# Patient Record
Sex: MALE | Race: WHITE | NOT HISPANIC OR LATINO
[De-identification: names, ages, dates, MRNs, and addresses within clinical notes are randomized per-mention and may not be internally consistent; named-entity substitution may affect disease eponyms.]

---

## 2017-02-09 ENCOUNTER — APPOINTMENT (OUTPATIENT)
Dept: HEART AND VASCULAR | Facility: CLINIC | Age: 82
End: 2017-02-09

## 2017-03-23 ENCOUNTER — APPOINTMENT (OUTPATIENT)
Dept: HEART AND VASCULAR | Facility: CLINIC | Age: 82
End: 2017-03-23

## 2017-03-23 VITALS — RESPIRATION RATE: 16 BRPM | DIASTOLIC BLOOD PRESSURE: 70 MMHG | SYSTOLIC BLOOD PRESSURE: 118 MMHG | HEART RATE: 64 BPM

## 2017-03-23 VITALS — BODY MASS INDEX: 19.61 KG/M2 | HEIGHT: 73 IN | WEIGHT: 148 LBS

## 2017-03-23 VITALS
SYSTOLIC BLOOD PRESSURE: 137 MMHG | WEIGHT: 148 LBS | DIASTOLIC BLOOD PRESSURE: 61 MMHG | HEIGHT: 73 IN | BODY MASS INDEX: 19.61 KG/M2 | HEART RATE: 71 BPM

## 2017-03-23 DIAGNOSIS — Z00.00 ENCOUNTER FOR GENERAL ADULT MEDICAL EXAMINATION W/OUT ABNORMAL FINDINGS: ICD-10-CM

## 2017-07-20 ENCOUNTER — APPOINTMENT (OUTPATIENT)
Dept: HEART AND VASCULAR | Facility: CLINIC | Age: 82
End: 2017-07-20

## 2017-07-20 VITALS — HEART RATE: 63 BPM | SYSTOLIC BLOOD PRESSURE: 136 MMHG | RESPIRATION RATE: 16 BRPM | DIASTOLIC BLOOD PRESSURE: 80 MMHG

## 2017-07-20 RX ORDER — TAMSULOSIN HYDROCHLORIDE 0.4 MG/1
0.4 CAPSULE ORAL
Qty: 30 | Refills: 0 | Status: ACTIVE | COMMUNITY
Start: 2017-04-03

## 2017-07-20 RX ORDER — DEXTRAN 70/HYPROMELLOSE 0.1%-0.3%
0.1-0.3 DROPS OPHTHALMIC (EYE)
Qty: 15 | Refills: 0 | Status: ACTIVE | COMMUNITY
Start: 2017-01-31

## 2017-08-16 ENCOUNTER — MEDICATION RENEWAL (OUTPATIENT)
Age: 82
End: 2017-08-16

## 2017-10-26 ENCOUNTER — APPOINTMENT (OUTPATIENT)
Dept: HEART AND VASCULAR | Facility: CLINIC | Age: 82
End: 2017-10-26
Payer: OTHER GOVERNMENT

## 2017-10-26 VITALS — WEIGHT: 159 LBS | BODY MASS INDEX: 21.07 KG/M2 | HEIGHT: 73 IN

## 2017-10-26 DIAGNOSIS — R07.9 CHEST PAIN, UNSPECIFIED: ICD-10-CM

## 2017-10-26 PROCEDURE — 36415 COLL VENOUS BLD VENIPUNCTURE: CPT

## 2017-10-26 PROCEDURE — 99214 OFFICE O/P EST MOD 30 MIN: CPT

## 2017-10-26 RX ORDER — LEVETIRACETAM 500 MG/1
500 TABLET, FILM COATED ORAL
Qty: 60 | Refills: 0 | Status: DISCONTINUED | COMMUNITY
Start: 2017-04-18 | End: 2017-10-26

## 2017-10-29 VITALS — RESPIRATION RATE: 16 BRPM | HEART RATE: 64 BPM | DIASTOLIC BLOOD PRESSURE: 80 MMHG | SYSTOLIC BLOOD PRESSURE: 124 MMHG

## 2017-10-29 LAB
ALBUMIN SERPL ELPH-MCNC: 3.5 G/DL
ALP BLD-CCNC: 73 U/L
ALT SERPL-CCNC: 13 U/L
ANION GAP SERPL CALC-SCNC: 13 MMOL/L
AST SERPL-CCNC: 10 U/L
BILIRUB SERPL-MCNC: 0.3 MG/DL
BUN SERPL-MCNC: 14 MG/DL
CALCIUM SERPL-MCNC: 8.2 MG/DL
CHLORIDE SERPL-SCNC: 107 MMOL/L
CHOLEST SERPL-MCNC: 98 MG/DL
CHOLEST/HDLC SERPL: 2.5 RATIO
CO2 SERPL-SCNC: 27 MMOL/L
CREAT SERPL-MCNC: 1.01 MG/DL
GLUCOSE SERPL-MCNC: 105 MG/DL
HDLC SERPL-MCNC: 40 MG/DL
LDLC SERPL CALC-MCNC: 42 MG/DL
POTASSIUM SERPL-SCNC: 4.3 MMOL/L
PROT SERPL-MCNC: 5.5 G/DL
SODIUM SERPL-SCNC: 147 MMOL/L
TRIGL SERPL-MCNC: 82 MG/DL

## 2018-02-22 ENCOUNTER — APPOINTMENT (OUTPATIENT)
Dept: HEART AND VASCULAR | Facility: CLINIC | Age: 83
End: 2018-02-22
Payer: COMMERCIAL

## 2018-02-22 VITALS — HEIGHT: 73 IN | WEIGHT: 160 LBS | BODY MASS INDEX: 21.2 KG/M2

## 2018-02-22 PROCEDURE — 99214 OFFICE O/P EST MOD 30 MIN: CPT

## 2018-02-26 VITALS — DIASTOLIC BLOOD PRESSURE: 70 MMHG | RESPIRATION RATE: 16 BRPM | HEART RATE: 64 BPM | SYSTOLIC BLOOD PRESSURE: 130 MMHG

## 2018-06-19 ENCOUNTER — MEDICATION RENEWAL (OUTPATIENT)
Age: 83
End: 2018-06-19

## 2018-06-19 RX ORDER — METOPROLOL TARTRATE 25 MG/1
25 TABLET, FILM COATED ORAL
Qty: 90 | Refills: 3 | Status: ACTIVE | COMMUNITY
Start: 2017-03-03 | End: 1900-01-01

## 2018-08-23 ENCOUNTER — APPOINTMENT (OUTPATIENT)
Dept: HEART AND VASCULAR | Facility: CLINIC | Age: 83
End: 2018-08-23
Payer: COMMERCIAL

## 2018-08-23 VITALS — WEIGHT: 149 LBS | BODY MASS INDEX: 19.75 KG/M2 | HEIGHT: 73 IN

## 2018-08-23 PROCEDURE — 99214 OFFICE O/P EST MOD 30 MIN: CPT | Mod: 25

## 2018-08-23 PROCEDURE — 36415 COLL VENOUS BLD VENIPUNCTURE: CPT

## 2018-08-23 RX ORDER — AMANTADINE HYDROCHLORIDE 100 MG/1
100 CAPSULE ORAL
Qty: 30 | Refills: 0 | Status: DISCONTINUED | COMMUNITY
Start: 2017-04-03 | End: 2018-08-23

## 2018-08-24 ENCOUNTER — EMERGENCY (EMERGENCY)
Facility: HOSPITAL | Age: 83
LOS: 1 days | Discharge: HOME | End: 2018-08-24
Attending: EMERGENCY MEDICINE | Admitting: EMERGENCY MEDICINE
Payer: COMMERCIAL

## 2018-08-24 VITALS — SYSTOLIC BLOOD PRESSURE: 132 MMHG | HEART RATE: 60 BPM | RESPIRATION RATE: 16 BRPM | DIASTOLIC BLOOD PRESSURE: 80 MMHG

## 2018-08-24 VITALS
DIASTOLIC BLOOD PRESSURE: 50 MMHG | TEMPERATURE: 98 F | OXYGEN SATURATION: 97 % | RESPIRATION RATE: 18 BRPM | WEIGHT: 149.69 LBS | HEART RATE: 81 BPM | SYSTOLIC BLOOD PRESSURE: 186 MMHG

## 2018-08-24 VITALS
DIASTOLIC BLOOD PRESSURE: 87 MMHG | RESPIRATION RATE: 18 BRPM | TEMPERATURE: 98 F | HEART RATE: 76 BPM | SYSTOLIC BLOOD PRESSURE: 176 MMHG | OXYGEN SATURATION: 97 %

## 2018-08-24 DIAGNOSIS — Z98.890 OTHER SPECIFIED POSTPROCEDURAL STATES: Chronic | ICD-10-CM

## 2018-08-24 LAB
ALBUMIN SERPL ELPH-MCNC: 3.5 G/DL
ALP BLD-CCNC: 57 U/L
ALT SERPL-CCNC: 9 U/L
ANION GAP SERPL CALC-SCNC: 16 MMOL/L
AST SERPL-CCNC: 14 U/L
BILIRUB SERPL-MCNC: 0.4 MG/DL
BUN SERPL-MCNC: 14 MG/DL
CALCIUM SERPL-MCNC: 6.2 MG/DL
CHLORIDE SERPL-SCNC: 104 MMOL/L
CHOLEST SERPL-MCNC: 94 MG/DL
CHOLEST/HDLC SERPL: 2.1 RATIO
CO2 SERPL-SCNC: 25 MMOL/L
CREAT SERPL-MCNC: 0.9 MG/DL
GLUCOSE SERPL-MCNC: 100 MG/DL
HDLC SERPL-MCNC: 45 MG/DL
LDLC SERPL CALC-MCNC: 39 MG/DL
POTASSIUM SERPL-SCNC: 4 MMOL/L
PROT SERPL-MCNC: 5.3 G/DL
SODIUM SERPL-SCNC: 145 MMOL/L
TRIGL SERPL-MCNC: 51 MG/DL

## 2018-08-24 PROCEDURE — 96375 TX/PRO/DX INJ NEW DRUG ADDON: CPT

## 2018-08-24 PROCEDURE — 99284 EMERGENCY DEPT VISIT MOD MDM: CPT | Mod: 25

## 2018-08-24 PROCEDURE — 83735 ASSAY OF MAGNESIUM: CPT

## 2018-08-24 PROCEDURE — 82330 ASSAY OF CALCIUM: CPT

## 2018-08-24 PROCEDURE — 82040 ASSAY OF SERUM ALBUMIN: CPT

## 2018-08-24 PROCEDURE — 85025 COMPLETE CBC W/AUTO DIFF WBC: CPT

## 2018-08-24 PROCEDURE — 85610 PROTHROMBIN TIME: CPT

## 2018-08-24 PROCEDURE — 80048 BASIC METABOLIC PNL TOTAL CA: CPT

## 2018-08-24 PROCEDURE — 36415 COLL VENOUS BLD VENIPUNCTURE: CPT

## 2018-08-24 PROCEDURE — 96374 THER/PROPH/DIAG INJ IV PUSH: CPT

## 2018-08-24 PROCEDURE — 99284 EMERGENCY DEPT VISIT MOD MDM: CPT

## 2018-08-24 PROCEDURE — 84100 ASSAY OF PHOSPHORUS: CPT

## 2018-08-24 PROCEDURE — 85730 THROMBOPLASTIN TIME PARTIAL: CPT

## 2018-08-24 PROCEDURE — 80053 COMPREHEN METABOLIC PANEL: CPT

## 2018-08-24 RX ORDER — MAGNESIUM SULFATE 500 MG/ML
2 VIAL (ML) INJECTION ONCE
Qty: 0 | Refills: 0 | Status: COMPLETED | OUTPATIENT
Start: 2018-08-24 | End: 2018-08-24

## 2018-08-24 RX ORDER — CALCIUM CARBONATE 500(1250)
2 TABLET ORAL ONCE
Qty: 0 | Refills: 0 | Status: COMPLETED | OUTPATIENT
Start: 2018-08-24 | End: 2018-08-24

## 2018-08-24 RX ORDER — CALCIUM GLUCONATE 100 MG/ML
2 VIAL (ML) INTRAVENOUS ONCE
Qty: 0 | Refills: 0 | Status: COMPLETED | OUTPATIENT
Start: 2018-08-24 | End: 2018-08-24

## 2018-08-24 RX ORDER — MAGNESIUM OXIDE 400 MG ORAL TABLET 241.3 MG
800 TABLET ORAL ONCE
Qty: 0 | Refills: 0 | Status: COMPLETED | OUTPATIENT
Start: 2018-08-24 | End: 2018-08-24

## 2018-08-24 RX ORDER — MAGNESIUM OXIDE 400 MG ORAL TABLET 241.3 MG
1 TABLET ORAL
Qty: 14 | Refills: 0 | OUTPATIENT
Start: 2018-08-24 | End: 2018-09-06

## 2018-08-24 RX ADMIN — Medication 2 TABLET(S): at 14:24

## 2018-08-24 RX ADMIN — Medication 200 GRAM(S): at 14:25

## 2018-08-24 RX ADMIN — Medication 50 GRAM(S): at 15:22

## 2018-08-24 RX ADMIN — MAGNESIUM OXIDE 400 MG ORAL TABLET 800 MILLIGRAM(S): 241.3 TABLET ORAL at 15:22

## 2018-08-24 NOTE — ED ADULT NURSE NOTE - PMH
Atherosclerosis of coronary artery  CAD (coronary artery disease)  Stented coronary artery  x4, per pt.

## 2018-08-24 NOTE — ED PROVIDER NOTE - OBJECTIVE STATEMENT
84 yo male h/o cad s/p stent, prostate ca, gerd c/o low calcium found on routine blood work yest.  Pt notes ~ 1 mo nonbloody diarrhea w 10 lb wt loss, denies any new meds or changes in meds.  No fever. No weakness, muscle cramps/pain, palpitations, sob, cp, abd pain, n/v, dysuria.

## 2018-08-24 NOTE — ED PROVIDER NOTE - MEDICAL DECISION MAKING DETAILS
Pt w incidental finding of low ca - asymptomatic.  EKG w/o sig hypocalcemia findings.  Will recheck labs; reassess.

## 2018-08-24 NOTE — ED ADULT NURSE NOTE - NSIMPLEMENTINTERV_GEN_ALL_ED
Implemented All Universal Safety Interventions:  [place] to call system. Call bell, personal items and telephone within reach. Instruct patient to call for assistance. Room bathroom lighting operational. Non-slip footwear when patient is off stretcher. Physically safe environment: no spills, clutter or unnecessary equipment. Stretcher in lowest position, wheels locked, appropriate side rails in place.

## 2018-08-24 NOTE — ED ADULT TRIAGE NOTE - CHIEF COMPLAINT QUOTE
pt states " I had annual blood work and they call me from my doctor's office to tell me that my calcium was low". ekg in progress.

## 2018-08-24 NOTE — ED PROVIDER NOTE - PROGRESS NOTE DETAILS
Pt w low ca here, albumin 3.3.  Mg, Phos, vit d and pth ordered.  Ca repletion ordered; will recheck. Mag low and repleted; repeat mg, ca improved.  Pt discussed w hospitalist, Dr Stuart - agrees w dc since pt asymptomatic.  Vit d and pth pending; pt set up for outpt appt bt referrals coordinator.  Rudolph groves.

## 2018-08-24 NOTE — ED PROVIDER NOTE - PMH
Atherosclerosis of coronary artery  CAD (coronary artery disease)  Stented coronary artery  x4, per pt. Atherosclerosis of coronary artery  CAD (coronary artery disease)  Prostate cancer    Stented coronary artery  x4, per pt.

## 2018-08-24 NOTE — ED ADULT NURSE NOTE - OBJECTIVE STATEMENT
Pt directed to ED from cardiologist [place] abnormal labs.  PT states "I had a heart check up yesterday and some blood was drawn and today my doctor said I should come here because my Calcium is abnormal."  EKG obtained.  PT denies palpitations, CP, SOB, Dizziness, N/V/D, Fevers at this time.

## 2018-08-28 DIAGNOSIS — Z79.82 LONG TERM (CURRENT) USE OF ASPIRIN: ICD-10-CM

## 2018-08-28 DIAGNOSIS — R19.7 DIARRHEA, UNSPECIFIED: ICD-10-CM

## 2018-08-28 DIAGNOSIS — I25.10 ATHEROSCLEROTIC HEART DISEASE OF NATIVE CORONARY ARTERY WITHOUT ANGINA PECTORIS: ICD-10-CM

## 2018-08-28 DIAGNOSIS — Z79.02 LONG TERM (CURRENT) USE OF ANTITHROMBOTICS/ANTIPLATELETS: ICD-10-CM

## 2018-08-28 DIAGNOSIS — R79.89 OTHER SPECIFIED ABNORMAL FINDINGS OF BLOOD CHEMISTRY: ICD-10-CM

## 2018-08-28 DIAGNOSIS — E83.42 HYPOMAGNESEMIA: ICD-10-CM

## 2018-08-28 DIAGNOSIS — Z79.899 OTHER LONG TERM (CURRENT) DRUG THERAPY: ICD-10-CM

## 2018-08-28 DIAGNOSIS — E83.51 HYPOCALCEMIA: ICD-10-CM

## 2018-08-29 ENCOUNTER — APPOINTMENT (OUTPATIENT)
Dept: INTERNAL MEDICINE | Facility: CLINIC | Age: 83
End: 2018-08-29

## 2018-08-29 PROBLEM — C61 MALIGNANT NEOPLASM OF PROSTATE: Chronic | Status: ACTIVE | Noted: 2018-08-24

## 2018-09-04 ENCOUNTER — APPOINTMENT (OUTPATIENT)
Dept: INTERNAL MEDICINE | Facility: CLINIC | Age: 83
End: 2018-09-04

## 2018-09-05 ENCOUNTER — LABORATORY RESULT (OUTPATIENT)
Age: 83
End: 2018-09-05

## 2018-09-05 ENCOUNTER — APPOINTMENT (OUTPATIENT)
Dept: INTERNAL MEDICINE | Facility: CLINIC | Age: 83
End: 2018-09-05
Payer: COMMERCIAL

## 2018-09-05 DIAGNOSIS — Z78.9 OTHER SPECIFIED HEALTH STATUS: ICD-10-CM

## 2018-09-05 DIAGNOSIS — Z90.49 ACQUIRED ABSENCE OF OTHER SPECIFIED PARTS OF DIGESTIVE TRACT: ICD-10-CM

## 2018-09-05 DIAGNOSIS — I25.10 ATHEROSCLEROTIC HEART DISEASE OF NATIVE CORONARY ARTERY W/OUT ANGINA PECTORIS: ICD-10-CM

## 2018-09-05 PROCEDURE — 36415 COLL VENOUS BLD VENIPUNCTURE: CPT

## 2018-09-05 PROCEDURE — 99204 OFFICE O/P NEW MOD 45 MIN: CPT | Mod: 25,GC

## 2018-09-05 RX ORDER — ATORVASTATIN CALCIUM 20 MG/1
20 TABLET, FILM COATED ORAL
Qty: 135 | Refills: 3 | Status: ACTIVE | COMMUNITY
Start: 2018-09-05

## 2018-09-05 RX ORDER — MENTHOL 5.8 MG/1
500 LOZENGE ORAL
Qty: 180 | Refills: 0 | Status: ACTIVE | COMMUNITY
Start: 2018-09-05

## 2018-09-05 RX ORDER — OMEPRAZOLE 20 MG/1
20 CAPSULE, DELAYED RELEASE ORAL DAILY
Qty: 30 | Refills: 0 | Status: ACTIVE | COMMUNITY
Start: 2018-09-05

## 2018-09-05 RX ORDER — ATORVASTATIN CALCIUM 10 MG/1
10 TABLET, FILM COATED ORAL
Qty: 30 | Refills: 0 | Status: DISCONTINUED | COMMUNITY
Start: 2017-03-03 | End: 2018-09-05

## 2018-09-05 RX ORDER — FINASTERIDE 5 MG/1
5 TABLET, FILM COATED ORAL
Qty: 30 | Refills: 0 | Status: DISCONTINUED | COMMUNITY
Start: 2017-03-31 | End: 2018-09-05

## 2018-09-05 RX ORDER — OMEGA-3/DHA/EPA/FISH OIL 300-1000MG
400 CAPSULE ORAL
Qty: 30 | Refills: 0 | Status: ACTIVE | COMMUNITY
Start: 2018-09-05

## 2018-09-05 NOTE — COUNSELING
[Healthy eating counseling provided] : healthy eating [Inadequate social support] : Inadequate social support [Financial issues] : Financial issues [de-identified] : wife currently hospitalized for broken hip

## 2018-09-05 NOTE — END OF VISIT
[] : Resident [FreeTextEntry3] : Transfer PCP to our practice. \par Sent to ED by cardiology with hypocalcemia\par ED w/u shows low ca, otw unremarkable, no further work up for etiology is done. \par Corrected Ca 6.9. \par Diarrhea. -now resolved. \par Exam: neuro-- negative paresthesia \par \par DDx: Hypocalcemia 2/2 PTH?  Electrolyte abnormality with recent diarreha?  \par Check BMP, PTH, urine lytes, Mg. \par \par f/u labs with Dr. August over phone this wk. \par RTO with [facility] in 2 months.   [>50% of Time Spent on Counseling for ____] : Greater than 50% of the encounter time was spent on counseling for [unfilled] [Time Spent: ___ minutes] : I have spent [unfilled] minutes of face to face time with the patient

## 2018-09-05 NOTE — HISTORY OF PRESENT ILLNESS
[Post-hospitalization from ___ Hospital] : Post-hospitalization from [unfilled] Hospital [FreeTextEntry2] : The patient had a 2-month history diarrhea for which he did not seek medical care. On 8/23 Mr. Cha was seen by cardiologist for routine f/u labs for his CAD s/p 3x stents and found to have hypocalcemia to 6.1 after which he was recommended to go to ED. At ED, calcium was 6.3 and magnesium was 0.7. Patient received electrolyte repletion and IV fluids and was discharged on PO calcium vitamin D tablet and PO magnesium. His diarrhea resolved after starting these supplements. He presents today for evaluation. Denies any other complaints.

## 2018-09-05 NOTE — ASSESSMENT
[FreeTextEntry1] : Mr. Cha is an 86yo M with PMH HLD, HTN, CAD s/p 3x stents, BPH, GERD who presents for f/u of acute diarrhea, hypocalcemia and hypomagnesemia detected upon routine labs at outpatient cards then seen by ED. Poor PO intake vs acute illness vs primary hypoparathyroidism vs increased urinary calcium losses. Diarrhea resolved after starting PO calcium, vitamin D and magnesium. Patient has been tolerating good PO and has good appetite.\par \par \par 1. Hypocalcemia, hypomagnesemia\par - labs today: BMP, magnesium, UA, urine protein, 24 hr urine calcium\par - continue magnesium 400 mg po daily\par - switch from calcium 600/ vitamin D 200 qd to Tums 3 tablets BID as per patient current tablets too big\par \par 2. Diarrhea\par - resolved, patient educated to call clinic if diarrhea recurs\par

## 2018-09-05 NOTE — PHYSICAL EXAM
[No Acute Distress] : no acute distress [Well Nourished] : well nourished [Well Developed] : well developed [Well-Appearing] : well-appearing [Normal Voice/Communication] : normal voice/communication [Normal Sclera/Conjunctiva] : normal sclera/conjunctiva [No JVD] : no jugular venous distention [No Respiratory Distress] : no respiratory distress  [Clear to Auscultation] : lungs were clear to auscultation bilaterally [No Accessory Muscle Use] : no accessory muscle use [Normal Rate] : normal rate  [Regular Rhythm] : with a regular rhythm [Normal S1, S2] : normal S1 and S2 [No Murmur] : no murmur heard [No Edema] : there was no peripheral edema [No Extremity Clubbing/Cyanosis] : no extremity clubbing/cyanosis [Soft] : abdomen soft [Non Tender] : non-tender [Non-distended] : non-distended [No Masses] : no abdominal mass palpated [Kyphosis] : kyphosis [Grossly Normal Strength/Tone] : grossly normal strength/tone [No Rash] : no rash [Coordination Grossly Intact] : coordination grossly intact [No Focal Deficits] : no focal deficits [Speech Grossly Normal] : speech grossly normal [Memory Grossly Normal] : memory grossly normal [Normal Affect] : the affect was normal [Alert and Oriented x3] : oriented to person, place, and time [Normal Mood] : the mood was normal [Normal Insight/Judgement] : insight and judgment were intact [de-identified] : Elderly adult white male [de-identified] : antalgic gait

## 2018-09-05 NOTE — REVIEW OF SYSTEMS
[Joint Pain] : joint pain [Fever] : no fever [Chills] : no chills [Recent Change In Weight] : ~T no recent weight change [Chest Pain] : no chest pain [Palpitations] : no palpitations [Lower Ext Edema] : no lower extremity edema [Orthopena] : no orthopnea [Shortness Of Breath] : no shortness of breath [Cough] : no cough [Dyspnea on Exertion] : not dyspnea on exertion [Abdominal Pain] : no abdominal pain [Nausea] : no nausea [Constipation] : no constipation [Diarrhea] : no diarrhea [Vomiting] : no vomiting [Dysuria] : no dysuria [Hesitancy] : no hesitancy

## 2018-09-10 LAB
ANION GAP SERPL CALC-SCNC: 13 MMOL/L
APPEARANCE: CLEAR
BILIRUBIN URINE: ABNORMAL
BLOOD URINE: NEGATIVE
BUN SERPL-MCNC: 14 MG/DL
CALCIUM SERPL-MCNC: 8.4 MG/DL
CALCIUM SERPL-MCNC: 8.4 MG/DL
CAU: 4 MG/DL
CHLORIDE SERPL-SCNC: 103 MMOL/L
CO2 SERPL-SCNC: 27 MMOL/L
COLOR: ABNORMAL
CREAT 24H UR-MCNC: NORMAL
CREAT ?TM UR-MCNC: 304 MG/DL
CREAT SERPL-MCNC: 0.82 MG/DL
GLUCOSE QUALITATIVE U: NEGATIVE MG/DL
GLUCOSE SERPL-MCNC: 105 MG/DL
KETONES URINE: ABNORMAL
LEUKOCYTE ESTERASE URINE: NEGATIVE
MAGNESIUM SERPL-MCNC: 1.5 MG/DL
NITRITE URINE: NEGATIVE
PARATHYROID HORMONE INTACT: 38 PG/ML
PH URINE: 5.5
POTASSIUM SERPL-SCNC: 4.4 MMOL/L
PROT ?TM UR-MCNC: NORMAL
PROTEIN URINE: ABNORMAL MG/DL
SODIUM SERPL-SCNC: 143 MMOL/L
SPECIFIC GRAVITY URINE: 1.03
SPECIMEN VOL 24H UR: NORMAL
SPECIMEN VOL 24H UR: NORMAL
UROBILINOGEN URINE: 1 MG/DL

## 2018-09-12 ENCOUNTER — INPATIENT (INPATIENT)
Facility: HOSPITAL | Age: 83
LOS: 0 days | Discharge: HOME | DRG: 392 | End: 2018-09-13
Attending: STUDENT IN AN ORGANIZED HEALTH CARE EDUCATION/TRAINING PROGRAM | Admitting: STUDENT IN AN ORGANIZED HEALTH CARE EDUCATION/TRAINING PROGRAM
Payer: COMMERCIAL

## 2018-09-12 VITALS
HEART RATE: 56 BPM | RESPIRATION RATE: 18 BRPM | TEMPERATURE: 98 F | DIASTOLIC BLOOD PRESSURE: 75 MMHG | WEIGHT: 149.91 LBS | SYSTOLIC BLOOD PRESSURE: 161 MMHG | OXYGEN SATURATION: 98 %

## 2018-09-12 DIAGNOSIS — Z98.890 OTHER SPECIFIED POSTPROCEDURAL STATES: Chronic | ICD-10-CM

## 2018-09-12 DIAGNOSIS — Z95.5 PRESENCE OF CORONARY ANGIOPLASTY IMPLANT AND GRAFT: ICD-10-CM

## 2018-09-12 DIAGNOSIS — Z91.89 OTHER SPECIFIED PERSONAL RISK FACTORS, NOT ELSEWHERE CLASSIFIED: ICD-10-CM

## 2018-09-12 DIAGNOSIS — K40.90 UNILATERAL INGUINAL HERNIA, WITHOUT OBSTRUCTION OR GANGRENE, NOT SPECIFIED AS RECURRENT: ICD-10-CM

## 2018-09-12 DIAGNOSIS — K52.9 NONINFECTIVE GASTROENTERITIS AND COLITIS, UNSPECIFIED: ICD-10-CM

## 2018-09-12 DIAGNOSIS — C61 MALIGNANT NEOPLASM OF PROSTATE: ICD-10-CM

## 2018-09-12 DIAGNOSIS — Z29.9 ENCOUNTER FOR PROPHYLACTIC MEASURES, UNSPECIFIED: ICD-10-CM

## 2018-09-12 DIAGNOSIS — I10 ESSENTIAL (PRIMARY) HYPERTENSION: ICD-10-CM

## 2018-09-12 LAB — LACTATE SERPL-SCNC: 1.1 MMOL/L — SIGNIFICANT CHANGE UP (ref 0.5–2)

## 2018-09-12 PROCEDURE — 74177 CT ABD & PELVIS W/CONTRAST: CPT | Mod: 26

## 2018-09-12 PROCEDURE — 99285 EMERGENCY DEPT VISIT HI MDM: CPT | Mod: 25

## 2018-09-12 PROCEDURE — 93010 ELECTROCARDIOGRAM REPORT: CPT

## 2018-09-12 PROCEDURE — 99222 1ST HOSP IP/OBS MODERATE 55: CPT | Mod: GC

## 2018-09-12 RX ORDER — MAGNESIUM SULFATE 500 MG/ML
2 VIAL (ML) INJECTION ONCE
Qty: 0 | Refills: 0 | Status: COMPLETED | OUTPATIENT
Start: 2018-09-12 | End: 2018-09-12

## 2018-09-12 RX ORDER — IOHEXOL 300 MG/ML
30 INJECTION, SOLUTION INTRAVENOUS ONCE
Qty: 0 | Refills: 0 | Status: COMPLETED | OUTPATIENT
Start: 2018-09-12 | End: 2018-09-12

## 2018-09-12 RX ORDER — SODIUM CHLORIDE 9 MG/ML
500 INJECTION INTRAMUSCULAR; INTRAVENOUS; SUBCUTANEOUS ONCE
Qty: 0 | Refills: 0 | Status: DISCONTINUED | OUTPATIENT
Start: 2018-09-12 | End: 2018-09-12

## 2018-09-12 RX ORDER — METRONIDAZOLE 500 MG
500 TABLET ORAL EVERY 8 HOURS
Qty: 0 | Refills: 0 | Status: DISCONTINUED | OUTPATIENT
Start: 2018-09-13 | End: 2018-09-13

## 2018-09-12 RX ORDER — ONDANSETRON 8 MG/1
4 TABLET, FILM COATED ORAL ONCE
Qty: 0 | Refills: 0 | Status: COMPLETED | OUTPATIENT
Start: 2018-09-12 | End: 2018-09-12

## 2018-09-12 RX ORDER — CEFTRIAXONE 500 MG/1
1 INJECTION, POWDER, FOR SOLUTION INTRAMUSCULAR; INTRAVENOUS EVERY 24 HOURS
Qty: 0 | Refills: 0 | Status: DISCONTINUED | OUTPATIENT
Start: 2018-09-13 | End: 2018-09-13

## 2018-09-12 RX ORDER — SODIUM CHLORIDE 9 MG/ML
1000 INJECTION INTRAMUSCULAR; INTRAVENOUS; SUBCUTANEOUS
Qty: 0 | Refills: 0 | Status: DISCONTINUED | OUTPATIENT
Start: 2018-09-12 | End: 2018-09-13

## 2018-09-12 RX ORDER — CEFTRIAXONE 500 MG/1
1 INJECTION, POWDER, FOR SOLUTION INTRAMUSCULAR; INTRAVENOUS ONCE
Qty: 0 | Refills: 0 | Status: COMPLETED | OUTPATIENT
Start: 2018-09-12 | End: 2018-09-12

## 2018-09-12 RX ORDER — METRONIDAZOLE 500 MG
500 TABLET ORAL ONCE
Qty: 0 | Refills: 0 | Status: COMPLETED | OUTPATIENT
Start: 2018-09-12 | End: 2018-09-12

## 2018-09-12 RX ORDER — SODIUM CHLORIDE 9 MG/ML
1000 INJECTION INTRAMUSCULAR; INTRAVENOUS; SUBCUTANEOUS ONCE
Qty: 0 | Refills: 0 | Status: COMPLETED | OUTPATIENT
Start: 2018-09-12 | End: 2018-09-12

## 2018-09-12 RX ADMIN — Medication 100 MILLIGRAM(S): at 21:35

## 2018-09-12 RX ADMIN — ONDANSETRON 4 MILLIGRAM(S): 8 TABLET, FILM COATED ORAL at 17:33

## 2018-09-12 RX ADMIN — CEFTRIAXONE 1 GRAM(S): 500 INJECTION, POWDER, FOR SOLUTION INTRAMUSCULAR; INTRAVENOUS at 21:49

## 2018-09-12 RX ADMIN — SODIUM CHLORIDE 100 MILLILITER(S): 9 INJECTION INTRAMUSCULAR; INTRAVENOUS; SUBCUTANEOUS at 17:33

## 2018-09-12 RX ADMIN — SODIUM CHLORIDE 1000 MILLILITER(S): 9 INJECTION INTRAMUSCULAR; INTRAVENOUS; SUBCUTANEOUS at 20:16

## 2018-09-12 RX ADMIN — CEFTRIAXONE 100 GRAM(S): 500 INJECTION, POWDER, FOR SOLUTION INTRAMUSCULAR; INTRAVENOUS at 21:16

## 2018-09-12 RX ADMIN — Medication 50 GRAM(S): at 23:42

## 2018-09-12 RX ADMIN — IOHEXOL 30 MILLILITER(S): 300 INJECTION, SOLUTION INTRAVENOUS at 17:33

## 2018-09-12 NOTE — ED PROVIDER NOTE - MEDICAL DECISION MAKING DETAILS
Pt p/w abd pain, n/v. Pt reports large painful hernia PTA, resolved. Possibly incarcerated hernia, now reduced. Will get labs, CT a/p, surgical consult. Dispo pending w/u and clinical status

## 2018-09-12 NOTE — H&P ADULT - HISTORY OF PRESENT ILLNESS
85 y/o M PMhx of HTN, CAD s/p PCI (last stent 2012), ICH 2/2 fall (2016), prostate ca s/p resection, gerd, b/l inguinal hernia with right incarcerated hernia s/p repair (years ago) here with abd pain. The patient reports lower abd pain that started this AM. The pain is crampy, nonradiating, intermittent and worse with palpation. The patient states that he has bowel movements daily but they have been "loose" for the past month. He has been taking imodium at home with reduction in the amount of loose stools. He also reports an episode of nausea and vomiting earlier today that was nonbloody. The patient denies chest pain, shortness of breath, fever, or chills. Denies recent travel, recent hospitalizations or sick contacts.     CT a/p with iv and po contrast had findings consistent with colitis and also showed some mesenteric edema.   Patient lactate was 2.3 but normalized after 1.5L of NS.   General surgery evaluated patient given hx of incarcerated hernias and mesenteric edema. No urgent surgical intervention recommended. 87 y/o M PMhx of HTN, CAD s/p PCI (last stent 2012), ICH 2/2 fall (2016), prostate ca s/p resection, gerd, b/l inguinal hernia with right incarcerated hernia s/p repair (years ago) here with abd pain. The patient reports lower abd pain that started this AM. The pain is crampy, nonradiating, intermittent and worse with palpation. The patient states that he has bowel movements daily but they have been "loose" for the past month. He has been taking imodium at home with reduction in the amount of loose stools. He also reports an episode of nausea and vomiting earlier today that was nonbloody. The patient denies chest pain, shortness of breath, fever, or chills. Denies recent travel, recent hospitalizations or sick contacts.     CT a/p with iv and po contrast had findings consistent with colitis and also showed some mesenteric edema.   Patient lactate was 2.3 but normalized after 1.5L of NS.   General surgery evaluated patient given hx of incarcerated hernias and mesenteric edema. No urgent surgical intervention recommended.     FH: Denies cardiac history in father

## 2018-09-12 NOTE — ED PROVIDER NOTE - PHYSICAL EXAMINATION
Constitutional: Elderly, frail, awake, alert, oriented to person, place, time/situation and in no apparent distress.  ENMT: Airway patent. Normal MM  Eyes: Clear bilaterally  Cardiac: Normal rate, regular rhythm.  Heart sounds S1, S2.  No murmurs, rubs or gallops.  Respiratory: Breaths sounds equal and clear b/l. No increased WOB, tachypnea, hypoxia, or accessory mm use. Pt speaks in full sentences.   Gastrointestinal: Abd soft, NT, ND, NABS. No guarding, rebound, or rigidity. No pulsatile abdominal masses. No organomegaly appreciated. No CVAT. + mild swelling in L inguinal region w/o clear hernia palpated.   Musculoskeletal: Range of motion is not limited  Neuro: Alert and oriented, grossly non focal  Skin: Skin normal color for race, warm, dry and intact. No evidence of rash.  Psych: Alert and oriented to person, place, time/situation. normal mood and affect. no apparent risk to self or others. Constitutional: Elderly, frail, awake, alert, oriented to person, place, time/situation and in no apparent distress.  ENMT: Airway patent. Normal MM  Eyes: Clear bilaterally  Cardiac: Normal rate, regular rhythm.  Heart sounds S1, S2.  No murmurs, rubs or gallops.  Respiratory: Breaths sounds equal and clear b/l. No increased WOB, tachypnea, hypoxia, or accessory mm use. Pt speaks in full sentences.   Gastrointestinal: Abd soft, ND, NABS. + mild ttp w/ deep palpation in the LLQ. No guarding, rebound, or rigidity. No pulsatile abdominal masses. No organomegaly appreciated. No CVAT. + mild swelling in L inguinal region w/o clear hernia palpated, ttp in this region.   Musculoskeletal: Range of motion is not limited  Neuro: Alert and oriented, grossly non focal  Skin: Skin normal color for race, warm, dry and intact. No evidence of rash.  Psych: Alert and oriented to person, place, time/situation. normal mood and affect. no apparent risk to self or others.

## 2018-09-12 NOTE — H&P ADULT - PROBLEM SELECTOR PLAN 2
CT showing mesenteric edema which is less prominent that prior scans. Inguinal hernias reducible b/l.   -General surgery on board -- no urgent surgical intervention required at this point in time. Recommendations appreciated.   -Serial abd exams.

## 2018-09-12 NOTE — CONSULT NOTE ADULT - SUBJECTIVE AND OBJECTIVE BOX
HPI:  86 year old male with history of HTN, CAD s/p stent x 3, GERD, Prostate cancer s/p resection, bilateral inguinal hernias s/p open R inguinal hernia repair. The patient presents with a one day history of sudden abdominal pain associated with some nausea / vomiting. The patient states that this pain was located in the epigastric region as well over his L groin. The patient's abdominal pain improved upon presentation to the ED. The patient continues to have normal bowel function.   The patient currently denies nausea / vomiting / fevers / chills / CP / SOB / dysuria.     PAST MEDICAL & SURGICAL HISTORY:  Prostate cancer  Stented coronary artery: x4, per pt.  Atherosclerosis of coronary artery: CAD (coronary artery disease)  History of prostate surgery    MEDICATIONS  (STANDING):  sodium chloride 0.9%. 1000 milliLiter(s) (100 mL/Hr) IV Continuous <Continuous>    Allergies  No Known Allergies    Vital Signs Last 24 Hrs  T(C): 36.5 (12 Sep 2018 20:14), Max: 36.6 (12 Sep 2018 16:37)  T(F): 97.7 (12 Sep 2018 20:14), Max: 97.9 (12 Sep 2018 16:37)  HR: 75 (12 Sep 2018 20:14) (56 - 75)  BP: 126/62 (12 Sep 2018 20:14) (126/62 - 161/75)  BP(mean): --  RR: 16 (12 Sep 2018 20:14) (16 - 18)  SpO2: 99% (12 Sep 2018 20:14) (98% - 99%)    PHYSICAL EXAM  Gen: NAD   HEENT: NCAT   Resp: CTA BL   CV: RRR  Abd: soft, nt, nd, b/l inguinal hernias reducible non incarcerated likely fat containing   Extremities: no swelling, cyanosis or edema   Neuro: grossly intact     LABS:                        12.8   5.5   )-----------( 205      ( 12 Sep 2018 16:58 )             38.4     09-12    143  |  100  |  15  ----------------------------<  164<H>  3.8   |  29  |  0.93    Ca    9.2      12 Sep 2018 16:58    TPro  6.0  /  Alb  3.8  /  TBili  0.8  /  DBili  x   /  AST  14  /  ALT  10  /  AlkPhos  59  09-12    PT/INR - ( 12 Sep 2018 16:58 )   PT: 14.9 sec;   INR: 1.33        PTT - ( 12 Sep 2018 16:58 )  PTT:30.7 sec    RADIOLOGY & ADDITIONAL STUDIES:  CT scan --> diffuse colitis, consistent with CT scan from 2015  bilateral inguinal hernia's not containing fat

## 2018-09-12 NOTE — ED ADULT NURSE REASSESSMENT NOTE - NS ED NURSE REASSESS COMMENT FT1
report was received from off-going RN, pt. in CT at present, will bolus NS upon return and repeat Lactate afterwards

## 2018-09-12 NOTE — ED ADULT TRIAGE NOTE - CHIEF COMPLAINT QUOTE
patient BIBA from home complains of lower abdominal pain with n/v x 3 days. states that he vomited 6 times today. denies chest pain, sob, fever, chills. reports having diarrhea as well but not today.

## 2018-09-12 NOTE — ED PROVIDER NOTE - PROGRESS NOTE DETAILS
Surgery consulted for possible incarcerated hernia. Pt in CT and surgery will see after CT CT results reviewed. A/p radiology documentation - surgery aware. D/w surgery who is reviewing w/ surgical attending at this time CT results reviewed. A/p radiology documentation - surgery aware. D/w surgery who is reviewing w/ surgical attending Dr Bhatt at this time PT seen by surgery - does not feel mesenteric ischemia. Feels colitis of infection / inflamm pathology. Recommends admission to medicine. PT seen by surgery - does not feel mesenteric ischemia. Feels colitis of infection / inflamm pathology. Recommends admission to medicine. Pt had watery BM in the ED, non bloody. Will give abx, admit to medicine. Rpt lactate nml

## 2018-09-12 NOTE — ED ADULT NURSE NOTE - OBJECTIVE STATEMENT
Patient presents to ED with c/o abdominal pain x3 days associated with nausea and vomiting. Reports vomiting 6 times today Patient also reports diarrhea.  Denies chest pain, sob, fever, chills.

## 2018-09-12 NOTE — CONSULT NOTE ADULT - ASSESSMENT
86 year old male with history of HTN, CAD s/p stent x 3, GERD, Prostate cancer s/p resection, bilateral inguinal hernias s/p open R inguinal hernia repair. The patient presents with colitis and bilateral reducible inguinal hernia   - no urgent surgical intervention at this time   - etiology likely inflammatory versus infectious, unlikely ischemic given distribution of colitis   - bilateral groin hernias are reducible, non incarcerated and fat containing  - patient should been hydrated given elevated lactate  - recommend medical admission and GI work up   - recommend stool studies   - discussed with Dr. Bhatt 86 year old male with history of HTN, CAD s/p stent x 3, GERD, Prostate cancer s/p resection, bilateral inguinal hernias s/p open R inguinal hernia repair. The patient presents with colitis and bilateral reducible inguinal hernia   - no urgent surgical intervention at this time   - etiology likely inflammatory versus infectious, unlikely ischemic given distribution of colitis   - bilateral groin hernias are reducible, non incarcerated and fat containing  - patient should been hydrated given elevated lactate and have lactate re checked   - recommend medical admission and GI work up   - recommend stool studies   - discussed with Dr. Bhatt

## 2018-09-12 NOTE — H&P ADULT - PROBLEM SELECTOR PLAN 1
As per CT scan. Patient reports subacute to chronic loose stool but has been taking imodium. Does not meet SIRS criteria.  -c/w CTX and Flagyl.   -Stool PCR and stool culture.  -Consider O/P x3.   -Serial abd exams.

## 2018-09-12 NOTE — H&P ADULT - PROBLEM SELECTOR PLAN 7
1) PCP Contacted on Admission: (Y/N) --> Name & Phone #:  2) Date of Contact with PCP:  3) PCP Contacted at Discharge: (Y/N, N/A): na   4) Summary of Handoff Given to PCP:   5) Post-Discharge Appointment Date and Location: 1) PCP Contacted on Admission: (Y/N) --> Name & Phone #:  [phone]  2) Date of Contact with PCP:  3) PCP Contacted at Discharge: (Y/N, N/A): na   4) Summary of Handoff Given to PCP:   5) Post-Discharge Appointment Date and Location:

## 2018-09-12 NOTE — H&P ADULT - NSHPPHYSICALEXAM_GEN_ALL_CORE
Appearance: NAD. Speaking in full sentences.   HEENT:   Conjunctiva clear b/l. Moist oral mucosa.  Cardiovascular: RRR with no murmurs.  Respiratory: Lungs CTAB.   Gastrointestinal:  Soft, nontender. Non-distended. Non-rigid.	  Extremities: No edema b/l. No erythema b/l. LE WWP b/l.  Vascular: DP 2+ b/l.  Neurologic:  Alert and awake. Moving all extremities. Following commands. Making eye contact. Appearance: NAD. Speaking in full sentences.   HEENT:   Conjunctiva clear b/l.  Oral mucosa moist.   Cardiovascular: RRR with no murmurs.  Respiratory: Lungs CTAB.   Gastrointestinal:  Soft, Non-distended. Non-rigid. Mild tenderness to deep palpation of the lower abd quadrants. Reducible inguinal hernias noted b/l. No tenderness with inguinal hernia exam b/l. 	  Extremities: No edema b/l. No erythema b/l. LE WWP b/l.  Vascular: DP 2+ b/l.  Neurologic:  Alert and awake. Moving all extremities. Following commands. Making eye contact.  Skin: no rash noted. Appearance: NAD. Speaking in full sentences.   HEENT:   Conjunctiva clear b/l.  Oral mucosa moist.   Cardiovascular: RRR with no murmurs.  Respiratory: Lungs CTAB.   Gastrointestinal:  Soft, Non-distended. Non-rigid. Mild tenderness to deep palpation of the lower abd quadrants. Reducible inguinal hernias noted b/l. No tenderness with inguinal hernia exam b/l. 	  Extremities: No edema b/l. No erythema b/l. LE WWP b/l.  Vascular: DP 2+ b/l.  Neurologic:  Alert and awake. Moving all extremities. Following commands. Making eye contact.  Skin: no rash noted.  Psych: normal affect

## 2018-09-12 NOTE — H&P ADULT - NSHPLABSRESULTS_GEN_ALL_CORE
LABS:                        12.8   5.5   )-----------( 205      ( 12 Sep 2018 16:58 )             38.4     09-12    143  |  100  |  15  ----------------------------<  164<H>  3.8   |  29  |  0.93    Ca    9.2      12 Sep 2018 16:58    TPro  6.0  /  Alb  3.8  /  TBili  0.8  /  DBili  x   /  AST  14  /  ALT  10  /  AlkPhos  59  09-12    PT/INR - ( 12 Sep 2018 16:58 )   PT: 14.9 sec;   INR: 1.33          PTT - ( 12 Sep 2018 16:58 )  PTT:30.7 sec LABS:                        12.8   5.5   )-----------( 205      ( 12 Sep 2018 16:58 )             38.4     09-12    143  |  100  |  15  ----------------------------<  164<H>  3.8   |  29  |  0.93    Ca    9.2      12 Sep 2018 16:58    TPro  6.0  /  Alb  3.8  /  TBili  0.8  /  DBili  x   /  AST  14  /  ALT  10  /  AlkPhos  59  09-12    PT/INR - ( 12 Sep 2018 16:58 )   PT: 14.9 sec;   INR: 1.33       PTT - ( 12 Sep 2018 16:58 )  PTT:30.7 sec    Additional studies reviewed

## 2018-09-12 NOTE — ED ADULT NURSE NOTE - NSIMPLEMENTINTERV_GEN_ALL_ED
Implemented All Universal Safety Interventions:  [place] to call system. Call bell, personal items and telephone within reach. Instruct patient to call for assistance. Room bathroom lighting operational. Non-slip footwear when patient is off stretcher. Physically safe environment: no spills, clutter or unnecessary equipment. Stretcher in lowest position, wheels locked, appropriate side rails in place.

## 2018-09-12 NOTE — H&P ADULT - PMH
Atherosclerosis of coronary artery  CAD (coronary artery disease)  Prostate cancer    Stented coronary artery  x4, per pt.

## 2018-09-12 NOTE — H&P ADULT - ASSESSMENT
85 y/o M PMhx of HTN, CAD s/p PCI (last stent 2012), ICH 2/2 fall (2016), prostate ca s/p resection, gerd, b/l inguinal hernia with right incarcerated hernia s/p repair (years ago) here with colitis.

## 2018-09-12 NOTE — ED PROVIDER NOTE - OBJECTIVE STATEMENT
Pt w/ PMHx HTN, HLD, CAD s/p PCI, ICH, prostate CA in remission, GERD, PSHx R inguinal hernia repair s/p incarceration at [facility] in [place], p/w abd pain, n/v. Pt reports LLQ pain, onset earlier todayu, and bulging known L inguinal hernia w/ pain. Pt reports multiple episodes of nonbilious, nonbloody emesis. Pt reports 1 hard BM earlier today. Pt is not passing flatus. No F/U/D or hematuria. No flank pain. No f/c. At the time of exam, pt reports his hernia reduced, and is pain-free for the past 15 min. Pt w/ PMHx HTN, HLD, CAD s/p PCI, ICH, prostate CA in remission, GERD, PSHx R inguinal hernia repair s/p incarceration at [facility] in [place], p/w abd pain, n/v. Pt reports LLQ pain, onset earlier today, and bulging known L inguinal hernia w/ pain. Pt reports multiple episodes of nonbilious, nonbloody emesis. Pt reports 1 hard BM earlier today. Pt is not passing flatus. No F/U/D or hematuria. No flank pain. No f/c. At the time of exam, pt reports his hernia reduced, and is pain-free for the past 15 min.

## 2018-09-13 ENCOUNTER — TRANSCRIPTION ENCOUNTER (OUTPATIENT)
Age: 83
End: 2018-09-13

## 2018-09-13 VITALS
HEART RATE: 67 BPM | OXYGEN SATURATION: 99 % | RESPIRATION RATE: 16 BRPM | TEMPERATURE: 98 F | SYSTOLIC BLOOD PRESSURE: 129 MMHG | DIASTOLIC BLOOD PRESSURE: 63 MMHG

## 2018-09-13 DIAGNOSIS — I25.10 ATHEROSCLEROTIC HEART DISEASE OF NATIVE CORONARY ARTERY WITHOUT ANGINA PECTORIS: ICD-10-CM

## 2018-09-13 DIAGNOSIS — E83.42 HYPOMAGNESEMIA: ICD-10-CM

## 2018-09-13 DIAGNOSIS — K40.20 BILATERAL INGUINAL HERNIA, WITHOUT OBSTRUCTION OR GANGRENE, NOT SPECIFIED AS RECURRENT: ICD-10-CM

## 2018-09-13 LAB — HBA1C BLD-MCNC: 5.2 % — SIGNIFICANT CHANGE UP (ref 4–5.6)

## 2018-09-13 PROCEDURE — 83605 ASSAY OF LACTIC ACID: CPT

## 2018-09-13 PROCEDURE — 87040 BLOOD CULTURE FOR BACTERIA: CPT

## 2018-09-13 PROCEDURE — 99238 HOSP IP/OBS DSCHRG MGMT 30/<: CPT

## 2018-09-13 PROCEDURE — 85730 THROMBOPLASTIN TIME PARTIAL: CPT

## 2018-09-13 PROCEDURE — 96365 THER/PROPH/DIAG IV INF INIT: CPT | Mod: XU

## 2018-09-13 PROCEDURE — 83036 HEMOGLOBIN GLYCOSYLATED A1C: CPT

## 2018-09-13 PROCEDURE — 85610 PROTHROMBIN TIME: CPT

## 2018-09-13 PROCEDURE — 97162 PT EVAL MOD COMPLEX 30 MIN: CPT

## 2018-09-13 PROCEDURE — 74177 CT ABD & PELVIS W/CONTRAST: CPT

## 2018-09-13 PROCEDURE — 85025 COMPLETE CBC W/AUTO DIFF WBC: CPT

## 2018-09-13 PROCEDURE — 83735 ASSAY OF MAGNESIUM: CPT

## 2018-09-13 PROCEDURE — 93005 ELECTROCARDIOGRAM TRACING: CPT

## 2018-09-13 PROCEDURE — 99285 EMERGENCY DEPT VISIT HI MDM: CPT | Mod: 25

## 2018-09-13 PROCEDURE — 36415 COLL VENOUS BLD VENIPUNCTURE: CPT

## 2018-09-13 PROCEDURE — 96375 TX/PRO/DX INJ NEW DRUG ADDON: CPT | Mod: XU

## 2018-09-13 PROCEDURE — 80053 COMPREHEN METABOLIC PANEL: CPT

## 2018-09-13 PROCEDURE — 83690 ASSAY OF LIPASE: CPT

## 2018-09-13 PROCEDURE — 81003 URINALYSIS AUTO W/O SCOPE: CPT

## 2018-09-13 RX ORDER — ATORVASTATIN CALCIUM 80 MG/1
0 TABLET, FILM COATED ORAL
Qty: 0 | Refills: 0 | COMMUNITY

## 2018-09-13 RX ORDER — CLOPIDOGREL BISULFATE 75 MG/1
75 TABLET, FILM COATED ORAL DAILY
Qty: 0 | Refills: 0 | Status: DISCONTINUED | OUTPATIENT
Start: 2018-09-13 | End: 2018-09-13

## 2018-09-13 RX ORDER — CALCIUM CARBONATE 500(1250)
1 TABLET ORAL DAILY
Qty: 0 | Refills: 0 | Status: DISCONTINUED | OUTPATIENT
Start: 2018-09-13 | End: 2018-09-13

## 2018-09-13 RX ORDER — ATORVASTATIN CALCIUM 80 MG/1
40 TABLET, FILM COATED ORAL AT BEDTIME
Qty: 0 | Refills: 0 | Status: DISCONTINUED | OUTPATIENT
Start: 2018-09-13 | End: 2018-09-13

## 2018-09-13 RX ORDER — ASPIRIN/CALCIUM CARB/MAGNESIUM 324 MG
0 TABLET ORAL
Qty: 0 | Refills: 0 | COMMUNITY

## 2018-09-13 RX ORDER — ENOXAPARIN SODIUM 100 MG/ML
40 INJECTION SUBCUTANEOUS DAILY
Qty: 0 | Refills: 0 | Status: DISCONTINUED | OUTPATIENT
Start: 2018-09-13 | End: 2018-09-13

## 2018-09-13 RX ORDER — CLOPIDOGREL BISULFATE 75 MG/1
0 TABLET, FILM COATED ORAL
Qty: 0 | Refills: 0 | COMMUNITY

## 2018-09-13 RX ORDER — PANTOPRAZOLE SODIUM 20 MG/1
40 TABLET, DELAYED RELEASE ORAL
Qty: 0 | Refills: 0 | Status: DISCONTINUED | OUTPATIENT
Start: 2018-09-13 | End: 2018-09-13

## 2018-09-13 RX ORDER — ASPIRIN/CALCIUM CARB/MAGNESIUM 324 MG
81 TABLET ORAL DAILY
Qty: 0 | Refills: 0 | Status: DISCONTINUED | OUTPATIENT
Start: 2018-09-13 | End: 2018-09-13

## 2018-09-13 RX ORDER — MAGNESIUM OXIDE 400 MG ORAL TABLET 241.3 MG
400 TABLET ORAL DAILY
Qty: 0 | Refills: 0 | Status: DISCONTINUED | OUTPATIENT
Start: 2018-09-13 | End: 2018-09-13

## 2018-09-13 RX ORDER — OMEPRAZOLE 10 MG/1
0 CAPSULE, DELAYED RELEASE ORAL
Qty: 0 | Refills: 0 | COMMUNITY

## 2018-09-13 RX ORDER — ATORVASTATIN CALCIUM 80 MG/1
1 TABLET, FILM COATED ORAL
Qty: 0 | Refills: 0 | COMMUNITY

## 2018-09-13 RX ORDER — INFLUENZA VIRUS VACCINE 15; 15; 15; 15 UG/.5ML; UG/.5ML; UG/.5ML; UG/.5ML
0.5 SUSPENSION INTRAMUSCULAR ONCE
Qty: 0 | Refills: 0 | Status: COMPLETED | OUTPATIENT
Start: 2018-09-13 | End: 2018-09-13

## 2018-09-13 RX ADMIN — CLOPIDOGREL BISULFATE 75 MILLIGRAM(S): 75 TABLET, FILM COATED ORAL at 11:08

## 2018-09-13 RX ADMIN — Medication 100 MILLIGRAM(S): at 06:38

## 2018-09-13 RX ADMIN — Medication 81 MILLIGRAM(S): at 11:08

## 2018-09-13 RX ADMIN — MAGNESIUM OXIDE 400 MG ORAL TABLET 400 MILLIGRAM(S): 241.3 TABLET ORAL at 11:08

## 2018-09-13 RX ADMIN — PANTOPRAZOLE SODIUM 40 MILLIGRAM(S): 20 TABLET, DELAYED RELEASE ORAL at 06:38

## 2018-09-13 RX ADMIN — Medication 1 TABLET(S): at 12:30

## 2018-09-13 NOTE — PHYSICAL THERAPY INITIAL EVALUATION ADULT - ADDITIONAL COMMENTS
Patient lives with his wife in a walk up apartment building with 50 steps with handrails to enter. 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

## 2018-09-13 NOTE — PROGRESS NOTE ADULT - PROBLEM SELECTOR PLAN 7
1) PCP Contacted on Admission: (Y/N) --> Name & Phone #:  [phone]  2) Date of Contact with PCP:  3) PCP Contacted at Discharge: (Y/N, N/A): na   4) Summary of Handoff Given to PCP:   5) Post-Discharge Appointment Date and Location:

## 2018-09-13 NOTE — DISCHARGE NOTE ADULT - PLAN OF CARE
Supportive Treatment You were admitted to the hospital because of symptoms that you had that were consistent with an infection of your digestive tract which can be caused by a number of things such as bacterial toxins or viruses. We treated you with fluids, and empiric antibiotics.

## 2018-09-13 NOTE — CONSULT NOTE ADULT - ASSESSMENT
85 y/o M PMhx of HTN, CAD s/p PCI (last stent 2012), ICH 2/2 fall (2016), prostate ca s/p resection, gerd, b/l inguinal hernia with right incarcerated hernia s/p repair (years ago) here with colitis.     Problem/Plan - 1:  ·  Problem: Colitis.  Plan: As per CT scan. Patient reports subacute to chronic loose stool but has been taking imodium. Does not meet SIRS criteria.  -c/w CTX and Flagyl.   -Stool PCR and stool culture.  -Consider O/P x3.   -Serial abd exams.     Problem/Plan - 2:  ·  Problem: Inguinal hernia.  Plan: CT showing mesenteric edema which is less prominent that prior scans. Inguinal hernias reducible b/l.   -General surgery on board -- no urgent surgical intervention required at this point in time. Recommendations appreciated.   -Serial abd exams.

## 2018-09-13 NOTE — PROGRESS NOTE ADULT - PROBLEM SELECTOR PLAN 1
As per CT scan. Patient reports subacute to chronic loose stool but has been taking imodium. Does not meet SIRS criteria.  -DC CTX and Flagyl.   -Stool PCR and stool culture.  -Consider O/P .   -Serial abd exams.

## 2018-09-13 NOTE — DISCHARGE NOTE ADULT - CARE PLAN
Principal Discharge DX:	Gastroenteritis  Goal:	Supportive Treatment  Assessment and plan of treatment:	You were admitted to the hospital because of symptoms that you had that were consistent with an infection of your digestive tract which can be caused by a number of things such as bacterial toxins or viruses. We treated you with fluids, and empiric antibiotics.

## 2018-09-13 NOTE — PROGRESS NOTE ADULT - PROBLEM SELECTOR PLAN 1
clinically-improved; can d/c abx (afebrile, WBC WNL, sx mild/resolved); cont. bland diet as tolerated; unable to send stool studies as BM now formed

## 2018-09-13 NOTE — PHYSICAL THERAPY INITIAL EVALUATION ADULT - PERTINENT HX OF CURRENT PROBLEM, REHAB EVAL
85 y/o M PMhx of HTN, CAD s/p PCI (last stent 2012), ICH 2/2 fall (2016), prostate ca s/p resection, GERD, b/l inguinal hernia with right incarcerated hernia s/p repair (years ago) presents with abdominal pain.

## 2018-09-13 NOTE — DISCHARGE NOTE ADULT - PROVIDER TOKENS
FREE:[LAST:[[facility] Medicine at [address]],PHONE:[phone]],FAX:[phone]],ADDRESS:[[address] [place] [address], [address] [place] [address]]]

## 2018-09-13 NOTE — PROGRESS NOTE ADULT - SUBJECTIVE AND OBJECTIVE BOX
Patient is a 86y old  Male who presents with a chief complaint of Colitis (13 Sep 2018 11:27)      INTERVAL HPI/OVERNIGHT EVENTS:    Pt. seen and examined at 10:30AM  Pt. feels well; diarrhea resolved -- last BM formed  No F/C, N/V, CP, SOB, abdominal pain    Review of Systems: 12 point review of systems otherwise negative    MEDICATIONS  (STANDING):  aspirin enteric coated 81 milliGRAM(s) Oral daily  atorvastatin 40 milliGRAM(s) Oral at bedtime  calcium carbonate    500 mG (Tums) Chewable 1 Tablet(s) Chew daily  cefTRIAXone   IVPB 1 Gram(s) IV Intermittent every 24 hours  clopidogrel Tablet 75 milliGRAM(s) Oral daily  enoxaparin Injectable 40 milliGRAM(s) SubCutaneous daily  magnesium oxide 400 milliGRAM(s) Oral daily  metroNIDAZOLE  IVPB 500 milliGRAM(s) IV Intermittent every 8 hours  pantoprazole    Tablet 40 milliGRAM(s) Oral before breakfast    MEDICATIONS  (PRN):      Allergies    No Known Allergies    Intolerances          Vital Signs Last 24 Hrs  T(C): 36.8 (13 Sep 2018 11:17), Max: 37.1 (13 Sep 2018 05:55)  T(F): 98.3 (13 Sep 2018 11:17), Max: 98.8 (13 Sep 2018 05:55)  HR: 67 (13 Sep 2018 11:17) (56 - 75)  BP: 129/63 (13 Sep 2018 11:17) (117/64 - 161/75)  BP(mean): --  RR: 16 (13 Sep 2018 11:17) (16 - 18)  SpO2: 99% (13 Sep 2018 11:17) (95% - 99%)  CAPILLARY BLOOD GLUCOSE            Physical Exam:  (at 10:30AM)  Daily Height in cm: 180.34 (13 Sep 2018 05:55)    Daily   General:  well-appearing  HEENT: MMM  Abdomen:  soft NT ND  Skin:  WWP  Neuro:  AAOx3    LABS:                        12.8   5.5   )-----------( 205      ( 12 Sep 2018 16:58 )             38.4     09-12    143  |  100  |  15  ----------------------------<  164<H>  3.8   |  29  |  0.93    Ca    9.2      12 Sep 2018 16:58  Mg     1.5     09-12    TPro  6.0  /  Alb  3.8  /  TBili  0.8  /  DBili  x   /  AST  14  /  ALT  10  /  AlkPhos  59  09-12    PT/INR - ( 12 Sep 2018 16:58 )   PT: 14.9 sec;   INR: 1.33          PTT - ( 12 Sep 2018 16:58 )  PTT:30.7 sec  Urinalysis Basic - ( 12 Sep 2018 22:43 )    Color: Straw / Appearance: Clear / SG: <=1.005 / pH: x  Gluc: x / Ketone: NEGATIVE  / Bili: Negative / Urobili: 0.2 E.U./dL   Blood: x / Protein: NEGATIVE mg/dL / Nitrite: NEGATIVE   Leuk Esterase: NEGATIVE / RBC: x / WBC x   Sq Epi: x / Non Sq Epi: x / Bacteria: x          RADIOLOGY & ADDITIONAL TESTS:    ---------------------------------------------------------------------------  I personally reviewed: [  ]EKG   [  ]CXR    [  ] CT    [  ]Other  ---------------------------------------------------------------------------  PLEASE CHECK WHEN PRESENT:     [  ]Heart Failure     [  ] Acute     [  ] Acute on Chronic     [  ] Chronic  -------------------------------------------------------------------     [  ]Diastolic [HFpEF]     [  ]Systolic [HFrEF]     [  ]Combined [HFpEF & HFrEF]     [  ]Other:  -------------------------------------------------------------------  [  ]JESUS     [  ]ATN     [  ]Reneal Medullary Necrosis     [  ]Renal Cortical Necrosis     [  ]Other Pathological Lesions:    [  ]CKD 1  [  ]CKD 2  [  ]CKD 3  [  ]CKD 4  [  ]CKD 5  [  ]Other  -------------------------------------------------------------------  [  ]Other/Unspecified:    --------------------------------------------------------------------    Abdominal Nutritional Status  [  ]Malnutrition: See Nutrition Note  [  ]Cachexia  [  ]Other:   [  ]Supplement Ordered:  [  ]Morbid Obesity (BMI >=40]

## 2018-09-13 NOTE — PROGRESS NOTE ADULT - SUBJECTIVE AND OBJECTIVE BOX
CAN FELIX 86y Male    Interval HPI:  Patient was admitted to floors overnight 2/2 to  of loose stools and NBNB vomiting x 6 since yesterday. Patient denied any episode of vomiting while in hospital and one loose stool yesterday. Patient has no acute complaints. Denies fever, cough, new N/V/D or abdominal pain.      Patient seen and examined at bedside:    T(C): 37.1 (09-13-18 @ 05:55), Max: 37.1 (09-13-18 @ 05:55)  HR: 66 (09-13-18 @ 05:55) (56 - 75)  BP: 129/57 (09-13-18 @ 05:55) (117/64 - 161/75)  RR: 18 (09-13-18 @ 05:55) (16 - 18)  SpO2: 98% (09-13-18 @ 05:55) (95% - 99%)    Review of systems negative except as mentioned above    aspirin enteric coated 81 milliGRAM(s) Oral daily  atorvastatin 40 milliGRAM(s) Oral at bedtime  calcium carbonate    500 mG (Tums) Chewable 1 Tablet(s) Chew daily  cefTRIAXone   IVPB 1 Gram(s) IV Intermittent every 24 hours  clopidogrel Tablet 75 milliGRAM(s) Oral daily  enoxaparin Injectable 40 milliGRAM(s) SubCutaneous daily  influenza   Vaccine 0.5 milliLiter(s) IntraMuscular once  magnesium oxide 400 milliGRAM(s) Oral daily  metroNIDAZOLE  IVPB 500 milliGRAM(s) IV Intermittent every 8 hours  pantoprazole    Tablet 40 milliGRAM(s) Oral before breakfast      Physical Exam:    GENERAL: Lying in bed, NAD  HEENT: NC/AT, MMM, PERRL, no LAD,   NECK: Supple  RESPIRATORY: CTAB, with good effort and depth  CV: S1S2 appreciated, RRR, no m/r/g  GI: Soft, NT/ND, normal active bowel sounds  EXT: 2+ pulses in upper and lower ext B/L, capillary refill <2secs, no cyanosis, no edema  NEURO: AOx3  MSK: Normal bulk and tone for age, NROM      Labs:                        12.8   5.5   )-----------( 205      ( 12 Sep 2018 16:58 )             38.4     09-12    143  |  100  |  15  ----------------------------<  164<H>  3.8   |  29  |  0.93    Ca    9.2      12 Sep 2018 16:58  Mg     1.5     09-12    TPro  6.0  /  Alb  3.8  /  TBili  0.8  /  DBili  x   /  AST  14  /  ALT  10  /  AlkPhos  59  09-12    PT/INR - ( 12 Sep 2018 16:58 )   PT: 14.9 sec;   INR: 1.33          PTT - ( 12 Sep 2018 16:58 )  PTT:30.7 sec  Urinalysis Basic - ( 12 Sep 2018 22:43 )    Color: Straw / Appearance: Clear / SG: <=1.005 / pH: x  Gluc: x / Ketone: NEGATIVE  / Bili: Negative / Urobili: 0.2 E.U./dL   Blood: x / Protein: NEGATIVE mg/dL / Nitrite: NEGATIVE   Leuk Esterase: NEGATIVE / RBC: x / WBC x   Sq Epi: x / Non Sq Epi: x / Bacteria: x        CAPILLARY BLOOD GLUCOSE                Imaging:

## 2018-09-13 NOTE — DISCHARGE NOTE ADULT - MEDICATION SUMMARY - MEDICATIONS TO TAKE
I will START or STAY ON the medications listed below when I get home from the hospital:    aspirin  -- 81 milligram(s) by mouth once a day  -- Indication: For Pain    Tums 500 mg oral tablet, chewable  -- 1 tab(s) by mouth once a day  -- Indication: For Reflux    atorvastatin 40 mg oral tablet  -- 1 tab(s) by mouth once a day  -- Indication: For High Cholesterol    Plavix 75 mg oral tablet  -- 1 tab(s) by mouth once a day  -- Indication: For Thrombosis    magnesium oxide 400 mg (240 mg elemental magnesium) oral tablet  -- 1 tab(s) by mouth once a day   -- Indication: For Nutritional Supplementation    omeprazole 20 mg oral delayed release capsule  -- 1 cap(s) by mouth once a day  -- Indication: For GERD

## 2018-09-13 NOTE — DISCHARGE NOTE ADULT - CARE PROVIDER_API CALL
[facility] Physician [facility] Medicine at [address] [place],   [address]   [place], [place] [address]  Phone: [phone]  Fax: [phone]

## 2018-09-13 NOTE — PROGRESS NOTE ADULT - ASSESSMENT
87 y/o M PMhx of HTN, CAD s/p PCI (last stent 2012), ICH 2/2 fall (2016), prostate ca s/p resection, gerd, b/l inguinal hernia with right incarcerated hernia s/p repair (years ago) presents with gastroenteritis.

## 2018-09-13 NOTE — DISCHARGE NOTE ADULT - HOSPITAL COURSE
Mr. Cha was admitted to the hospital because of intractable nausea, vomiting and loose stools. CT abdomen and pelvis was done which showed findings consistent with colitis. Surgery was consulted and deemed no intervention necessary. Patient received 1.5L in ED and responded to fluid resuscitation. Please follow up with [facility] Medicine clinic at [address] [place] [address], [address] [place] [address].

## 2018-09-13 NOTE — PHYSICAL THERAPY INITIAL EVALUATION ADULT - THERAPY FREQUENCY, PT EVAL
DC PT - patient is independent for all functional mobility and ADLs including bed mobility, transfers and ambulation without assistive device or external assistance provided. Patient with good dynamic stability for all mobility without loss of balance witnessed. Displays good endurance with stair negotiation, despite increased time on task for descending stairs. Patient has no further skilled inpatient PT needs but would benefit from use of a straight cane for balance. Patient attests to having used one and knowing how to use one. Patient is safe to return home. DC PT

## 2018-09-13 NOTE — PHYSICAL THERAPY INITIAL EVALUATION ADULT - PATIENT/FAMILY/SIGNIFICANT OTHER GOALS STATEMENT, PT EVAL
Patient is willing and motivated to participate in physical therapy and would like to go home so he can visit his wife in the hospital

## 2018-09-13 NOTE — CONSULT NOTE ADULT - SUBJECTIVE AND OBJECTIVE BOX
Patient is a 86y old  Male who presents with a chief complaint of colitis (12 Sep 2018 22:43)       HPI:  87 y/o M PMhx of HTN, CAD s/p PCI (last stent 2012), ICH 2/2 fall (2016), prostate ca s/p resection, gerd, b/l inguinal hernia with right incarcerated hernia s/p repair (years ago) here with abd pain. The patient reports lower abd pain that started this AM. The pain is crampy, nonradiating, intermittent and worse with palpation. The patient states that he has bowel movements daily but they have been "loose" for the past month. He has been taking imodium at home with reduction in the amount of loose stools. He also reports an episode of nausea and vomiting earlier today that was nonbloody. The patient denies chest pain, shortness of breath, fever, or chills. Denies recent travel, recent hospitalizations or sick contacts.     CT a/p with iv and po contrast had findings consistent with colitis and also showed some mesenteric edema.   Patient lactate was 2.3 but normalized after 1.5L of NS.   General surgery evaluated patient given hx of incarcerated hernias and mesenteric edema. No urgent surgical intervention recommended.     FH: Denies cardiac history in father (12 Sep 2018 22:43)      PAST MEDICAL & SURGICAL HISTORY:  Prostate cancer  Stented coronary artery: x4, per pt.  Atherosclerosis of coronary artery: CAD (coronary artery disease)  History of prostate surgery      MEDICATIONS  (STANDING):  aspirin enteric coated 81 milliGRAM(s) Oral daily  atorvastatin 40 milliGRAM(s) Oral at bedtime  calcium carbonate    500 mG (Tums) Chewable 1 Tablet(s) Chew daily  cefTRIAXone   IVPB 1 Gram(s) IV Intermittent every 24 hours  clopidogrel Tablet 75 milliGRAM(s) Oral daily  enoxaparin Injectable 40 milliGRAM(s) SubCutaneous daily  magnesium oxide 400 milliGRAM(s) Oral daily  metroNIDAZOLE  IVPB 500 milliGRAM(s) IV Intermittent every 8 hours  pantoprazole    Tablet 40 milliGRAM(s) Oral before breakfast    MEDICATIONS  (PRN):      Social History per patient: lives with his wife in an elevator accessible apartment building    Functional Level Prior to Admission per patient: ADL independent, walks with a cane/walker prn    FAMILY HISTORY:  No pertinent family history in first degree relatives      CBC Full  -  ( 12 Sep 2018 16:58 )  WBC Count : 5.5 K/uL  Hemoglobin : 12.8 g/dL  Hematocrit : 38.4 %  Platelet Count - Automated : 205 K/uL  Mean Cell Volume : 97.7 fL  Mean Cell Hemoglobin : 32.6 pg  Mean Cell Hemoglobin Concentration : 33.3 g/dL  Auto Neutrophil # : x  Auto Lymphocyte # : x  Auto Monocyte # : x  Auto Eosinophil # : x  Auto Basophil # : x  Auto Neutrophil % : 85.0 %  Auto Lymphocyte % : 8.0 %  Auto Monocyte % : 6.4 %  Auto Eosinophil % : 0.4 %  Auto Basophil % : 0.2 %      09-12    143  |  100  |  15  ----------------------------<  164<H>  3.8   |  29  |  0.93    Ca    9.2      12 Sep 2018 16:58  Mg     1.5     09-12    TPro  6.0  /  Alb  3.8  /  TBili  0.8  /  DBili  x   /  AST  14  /  ALT  10  /  AlkPhos  59  09-12        Urinalysis Basic - ( 12 Sep 2018 22:43 )    Color: Straw / Appearance: Clear / SG: <=1.005 / pH: x  Gluc: x / Ketone: NEGATIVE  / Bili: Negative / Urobili: 0.2 E.U./dL   Blood: x / Protein: NEGATIVE mg/dL / Nitrite: NEGATIVE   Leuk Esterase: NEGATIVE / RBC: x / WBC x   Sq Epi: x / Non Sq Epi: x / Bacteria: x          Radiology:    < from: CT Abdomen and Pelvis w/ Oral Cont and w/ IV Cont (09.12.18 @ 20:29) >  EXAM:  CT ABDOMEN AND PELVIS OC IC                          PROCEDURE DATE:  09/12/2018          INTERPRETATION:  CT of the ABDOMEN and PELVIS with intravenous contrast   dated 9/12/2018 8:29 PM    INDICATION: Abdominal pain. Vomiting. Known left inguinal hernia.    TECHNIQUE: CT of the abdomen and pelvis. Axial, sagittal, and coronal   images were obtained and reviewed. IV contrast (Optiray 350): used 125   cc, discarded 4 cc. Oral contrast was administered.    COMPARISON: CT abdomen pelvis 6/15/2015    FINDINGS:    Lower chest: Grossly clear lung bases.     Liver: Smooth in contour. No focal mass. Portal and hepatic veins are   patent.    Biliary system: Cholelithiasis. No biliary ductal dilatation.    Pancreas: Unremarkable.    Spleen: Unremarkable.    Adrenal glands: Unremarkable.    Kidneys: Symmetric parenchymal enhancement. No renal mass. No   hydronephrosis. No renal or ureteral stone.   Urinary Bladder: Unremarkable.    Reproductive organs: Unremarkable.     Bowel/Peritoneum: There is wall thickening involving the distal ileum,   cecum, ascending and proximal transverse colon. There also appears to be   more mild wall thickening extending from the distal descending through   the sigmoid colon. Redemonstrated are irregular inguinal mesenteric   vessels within the right lower quadrant along with stranding of the   surrounding mesenteric fat, consistent with mesenteric edema. These   findings are quite similar in appearance to prior study of 6/15/2015   however appears more prominent on the present study. There is no evidence   of pneumatosis intestinalis at this time or intraperitoneal free air.   Findings are suggestive for an enterocolitis either ischemic or   inflammatory/infectious in etiology. There are large bilateral inguinal   hernias containing fluid. No bowel loops are noted within the hernia   sacs. There is a small focus of the right anterior margin of the urinary   bladder which enters into the proximal right inguinal hernia sac.    Lymph nodes: No lymphadenopathy.    Aorta/IVC: The aorta and aortic branches are well-opacified with   contrast. The splenic, superior mesenteric and portal veins are   well-opacified with contrast.    Abdominal wall: Bilateral fluid containing inguinal hernias, no bowel   content seen within the herniated sac. Small portion of the right   inferior aspect of the bladder is seen going into the hernia. Small fluid   containing umbilical hernia.    Bones/Soft tissues: Mild degenerative changes spine.      IMPRESSION:   Cristopher thickening involving the distal ileum, cecum, ascending and proximal   transverse colon. There also appears to be more mild wall thickening   extending from the distal descending through the sigmoid colon.   Redemonstrated are irregular inguinal mesenteric vessels within the right   lower quadrant along with stranding of the surrounding mesenteric fat,   consistent with mesenteric edema. These findings are quite similar in   appearance to prior study of 6/15/2015 however appears more prominent on   the present study. There is no evidence of pneumatosis intestinalis at   this time or intraperitoneal free air. Findings are suggestive for an   enterocolitis either ischemic or inflammatory/infectious in etiology.   Correlation with the patient's lactate levels may be of help.    Cholelithiasis              Vital Signs Last 24 Hrs  T(C): 37.1 (13 Sep 2018 05:55), Max: 37.1 (13 Sep 2018 05:55)  T(F): 98.8 (13 Sep 2018 05:55), Max: 98.8 (13 Sep 2018 05:55)  HR: 66 (13 Sep 2018 05:55) (56 - 75)  BP: 129/57 (13 Sep 2018 05:55) (117/64 - 161/75)  BP(mean): --  RR: 18 (13 Sep 2018 05:55) (16 - 18)  SpO2: 98% (13 Sep 2018 05:55) (95% - 99%)    REVIEW OF SYSTEMS:    CONSTITUTIONAL: fatigue  EYES: No eye pain, visual disturbances, or discharge  ENMT:  No difficulty hearing, tinnitus, vertigo; No sinus or throat pain  NECK: No pain or stiffness  BREASTS: No pain, masses, or nipple discharge  RESPIRATORY: No cough, wheezing, chills or hemoptysis; No shortness of breath  CARDIOVASCULAR: No chest pain, palpitations, dizziness, or leg swelling  GASTROINTESTINAL: No abdominal or epigastric pain. No nausea, vomiting, or hematemesis; No diarrhea or constipation. No melena or hematochezia.  GENITOURINARY: No dysuria, frequency, hematuria, or incontinence  NEUROLOGICAL: No headaches, memory loss, loss of strength, numbness, or tremors  SKIN: No itching, burning, rashes, or lesions   LYMPH NODES: No enlarged glands  ENDOCRINE: No heat or cold intolerance; No hair loss  MUSCULOSKELETAL: No joint pain or swelling; No muscle, back, or extremity pain  PSYCHIATRIC: No depression, anxiety, mood swings, or difficulty sleeping  HEME/LYMPH: No easy bruising, or bleeding gums  ALLERGY AND IMMUNOLOGIC: No hives or eczema  VASCULAR: no swelling, erythema      Physical Exam: 87 yo  gentleman lying in semi Moulton's position, fee's tired, no c/o abdominal pain    Head: normocephalic, atraumatic    Eyes: PERRLA, EOMI, no nystagmus, sclera anicteric    ENT: nasal discharge, uvula midline, no oropharyngeal erythema/exudate    Neck: supple, negative JVD, negative carotid bruits, no thyromegaly    Chest: CTA bilaterally, neg wheeze, rhonchi, rales, crackles, egophany    Cardiovascular: regular rate and rhythm, neg murmurs/rubs/gallops    Abdomen: soft, non distended, non tender, negative rebound/guarding, normal bowel sounds, neg hepatosplenomegaly, bilateral reducible inguinal hernias, non tender    Extremities: WWP, neg cyanosis/clubbing/edema, negative calf tenderness to palpation, negative Kayla's sign    :     Neurologic Exam:    Alert and oriented to person, place, date/year, speech fluent w/o dysarthria, repetition intact, comprehension intact,     Cranial Nerves:     II:                       pupils equal, round and reactive to light, visual fields intact   III/ IV/VI:            extraocular movements intact, neg nystagmus, ptosis  V:                       facial sensation intact, V1-3 normal  VII:                     face symmetric, no droop, normal eye closure and smile  VIII:                    hearing intact to finger rub bilaterally  IX/ X:                 soft palate rise symmetrical  XI:                      head turning, shoulder shrug normal  XII:                     tongue midline    Motor Exam:    Upper Extremities:              Right:    5/5 /intrinsics                                                          5/5 deltoid                                                          5/5 biceps                                                          5/5 triceps                                                          5/5 wrist extensors-flexors                                                          negative pronator drift                                                Left:      5/5 /intrinsics                                                          5/5 deltoid                                                          5/5 biceps                                                          5/5 triceps                                                          5/5 wrist extensors/flexors                                                          negative pronator drift      Lower Extremities:             Right:      4/5 hip flexors/adductors/abductors                                                           5/5 quadriceps/hamstrings                                                           5/5 dorsiflexors/plantar flexors/invertors-evertors                                               Left:       4/5 hip flexors/adductors/abductors                                                            5/5 quadriceps/hamstrings                                                            5/5 dorsiflexors/plantar flexors/invertors-evertors    Sensory:              intact to LT/PP in all UE/LE dermatomes    DTR:                   = biceps/     triceps/     brachioradialis                            = patella/   medial hamstring/    ankle                            neg clonus                            neg Babinski                            neg Hoffmans    Finger to Nose:  wnl    Heel to Shin:       wnl    Rapid Alternating movements:   wnl    Joint Position Sense:  intact    Romberg:  not tested    Tandem Walking:  not tested    Gait:  not tested        PM&R Impression:    1) deconditioned  2) no focal weakness  3) colitis      Recommendations:    1) Physical therapy focusing on therapeutic exercises, bed mobility/transfer out of bed evaluation, progressive ambulation with assistive devices.    2) Anticipated Disposition Plan/Recommendations: d/c home, +/- home physical therapy

## 2018-09-13 NOTE — DISCHARGE NOTE ADULT - PATIENT PORTAL LINK FT
You can access the [facility] Patient Portal, offered by [facility], by registering with the following website: [email or web address]

## 2018-09-13 NOTE — DISCHARGE NOTE ADULT - ADDITIONAL INSTRUCTIONS
Please follow up with your PCP Please follow up with at [facility] Medicine at [address]

## 2018-09-17 DIAGNOSIS — Z95.5 PRESENCE OF CORONARY ANGIOPLASTY IMPLANT AND GRAFT: ICD-10-CM

## 2018-09-17 DIAGNOSIS — K52.9 NONINFECTIVE GASTROENTERITIS AND COLITIS, UNSPECIFIED: ICD-10-CM

## 2018-09-17 DIAGNOSIS — I10 ESSENTIAL (PRIMARY) HYPERTENSION: ICD-10-CM

## 2018-09-17 DIAGNOSIS — K21.9 GASTRO-ESOPHAGEAL REFLUX DISEASE WITHOUT ESOPHAGITIS: ICD-10-CM

## 2018-09-17 DIAGNOSIS — I25.10 ATHEROSCLEROTIC HEART DISEASE OF NATIVE CORONARY ARTERY WITHOUT ANGINA PECTORIS: ICD-10-CM

## 2018-09-17 DIAGNOSIS — K40.20 BILATERAL INGUINAL HERNIA, WITHOUT OBSTRUCTION OR GANGRENE, NOT SPECIFIED AS RECURRENT: ICD-10-CM

## 2018-09-17 DIAGNOSIS — Z79.82 LONG TERM (CURRENT) USE OF ASPIRIN: ICD-10-CM

## 2018-09-17 DIAGNOSIS — E83.42 HYPOMAGNESEMIA: ICD-10-CM

## 2018-09-17 DIAGNOSIS — R10.9 UNSPECIFIED ABDOMINAL PAIN: ICD-10-CM

## 2018-09-17 DIAGNOSIS — Z85.46 PERSONAL HISTORY OF MALIGNANT NEOPLASM OF PROSTATE: ICD-10-CM

## 2018-09-17 DIAGNOSIS — E78.5 HYPERLIPIDEMIA, UNSPECIFIED: ICD-10-CM

## 2018-09-17 DIAGNOSIS — E78.00 PURE HYPERCHOLESTEROLEMIA, UNSPECIFIED: ICD-10-CM

## 2018-09-17 DIAGNOSIS — Z79.01 LONG TERM (CURRENT) USE OF ANTICOAGULANTS: ICD-10-CM

## 2018-09-18 ENCOUNTER — APPOINTMENT (OUTPATIENT)
Dept: INTERNAL MEDICINE | Facility: CLINIC | Age: 83
End: 2018-09-18
Payer: COMMERCIAL

## 2018-09-18 VITALS
DIASTOLIC BLOOD PRESSURE: 74 MMHG | BODY MASS INDEX: 19.88 KG/M2 | TEMPERATURE: 98.2 F | OXYGEN SATURATION: 99 % | WEIGHT: 150 LBS | HEIGHT: 73 IN | HEART RATE: 48 BPM | SYSTOLIC BLOOD PRESSURE: 127 MMHG

## 2018-09-18 DIAGNOSIS — E83.51 HYPOCALCEMIA: ICD-10-CM

## 2018-09-18 DIAGNOSIS — E83.42 HYPOMAGNESEMIA: ICD-10-CM

## 2018-09-18 DIAGNOSIS — R19.7 DIARRHEA, UNSPECIFIED: ICD-10-CM

## 2018-09-18 DIAGNOSIS — K40.20 BILATERAL INGUINAL HERNIA, W/OUT OBSTRUCTION OR GANGRENE, NOT SPECIFIED AS RECURRENT: ICD-10-CM

## 2018-09-18 LAB
CULTURE RESULTS: SIGNIFICANT CHANGE UP
CULTURE RESULTS: SIGNIFICANT CHANGE UP
PROT UR-MCNC: 39 MG/DL
SPECIMEN SOURCE: SIGNIFICANT CHANGE UP
SPECIMEN SOURCE: SIGNIFICANT CHANGE UP

## 2018-09-18 PROCEDURE — 99496 TRANSJ CARE MGMT HIGH F2F 7D: CPT | Mod: 25,GC

## 2018-09-18 PROCEDURE — 36415 COLL VENOUS BLD VENIPUNCTURE: CPT

## 2018-09-19 LAB
ANION GAP SERPL CALC-SCNC: 10 MMOL/L
BUN SERPL-MCNC: 12 MG/DL
CALCIUM SERPL-MCNC: 8.5 MG/DL
CHLORIDE SERPL-SCNC: 106 MMOL/L
CO2 SERPL-SCNC: 28 MMOL/L
CREAT SERPL-MCNC: 0.87 MG/DL
GLUCOSE SERPL-MCNC: 115 MG/DL
MAGNESIUM SERPL-MCNC: 2 MG/DL
PHOSPHATE SERPL-MCNC: 2.9 MG/DL
POTASSIUM SERPL-SCNC: 4.5 MMOL/L
SODIUM SERPL-SCNC: 144 MMOL/L

## 2018-11-05 ENCOUNTER — APPOINTMENT (OUTPATIENT)
Dept: INTERNAL MEDICINE | Facility: CLINIC | Age: 83
End: 2018-11-05

## 2019-01-24 ENCOUNTER — APPOINTMENT (OUTPATIENT)
Dept: HEART AND VASCULAR | Facility: CLINIC | Age: 84
End: 2019-01-24
Payer: COMMERCIAL

## 2019-01-24 VITALS — WEIGHT: 135 LBS | HEIGHT: 73 IN | BODY MASS INDEX: 17.89 KG/M2

## 2019-01-24 PROCEDURE — 99214 OFFICE O/P EST MOD 30 MIN: CPT

## 2019-01-28 VITALS — RESPIRATION RATE: 16 BRPM | HEART RATE: 64 BPM | DIASTOLIC BLOOD PRESSURE: 70 MMHG | SYSTOLIC BLOOD PRESSURE: 120 MMHG

## 2019-01-28 NOTE — DISCUSSION/SUMMARY
[FreeTextEntry1] : Impression: Stable CAD. No recent episodes of angina. He is to continue taking her medication for hypertension and hypercholesterolemia in followup in 6 months. I will refer him to Dr. Chris Valencia of surgery for evaluation of his inguinal hernias.

## 2019-01-28 NOTE — PHYSICAL EXAM
[FreeTextEntry1] : Neck exam reveals no JVD or bruits.  Carotid pulses are present and symetric.  Heart exam: S1, S2 regular without murmur, gallop, or rub.  The lungs are clear without evidence of wheezes, rhonchi or rales.  Extremities reveal no clubbing, cyanosis or edema.

## 2019-01-28 NOTE — REASON FOR VISIT
[Follow-Up - Clinic] : a clinic follow-up of [Coronary Artery Disease] : coronary artery disease [FreeTextEntry1] : This 86-year-old man with a history of coronary artery disease and prior stent procedures dating back to 2012 comes in for routine followup. He denies chest pain, shortness of breath, palpitations or syncope. He does complain of a bilateral inguinal hernia which he thinks needs to be evaluated.

## 2019-02-19 ENCOUNTER — APPOINTMENT (OUTPATIENT)
Dept: HEART AND VASCULAR | Facility: CLINIC | Age: 84
End: 2019-02-19

## 2019-02-21 ENCOUNTER — APPOINTMENT (OUTPATIENT)
Dept: HEART AND VASCULAR | Facility: CLINIC | Age: 84
End: 2019-02-21

## 2019-02-27 ENCOUNTER — FORM ENCOUNTER (OUTPATIENT)
Age: 84
End: 2019-02-27

## 2019-02-28 ENCOUNTER — OUTPATIENT (OUTPATIENT)
Dept: OUTPATIENT SERVICES | Facility: HOSPITAL | Age: 84
LOS: 1 days | End: 2019-02-28
Payer: COMMERCIAL

## 2019-02-28 ENCOUNTER — APPOINTMENT (OUTPATIENT)
Dept: HEART AND VASCULAR | Facility: CLINIC | Age: 84
End: 2019-02-28
Payer: COMMERCIAL

## 2019-02-28 ENCOUNTER — LABORATORY RESULT (OUTPATIENT)
Age: 84
End: 2019-02-28

## 2019-02-28 ENCOUNTER — NON-APPOINTMENT (OUTPATIENT)
Age: 84
End: 2019-02-28

## 2019-02-28 DIAGNOSIS — Z01.818 ENCOUNTER FOR OTHER PREPROCEDURAL EXAMINATION: ICD-10-CM

## 2019-02-28 DIAGNOSIS — I25.9 CHRONIC ISCHEMIC HEART DISEASE, UNSPECIFIED: ICD-10-CM

## 2019-02-28 DIAGNOSIS — Z98.890 OTHER SPECIFIED POSTPROCEDURAL STATES: Chronic | ICD-10-CM

## 2019-02-28 PROCEDURE — 71046 X-RAY EXAM CHEST 2 VIEWS: CPT

## 2019-02-28 PROCEDURE — 71046 X-RAY EXAM CHEST 2 VIEWS: CPT | Mod: 26

## 2019-02-28 PROCEDURE — 93000 ELECTROCARDIOGRAM COMPLETE: CPT

## 2019-02-28 PROCEDURE — 99214 OFFICE O/P EST MOD 30 MIN: CPT

## 2019-03-01 LAB
ALBUMIN SERPL ELPH-MCNC: 2.3 G/DL
ALP BLD-CCNC: 94 U/L
ALT SERPL-CCNC: 10 U/L
ANION GAP SERPL CALC-SCNC: 11 MMOL/L
AST SERPL-CCNC: 13 U/L
BILIRUB SERPL-MCNC: 0.4 MG/DL
BUN SERPL-MCNC: 21 MG/DL
CALCIUM SERPL-MCNC: 7.8 MG/DL
CHLORIDE SERPL-SCNC: 103 MMOL/L
CHOLEST SERPL-MCNC: 94 MG/DL
CHOLEST/HDLC SERPL: 2.1 RATIO
CO2 SERPL-SCNC: 27 MMOL/L
CREAT SERPL-MCNC: 0.97 MG/DL
GLUCOSE SERPL-MCNC: 139 MG/DL
HDLC SERPL-MCNC: 45 MG/DL
INR PPP: 1.14 RATIO
LDLC SERPL CALC-MCNC: 36 MG/DL
POTASSIUM SERPL-SCNC: 3.7 MMOL/L
PROT SERPL-MCNC: 4.3 G/DL
PT BLD: 13.2 SEC
SODIUM SERPL-SCNC: 141 MMOL/L
TRIGL SERPL-MCNC: 64 MG/DL

## 2019-03-03 VITALS — DIASTOLIC BLOOD PRESSURE: 70 MMHG | SYSTOLIC BLOOD PRESSURE: 100 MMHG | HEART RATE: 64 BPM | RESPIRATION RATE: 16 BRPM

## 2019-03-03 LAB
BASOPHILS # BLD AUTO: 0.03 K/UL
BASOPHILS NFR BLD AUTO: 0.6 %
EOSINOPHIL # BLD AUTO: 0.07 K/UL
EOSINOPHIL NFR BLD AUTO: 1.4 %
HCT VFR BLD CALC: 39.7 %
HGB BLD-MCNC: 12.3 G/DL
IMM GRANULOCYTES NFR BLD AUTO: 0.2 %
LYMPHOCYTES # BLD AUTO: 0.45 K/UL
LYMPHOCYTES NFR BLD AUTO: 9.1 %
MAN DIFF?: NORMAL
MCHC RBC-ENTMCNC: 31 GM/DL
MCHC RBC-ENTMCNC: 33.3 PG
MCV RBC AUTO: 107.6 FL
MONOCYTES # BLD AUTO: 0.44 K/UL
MONOCYTES NFR BLD AUTO: 8.9 %
NEUTROPHILS # BLD AUTO: 3.94 K/UL
NEUTROPHILS NFR BLD AUTO: 79.8 %
PLATELET # BLD AUTO: 273 K/UL
RBC # BLD: 3.69 M/UL
RBC # FLD: 14.1 %
WBC # FLD AUTO: 4.94 K/UL

## 2019-03-03 NOTE — DISCUSSION/SUMMARY
[FreeTextEntry1] : EKG: Normal sinus rhythm, occasional single PVC.\par \par  Impression: Low risk for general anesthesia from cardiovascular perspective. He is to continue taking her medication for hypertension hypercholesterolemia and followup in 6 months.

## 2019-03-03 NOTE — REASON FOR VISIT
[Follow-Up - Clinic] : a clinic follow-up of [Coronary Artery Disease] : coronary artery disease [FreeTextEntry1] : This 86-year-old man with history of coronary artery disease and prior stent procedure comes in for preoperative clearance prior to needing hernia repair with Dr. Valencia. Surgery is scheduled for March 11. Today he denies chest pain, shortness of breath, palpitations or syncope. There is no history of adverse anesthesia reaction.

## 2019-03-07 ENCOUNTER — APPOINTMENT (OUTPATIENT)
Dept: HEART AND VASCULAR | Facility: CLINIC | Age: 84
End: 2019-03-07

## 2019-03-08 RX ORDER — OMEPRAZOLE 10 MG/1
1 CAPSULE, DELAYED RELEASE ORAL
Qty: 0 | Refills: 0 | COMMUNITY

## 2019-03-08 RX ORDER — ASPIRIN/CALCIUM CARB/MAGNESIUM 324 MG
81 TABLET ORAL
Qty: 0 | Refills: 0 | COMMUNITY

## 2019-03-08 RX ORDER — CLOPIDOGREL BISULFATE 75 MG/1
1 TABLET, FILM COATED ORAL
Qty: 0 | Refills: 0 | COMMUNITY

## 2019-03-08 RX ORDER — CALCIUM CARBONATE 500(1250)
1 TABLET ORAL
Qty: 0 | Refills: 0 | COMMUNITY

## 2019-03-08 RX ORDER — ATORVASTATIN CALCIUM 80 MG/1
1 TABLET, FILM COATED ORAL
Qty: 0 | Refills: 0 | COMMUNITY

## 2019-03-11 ENCOUNTER — RESULT REVIEW (OUTPATIENT)
Age: 84
End: 2019-03-11

## 2019-03-11 ENCOUNTER — INPATIENT (INPATIENT)
Facility: HOSPITAL | Age: 84
LOS: 7 days | Discharge: NURSING, REHAB OR LONG-TERM CARE | DRG: 350 | End: 2019-03-19
Attending: SURGERY | Admitting: SURGERY
Payer: COMMERCIAL

## 2019-03-11 VITALS
TEMPERATURE: 97 F | HEART RATE: 102 BPM | DIASTOLIC BLOOD PRESSURE: 73 MMHG | RESPIRATION RATE: 20 BRPM | SYSTOLIC BLOOD PRESSURE: 116 MMHG | OXYGEN SATURATION: 100 %

## 2019-03-11 DIAGNOSIS — Z41.9 ENCOUNTER FOR PROCEDURE FOR PURPOSES OTHER THAN REMEDYING HEALTH STATE, UNSPECIFIED: Chronic | ICD-10-CM

## 2019-03-11 DIAGNOSIS — Z98.890 OTHER SPECIFIED POSTPROCEDURAL STATES: Chronic | ICD-10-CM

## 2019-03-11 LAB
ANION GAP SERPL CALC-SCNC: 8 MMOL/L — SIGNIFICANT CHANGE UP (ref 5–17)
BASE EXCESS BLDA CALC-SCNC: 1.1 MMOL/L — SIGNIFICANT CHANGE UP (ref -2–3)
BUN SERPL-MCNC: 22 MG/DL — SIGNIFICANT CHANGE UP (ref 7–23)
CALCIUM SERPL-MCNC: 7.6 MG/DL — LOW (ref 8.4–10.5)
CHLORIDE SERPL-SCNC: 101 MMOL/L — SIGNIFICANT CHANGE UP (ref 96–108)
CO2 SERPL-SCNC: 26 MMOL/L — SIGNIFICANT CHANGE UP (ref 22–31)
CREAT SERPL-MCNC: 0.9 MG/DL — SIGNIFICANT CHANGE UP (ref 0.5–1.3)
GAS PNL BLDA: SIGNIFICANT CHANGE UP
GLUCOSE SERPL-MCNC: 187 MG/DL — HIGH (ref 70–99)
HCO3 BLDA-SCNC: 30 MMOL/L — HIGH (ref 21–28)
HCT VFR BLD CALC: 32.5 % — LOW (ref 39–50)
HGB BLD-MCNC: 10.9 G/DL — LOW (ref 13–17)
MAGNESIUM SERPL-MCNC: 1.8 MG/DL — SIGNIFICANT CHANGE UP (ref 1.6–2.6)
MCHC RBC-ENTMCNC: 33.5 GM/DL — SIGNIFICANT CHANGE UP (ref 32–36)
MCHC RBC-ENTMCNC: 34.2 PG — HIGH (ref 27–34)
MCV RBC AUTO: 101.9 FL — HIGH (ref 80–100)
NRBC # BLD: 0 /100 WBCS — SIGNIFICANT CHANGE UP (ref 0–0)
PCO2 BLDA: 71 MMHG — CRITICAL HIGH (ref 35–48)
PH BLDA: 7.24 — LOW (ref 7.35–7.45)
PHOSPHATE SERPL-MCNC: 6 MG/DL — HIGH (ref 2.5–4.5)
PLATELET # BLD AUTO: 180 K/UL — SIGNIFICANT CHANGE UP (ref 150–400)
PO2 BLDA: 294 MMHG — HIGH (ref 83–108)
POTASSIUM SERPL-MCNC: 4.2 MMOL/L — SIGNIFICANT CHANGE UP (ref 3.5–5.3)
POTASSIUM SERPL-SCNC: 4.2 MMOL/L — SIGNIFICANT CHANGE UP (ref 3.5–5.3)
RBC # BLD: 3.19 M/UL — LOW (ref 4.2–5.8)
RBC # FLD: 12.9 % — SIGNIFICANT CHANGE UP (ref 10.3–14.5)
SAO2 % BLDA: 99 % — SIGNIFICANT CHANGE UP (ref 95–100)
SODIUM SERPL-SCNC: 135 MMOL/L — SIGNIFICANT CHANGE UP (ref 135–145)
WBC # BLD: 7.25 K/UL — SIGNIFICANT CHANGE UP (ref 3.8–10.5)
WBC # FLD AUTO: 7.25 K/UL — SIGNIFICANT CHANGE UP (ref 3.8–10.5)

## 2019-03-11 RX ORDER — METOPROLOL TARTRATE 50 MG
25 TABLET ORAL DAILY
Qty: 0 | Refills: 0 | Status: DISCONTINUED | OUTPATIENT
Start: 2019-03-12 | End: 2019-03-19

## 2019-03-11 RX ORDER — TAMSULOSIN HYDROCHLORIDE 0.4 MG/1
0.4 CAPSULE ORAL AT BEDTIME
Qty: 0 | Refills: 0 | Status: DISCONTINUED | OUTPATIENT
Start: 2019-03-11 | End: 2019-03-19

## 2019-03-11 RX ORDER — ASPIRIN/CALCIUM CARB/MAGNESIUM 324 MG
81 TABLET ORAL DAILY
Qty: 0 | Refills: 0 | Status: DISCONTINUED | OUTPATIENT
Start: 2019-03-12 | End: 2019-03-19

## 2019-03-11 RX ORDER — ATORVASTATIN CALCIUM 80 MG/1
80 TABLET, FILM COATED ORAL AT BEDTIME
Qty: 0 | Refills: 0 | Status: DISCONTINUED | OUTPATIENT
Start: 2019-03-11 | End: 2019-03-19

## 2019-03-11 RX ORDER — PANTOPRAZOLE SODIUM 20 MG/1
40 TABLET, DELAYED RELEASE ORAL
Qty: 0 | Refills: 0 | Status: DISCONTINUED | OUTPATIENT
Start: 2019-03-11 | End: 2019-03-19

## 2019-03-11 RX ORDER — INSULIN LISPRO 100/ML
VIAL (ML) SUBCUTANEOUS EVERY 6 HOURS
Qty: 0 | Refills: 0 | Status: DISCONTINUED | OUTPATIENT
Start: 2019-03-11 | End: 2019-03-12

## 2019-03-11 RX ORDER — ASPIRIN/CALCIUM CARB/MAGNESIUM 324 MG
81 TABLET ORAL ONCE
Qty: 0 | Refills: 0 | Status: COMPLETED | OUTPATIENT
Start: 2019-03-11 | End: 2019-03-11

## 2019-03-11 RX ORDER — ALBUMIN HUMAN 25 %
250 VIAL (ML) INTRAVENOUS EVERY 8 HOURS
Qty: 0 | Refills: 0 | Status: DISCONTINUED | OUTPATIENT
Start: 2019-03-11 | End: 2019-03-11

## 2019-03-11 RX ORDER — ONDANSETRON 8 MG/1
4 TABLET, FILM COATED ORAL EVERY 6 HOURS
Qty: 0 | Refills: 0 | Status: DISCONTINUED | OUTPATIENT
Start: 2019-03-11 | End: 2019-03-19

## 2019-03-11 RX ORDER — HEPARIN SODIUM 5000 [USP'U]/ML
5000 INJECTION INTRAVENOUS; SUBCUTANEOUS EVERY 8 HOURS
Qty: 0 | Refills: 0 | Status: DISCONTINUED | OUTPATIENT
Start: 2019-03-11 | End: 2019-03-15

## 2019-03-11 RX ORDER — DEXTROSE 50 % IN WATER 50 %
12.5 SYRINGE (ML) INTRAVENOUS ONCE
Qty: 0 | Refills: 0 | Status: DISCONTINUED | OUTPATIENT
Start: 2019-03-11 | End: 2019-03-12

## 2019-03-11 RX ORDER — HYDROMORPHONE HYDROCHLORIDE 2 MG/ML
0.25 INJECTION INTRAMUSCULAR; INTRAVENOUS; SUBCUTANEOUS
Qty: 0 | Refills: 0 | Status: DISCONTINUED | OUTPATIENT
Start: 2019-03-11 | End: 2019-03-11

## 2019-03-11 RX ORDER — DOCUSATE SODIUM 100 MG
100 CAPSULE ORAL THREE TIMES A DAY
Qty: 0 | Refills: 0 | Status: DISCONTINUED | OUTPATIENT
Start: 2019-03-11 | End: 2019-03-13

## 2019-03-11 RX ORDER — ALBUMIN HUMAN 25 %
250 VIAL (ML) INTRAVENOUS EVERY 8 HOURS
Qty: 0 | Refills: 0 | Status: DISCONTINUED | OUTPATIENT
Start: 2019-03-11 | End: 2019-03-13

## 2019-03-11 RX ORDER — HYDROMORPHONE HYDROCHLORIDE 2 MG/ML
0.5 INJECTION INTRAMUSCULAR; INTRAVENOUS; SUBCUTANEOUS
Qty: 0 | Refills: 0 | Status: DISCONTINUED | OUTPATIENT
Start: 2019-03-11 | End: 2019-03-11

## 2019-03-11 RX ORDER — ACETAMINOPHEN 500 MG
650 TABLET ORAL EVERY 6 HOURS
Qty: 0 | Refills: 0 | Status: DISCONTINUED | OUTPATIENT
Start: 2019-03-11 | End: 2019-03-19

## 2019-03-11 RX ORDER — DEXTROSE 50 % IN WATER 50 %
25 SYRINGE (ML) INTRAVENOUS ONCE
Qty: 0 | Refills: 0 | Status: DISCONTINUED | OUTPATIENT
Start: 2019-03-11 | End: 2019-03-12

## 2019-03-11 RX ORDER — SODIUM CHLORIDE 9 MG/ML
1000 INJECTION, SOLUTION INTRAVENOUS
Qty: 0 | Refills: 0 | Status: DISCONTINUED | OUTPATIENT
Start: 2019-03-11 | End: 2019-03-11

## 2019-03-11 RX ADMIN — Medication 31.25 MILLILITER(S): at 17:53

## 2019-03-11 RX ADMIN — Medication 81 MILLIGRAM(S): at 13:26

## 2019-03-11 RX ADMIN — Medication 100 MILLIGRAM(S): at 22:44

## 2019-03-11 RX ADMIN — HEPARIN SODIUM 5000 UNIT(S): 5000 INJECTION INTRAVENOUS; SUBCUTANEOUS at 22:44

## 2019-03-11 RX ADMIN — TAMSULOSIN HYDROCHLORIDE 0.4 MILLIGRAM(S): 0.4 CAPSULE ORAL at 22:43

## 2019-03-11 NOTE — H&P PST ADULT - ASSESSMENT
86M with a hx of CAD s/p stenting, right inguinal hernia repair now with recurrence and new diagnosis of left inguinal hernia and umbilical hernia here for an elective robotic assisted laparoscopic inguinal hernia repair b/l with mesh.    - Will admit to surgical service post op, team 1, telemetry bed  - 4.5 liters of ascites removed and sent for cytopathology during the procedure, as such will give slow colloid gtt for IVF  - Will restart home metoprolol tm, unless sustained tachycardia on POD0  - Preop ASA 81 given, will continue tm, and discuss restarting home plavix with stable cbc  - PACU labs, AM labs, PACU EKG  - TOV @ 10pm  - Pain/nausea control, but will limit narcotics  - OOBA with assistance  - SQH/SCDs/thigh high stockings/IS

## 2019-03-11 NOTE — H&P PST ADULT - HISTORY OF PRESENT ILLNESS
86M with a hx of CAD s/p coronary stents 2012 still on ASA/plavix, a hx of a right inguinal hernia repair with mesh now presenting with a longstanding hx of b/l groin bulging with no c/o pain or obstructive symptomatology. He was diagnosed with recurrent right inguinal hernia, new onset left inguinal hernia, and umbilical hernia.  He was evaluated by his cardiologist Dr. Luiz Garcia and optimized for his elective robotic assisted transabdominal preperitoneal approach to b/l inguinal hernia repair with mesh and primary umbilical hernia repair. No recent record of ECHO, however lives at home independently and able to walk up 3 flights of stairs to his apartment. Denies any recent fevers, chills, cp, sob, nausea, vomiting, change of bowel function.  He does note a recent hx of b/l lower extremity edema. Never smoker, rare ETOH use. Preop Hgh 12.3, Cr 0.97. Preop EKG NSR with occasional PVC.

## 2019-03-11 NOTE — H&P PST ADULT - PMH
Acute diarrhea    Atherosclerosis of coronary artery  CAD (coronary artery disease)  Esophageal reflux    HLD (hyperlipidemia)    HTN (hypertension)    Prostate cancer    Stented coronary artery  x3 , per pt.

## 2019-03-11 NOTE — BRIEF OPERATIVE NOTE - OPERATION/FINDINGS
Diagnostic laparoscopy revealing 4.5 liters of clear serous ascites, suctioned out and sent for cytopathology. No hemodynamic instability observed throughout the procedure. B/l inguinal hernia appreciated, and via a robotic assisted laparoscopic preperitoneal approach repaired b/l hernia with progrip mesh. Slight thickening of peritoneum appreciated medially. Previous mesh seen folded into the right inguinal canal. Pt did not report pain at the right groin preoperatively as such left mesh in place acting as a plug in the inguinal canal. Reduced a large direct inguinal hernia on the right and large indirect/direct inguinal hernia on the left. Peritoneum closed primarily with v-lock suture.  Umbilical hernia repaired primarily with maxon. Hemostasis achieved at the end of the case. Diagnostic laparoscopy revealing 4.5 liters of clear serous ascites, suctioned out and sent for cytopathology. Liver appeared grossly normal. No obvious mass appreciated in the bowel or omentum. No hemodynamic instability observed throughout the procedure. B/l inguinal hernia appreciated, and via a robotic assisted laparoscopic preperitoneal approach repaired b/l hernia with progrip mesh. Slight thickening of peritoneum appreciated medially. Previous mesh seen folded into the right inguinal canal. Pt did not report pain at the right groin preoperatively as such left mesh in place acting as a plug in the inguinal canal. Reduced a large direct inguinal hernia on the right and large indirect/direct inguinal hernia on the left. Peritoneum closed primarily with v-lock suture.  Umbilical hernia repaired primarily with maxon. Hemostasis achieved at the end of the case.

## 2019-03-11 NOTE — BRIEF OPERATIVE NOTE - PRE-OP DX
Bilateral inguinal hernia  03/11/2019    Active  Luis Aguiar  Umbilical hernia  03/11/2019    Active  Luis Aguiar

## 2019-03-11 NOTE — PACU DISCHARGE NOTE - COMMENTS
pt aao x3.  VSS.  lap sites to abd x3 with 2x2 and tegaderm intact.  denies c/o pain.  report given to RN on 8 lachman.  pt to go to 3-2 via bed with transport and RN on monitor

## 2019-03-11 NOTE — BRIEF OPERATIVE NOTE - PROCEDURE
<<-----Click on this checkbox to enter Procedure Umbilical hernia repair  03/11/2019    Active  MERA  Laparoscopic bilateral repair of inguinal hernia  03/11/2019  robotic assisted  [facility]

## 2019-03-12 LAB
ANION GAP SERPL CALC-SCNC: 8 MMOL/L — SIGNIFICANT CHANGE UP (ref 5–17)
APPEARANCE UR: CLEAR — SIGNIFICANT CHANGE UP
BILIRUB UR-MCNC: NEGATIVE — SIGNIFICANT CHANGE UP
BLD GP AB SCN SERPL QL: NEGATIVE — SIGNIFICANT CHANGE UP
BUN SERPL-MCNC: 23 MG/DL — SIGNIFICANT CHANGE UP (ref 7–23)
CALCIUM SERPL-MCNC: 7.8 MG/DL — LOW (ref 8.4–10.5)
CHLORIDE SERPL-SCNC: 100 MMOL/L — SIGNIFICANT CHANGE UP (ref 96–108)
CO2 SERPL-SCNC: 28 MMOL/L — SIGNIFICANT CHANGE UP (ref 22–31)
COLOR SPEC: YELLOW — SIGNIFICANT CHANGE UP
CREAT SERPL-MCNC: 0.87 MG/DL — SIGNIFICANT CHANGE UP (ref 0.5–1.3)
DIFF PNL FLD: NEGATIVE — SIGNIFICANT CHANGE UP
GLUCOSE BLDC GLUCOMTR-MCNC: 123 MG/DL — HIGH (ref 70–99)
GLUCOSE BLDC GLUCOMTR-MCNC: 129 MG/DL — HIGH (ref 70–99)
GLUCOSE BLDC GLUCOMTR-MCNC: 137 MG/DL — HIGH (ref 70–99)
GLUCOSE BLDC GLUCOMTR-MCNC: 91 MG/DL — SIGNIFICANT CHANGE UP (ref 70–99)
GLUCOSE BLDC GLUCOMTR-MCNC: 97 MG/DL — SIGNIFICANT CHANGE UP (ref 70–99)
GLUCOSE SERPL-MCNC: 103 MG/DL — HIGH (ref 70–99)
GLUCOSE UR QL: NEGATIVE — SIGNIFICANT CHANGE UP
HBA1C BLD-MCNC: 4.9 % — SIGNIFICANT CHANGE UP (ref 4–5.6)
HCT VFR BLD CALC: 29.5 % — LOW (ref 39–50)
HGB BLD-MCNC: 9.8 G/DL — LOW (ref 13–17)
KETONES UR-MCNC: 15 MG/DL
LEUKOCYTE ESTERASE UR-ACNC: NEGATIVE — SIGNIFICANT CHANGE UP
MAGNESIUM SERPL-MCNC: 1.7 MG/DL — SIGNIFICANT CHANGE UP (ref 1.6–2.6)
MCHC RBC-ENTMCNC: 33.2 GM/DL — SIGNIFICANT CHANGE UP (ref 32–36)
MCHC RBC-ENTMCNC: 33.9 PG — SIGNIFICANT CHANGE UP (ref 27–34)
MCV RBC AUTO: 102.1 FL — HIGH (ref 80–100)
NITRITE UR-MCNC: POSITIVE
NRBC # BLD: 0 /100 WBCS — SIGNIFICANT CHANGE UP (ref 0–0)
PH UR: 5.5 — SIGNIFICANT CHANGE UP (ref 5–8)
PHOSPHATE SERPL-MCNC: 4.1 MG/DL — SIGNIFICANT CHANGE UP (ref 2.5–4.5)
PLATELET # BLD AUTO: 168 K/UL — SIGNIFICANT CHANGE UP (ref 150–400)
POTASSIUM SERPL-MCNC: 4.2 MMOL/L — SIGNIFICANT CHANGE UP (ref 3.5–5.3)
POTASSIUM SERPL-SCNC: 4.2 MMOL/L — SIGNIFICANT CHANGE UP (ref 3.5–5.3)
PROT UR-MCNC: NEGATIVE MG/DL — SIGNIFICANT CHANGE UP
RBC # BLD: 2.89 M/UL — LOW (ref 4.2–5.8)
RBC # FLD: 13.2 % — SIGNIFICANT CHANGE UP (ref 10.3–14.5)
RH IG SCN BLD-IMP: POSITIVE — SIGNIFICANT CHANGE UP
SODIUM SERPL-SCNC: 136 MMOL/L — SIGNIFICANT CHANGE UP (ref 135–145)
SP GR SPEC: >=1.03 — SIGNIFICANT CHANGE UP (ref 1–1.03)
UROBILINOGEN FLD QL: 0.2 E.U./DL — SIGNIFICANT CHANGE UP
WBC # BLD: 5.54 K/UL — SIGNIFICANT CHANGE UP (ref 3.8–10.5)
WBC # FLD AUTO: 5.54 K/UL — SIGNIFICANT CHANGE UP (ref 3.8–10.5)

## 2019-03-12 RX ORDER — MAGNESIUM SULFATE 500 MG/ML
1 VIAL (ML) INJECTION ONCE
Qty: 0 | Refills: 0 | Status: COMPLETED | OUTPATIENT
Start: 2019-03-12 | End: 2019-03-12

## 2019-03-12 RX ORDER — MULTIVIT-MIN/FERROUS GLUCONATE 9 MG/15 ML
1 LIQUID (ML) ORAL DAILY
Qty: 0 | Refills: 0 | Status: DISCONTINUED | OUTPATIENT
Start: 2019-03-12 | End: 2019-03-19

## 2019-03-12 RX ORDER — TAMSULOSIN HYDROCHLORIDE 0.4 MG/1
0.4 CAPSULE ORAL AT BEDTIME
Qty: 0 | Refills: 0 | Status: DISCONTINUED | OUTPATIENT
Start: 2019-03-12 | End: 2019-03-12

## 2019-03-12 RX ORDER — TAMSULOSIN HYDROCHLORIDE 0.4 MG/1
0.4 CAPSULE ORAL ONCE
Qty: 0 | Refills: 0 | Status: COMPLETED | OUTPATIENT
Start: 2019-03-12 | End: 2019-03-12

## 2019-03-12 RX ADMIN — ATORVASTATIN CALCIUM 80 MILLIGRAM(S): 80 TABLET, FILM COATED ORAL at 22:23

## 2019-03-12 RX ADMIN — HEPARIN SODIUM 5000 UNIT(S): 5000 INJECTION INTRAVENOUS; SUBCUTANEOUS at 05:37

## 2019-03-12 RX ADMIN — Medication 1 TABLET(S): at 12:07

## 2019-03-12 RX ADMIN — HEPARIN SODIUM 5000 UNIT(S): 5000 INJECTION INTRAVENOUS; SUBCUTANEOUS at 22:23

## 2019-03-12 RX ADMIN — PANTOPRAZOLE SODIUM 40 MILLIGRAM(S): 20 TABLET, DELAYED RELEASE ORAL at 07:22

## 2019-03-12 RX ADMIN — TAMSULOSIN HYDROCHLORIDE 0.4 MILLIGRAM(S): 0.4 CAPSULE ORAL at 22:23

## 2019-03-12 RX ADMIN — Medication 650 MILLIGRAM(S): at 06:35

## 2019-03-12 RX ADMIN — Medication 650 MILLIGRAM(S): at 18:21

## 2019-03-12 RX ADMIN — HEPARIN SODIUM 5000 UNIT(S): 5000 INJECTION INTRAVENOUS; SUBCUTANEOUS at 14:35

## 2019-03-12 RX ADMIN — Medication 650 MILLIGRAM(S): at 13:00

## 2019-03-12 RX ADMIN — TAMSULOSIN HYDROCHLORIDE 0.4 MILLIGRAM(S): 0.4 CAPSULE ORAL at 05:37

## 2019-03-12 RX ADMIN — Medication 100 MILLIGRAM(S): at 14:35

## 2019-03-12 RX ADMIN — Medication 650 MILLIGRAM(S): at 17:37

## 2019-03-12 RX ADMIN — Medication 81 MILLIGRAM(S): at 12:07

## 2019-03-12 RX ADMIN — Medication 100 MILLIGRAM(S): at 05:37

## 2019-03-12 RX ADMIN — Medication 100 GRAM(S): at 12:07

## 2019-03-12 RX ADMIN — Medication 31.25 MILLILITER(S): at 17:36

## 2019-03-12 RX ADMIN — Medication 650 MILLIGRAM(S): at 12:07

## 2019-03-12 RX ADMIN — Medication 100 MILLIGRAM(S): at 22:23

## 2019-03-12 RX ADMIN — Medication 31.25 MILLILITER(S): at 09:36

## 2019-03-12 RX ADMIN — Medication 31.25 MILLILITER(S): at 01:58

## 2019-03-12 RX ADMIN — Medication 650 MILLIGRAM(S): at 05:37

## 2019-03-12 NOTE — CONSULT NOTE ADULT - SUBJECTIVE AND OBJECTIVE BOX
CONSULT NOTE:    HPI:  86M with a hx of CAD s/p coronary stents 2012 still on ASA/plavix, a hx of a right inguinal hernia repair with mesh, diagnosed with recurrent right inguinal hernia, new onset left inguinal hernia, and umbilical hernia.  admitted for elective robotic assisted transabdominal preperitoneal approach to b/l inguinal hernia repair with mesh and primary umbilical hernia repair. Called to see patient for urinary retention post op. Patient states he has a history underwent turp many many years ago. He is unsure of the surgeons name who did the procedure and he is unsure whether he ever took any medications for his prostate. Patients states he believes he empties his bladder at home and states he getsup to urinate once or twice each night. Denies any urgency, frequency, hematuria or dysuria. Patient states his last BM was yesterday. He states he has not ambulated yet and is having a lot of abd pain.     Vital Signs Last 24 Hrs  T(C): 35.6 (12 Mar 2019 09:04), Max: 36.6 (12 Mar 2019 00:10)  T(F): 96.1 (12 Mar 2019 09:04), Max: 97.8 (12 Mar 2019 00:10)  HR: 106 (12 Mar 2019 12:10) (72 - 108)  BP: 98/60 (12 Mar 2019 12:10) (90/64 - 120/75)  BP(mean): 74 (12 Mar 2019 12:10) (72 - 95)  RR: 16 (12 Mar 2019 12:10) (12 - 24)  SpO2: 98% (12 Mar 2019 12:10) (98% - 100%)  I&O's Summary    11 Mar 2019 07:01  -  12 Mar 2019 07:00  --------------------------------------------------------  IN: 604.8 mL / OUT: 0 mL / NET: 604.8 mL    12 Mar 2019 07:01  -  12 Mar 2019 12:23  --------------------------------------------------------  IN: 360 mL / OUT: 180 mL / NET: 180 mL        PE:  Gen: NAD  Abd: soft, ND, appropriate TTP incisions c/d/i  : rao draining clear urine      LABS:                        9.8    5.54  )-----------( 168      ( 12 Mar 2019 07:09 )             29.5     03-12    136  |  100  |  23  ----------------------------<  103<H>  4.2   |  28  |  0.87    Ca    7.8<L>      12 Mar 2019 07:09  Phos  4.1     03-12  Mg     1.7     03-12        Cultures      A/P 85 yo m with post op urinary retention  1)Send UA and UCx  2)Keep rao monitor output  3)continue flomax  4) OOB  5) bowel regimen  6) can do TOV before DC

## 2019-03-13 LAB
ANION GAP SERPL CALC-SCNC: 5 MMOL/L — SIGNIFICANT CHANGE UP (ref 5–17)
BUN SERPL-MCNC: 19 MG/DL — SIGNIFICANT CHANGE UP (ref 7–23)
C DIFF GDH STL QL: NEGATIVE — SIGNIFICANT CHANGE UP
C DIFF GDH STL QL: SIGNIFICANT CHANGE UP
CALCIUM SERPL-MCNC: 7.6 MG/DL — LOW (ref 8.4–10.5)
CHLORIDE SERPL-SCNC: 101 MMOL/L — SIGNIFICANT CHANGE UP (ref 96–108)
CO2 SERPL-SCNC: 29 MMOL/L — SIGNIFICANT CHANGE UP (ref 22–31)
CREAT SERPL-MCNC: 0.92 MG/DL — SIGNIFICANT CHANGE UP (ref 0.5–1.3)
CULTURE RESULTS: NO GROWTH — SIGNIFICANT CHANGE UP
GLUCOSE SERPL-MCNC: 103 MG/DL — HIGH (ref 70–99)
HCT VFR BLD CALC: 24.2 % — LOW (ref 39–50)
HCT VFR BLD CALC: 29 % — LOW (ref 39–50)
HGB BLD-MCNC: 7.9 G/DL — LOW (ref 13–17)
HGB BLD-MCNC: 9.5 G/DL — LOW (ref 13–17)
MAGNESIUM SERPL-MCNC: 1.9 MG/DL — SIGNIFICANT CHANGE UP (ref 1.6–2.6)
MCHC RBC-ENTMCNC: 32.6 GM/DL — SIGNIFICANT CHANGE UP (ref 32–36)
MCHC RBC-ENTMCNC: 32.8 GM/DL — SIGNIFICANT CHANGE UP (ref 32–36)
MCHC RBC-ENTMCNC: 33.3 PG — SIGNIFICANT CHANGE UP (ref 27–34)
MCHC RBC-ENTMCNC: 33.6 PG — SIGNIFICANT CHANGE UP (ref 27–34)
MCV RBC AUTO: 102.1 FL — HIGH (ref 80–100)
MCV RBC AUTO: 102.5 FL — HIGH (ref 80–100)
NON-GYNECOLOGICAL CYTOLOGY STUDY: SIGNIFICANT CHANGE UP
NON-GYNECOLOGICAL CYTOLOGY STUDY: SIGNIFICANT CHANGE UP
NRBC # BLD: 0 /100 WBCS — SIGNIFICANT CHANGE UP (ref 0–0)
NRBC # BLD: 0 /100 WBCS — SIGNIFICANT CHANGE UP (ref 0–0)
PHOSPHATE SERPL-MCNC: 2.7 MG/DL — SIGNIFICANT CHANGE UP (ref 2.5–4.5)
PLATELET # BLD AUTO: 143 K/UL — LOW (ref 150–400)
PLATELET # BLD AUTO: 206 K/UL — SIGNIFICANT CHANGE UP (ref 150–400)
POTASSIUM SERPL-MCNC: 4.5 MMOL/L — SIGNIFICANT CHANGE UP (ref 3.5–5.3)
POTASSIUM SERPL-SCNC: 4.5 MMOL/L — SIGNIFICANT CHANGE UP (ref 3.5–5.3)
RBC # BLD: 2.37 M/UL — LOW (ref 4.2–5.8)
RBC # BLD: 2.83 M/UL — LOW (ref 4.2–5.8)
RBC # FLD: 13.2 % — SIGNIFICANT CHANGE UP (ref 10.3–14.5)
RBC # FLD: 13.4 % — SIGNIFICANT CHANGE UP (ref 10.3–14.5)
SODIUM SERPL-SCNC: 135 MMOL/L — SIGNIFICANT CHANGE UP (ref 135–145)
SPECIMEN SOURCE: SIGNIFICANT CHANGE UP
WBC # BLD: 3.52 K/UL — LOW (ref 3.8–10.5)
WBC # BLD: 6.64 K/UL — SIGNIFICANT CHANGE UP (ref 3.8–10.5)
WBC # FLD AUTO: 3.52 K/UL — LOW (ref 3.8–10.5)
WBC # FLD AUTO: 6.64 K/UL — SIGNIFICANT CHANGE UP (ref 3.8–10.5)

## 2019-03-13 RX ORDER — MAGNESIUM SULFATE 500 MG/ML
1 VIAL (ML) INJECTION ONCE
Qty: 0 | Refills: 0 | Status: COMPLETED | OUTPATIENT
Start: 2019-03-13 | End: 2019-03-13

## 2019-03-13 RX ADMIN — Medication 650 MILLIGRAM(S): at 16:40

## 2019-03-13 RX ADMIN — Medication 1 TABLET(S): at 09:43

## 2019-03-13 RX ADMIN — Medication 650 MILLIGRAM(S): at 00:36

## 2019-03-13 RX ADMIN — HEPARIN SODIUM 5000 UNIT(S): 5000 INJECTION INTRAVENOUS; SUBCUTANEOUS at 06:08

## 2019-03-13 RX ADMIN — Medication 650 MILLIGRAM(S): at 07:04

## 2019-03-13 RX ADMIN — Medication 100 MILLIGRAM(S): at 06:07

## 2019-03-13 RX ADMIN — Medication 650 MILLIGRAM(S): at 10:43

## 2019-03-13 RX ADMIN — Medication 100 GRAM(S): at 12:40

## 2019-03-13 RX ADMIN — TAMSULOSIN HYDROCHLORIDE 0.4 MILLIGRAM(S): 0.4 CAPSULE ORAL at 22:03

## 2019-03-13 RX ADMIN — ATORVASTATIN CALCIUM 80 MILLIGRAM(S): 80 TABLET, FILM COATED ORAL at 22:03

## 2019-03-13 RX ADMIN — Medication 31.25 MILLILITER(S): at 01:26

## 2019-03-13 RX ADMIN — Medication 650 MILLIGRAM(S): at 10:44

## 2019-03-13 RX ADMIN — HEPARIN SODIUM 5000 UNIT(S): 5000 INJECTION INTRAVENOUS; SUBCUTANEOUS at 22:03

## 2019-03-13 RX ADMIN — Medication 650 MILLIGRAM(S): at 06:08

## 2019-03-13 RX ADMIN — HEPARIN SODIUM 5000 UNIT(S): 5000 INJECTION INTRAVENOUS; SUBCUTANEOUS at 14:47

## 2019-03-13 RX ADMIN — Medication 650 MILLIGRAM(S): at 01:40

## 2019-03-13 RX ADMIN — Medication 25 MILLIGRAM(S): at 06:07

## 2019-03-13 RX ADMIN — PANTOPRAZOLE SODIUM 40 MILLIGRAM(S): 20 TABLET, DELAYED RELEASE ORAL at 06:09

## 2019-03-13 RX ADMIN — Medication 650 MILLIGRAM(S): at 16:43

## 2019-03-13 RX ADMIN — Medication 81 MILLIGRAM(S): at 09:42

## 2019-03-13 NOTE — PHYSICAL THERAPY INITIAL EVALUATION ADULT - ADDITIONAL COMMENTS
Pt lives in 4th floor walk up with "50 steps up plus 10 steps to the porch", lives with wife. Pt states prior to sx indpt with amb without device, indpt with showering, sometimes needs assist for dressing, assist from wife's HHA for groceries. Pt states wife has HHA, needs assist, and is currently hospitalized as well. Pt owns SC and RW however has not used.

## 2019-03-13 NOTE — PHYSICAL THERAPY INITIAL EVALUATION ADULT - CRITERIA FOR SKILLED THERAPEUTIC INTERVENTIONS
predicted duration of therapy intervention/therapy frequency/anticipated discharge recommendation/impairments found/functional limitations in following categories/risk reduction/prevention/rehab potential

## 2019-03-13 NOTE — PHYSICAL THERAPY INITIAL EVALUATION ADULT - PERTINENT HX OF CURRENT PROBLEM, REHAB EVAL
86M with a hx of CAD s/p stenting, right inguinal hernia repair now with recurrence and new diagnosis of left inguinal hernia and umbilical hernia here for an elective robotic assisted laparoscopic inguinal hernia repair b/l with mesh.

## 2019-03-13 NOTE — PHYSICAL THERAPY INITIAL EVALUATION ADULT - DISCHARGE DISPOSITION, PT EVAL
TBD pending further assessment as limited by diarrhea this session. ***Of note, pt with wife in hospital, lives ni 4th floor walk-up)

## 2019-03-13 NOTE — PHYSICAL THERAPY INITIAL EVALUATION ADULT - GENERAL OBSERVATIONS, REHAB EVAL
Spoke to EVGENY Luo, pt cleared for PT. Chart reivewed, IE completed however limited by multiple bouts of diarrhea. Pt POD #2 lap inguinal hernia repair. Pt rcvd semi supine, +tele, +rao, +bedalarm, +SCDs. Pt denies pain, endorses many bouts of diarrhea this date (RN aware). Pt tolerated session fairly, limited by BMs, strength, balance and impaired posture. Demo Shell transfers to RW, static stance at RW >1 min with CG.

## 2019-03-14 ENCOUNTER — TRANSCRIPTION ENCOUNTER (OUTPATIENT)
Age: 84
End: 2019-03-14

## 2019-03-14 LAB
ANION GAP SERPL CALC-SCNC: 6 MMOL/L — SIGNIFICANT CHANGE UP (ref 5–17)
BUN SERPL-MCNC: 16 MG/DL — SIGNIFICANT CHANGE UP (ref 7–23)
CALCIUM SERPL-MCNC: 7.5 MG/DL — LOW (ref 8.4–10.5)
CHLORIDE SERPL-SCNC: 100 MMOL/L — SIGNIFICANT CHANGE UP (ref 96–108)
CO2 SERPL-SCNC: 29 MMOL/L — SIGNIFICANT CHANGE UP (ref 22–31)
CREAT SERPL-MCNC: 0.88 MG/DL — SIGNIFICANT CHANGE UP (ref 0.5–1.3)
GLUCOSE SERPL-MCNC: 91 MG/DL — SIGNIFICANT CHANGE UP (ref 70–99)
HCT VFR BLD CALC: 27.4 % — LOW (ref 39–50)
HGB BLD-MCNC: 9 G/DL — LOW (ref 13–17)
MAGNESIUM SERPL-MCNC: 2 MG/DL — SIGNIFICANT CHANGE UP (ref 1.6–2.6)
MCHC RBC-ENTMCNC: 32.8 GM/DL — SIGNIFICANT CHANGE UP (ref 32–36)
MCHC RBC-ENTMCNC: 33.5 PG — SIGNIFICANT CHANGE UP (ref 27–34)
MCV RBC AUTO: 101.9 FL — HIGH (ref 80–100)
NRBC # BLD: 0 /100 WBCS — SIGNIFICANT CHANGE UP (ref 0–0)
PHOSPHATE SERPL-MCNC: 2.3 MG/DL — LOW (ref 2.5–4.5)
PLATELET # BLD AUTO: 199 K/UL — SIGNIFICANT CHANGE UP (ref 150–400)
POTASSIUM SERPL-MCNC: 4.2 MMOL/L — SIGNIFICANT CHANGE UP (ref 3.5–5.3)
POTASSIUM SERPL-SCNC: 4.2 MMOL/L — SIGNIFICANT CHANGE UP (ref 3.5–5.3)
RBC # BLD: 2.69 M/UL — LOW (ref 4.2–5.8)
RBC # FLD: 13.3 % — SIGNIFICANT CHANGE UP (ref 10.3–14.5)
SODIUM SERPL-SCNC: 135 MMOL/L — SIGNIFICANT CHANGE UP (ref 135–145)
WBC # BLD: 4.71 K/UL — SIGNIFICANT CHANGE UP (ref 3.8–10.5)
WBC # FLD AUTO: 4.71 K/UL — SIGNIFICANT CHANGE UP (ref 3.8–10.5)

## 2019-03-14 PROCEDURE — 71045 X-RAY EXAM CHEST 1 VIEW: CPT | Mod: 26

## 2019-03-14 RX ORDER — FUROSEMIDE 40 MG
20 TABLET ORAL ONCE
Qty: 0 | Refills: 0 | Status: COMPLETED | OUTPATIENT
Start: 2019-03-14 | End: 2019-03-14

## 2019-03-14 RX ORDER — POTASSIUM PHOSPHATE, MONOBASIC POTASSIUM PHOSPHATE, DIBASIC 236; 224 MG/ML; MG/ML
15 INJECTION, SOLUTION INTRAVENOUS ONCE
Qty: 0 | Refills: 0 | Status: COMPLETED | OUTPATIENT
Start: 2019-03-14 | End: 2019-03-14

## 2019-03-14 RX ORDER — LOPERAMIDE HCL 2 MG
2 TABLET ORAL THREE TIMES A DAY
Qty: 0 | Refills: 0 | Status: DISCONTINUED | OUTPATIENT
Start: 2019-03-14 | End: 2019-03-18

## 2019-03-14 RX ORDER — LOPERAMIDE HCL 2 MG
2 TABLET ORAL ONCE
Qty: 0 | Refills: 0 | Status: COMPLETED | OUTPATIENT
Start: 2019-03-14 | End: 2019-03-14

## 2019-03-14 RX ORDER — LANOLIN ALCOHOL/MO/W.PET/CERES
3 CREAM (GRAM) TOPICAL ONCE
Qty: 0 | Refills: 0 | Status: COMPLETED | OUTPATIENT
Start: 2019-03-14 | End: 2019-03-14

## 2019-03-14 RX ORDER — HETASTARCH 6 G/100ML
500 INJECTION, SOLUTION INTRAVENOUS ONCE
Qty: 0 | Refills: 0 | Status: COMPLETED | OUTPATIENT
Start: 2019-03-14 | End: 2019-03-14

## 2019-03-14 RX ORDER — CLOPIDOGREL BISULFATE 75 MG/1
75 TABLET, FILM COATED ORAL DAILY
Qty: 0 | Refills: 0 | Status: DISCONTINUED | OUTPATIENT
Start: 2019-03-14 | End: 2019-03-19

## 2019-03-14 RX ADMIN — HEPARIN SODIUM 5000 UNIT(S): 5000 INJECTION INTRAVENOUS; SUBCUTANEOUS at 22:18

## 2019-03-14 RX ADMIN — HEPARIN SODIUM 5000 UNIT(S): 5000 INJECTION INTRAVENOUS; SUBCUTANEOUS at 06:01

## 2019-03-14 RX ADMIN — PANTOPRAZOLE SODIUM 40 MILLIGRAM(S): 20 TABLET, DELAYED RELEASE ORAL at 06:01

## 2019-03-14 RX ADMIN — Medication 3 MILLIGRAM(S): at 22:58

## 2019-03-14 RX ADMIN — Medication 25 MILLIGRAM(S): at 06:01

## 2019-03-14 RX ADMIN — TAMSULOSIN HYDROCHLORIDE 0.4 MILLIGRAM(S): 0.4 CAPSULE ORAL at 22:18

## 2019-03-14 RX ADMIN — HEPARIN SODIUM 5000 UNIT(S): 5000 INJECTION INTRAVENOUS; SUBCUTANEOUS at 16:06

## 2019-03-14 RX ADMIN — POTASSIUM PHOSPHATE, MONOBASIC POTASSIUM PHOSPHATE, DIBASIC 62.5 MILLIMOLE(S): 236; 224 INJECTION, SOLUTION INTRAVENOUS at 12:10

## 2019-03-14 RX ADMIN — ATORVASTATIN CALCIUM 80 MILLIGRAM(S): 80 TABLET, FILM COATED ORAL at 22:18

## 2019-03-14 RX ADMIN — Medication 650 MILLIGRAM(S): at 06:01

## 2019-03-14 RX ADMIN — HETASTARCH 1000 MILLILITER(S): 6 INJECTION, SOLUTION INTRAVENOUS at 17:46

## 2019-03-14 RX ADMIN — Medication 650 MILLIGRAM(S): at 17:46

## 2019-03-14 RX ADMIN — Medication 1 TABLET(S): at 12:12

## 2019-03-14 RX ADMIN — Medication 2 MILLIGRAM(S): at 22:18

## 2019-03-14 RX ADMIN — Medication 20 MILLIGRAM(S): at 19:15

## 2019-03-14 RX ADMIN — CLOPIDOGREL BISULFATE 75 MILLIGRAM(S): 75 TABLET, FILM COATED ORAL at 17:46

## 2019-03-14 RX ADMIN — Medication 650 MILLIGRAM(S): at 12:11

## 2019-03-14 RX ADMIN — Medication 81 MILLIGRAM(S): at 12:12

## 2019-03-14 RX ADMIN — Medication 650 MILLIGRAM(S): at 18:42

## 2019-03-14 NOTE — DISCHARGE NOTE PROVIDER - NSDCFUADDINST_GEN_ALL_CORE_FT
General Discharge Instructions:  Please resume all regular home medications unless specifically advised not to take a particular medication. Also, please take any new medications as prescribed. Take 2 tabs tylenol every 6 hours as needed for pain management. You have also been prescribed percocet for pain not controlled by tylenol. Take 2 tabs as prescribed instead of tylenol for severe pain. Take prescribed stool softener (colace) to prevent constipation caused by percocet.  Please get plenty of rest, continue to ambulate several times per day, and drink adequate amounts of fluids. Avoid lifting weights greater than 5-10 lbs until you follow-up with your surgeon, who will instruct you further regarding activity restrictions.  Avoid driving or operating heavy machinery while taking pain medications.  Please follow-up with your surgeon and Primary Care Provider (PCP) as advised.  Incision Care:  *Please call your doctor or nurse practitioner if you have increased pain, swelling, redness, or drainage from the incision site.  *Avoid swimming and baths until your follow-up appointment.  *You may shower, and wash surgical incisions with a mild soap and warm water. Gently pat the area dry.  *If you have steri-strips, they will fall off on their own. Please remove any remaining strips 7-10 days after surgery.    Warning Signs:  Please call your doctor or nurse practitioner if you experience the following:  *You experience new chest pain, pressure, squeezing or tightness.  *New or worsening cough, shortness of breath, or wheeze.  *If you are vomiting and cannot keep down fluids or your medications.  *You are getting dehydrated due to continued vomiting, diarrhea, or other reasons. Signs of dehydration include dry mouth, rapid heartbeat, or feeling dizzy or faint when standing.  *You see blood or dark/black material when you vomit or have a bowel movement.  *You experience burning when you urinate, have blood in your urine, or experience a discharge.  *Your pain is not improving within 8-12 hours or is not gone within 24 hours. Call or return immediately if your pain is getting worse, changes location, or moves to your chest or back.  *You have shaking chills, or fever greater than 101.5 degrees Fahrenheit or 38 degrees Celsius.  *Any change in your symptoms, or any new symptoms that concern you.    Follow Up:  Please follow up with Dr. Valencia in 1-2 weeks; you may call the office to make an appointment at your earliest convenience [phone]. General Discharge Instructions:  Please resume all regular home medications unless specifically advised not to take a particular medication. Also, please take any new medications as prescribed. Take 2 tabs tylenol every 6 hours as needed for pain management.  Please get plenty of rest, continue to ambulate several times per day, and drink adequate amounts of fluids. Avoid lifting weights greater than 5-10 lbs until you follow-up with your surgeon, who will instruct you further regarding activity restrictions.  Avoid driving or operating heavy machinery while taking pain medications.  Please follow-up with your surgeon and Primary Care Provider (PCP) as advised.  Incision Care:  *Please call your doctor or nurse practitioner if you have increased pain, swelling, redness, or drainage from the incision site.  *Avoid swimming and baths until your follow-up appointment.  *You may shower, and wash surgical incisions with a mild soap and warm water. Gently pat the area dry.  *If you have steri-strips, they will fall off on their own. Please remove any remaining strips 7-10 days after surgery.    Warning Signs:  Please call your doctor or nurse practitioner if you experience the following:  *You experience new chest pain, pressure, squeezing or tightness.  *New or worsening cough, shortness of breath, or wheeze.  *If you are vomiting and cannot keep down fluids or your medications.  *You are getting dehydrated due to continued vomiting, diarrhea, or other reasons. Signs of dehydration include dry mouth, rapid heartbeat, or feeling dizzy or faint when standing.  *You see blood or dark/black material when you vomit or have a bowel movement.  *You experience burning when you urinate, have blood in your urine, or experience a discharge.  *Your pain is not improving within 8-12 hours or is not gone within 24 hours. Call or return immediately if your pain is getting worse, changes location, or moves to your chest or back.  *You have shaking chills, or fever greater than 101.5 degrees Fahrenheit or 38 degrees Celsius.  *Any change in your symptoms, or any new symptoms that concern you.    Follow Up:  Please follow up with Dr. Valencia in 1-2 weeks; you may call the office to make an appointment at your earliest convenience [phone]. General Discharge Instructions:  Please resume all regular home medications unless specifically advised not to take a particular medication. Also, please take any new medications as prescribed. Take 2 tabs tylenol every 6 hours as needed for pain management.  Please get plenty of rest, continue to ambulate several times per day, and drink adequate amounts of fluids. Avoid lifting weights greater than 5-10 lbs until you follow-up with your surgeon, who will instruct you further regarding activity restrictions.  Avoid driving or operating heavy machinery while taking pain medications.  Please follow-up with your surgeon Dr. Valencia in 1-2 weeks. Please call his office at [phone]  Incision Care:  *Please call your doctor or nurse practitioner if you have increased pain, swelling, redness, or drainage from the incision site.  *Avoid swimming and baths until your follow-up appointment.  *You may shower, and wash surgical incisions with a mild soap and warm water. Gently pat the area dry.      Warning Signs:  Please call your doctor or nurse practitioner if you experience the following:  *You experience new chest pain, pressure, squeezing or tightness.  *New or worsening cough, shortness of breath, or wheeze.  *If you are vomiting and cannot keep down fluids or your medications.  *You are getting dehydrated due to continued vomiting, diarrhea, or other reasons. Signs of dehydration include dry mouth, rapid heartbeat, or feeling dizzy or faint when standing.  *You see blood or dark/black material when you vomit or have a bowel movement.  *You experience burning when you urinate, have blood in your urine, or experience a discharge.  *Your pain is not improving within 8-12 hours or is not gone within 24 hours. Call or return immediately if your pain is getting worse, changes location, or moves to your chest or back.  *You have shaking chills, or fever greater than 101.5 degrees Fahrenheit or 38 degrees Celsius.  *Any change in your symptoms, or any new symptoms that concern you.    Follow Up:  Please follow up with Dr. Valencia in 1-2 weeks; you may call the office to make an appointment at your earliest convenience [phone].

## 2019-03-14 NOTE — DIETITIAN INITIAL EVALUATION ADULT. - OTHER INFO
85yo M w/ a hx of CAD s/p stenting, right inguinal hernia repair now w/ recurrence and new diagnosis of left inguinal hernia and umbilical hernia here for an elective robotic assisted laparoscopic inguinal hernia repair b/l with mesh, now POD4. Pt seen in room resting in bed. Had 1 epsiode of diarrhea prior to assessment, has been having multiple bouts of diarrhea for the last 2 days. Currently on a regular diet and tolerating PO. Had 2 slices of turkey vengeas today, did not have cream of wheat on breakfast tray. Denies N/V. Pt reports at home, preparing his meals on his own and having 3 meals/day. Reports UBW was 169lbs 2-3 years ago and has been gradually losing weight since. Current BW is 137lbs, wt stated UBW 145lbs. This indicates a 32lb wt loss (18.9% wt change) x 2-3 years. Pt reports wt loss was gradual and not rapid.  Pt w/ notable severe muscle wasting in upper extremeties. Discussed importance of protein for maintaining LBM and hydration w/ perisistant diarrhea. Pt receptive and expressed understanding. Agreeable to trying EnsureEnlives (prefers strawberry)- communicated to team. NKFA or dietary restrictions. Skin: surgical incision; GI WDL per flowsheet. RD to follow.

## 2019-03-14 NOTE — DISCHARGE NOTE PROVIDER - HOSPITAL COURSE
Patient is a 86M with a hx of CAD s/p coronary stents 2012 still on ASA/plavix, a hx of a right inguinal hernia repair with mesh who presented to [facility] with a longstanding hx of b/l groin bulging with no c/o pain or obstructive symptomatology. He was diagnosed with recurrent right inguinal hernia, new onset left inguinal hernia, and umbilical hernia.  He was evaluated by his cardiologist Dr. Luiz Garcia and optimized for his elective robotic assisted transabdominal preperitoneal approach to b/l inguinal hernia repair with mesh and primary umbilical hernia repair. No recent record of ECHO, however pt reported that he lives at home independently and able to walk up 3 flights of stairs to his apartment. On presentation, pt denies any recent fevers, chills, cp, sob, nausea, vomiting, change of bowel function.  He does note a recent hx of b/l lower extremity edema. Never smoker, rare ETOH use. Preop Hgb 12.3, Cr 0.97. Preop EKG NSR with occasional PVC. On 3/11 pt underwent laparoscopic bilateral repair of inguinal hernia and umbilical hernia repair. Pt's postoperative check was within normal limits and rao was removed after the case. On 3/12 pt failed his trial of void, rao was replaced and pt was started on flomax. On 3/13 pt had multiple bouts of watery diarrhea w/ resolution in the evening, c.diff neg, and pts rao was removed again and _____. On 3/14 pt was evaluated by PT and recommended _______. Throughout hospital stay pts diet was advanced to a regular diet and tolerated well. On day of discharge patient was stable to be d/c'd _____. Patient is a 86M with a hx of CAD s/p coronary stents 2012 still on ASA/plavix, a hx of a right inguinal hernia repair with mesh who presented to [facility] with a longstanding hx of b/l groin bulging with no c/o pain or obstructive symptomatology. He was diagnosed with recurrent right inguinal hernia, new onset left inguinal hernia, and umbilical hernia.  He was evaluated by his cardiologist Dr. Luiz Garcia and optimized for his elective robotic assisted transabdominal preperitoneal approach to b/l inguinal hernia repair with mesh and primary umbilical hernia repair. No recent record of ECHO, however pt reported that he lives at home independently and able to walk up 3 flights of stairs to his apartment. On presentation, pt denies any recent fevers, chills, cp, sob, nausea, vomiting, change of bowel function.  He does note a recent hx of b/l lower extremity edema. Never smoker, rare ETOH use. Preop Hgb 12.3, Cr 0.97. Preop EKG NSR with occasional PVC. On 3/11 pt underwent laparoscopic bilateral repair of inguinal hernia and umbilical hernia repair. Pt's postoperative check was within normal limits and rao was removed after the case. On 3/12 pt failed his trial of void, rao was replaced and pt was started on flomax. On 3/13 pt had multiple bouts of watery diarrhea w/ resolution in the evening, c.diff neg, and pts rao was removed again and patient passed his trial of void. On 3/14 pt was evaluated by PT and recommended discharge to subacute rehab. Throughout hospital stay pts diet was advanced to a regular diet and tolerated well.  On 3/17 the patient had episode of sinus tachycardia. the patient was evaluated by cardiology and the EKG was within normal limits and showed no evidence of ischemia findings, troponins were trended and within normal limits on day of discharge. On day of discharge patient was stable to be d/c'd to subacute rehab.

## 2019-03-14 NOTE — DISCHARGE NOTE PROVIDER - CARE PROVIDER_API CALL
Jayden Valencia)  Surgery  [address]  [place], [place] [address]  Phone: [phone]  Fax: [phone]  Follow Up Time:

## 2019-03-14 NOTE — DISCHARGE NOTE PROVIDER - NSDCCPCAREPLAN_GEN_ALL_CORE_FT
PRINCIPAL DISCHARGE DIAGNOSIS  Diagnosis: Bilateral inguinal hernia  Assessment and Plan of Treatment: B/L inguinal hernia repair      SECONDARY DISCHARGE DIAGNOSES  Diagnosis: Umbilical hernia  Assessment and Plan of Treatment: Unbilical hernia repair

## 2019-03-14 NOTE — DIETITIAN INITIAL EVALUATION ADULT. - ENERGY NEEDS
Height: 5'11" Weight: 137lbs, IBW 172lbs+/-10%, %IBW 79%, BMI 19.1  IBW used for calculations as pt <80% of IBW    Nutrient needs based on [facility] standards of care for maintenance in older adults.   Needs adjusted for age, post-op and suspected severe protein calorie malnutrition. Recommend utilizing upper range of estimated needs.

## 2019-03-14 NOTE — CHART NOTE - NSCHARTNOTEFT_GEN_A_CORE
Upon Nutritional Assessment by the Registered Dietitian your patient was determined to meet criteria / has evidence of the following diagnosis/diagnoses:          [ ]  Mild Protein Calorie Malnutrition        [ ]  Moderate Protein Calorie Malnutrition        [ x] Severe Protein Calorie Malnutrition        [ ] Unspecified Protein Calorie Malnutrition        [ ] Underweight / BMI <19        [ ] Morbid Obesity / BMI > 40  BMI 19.1  79% of ideal body weight    Findings as based on:  •  Comprehensive nutrition assessment and consultation  •  Calorie counts (nutrient intake analysis)  •  Food acceptance and intake status from observations by staff  •  Follow up  •  Patient education  •  Intervention secondary to interdisciplinary rounds  •   concerns    Severe muscle wasting in temple and clavicle & acromion bone region  18.1% unintentional wt loss over last 2 years    Treatment:    The following diet has been recommended:  1) Continue on regular diet as tolerated  2) EnsureEnlive TID (1050kcal, 60g pro)  3) MVI daily  4) consider metamucil if diarrhea persists  *recs d/w team. Order placed for MD verification.    PROVIDER Section:     By signing this assessment you are acknowledging and agree with the diagnosis/diagnoses assigned by the Registered Dietitian    Comments:

## 2019-03-15 ENCOUNTER — TRANSCRIPTION ENCOUNTER (OUTPATIENT)
Age: 84
End: 2019-03-15

## 2019-03-15 LAB
ANION GAP SERPL CALC-SCNC: 4 MMOL/L — LOW (ref 5–17)
BUN SERPL-MCNC: 17 MG/DL — SIGNIFICANT CHANGE UP (ref 7–23)
CALCIUM SERPL-MCNC: 7 MG/DL — LOW (ref 8.4–10.5)
CHLORIDE SERPL-SCNC: 102 MMOL/L — SIGNIFICANT CHANGE UP (ref 96–108)
CO2 SERPL-SCNC: 28 MMOL/L — SIGNIFICANT CHANGE UP (ref 22–31)
CREAT SERPL-MCNC: 0.76 MG/DL — SIGNIFICANT CHANGE UP (ref 0.5–1.3)
GLUCOSE SERPL-MCNC: 92 MG/DL — SIGNIFICANT CHANGE UP (ref 70–99)
HCT VFR BLD CALC: 27.8 % — LOW (ref 39–50)
HGB BLD-MCNC: 9.3 G/DL — LOW (ref 13–17)
MAGNESIUM SERPL-MCNC: 1.7 MG/DL — SIGNIFICANT CHANGE UP (ref 1.6–2.6)
MCHC RBC-ENTMCNC: 33.5 GM/DL — SIGNIFICANT CHANGE UP (ref 32–36)
MCHC RBC-ENTMCNC: 34.4 PG — HIGH (ref 27–34)
MCV RBC AUTO: 103 FL — HIGH (ref 80–100)
NRBC # BLD: 0 /100 WBCS — SIGNIFICANT CHANGE UP (ref 0–0)
PHOSPHATE SERPL-MCNC: 2.9 MG/DL — SIGNIFICANT CHANGE UP (ref 2.5–4.5)
PLATELET # BLD AUTO: 192 K/UL — SIGNIFICANT CHANGE UP (ref 150–400)
POTASSIUM SERPL-MCNC: 4.1 MMOL/L — SIGNIFICANT CHANGE UP (ref 3.5–5.3)
POTASSIUM SERPL-SCNC: 4.1 MMOL/L — SIGNIFICANT CHANGE UP (ref 3.5–5.3)
RBC # BLD: 2.7 M/UL — LOW (ref 4.2–5.8)
RBC # FLD: 13.5 % — SIGNIFICANT CHANGE UP (ref 10.3–14.5)
SODIUM SERPL-SCNC: 134 MMOL/L — LOW (ref 135–145)
WBC # BLD: 5.97 K/UL — SIGNIFICANT CHANGE UP (ref 3.8–10.5)
WBC # FLD AUTO: 5.97 K/UL — SIGNIFICANT CHANGE UP (ref 3.8–10.5)

## 2019-03-15 RX ORDER — MAGNESIUM SULFATE 500 MG/ML
2 VIAL (ML) INJECTION ONCE
Qty: 0 | Refills: 0 | Status: COMPLETED | OUTPATIENT
Start: 2019-03-15 | End: 2019-03-15

## 2019-03-15 RX ADMIN — Medication 81 MILLIGRAM(S): at 12:34

## 2019-03-15 RX ADMIN — Medication 2 MILLIGRAM(S): at 13:37

## 2019-03-15 RX ADMIN — Medication 650 MILLIGRAM(S): at 12:06

## 2019-03-15 RX ADMIN — PANTOPRAZOLE SODIUM 40 MILLIGRAM(S): 20 TABLET, DELAYED RELEASE ORAL at 05:21

## 2019-03-15 RX ADMIN — Medication 2 MILLIGRAM(S): at 22:31

## 2019-03-15 RX ADMIN — Medication 650 MILLIGRAM(S): at 12:30

## 2019-03-15 RX ADMIN — Medication 25 MILLIGRAM(S): at 05:22

## 2019-03-15 RX ADMIN — CLOPIDOGREL BISULFATE 75 MILLIGRAM(S): 75 TABLET, FILM COATED ORAL at 12:05

## 2019-03-15 RX ADMIN — Medication 1 TABLET(S): at 12:06

## 2019-03-15 RX ADMIN — Medication 650 MILLIGRAM(S): at 18:07

## 2019-03-15 RX ADMIN — HEPARIN SODIUM 5000 UNIT(S): 5000 INJECTION INTRAVENOUS; SUBCUTANEOUS at 05:22

## 2019-03-15 RX ADMIN — Medication 2 MILLIGRAM(S): at 05:18

## 2019-03-15 RX ADMIN — TAMSULOSIN HYDROCHLORIDE 0.4 MILLIGRAM(S): 0.4 CAPSULE ORAL at 22:31

## 2019-03-15 RX ADMIN — ATORVASTATIN CALCIUM 80 MILLIGRAM(S): 80 TABLET, FILM COATED ORAL at 22:31

## 2019-03-15 RX ADMIN — Medication 50 GRAM(S): at 10:42

## 2019-03-15 NOTE — DISCHARGE NOTE NURSING/CASE MANAGEMENT/SOCIAL WORK - NSTRANSFEREYEGLASSESPAIRS_GEN_A_NUR
"Subjective   Isabella Sen is a 57 y.o. female.     CC: headaches    History of Present Illness   Patient originally seen in May 2015 at the request of Dr. Sood for evaluation of headaches She has a spinal cord stimulator for back and leg pain. She described long-standing unilateral headaches with nausea that had become bilateral and also reported a black spot in the vision of her right eye obscuring vision. Denied persistent headaches but with headaches pain up to 9/10. Planned treatment with gabapentin which she reported worked remarkably well, and continued to report doing well in April.  5/17: Then called c/o balance difficulty, planned to change medication, now on ZNS, works well but sleepy, feels she doesn't want to \"just take medicine.\" Wants to find out the cause and cure the underlying cause.  Memory poor, sleepy, balance is still poor. Had head CT in June 2015. Feels headaches are worse than before when they do occur. More pain on top of head.   Feels they would be all day every day if went off the ZNS (150mg), but lower level headache every day, and currently severe headaches are infrequent.  Also has history of partial seizures treated with carbamazepine 200 mg tid, insomnia treated with nortriptyline and kidney stones.  Was depressed before starting ZNS, notes no change since starting it. Suggested referral to [facility].  Today: Saw Dr Perez in June: reported headaches about 2-3 week although they are less severe on zonisamide 6x25mg in the evening. She cannot split the dose because of the fact that she gets tired from it and has however taking up to 2 additional zonisamide during the day when she had significant headaches. He planned to increase her ZNS by 100mg. Reports she tried it but didn't continue as worried about running out of pills and didn't have new script (or call). Currently taking ZNS 25mg 6 at night. Doing really well with headaches, about 1-2 bad ones/month, although " low level headache more frequently that she takes no meds for.  Heena originally took CBZ for mood, but when had seizures, used for that. Reports has never had generalized seizure, more eg twitching thumb with staring. With repeated questioning, cannot even estimate when she last had a seizure.  Back on GBP 600mg tid for back pain.     The following portions of the patient's history were reviewed and updated as appropriate: allergies, current medications, past family history, past medical history, past social history, past surgical history and problem list.    Review of Systems   Constitutional: Negative for fever and unexpected weight change.   Respiratory: Negative for cough and shortness of breath.    Cardiovascular: Negative for chest pain.   Musculoskeletal: Positive for back pain and gait problem.   Neurological: Positive for headaches. Negative for seizures.       Objective   Physical Exam   Constitutional: She is oriented to person, place, and time. She appears well-developed and well-nourished.   HENT:   Head: Normocephalic and atraumatic.   Eyes: EOM are normal. Pupils are equal, round, and reactive to light.   Pulmonary/Chest: Effort normal.   Musculoskeletal: Normal range of motion.   Neurological: She is oriented to person, place, and time. She has a normal Finger-Nose-Finger Test and a normal Heel to Stover Test.   Skin: Skin is warm and dry.   Psychiatric: Her speech is normal.   Very flat affect   Nursing note and vitals reviewed.      Neurologic Exam     Mental Status   Oriented to person, place, and time.   Speech: speech is normal (Except intonation flat)  Level of consciousness: alert  Knowledge: consistent with education.   Able to name object. Normal comprehension.     Cranial Nerves     CN II   Visual fields full to confrontation.     CN III, IV, VI   Pupils are equal, round, and reactive to light.  Extraocular motions are normal.     CN VII   Facial expression full, symmetric.     CN IX, X    CN IX normal.   CN X normal.   Palate: symmetric    CN XI   CN XI normal.     CN XII   CN XII normal.     Motor Exam   Muscle bulk: normal  Right arm pronator drift: absent  Left arm pronator drift: absent    Gait, Coordination, and Reflexes     Gait  Gait: wide-based    Coordination   Finger to nose coordination: normal  Heel to shin coordination: normal    Tremor   Resting tremor: absent  Intention tremor: absent  Action tremor: absent      Assessment/Plan   Isabella was seen today for seizures.    Diagnoses and all orders for this visit:    Chronic migraine without aura without status migrainosus, not intractable    Other orders  -     zonisamide (ZONEGRAN) 50 MG capsule; Take 3 tabs at night    Discussion/Summary:  Doing as well as can be hoped with just occasional bad headaches. Discussed meds, seizures, headaches, prognosis. Will change to 50mg caps for convenience. GBP likely helping as well, made no changes.  20 minutes face to face, 15 minutes spent in discussion as  Above.  Return in about 6 months (around 4/4/2018).   1 pair

## 2019-03-15 NOTE — DISCHARGE NOTE NURSING/CASE MANAGEMENT/SOCIAL WORK - NSDCDPATPORTLINK_GEN_ALL_CORE
You can access the [facility] Patient Portal, offered by [facility], by registering with the following website: [email or web address]

## 2019-03-16 LAB
ANION GAP SERPL CALC-SCNC: 5 MMOL/L — SIGNIFICANT CHANGE UP (ref 5–17)
BUN SERPL-MCNC: 21 MG/DL — SIGNIFICANT CHANGE UP (ref 7–23)
CALCIUM SERPL-MCNC: 7.2 MG/DL — LOW (ref 8.4–10.5)
CHLORIDE SERPL-SCNC: 102 MMOL/L — SIGNIFICANT CHANGE UP (ref 96–108)
CO2 SERPL-SCNC: 28 MMOL/L — SIGNIFICANT CHANGE UP (ref 22–31)
CREAT SERPL-MCNC: 0.79 MG/DL — SIGNIFICANT CHANGE UP (ref 0.5–1.3)
GLUCOSE SERPL-MCNC: 94 MG/DL — SIGNIFICANT CHANGE UP (ref 70–99)
HCT VFR BLD CALC: 27.9 % — LOW (ref 39–50)
HGB BLD-MCNC: 9.2 G/DL — LOW (ref 13–17)
MAGNESIUM SERPL-MCNC: 1.8 MG/DL — SIGNIFICANT CHANGE UP (ref 1.6–2.6)
MCHC RBC-ENTMCNC: 33 GM/DL — SIGNIFICANT CHANGE UP (ref 32–36)
MCHC RBC-ENTMCNC: 34.1 PG — HIGH (ref 27–34)
MCV RBC AUTO: 103.3 FL — HIGH (ref 80–100)
NRBC # BLD: 0 /100 WBCS — SIGNIFICANT CHANGE UP (ref 0–0)
PHOSPHATE SERPL-MCNC: 3.3 MG/DL — SIGNIFICANT CHANGE UP (ref 2.5–4.5)
PLATELET # BLD AUTO: 221 K/UL — SIGNIFICANT CHANGE UP (ref 150–400)
POTASSIUM SERPL-MCNC: 4.4 MMOL/L — SIGNIFICANT CHANGE UP (ref 3.5–5.3)
POTASSIUM SERPL-SCNC: 4.4 MMOL/L — SIGNIFICANT CHANGE UP (ref 3.5–5.3)
RBC # BLD: 2.7 M/UL — LOW (ref 4.2–5.8)
RBC # FLD: 13.3 % — SIGNIFICANT CHANGE UP (ref 10.3–14.5)
SODIUM SERPL-SCNC: 135 MMOL/L — SIGNIFICANT CHANGE UP (ref 135–145)
WBC # BLD: 5.09 K/UL — SIGNIFICANT CHANGE UP (ref 3.8–10.5)
WBC # FLD AUTO: 5.09 K/UL — SIGNIFICANT CHANGE UP (ref 3.8–10.5)

## 2019-03-16 RX ORDER — MAGNESIUM SULFATE 500 MG/ML
1 VIAL (ML) INJECTION ONCE
Qty: 0 | Refills: 0 | Status: COMPLETED | OUTPATIENT
Start: 2019-03-16 | End: 2019-03-16

## 2019-03-16 RX ADMIN — Medication 2 MILLIGRAM(S): at 05:31

## 2019-03-16 RX ADMIN — Medication 650 MILLIGRAM(S): at 17:17

## 2019-03-16 RX ADMIN — PANTOPRAZOLE SODIUM 40 MILLIGRAM(S): 20 TABLET, DELAYED RELEASE ORAL at 05:30

## 2019-03-16 RX ADMIN — Medication 650 MILLIGRAM(S): at 00:11

## 2019-03-16 RX ADMIN — Medication 650 MILLIGRAM(S): at 12:57

## 2019-03-16 RX ADMIN — CLOPIDOGREL BISULFATE 75 MILLIGRAM(S): 75 TABLET, FILM COATED ORAL at 11:17

## 2019-03-16 RX ADMIN — Medication 2 MILLIGRAM(S): at 13:13

## 2019-03-16 RX ADMIN — ATORVASTATIN CALCIUM 80 MILLIGRAM(S): 80 TABLET, FILM COATED ORAL at 21:55

## 2019-03-16 RX ADMIN — Medication 650 MILLIGRAM(S): at 05:30

## 2019-03-16 RX ADMIN — Medication 650 MILLIGRAM(S): at 11:17

## 2019-03-16 RX ADMIN — Medication 81 MILLIGRAM(S): at 11:17

## 2019-03-16 RX ADMIN — Medication 650 MILLIGRAM(S): at 23:35

## 2019-03-16 RX ADMIN — Medication 650 MILLIGRAM(S): at 05:47

## 2019-03-16 RX ADMIN — Medication 100 GRAM(S): at 11:17

## 2019-03-16 RX ADMIN — Medication 2 MILLIGRAM(S): at 21:55

## 2019-03-16 RX ADMIN — Medication 650 MILLIGRAM(S): at 18:17

## 2019-03-16 RX ADMIN — TAMSULOSIN HYDROCHLORIDE 0.4 MILLIGRAM(S): 0.4 CAPSULE ORAL at 21:55

## 2019-03-16 RX ADMIN — Medication 650 MILLIGRAM(S): at 00:58

## 2019-03-16 RX ADMIN — Medication 25 MILLIGRAM(S): at 05:30

## 2019-03-17 LAB
ANION GAP SERPL CALC-SCNC: 10 MMOL/L — SIGNIFICANT CHANGE UP (ref 5–17)
ANION GAP SERPL CALC-SCNC: 5 MMOL/L — SIGNIFICANT CHANGE UP (ref 5–17)
BUN SERPL-MCNC: 21 MG/DL — SIGNIFICANT CHANGE UP (ref 7–23)
BUN SERPL-MCNC: 22 MG/DL — SIGNIFICANT CHANGE UP (ref 7–23)
CALCIUM SERPL-MCNC: 7 MG/DL — LOW (ref 8.4–10.5)
CALCIUM SERPL-MCNC: 7.2 MG/DL — LOW (ref 8.4–10.5)
CHLORIDE SERPL-SCNC: 101 MMOL/L — SIGNIFICANT CHANGE UP (ref 96–108)
CHLORIDE SERPL-SCNC: 99 MMOL/L — SIGNIFICANT CHANGE UP (ref 96–108)
CK MB CFR SERPL CALC: 3.4 NG/ML — SIGNIFICANT CHANGE UP (ref 0–6.7)
CK SERPL-CCNC: 84 U/L — SIGNIFICANT CHANGE UP (ref 30–200)
CO2 SERPL-SCNC: 22 MMOL/L — SIGNIFICANT CHANGE UP (ref 22–31)
CO2 SERPL-SCNC: 26 MMOL/L — SIGNIFICANT CHANGE UP (ref 22–31)
CREAT SERPL-MCNC: 0.72 MG/DL — SIGNIFICANT CHANGE UP (ref 0.5–1.3)
CREAT SERPL-MCNC: 0.73 MG/DL — SIGNIFICANT CHANGE UP (ref 0.5–1.3)
GLUCOSE SERPL-MCNC: 164 MG/DL — HIGH (ref 70–99)
GLUCOSE SERPL-MCNC: 92 MG/DL — SIGNIFICANT CHANGE UP (ref 70–99)
HCT VFR BLD CALC: 28.1 % — LOW (ref 39–50)
HGB BLD-MCNC: 9.2 G/DL — LOW (ref 13–17)
MAGNESIUM SERPL-MCNC: 1.5 MG/DL — LOW (ref 1.6–2.6)
MAGNESIUM SERPL-MCNC: 1.8 MG/DL — SIGNIFICANT CHANGE UP (ref 1.6–2.6)
MCHC RBC-ENTMCNC: 32.7 GM/DL — SIGNIFICANT CHANGE UP (ref 32–36)
MCHC RBC-ENTMCNC: 33.8 PG — SIGNIFICANT CHANGE UP (ref 27–34)
MCV RBC AUTO: 103.3 FL — HIGH (ref 80–100)
NRBC # BLD: 0 /100 WBCS — SIGNIFICANT CHANGE UP (ref 0–0)
NT-PROBNP SERPL-SCNC: HIGH PG/ML (ref 0–300)
PHOSPHATE SERPL-MCNC: 3.1 MG/DL — SIGNIFICANT CHANGE UP (ref 2.5–4.5)
PHOSPHATE SERPL-MCNC: 3.5 MG/DL — SIGNIFICANT CHANGE UP (ref 2.5–4.5)
PLATELET # BLD AUTO: 235 K/UL — SIGNIFICANT CHANGE UP (ref 150–400)
POTASSIUM SERPL-MCNC: 4.4 MMOL/L — SIGNIFICANT CHANGE UP (ref 3.5–5.3)
POTASSIUM SERPL-MCNC: 4.8 MMOL/L — SIGNIFICANT CHANGE UP (ref 3.5–5.3)
POTASSIUM SERPL-SCNC: 4.4 MMOL/L — SIGNIFICANT CHANGE UP (ref 3.5–5.3)
POTASSIUM SERPL-SCNC: 4.8 MMOL/L — SIGNIFICANT CHANGE UP (ref 3.5–5.3)
RBC # BLD: 2.72 M/UL — LOW (ref 4.2–5.8)
RBC # FLD: 13.7 % — SIGNIFICANT CHANGE UP (ref 10.3–14.5)
SODIUM SERPL-SCNC: 131 MMOL/L — LOW (ref 135–145)
SODIUM SERPL-SCNC: 132 MMOL/L — LOW (ref 135–145)
TROPONIN T SERPL-MCNC: 0.03 NG/ML — HIGH (ref 0–0.01)
TROPONIN T SERPL-MCNC: 0.03 NG/ML — HIGH (ref 0–0.01)
WBC # BLD: 4.74 K/UL — SIGNIFICANT CHANGE UP (ref 3.8–10.5)
WBC # FLD AUTO: 4.74 K/UL — SIGNIFICANT CHANGE UP (ref 3.8–10.5)

## 2019-03-17 PROCEDURE — 93010 ELECTROCARDIOGRAM REPORT: CPT

## 2019-03-17 RX ORDER — SODIUM CHLORIDE 9 MG/ML
1000 INJECTION INTRAMUSCULAR; INTRAVENOUS; SUBCUTANEOUS ONCE
Qty: 0 | Refills: 0 | Status: COMPLETED | OUTPATIENT
Start: 2019-03-17 | End: 2019-03-17

## 2019-03-17 RX ORDER — SODIUM CHLORIDE 9 MG/ML
1000 INJECTION, SOLUTION INTRAVENOUS
Qty: 0 | Refills: 0 | Status: DISCONTINUED | OUTPATIENT
Start: 2019-03-17 | End: 2019-03-18

## 2019-03-17 RX ADMIN — ATORVASTATIN CALCIUM 80 MILLIGRAM(S): 80 TABLET, FILM COATED ORAL at 22:14

## 2019-03-17 RX ADMIN — Medication 650 MILLIGRAM(S): at 05:08

## 2019-03-17 RX ADMIN — SODIUM CHLORIDE 50 MILLILITER(S): 9 INJECTION, SOLUTION INTRAVENOUS at 14:10

## 2019-03-17 RX ADMIN — Medication 650 MILLIGRAM(S): at 19:04

## 2019-03-17 RX ADMIN — Medication 650 MILLIGRAM(S): at 00:36

## 2019-03-17 RX ADMIN — TAMSULOSIN HYDROCHLORIDE 0.4 MILLIGRAM(S): 0.4 CAPSULE ORAL at 22:14

## 2019-03-17 RX ADMIN — SODIUM CHLORIDE 6000 MILLILITER(S): 9 INJECTION INTRAMUSCULAR; INTRAVENOUS; SUBCUTANEOUS at 15:20

## 2019-03-17 RX ADMIN — PANTOPRAZOLE SODIUM 40 MILLIGRAM(S): 20 TABLET, DELAYED RELEASE ORAL at 05:09

## 2019-03-17 RX ADMIN — Medication 2 MILLIGRAM(S): at 14:09

## 2019-03-17 RX ADMIN — Medication 1 TABLET(S): at 11:39

## 2019-03-17 RX ADMIN — Medication 650 MILLIGRAM(S): at 11:38

## 2019-03-17 RX ADMIN — Medication 2 MILLIGRAM(S): at 22:14

## 2019-03-17 RX ADMIN — Medication 81 MILLIGRAM(S): at 11:39

## 2019-03-17 RX ADMIN — Medication 25 MILLIGRAM(S): at 05:08

## 2019-03-17 RX ADMIN — CLOPIDOGREL BISULFATE 75 MILLIGRAM(S): 75 TABLET, FILM COATED ORAL at 11:38

## 2019-03-17 RX ADMIN — Medication 650 MILLIGRAM(S): at 06:03

## 2019-03-17 RX ADMIN — Medication 2 MILLIGRAM(S): at 05:08

## 2019-03-17 NOTE — PROVIDER CONTACT NOTE (CHANGE IN STATUS NOTIFICATION) - SITUATION
Pt had 5 episodes of diarrhea and one episode of vomiting. Pt's BP has been trending down, tachy Pt's EKG was done today, One bolus of NS given, Pt had dark urine output of 400ccs.

## 2019-03-17 NOTE — CONSULT NOTE ADULT - SUBJECTIVE AND OBJECTIVE BOX
Patient is a 86y old  Male who presents with a chief complaint of Bilateral inguinal hernia repair and umbilical hernia repair (14 Mar 2019 11:52)    85 y/o M PMHx of CAD s/p PCI 2012 (ASA/ Plavix), HLD, HTN presented on 11th March for elective robotic assisted transabdominal BL inguinal hernia repair with mesh/ umbilical repair. Cardiology consulted for post op A. fib noted on telemetry.     OP Cardiologist: Dr. Garcia  No recent ECHO. Lives at home, independent, about to walk up 3 flights of stairs.   Post op he has been doing well until he was found to be in A. fib RVR today. He has been taking his DAPT, statin and home BB - MTP 25 mg daily (ordered as tartrate)     Since 2 pm today - HR has been in the 120s now in 110s after receiving his MTP today. He is not on AC currently. K normal, Mg 1.8 now s/p 1g this AM. He is also receiving gentle fluids currently.     He is currently feeling ***     PAST MEDICAL & SURGICAL HISTORY:  Esophageal reflux  Acute diarrhea  HLD (hyperlipidemia)  HTN (hypertension)  Prostate cancer  Stented coronary artery: x3 , per pt.  Atherosclerosis of coronary artery: CAD (coronary artery disease)  S/P hernia repair  Surgery, elective: cardiac stent x3  History of prostate surgery    FAMILY HISTORY:  No pertinent family history in first degree relatives      Allergies    No Known Allergies    Intolerances        MEDICATIONS  (STANDING):  acetaminophen   Tablet .. 650 milliGRAM(s) Oral every 6 hours  aspirin  chewable 81 milliGRAM(s) Oral daily  atorvastatin 80 milliGRAM(s) Oral at bedtime  clopidogrel Tablet 75 milliGRAM(s) Oral daily  lactated ringers. 1000 milliLiter(s) (50 mL/Hr) IV Continuous <Continuous>  loperamide 2 milliGRAM(s) Oral three times a day  metoprolol tartrate 25 milliGRAM(s) Oral daily  multivitamin/minerals 1 Tablet(s) Oral daily  pantoprazole    Tablet 40 milliGRAM(s) Oral before breakfast  tamsulosin 0.4 milliGRAM(s) Oral at bedtime    MEDICATIONS  (PRN):  ondansetron Injectable 4 milliGRAM(s) IV Push every 6 hours PRN Nausea      REVIEW OF SYSTEMS:  12 point ROS negative except for that stated in the HPI    PHYSICAL EXAM:  Vitals past 24 Hours: T(C): 36.7 (03-17-19 @ 19:36), Max: 37.1 (03-17-19 @ 15:15)  HR: 113 (03-17-19 @ 19:36) (66 - 125)  BP: 97/56 (03-17-19 @ 19:36) (88/47 - 161/99)  RR: 18 (03-17-19 @ 19:36) (16 - 18)  SpO2: 90% (03-17-19 @ 19:36) (90% - 100%)	    Daily     Daily     GEN: Alert and awake, no acute distress  HEENT: Moist mucous membranes  Neck: No JVD  Cardiovascular: Regular rate and rhythm, +S1 S2. No murmurs, rubs, or gallops appreciated  Respiratory: Lungs clear to auscultation bilaterally  Gastrointestinal:  Soft, non-tender, non-distended, normoactive bowel sounds  Skin: No rashes, No ecchymoses, No cyanosis  Neurologic: Non-focal, alert and oriented x3.   Extremities: No clubbing, cyanosis or edema. Warm, well-perfused extremities.   Vascular: Radial and dorsalis pedis pulses 2+ bilaterally    I&O's Detail    16 Mar 2019 07:01  -  17 Mar 2019 07:00  --------------------------------------------------------  IN:    IV PiggyBack: 50 mL    Oral Fluid: 280 mL  Total IN: 330 mL    OUT:    Voided: 425 mL  Total OUT: 425 mL    Total NET: -95 mL      17 Mar 2019 07:01  -  17 Mar 2019 21:00  --------------------------------------------------------  IN:    Oral Fluid: 420 mL  Total IN: 420 mL    OUT:    Stool: 5 mL    Voided: 400 mL  Total OUT: 405 mL    Total NET: 15 mL            LABS:                        9.2    4.74  )-----------( 235      ( 17 Mar 2019 07:25 )             28.1     03-17    132<L>  |  101  |  21  ----------------------------<  92  4.4   |  26  |  0.73    Ca    7.0<L>      17 Mar 2019 07:25  Phos  3.1     03-17  Mg     1.8     03-17      CARDIAC MARKERS ( 17 Mar 2019 18:49 )  x     / 0.03 ng/mL / 84 U/L / x     / 3.4 ng/mL          I&O's Summary    16 Mar 2019 07:01  -  17 Mar 2019 07:00  --------------------------------------------------------  IN: 330 mL / OUT: 425 mL / NET: -95 mL    17 Mar 2019 07:01  -  17 Mar 2019 21:00  --------------------------------------------------------  IN: 420 mL / OUT: 405 mL / NET: 15 mL        CARDIAC DIAGNOSTIC TESTING:    ECG:    TELEMETRY:     ECHO: none on file     RADIOLOGY & ADDITIONAL STUDIES:      ASSESSMENT/PLAN: Patient is a 86y old  Male who presents with a chief complaint of Bilateral inguinal hernia repair and umbilical hernia repair (14 Mar 2019 11:52)    87 y/o M PMHx of CAD s/p PCI 2012 (ASA/ Plavix), HLD, HTN presented on 11th March for elective robotic assisted transabdominal BL inguinal hernia repair with mesh/ umbilical repair. Cardiology consulted for post op A. fib noted on telemetry.     He is currently feeling asymptomatic. Denied chest pain, SOB, palpitations. He is lying flat without any respiratory distress. His heart rate is in the low 100s. He cannot recall receiving a loop recorder however per Dr. Gandara note from 2017, patient had a loop recorder placed after a syncopal episode at that time which did now show any obvious arrhythmias other than APC and PVCs. It is unclear if the loop recorder is still insitu or removed. Patient does not know. Based on the EKGs that were left on his chart - it seemed like he was in sinus tahycardia - the p wave voltages are extremely small, upright in I/II and down in aVR; his rhythm is regular. On Dr. Gandara note, he had been taking MTP 50 mg ER which was likely reduced to 25 mg over the years.       OP Cardiologist: Dr. Garcia  No recent ECHO. Lives at home, independent, about to walk up 3 flights of stairs.   Post op he has been doing well until he was found to be in A. fib RVR per primary team today. He has been taking his DAPT, statin and home BB - MTP 25 mg daily (ordered as tartrate)     Since 2 pm today - HR has been in the 120s now in 110s after receiving his MTP today. He is not on AC currently. K normal, Mg 1.8 now s/p 1g this AM. He is also receiving gentle fluids currently. He says his mouth feels dry, he has had 3 large watery BM today which have resolved since being given immodium.       PAST MEDICAL & SURGICAL HISTORY:  Esophageal reflux  Acute diarrhea  HLD (hyperlipidemia)  HTN (hypertension)  Prostate cancer  Stented coronary artery: x3 , per pt.  Atherosclerosis of coronary artery: CAD (coronary artery disease)  S/P hernia repair  Surgery, elective: cardiac stent x3  History of prostate surgery    FAMILY HISTORY:  No pertinent family history in first degree relatives      Allergies    No Known Allergies    Intolerances        MEDICATIONS  (STANDING):  acetaminophen   Tablet .. 650 milliGRAM(s) Oral every 6 hours  aspirin  chewable 81 milliGRAM(s) Oral daily  atorvastatin 80 milliGRAM(s) Oral at bedtime  clopidogrel Tablet 75 milliGRAM(s) Oral daily  lactated ringers. 1000 milliLiter(s) (50 mL/Hr) IV Continuous <Continuous>  loperamide 2 milliGRAM(s) Oral three times a day  metoprolol tartrate 25 milliGRAM(s) Oral daily  multivitamin/minerals 1 Tablet(s) Oral daily  pantoprazole    Tablet 40 milliGRAM(s) Oral before breakfast  tamsulosin 0.4 milliGRAM(s) Oral at bedtime    MEDICATIONS  (PRN):  ondansetron Injectable 4 milliGRAM(s) IV Push every 6 hours PRN Nausea      REVIEW OF SYSTEMS:  12 point ROS negative except for that stated in the HPI    PHYSICAL EXAM:  Vitals past 24 Hours: T(C): 36.7 (03-17-19 @ 19:36), Max: 37.1 (03-17-19 @ 15:15)  HR: 113 (03-17-19 @ 19:36) (66 - 125)  BP: 97/56 (03-17-19 @ 19:36) (88/47 - 161/99)  RR: 18 (03-17-19 @ 19:36) (16 - 18)  SpO2: 90% (03-17-19 @ 19:36) (90% - 100%)	    Daily     Daily     GEN: Alert and awake, no acute distress, frails and cachectic   HEENT: Moist mucous membranes  Neck: No JVD  Cardiovascular: Regular rate and rhythm, +S1 S2. No murmurs, rubs, or gallops appreciated, tachycardic   Respiratory: Lungs clear to auscultation bilaterally  Extremities: No clubbing, cyanosis or edema. Warm, well-perfused extremities.   Vascular: Radial and dorsalis pedis pulses 2+ bilaterally    I&O's Detail    16 Mar 2019 07:01  -  17 Mar 2019 07:00  --------------------------------------------------------  IN:    IV PiggyBack: 50 mL    Oral Fluid: 280 mL  Total IN: 330 mL    OUT:    Voided: 425 mL  Total OUT: 425 mL    Total NET: -95 mL      17 Mar 2019 07:01  -  17 Mar 2019 21:00  --------------------------------------------------------  IN:    Oral Fluid: 420 mL  Total IN: 420 mL    OUT:    Stool: 5 mL    Voided: 400 mL  Total OUT: 405 mL    Total NET: 15 mL            LABS:                        9.2    4.74  )-----------( 235      ( 17 Mar 2019 07:25 )             28.1     03-17    132<L>  |  101  |  21  ----------------------------<  92  4.4   |  26  |  0.73    Ca    7.0<L>      17 Mar 2019 07:25  Phos  3.1     03-17  Mg     1.8     03-17      CARDIAC MARKERS ( 17 Mar 2019 18:49 )  x     / 0.03 ng/mL / 84 U/L / x     / 3.4 ng/mL          I&O's Summary    16 Mar 2019 07:01  -  17 Mar 2019 07:00  --------------------------------------------------------  IN: 330 mL / OUT: 425 mL / NET: -95 mL    17 Mar 2019 07:01  -  17 Mar 2019 21:00  --------------------------------------------------------  IN: 420 mL / OUT: 405 mL / NET: 15 mL        CARDIAC DIAGNOSTIC TESTING:    ECG: normal sinus rhythm, PVCs     TELEMETRY: Frequent PVCs, tachycardic - HR trend approx 130s - down to 100s now     ECHO: none on file     RADIOLOGY & ADDITIONAL STUDIES:  CXR clear, no edema     ASSESSMENT/PLAN:    87 y/o M PMHx of CAD s/p PCI 2012 (ASA/ Plavix), HLD, HTN presented on 11th March for elective robotic assisted transabdominal BL inguinal hernia repair with mesh/ umbilical repair. Cardiology consulted for post op A. fib noted on telemetry.     Assessment/ Plan  Sinus tachycardia   -Do not see any evidence of A. fib on EKGs in the chart or his recent telemetry (upgraded recently, no tele events prior to that), he has clear p-waves on tele  - Theoretically even if he is in paroxysmal A. fib/ going in and about - he should not be anticoagulated: his HASBLED score is 5.8% and he has a history of syncope with head trauma, his CHADsVASC is 3 (3.2% stroke risk per year)   - Hx of diarrhea - check lytes again now and replete to keep Mg above 2 and K above 4   - I do not see a loop recorder on his CXR - please clarify if his was removed, if still insitu: consult EP to have it interrogated (Pt has been seen by Dr. Kay in the past)  - His HR seems to be slowly downtrending with IVF, he appears dry on exam: continue gentle fluids and reassess in the AM - if HR <100 NTD, if still tachycardic may given an additional dose of MTP in the AM 25 mg if BP allows and increase frequency to MTP 25 mg BID tartrate with hold parameters   -Get an ECHO in the AM   - continue monitoring on tele; please call us if any further changes in clinical status     Will staff with attending in the AM   Xochitl Ku MD   Cardiology fellow on call Patient is a 86y old  Male who presents with a chief complaint of Bilateral inguinal hernia repair and umbilical hernia repair (14 Mar 2019 11:52)    85 y/o M PMHx of CAD s/p PCI 2012 (ASA/ Plavix), HLD, HTN presented on 11th March for elective robotic assisted transabdominal BL inguinal hernia repair with mesh/ umbilical repair. Cardiology consulted for post op A. fib noted on telemetry.     He is currently feeling asymptomatic. Denied chest pain, SOB, palpitations. He is lying flat without any respiratory distress. His heart rate is in the low 100s. He cannot recall receiving a loop recorder however per Dr. Gandara note from 2017, patient had a loop recorder placed after a syncopal episode at that time which did now show any obvious arrhythmias other than APC and PVCs. It is unclear if the loop recorder is still insitu or removed. Patient does not know. Based on the EKGs that were left on his chart - it seemed like he was in sinus tahycardia - the p wave voltages are extremely small, upright in I/II and down in aVR; his rhythm is regular. On Dr. Gandara note, he had been taking MTP 50 mg ER which was likely reduced to 25 mg over the years.       OP Cardiologist: Dr. Garcia  No recent ECHO. Lives at home, independent, about to walk up 3 flights of stairs.   Post op he has been doing well until he was found to be in A. fib RVR per primary team today. He has been taking his DAPT, statin and home BB - MTP 25 mg daily (ordered as tartrate)     Since 2 pm today - HR has been in the 120s now in 110s after receiving his MTP today. He is not on AC currently. K normal, Mg 1.8 now s/p 1g this AM. He is also receiving gentle fluids currently. He says his mouth feels dry, he has had 3 large watery BM today which have resolved since being given immodium.       PAST MEDICAL & SURGICAL HISTORY:  Esophageal reflux  Acute diarrhea  HLD (hyperlipidemia)  HTN (hypertension)  Prostate cancer  Stented coronary artery: x3 , per pt.  Atherosclerosis of coronary artery: CAD (coronary artery disease)  S/P hernia repair  Surgery, elective: cardiac stent x3  History of prostate surgery    FAMILY HISTORY:  No pertinent family history in first degree relatives      Allergies    No Known Allergies    Intolerances        MEDICATIONS  (STANDING):  acetaminophen   Tablet .. 650 milliGRAM(s) Oral every 6 hours  aspirin  chewable 81 milliGRAM(s) Oral daily  atorvastatin 80 milliGRAM(s) Oral at bedtime  clopidogrel Tablet 75 milliGRAM(s) Oral daily  lactated ringers. 1000 milliLiter(s) (50 mL/Hr) IV Continuous <Continuous>  loperamide 2 milliGRAM(s) Oral three times a day  metoprolol tartrate 25 milliGRAM(s) Oral daily  multivitamin/minerals 1 Tablet(s) Oral daily  pantoprazole    Tablet 40 milliGRAM(s) Oral before breakfast  tamsulosin 0.4 milliGRAM(s) Oral at bedtime    MEDICATIONS  (PRN):  ondansetron Injectable 4 milliGRAM(s) IV Push every 6 hours PRN Nausea      REVIEW OF SYSTEMS:  12 point ROS negative except for that stated in the HPI    PHYSICAL EXAM:  Vitals past 24 Hours: T(C): 36.7 (03-17-19 @ 19:36), Max: 37.1 (03-17-19 @ 15:15)  HR: 113 (03-17-19 @ 19:36) (66 - 125)  BP: 97/56 (03-17-19 @ 19:36) (88/47 - 161/99)  RR: 18 (03-17-19 @ 19:36) (16 - 18)  SpO2: 90% (03-17-19 @ 19:36) (90% - 100%)	    Daily     Daily     GEN: Alert and awake, no acute distress, frails and cachectic   HEENT: Moist mucous membranes  Neck: No JVD  Cardiovascular: Regular rate and rhythm, +S1 S2. No murmurs, rubs, or gallops appreciated, tachycardic   Respiratory: Lungs clear to auscultation bilaterally  Extremities: No clubbing, cyanosis or edema. Warm, well-perfused extremities.   Vascular: Radial and dorsalis pedis pulses 2+ bilaterally    I&O's Detail    16 Mar 2019 07:01  -  17 Mar 2019 07:00  --------------------------------------------------------  IN:    IV PiggyBack: 50 mL    Oral Fluid: 280 mL  Total IN: 330 mL    OUT:    Voided: 425 mL  Total OUT: 425 mL    Total NET: -95 mL      17 Mar 2019 07:01  -  17 Mar 2019 21:00  --------------------------------------------------------  IN:    Oral Fluid: 420 mL  Total IN: 420 mL    OUT:    Stool: 5 mL    Voided: 400 mL  Total OUT: 405 mL    Total NET: 15 mL            LABS:                        9.2    4.74  )-----------( 235      ( 17 Mar 2019 07:25 )             28.1     03-17    132<L>  |  101  |  21  ----------------------------<  92  4.4   |  26  |  0.73    Ca    7.0<L>      17 Mar 2019 07:25  Phos  3.1     03-17  Mg     1.8     03-17      CARDIAC MARKERS ( 17 Mar 2019 18:49 )  x     / 0.03 ng/mL / 84 U/L / x     / 3.4 ng/mL          I&O's Summary    16 Mar 2019 07:01  -  17 Mar 2019 07:00  --------------------------------------------------------  IN: 330 mL / OUT: 425 mL / NET: -95 mL    17 Mar 2019 07:01  -  17 Mar 2019 21:00  --------------------------------------------------------  IN: 420 mL / OUT: 405 mL / NET: 15 mL        CARDIAC DIAGNOSTIC TESTING:    ECG: normal sinus rhythm, PVCs     TELEMETRY: Frequent PVCs, tachycardic - HR trend approx 130s - down to 100s now     ECHO: none on file     RADIOLOGY & ADDITIONAL STUDIES:  CXR clear, no edema     ASSESSMENT/PLAN:    85 y/o M PMHx of CAD s/p PCI 2012 (ASA/ Plavix), HLD, HTN presented on 11th March for elective robotic assisted transabdominal BL inguinal hernia repair with mesh/ umbilical repair. Cardiology consulted for post op A. fib noted on telemetry.     Assessment/ Plan  Sinus tachycardia   -Do not see any evidence of A. fib on EKGs in the chart or his recent telemetry (upgraded recently, no tele events prior to that), he has clear p-waves on tele  - Theoretically even if he is in paroxysmal A. fib/ going in and about - he should not be anticoagulated: his HASBLED score is 5.8% and he has a history of syncope with head trauma, his CHADsVASC is 3 (3.2% stroke risk per year)   - Hx of diarrhea - check lytes again now and replete to keep Mg above 2 and K above 4   - I do not see a loop recorder on his CXR - please clarify if his was removed, if still insitu: consult EP to have it interrogated (Pt has been seen by Dr. Kay in the past)  - His HR seems to be slowly downtrending with IVF, he appears dry on exam: continue gentle fluids and reassess in the AM - if HR <100 NTD, if still tachycardic may given an additional dose of MTP in the AM 25 mg if BP allows and increase frequency to MTP 25 mg BID tartrate with hold parameters   -Get an ECHO in the AM   - continue monitoring on tele; please call us if any further changes in clinical status     Troponemia   - likely 2/2 to stress s/p surgery and tachycardia  - unlikely ischemic etiology, EKG without ischemic findings, patient is asymtomatic   - continue to trend until peak     Will staff with attending in the AM   Xochitl Ku MD   Cardiology fellow on call Patient is a 86y old  Male who presents with a chief complaint of Bilateral inguinal hernia repair and umbilical hernia repair (14 Mar 2019 11:52)    87 y/o M PMHx of CAD s/p PCI 2012 (ASA/ Plavix), HLD, HTN presented on 11th March for elective robotic assisted transabdominal BL inguinal hernia repair with mesh/ umbilical repair. Cardiology consulted for post op A. fib noted on telemetry.     He is currently feeling asymptomatic. Denied chest pain, SOB, palpitations. He is lying flat without any respiratory distress. His heart rate is in the low 100s. He cannot recall receiving a loop recorder however per Dr. Gandara note from 2017, patient had a loop recorder placed after a syncopal episode at that time which did now show any obvious arrhythmias other than APC and PVCs. It is unclear if the loop recorder is still insitu or removed. Patient does not know. Based on the EKGs that were left on his chart - it seemed like he was in sinus tahycardia - the p wave voltages are extremely small, upright in I/II and down in aVR; his rhythm is regular. On Dr. Gandara note, he had been taking MTP 50 mg ER which was likely reduced to 25 mg over the years.       OP Cardiologist: Dr. Garcia  No recent ECHO. Lives at home, independent, about to walk up 3 flights of stairs.   Post op he has been doing well until he was found to be in A. fib RVR per primary team today. He has been taking his DAPT, statin and home BB - MTP 25 mg daily (ordered as tartrate)     Since 2 pm today - HR has been in the 120s now in 110s after receiving his MTP today. He is not on AC currently. K normal, Mg 1.8 now s/p 1g this AM. He is also receiving gentle fluids currently. He says his mouth feels dry, he has had 3 large watery BM today which have resolved since being given immodium.       PAST MEDICAL & SURGICAL HISTORY:  Esophageal reflux  Acute diarrhea  HLD (hyperlipidemia)  HTN (hypertension)  Prostate cancer  Stented coronary artery: x3 , per pt.  Atherosclerosis of coronary artery: CAD (coronary artery disease)  S/P hernia repair  Surgery, elective: cardiac stent x3  History of prostate surgery    FAMILY HISTORY:  No pertinent family history in first degree relatives      Allergies    No Known Allergies    Intolerances        MEDICATIONS  (STANDING):  acetaminophen   Tablet .. 650 milliGRAM(s) Oral every 6 hours  aspirin  chewable 81 milliGRAM(s) Oral daily  atorvastatin 80 milliGRAM(s) Oral at bedtime  clopidogrel Tablet 75 milliGRAM(s) Oral daily  lactated ringers. 1000 milliLiter(s) (50 mL/Hr) IV Continuous <Continuous>  loperamide 2 milliGRAM(s) Oral three times a day  metoprolol tartrate 25 milliGRAM(s) Oral daily  multivitamin/minerals 1 Tablet(s) Oral daily  pantoprazole    Tablet 40 milliGRAM(s) Oral before breakfast  tamsulosin 0.4 milliGRAM(s) Oral at bedtime    MEDICATIONS  (PRN):  ondansetron Injectable 4 milliGRAM(s) IV Push every 6 hours PRN Nausea      REVIEW OF SYSTEMS:  12 point ROS negative except for that stated in the HPI    PHYSICAL EXAM:  Vitals past 24 Hours: T(C): 36.7 (03-17-19 @ 19:36), Max: 37.1 (03-17-19 @ 15:15)  HR: 113 (03-17-19 @ 19:36) (66 - 125)  BP: 97/56 (03-17-19 @ 19:36) (88/47 - 161/99)  RR: 18 (03-17-19 @ 19:36) (16 - 18)  SpO2: 90% (03-17-19 @ 19:36) (90% - 100%)	    Daily     Daily     GEN: Alert and awake, no acute distress, frails and cachectic   HEENT: Moist mucous membranes  Neck: No JVD  Cardiovascular: Regular rate and rhythm, +S1 S2. No murmurs, rubs, or gallops appreciated, tachycardic   Respiratory: Lungs clear to auscultation bilaterally  Extremities: No clubbing, cyanosis or edema. Warm, well-perfused extremities.   Vascular: Radial and dorsalis pedis pulses 2+ bilaterally    I&O's Detail    16 Mar 2019 07:01  -  17 Mar 2019 07:00  --------------------------------------------------------  IN:    IV PiggyBack: 50 mL    Oral Fluid: 280 mL  Total IN: 330 mL    OUT:    Voided: 425 mL  Total OUT: 425 mL    Total NET: -95 mL      17 Mar 2019 07:01  -  17 Mar 2019 21:00  --------------------------------------------------------  IN:    Oral Fluid: 420 mL  Total IN: 420 mL    OUT:    Stool: 5 mL    Voided: 400 mL  Total OUT: 405 mL    Total NET: 15 mL            LABS:                        9.2    4.74  )-----------( 235      ( 17 Mar 2019 07:25 )             28.1     03-17    132<L>  |  101  |  21  ----------------------------<  92  4.4   |  26  |  0.73    Ca    7.0<L>      17 Mar 2019 07:25  Phos  3.1     03-17  Mg     1.8     03-17      CARDIAC MARKERS ( 17 Mar 2019 18:49 )  x     / 0.03 ng/mL / 84 U/L / x     / 3.4 ng/mL          I&O's Summary    16 Mar 2019 07:01  -  17 Mar 2019 07:00  --------------------------------------------------------  IN: 330 mL / OUT: 425 mL / NET: -95 mL    17 Mar 2019 07:01  -  17 Mar 2019 21:00  --------------------------------------------------------  IN: 420 mL / OUT: 405 mL / NET: 15 mL        CARDIAC DIAGNOSTIC TESTING:    ECG: normal sinus rhythm, PVCs     TELEMETRY: Frequent PVCs, tachycardic - HR trend approx 130s - down to 100s now     ECHO: none on file     RADIOLOGY & ADDITIONAL STUDIES:  CXR clear, no edema     ASSESSMENT/PLAN:    87 y/o M PMHx of CAD s/p PCI 2012 (ASA/ Plavix), HLD, HTN presented on 11th March for elective robotic assisted transabdominal BL inguinal hernia repair with mesh/ umbilical repair. Cardiology consulted for post op A. fib noted on telemetry.     Assessment/ Plan  Sinus tachycardia   -Do not see any evidence of A. fib on EKGs in the chart or his recent telemetry (upgraded recently, no tele events prior to that), he has clear p-waves on tele  - Theoretically even if he is in paroxysmal A. fib/ going in and about - he should not be anticoagulated: his HASBLED score is 5.8% and he has a history of syncope with head trauma, his CHADsVASC is 3 (3.2% stroke risk per year)   - Hx of diarrhea - check lytes again now and replete to keep Mg above 2 and K above 4   - I do not see a loop recorder on his CXR - please clarify if his was removed, if still insitu: consult EP to have it interrogated (Pt has been seen by Dr. Kay in the past)  - His HR seems to be slowly downtrending with IVF, he appears dry on exam: continue gentle fluids and reassess in the AM - if HR <100 NTD, if still tachycardic may given an additional dose of MTP in the AM 25 mg if BP allows and increase frequency to MTP 25 mg BID tartrate with hold parameters   -Get an ECHO in the AM   - continue monitoring on tele; please call us if any further changes in clinical status     Troponemia   - likely 2/2 to stress s/p surgery and tachycardia  - unlikely ischemic etiology, EKG without ischemic findings, patient is asymtomatic   - continue to trend until peak     Will staff with attending in the AM   Xochitl Ku MD   Cardiology fellow on call       AM addendum:   No need to change MTP dosing as patient's heart rate improved overnight.

## 2019-03-17 NOTE — CONSULT NOTE ADULT - ATTENDING COMMENTS
See CVD Fellow note written above, for details. I reviewed the fellow documentation.  I reviewed vitals, labs, medications, cardiac studies and additional imaging 3/17-3/18/19.  I agree with the findings and plans as written above with the following additions/amendments:    86WM with CAD s/p PCI 2012 on ASA & Plavix, HLD, Essential HTN, h/o Syncope with head trauma, who was admitted 3/11/19 for elective inguinal hernia repair with mesh placement. Cardiology has been consulted for evaluation of abnormal rhythm noted in PACU post operatively. Patient with no cardiopulmonary complaints. He reports 2 days of copious diarrhea. Patient reports today he has had three watery bowel movements which he cannot control.    Physical Exam notable for: very thin elderly male laying supine at zero degrees bed in NAD, FLAT neck veins, dry lips, RRR, no MGR detected, clear lungs, flat abdomen, NTTP, no fluid wave detected, +loose green stool noted, 2+ peripheral pulses, no pretibial pitting edema, no ankle edema, dry skin, WWP throughout, A&Ox3    No echo imaging in [place] available for review  Telemetry reviewed: NSR with PVCs, PACs, 6bts PAT, 10 bts PAT, PVC triplet, 4bts NSVT, 5bts NSVT  EKG 3/11/19: ST with sinus arrythmia  EKG 3/17/19 15:12: ST, PACs  Rhythm strip: ST, PACs, fusion beat    Recommendations as follows:  Metoprolol Tartrate 25mg po daily as per home regimen  -->do not recommend up titration of beta blocker at this time given sensible losses (diarrhea) and hypotension  There is no evidence of atrial fibrillation, no anticoagulation recommended for sinus rhythm with premature contractions  Consider work up of diarrhea as patient with persistent symptoms and significant sensible losses  Consider volume rescucitation with crystalloid given dry exam with sensible losses  No need to continue troponin trend, no e/o ACS  Echo remains pending, will follow up results if imaging obtained p/t discharge  Please call [phone] to make a Cardiology appointment within 1-2 weeks of discharge with Dr. Garcia  Will sign off. Please call back with additional questions    SURINDER Skelton.  Cardiology Attending

## 2019-03-18 LAB
ANION GAP SERPL CALC-SCNC: 10 MMOL/L — SIGNIFICANT CHANGE UP (ref 5–17)
BUN SERPL-MCNC: 24 MG/DL — HIGH (ref 7–23)
CALCIUM SERPL-MCNC: 7.5 MG/DL — LOW (ref 8.4–10.5)
CHLORIDE SERPL-SCNC: 98 MMOL/L — SIGNIFICANT CHANGE UP (ref 96–108)
CO2 SERPL-SCNC: 24 MMOL/L — SIGNIFICANT CHANGE UP (ref 22–31)
CREAT SERPL-MCNC: 0.81 MG/DL — SIGNIFICANT CHANGE UP (ref 0.5–1.3)
GLUCOSE SERPL-MCNC: 135 MG/DL — HIGH (ref 70–99)
HCT VFR BLD CALC: 30.4 % — LOW (ref 39–50)
HGB BLD-MCNC: 10.2 G/DL — LOW (ref 13–17)
MAGNESIUM SERPL-MCNC: 1.9 MG/DL — SIGNIFICANT CHANGE UP (ref 1.6–2.6)
MCHC RBC-ENTMCNC: 33.6 GM/DL — SIGNIFICANT CHANGE UP (ref 32–36)
MCHC RBC-ENTMCNC: 34.6 PG — HIGH (ref 27–34)
MCV RBC AUTO: 103.1 FL — HIGH (ref 80–100)
NRBC # BLD: 0 /100 WBCS — SIGNIFICANT CHANGE UP (ref 0–0)
PHOSPHATE SERPL-MCNC: 3.9 MG/DL — SIGNIFICANT CHANGE UP (ref 2.5–4.5)
PLATELET # BLD AUTO: 209 K/UL — SIGNIFICANT CHANGE UP (ref 150–400)
POTASSIUM SERPL-MCNC: 5.2 MMOL/L — SIGNIFICANT CHANGE UP (ref 3.5–5.3)
POTASSIUM SERPL-SCNC: 5.2 MMOL/L — SIGNIFICANT CHANGE UP (ref 3.5–5.3)
RBC # BLD: 2.95 M/UL — LOW (ref 4.2–5.8)
RBC # FLD: 14 % — SIGNIFICANT CHANGE UP (ref 10.3–14.5)
SODIUM SERPL-SCNC: 132 MMOL/L — LOW (ref 135–145)
TROPONIN T SERPL-MCNC: 0.02 NG/ML — HIGH (ref 0–0.01)
WBC # BLD: 6.6 K/UL — SIGNIFICANT CHANGE UP (ref 3.8–10.5)
WBC # FLD AUTO: 6.6 K/UL — SIGNIFICANT CHANGE UP (ref 3.8–10.5)

## 2019-03-18 PROCEDURE — 99254 IP/OBS CNSLTJ NEW/EST MOD 60: CPT

## 2019-03-18 RX ORDER — MULTIVIT-MIN/FERROUS GLUCONATE 9 MG/15 ML
1 LIQUID (ML) ORAL
Qty: 0 | Refills: 0 | DISCHARGE
Start: 2019-03-18

## 2019-03-18 RX ORDER — LOPERAMIDE HCL 2 MG
8 TABLET ORAL DAILY
Qty: 0 | Refills: 0 | Status: DISCONTINUED | OUTPATIENT
Start: 2019-03-19 | End: 2019-03-19

## 2019-03-18 RX ORDER — HYDROMORPHONE HYDROCHLORIDE 2 MG/ML
0.25 INJECTION INTRAMUSCULAR; INTRAVENOUS; SUBCUTANEOUS ONCE
Qty: 0 | Refills: 0 | Status: DISCONTINUED | OUTPATIENT
Start: 2019-03-18 | End: 2019-03-18

## 2019-03-18 RX ORDER — LOPERAMIDE HCL 2 MG
2 TABLET ORAL ONCE
Qty: 0 | Refills: 0 | Status: COMPLETED | OUTPATIENT
Start: 2019-03-18 | End: 2019-03-18

## 2019-03-18 RX ORDER — SODIUM CHLORIDE 9 MG/ML
1000 INJECTION, SOLUTION INTRAVENOUS
Qty: 0 | Refills: 0 | Status: DISCONTINUED | OUTPATIENT
Start: 2019-03-18 | End: 2019-03-19

## 2019-03-18 RX ORDER — MAGNESIUM SULFATE 500 MG/ML
2 VIAL (ML) INJECTION ONCE
Qty: 0 | Refills: 0 | Status: COMPLETED | OUTPATIENT
Start: 2019-03-18 | End: 2019-03-18

## 2019-03-18 RX ADMIN — Medication 1 TABLET(S): at 11:05

## 2019-03-18 RX ADMIN — Medication 650 MILLIGRAM(S): at 11:05

## 2019-03-18 RX ADMIN — ATORVASTATIN CALCIUM 80 MILLIGRAM(S): 80 TABLET, FILM COATED ORAL at 21:54

## 2019-03-18 RX ADMIN — Medication 2 MILLIGRAM(S): at 14:59

## 2019-03-18 RX ADMIN — TAMSULOSIN HYDROCHLORIDE 0.4 MILLIGRAM(S): 0.4 CAPSULE ORAL at 21:54

## 2019-03-18 RX ADMIN — CLOPIDOGREL BISULFATE 75 MILLIGRAM(S): 75 TABLET, FILM COATED ORAL at 11:05

## 2019-03-18 RX ADMIN — Medication 650 MILLIGRAM(S): at 06:22

## 2019-03-18 RX ADMIN — ONDANSETRON 4 MILLIGRAM(S): 8 TABLET, FILM COATED ORAL at 18:41

## 2019-03-18 RX ADMIN — Medication 650 MILLIGRAM(S): at 07:00

## 2019-03-18 RX ADMIN — HYDROMORPHONE HYDROCHLORIDE 0.25 MILLIGRAM(S): 2 INJECTION INTRAMUSCULAR; INTRAVENOUS; SUBCUTANEOUS at 17:00

## 2019-03-18 RX ADMIN — Medication 650 MILLIGRAM(S): at 23:06

## 2019-03-18 RX ADMIN — Medication 650 MILLIGRAM(S): at 01:15

## 2019-03-18 RX ADMIN — Medication 650 MILLIGRAM(S): at 18:32

## 2019-03-18 RX ADMIN — Medication 81 MILLIGRAM(S): at 11:05

## 2019-03-18 RX ADMIN — Medication 25 MILLIGRAM(S): at 06:21

## 2019-03-18 RX ADMIN — Medication 50 GRAM(S): at 00:16

## 2019-03-18 RX ADMIN — Medication 650 MILLIGRAM(S): at 12:05

## 2019-03-18 RX ADMIN — Medication 650 MILLIGRAM(S): at 00:15

## 2019-03-18 RX ADMIN — HYDROMORPHONE HYDROCHLORIDE 0.25 MILLIGRAM(S): 2 INJECTION INTRAMUSCULAR; INTRAVENOUS; SUBCUTANEOUS at 16:46

## 2019-03-18 RX ADMIN — Medication 650 MILLIGRAM(S): at 18:31

## 2019-03-18 RX ADMIN — PANTOPRAZOLE SODIUM 40 MILLIGRAM(S): 20 TABLET, DELAYED RELEASE ORAL at 06:21

## 2019-03-18 RX ADMIN — Medication 2 MILLIGRAM(S): at 18:31

## 2019-03-18 RX ADMIN — Medication 2 MILLIGRAM(S): at 06:20

## 2019-03-18 NOTE — CHART NOTE - NSCHARTNOTEFT_GEN_A_CORE
Admitting Diagnosis:   Patient is a 86y old  Male who presents with a chief complaint of Elective inguinal hernia repair (17 Mar 2019 20:59)    PAST MEDICAL & SURGICAL HISTORY:  Esophageal reflux  Acute diarrhea  HLD (hyperlipidemia)  HTN (hypertension)  Prostate cancer  Stented coronary artery: x3 , per pt.  Atherosclerosis of coronary artery: CAD (coronary artery disease)  S/P hernia repair  Surgery, elective: cardiac stent x3  History of prostate surgery    Current Nutrition Order: Regular diet with Ensure Enlive TID     PO Intake: Good (%) [   ]  Fair (50-75%) [ X  ] Poor (<25%) [   ]    GI Issues: loose stool 3/17 per flow sheet, pt denies N/V    Pain: Pt denies pain at this time    Skin Integrity: surgical incision abdomen    Labs:   03-18    132<L>  |  98  |  24<H>  ----------------------------<  135<H>  5.2   |  24  |  0.81    Ca    7.5<L>      18 Mar 2019 06:36  Phos  3.9     03-18  Mg     1.9     03-18    CAPILLARY BLOOD GLUCOSE    Medications:  MEDICATIONS  (STANDING):  acetaminophen   Tablet .. 650 milliGRAM(s) Oral every 6 hours  aspirin  chewable 81 milliGRAM(s) Oral daily  atorvastatin 80 milliGRAM(s) Oral at bedtime  clopidogrel Tablet 75 milliGRAM(s) Oral daily  loperamide 2 milliGRAM(s) Oral three times a day  metoprolol tartrate 25 milliGRAM(s) Oral daily  multivitamin/minerals 1 Tablet(s) Oral daily  pantoprazole    Tablet 40 milliGRAM(s) Oral before breakfast  tamsulosin 0.4 milliGRAM(s) Oral at bedtime    MEDICATIONS  (PRN):  ondansetron Injectable 4 milliGRAM(s) IV Push every 6 hours PRN Nausea    Weight:  64.1kg= updated bedsale wt 3/18    Weight Change:   Pt weighing 62.3 kg on admission    Nutrition Focused Physical Exam: Completed [ X on 3/14  ]  Not Pertinent [   ]    Estimated energy needs:   Height: 5'11" Weight: 137lbs, IBW 172lbs+/-10%, %IBW 79%, BMI 19.1  IBW used for calculations as pt <80% of IBW    Nutrient needs based on [facility] standards of care for maintenance in older adults.   Needs adjusted for age, post-op and suspected severe protein calorie malnutrition. Recommend utilizing upper range of estimated needs.    8695-4015 kcal (20-25 kcal/kg),  gm (1.2-1.4 gm/kg), fluid needs per team 2/2 hyponatremia    Subjective: 85yo M w/ a hx of CAD s/p stenting, right inguinal hernia repair now w/ recurrence and new diagnosis of left inguinal hernia and umbilical hernia here for an elective robotic assisted laparoscopic inguinal hernia repair b/l with mesh. Pt resting in bed, pt consuming ~50% of meals and drinking some Ensure supplements. Encouraged pt to aim to consume >/=75% meals and drink Ensure TID- pt appears receptived. RD to monitor and follow per protocol.    Previous Nutrition Diagnosis:  Malnutrition RT suspected inadequate kcal/pro intake over last few years AEB severe muscle wasting in upper extremities and 18.9% wt change over last 2 years    Active [ X ]  Resolved [   ]    Goal: Pt to consistently meet % of estimated needs PO without further s/s malnutrition.    Recommendations:  1. Recommend continue current diet order (Regular diet with Ensure Enlive TID). Monitor and encourage PO intake.  2. Monitor labs and trend weekly weights.    Education: Encouraged pt to increase PO intake as able and drink Ensure supplements- pt receptive    Risk Level: High [   ] Moderate [ X ] Low [   ]

## 2019-03-18 NOTE — PROGRESS NOTE ADULT - ATTENDING COMMENTS
Please see full attending addendum to initial consult note.   In brief:  86WM with CAD s/p PCI 2012 on ASA & Plavix, HLD, Essential HTN, h/o Syncope with head trauma, who was admitted 3/11/19 for elective inguinal hernia repair with mesh placement. Cardiology has been consulted for evaluation of abnormal rhythm noted in PACU post operatively. Patient with no cardiopulmonary complaints  No echo imaging in [place] available for review  Telemetry reviewed: NSR with PVCs, PACs, 6bts PAT, 10 bts PAT, PVC triplet, 4bts NSVT, 5bts NSVT  EKG 3/11/19: ST with sinus arrythmia  EKG 3/17/19 15:12: ST, PACs  Rhythm strip: ST, PACs, fusion beat    Recommendations as follows:  Metoprolol Tartrate 25mg po daily as per home regimen  -->do not recommend up titration of beta blocker at this time given sensible losses (diarrhea) and hypotension  There is no evidence of atrial fibrillation, no anticoagulation recommended for sinus rhythm with premature contractions  No need to continue troponin trend, no e/o ACS  Echo remains pending, will follow up results if imaging obtained p/t discharge  Please call [phone] to make a Cardiology appointment within 1-2 weeks of discharge with Dr. Garica  Will sign off. Please call back with additional questions    SURINDER Skelton.  Cardiology Attending .

## 2019-03-18 NOTE — PROVIDER CONTACT NOTE (OTHER) - SITUATION
Latest BP readings within the past two hours were 88/54 mmhg and 86/53 mmhg respectively. Px without urine output since around 2230. Bladder scan revealed 266 ml.
Px with O2 sat as low as 90-91% on RA.

## 2019-03-19 VITALS
TEMPERATURE: 98 F | RESPIRATION RATE: 16 BRPM | HEART RATE: 75 BPM | SYSTOLIC BLOOD PRESSURE: 92 MMHG | DIASTOLIC BLOOD PRESSURE: 61 MMHG | OXYGEN SATURATION: 96 %

## 2019-03-19 LAB
ANION GAP SERPL CALC-SCNC: 5 MMOL/L — SIGNIFICANT CHANGE UP (ref 5–17)
BUN SERPL-MCNC: 28 MG/DL — HIGH (ref 7–23)
CALCIUM SERPL-MCNC: 7.2 MG/DL — LOW (ref 8.4–10.5)
CHLORIDE SERPL-SCNC: 98 MMOL/L — SIGNIFICANT CHANGE UP (ref 96–108)
CO2 SERPL-SCNC: 26 MMOL/L — SIGNIFICANT CHANGE UP (ref 22–31)
CREAT SERPL-MCNC: 0.77 MG/DL — SIGNIFICANT CHANGE UP (ref 0.5–1.3)
GLUCOSE SERPL-MCNC: 123 MG/DL — HIGH (ref 70–99)
HCT VFR BLD CALC: 27.1 % — LOW (ref 39–50)
HGB BLD-MCNC: 8.9 G/DL — LOW (ref 13–17)
MAGNESIUM SERPL-MCNC: 1.7 MG/DL — SIGNIFICANT CHANGE UP (ref 1.6–2.6)
MCHC RBC-ENTMCNC: 32.8 GM/DL — SIGNIFICANT CHANGE UP (ref 32–36)
MCHC RBC-ENTMCNC: 33.8 PG — SIGNIFICANT CHANGE UP (ref 27–34)
MCV RBC AUTO: 103 FL — HIGH (ref 80–100)
NRBC # BLD: 0 /100 WBCS — SIGNIFICANT CHANGE UP (ref 0–0)
PHOSPHATE SERPL-MCNC: 2.9 MG/DL — SIGNIFICANT CHANGE UP (ref 2.5–4.5)
PLATELET # BLD AUTO: 208 K/UL — SIGNIFICANT CHANGE UP (ref 150–400)
POTASSIUM SERPL-MCNC: 4.7 MMOL/L — SIGNIFICANT CHANGE UP (ref 3.5–5.3)
POTASSIUM SERPL-SCNC: 4.7 MMOL/L — SIGNIFICANT CHANGE UP (ref 3.5–5.3)
RBC # BLD: 2.63 M/UL — LOW (ref 4.2–5.8)
RBC # FLD: 14.3 % — SIGNIFICANT CHANGE UP (ref 10.3–14.5)
SODIUM SERPL-SCNC: 129 MMOL/L — LOW (ref 135–145)
SURGICAL PATHOLOGY STUDY: SIGNIFICANT CHANGE UP
WBC # BLD: 3.98 K/UL — SIGNIFICANT CHANGE UP (ref 3.8–10.5)
WBC # FLD AUTO: 3.98 K/UL — SIGNIFICANT CHANGE UP (ref 3.8–10.5)

## 2019-03-19 PROCEDURE — 93005 ELECTROCARDIOGRAM TRACING: CPT

## 2019-03-19 PROCEDURE — 93306 TTE W/DOPPLER COMPLETE: CPT | Mod: 26

## 2019-03-19 PROCEDURE — 86850 RBC ANTIBODY SCREEN: CPT

## 2019-03-19 PROCEDURE — 97530 THERAPEUTIC ACTIVITIES: CPT

## 2019-03-19 PROCEDURE — 97161 PT EVAL LOW COMPLEX 20 MIN: CPT

## 2019-03-19 PROCEDURE — 81001 URINALYSIS AUTO W/SCOPE: CPT

## 2019-03-19 PROCEDURE — 82962 GLUCOSE BLOOD TEST: CPT

## 2019-03-19 PROCEDURE — 84100 ASSAY OF PHOSPHORUS: CPT

## 2019-03-19 PROCEDURE — 84484 ASSAY OF TROPONIN QUANT: CPT

## 2019-03-19 PROCEDURE — 87086 URINE CULTURE/COLONY COUNT: CPT

## 2019-03-19 PROCEDURE — C1781: CPT

## 2019-03-19 PROCEDURE — 82330 ASSAY OF CALCIUM: CPT

## 2019-03-19 PROCEDURE — 80048 BASIC METABOLIC PNL TOTAL CA: CPT

## 2019-03-19 PROCEDURE — 83036 HEMOGLOBIN GLYCOSYLATED A1C: CPT

## 2019-03-19 PROCEDURE — 83880 ASSAY OF NATRIURETIC PEPTIDE: CPT

## 2019-03-19 PROCEDURE — 36415 COLL VENOUS BLD VENIPUNCTURE: CPT

## 2019-03-19 PROCEDURE — 84295 ASSAY OF SERUM SODIUM: CPT

## 2019-03-19 PROCEDURE — 88302 TISSUE EXAM BY PATHOLOGIST: CPT

## 2019-03-19 PROCEDURE — 87449 NOS EACH ORGANISM AG IA: CPT

## 2019-03-19 PROCEDURE — P9045: CPT

## 2019-03-19 PROCEDURE — 84132 ASSAY OF SERUM POTASSIUM: CPT

## 2019-03-19 PROCEDURE — 97116 GAIT TRAINING THERAPY: CPT

## 2019-03-19 PROCEDURE — 82553 CREATINE MB FRACTION: CPT

## 2019-03-19 PROCEDURE — 87324 CLOSTRIDIUM AG IA: CPT

## 2019-03-19 PROCEDURE — 82550 ASSAY OF CK (CPK): CPT

## 2019-03-19 PROCEDURE — 83735 ASSAY OF MAGNESIUM: CPT

## 2019-03-19 PROCEDURE — 85027 COMPLETE CBC AUTOMATED: CPT

## 2019-03-19 PROCEDURE — 86900 BLOOD TYPING SEROLOGIC ABO: CPT

## 2019-03-19 PROCEDURE — 88305 TISSUE EXAM BY PATHOLOGIST: CPT

## 2019-03-19 PROCEDURE — 71045 X-RAY EXAM CHEST 1 VIEW: CPT

## 2019-03-19 PROCEDURE — 88112 CYTOPATH CELL ENHANCE TECH: CPT

## 2019-03-19 PROCEDURE — C8929: CPT

## 2019-03-19 PROCEDURE — 82803 BLOOD GASES ANY COMBINATION: CPT

## 2019-03-19 PROCEDURE — S2900: CPT

## 2019-03-19 PROCEDURE — 86901 BLOOD TYPING SEROLOGIC RH(D): CPT

## 2019-03-19 RX ORDER — SODIUM CHLORIDE 9 MG/ML
1000 INJECTION INTRAMUSCULAR; INTRAVENOUS; SUBCUTANEOUS
Qty: 0 | Refills: 0 | Status: DISCONTINUED | OUTPATIENT
Start: 2019-03-19 | End: 2019-03-19

## 2019-03-19 RX ORDER — LOPERAMIDE HCL 2 MG
4 TABLET ORAL
Qty: 0 | Refills: 0 | DISCHARGE
Start: 2019-03-19

## 2019-03-19 RX ADMIN — SODIUM CHLORIDE 50 MILLILITER(S): 9 INJECTION INTRAMUSCULAR; INTRAVENOUS; SUBCUTANEOUS at 07:51

## 2019-03-19 RX ADMIN — Medication 1 TABLET(S): at 13:43

## 2019-03-19 RX ADMIN — Medication 25 MILLIGRAM(S): at 06:28

## 2019-03-19 RX ADMIN — Medication 650 MILLIGRAM(S): at 13:43

## 2019-03-19 RX ADMIN — Medication 650 MILLIGRAM(S): at 06:28

## 2019-03-19 RX ADMIN — Medication 8 MILLIGRAM(S): at 07:51

## 2019-03-19 RX ADMIN — Medication 650 MILLIGRAM(S): at 07:25

## 2019-03-19 RX ADMIN — Medication 650 MILLIGRAM(S): at 00:00

## 2019-03-19 RX ADMIN — Medication 650 MILLIGRAM(S): at 18:21

## 2019-03-19 RX ADMIN — CLOPIDOGREL BISULFATE 75 MILLIGRAM(S): 75 TABLET, FILM COATED ORAL at 13:43

## 2019-03-19 RX ADMIN — PANTOPRAZOLE SODIUM 40 MILLIGRAM(S): 20 TABLET, DELAYED RELEASE ORAL at 06:28

## 2019-03-19 RX ADMIN — Medication 81 MILLIGRAM(S): at 13:42

## 2019-03-19 NOTE — PROGRESS NOTE ADULT - ASSESSMENT
Continue with current medication regimen, metoprolol tartrate 25 mg PO daily  Resume remainder of cardiac meds as prior to admission  EKG and tele review c/w sinus rhythm with PACs/PVCs, no signs of afib  Consider work up of diarrhea as he continues to have loose BMs  Consider volume rescucitation with crystalloid  Echo remains pending, will follow up results if imaging obtained p/t discharge    Please see attending attestation to note from 3/17 for further recommendations  Patient seen and discussed with Dr. Newberry
86M with a hx of CAD s/p stenting, right inguinal hernia repair now with recurrence and new diagnosis of left inguinal hernia and umbilical hernia here for an elective robotic assisted laparoscopic inguinal hernia repair b/l with mesh (3/11).    pain/nausea control  home metoprolol, aspirin  IS/OOB  regular   LR @ 50cc/hr  imodium  SCDs, no HSQ necessary  dispo: [facility]
86M with a hx of CAD s/p stenting, right inguinal hernia repair now with recurrence and new diagnosis of left inguinal hernia and umbilical hernia here for an elective robotic assisted laparoscopic inguinal hernia repair b/l with mesh (3/11).    pain/nausea control  home metoprolol, aspirin  IS/OOB  regular   SCDs, no HSQ necessary  ECHO pending  f/u cards consult  dispo: [facility]
86M with a hx of CAD s/p stenting, right inguinal hernia repair now with recurrence and new diagnosis of left inguinal hernia and umbilical hernia here for an elective robotic assisted laparoscopic inguinal hernia repair b/l with mesh (3/11).    pain/nausea control  home metoprolol, aspirin  IS/OOB  regular   SCDs, no HSQ necessary  dispo: [facility]
86M with a hx of CAD s/p stenting, right inguinal hernia repair now with recurrence and new diagnosis of left inguinal hernia and umbilical hernia here for an elective robotic assisted laparoscopic inguinal hernia repair b/l with mesh.    - 4.5 liters of ascites removed and sent for cytopathology during the procedure, as such will give slow colloid gtt for IVF  - restart home metoprolol  - Preop ASA 81 given and discuss restarting home plavix with stable cbc  - Pain/nausea control, but will limit narcotics  - OOBA with assistance  - SQH/SCDs/thigh high stockings/IS  - rao in place, will attempt TOV before DC
86M with a hx of CAD s/p stenting, right inguinal hernia repair now with recurrence and new diagnosis of left inguinal hernia and umbilical hernia here for an elective robotic assisted laparoscopic inguinal hernia repair b/l with mesh.    - 4.5 liters of ascites removed and sent for cytopathology during the procedure, as such will give slow colloid gtt for IVF  - restart home metoprolol  - Preop ASA 81 given, will continue tm, and discuss restarting home plavix with stable cbc  - Pain/nausea control, but will limit narcotics  - OOBA with assistance  - SQH/SCDs/thigh high stockings/IS  -failed TOV, nurses unable to straight cath, will place cudette catheter and consult urology
86M with a hx of CAD s/p stenting, right inguinal hernia repair now with recurrence and new diagnosis of left inguinal hernia and umbilical hernia here for an elective robotic assisted laparoscopic inguinal hernia repair b/l with mesh.    - Pain/nausea control, but will limit narcotics  - regular diet  - restarted home metoprolol  - ASA, holding plavix  - OOBA with assistance  - no SQH (on ASA/Plavix), SCDs/thigh high stockings/IS  - Dispo: [facility]
86M with a hx of CAD s/p stenting, right inguinal hernia repair now with recurrence and new diagnosis of left inguinal hernia and umbilical hernia here for an elective robotic assisted laparoscopic inguinal hernia repair b/l with mesh.    - restarted home metoprolol  - ASA, holding plavix  - Pain/nausea control, but will limit narcotics  - OOBA with assistance  - SQH/SCDs/thigh high stockings/IS  - regular diet  - d/c rao today  - re-eval with PT

## 2019-03-19 NOTE — PROGRESS NOTE ADULT - SUBJECTIVE AND OBJECTIVE BOX
HR in 80s-90s, tele reviewed, atrial tach, pacs, pvcs, ST  No evidence of atrial fibrillation  Given 1.5 L crystalloid, bp remains labile  No complaints today, continues to have loose/liquid BM    PAST MEDICAL & SURGICAL HISTORY:  Esophageal reflux  Acute diarrhea  HLD (hyperlipidemia)  HTN (hypertension)  Prostate cancer  Stented coronary artery: x3 , per pt.  Atherosclerosis of coronary artery: CAD (coronary artery disease)  S/P hernia repair  Surgery, elective: cardiac stent x3  History of prostate surgery      MEDICATIONS  (STANDING):  acetaminophen   Tablet .. 650 milliGRAM(s) Oral every 6 hours  aspirin  chewable 81 milliGRAM(s) Oral daily  atorvastatin 80 milliGRAM(s) Oral at bedtime  clopidogrel Tablet 75 milliGRAM(s) Oral daily  lactated ringers. 1000 milliLiter(s) (50 mL/Hr) IV Continuous <Continuous>  loperamide 2 milliGRAM(s) Oral once  metoprolol tartrate 25 milliGRAM(s) Oral daily  multivitamin/minerals 1 Tablet(s) Oral daily  pantoprazole    Tablet 40 milliGRAM(s) Oral before breakfast  tamsulosin 0.4 milliGRAM(s) Oral at bedtime      Vital Signs Last 24 Hrs  T(C): 36.3 (18 Mar 2019 16:48), Max: 37 (17 Mar 2019 19:25)  T(F): 97.4 (18 Mar 2019 16:48), Max: 98.6 (17 Mar 2019 19:25)  HR: 77 (18 Mar 2019 16:48) (71 - 115)  BP: 92/55 (18 Mar 2019 16:48) (86/53 - 110/65)  BP(mean): --  RR: 18 (18 Mar 2019 16:48) (16 - 20)  SpO2: 95% (18 Mar 2019 16:48) (90% - 96%)    Daily     Daily     Physical Exam:   GEN: NAD, AAOx3, frail, elderly  HEENT: dry MM, no JVD  CV: S1 S2 irregular, PACs  Lung: CTAB  Ext: no c/c/e  Neuro: no focal neuro deficit      LABS:                        10.2   6.60  )-----------( 209      ( 18 Mar 2019 06:37 )             30.4     03-18    132<L>  |  98  |  24<H>  ----------------------------<  135<H>  5.2   |  24  |  0.81    Ca    7.5<L>      18 Mar 2019 06:36  Phos  3.9     03-18  Mg     1.9     03-18      CARDIAC MARKERS ( 18 Mar 2019 06:36 )  x     / 0.02 ng/mL / x     / x     / x      CARDIAC MARKERS ( 17 Mar 2019 22:53 )  x     / 0.03 ng/mL / x     / x     / x      CARDIAC MARKERS ( 17 Mar 2019 18:49 )  x     / 0.03 ng/mL / 84 U/L / x     / 3.4 ng/mL          I&O's Detail    17 Mar 2019 07:01  -  18 Mar 2019 07:00  --------------------------------------------------------  IN:    IV PiggyBack: 50 mL    Oral Fluid: 480 mL  Total IN: 530 mL    OUT:    Stool: 5 mL    Voided: 400 mL  Total OUT: 405 mL    Total NET: 125 mL      18 Mar 2019 07:01  -  18 Mar 2019 18:06  --------------------------------------------------------  IN:    Oral Fluid: 380 mL  Total IN: 380 mL    OUT:    Stool: 2 mL    Voided: 350 mL  Total OUT: 352 mL    Total NET: 28 mL
24 hr events:  O/N: pt satting well on nc 2 L of oxygen, bipap discontinued, POC wnl, failed TOV @ 1am, bladder scaned 250cc, chief is aware, we will continue to monitor, VSS, @ 4:30 am pt still didn't void, bladder scan 297cc, straight cath ordered, nurse met resistants, flomax ordered STAT and then at bedtime    SUBJECTIVE:  Pt seen and examined by chief resident. Pt is doing well, resting comfortably on bed. Pain controlled. Diet tolerated.  No nausea or vomiting. No complaints at this time.      Vital Signs Last 24 Hrs  T(C): 35.6 (12 Mar 2019 09:04), Max: 36.6 (12 Mar 2019 00:10)  T(F): 96.1 (12 Mar 2019 09:04), Max: 97.8 (12 Mar 2019 00:10)  HR: 88 (12 Mar 2019 08:17) (72 - 108)  BP: 114/56 (12 Mar 2019 08:17) (90/64 - 120/75)  BP(mean): 81 (12 Mar 2019 08:17) (72 - 95)  RR: 18 (12 Mar 2019 08:17) (12 - 24)  SpO2: 100% (12 Mar 2019 08:17) (98% - 100%)    Physical Exam:  General: NAD  Pulmonary: Nonlabored breathing, no respiratory distress  Abdominal: soft, NT/ND, incisions clean dry and intact  Extremities: warm, well perfused. SCDs in place        I&O's Summary    11 Mar 2019 07:01  -  12 Mar 2019 07:00  --------------------------------------------------------  IN: 604.8 mL / OUT: 0 mL / NET: 604.8 mL    12 Mar 2019 07:01  -  12 Mar 2019 09:48  --------------------------------------------------------  IN: 360 mL / OUT: 180 mL / NET: 180 mL        LABS:                        9.8    5.54  )-----------( 168      ( 12 Mar 2019 07:09 )             29.5     03-12    136  |  100  |  23  ----------------------------<  103<H>  4.2   |  28  |  0.87    Ca    7.8<L>      12 Mar 2019 07:09  Phos  4.1     03-12  Mg     1.7     03-12        Urinalysis Basic - ( 12 Mar 2019 08:06 )    Color: Yellow / Appearance: Clear / SG: >=1.030 / pH: x  Gluc: x / Ketone: 15 mg/dL  / Bili: Negative / Urobili: 0.2 E.U./dL   Blood: x / Protein: NEGATIVE mg/dL / Nitrite: POSITIVE   Leuk Esterase: NEGATIVE / RBC: < 5 /HPF / WBC < 5 /HPF   Sq Epi: x / Non Sq Epi: 0-5 /HPF / Bacteria: Present /HPF      CAPILLARY BLOOD GLUCOSE      POCT Blood Glucose.: 91 mg/dL (12 Mar 2019 07:00)  POCT Blood Glucose.: 97 mg/dL (12 Mar 2019 00:10)        RADIOLOGY & ADDITIONAL STUDIES:
24 hr events:  O/N: tolerating regular diet, VSS. KELL  3/13: AM hb.6. PM hgb:9.5. multiple bouts of watery diarrhea, c.diff negative     SUBJECTIVE:  Pt seen and examined by chief resident. Pt is doing well, resting comfortably on bed. Pain controlled. Diet tolerated. No more episodes of diarrhea. No nausea or vomiting. No complaints at this time.    Vital Signs Last 24 Hrs  T(C): 36.7 (14 Mar 2019 05:33), Max: 37.1 (13 Mar 2019 14:23)  T(F): 98.1 (14 Mar 2019 05:33), Max: 98.7 (13 Mar 2019 14:23)  HR: 74 (14 Mar 2019 05:17) (68 - 86)  BP: 104/58 (14 Mar 2019 05:17) (91/50 - 114/66)  BP(mean): 75 (14 Mar 2019 05:17) (75 - 83)  RR: 17 (14 Mar 2019 05:17) (17 - 18)  SpO2: 98% (14 Mar 2019 05:17) (97% - 100%)    Physical Exam:  General: NAD  Pulmonary: Nonlabored breathing, no respiratory distress  Abdominal: soft, NT/ND, incisions clean dry and intact  Extremities: warm, well perfused  : jalen in place    Lines/drains/tubes:  Jalen    I&O's Summary    13 Mar 2019 07:01  -  14 Mar 2019 07:00  --------------------------------------------------------  IN: 0 mL / OUT: 1229 mL / NET: -1229 mL        LABS:                        9.0    4.71  )-----------( 199      ( 14 Mar 2019 06:34 )             27.4     03-14    135  |  100  |  16  ----------------------------<  91  4.2   |  29  |  0.88    Ca    7.5<L>      14 Mar 2019 06:34  Phos  2.3     03-14  Mg     2.0     03-14        Urinalysis Basic - ( 12 Mar 2019 08:06 )    Color: Yellow / Appearance: Clear / SG: >=1.030 / pH: x  Gluc: x / Ketone: 15 mg/dL  / Bili: Negative / Urobili: 0.2 E.U./dL   Blood: x / Protein: NEGATIVE mg/dL / Nitrite: POSITIVE   Leuk Esterase: NEGATIVE / RBC: < 5 /HPF / WBC < 5 /HPF   Sq Epi: x / Non Sq Epi: 0-5 /HPF / Bacteria: Present /HPF      CAPILLARY BLOOD GLUCOSE            RADIOLOGY & ADDITIONAL STUDIES:
24 hr events:  O/N: vss, urine output overnight 325/555cc  3/12: Cudette placed.  consulted, keep on flomax and ordered UA/UCx. PT ordered. restarted metoprolol    SUBJECTIVE:  Pt seen and examined by chief resident. Pt is doing well, resting comfortably on bed. Pain controlled. Diet tolerated.  No nausea or vomiting. No complaints at this time.      Vital Signs Last 24 Hrs  T(C): 35.8 (13 Mar 2019 04:55), Max: 36.4 (12 Mar 2019 14:34)  T(F): 96.4 (13 Mar 2019 04:55), Max: 97.5 (12 Mar 2019 14:34)  HR: 78 (13 Mar 2019 04:04) (78 - 106)  BP: 97/60 (13 Mar 2019 04:04) (97/60 - 114/56)  BP(mean): 74 (13 Mar 2019 04:04) (74 - 81)  RR: 16 (13 Mar 2019 04:04) (16 - 18)  SpO2: 98% (13 Mar 2019 04:04) (98% - 100%)    Physical Exam:  General: NAD  Pulmonary: Nonlabored breathing, no respiratory distress  Abdominal: soft, NT/ND, incisions clean dry and intact  Extremities: warm, well perfused. SCDs in place      Lines/drains/tubes:  Milena    I&O's Summary    12 Mar 2019 07:01  -  13 Mar 2019 07:00  --------------------------------------------------------  IN: 1564 mL / OUT: 555 mL / NET: 1009 mL        LABS:                        7.9    3.52  )-----------( 143      ( 13 Mar 2019 05:47 )             24.2     03-13    135  |  101  |  19  ----------------------------<  103<H>  4.5   |  29  |  0.92    Ca    7.6<L>      13 Mar 2019 05:44  Phos  2.7     03-13  Mg     1.9     03-13        Urinalysis Basic - ( 12 Mar 2019 08:06 )    Color: Yellow / Appearance: Clear / SG: >=1.030 / pH: x  Gluc: x / Ketone: 15 mg/dL  / Bili: Negative / Urobili: 0.2 E.U./dL   Blood: x / Protein: NEGATIVE mg/dL / Nitrite: POSITIVE   Leuk Esterase: NEGATIVE / RBC: < 5 /HPF / WBC < 5 /HPF   Sq Epi: x / Non Sq Epi: 0-5 /HPF / Bacteria: Present /HPF      CAPILLARY BLOOD GLUCOSE      POCT Blood Glucose.: 137 mg/dL (12 Mar 2019 21:58)  POCT Blood Glucose.: 129 mg/dL (12 Mar 2019 16:45)  POCT Blood Glucose.: 123 mg/dL (12 Mar 2019 11:50)        RADIOLOGY & ADDITIONAL STUDIES:
POST-OPERATIVE NOTE    Procedure: Laparoscopic bilateral repair of inguinal hernia, Umbilical hernia repair       Diagnosis/Indication: Bilateral inguinal hernia, Umbilical hernia     Surgeon: Dr. Valencia    S: Pt has no complaints. Denies CP, SOB, NGUYEN, calf tenderness. Pain controlled with medication. Denies nausea, vomiting.    O:  T(C): 36.2 (03-11-19 @ 16:35), Max: 36.2 (03-11-19 @ 16:35)  T(F): 97.2 (03-11-19 @ 16:35), Max: 97.2 (03-11-19 @ 16:35)  HR: 72 (03-11-19 @ 19:18) (72 - 108)  BP: 96/67 (03-11-19 @ 19:18) (90/64 - 120/75)  RR: 16 (03-11-19 @ 19:18) (12 - 24)  SpO2: 100% (03-11-19 @ 19:18) (99% - 100%)  Wt(kg): --                        10.9   7.25  )-----------( 180      ( 11 Mar 2019 17:00 )             32.5     03-11    135  |  101  |  22  ----------------------------<  187<H>  4.2   |  26  |  0.90    Ca    7.6<L>      11 Mar 2019 17:00  Phos  6.0     03-11  Mg     1.8     03-11    Gen: NAD, resting comfortably in bed  C/V: NSR  Pulm: Nonlabored breathing, no respiratory distress  Abd: soft, NT/ND, incision areas are clean, dry and intact (covered with gauze)  Extrem: WWP, no calf edema or tenderness, SCDs in place    A/P: 86y Male s/p above procedure  Diet: Reg  IVF: Albumin  Pain/nausea control  SQH/SCDs/OOBA/IS  Dispo pending pain control, PO tolerance, clinical improvement
STATUS POST:  3/11:  Laparoscopic bilateral repair of inguinal hernia, Umbilical hernia repair      SUBJECTIVE: This morning, he feels well; his pain is well-controlled. No nausea or vomiting. Passing flatus and having BMs. No acute complaints.    aspirin  chewable 81 milliGRAM(s) Oral daily  clopidogrel Tablet 75 milliGRAM(s) Oral daily  metoprolol tartrate 25 milliGRAM(s) Oral daily  tamsulosin 0.4 milliGRAM(s) Oral at bedtime      Vital Signs Last 24 Hrs  T(C): 37.2 (16 Mar 2019 08:50), Max: 37.2 (16 Mar 2019 08:50)  T(F): 98.9 (16 Mar 2019 08:50), Max: 98.9 (16 Mar 2019 08:50)  HR: 66 (16 Mar 2019 08:50) (61 - 70)  BP: 96/60 (16 Mar 2019 08:50) (96/60 - 112/60)  BP(mean): --  RR: 17 (16 Mar 2019 08:50) (16 - 17)  SpO2: 100% (16 Mar 2019 08:50) (97% - 100%)  I&O's Detail    15 Mar 2019 07:01  -  16 Mar 2019 07:00  --------------------------------------------------------  IN:    Oral Fluid: 480 mL  Total IN: 480 mL    OUT:    Voided: 350 mL  Total OUT: 350 mL    Total NET: 130 mL          General: NAD, resting comfortably in bed  Pulm: Nonlabored breathing, no respiratory distress  Abd: soft, NT/ND, incisions CDI with Steri-Strips in place, no rosa-incisional erythema or induration  Extrem: WWP, no edema, SCDs in place, no calf tenderness        LABS:                        9.2    5.09  )-----------( 221      ( 16 Mar 2019 07:34 )             27.9     03-16    135  |  102  |  21  ----------------------------<  94  4.4   |  28  |  0.79    Ca    7.2<L>      16 Mar 2019 07:34  Phos  3.3     03-16  Mg     1.8     03-16
STATUS POST:  Laparoscopic bilateral repair of inguinal hernia  Umbilical hernia repair      POST OPERATIVE DAY #: 4    SUBJECTIVE:   No acute events overnight. Voiding after bolus and lasix  Patient  pain controlled, out of bed with PT.  Tolerating regular diet. Denies nausea/vomiting  no loose BMs overnight, +Flatus    ---------------------------------------------------------------    Vital Signs Last 24 Hrs  T(C): 36.1 (15 Mar 2019 05:48), Max: 36.4 (14 Mar 2019 17:57)  T(F): 96.9 (15 Mar 2019 05:48), Max: 97.5 (14 Mar 2019 17:57)  HR: 68 (15 Mar 2019 05:48) (68 - 86)  BP: 125/60 (15 Mar 2019 05:48) (100/68 - 125/60)  BP(mean): 82 (14 Mar 2019 12:14) (76 - 82)  RR: 18 (15 Mar 2019 05:48) (17 - 18)  SpO2: 99% (15 Mar 2019 05:48) (97% - 99%)    I&O's Summary    14 Mar 2019 07:01  -  15 Mar 2019 07:00  --------------------------------------------------------  IN: 0 mL / OUT: 1025 mL / NET: -1025 mL        ----------------------------------------------------------------    Physical Exam:  General: NAD, Comfortable in bed     Neuro: A&Ox3  Respiratory: nonlabored breathing, no respiratory distress    Abd: soft, nontender, nondistended,  incisions clean dry intact, without erythema, drainage, or malodor  Extremities: WWP, pitting edema, compression stockings and SCDs in place        LABS:                        9.3    5.97  )-----------( 192      ( 15 Mar 2019 07:27 )             27.8     03-15    134<L>  |  102  |  17  ----------------------------<  92  4.1   |  28  |  0.76    Ca    7.0<L>      15 Mar 2019 07:27  Phos  2.9     03-15  Mg     1.7     03-15              RADIOLOGY & ADDITIONAL STUDIES:
SUBJECTIVE: Patient seen and examined bedside. Patient had 2 episodes of diarrhea overnight. Patient denies abdominal pain.    aspirin  chewable 81 milliGRAM(s) Oral daily  clopidogrel Tablet 75 milliGRAM(s) Oral daily  metoprolol tartrate 25 milliGRAM(s) Oral daily  tamsulosin 0.4 milliGRAM(s) Oral at bedtime      Vital Signs Last 24 Hrs  T(C): 36 (19 Mar 2019 04:14), Max: 36.7 (18 Mar 2019 21:45)  T(F): 96.8 (19 Mar 2019 04:14), Max: 98.1 (18 Mar 2019 21:45)  HR: 86 (19 Mar 2019 04:14) (71 - 90)  BP: 99/54 (19 Mar 2019 04:14) (92/55 - 99/57)  BP(mean): --  RR: 17 (19 Mar 2019 04:14) (16 - 20)  SpO2: 95% (19 Mar 2019 04:14) (93% - 95%)  I&O's Detail    18 Mar 2019 07:01  -  19 Mar 2019 07:00  --------------------------------------------------------  IN:    lactated ringers.: 650 mL    Oral Fluid: 440 mL  Total IN: 1090 mL    OUT:    Stool: 2 mL    Voided: 350 mL  Total OUT: 352 mL    Total NET: 738 mL          General: NAD, resting comfortably in bed  C/V: NSR  Pulm: Nonlabored breathing, no respiratory distress  Abd: soft, NT/ND. surgical incisions clean, dry, and intact.  Extrem: WWP, no edema, SCDs in place        LABS:                        8.9    3.98  )-----------( 208      ( 19 Mar 2019 06:50 )             27.1     03-18    132<L>  |  98  |  24<H>  ----------------------------<  135<H>  5.2   |  24  |  0.81    Ca    7.5<L>      18 Mar 2019 06:36  Phos  3.9     03-18  Mg     1.9     03-18            RADIOLOGY & ADDITIONAL STUDIES:
SUBJECTIVE: Patient seen and examined bedside. Patient has no complaints. Patient is tolerating his regular diet. Patient reports that he had multiple episodes of diarrhea yesterday.    aspirin  chewable 81 milliGRAM(s) Oral daily  clopidogrel Tablet 75 milliGRAM(s) Oral daily  metoprolol tartrate 25 milliGRAM(s) Oral daily  tamsulosin 0.4 milliGRAM(s) Oral at bedtime      Vital Signs Last 24 Hrs  T(C): 36.2 (18 Mar 2019 05:08), Max: 37.1 (17 Mar 2019 15:15)  T(F): 97.2 (18 Mar 2019 05:08), Max: 98.7 (17 Mar 2019 15:15)  HR: 77 (18 Mar 2019 07:16) (70 - 125)  BP: 86/53 (18 Mar 2019 07:16) (86/53 - 161/99)  BP(mean): --  RR: 16 (18 Mar 2019 07:16) (16 - 20)  SpO2: 93% (18 Mar 2019 07:16) (90% - 100%)  I&O's Detail    17 Mar 2019 07:01  -  18 Mar 2019 07:00  --------------------------------------------------------  IN:    IV PiggyBack: 50 mL    Oral Fluid: 480 mL  Total IN: 530 mL    OUT:    Stool: 5 mL    Voided: 400 mL  Total OUT: 405 mL    Total NET: 125 mL          General: NAD, resting comfortably in bed  C/V: NSR  Pulm: Nonlabored breathing, no respiratory distress  Abd: soft, NT/ND.  Extrem: WWP, no edema, SCDs in place        LABS:                        10.2   6.60  )-----------( 209      ( 18 Mar 2019 06:37 )             30.4     03-18    132<L>  |  98  |  24<H>  ----------------------------<  135<H>  5.2   |  24  |  0.81    Ca    7.5<L>      18 Mar 2019 06:36  Phos  3.9     03-18  Mg     1.9     03-18            RADIOLOGY & ADDITIONAL STUDIES:

## 2019-03-25 DIAGNOSIS — E43 UNSPECIFIED SEVERE PROTEIN-CALORIE MALNUTRITION: ICD-10-CM

## 2019-03-25 DIAGNOSIS — R18.8 OTHER ASCITES: ICD-10-CM

## 2019-03-25 DIAGNOSIS — I10 ESSENTIAL (PRIMARY) HYPERTENSION: ICD-10-CM

## 2019-03-25 DIAGNOSIS — K42.9 UMBILICAL HERNIA WITHOUT OBSTRUCTION OR GANGRENE: ICD-10-CM

## 2019-03-25 DIAGNOSIS — Z79.82 LONG TERM (CURRENT) USE OF ASPIRIN: ICD-10-CM

## 2019-03-25 DIAGNOSIS — I25.10 ATHEROSCLEROTIC HEART DISEASE OF NATIVE CORONARY ARTERY WITHOUT ANGINA PECTORIS: ICD-10-CM

## 2019-03-25 DIAGNOSIS — Z95.5 PRESENCE OF CORONARY ANGIOPLASTY IMPLANT AND GRAFT: ICD-10-CM

## 2019-03-25 DIAGNOSIS — Z85.46 PERSONAL HISTORY OF MALIGNANT NEOPLASM OF PROSTATE: ICD-10-CM

## 2019-03-25 DIAGNOSIS — K40.01 BILATERAL INGUINAL HERNIA, WITH OBSTRUCTION, WITHOUT GANGRENE, RECURRENT: ICD-10-CM

## 2019-03-25 DIAGNOSIS — R00.0 TACHYCARDIA, UNSPECIFIED: ICD-10-CM

## 2019-03-25 DIAGNOSIS — K21.9 GASTRO-ESOPHAGEAL REFLUX DISEASE WITHOUT ESOPHAGITIS: ICD-10-CM

## 2019-03-25 DIAGNOSIS — R33.9 RETENTION OF URINE, UNSPECIFIED: ICD-10-CM

## 2019-03-25 DIAGNOSIS — E78.5 HYPERLIPIDEMIA, UNSPECIFIED: ICD-10-CM

## 2019-04-05 ENCOUNTER — INPATIENT (INPATIENT)
Facility: HOSPITAL | Age: 84
LOS: 33 days | Discharge: ANOTHER HOSPITAL | DRG: 3 | End: 2019-05-09
Attending: SURGERY | Admitting: SURGERY
Payer: MEDICARE

## 2019-04-05 VITALS
HEART RATE: 90 BPM | TEMPERATURE: 98 F | SYSTOLIC BLOOD PRESSURE: 102 MMHG | RESPIRATION RATE: 20 BRPM | OXYGEN SATURATION: 95 % | DIASTOLIC BLOOD PRESSURE: 64 MMHG

## 2019-04-05 DIAGNOSIS — Z41.9 ENCOUNTER FOR PROCEDURE FOR PURPOSES OTHER THAN REMEDYING HEALTH STATE, UNSPECIFIED: Chronic | ICD-10-CM

## 2019-04-05 DIAGNOSIS — Z98.890 OTHER SPECIFIED POSTPROCEDURAL STATES: Chronic | ICD-10-CM

## 2019-04-05 PROBLEM — E78.5 HYPERLIPIDEMIA, UNSPECIFIED: Chronic | Status: ACTIVE | Noted: 2019-03-08

## 2019-04-05 PROBLEM — K21.9 GASTRO-ESOPHAGEAL REFLUX DISEASE WITHOUT ESOPHAGITIS: Chronic | Status: ACTIVE | Noted: 2019-03-08

## 2019-04-05 PROBLEM — R19.7 DIARRHEA, UNSPECIFIED: Chronic | Status: ACTIVE | Noted: 2019-03-08

## 2019-04-05 PROBLEM — Z95.5 PRESENCE OF CORONARY ANGIOPLASTY IMPLANT AND GRAFT: Chronic | Status: ACTIVE | Noted: 2018-08-24

## 2019-04-05 PROBLEM — I10 ESSENTIAL (PRIMARY) HYPERTENSION: Chronic | Status: ACTIVE | Noted: 2019-03-08

## 2019-04-05 LAB
ALBUMIN SERPL ELPH-MCNC: 1.5 G/DL — LOW (ref 3.3–5)
ALBUMIN SERPL ELPH-MCNC: 1.9 G/DL — LOW (ref 3.3–5)
ALP SERPL-CCNC: 111 U/L — SIGNIFICANT CHANGE UP (ref 40–120)
ALP SERPL-CCNC: 93 U/L — SIGNIFICANT CHANGE UP (ref 40–120)
ALT FLD-CCNC: 42 U/L — SIGNIFICANT CHANGE UP (ref 10–45)
ALT FLD-CCNC: 49 U/L — HIGH (ref 10–45)
ANION GAP SERPL CALC-SCNC: 10 MMOL/L — SIGNIFICANT CHANGE UP (ref 5–17)
ANION GAP SERPL CALC-SCNC: 9 MMOL/L — SIGNIFICANT CHANGE UP (ref 5–17)
ANISOCYTOSIS BLD QL: SLIGHT — SIGNIFICANT CHANGE UP
APPEARANCE UR: CLEAR — SIGNIFICANT CHANGE UP
APTT BLD: 24 SEC — LOW (ref 27.5–36.3)
AST SERPL-CCNC: 21 U/L — SIGNIFICANT CHANGE UP (ref 10–40)
AST SERPL-CCNC: 27 U/L — SIGNIFICANT CHANGE UP (ref 10–40)
BASOPHILS # BLD AUTO: 0 K/UL — SIGNIFICANT CHANGE UP (ref 0–0.2)
BASOPHILS NFR BLD AUTO: 0 % — SIGNIFICANT CHANGE UP (ref 0–2)
BILIRUB SERPL-MCNC: 0.5 MG/DL — SIGNIFICANT CHANGE UP (ref 0.2–1.2)
BILIRUB SERPL-MCNC: 0.5 MG/DL — SIGNIFICANT CHANGE UP (ref 0.2–1.2)
BILIRUB UR-MCNC: NEGATIVE — SIGNIFICANT CHANGE UP
BLD GP AB SCN SERPL QL: NEGATIVE — SIGNIFICANT CHANGE UP
BUN SERPL-MCNC: 18 MG/DL — SIGNIFICANT CHANGE UP (ref 7–23)
BUN SERPL-MCNC: 20 MG/DL — SIGNIFICANT CHANGE UP (ref 7–23)
BURR CELLS BLD QL SMEAR: SLIGHT — SIGNIFICANT CHANGE UP
CALCIUM SERPL-MCNC: 7.2 MG/DL — LOW (ref 8.4–10.5)
CALCIUM SERPL-MCNC: 7.5 MG/DL — LOW (ref 8.4–10.5)
CHLORIDE SERPL-SCNC: 96 MMOL/L — SIGNIFICANT CHANGE UP (ref 96–108)
CHLORIDE SERPL-SCNC: 97 MMOL/L — SIGNIFICANT CHANGE UP (ref 96–108)
CO2 SERPL-SCNC: 21 MMOL/L — LOW (ref 22–31)
CO2 SERPL-SCNC: 25 MMOL/L — SIGNIFICANT CHANGE UP (ref 22–31)
COLOR SPEC: YELLOW — SIGNIFICANT CHANGE UP
CREAT SERPL-MCNC: 0.62 MG/DL — SIGNIFICANT CHANGE UP (ref 0.5–1.3)
CREAT SERPL-MCNC: 0.75 MG/DL — SIGNIFICANT CHANGE UP (ref 0.5–1.3)
DIFF PNL FLD: NEGATIVE — SIGNIFICANT CHANGE UP
EOSINOPHIL # BLD AUTO: 0 K/UL — SIGNIFICANT CHANGE UP (ref 0–0.5)
EOSINOPHIL NFR BLD AUTO: 0 % — SIGNIFICANT CHANGE UP (ref 0–6)
EXTRA SST TUBE: SIGNIFICANT CHANGE UP
GIANT PLATELETS BLD QL SMEAR: PRESENT — SIGNIFICANT CHANGE UP
GLUCOSE BLDC GLUCOMTR-MCNC: 109 MG/DL — HIGH (ref 70–99)
GLUCOSE SERPL-MCNC: 110 MG/DL — HIGH (ref 70–99)
GLUCOSE SERPL-MCNC: 133 MG/DL — HIGH (ref 70–99)
GLUCOSE UR QL: NEGATIVE — SIGNIFICANT CHANGE UP
HCT VFR BLD CALC: 29.4 % — LOW (ref 39–50)
HCT VFR BLD CALC: 31.5 % — LOW (ref 39–50)
HGB BLD-MCNC: 10.3 G/DL — LOW (ref 13–17)
HGB BLD-MCNC: 9.6 G/DL — LOW (ref 13–17)
INR BLD: 1.8 — HIGH (ref 0.88–1.16)
INR BLD: 1.82 — HIGH (ref 0.88–1.16)
KETONES UR-MCNC: NEGATIVE — SIGNIFICANT CHANGE UP
LACTATE SERPL-SCNC: 1.1 MMOL/L — SIGNIFICANT CHANGE UP (ref 0.5–2)
LACTATE SERPL-SCNC: 1.9 MMOL/L — SIGNIFICANT CHANGE UP (ref 0.5–2)
LEUKOCYTE ESTERASE UR-ACNC: NEGATIVE — SIGNIFICANT CHANGE UP
LIDOCAIN IGE QN: 31 U/L — SIGNIFICANT CHANGE UP (ref 7–60)
LYMPHOCYTES # BLD AUTO: 0 % — LOW (ref 13–44)
LYMPHOCYTES # BLD AUTO: 0 K/UL — LOW (ref 1–3.3)
MACROCYTES BLD QL: SLIGHT — SIGNIFICANT CHANGE UP
MAGNESIUM SERPL-MCNC: 1.4 MG/DL — LOW (ref 1.6–2.6)
MANUAL SMEAR VERIFICATION: SIGNIFICANT CHANGE UP
MCHC RBC-ENTMCNC: 32.7 GM/DL — SIGNIFICANT CHANGE UP (ref 32–36)
MCHC RBC-ENTMCNC: 32.7 GM/DL — SIGNIFICANT CHANGE UP (ref 32–36)
MCHC RBC-ENTMCNC: 33 PG — SIGNIFICANT CHANGE UP (ref 27–34)
MCHC RBC-ENTMCNC: 33.3 PG — SIGNIFICANT CHANGE UP (ref 27–34)
MCV RBC AUTO: 101 FL — HIGH (ref 80–100)
MCV RBC AUTO: 102.1 FL — HIGH (ref 80–100)
MONOCYTES # BLD AUTO: 0 K/UL — SIGNIFICANT CHANGE UP (ref 0–0.9)
MONOCYTES NFR BLD AUTO: 0 % — LOW (ref 2–14)
NEUTROPHILS # BLD AUTO: 20.88 K/UL — HIGH (ref 1.8–7.4)
NEUTROPHILS NFR BLD AUTO: 89.6 % — HIGH (ref 43–77)
NEUTS BAND # BLD: 10.4 % — HIGH (ref 0–8)
NITRITE UR-MCNC: NEGATIVE — SIGNIFICANT CHANGE UP
NRBC # BLD: 0 /100 WBCS — SIGNIFICANT CHANGE UP (ref 0–0)
OVALOCYTES BLD QL SMEAR: SLIGHT — SIGNIFICANT CHANGE UP
PH UR: 5.5 — SIGNIFICANT CHANGE UP (ref 5–8)
PHOSPHATE SERPL-MCNC: 3.4 MG/DL — SIGNIFICANT CHANGE UP (ref 2.5–4.5)
PLAT MORPH BLD: NORMAL — SIGNIFICANT CHANGE UP
PLATELET # BLD AUTO: 327 K/UL — SIGNIFICANT CHANGE UP (ref 150–400)
PLATELET # BLD AUTO: 392 K/UL — SIGNIFICANT CHANGE UP (ref 150–400)
POTASSIUM SERPL-MCNC: 4.2 MMOL/L — SIGNIFICANT CHANGE UP (ref 3.5–5.3)
POTASSIUM SERPL-MCNC: 4.7 MMOL/L — SIGNIFICANT CHANGE UP (ref 3.5–5.3)
POTASSIUM SERPL-SCNC: 4.2 MMOL/L — SIGNIFICANT CHANGE UP (ref 3.5–5.3)
POTASSIUM SERPL-SCNC: 4.7 MMOL/L — SIGNIFICANT CHANGE UP (ref 3.5–5.3)
PROT SERPL-MCNC: 4.2 G/DL — LOW (ref 6–8.3)
PROT SERPL-MCNC: 4.6 G/DL — LOW (ref 6–8.3)
PROT UR-MCNC: NEGATIVE MG/DL — SIGNIFICANT CHANGE UP
PROTHROM AB SERPL-ACNC: 20.7 SEC — HIGH (ref 10–12.9)
PROTHROM AB SERPL-ACNC: 21 SEC — HIGH (ref 10–12.9)
RBC # BLD: 2.88 M/UL — LOW (ref 4.2–5.8)
RBC # BLD: 3.12 M/UL — LOW (ref 4.2–5.8)
RBC # FLD: 13.1 % — SIGNIFICANT CHANGE UP (ref 10.3–14.5)
RBC # FLD: 13.2 % — SIGNIFICANT CHANGE UP (ref 10.3–14.5)
RBC BLD AUTO: ABNORMAL
RH IG SCN BLD-IMP: POSITIVE — SIGNIFICANT CHANGE UP
SODIUM SERPL-SCNC: 128 MMOL/L — LOW (ref 135–145)
SODIUM SERPL-SCNC: 130 MMOL/L — LOW (ref 135–145)
SP GR SPEC: 1.01 — SIGNIFICANT CHANGE UP (ref 1–1.03)
UROBILINOGEN FLD QL: 0.2 E.U./DL — SIGNIFICANT CHANGE UP
WBC # BLD: 14.99 K/UL — HIGH (ref 3.8–10.5)
WBC # BLD: 20.88 K/UL — HIGH (ref 3.8–10.5)
WBC # FLD AUTO: 14.99 K/UL — HIGH (ref 3.8–10.5)
WBC # FLD AUTO: 20.88 K/UL — HIGH (ref 3.8–10.5)

## 2019-04-05 PROCEDURE — 74177 CT ABD & PELVIS W/CONTRAST: CPT | Mod: 26

## 2019-04-05 PROCEDURE — 99291 CRITICAL CARE FIRST HOUR: CPT

## 2019-04-05 PROCEDURE — 37191 INS ENDOVAS VENA CAVA FILTR: CPT

## 2019-04-05 PROCEDURE — 99152 MOD SED SAME PHYS/QHP 5/>YRS: CPT

## 2019-04-05 PROCEDURE — 49406 IMAGE CATH FLUID PERI/RETRO: CPT

## 2019-04-05 PROCEDURE — 99255 IP/OBS CONSLTJ NEW/EST HI 80: CPT

## 2019-04-05 PROCEDURE — 93010 ELECTROCARDIOGRAM REPORT: CPT

## 2019-04-05 PROCEDURE — 71045 X-RAY EXAM CHEST 1 VIEW: CPT | Mod: 26

## 2019-04-05 PROCEDURE — 99222 1ST HOSP IP/OBS MODERATE 55: CPT | Mod: GC

## 2019-04-05 PROCEDURE — 99292 CRITICAL CARE ADDL 30 MIN: CPT

## 2019-04-05 PROCEDURE — 99232 SBSQ HOSP IP/OBS MODERATE 35: CPT | Mod: GC

## 2019-04-05 PROCEDURE — 71260 CT THORAX DX C+: CPT | Mod: 26

## 2019-04-05 RX ORDER — SODIUM CHLORIDE 9 MG/ML
1000 INJECTION, SOLUTION INTRAVENOUS
Qty: 0 | Refills: 0 | Status: DISCONTINUED | OUTPATIENT
Start: 2019-04-05 | End: 2019-04-05

## 2019-04-05 RX ORDER — SODIUM CHLORIDE 9 MG/ML
1000 INJECTION INTRAMUSCULAR; INTRAVENOUS; SUBCUTANEOUS
Qty: 0 | Refills: 0 | Status: DISCONTINUED | OUTPATIENT
Start: 2019-04-05 | End: 2019-04-06

## 2019-04-05 RX ORDER — PIPERACILLIN AND TAZOBACTAM 4; .5 G/20ML; G/20ML
3.38 INJECTION, POWDER, LYOPHILIZED, FOR SOLUTION INTRAVENOUS EVERY 6 HOURS
Qty: 0 | Refills: 0 | Status: DISCONTINUED | OUTPATIENT
Start: 2019-04-05 | End: 2019-04-08

## 2019-04-05 RX ORDER — DEXTROSE 50 % IN WATER 50 %
25 SYRINGE (ML) INTRAVENOUS ONCE
Qty: 0 | Refills: 0 | Status: DISCONTINUED | OUTPATIENT
Start: 2019-04-05 | End: 2019-05-09

## 2019-04-05 RX ORDER — SODIUM CHLORIDE 9 MG/ML
1000 INJECTION, SOLUTION INTRAVENOUS
Qty: 0 | Refills: 0 | Status: DISCONTINUED | OUTPATIENT
Start: 2019-04-05 | End: 2019-05-09

## 2019-04-05 RX ORDER — GLUCAGON INJECTION, SOLUTION 0.5 MG/.1ML
1 INJECTION, SOLUTION SUBCUTANEOUS ONCE
Qty: 0 | Refills: 0 | Status: DISCONTINUED | OUTPATIENT
Start: 2019-04-05 | End: 2019-05-09

## 2019-04-05 RX ORDER — VANCOMYCIN HCL 1 G
1000 VIAL (EA) INTRAVENOUS EVERY 12 HOURS
Qty: 0 | Refills: 0 | Status: DISCONTINUED | OUTPATIENT
Start: 2019-04-05 | End: 2019-04-06

## 2019-04-05 RX ORDER — ASPIRIN/CALCIUM CARB/MAGNESIUM 324 MG
1 TABLET ORAL
Qty: 0 | Refills: 0 | COMMUNITY

## 2019-04-05 RX ORDER — HEPARIN SODIUM 5000 [USP'U]/ML
6000 INJECTION INTRAVENOUS; SUBCUTANEOUS ONCE
Qty: 0 | Refills: 0 | Status: COMPLETED | OUTPATIENT
Start: 2019-04-05 | End: 2019-04-05

## 2019-04-05 RX ORDER — SODIUM CHLORIDE 9 MG/ML
1000 INJECTION INTRAMUSCULAR; INTRAVENOUS; SUBCUTANEOUS ONCE
Qty: 0 | Refills: 0 | Status: COMPLETED | OUTPATIENT
Start: 2019-04-05 | End: 2019-04-05

## 2019-04-05 RX ORDER — ONDANSETRON 8 MG/1
4 TABLET, FILM COATED ORAL EVERY 6 HOURS
Qty: 0 | Refills: 0 | Status: DISCONTINUED | OUTPATIENT
Start: 2019-04-05 | End: 2019-05-09

## 2019-04-05 RX ORDER — PANTOPRAZOLE SODIUM 20 MG/1
40 TABLET, DELAYED RELEASE ORAL DAILY
Qty: 0 | Refills: 0 | Status: DISCONTINUED | OUTPATIENT
Start: 2019-04-05 | End: 2019-04-30

## 2019-04-05 RX ORDER — HEPARIN SODIUM 5000 [USP'U]/ML
INJECTION INTRAVENOUS; SUBCUTANEOUS
Qty: 25000 | Refills: 0 | Status: DISCONTINUED | OUTPATIENT
Start: 2019-04-05 | End: 2019-04-06

## 2019-04-05 RX ORDER — DEXTROSE 50 % IN WATER 50 %
15 SYRINGE (ML) INTRAVENOUS ONCE
Qty: 0 | Refills: 0 | Status: DISCONTINUED | OUTPATIENT
Start: 2019-04-05 | End: 2019-05-09

## 2019-04-05 RX ORDER — VANCOMYCIN HCL 1 G
1000 VIAL (EA) INTRAVENOUS ONCE
Qty: 0 | Refills: 0 | Status: COMPLETED | OUTPATIENT
Start: 2019-04-05 | End: 2019-04-05

## 2019-04-05 RX ORDER — PIPERACILLIN AND TAZOBACTAM 4; .5 G/20ML; G/20ML
3.38 INJECTION, POWDER, LYOPHILIZED, FOR SOLUTION INTRAVENOUS ONCE
Qty: 0 | Refills: 0 | Status: COMPLETED | OUTPATIENT
Start: 2019-04-05 | End: 2019-04-05

## 2019-04-05 RX ORDER — OMEPRAZOLE 10 MG/1
1 CAPSULE, DELAYED RELEASE ORAL
Qty: 0 | Refills: 0 | COMMUNITY

## 2019-04-05 RX ORDER — MAGNESIUM SULFATE 500 MG/ML
2 VIAL (ML) INJECTION
Qty: 0 | Refills: 0 | Status: COMPLETED | OUTPATIENT
Start: 2019-04-05 | End: 2019-04-06

## 2019-04-05 RX ORDER — INSULIN LISPRO 100/ML
VIAL (ML) SUBCUTANEOUS EVERY 6 HOURS
Qty: 0 | Refills: 0 | Status: DISCONTINUED | OUTPATIENT
Start: 2019-04-05 | End: 2019-04-08

## 2019-04-05 RX ORDER — DEXTROSE 50 % IN WATER 50 %
12.5 SYRINGE (ML) INTRAVENOUS ONCE
Qty: 0 | Refills: 0 | Status: DISCONTINUED | OUTPATIENT
Start: 2019-04-05 | End: 2019-05-09

## 2019-04-05 RX ADMIN — Medication 250 MILLIGRAM(S): at 18:20

## 2019-04-05 RX ADMIN — PIPERACILLIN AND TAZOBACTAM 200 GRAM(S): 4; .5 INJECTION, POWDER, LYOPHILIZED, FOR SOLUTION INTRAVENOUS at 16:33

## 2019-04-05 RX ADMIN — SODIUM CHLORIDE 1000 MILLILITER(S): 9 INJECTION INTRAMUSCULAR; INTRAVENOUS; SUBCUTANEOUS at 18:20

## 2019-04-05 RX ADMIN — HEPARIN SODIUM 6000 UNIT(S): 5000 INJECTION INTRAVENOUS; SUBCUTANEOUS at 18:10

## 2019-04-05 RX ADMIN — HEPARIN SODIUM UNIT(S)/HR: 5000 INJECTION INTRAVENOUS; SUBCUTANEOUS at 18:39

## 2019-04-05 RX ADMIN — HEPARIN SODIUM 1300 UNIT(S)/HR: 5000 INJECTION INTRAVENOUS; SUBCUTANEOUS at 18:10

## 2019-04-05 NOTE — CONSULT NOTE ADULT - SUBJECTIVE AND OBJECTIVE BOX
Surgeon: Dr. Reaves    Requesting Physician: Dr. Valencia    HISTORY OF PRESENT ILLNESS (Need 4): HPI obtained per H&P, as patient in procedure with IR, will return again in 1 hour and update accordingly.   85 y/o Male, poor historian, with history of CAD s/p 3 stents (, , ) still on ASA/Plavix, ETOH abuse, b/l inguinal hernias s/p laparoscopic robotic-assisted repair of bilateral inguinal hernias on 3/11/2019 (notably, 4.5L of ascites were also drained in the beginning of the procedure and sent for cytopathology) presenting with right inguinal hernia discomfort. Patient denies any associated fevers, chills, nausea, or emesis. Patient cannot remember last time he had bowel movement or passed flatus, but per nursing staff, patient a large bowel movement in his stretcher in the ED. Patient reports having a colonoscopy at age 70s which was reportedly normal. He denies ever having an endoscopy.     In the ED, patient is afebrile, HR 90s, , RR20, saturation 95%. WBC 20.8, hgb 10.3, INR 1.82, PT 21, PTT 24, Na 130. CT ab/pelvis with IV contrast shows 1) bilateral segmental pulmonary emboli, 2) left lung empyema, 3) large pneumoperitoneum, 4) 10 x 5 cm abscess in LLQ (possible source of pneumoperitoneum), 5) 6 x 4 cm fluid collection in right groin, and 6) DVT in bilateral common femoral vein, central venous pelvic thrombus in the left common iliac and internal iliac veins.    PAST MEDICAL & SURGICAL HISTORY:  Esophageal reflux  Acute diarrhea  HLD (hyperlipidemia)  HTN (hypertension)  Prostate cancer  Stented coronary artery: x3 , per pt.  Atherosclerosis of coronary artery: CAD (coronary artery disease)  S/P hernia repair  Surgery, elective: cardiac stent x3  History of prostate surgery      MEDICATIONS  (STANDING):  dextrose 5%. 1000 milliLiter(s) (50 mL/Hr) IV Continuous <Continuous>  dextrose 50% Injectable 12.5 Gram(s) IV Push once  dextrose 50% Injectable 25 Gram(s) IV Push once  dextrose 50% Injectable 25 Gram(s) IV Push once  heparin  Infusion.  Unit(s)/Hr (13 mL/Hr) IV Continuous <Continuous>  insulin lispro (HumaLOG) corrective regimen sliding scale   SubCutaneous every 6 hours  lactated ringers. 1000 milliLiter(s) (100 mL/Hr) IV Continuous <Continuous>  pantoprazole  Injectable 40 milliGRAM(s) IV Push daily  piperacillin/tazobactam IVPB. 3.375 Gram(s) IV Intermittent every 6 hours  vancomycin  IVPB 1000 milliGRAM(s) IV Intermittent every 12 hours    MEDICATIONS  (PRN):  dextrose 40% Gel 15 Gram(s) Oral once PRN Blood Glucose LESS THAN 70 milliGRAM(s)/deciliter  glucagon  Injectable 1 milliGRAM(s) IntraMuscular once PRN Glucose LESS THAN 70 milligrams/deciliter  ondansetron Injectable 4 milliGRAM(s) IV Push every 6 hours PRN Nausea      Allergies    No Known Allergies    Intolerances        SOCIAL HISTORY: Obtained per H&P, as patient in procedure with IR  Patient reports significant alcohol use in youth but was unable to quantify the number of daily drinks. Patient denies smoking history or drug use    FAMILY HISTORY:  No pertinent family history in first degree relatives      Review of Systems (Need 10): Unable to obtain, patient in IR procedure, will follow up.                                                                    Vital Signs Last 24 Hrs  T(C): 36.7 (2019 18:09), Max: 36.7 (2019 18:09)  T(F): 98 (2019 18:09), Max: 98 (2019 18:09)  HR: 93 (2019 18:09) (90 - 93)  BP: 109/62 (2019 18:09) (102/64 - 109/62)  BP(mean): --  RR: 18 (2019 18:09) (18 - 20)  SpO2: 100% (2019 18:09) (95% - 100%)    Physical Exam (Need 8) Unable to obtain, as patient in IR procedure, will reassess.   CONSTITUTIONAL:                                                                          WNL  NEURO:                                                                                             WNL                      EYES:                                                                                                  WNL  ENMT:                                                                                                WNL  CV:                                                                                                      WNL  RESPIRATORY:                                                                                  WNL  GI:                                                                                                       WNL  : MARRERO + / -                                                                                 WNL  MUSKULOSKELETAL:                                                                       WNL  SKIN / BREAST:                                                                                 WNL                                                          LABS:                        10.3   20.88 )-----------( 392      ( 2019 15:08 )             31.5     04-05    130<L>  |  96  |  20  ----------------------------<  133<H>  4.7   |  25  |  0.75    Ca    7.5<L>      2019 15:08    TPro  4.6<L>  /  Alb  1.9<L>  /  TBili  0.5  /  DBili  x   /  AST  27  /  ALT  49<H>  /  AlkPhos  111  04-05    PT/INR - ( 2019 16:25 )   PT: 21.0 sec;   INR: 1.82          PTT - ( 2019 16:25 )  PTT:24.0 sec  Urinalysis Basic - ( 2019 16:40 )    Color: Yellow / Appearance: Clear / S.015 / pH: x  Gluc: x / Ketone: NEGATIVE  / Bili: Negative / Urobili: 0.2 E.U./dL   Blood: x / Protein: NEGATIVE mg/dL / Nitrite: NEGATIVE   Leuk Esterase: NEGATIVE / RBC: x / WBC x   Sq Epi: x / Non Sq Epi: x / Bacteria: x              RADIOLOGY & ADDITIONAL STUDIES:  CAROTID U/S: none    CXR: None    CT Scan: < from: CT Chest w/ IV Cont (19 @ 17:00) >    EXAM:  CT CHEST IC                          EXAM:  CT ABDOMEN AND PELVIS IC                          PROCEDURE DATE:  2019          INTERPRETATION:  ATTENDING RADIOLOGIST ADDENDUM:     Large pneumoperitoneum and large ascites. 10 x 5 cm abscess in the left   lower quadrant adjacent to the thickened and inflamed descending colon.   Overall findings are consistent with bowel perforation, possibly from the   descending colon, however other sources of bowel perforation such as   stomach, duodenum and small bowel are in the differential.     DVT bilateral common femoral vein. Central venous pelvic thrombus in the   left common iliac and internal iliac veins.    6 x 4 cm fluid collection right groin.    < end of copied text >  < from: CT Chest w/ IV Cont (19 @ 17:00) >    IMPRESSION:  1.  Large amount of ascites and free intraperitoneal air.  2.  Bilateral pulmonary emboli.  3.  Left lower lobe hydropneumothorax with enhancing rim, suspicious for   empyema.  4.  Small right pleural effusion with passive atelectasis of lower lung   zone  5.  Mild thickening of small bowel loops, likely reactive.  6.  No change in fluid-filled right inguinal hernia.      < end of copied text >    TTE / GIANA: < from: TTE Echo w/Cont Complete (19 @ 12:58) >  EXAM:  ECHOCARDIOGRAM W CONTRAST                          PROCEDURE DATE:  2019          INTERPRETATION:  Patient Height: 180.0 cm  Patient Weight: 62.0 kg  Heart Rate: 81 bpm  Systolic Pressure: 94 mmHg  Diastolic Pressure: 55 mmHg  BSA: 1.8m^2  Interpretation Summary  There is mild concentric left ventricular hypertrophy.Abnormal   (paradoxical)   septal motion consistent with abnormal conduction. The other walls   contract   normally. The left ventricular ejection fraction is normal. The left   atrial   size is normal. There is moderate global hypokinesis of the left   ventricle.   The left ventricular ejection fraction is mild to moderately reduced.The   left   ventricular ejection fraction is 40%.The right ventricle is normal in  size and   function.The left atrial size is normal.The mitral inflow pattern is   consistent with impaired left ventricular relaxation with mildly   elevated left   atrial pressure (8-14mmHg).  Right atrial size is normal.There is mild   aortic   valve thickening.No aortic regurgitation noted.Milldy calcified mitral   valve   leaflets.There is trace mitral regurgitation.Structurally normal   tricuspid   valve.There is trace to mild tricuspid regurgitation.The pulmonary artery   systolic pressure is estimated to be 30 mmHg.The pulmonic valve is not   well   visualized.There is no pericardial effusion.    < end of copied text >      Cardiac Cath: none done on this admission Surgeon: Dr. Reaves    Requesting Physician: Dr. Valencia    HISTORY OF PRESENT ILLNESS (Need 4): HPI obtained per H&P, as patient seen post IR-procedure and not capable of answering detailed questions at this time due to lethargy, and generalized malaise.   87 y/o Male, poor historian, with history of CAD s/p 3 stents (, , ) still on ASA/Plavix, ETOH abuse, b/l inguinal hernias s/p laparoscopic robotic-assisted repair of bilateral inguinal hernias on 3/11/2019 (notably, 4.5L of ascites were also drained in the beginning of the procedure and sent for cytopathology) presenting with right inguinal hernia discomfort. Patient denies any associated fevers, chills, nausea, or emesis. Patient cannot remember last time he had bowel movement or passed flatus, but per nursing staff, patient a large bowel movement in his stretcher in the ED. Patient reports having a colonoscopy at age 70s which was reportedly normal. He denies ever having an endoscopy.     In the ED, patient is afebrile, HR 90s, , RR20, saturation 95%. WBC 20.8, hgb 10.3, INR 1.82, PT 21, PTT 24, Na 130. CT ab/pelvis with IV contrast shows 1) bilateral segmental pulmonary emboli, 2) left lung empyema, 3) large pneumoperitoneum, 4) 10 x 5 cm abscess in LLQ (possible source of pneumoperitoneum), 5) 6 x 4 cm fluid collection in right groin, and 6) DVT in bilateral common femoral vein, central venous pelvic thrombus in the left common iliac and internal iliac veins.    PAST MEDICAL & SURGICAL HISTORY:  Esophageal reflux  Acute diarrhea  HLD (hyperlipidemia)  HTN (hypertension)  Prostate cancer  Stented coronary artery: x3 , per pt.  Atherosclerosis of coronary artery: CAD (coronary artery disease)  S/P hernia repair  Surgery, elective: cardiac stent x3  History of prostate surgery      MEDICATIONS  (STANDING):  dextrose 5%. 1000 milliLiter(s) (50 mL/Hr) IV Continuous <Continuous>  dextrose 50% Injectable 12.5 Gram(s) IV Push once  dextrose 50% Injectable 25 Gram(s) IV Push once  dextrose 50% Injectable 25 Gram(s) IV Push once  heparin  Infusion.  Unit(s)/Hr (13 mL/Hr) IV Continuous <Continuous>  insulin lispro (HumaLOG) corrective regimen sliding scale   SubCutaneous every 6 hours  lactated ringers. 1000 milliLiter(s) (100 mL/Hr) IV Continuous <Continuous>  pantoprazole  Injectable 40 milliGRAM(s) IV Push daily  piperacillin/tazobactam IVPB. 3.375 Gram(s) IV Intermittent every 6 hours  vancomycin  IVPB 1000 milliGRAM(s) IV Intermittent every 12 hours    MEDICATIONS  (PRN):  dextrose 40% Gel 15 Gram(s) Oral once PRN Blood Glucose LESS THAN 70 milliGRAM(s)/deciliter  glucagon  Injectable 1 milliGRAM(s) IntraMuscular once PRN Glucose LESS THAN 70 milligrams/deciliter  ondansetron Injectable 4 milliGRAM(s) IV Push every 6 hours PRN Nausea      Allergies    No Known Allergies    Intolerances        SOCIAL HISTORY: Obtained per H&P, as patient post IR procedure, lethargic.  Patient reports significant alcohol use in youth but was unable to quantify the number of daily drinks. Patient denies smoking history or drug use    FAMILY HISTORY:  No pertinent family history in first degree relatives      Review of Systems (Need 10): Unable to obtain, as patient lethargic post IR procedure.                                                                     Vital Signs Last 24 Hrs  T(C): 36.7 (2019 18:09), Max: 36.7 (2019 18:09)  T(F): 98 (2019 18:09), Max: 98 (2019 18:09)  HR: 93 (2019 18:09) (90 - 93)  BP: 109/62 (2019 18:09) (102/64 - 109/62)  BP(mean): --  RR: 18 (2019 18:09) (18 - 20)  SpO2: 100% (2019 18:09) (95% - 100%)    Physical Exam (Need 8)   CONSTITUTIONAL: Frail, cachectic appearing male, normal affect, lying stiff in bed, in no acute distress.   NEURO: CN II-XII grossly intact, A&Ox3, no focal deficits.                 EYES: PERRLA, EOMI, no conjunctival injection  ENMT: Dry mucous membranes, no erythema, no lymphadenopathy, trachea midline.   CV: S1S2, RRR, no murmurs appreciated on exam.   RESPIRATORY: Auscultated anteriorly as patient too weak, clear to auscultation bilaterally, no wheezes rales or rhonchi.   GI: +Ascites, lateral pitting edema, soft, tender near LUQ PHYLLIS drain   : Deferred  MUSKULOSKELETAL: Diffuse 4+ b/l LE edema extending up to groin. Diffuse 2+ pitting edema b/l UE.   SKIN / BREAST: No lesions  VASCULAR: Radial pulse 2+ b/l. Unable to palpate LE pulses 2/2 edema.                                                             LABS:                        10.3   20.88 )-----------( 392      ( 2019 15:08 )             31.5     04-05    130<L>  |  96  |  20  ----------------------------<  133<H>  4.7   |  25  |  0.75    Ca    7.5<L>      2019 15:08    TPro  4.6<L>  /  Alb  1.9<L>  /  TBili  0.5  /  DBili  x   /  AST  27  /  ALT  49<H>  /  AlkPhos  111  04-05    PT/INR - ( 2019 16:25 )   PT: 21.0 sec;   INR: 1.82          PTT - ( 2019 16:25 )  PTT:24.0 sec  Urinalysis Basic - ( 2019 16:40 )    Color: Yellow / Appearance: Clear / S.015 / pH: x  Gluc: x / Ketone: NEGATIVE  / Bili: Negative / Urobili: 0.2 E.U./dL   Blood: x / Protein: NEGATIVE mg/dL / Nitrite: NEGATIVE   Leuk Esterase: NEGATIVE / RBC: x / WBC x   Sq Epi: x / Non Sq Epi: x / Bacteria: x              RADIOLOGY & ADDITIONAL STUDIES:  CAROTID U/S: none    CXR: None    CT Scan: < from: CT Chest w/ IV Cont (19 @ 17:00) >    EXAM:  CT CHEST IC                          EXAM:  CT ABDOMEN AND PELVIS IC                          PROCEDURE DATE:  2019          INTERPRETATION:  ATTENDING RADIOLOGIST ADDENDUM:     Large pneumoperitoneum and large ascites. 10 x 5 cm abscess in the left   lower quadrant adjacent to the thickened and inflamed descending colon.   Overall findings are consistent with bowel perforation, possibly from the   descending colon, however other sources of bowel perforation such as   stomach, duodenum and small bowel are in the differential.     DVT bilateral common femoral vein. Central venous pelvic thrombus in the   left common iliac and internal iliac veins.    6 x 4 cm fluid collection right groin.    < end of copied text >  < from: CT Chest w/ IV Cont (19 @ 17:00) >    IMPRESSION:  1.  Large amount of ascites and free intraperitoneal air.  2.  Bilateral pulmonary emboli.  3.  Left lower lobe hydropneumothorax with enhancing rim, suspicious for   empyema.  4.  Small right pleural effusion with passive atelectasis of lower lung   zone  5.  Mild thickening of small bowel loops, likely reactive.  6.  No change in fluid-filled right inguinal hernia.      < end of copied text >    TTE / GIANA: < from: TTE Echo w/Cont Complete (19 @ 12:58) >  EXAM:  ECHOCARDIOGRAM W CONTRAST                          PROCEDURE DATE:  2019          INTERPRETATION:  Patient Height: 180.0 cm  Patient Weight: 62.0 kg  Heart Rate: 81 bpm  Systolic Pressure: 94 mmHg  Diastolic Pressure: 55 mmHg  BSA: 1.8m^2  Interpretation Summary  There is mild concentric left ventricular hypertrophy.Abnormal   (paradoxical)   septal motion consistent with abnormal conduction. The other walls   contract   normally. The left ventricular ejection fraction is normal. The left   atrial   size is normal. There is moderate global hypokinesis of the left   ventricle.   The left ventricular ejection fraction is mild to moderately reduced.The   left   ventricular ejection fraction is 40%.The right ventricle is normal in  size and   function.The left atrial size is normal.The mitral inflow pattern is   consistent with impaired left ventricular relaxation with mildly   elevated left   atrial pressure (8-14mmHg).  Right atrial size is normal.There is mild   aortic   valve thickening.No aortic regurgitation noted.Milldy calcified mitral   valve   leaflets.There is trace mitral regurgitation.Structurally normal   tricuspid   valve.There is trace to mild tricuspid regurgitation.The pulmonary artery   systolic pressure is estimated to be 30 mmHg.The pulmonic valve is not   well   visualized.There is no pericardial effusion.    < end of copied text >      Cardiac Cath: none done on this admission

## 2019-04-05 NOTE — ED PROVIDER NOTE - OBJECTIVE STATEMENT
87 y/o m with PMH of HLD, CAD, BPH, HTN, GERD presents to ED from rehab center s/p b/l inguinal hernia and umbilica hernia repair by Dr. Valencia two weeks prior now with diffuse ascites and b/l lower ext edema with 16 lb weight gain.  Pt had US at NH confirming large abd ascites.  Pt complains of intermittent pain to right groin area over hernia repair and continues to feel intermittent bulge that he states at sometimes gets hard.  No nausea, vomiting, + BM.  Decreased po intake.  No fever or chills.  No chest pain or shortness of breath.  NH contacted Dr. Valencia who recommended pt transfer to hospital for evaluation and most likely admission.

## 2019-04-05 NOTE — ED ADULT NURSE NOTE - OBJECTIVE STATEMENT
Patient is an 85yo M, BIBA, AAOx3, in NAD, vitals stable, sent from NH for possible abdominal ascites.  Patient denies any CP, SOB, abdominal pain, N/V, fevers, chills or any other complaints at this time.  PIV placed, labs drawn and sent.

## 2019-04-05 NOTE — H&P ADULT - ASSESSMENT
Mr. Cha is an 85 yo M with history of b/l laparoscopic robotic-assisted inguinal hernia repair presenting with b/l segmental pulmonary embolisms, left lung empyema, pneumoperitoneum, and distal descending colon abscess (likely source of pneumoperitoneum), significant DVT burden.    - Admit patient to Team 1 Surgery - SICU level of care given comorbidities and radiologic findings.   - IR procedure for drainage of colonic abscess.   - Given extent of LE DVT burden (bilateral common femoral vein, central venous pelvic thrombus in the left common iliac and internal iliac veins.) and existing b/l PEs, patient will receive IVC filter via IR. Vascular consulted and in agreement  - Thoracic consult regarding management of empyema.  - Continue heparin drip following IR procedures  - Serial abdominal exams given significant pneumoperitoneum on imaging.  - Plan discussed with attending and chief resident. Mr. Cha is an 85 yo M with history of b/l laparoscopic robotic-assisted inguinal hernia repair presenting with b/l segmental pulmonary embolisms, left lung empyema, pneumoperitoneum, and distal descending colon abscess (likely source of pneumoperitoneum), significant DVT burden.    - Admit patient to Team 1 Surgery - SICU level of care given comorbidities and radiologic findings.   - IR procedure for drainage of colonic abscess.   - Given extent of LE DVT burden (bilateral common femoral vein, central venous pelvic thrombus in the left common iliac and internal iliac veins.) and existing b/l PEs, patient will receive IVC filter via IR. Vascular consulted and in agreement  - Thoracic consult regarding management of empyema.  - Continue heparin drip following IR procedures  - Serial abdominal exams given significant pneumoperitoneum on imaging.  - IV Vanc/Zosyn  - NPO, IVF  - IV Protonix  - Abbott catheter for careful management of I/Os.  - Plan discussed with attending and chief resident.

## 2019-04-05 NOTE — CONSULT NOTE ADULT - SUBJECTIVE AND OBJECTIVE BOX
VASCULAR SURGERY CONSULT NOTE    HPI: Mr. Cha is a 87 y/o m with PMH of HLD, CAD, BPH, HTN, GERD s/p b/l inguinal hernia and umbilica hernia repair by Dr. Valencia two weeks ago, now presenting with diffuse ascites, b/l lower ext edema, 16 lb weight gain. and evidence of bowel perforation and pelvic abscess.  Pt complains of intermittent pain to right groin area over hernia repair and continues to feel intermittent bulge that he states at sometimes gets hard.  No nausea, vomiting, + BM.  Decreased po intake.  No fever or chills.  No chest pain or shortness of breath.     Vascular surgery consulted for bilateral PE, DVT bilateral common femoral vein. Central venous pelvic thrombus in the left common iliac and internal iliac veins.    PMHx:   PAST MEDICAL & SURGICAL HISTORY:  Esophageal reflux  Acute diarrhea  HLD (hyperlipidemia)  HTN (hypertension)  Prostate cancer  Stented coronary artery: x3 , per pt.  Atherosclerosis of coronary artery: CAD (coronary artery disease)  S/P hernia repair  Surgery, elective: cardiac stent x3  History of prostate surgery    PSHx:    ROS: Pertinent positives/negatives noted in HPI.     HOME MEDS:   aspirin 81 mg oral tablet, chewable: 1 tab(s) orally once a day  atorvastatin 20 mg oral tablet: 1 tab(s) orally once a day  Flomax 0.4 mg oral capsule: 1 cap(s) orally once a day  loperamide 2 mg oral capsule: 4 cap(s) orally once a day  Metoprolol Tartrate 25 mg oral tablet: 1 tab(s) orally once a day  Multiple Vitamins with Minerals oral tablet: 1 tab(s) orally once a day  omeprazole 20 mg oral delayed release capsule: 1 cap(s) orally once a day  Plavix 75 mg oral tablet: 1 tab(s) orally once a day    ALLERGIES: NKDA  Allergies    No Known Allergies    Intolerances    SocHx:     FHx: No pertinent history in first degree relatives.   FAMILY HISTORY:  No pertinent family history in first degree relatives     T(C): 36.7 (19 @ 18:09), Max: 36.7 (19 @ 18:09)  HR: 93 (19 @ 18:09) (90 - 93)  BP: 109/62 (19 @ 18:09) (102/64 - 109/62)  RR: 18 (19 @ 18:09) (18 - 20)  SpO2: 100% (19 @ 18:09) (95% - 100%)    PHYSICAL EXAM:  GEN: NAD  CV: Tachy to 90's, regular rhythm   Abd: abd distended, mild diffuse TTP, R groin with small bulge  Extremities: b/l LE w/ 2+ pitting edema, B/L DP/PT palpable, b/l LE warm to palp    LABS:                        10.3   20.88 )-----------( 392      ( 2019 15:08 )             31.5         130<L>  |  96  |  20  ----------------------------<  133<H>  4.7   |  25  |  0.75    Ca    7.5<L>      2019 15:08    TPro  4.6<L>  /  Alb  1.9<L>  /  TBili  0.5  /  DBili  x   /  AST  27  /  ALT  49<H>  /  AlkPhos  111  04-05    PT/INR - ( 2019 16:25 )   PT: 21.0 sec;   INR: 1.82          PTT - ( 2019 16:25 )  PTT:24.0 sec    Urinalysis Basic - ( 2019 16:40 )    Color: Yellow / Appearance: Clear / S.015 / pH: x  Gluc: x / Ketone: NEGATIVE  / Bili: Negative / Urobili: 0.2 E.U./dL   Blood: x / Protein: NEGATIVE mg/dL / Nitrite: NEGATIVE   Leuk Esterase: NEGATIVE / RBC: x / WBC x   Sq Epi: x / Non Sq Epi: x / Bacteria: x

## 2019-04-05 NOTE — ED ADULT NURSE NOTE - CHIEF COMPLAINT QUOTE
Pt BIBA from [facility] on referral of attending physician w/ 6/10 abd pain r/t ascites confirmed on US after >16kg weight gain s/p inguinal hernia repair.  Pt denies N/V/D or CP/SOB.

## 2019-04-05 NOTE — CONSULT NOTE ADULT - ASSESSMENT
87 y/o Male, poor historian, with history of CAD s/p 3 stents (2012, 2009, 2000) still on ASA/Plavix, ETOH abuse, b/l inguinal hernias s/p laparoscopic robotic-assisted repair of bilateral inguinal hernias on 3/11/2019 (notably, 4.5L of ascites were also drained in the beginning of the procedure and sent for cytopathology) presented to [facility] ED with right inguinal hernia discomfort. In the ED, afebrile, HR 90s, , RR 20, saturation 95%. WBC 20.8. CT ab/pelvis with IV contrast revealed bilateral pulmonary emboli, left lung abscess, large pneumoperitoneum and large ascites consistent with bowel perforation, 10 x 5cm abscess LLQ, 6x4cm fluid collection right groin, DVT b/l common femoral vein, central venous pelvic thrombus in left common iliac and internal iliac veins. Vascular Surgery consulted for b/l PEs, DVTs. Thoracic Surgery, Dr. Reaves, consulted for left lung abscess and for possible surgical intervention.     Plan:  Problem 1: Lung abscess  - Case and images reviewed with Dr. Reaves.   - Dr. Reaves to see patient in AM.   - There is no urgent surgical intervention indicated for lung abscess at this time.   - GI issues need to be addressed - patient at IR for drainage of colonic abscess.  - Being admitted to SICU under Dr. Valencia  - Will continue to follow and provide further recs.    Problem 2: B/l pulmonary emboli, DVT bilateral common femoral vein. Central venous pelvic thrombus in the left common iliac and internal iliac veins  - Vascular surgery following, appreciate recs.   - IR to place IVC filter at this time.   - Continue heparin gtt, f/u coags per primary team.     Problem 3: Colon abscess, large pneumoperitoneum  - Admitted under Dr. Valencia, surgery, appreciate primary team recs.  - IR drainage today.   - WBC: 20.88, Continue Zosyn, Vancomycin  - NPO and IVF.     Problem 4: PPX  - Continue GI PPx with pantoprazole.     I have reviewed clinical labs tests and reports, radiology tests and reports, as well as old patient medical records, and discussed with the refering physician.

## 2019-04-05 NOTE — CONSULT NOTE ADULT - ASSESSMENT
Mr. Cha is an 87 yo M with history of b/l laparoscopic robotic-assisted inguinal hernia repair presenting with b/l segmental pulmonary embolisms, left lung empyema, pneumoperitoneum, and distal descending colon abscess (likely source of pneumoperitoneum), significant DVT burden, s/p IR drainage of LLQ abdominal collection (4/5) Mr. Cha is an 87 yo M with history of b/l laparoscopic robotic-assisted inguinal hernia repair presenting with b/l segmental pulmonary embolisms, left lung empyema, pneumoperitoneum, and distal descending colon abscess (likely source of pneumoperitoneum), significant DVT burden, s/p IR drainage of LLQ abdominal collection (4/5)    PLAN: 87 yo M with history of b/l laparoscopic robotic-assisted inguinal hernia repair presenting with b/l segmental pulmonary embolisms, left lung empyema, pneumoperitoneum, and distal descending colon abscess (likely source of pneumoperitoneum), significant DVT burden, s/p IR drainage of LLQ abdominal collection (4/5)    PLAN:   Neuro: pain well-controlled  CV: asymptomatic and HD stable in setting of bilateral PE   Pulm: bilateral PE's; currently HD stable and satting well; CT on board for Empyema  GI: NPO / IVF - s/p IR drainage LLQ collection; pneumoperitoneum of unknown etiology; serial abdominal exams  : rao; strict I'Os  ID: Cont. Zosyn for LLQ abscess, now s/p drainage  Endo: ISS  Heme: heparin gtt for iliofemoral DVT / bilateral PE; s/p IVC Filter   PPx: SCDs; (already on hep gtt)  Lines: PIVs  Wounds: abdominal PHYLLIS drain x1  PT/OT: Not ordered    - IV Vanc/Zosyn  - NPO, IVF  - IV Protonix  - Rao catheter for careful management of I/Os.  - Plan discussed with attending and chief resident.    Home Medications:  aspirin 81 mg oral tablet, chewable: 1 tab(s) orally once a day (05 Apr 2019 20:35)  atorvastatin 20 mg oral tablet: 1 tab(s) orally once a day (05 Apr 2019 20:35)  Flomax 0.4 mg oral capsule: 1 cap(s) orally once a day (05 Apr 2019 20:35)  loperamide 2 mg oral capsule: 4 cap(s) orally once a day (05 Apr 2019 20:35)  Metoprolol Tartrate 25 mg oral tablet: 1 tab(s) orally once a day (05 Apr 2019 20:35)  Multiple Vitamins with Minerals oral tablet: 1 tab(s) orally once a day (05 Apr 2019 20:35)  Plavix 75 mg oral tablet: 1 tab(s) orally once a day (05 Apr 2019 20:35) 85 yo M with history of b/l laparoscopic robotic-assisted inguinal hernia repair presenting with b/l segmental pulmonary embolisms, left lung empyema, pneumoperitoneum, and distal descending colon abscess (likely source of pneumoperitoneum), significant DVT burden, s/p IR drainage of LLQ abdominal collection (4/5)    PLAN:   Neuro: pain well-controlled  CV: asymptomatic and HD stable in setting of bilateral PE; statin; hold home asa/plavix for now  Pulm: bilateral PE's; satting well; CT consult for Empyema  GI: NPO / IVF - s/p IR drainage LLQ collection; pneumoperitoneum of unknown etiology; serial abdominal exams  : rao; strict I'Os  ID: Cont. Vanco/Zosyn for LLQ abscess, now s/p drainage; empyema   Endo: ISS  Heme: heparin gtt for iliofemoral DVT / bilateral PE; s/p IVC Filter   PPx: SCDs; (already on hep gtt)  Lines: PIVs  Wounds: abdominal PHYLLIS drain x1  PT/OT: Not ordered

## 2019-04-05 NOTE — ED PROVIDER NOTE - CRITICAL CARE PROVIDED
interpretation of diagnostic studies/consult w/ pt's family directly relating to pts condition/direct patient care (not related to procedure)/additional history taking/consultation with other physicians/conducted a detailed discussion of DNR status/telephone consultation with the patient's family/documentation

## 2019-04-05 NOTE — ED PROVIDER NOTE - CARE PLAN
Principal Discharge DX:	Perforated bowel  Secondary Diagnosis:	Abscess  Secondary Diagnosis:	Empyema  Secondary Diagnosis:	Pulmonary embolism

## 2019-04-05 NOTE — H&P ADULT - HISTORY OF PRESENT ILLNESS
Mr. Cha is an 85 yo M, poor historian, with history of CAD s/p 3 stents (2012, 2009, 2000) still on ASA/Plavix, ETOH abuse, b/l inguinal hernias s/p laparoscopic robotic-assisted repair of bilateral inguinal hernias on 3/11/2019 (notably, 4.5L of ascites were also drained in the beginning of the procedure and sent for cytopathology) presenting with right inguinal hernia discomfort. Patient is not ambulatory, but states that the discomfort occurs particularly upon moving is RLE. Patient denies any associated fevers, chills, nausea, or emesis. Patient cannot remember last time he had bowel movement or passed flatus, but per nursing staff, patient a large bowel movement in his stretcher in the ED. Patient reports having a colonoscopy at age 70s which was reportedly normal. He denies ever having an endoscopy.     In the ED, patient is afebrile, HR 90s, , RR20, saturation 95%. WBC 20.8, hgb 10.3, INR 1.82, PT 21, PTT 24, Na 130. CT ab/pelvis with IV contrast shows 1) bilateral segmental pulmonary emboli, 2) left lung empyema, 3) large pneumoperitoneum, 4) 10 x 5 cm abscess in LLQ (possible source of pneumoperitoneum), 5) 6 x 4 cm fluid collection in right groin, and 6) DVT in bilateral common femoral vein, central venous pelvic thrombus in the left common iliac and internal iliac veins.    PMH/PSx: CAD s/p 3 stents (2012, 2009, 2000) still on ASA/Plavix, ETOH abuse, b/l inguinal hernias s/p laparoscopic robotic-assisted repair of bilateral inguinal hernias on 3/11/2019 w/ drainage of 4.5 L of ascites,   Allergies: NKDA  Meds: see med rec  FH: patient denies FH of cancers or IBD  SH: patient reports significant alcohol use in youth but was unable to quantify the number of daily drinks. Patient denies smoking history or drug use Mr. Cha is an 87 yo M, poor historian, with history of CAD s/p 3 stents (2012, 2009, 2000) still on ASA/Plavix, ETOH abuse, b/l inguinal hernias s/p laparoscopic robotic-assisted repair of bilateral inguinal hernias on 3/11/2019 (notably, 4.5L of ascites were also drained in the beginning of the procedure and sent for cytopathology) presenting with right inguinal hernia discomfort. Patient denies any associated fevers, chills, nausea, or emesis. Patient cannot remember last time he had bowel movement or passed flatus, but per nursing staff, patient a large bowel movement in his stretcher in the ED. Patient reports having a colonoscopy at age 70s which was reportedly normal. He denies ever having an endoscopy.     In the ED, patient is afebrile, HR 90s, , RR20, saturation 95%. WBC 20.8, hgb 10.3, INR 1.82, PT 21, PTT 24, Na 130. CT ab/pelvis with IV contrast shows 1) bilateral segmental pulmonary emboli, 2) left lung empyema, 3) large pneumoperitoneum, 4) 10 x 5 cm abscess in LLQ (possible source of pneumoperitoneum), 5) 6 x 4 cm fluid collection in right groin, and 6) DVT in bilateral common femoral vein, central venous pelvic thrombus in the left common iliac and internal iliac veins.    PMH/PSx: CAD s/p 3 stents (2012, 2009, 2000) still on ASA/Plavix, ETOH abuse, b/l inguinal hernias s/p laparoscopic robotic-assisted repair of bilateral inguinal hernias on 3/11/2019 w/ drainage of 4.5 L of ascites,   Allergies: NKDA  Meds: see med rec  FH: patient denies FH of cancers or IBD  SH: patient reports significant alcohol use in youth but was unable to quantify the number of daily drinks. Patient denies smoking history or drug use

## 2019-04-05 NOTE — ED ADULT TRIAGE NOTE - CHIEF COMPLAINT QUOTE
Pt BIBA from [facility] on referral of attending physician w/ 6/10 abd pain r/t ascites confirmed on US after >16kg weight gain s/p inguinal hernia repair.  Pt denies N/V/D or CP/SOB.

## 2019-04-05 NOTE — H&P ADULT - NSHPPHYSICALEXAM_GEN_ALL_CORE
Gen: NAD, resting comfortably in bed  C/V: NSR  Pulm: Nonlabored breathing, no respiratory distress  Abd: distended with fluid wave, but soft and nontender  Groin: fullness of right groin, discomfort on deep palpation. No pain or discomfort over left groin   Extrem: Nonedematous

## 2019-04-05 NOTE — ED PROVIDER NOTE - CLINICAL SUMMARY MEDICAL DECISION MAKING FREE TEXT BOX
Pt with recent lap hernia repair two weeks prior sent from [facility] showing large amount of fluid with no known origin.  Pt denies physical complaints.  Pt weak appearing with distended abdomen and leg swelling.  Workup shows B/l PEs, perforated bowel, LLQ abscess, large peritoneal fluid.  Surgery immediately consulted after seeing patient.  As per surgery, IR to drain abscess.  IR emergently called in.  Borad spectrum antibiotics.  Heparin initially ordered and then help bc pt going to IR.  Pt with stable vitals in ED - not tachy or febrile BP 90s-100.  Pt admitted to SICU.

## 2019-04-05 NOTE — CONSULT NOTE ADULT - SUBJECTIVE AND OBJECTIVE BOX
Attending:  Devonte    HPI:  Mr. Cha is an 87 yo M, poor historian, with history of CAD s/p 3 stents (, , ) still on ASA/Plavix, ETOH abuse, b/l inguinal hernias s/p recent laparoscopic robotic-assisted repair of bilateral inguinal hernias on 3/11/2019 (notably, 4.5L of ascites were also drained in the beginning of the procedure and sent for cytopathology) presenting with right inguinal hernia discomfort. Patient denies any associated fevers, chills, nausea, or emesis. Patient cannot remember last time he had bowel movement or passed flatus, but per nursing staff, patient a large bowel movement in his stretcher in the ED. Patient reports having a colonoscopy at age 70s which was reportedly normal. He denies ever having an endoscopy.     In the ED, patient is afebrile, HR 90s, , RR20, saturation 95%. WBC 20.8, hgb 10.3, INR 1.82, PT 21, PTT 24, Na 130. CT ab/pelvis with IV contrast shows 1) bilateral segmental pulmonary emboli, 2) left lung empyema, 3) large pneumoperitoneum, 4) 10 x 5 cm abscess in LLQ (possible source of pneumoperitoneum), 5) 6 x 4 cm fluid collection in right groin, and 6) DVT in bilateral common femoral vein, central venous pelvic thrombus in the left common iliac and internal iliac veins.    SICU:  Patient was brought to SICU immediately following LLQ abscess drainage and IVC filter placement by IR. Patient was seen by bedside. He was awake, alert, and able to answer questions. He complained of "feeling cold" but endorsed that he had been cold before he came to the hospital. He denied subjective fevers, just that he felt cold most of the time. He denied abdominal pain, nausea, vomiting, chest pain, sob, dizziness. Also no neck pain at the site of endovenous access for IVC filter placement.         PMH/PSx: CAD s/p 3 stents (, , ) still on ASA/Plavix, ETOH abuse, b/l inguinal hernias s/p laparoscopic robotic-assisted repair of bilateral inguinal hernias on 3/11/2019 w/ drainage of 4.5 L of ascites,   Allergies: NKDA  Meds: see med rec  FH: patient denies FH of cancers or IBD  SH: patient reports significant alcohol use in youth but was unable to quantify the number of daily drinks. Patient denies smoking history or drug use (2019 19:49)        PAST MEDICAL & SURGICAL HISTORY:  Esophageal reflux  Acute diarrhea  HLD (hyperlipidemia)  HTN (hypertension)  Prostate cancer  Stented coronary artery: x3 , per pt.  Atherosclerosis of coronary artery: CAD (coronary artery disease)  S/P hernia repair  Surgery, elective: cardiac stent x3  History of prostate surgery      Home Medications:  aspirin 81 mg oral tablet, chewable: 1 tab(s) orally once a day (2019 20:35)  atorvastatin 20 mg oral tablet: 1 tab(s) orally once a day (2019 20:35)  Flomax 0.4 mg oral capsule: 1 cap(s) orally once a day (2019 20:35)  loperamide 2 mg oral capsule: 4 cap(s) orally once a day (2019 20:35)  Metoprolol Tartrate 25 mg oral tablet: 1 tab(s) orally once a day (2019 20:35)  Multiple Vitamins with Minerals oral tablet: 1 tab(s) orally once a day (2019 20:35)  Plavix 75 mg oral tablet: 1 tab(s) orally once a day (2019 20:35)      Allergies    No Known Allergies    Intolerances          SOCIAL HISTORY:      FAMILY HISTORY:  No pertinent family history in first degree relatives          Vital Signs Last 24 Hrs  T(C): 37.1 (2019 00:47), Max: 37.1 (2019 00:47)  T(F): 98.7 (2019 00:47), Max: 98.7 (2019 00:47)  HR: 90 (2019 01:00) (90 - 94)  BP: 101/53 (2019 01:00) (93/57 - 115/58)  BP(mean): 69 (2019 01:00) (68 - 77)  RR: 18 (2019 01:00) (16 - 20)  SpO2: 100% (2019 01:00) (95% - 100%)      PHYSICAL EXAM  Neuro: A&Ox3, CN grossly intact  General: no acute distress, resting comfortably, pleasant  HEENT: PERRL, EOMI, MMM  Pulm: no respiratory distress, satting well, clear to ausculatation bilaterally  C/V: nontachycardic, RRR, S1 S2, no murmurs  Abd: softly distended, but nttp, no guarding, full but not rigid; non-peritoneal; PHYLLIS drain w/ seropurulent fluid  Extremities: warm and well-perfused, 2+ pitting edema from feet to upper thighs  Vasc: 1+ palp signal through edema of feet; palp pop and fem bilaterally  : rao placed at bedside in SICU  Skin: no rashes          LABS:                        9.6    14.99 )-----------( 327      ( 2019 22:47 )             29.4     04-05    128<L>  |  97  |  18  ----------------------------<  110<H>  4.2   |  21<L>  |  0.62    Ca    7.2<L>      2019 22:47  Phos  3.4     04-05  Mg     1.4     04-05    TPro  4.2<L>  /  Alb  1.5<L>  /  TBili  0.5  /  DBili  x   /  AST  21  /  ALT  42  /  AlkPhos  93  04-05    LIVER FUNCTIONS - ( 2019 22:47 )  Alb: 1.5 g/dL / Pro: 4.2 g/dL / ALK PHOS: 93 U/L / ALT: 42 U/L / AST: 21 U/L / GGT: x           PT/INR - ( 2019 22:47 )   PT: 20.7 sec;   INR: 1.80          PTT - ( 2019 22:47 )  PTT:29.2 sec  CAPILLARY BLOOD GLUCOSE      POCT Blood Glucose.: 109 mg/dL (2019 22:18)    Urinalysis Basic - ( 2019 16:40 )    Color: Yellow / Appearance: Clear / S.015 / pH: x  Gluc: x / Ketone: NEGATIVE  / Bili: Negative / Urobili: 0.2 E.U./dL   Blood: x / Protein: NEGATIVE mg/dL / Nitrite: NEGATIVE   Leuk Esterase: NEGATIVE / RBC: x / WBC x   Sq Epi: x / Non Sq Epi: x / Bacteria: x            MEDICATIONS  (STANDING):  dextrose 5%. 1000 milliLiter(s) (50 mL/Hr) IV Continuous <Continuous>  dextrose 50% Injectable 12.5 Gram(s) IV Push once  dextrose 50% Injectable 25 Gram(s) IV Push once  dextrose 50% Injectable 25 Gram(s) IV Push once  heparin  Infusion 1500 Unit(s)/Hr (15 mL/Hr) IV Continuous <Continuous>  insulin lispro (HumaLOG) corrective regimen sliding scale   SubCutaneous every 6 hours  magnesium sulfate  IVPB 2 Gram(s) IV Intermittent every 1 hour  pantoprazole  Injectable 40 milliGRAM(s) IV Push daily  piperacillin/tazobactam IVPB. 3.375 Gram(s) IV Intermittent every 6 hours  sodium chloride 0.9%. 1000 milliLiter(s) (110 mL/Hr) IV Continuous <Continuous>  vancomycin  IVPB 1000 milliGRAM(s) IV Intermittent every 12 hours    MEDICATIONS  (PRN):  dextrose 40% Gel 15 Gram(s) Oral once PRN Blood Glucose LESS THAN 70 milliGRAM(s)/deciliter  glucagon  Injectable 1 milliGRAM(s) IntraMuscular once PRN Glucose LESS THAN 70 milligrams/deciliter  ondansetron Injectable 4 milliGRAM(s) IV Push every 6 hours PRN Nausea        RADIOLOGY & ADDITIONAL STUDIES    < from: CT Abdomen and Pelvis w/ IV Cont (19 @ 17:01) >  Large pneumoperitoneum and large ascites. 10 x 5 cm abscess in the left   lower quadrant adjacent to the thickened and inflamed descending colon.   Overall findings are consistent with bowel perforation, possibly from the   descending colon, however other sources of bowel perforation such as   stomach, duodenum and small bowel are in the differential.     DVT bilateral common femoral vein. Central venous pelvic thrombus in the   left common iliac and internal iliac veins.    6 x 4 cm fluid collection right groin.    < end of copied text >

## 2019-04-05 NOTE — ED PROVIDER NOTE - PROGRESS NOTE DETAILS
Pt with elevated wbc  with bandemia - fluids initially held secondary to ascites and leg swelling.  Pt septic thought and most likely dry so will give fluids but hold off on 30cc/kg bolus because unsure of origin or fluid.  Surgery aware of findings and following closely. Pt with elevated wbc  with bandemia - fluids initially held secondary to ascites and leg swelling.  Pt septic thought and most likely dry so will give fluids but hold off on 30cc/kg bolus because unsure of origin or fluid.  Surgery aware of findings and following closely.  Heparin started for PEs As per surgery, stop heparin for possible OR vs IR procedure in 1 hour

## 2019-04-05 NOTE — CONSULT NOTE ADULT - ASSESSMENT
85 y/o m with PMH of HLD, CAD, BPH, HTN, GERD s/p b/l inguinal hernia and umbilica hernia repair by Dr. Valencia two weeks ago, now presenting with diffuse ascites, b/l lower ext edema, 16 lb weight gain. and evidence of bowel perforation and pelvic abscess.    Vascular surgery consulted for bilateral PE, DVT bilateral common femoral vein. Central venous pelvic thrombus in the left common iliac and internal iliac veins.    - IR to place IVC filter now  - Resume heparin drip as soon as possible    - Vascular will follow  Call with any questions.

## 2019-04-06 LAB
ALBUMIN SERPL ELPH-MCNC: 1.3 G/DL — LOW (ref 3.3–5)
ALP SERPL-CCNC: 86 U/L — SIGNIFICANT CHANGE UP (ref 40–120)
ALT FLD-CCNC: 31 U/L — SIGNIFICANT CHANGE UP (ref 10–45)
ANION GAP SERPL CALC-SCNC: 7 MMOL/L — SIGNIFICANT CHANGE UP (ref 5–17)
ANION GAP SERPL CALC-SCNC: 8 MMOL/L — SIGNIFICANT CHANGE UP (ref 5–17)
APTT BLD: 100.5 SEC — HIGH (ref 27.5–36.3)
APTT BLD: 29.2 SEC — SIGNIFICANT CHANGE UP (ref 27.5–36.3)
APTT BLD: 55.5 SEC — HIGH (ref 27.5–36.3)
APTT BLD: 57.2 SEC — HIGH (ref 27.5–36.3)
APTT BLD: 93.4 SEC — HIGH (ref 27.5–36.3)
AST SERPL-CCNC: 15 U/L — SIGNIFICANT CHANGE UP (ref 10–40)
BASE EXCESS BLDA CALC-SCNC: -0.6 MMOL/L — SIGNIFICANT CHANGE UP (ref -2–3)
BILIRUB SERPL-MCNC: 0.4 MG/DL — SIGNIFICANT CHANGE UP (ref 0.2–1.2)
BUN SERPL-MCNC: 18 MG/DL — SIGNIFICANT CHANGE UP (ref 7–23)
BUN SERPL-MCNC: 18 MG/DL — SIGNIFICANT CHANGE UP (ref 7–23)
CALCIUM SERPL-MCNC: 6.9 MG/DL — LOW (ref 8.4–10.5)
CALCIUM SERPL-MCNC: 6.9 MG/DL — LOW (ref 8.4–10.5)
CHLORIDE SERPL-SCNC: 100 MMOL/L — SIGNIFICANT CHANGE UP (ref 96–108)
CHLORIDE SERPL-SCNC: 102 MMOL/L — SIGNIFICANT CHANGE UP (ref 96–108)
CO2 SERPL-SCNC: 22 MMOL/L — SIGNIFICANT CHANGE UP (ref 22–31)
CO2 SERPL-SCNC: 22 MMOL/L — SIGNIFICANT CHANGE UP (ref 22–31)
CREAT SERPL-MCNC: 0.62 MG/DL — SIGNIFICANT CHANGE UP (ref 0.5–1.3)
CREAT SERPL-MCNC: 0.63 MG/DL — SIGNIFICANT CHANGE UP (ref 0.5–1.3)
CULTURE RESULTS: NO GROWTH — SIGNIFICANT CHANGE UP
GLUCOSE BLDC GLUCOMTR-MCNC: 105 MG/DL — HIGH (ref 70–99)
GLUCOSE BLDC GLUCOMTR-MCNC: 113 MG/DL — HIGH (ref 70–99)
GLUCOSE BLDC GLUCOMTR-MCNC: 122 MG/DL — HIGH (ref 70–99)
GLUCOSE BLDC GLUCOMTR-MCNC: 134 MG/DL — HIGH (ref 70–99)
GLUCOSE SERPL-MCNC: 103 MG/DL — HIGH (ref 70–99)
GLUCOSE SERPL-MCNC: 112 MG/DL — HIGH (ref 70–99)
GRAM STN FLD: SIGNIFICANT CHANGE UP
HCO3 BLDA-SCNC: 22 MMOL/L — SIGNIFICANT CHANGE UP (ref 21–28)
HCT VFR BLD CALC: 22.8 % — LOW (ref 39–50)
HCT VFR BLD CALC: 23.8 % — LOW (ref 39–50)
HCT VFR BLD CALC: 24.6 % — LOW (ref 39–50)
HGB BLD-MCNC: 7.3 G/DL — LOW (ref 13–17)
HGB BLD-MCNC: 7.9 G/DL — LOW (ref 13–17)
HGB BLD-MCNC: 8.2 G/DL — LOW (ref 13–17)
INR BLD: 1.86 — HIGH (ref 0.88–1.16)
LACTATE SERPL-SCNC: 0.6 MMOL/L — SIGNIFICANT CHANGE UP (ref 0.5–2)
MAGNESIUM SERPL-MCNC: 1.9 MG/DL — SIGNIFICANT CHANGE UP (ref 1.6–2.6)
MCHC RBC-ENTMCNC: 32 GM/DL — SIGNIFICANT CHANGE UP (ref 32–36)
MCHC RBC-ENTMCNC: 32.4 PG — SIGNIFICANT CHANGE UP (ref 27–34)
MCHC RBC-ENTMCNC: 33.2 GM/DL — SIGNIFICANT CHANGE UP (ref 32–36)
MCHC RBC-ENTMCNC: 33.3 GM/DL — SIGNIFICANT CHANGE UP (ref 32–36)
MCHC RBC-ENTMCNC: 33.3 PG — SIGNIFICANT CHANGE UP (ref 27–34)
MCHC RBC-ENTMCNC: 33.5 PG — SIGNIFICANT CHANGE UP (ref 27–34)
MCV RBC AUTO: 100.4 FL — HIGH (ref 80–100)
MCV RBC AUTO: 100.4 FL — HIGH (ref 80–100)
MCV RBC AUTO: 101.3 FL — HIGH (ref 80–100)
NRBC # BLD: 0 /100 WBCS — SIGNIFICANT CHANGE UP (ref 0–0)
PCO2 BLDA: 30 MMHG — LOW (ref 35–48)
PH BLDA: 7.49 — HIGH (ref 7.35–7.45)
PHOSPHATE SERPL-MCNC: 3.3 MG/DL — SIGNIFICANT CHANGE UP (ref 2.5–4.5)
PLATELET # BLD AUTO: 247 K/UL — SIGNIFICANT CHANGE UP (ref 150–400)
PLATELET # BLD AUTO: 263 K/UL — SIGNIFICANT CHANGE UP (ref 150–400)
PLATELET # BLD AUTO: 328 K/UL — SIGNIFICANT CHANGE UP (ref 150–400)
PO2 BLDA: 117 MMHG — HIGH (ref 83–108)
POTASSIUM SERPL-MCNC: 3.7 MMOL/L — SIGNIFICANT CHANGE UP (ref 3.5–5.3)
POTASSIUM SERPL-MCNC: 3.9 MMOL/L — SIGNIFICANT CHANGE UP (ref 3.5–5.3)
POTASSIUM SERPL-SCNC: 3.7 MMOL/L — SIGNIFICANT CHANGE UP (ref 3.5–5.3)
POTASSIUM SERPL-SCNC: 3.9 MMOL/L — SIGNIFICANT CHANGE UP (ref 3.5–5.3)
PREALB SERPL-MCNC: 4 MG/DL — LOW (ref 20–40)
PROT SERPL-MCNC: 3.6 G/DL — LOW (ref 6–8.3)
PROTHROM AB SERPL-ACNC: 21.4 SEC — HIGH (ref 10–12.9)
RBC # BLD: 2.25 M/UL — LOW (ref 4.2–5.8)
RBC # BLD: 2.37 M/UL — LOW (ref 4.2–5.8)
RBC # BLD: 2.45 M/UL — LOW (ref 4.2–5.8)
RBC # FLD: 13.2 % — SIGNIFICANT CHANGE UP (ref 10.3–14.5)
RBC # FLD: 13.2 % — SIGNIFICANT CHANGE UP (ref 10.3–14.5)
RBC # FLD: 13.4 % — SIGNIFICANT CHANGE UP (ref 10.3–14.5)
SAO2 % BLDA: 98 % — SIGNIFICANT CHANGE UP (ref 95–100)
SODIUM SERPL-SCNC: 130 MMOL/L — LOW (ref 135–145)
SODIUM SERPL-SCNC: 131 MMOL/L — LOW (ref 135–145)
SPECIMEN SOURCE: SIGNIFICANT CHANGE UP
SPECIMEN SOURCE: SIGNIFICANT CHANGE UP
WBC # BLD: 10.92 K/UL — HIGH (ref 3.8–10.5)
WBC # BLD: 13.71 K/UL — HIGH (ref 3.8–10.5)
WBC # BLD: 13.85 K/UL — HIGH (ref 3.8–10.5)
WBC # FLD AUTO: 10.92 K/UL — HIGH (ref 3.8–10.5)
WBC # FLD AUTO: 13.71 K/UL — HIGH (ref 3.8–10.5)
WBC # FLD AUTO: 13.85 K/UL — HIGH (ref 3.8–10.5)

## 2019-04-06 PROCEDURE — 93306 TTE W/DOPPLER COMPLETE: CPT | Mod: 26

## 2019-04-06 PROCEDURE — 71045 X-RAY EXAM CHEST 1 VIEW: CPT | Mod: 26

## 2019-04-06 PROCEDURE — 99233 SBSQ HOSP IP/OBS HIGH 50: CPT | Mod: GC

## 2019-04-06 RX ORDER — SODIUM CHLORIDE 9 MG/ML
500 INJECTION INTRAMUSCULAR; INTRAVENOUS; SUBCUTANEOUS ONCE
Qty: 0 | Refills: 0 | Status: COMPLETED | OUTPATIENT
Start: 2019-04-06 | End: 2019-04-06

## 2019-04-06 RX ORDER — FUROSEMIDE 40 MG
20 TABLET ORAL ONCE
Qty: 0 | Refills: 0 | Status: COMPLETED | OUTPATIENT
Start: 2019-04-06 | End: 2019-04-06

## 2019-04-06 RX ORDER — HEPARIN SODIUM 5000 [USP'U]/ML
1500 INJECTION INTRAVENOUS; SUBCUTANEOUS
Qty: 25000 | Refills: 0 | Status: DISCONTINUED | OUTPATIENT
Start: 2019-04-06 | End: 2019-04-06

## 2019-04-06 RX ORDER — ALBUMIN HUMAN 25 %
50 VIAL (ML) INTRAVENOUS EVERY 8 HOURS
Qty: 0 | Refills: 0 | Status: DISCONTINUED | OUTPATIENT
Start: 2019-04-06 | End: 2019-04-10

## 2019-04-06 RX ORDER — HEPARIN SODIUM 5000 [USP'U]/ML
1400 INJECTION INTRAVENOUS; SUBCUTANEOUS
Qty: 25000 | Refills: 0 | Status: DISCONTINUED | OUTPATIENT
Start: 2019-04-06 | End: 2019-04-07

## 2019-04-06 RX ORDER — HYDROMORPHONE HYDROCHLORIDE 2 MG/ML
0.5 INJECTION INTRAMUSCULAR; INTRAVENOUS; SUBCUTANEOUS ONCE
Qty: 0 | Refills: 0 | Status: DISCONTINUED | OUTPATIENT
Start: 2019-04-06 | End: 2019-04-06

## 2019-04-06 RX ORDER — VANCOMYCIN HCL 1 G
1000 VIAL (EA) INTRAVENOUS EVERY 24 HOURS
Qty: 0 | Refills: 0 | Status: DISCONTINUED | OUTPATIENT
Start: 2019-04-07 | End: 2019-04-07

## 2019-04-06 RX ORDER — NOREPINEPHRINE BITARTRATE/D5W 8 MG/250ML
0.05 PLASTIC BAG, INJECTION (ML) INTRAVENOUS
Qty: 8 | Refills: 0 | Status: DISCONTINUED | OUTPATIENT
Start: 2019-04-06 | End: 2019-04-06

## 2019-04-06 RX ORDER — NOREPINEPHRINE BITARTRATE/D5W 8 MG/250ML
0.05 PLASTIC BAG, INJECTION (ML) INTRAVENOUS
Qty: 8 | Refills: 0 | Status: DISCONTINUED | OUTPATIENT
Start: 2019-04-06 | End: 2019-04-08

## 2019-04-06 RX ADMIN — Medication 50 GRAM(S): at 05:26

## 2019-04-06 RX ADMIN — Medication 50 GRAM(S): at 04:20

## 2019-04-06 RX ADMIN — SODIUM CHLORIDE 500 MILLILITER(S): 9 INJECTION INTRAMUSCULAR; INTRAVENOUS; SUBCUTANEOUS at 02:54

## 2019-04-06 RX ADMIN — Medication 50 MILLILITER(S): at 09:27

## 2019-04-06 RX ADMIN — HEPARIN SODIUM 13 UNIT(S)/HR: 5000 INJECTION INTRAVENOUS; SUBCUTANEOUS at 12:43

## 2019-04-06 RX ADMIN — Medication 20 MILLIGRAM(S): at 17:35

## 2019-04-06 RX ADMIN — Medication 50 GRAM(S): at 07:26

## 2019-04-06 RX ADMIN — Medication 250 MILLIGRAM(S): at 07:13

## 2019-04-06 RX ADMIN — PIPERACILLIN AND TAZOBACTAM 200 GRAM(S): 4; .5 INJECTION, POWDER, LYOPHILIZED, FOR SOLUTION INTRAVENOUS at 06:20

## 2019-04-06 RX ADMIN — HEPARIN SODIUM 15 UNIT(S)/HR: 5000 INJECTION INTRAVENOUS; SUBCUTANEOUS at 07:33

## 2019-04-06 RX ADMIN — PANTOPRAZOLE SODIUM 40 MILLIGRAM(S): 20 TABLET, DELAYED RELEASE ORAL at 11:54

## 2019-04-06 RX ADMIN — SODIUM CHLORIDE 110 MILLILITER(S): 9 INJECTION INTRAMUSCULAR; INTRAVENOUS; SUBCUTANEOUS at 07:33

## 2019-04-06 RX ADMIN — Medication 50 MILLILITER(S): at 17:09

## 2019-04-06 RX ADMIN — HYDROMORPHONE HYDROCHLORIDE 0.5 MILLIGRAM(S): 2 INJECTION INTRAMUSCULAR; INTRAVENOUS; SUBCUTANEOUS at 19:45

## 2019-04-06 RX ADMIN — PIPERACILLIN AND TAZOBACTAM 200 GRAM(S): 4; .5 INJECTION, POWDER, LYOPHILIZED, FOR SOLUTION INTRAVENOUS at 11:56

## 2019-04-06 RX ADMIN — HYDROMORPHONE HYDROCHLORIDE 0.5 MILLIGRAM(S): 2 INJECTION INTRAMUSCULAR; INTRAVENOUS; SUBCUTANEOUS at 19:18

## 2019-04-06 RX ADMIN — Medication 6.87 MICROGRAM(S)/KG/MIN: at 12:33

## 2019-04-06 RX ADMIN — SODIUM CHLORIDE 2000 MILLILITER(S): 9 INJECTION INTRAMUSCULAR; INTRAVENOUS; SUBCUTANEOUS at 10:39

## 2019-04-06 RX ADMIN — PIPERACILLIN AND TAZOBACTAM 200 GRAM(S): 4; .5 INJECTION, POWDER, LYOPHILIZED, FOR SOLUTION INTRAVENOUS at 23:29

## 2019-04-06 RX ADMIN — PIPERACILLIN AND TAZOBACTAM 200 GRAM(S): 4; .5 INJECTION, POWDER, LYOPHILIZED, FOR SOLUTION INTRAVENOUS at 18:12

## 2019-04-06 NOTE — DIETITIAN INITIAL EVALUATION ADULT. - ENERGY NEEDS
Height 71"; .7#; #; 94%IBW  .IBW Height 71"; .7#; #; 94%IBW  ABW used for calculations as pt between % of IBW. Needs estimated for maintenance in older adults; increased for malnutrition, infection

## 2019-04-06 NOTE — PATIENT PROFILE ADULT - FUNCTIONAL SCREEN CURRENT LEVEL: SWALLOWING (IF SCORE 2 OR MORE FOR ANY ITEM, CONSULT REHAB SERVICES), MLM)
Discharge summary and discharge medications reviewed with patient and appropriate educational materials and side effects teaching were provided. patient  Given 1 paper prescriptions and 0 electronic prescriptions sent to pt's listed pharmacy. Patient verbalized understanding of the importance of discussing medications with his or her physician or clinic they will be following up with. No si/s of acute distress prior to discharge. Patient offered wheelchair from treatment area to hospital entrance, patient declined wheelchair escort out of ED.
Patient presents to ED with concerns of HTN this morning. Took BP medications PTA. Attends HD T/R/Sa. Patient reports occasional cough and daily smoker. Emergency Department Nursing Plan of Care       The Nursing Plan of Care is developed from the Nursing assessment and Emergency Department Attending provider initial evaluation. The plan of care may be reviewed in the ED Provider note.     The Plan of Care was developed with the following considerations:   Patient / Family readiness to learn indicated by:verbalized understanding  Persons(s) to be included in education: patient  Barriers to Learning/Limitations:No    Signed     Lisa Campos RN    7/15/2018   7:32 AM
0 = swallows foods/liquids without difficulty

## 2019-04-06 NOTE — DIETITIAN INITIAL EVALUATION ADULT. - NS FNS REASON FOR WEIGHT CHANG
See subjective for details/fluid loss/fluid retention/altered GI function (specify)/decreased po intake

## 2019-04-06 NOTE — PROGRESS NOTE ADULT - SUBJECTIVE AND OBJECTIVE BOX
INTERVAL HPI/OVERNIGHT EVENTS: o/n: IR drainage of LLQ collection + IVC filter placement: 130 cc of purulent drainage; POC normal; neck soft; mag 1.4 repleted; Na 128; LR switched to normal saline; intermittent hypotension in 80's, nontachy; decreasing UOP; 500 bolus NS;     Pt seen and examined on morning rounds. He offers no complaints. Denies CP/SOB/NV    MEDICATIONS  (STANDING):  albumin human 25% IVPB 50 milliLiter(s) IV Intermittent every 8 hours  dextrose 5%. 1000 milliLiter(s) (50 mL/Hr) IV Continuous <Continuous>  dextrose 50% Injectable 12.5 Gram(s) IV Push once  dextrose 50% Injectable 25 Gram(s) IV Push once  dextrose 50% Injectable 25 Gram(s) IV Push once  heparin  Infusion 1500 Unit(s)/Hr (15 mL/Hr) IV Continuous <Continuous>  insulin lispro (HumaLOG) corrective regimen sliding scale   SubCutaneous every 6 hours  pantoprazole  Injectable 40 milliGRAM(s) IV Push daily  piperacillin/tazobactam IVPB. 3.375 Gram(s) IV Intermittent every 6 hours  sodium chloride 0.9%. 1000 milliLiter(s) (110 mL/Hr) IV Continuous <Continuous>    MEDICATIONS  (PRN):  dextrose 40% Gel 15 Gram(s) Oral once PRN Blood Glucose LESS THAN 70 milliGRAM(s)/deciliter  glucagon  Injectable 1 milliGRAM(s) IntraMuscular once PRN Glucose LESS THAN 70 milligrams/deciliter  ondansetron Injectable 4 milliGRAM(s) IV Push every 6 hours PRN Nausea      Drug Dosing Weight  Height (cm): 180.3 (13 Mar 2019 05:28)  Weight (kg): 73.3 (2019 15:10)  BMI (kg/m2): 22.5 (2019 15:10)  BSA (m2): 1.93 (2019 15:10)      PAST MEDICAL & SURGICAL HISTORY:  Esophageal reflux  Acute diarrhea  HLD (hyperlipidemia)  HTN (hypertension)  Prostate cancer  Stented coronary artery: x3 , per pt.  Atherosclerosis of coronary artery: CAD (coronary artery disease)  S/P hernia repair  Surgery, elective: cardiac stent x3  History of prostate surgery      ICU Vital Signs Last 24 Hrs  T(C): 36.4 (2019 09:30), Max: 37.6 (2019 06:57)  T(F): 97.5 (2019 09:30), Max: 99.7 (2019 06:57)  HR: 80 (2019 10:20) (80 - 96)  BP: 104/51 (2019 10:20) (78/47 - 115/58)  BP(mean): 63 (2019 10:20) (57 - 77)  ABP: --  ABP(mean): --  RR: 17 (2019 10:20) (15 - 22)  SpO2: 100% (2019 10:20) (95% - 100%)            2019 07:01  -  2019 07:00  --------------------------------------------------------  IN:    heparin Infusion: 131 mL    IV PiggyBack: 450 mL    Sodium Chloride 0.9% IV Bolus: 500 mL    sodium chloride 0.9%.: 990 mL  Total IN: 2071 mL    OUT:    Bulb: 5 mL    Voided: 475 mL  Total OUT: 480 mL    Total NET: 1591 mL      2019 07:01  -  2019 10:45  --------------------------------------------------------  IN:    heparin Infusion: 60 mL    Sodium Chloride 0.9% IV Bolus: 500 mL    sodium chloride 0.9%.: 275 mL  Total IN: 835 mL    OUT:    Indwelling Catheter - Urethral: 42 mL  Total OUT: 42 mL    Total NET: 793 mL        PHYSICAL EXAM:  General: cachectic, no acute distress, resting comfortably  Neuro: A&Ox3, CN grossly intact  HEENT: MMM  Pulm: no respiratory distress, CTAx2  C/V:  RRR, no murmurs  Abd: NTND, no guarding, PHYLLIS drain w/ SS drainage  : rao draining clear urine. +scrotal edema  Ext: WWP, 2+ pitting edema from feet to upper thighs  Vasc: 1+ palp signal through edema of feet; palp pop and fem bilaterally  Skin: no rashes        LABS:  CBC Full  -  ( 2019 06:09 )  WBC Count : 13.71 K/uL  RBC Count : 2.37 M/uL  Hemoglobin : 7.9 g/dL  Hematocrit : 23.8 %  Platelet Count - Automated : 263 K/uL  Mean Cell Volume : 100.4 fl  Mean Cell Hemoglobin : 33.3 pg  Mean Cell Hemoglobin Concentration : 33.2 gm/dL  Auto Neutrophil # : x  Auto Lymphocyte # : x  Auto Monocyte # : x  Auto Eosinophil # : x  Auto Basophil # : x  Auto Neutrophil % : x  Auto Lymphocyte % : x  Auto Monocyte % : x  Auto Eosinophil % : x  Auto Basophil % : x    04-06    130<L>  |  100  |  18  ----------------------------<  103<H>  3.9   |  22  |  0.63    Ca    6.9<L>      2019 06:30  Phos  3.3     04-06  Mg     1.9     04-06    TPro  3.6<L>  /  Alb  1.3<L>  /  TBili  0.4  /  DBili  x   /  AST  15  /  ALT  31  /  AlkPhos  86  04-06    PT/INR - ( 2019 07:09 )   PT: 21.4 sec;   INR: 1.86          PTT - ( 2019 07:09 )  PTT:93.4 sec  Urinalysis Basic - ( 2019 16:40 )    Color: Yellow / Appearance: Clear / S.015 / pH: x  Gluc: x / Ketone: NEGATIVE  / Bili: Negative / Urobili: 0.2 E.U./dL   Blood: x / Protein: NEGATIVE mg/dL / Nitrite: NEGATIVE   Leuk Esterase: NEGATIVE / RBC: x / WBC x   Sq Epi: x / Non Sq Epi: x / Bacteria: x INTERVAL HPI/OVERNIGHT EVENTS: o/n: IR drainage of LLQ collection + IVC filter placement: 130 cc of purulent drainage; POC normal; neck soft; mag 1.4 repleted; Na 128; LR switched to normal saline; intermittent hypotension in 80's, nontachy; decreasing UOP; 500 bolus NS;     Pt seen and examined on morning rounds. He offers no complaints. Denies CP/SOB/NV    MEDICATIONS  (STANDING):  albumin human 25% IVPB 50 milliLiter(s) IV Intermittent every 8 hours  dextrose 5%. 1000 milliLiter(s) (50 mL/Hr) IV Continuous <Continuous>  dextrose 50% Injectable 12.5 Gram(s) IV Push once  dextrose 50% Injectable 25 Gram(s) IV Push once  dextrose 50% Injectable 25 Gram(s) IV Push once  heparin  Infusion 1500 Unit(s)/Hr (15 mL/Hr) IV Continuous <Continuous>  insulin lispro (HumaLOG) corrective regimen sliding scale   SubCutaneous every 6 hours  pantoprazole  Injectable 40 milliGRAM(s) IV Push daily  piperacillin/tazobactam IVPB. 3.375 Gram(s) IV Intermittent every 6 hours  sodium chloride 0.9%. 1000 milliLiter(s) (110 mL/Hr) IV Continuous <Continuous>    MEDICATIONS  (PRN):  dextrose 40% Gel 15 Gram(s) Oral once PRN Blood Glucose LESS THAN 70 milliGRAM(s)/deciliter  glucagon  Injectable 1 milliGRAM(s) IntraMuscular once PRN Glucose LESS THAN 70 milligrams/deciliter  ondansetron Injectable 4 milliGRAM(s) IV Push every 6 hours PRN Nausea      Drug Dosing Weight  Height (cm): 180.3 (13 Mar 2019 05:28)  Weight (kg): 73.3 (2019 15:10)  BMI (kg/m2): 22.5 (2019 15:10)  BSA (m2): 1.93 (2019 15:10)      PAST MEDICAL & SURGICAL HISTORY:  Esophageal reflux  Acute diarrhea  HLD (hyperlipidemia)  HTN (hypertension)  Prostate cancer  Stented coronary artery: x3 , per pt.  Atherosclerosis of coronary artery: CAD (coronary artery disease)  S/P hernia repair  Surgery, elective: cardiac stent x3  History of prostate surgery      ICU Vital Signs Last 24 Hrs  T(C): 36.4 (2019 09:30), Max: 37.6 (2019 06:57)  T(F): 97.5 (2019 09:30), Max: 99.7 (2019 06:57)  HR: 80 (2019 10:20) (80 - 96)  BP: 104/51 (2019 10:20) (78/47 - 115/58)  BP(mean): 63 (2019 10:20) (57 - 77)  ABP: --  ABP(mean): --  RR: 17 (2019 10:20) (15 - 22)  SpO2: 100% (2019 10:20) (95% - 100%)            2019 07:01  -  2019 07:00  --------------------------------------------------------  IN:    heparin Infusion: 131 mL    IV PiggyBack: 450 mL    Sodium Chloride 0.9% IV Bolus: 500 mL    sodium chloride 0.9%.: 990 mL  Total IN: 2071 mL    OUT:    Bulb: 5 mL    Voided: 475 mL  Total OUT: 480 mL    Total NET: 1591 mL      2019 07:01  -  2019 10:45  --------------------------------------------------------  IN:    heparin Infusion: 60 mL    Sodium Chloride 0.9% IV Bolus: 500 mL    sodium chloride 0.9%.: 275 mL  Total IN: 835 mL    OUT:    Indwelling Catheter - Urethral: 42 mL  Total OUT: 42 mL    Total NET: 793 mL        PHYSICAL EXAM:  General: cachectic, no acute distress, resting comfortably  Neuro: A&Ox3, CN grossly intact  HEENT: MMM  Pulm: no respiratory distress, CTAx2  C/V:  RRR, no murmurs  Abd: NTND, no guarding, PHYLLIS drain w/ SS drainage  : rao draining clear urine. +scrotal edema  Ext: WWP, 2+ pitting edema from feet to upper thighs and UEs  Vasc: 1+ palp signal through edema of feet; palp pop and fem bilaterally  Skin: no rashes        LABS:  CBC Full  -  ( 2019 06:09 )  WBC Count : 13.71 K/uL  RBC Count : 2.37 M/uL  Hemoglobin : 7.9 g/dL  Hematocrit : 23.8 %  Platelet Count - Automated : 263 K/uL  Mean Cell Volume : 100.4 fl  Mean Cell Hemoglobin : 33.3 pg  Mean Cell Hemoglobin Concentration : 33.2 gm/dL  Auto Neutrophil # : x  Auto Lymphocyte # : x  Auto Monocyte # : x  Auto Eosinophil # : x  Auto Basophil # : x  Auto Neutrophil % : x  Auto Lymphocyte % : x  Auto Monocyte % : x  Auto Eosinophil % : x  Auto Basophil % : x    04-06    130<L>  |  100  |  18  ----------------------------<  103<H>  3.9   |  22  |  0.63    Ca    6.9<L>      2019 06:30  Phos  3.3     04-06  Mg     1.9     04-06    TPro  3.6<L>  /  Alb  1.3<L>  /  TBili  0.4  /  DBili  x   /  AST  15  /  ALT  31  /  AlkPhos  86  04-06    PT/INR - ( 2019 07:09 )   PT: 21.4 sec;   INR: 1.86          PTT - ( 2019 07:09 )  PTT:93.4 sec  Urinalysis Basic - ( 2019 16:40 )    Color: Yellow / Appearance: Clear / S.015 / pH: x  Gluc: x / Ketone: NEGATIVE  / Bili: Negative / Urobili: 0.2 E.U./dL   Blood: x / Protein: NEGATIVE mg/dL / Nitrite: NEGATIVE   Leuk Esterase: NEGATIVE / RBC: x / WBC x   Sq Epi: x / Non Sq Epi: x / Bacteria: x

## 2019-04-06 NOTE — DIETITIAN INITIAL EVALUATION ADULT. - OTHER INFO
87 yo/male with PMHx, EtOH use, B/L inguinal hernias s/p lap RA repair w/mesh on 3/11/19, presented with R. inguinal hernia discomfort. CT A/P showing B/L pulmonary emboli, L. lung abscess, large pneumoperitoneum possibly 2/2 LLQ abscess, R. groin fluid collection, and DVT in B/L common femoral vein, central venous pelvic thrombus in the left common iliac and internal iliac veins. S/p IVC filter placement and drainage of LLQ collection. Pt seen in room, awake, alert, pleasant. He reports poor intake prior to hospitalization in March, and continued poor intake after DC to [facility]. He was drinking Ensure during last admit and did enjoy them. Currently NPO- per discussion with PA, pt possibly w/perforated colon, and PHYLLIS drains still in place from abscess. Will consider starting TPN at this time as pt is already malnourished and unclear when nutritional needs will be able to be met. No complaints of N/V/C/D; BM on 4/5 per flowsheet. NKFA. No difficulty chewing or swallowing. Curtis 14; noted with R. IJ and LUQ PHYLLIS drain. as well as B/L LE pitting edema. No pain at this time. Of note, per EMR pt w/UBW of 169# and had admit weight of 137# in March 2019. Pt's current BW of 162# likely representing major fluid retention. NFPE significant for severe protein-calorie malnutrition, please see chart note. Will continue to follow per RD protocol.

## 2019-04-06 NOTE — PROGRESS NOTE ADULT - SUBJECTIVE AND OBJECTIVE BOX
S: Patient examined bedside. Pt has no complaints. Denies CP/SOB/NV.     O:     Vital Signs Last 24 Hrs  T(C): 36.9 (06 Apr 2019 14:00), Max: 37.6 (06 Apr 2019 06:57)  T(F): 98.4 (06 Apr 2019 14:00), Max: 99.7 (06 Apr 2019 06:57)  HR: 80 (06 Apr 2019 15:00) (76 - 96)  BP: 97/47 (06 Apr 2019 15:00) (78/47 - 115/58)  BP(mean): 60 (06 Apr 2019 15:00) (54 - 78)  RR: 20 (06 Apr 2019 15:00) (15 - 22)  SpO2: 100% (06 Apr 2019 15:00) (98% - 100%)  I&O's Summary    05 Apr 2019 07:01  -  06 Apr 2019 07:00  --------------------------------------------------------  IN: 2071 mL / OUT: 480 mL / NET: 1591 mL    06 Apr 2019 07:01  -  06 Apr 2019 15:21  --------------------------------------------------------  IN: 1467.4 mL / OUT: 183 mL / NET: 1284.4 mL      PHYSICAL EXAM:  General: NAD  Neuro: A&Ox3, no focal deficits  Pulm: no respiratory distress  C/V:  RRR  R IJ C/D/I no signs of hematoma  Abd: NT, ND, no guarding, PHYLLIS drain w/ SS drainage  b/l LE w/ 2+ pitting edema, B/L DP/PT palpable, b/l LE warm to palp                            7.3    10.92 )-----------( 247      ( 06 Apr 2019 12:19 )             22.8   04-06    131<L>  |  102  |  18  ----------------------------<  112<H>  3.7   |  22  |  0.62    Ca    6.9<L>      06 Apr 2019 12:19  Phos  3.3     04-06  Mg     1.9     04-06    TPro  3.6<L>  /  Alb  1.3<L>  /  TBili  0.4  /  DBili  x   /  AST  15  /  ALT  31  /  AlkPhos  86  04-06  LIVER FUNCTIONS - ( 06 Apr 2019 06:30 )  Alb: 1.3 g/dL / Pro: 3.6 g/dL / ALK PHOS: 86 U/L / ALT: 31 U/L / AST: 15 U/L / GGT: x

## 2019-04-06 NOTE — PROGRESS NOTE ADULT - ASSESSMENT
87 yo M with history of b/l laparoscopic robotic-assisted inguinal hernia repair presenting with b/l segmental pulmonary embolisms, left lung abscess, pneumoperitoneum, and distal descending colon abscess (likely source of pneumoperitoneum), significant DVT burden now s/p IVCF placement and IR drainage of LLQ abdominal collection (4/5)  Neuro: tylenol prn for pain  CV: HD stable in setting of bilateral PE; statin; hold home asa/plavix for now  Pulm: NC prn, bilateral PE's;  CT following for lung abscess, no acute intervention needed at this time  GI: NPO / IVF - s/p IR drainage LLQ collection; pneumoperitoneum of unknown etiology; serial abdominal exams  : rao; strict I'Os  ID: colon abscess and lung abscess; Vanco (4/5--), Zosyn (4/5--)   Endo: ISS  Heme: heparin gtt for iliofemoral DVT / bilateral PE; s/p IVC Filter   PPx: SCDs; (already on hep gtt)  Lines: PIVs  Wounds: abdominal PHYLLIS drain x1  PT/OT: Not ordered 85 yo M with history of b/l laparoscopic robotic-assisted inguinal hernia repair presenting with b/l segmental pulmonary embolisms, left lung abscess, pneumoperitoneum, and distal descending colon abscess (likely source of pneumoperitoneum), significant DVT burden now s/p IVCF placement and IR drainage of LLQ abdominal collection (4/5). Severe protein calorie malnutrition  Neuro: tylenol prn for pain  CV: HD stable in setting of bilateral PE; statin; hold home asa/plavix for now. Given significant edema and hypoalbuminemia will replete albumin  Pulm: NC prn, bilateral PE's;  CT following for lung abscess, no acute intervention needed at this time. Etiology of abscess not clear, possibly aspiration.  GI: NPO / IVF - s/p IR drainage LLQ collection; pneumoperitoneum of unknown etiology; serial abdominal exams. To discuss nutritional options  : rao; strict I'Os  ID: colon abscess and lung abscess; Vanco (4/5--), Zosyn (4/5--)   Endo: ISS  Heme: heparin gtt for iliofemoral DVT / bilateral PE; s/p IVC Filter   PPx: SCDs; (already on hep gtt)  Lines: PIVs  Wounds: abdominal PHYLLIS drain x1  PT/OT: Not ordered

## 2019-04-06 NOTE — CONSULT NOTE ADULT - SUBJECTIVE AND OBJECTIVE BOX
Surgery requested a peripherally inserted central venous catheter to accommodate TPN for this cachectic pt with abdominal and lung abscesses. The history, cxr and data were seen. On ultrasound exam, the left basilic/ brachial veins are suitable.  Consent was obtained.

## 2019-04-06 NOTE — CHART NOTE - NSCHARTNOTEFT_GEN_A_CORE
Upon Nutritional Assessment by the Registered Dietitian your patient was determined to meet criteria / has evidence of the following diagnosis/diagnoses:          [ ]  Mild Protein Calorie Malnutrition        [ ]  Moderate Protein Calorie Malnutrition        [X ] Severe Protein Calorie Malnutrition        [ ] Unspecified Protein Calorie Malnutrition        [ ] Underweight / BMI <19        [ ] Morbid Obesity / BMI > 40      Findings as based on:  •  Comprehensive nutrition assessment and consultation    Per physical assessment: Muscle Wasting- Temporal [  X ]  Clavicle/Pectoral [  X ]  Shoulder/Deltoid [ X  ]  Scapula [ X  ]  Interosseous [  X ]  Quadriceps [   ]  Gastrocnemius [   ]; Fat Wasting- Orbital [ X  ]  Buccal [ X  ]  Triceps [   ]  Rib [ X  ]--> Suspect severe protein-calorie malnutrition 2/2 to physical assessment, inadequate energy intake of <75% for >1 month    Treatment:    The following diet has been recommended:  Please see recommendations for TPN in initial RD assessment  Advance to PO intake as medically feasible      PROVIDER Section:     By signing this assessment you are acknowledging and agree with the diagnosis/diagnoses assigned by the Registered Dietitian    Comments:

## 2019-04-06 NOTE — DIETITIAN INITIAL EVALUATION ADULT. - NS AS NUTRI INTERV PARENTERAL
If do not suspect that pt will meet nutritional needs in 5 days, recommend starting TPN. TPN (route per team): 360g Dex, 95g AA, 50g 20% Lipids to provide in total  2126 Kcal, 95g protein, GIR 3.4, 1.3g/kg IBW protein/Composition/Concentration/Site care/IV fluids/Route/Rate/Schedule If do not suspect that pt will meet nutritional needs in 72hrs, recommend starting TPN. RD recs for 360g Dex, 95g AA, 50g 20% Lipids to provide in total 2126 Kcal, 95g protein, GIR 3.4, 1.3g/kg IBW protein. See flowsheet for additional recs on advancement./Composition/Concentration/Site care/IV fluids/Schedule/Route/Rate

## 2019-04-06 NOTE — PROGRESS NOTE ADULT - ASSESSMENT
87 yo M with history of b/l laparoscopic robotic-assisted inguinal hernia repair presenting with b/l segmental pulmonary embolisms, left lung abscess, pneumoperitoneum, and distal descending colon abscess (likely source of pneumoperitoneum), significant DVT burden now s/p IVCF placement and IR drainage of LLQ abdominal collection (4/5)    Recs:  Continue heparin gtt for PE, transition to PO anticoagulation when cleared by general surgery  Call with questions

## 2019-04-07 ENCOUNTER — RESULT REVIEW (OUTPATIENT)
Age: 84
End: 2019-04-07

## 2019-04-07 LAB
-  AMPICILLIN/SULBACTAM: SIGNIFICANT CHANGE UP
-  AMPICILLIN: SIGNIFICANT CHANGE UP
-  CEFAZOLIN: SIGNIFICANT CHANGE UP
-  CEFTRIAXONE: SIGNIFICANT CHANGE UP
-  CIPROFLOXACIN: SIGNIFICANT CHANGE UP
-  CLINDAMYCIN: SIGNIFICANT CHANGE UP
-  ERYTHROMYCIN: SIGNIFICANT CHANGE UP
-  GENTAMICIN: SIGNIFICANT CHANGE UP
-  LEVOFLOXACIN: SIGNIFICANT CHANGE UP
-  PENICILLIN: SIGNIFICANT CHANGE UP
-  PIPERACILLIN/TAZOBACTAM: SIGNIFICANT CHANGE UP
-  TOBRAMYCIN: SIGNIFICANT CHANGE UP
-  TRIMETHOPRIM/SULFAMETHOXAZOLE: SIGNIFICANT CHANGE UP
-  VANCOMYCIN: SIGNIFICANT CHANGE UP
ALBUMIN SERPL ELPH-MCNC: 2 G/DL — LOW (ref 3.3–5)
ALP SERPL-CCNC: 38 U/L — LOW (ref 40–120)
ALT FLD-CCNC: 11 U/L — SIGNIFICANT CHANGE UP (ref 10–45)
ANION GAP SERPL CALC-SCNC: 10 MMOL/L — SIGNIFICANT CHANGE UP (ref 5–17)
APTT BLD: 105.8 SEC — HIGH (ref 27.5–36.3)
APTT BLD: 126.9 SEC — CRITICAL HIGH (ref 27.5–36.3)
APTT BLD: 29.9 SEC — SIGNIFICANT CHANGE UP (ref 27.5–36.3)
APTT BLD: 36.8 SEC — HIGH (ref 27.5–36.3)
AST SERPL-CCNC: 13 U/L — SIGNIFICANT CHANGE UP (ref 10–40)
BASE EXCESS BLDA CALC-SCNC: -1.4 MMOL/L — SIGNIFICANT CHANGE UP (ref -2–3)
BASE EXCESS BLDA CALC-SCNC: -2.4 MMOL/L — LOW (ref -2–3)
BASE EXCESS BLDA CALC-SCNC: -3.7 MMOL/L — LOW (ref -2–3)
BASE EXCESS BLDA CALC-SCNC: -6.1 MMOL/L — LOW (ref -2–3)
BASE EXCESS BLDA CALC-SCNC: -7.6 MMOL/L — LOW (ref -2–3)
BILIRUB SERPL-MCNC: 2 MG/DL — HIGH (ref 0.2–1.2)
BUN SERPL-MCNC: 14 MG/DL — SIGNIFICANT CHANGE UP (ref 7–23)
BUN SERPL-MCNC: 16 MG/DL — SIGNIFICANT CHANGE UP (ref 7–23)
BUN SERPL-MCNC: 17 MG/DL — SIGNIFICANT CHANGE UP (ref 7–23)
CA-I BLDA-SCNC: 0.88 MMOL/L — LOW (ref 1.12–1.3)
CA-I BLDA-SCNC: 0.89 MMOL/L — LOW (ref 1.12–1.3)
CA-I BLDA-SCNC: 1.03 MMOL/L — LOW (ref 1.12–1.3)
CA-I BLDA-SCNC: 1.12 MMOL/L — SIGNIFICANT CHANGE UP (ref 1.12–1.3)
CALCIUM SERPL-MCNC: 7.1 MG/DL — LOW (ref 8.4–10.5)
CALCIUM SERPL-MCNC: 7.2 MG/DL — LOW (ref 8.4–10.5)
CALCIUM SERPL-MCNC: 8 MG/DL — LOW (ref 8.4–10.5)
CHLORIDE SERPL-SCNC: 101 MMOL/L — SIGNIFICANT CHANGE UP (ref 96–108)
CHLORIDE SERPL-SCNC: 101 MMOL/L — SIGNIFICANT CHANGE UP (ref 96–108)
CHLORIDE SERPL-SCNC: 106 MMOL/L — SIGNIFICANT CHANGE UP (ref 96–108)
CO2 SERPL-SCNC: 20 MMOL/L — LOW (ref 22–31)
CO2 SERPL-SCNC: 20 MMOL/L — LOW (ref 22–31)
CO2 SERPL-SCNC: 22 MMOL/L — SIGNIFICANT CHANGE UP (ref 22–31)
COHGB MFR BLDA: 0.1 % — SIGNIFICANT CHANGE UP
COHGB MFR BLDA: 0.3 % — SIGNIFICANT CHANGE UP
COHGB MFR BLDA: 0.4 % — SIGNIFICANT CHANGE UP
COHGB MFR BLDA: 0.5 % — SIGNIFICANT CHANGE UP
CREAT SERPL-MCNC: 0.6 MG/DL — SIGNIFICANT CHANGE UP (ref 0.5–1.3)
CREAT SERPL-MCNC: 0.67 MG/DL — SIGNIFICANT CHANGE UP (ref 0.5–1.3)
CREAT SERPL-MCNC: 0.73 MG/DL — SIGNIFICANT CHANGE UP (ref 0.5–1.3)
FIBRINOGEN PPP-MCNC: 183 MG/DL — LOW (ref 258–438)
FIBRINOGEN PPP-MCNC: 267 MG/DL — SIGNIFICANT CHANGE UP (ref 258–438)
GAS PNL BLDV: SIGNIFICANT CHANGE UP
GAS PNL BLDV: SIGNIFICANT CHANGE UP
GLUCOSE BLDC GLUCOMTR-MCNC: 133 MG/DL — HIGH (ref 70–99)
GLUCOSE BLDC GLUCOMTR-MCNC: 140 MG/DL — HIGH (ref 70–99)
GLUCOSE BLDC GLUCOMTR-MCNC: 165 MG/DL — HIGH (ref 70–99)
GLUCOSE SERPL-MCNC: 108 MG/DL — HIGH (ref 70–99)
GLUCOSE SERPL-MCNC: 129 MG/DL — HIGH (ref 70–99)
GLUCOSE SERPL-MCNC: 183 MG/DL — HIGH (ref 70–99)
HCO3 BLDA-SCNC: 19 MMOL/L — LOW (ref 21–28)
HCO3 BLDA-SCNC: 20 MMOL/L — LOW (ref 21–28)
HCO3 BLDA-SCNC: 22 MMOL/L — SIGNIFICANT CHANGE UP (ref 21–28)
HCO3 BLDA-SCNC: 23 MMOL/L — SIGNIFICANT CHANGE UP (ref 21–28)
HCO3 BLDA-SCNC: 23 MMOL/L — SIGNIFICANT CHANGE UP (ref 21–28)
HCT VFR BLD CALC: 24.4 % — LOW (ref 39–50)
HCT VFR BLD CALC: 24.5 % — LOW (ref 39–50)
HCT VFR BLD CALC: 26.9 % — LOW (ref 39–50)
HGB BLD-MCNC: 7.8 G/DL — LOW (ref 13–17)
HGB BLD-MCNC: 8.4 G/DL — LOW (ref 13–17)
HGB BLD-MCNC: 9 G/DL — LOW (ref 13–17)
HGB BLDA-MCNC: 10.4 G/DL — LOW (ref 13–17)
HGB BLDA-MCNC: 5.3 G/DL — CRITICAL LOW (ref 13–17)
HGB BLDA-MCNC: 5.6 G/DL — CRITICAL LOW (ref 13–17)
HGB BLDA-MCNC: 8.2 G/DL — LOW (ref 13–17)
INR BLD: 1.63 — HIGH (ref 0.88–1.16)
INR BLD: 1.82 — HIGH (ref 0.88–1.16)
LACTATE SERPL-SCNC: 1.9 MMOL/L — SIGNIFICANT CHANGE UP (ref 0.5–2)
MAGNESIUM SERPL-MCNC: 1.6 MG/DL — SIGNIFICANT CHANGE UP (ref 1.6–2.6)
MAGNESIUM SERPL-MCNC: 1.9 MG/DL — SIGNIFICANT CHANGE UP (ref 1.6–2.6)
MCHC RBC-ENTMCNC: 31.3 PG — SIGNIFICANT CHANGE UP (ref 27–34)
MCHC RBC-ENTMCNC: 31.4 PG — SIGNIFICANT CHANGE UP (ref 27–34)
MCHC RBC-ENTMCNC: 32 GM/DL — SIGNIFICANT CHANGE UP (ref 32–36)
MCHC RBC-ENTMCNC: 32.1 PG — SIGNIFICANT CHANGE UP (ref 27–34)
MCHC RBC-ENTMCNC: 33.5 GM/DL — SIGNIFICANT CHANGE UP (ref 32–36)
MCHC RBC-ENTMCNC: 34.3 GM/DL — SIGNIFICANT CHANGE UP (ref 32–36)
MCV RBC AUTO: 100.4 FL — HIGH (ref 80–100)
MCV RBC AUTO: 91.4 FL — SIGNIFICANT CHANGE UP (ref 80–100)
MCV RBC AUTO: 93.7 FL — SIGNIFICANT CHANGE UP (ref 80–100)
METHGB MFR BLDA: 0.1 % — SIGNIFICANT CHANGE UP
METHGB MFR BLDA: 0.2 % — SIGNIFICANT CHANGE UP
METHGB MFR BLDA: 0.2 % — SIGNIFICANT CHANGE UP
METHGB MFR BLDA: 0.3 % — SIGNIFICANT CHANGE UP
METHOD TYPE: SIGNIFICANT CHANGE UP
NRBC # BLD: 0 /100 WBCS — SIGNIFICANT CHANGE UP (ref 0–0)
O2 CT VFR BLDA CALC: 15.3 ML/DL — SIGNIFICANT CHANGE UP (ref 15–23)
O2 CT VFR BLDA CALC: SIGNIFICANT CHANGE UP (ref 15–23)
OXYHGB MFR BLDA: 98 % — SIGNIFICANT CHANGE UP (ref 94–100)
OXYHGB MFR BLDA: 99 % — SIGNIFICANT CHANGE UP (ref 94–100)
PCO2 BLDA: 36 MMHG — SIGNIFICANT CHANGE UP (ref 35–48)
PCO2 BLDA: 39 MMHG — SIGNIFICANT CHANGE UP (ref 35–48)
PCO2 BLDA: 40 MMHG — SIGNIFICANT CHANGE UP (ref 35–48)
PCO2 BLDA: 41 MMHG — SIGNIFICANT CHANGE UP (ref 35–48)
PCO2 BLDA: 41 MMHG — SIGNIFICANT CHANGE UP (ref 35–48)
PH BLDA: 7.27 — LOW (ref 7.35–7.45)
PH BLDA: 7.32 — LOW (ref 7.35–7.45)
PH BLDA: 7.34 — LOW (ref 7.35–7.45)
PH BLDA: 7.37 — SIGNIFICANT CHANGE UP (ref 7.35–7.45)
PH BLDA: 7.42 — SIGNIFICANT CHANGE UP (ref 7.35–7.45)
PHOSPHATE SERPL-MCNC: 3.7 MG/DL — SIGNIFICANT CHANGE UP (ref 2.5–4.5)
PHOSPHATE SERPL-MCNC: 3.9 MG/DL — SIGNIFICANT CHANGE UP (ref 2.5–4.5)
PLATELET # BLD AUTO: 106 K/UL — LOW (ref 150–400)
PLATELET # BLD AUTO: 118 K/UL — LOW (ref 150–400)
PLATELET # BLD AUTO: 339 K/UL — SIGNIFICANT CHANGE UP (ref 150–400)
PO2 BLDA: 286 MMHG — HIGH (ref 83–108)
PO2 BLDA: 292 MMHG — HIGH (ref 83–108)
PO2 BLDA: 299 MMHG — HIGH (ref 83–108)
PO2 BLDA: 316 MMHG — HIGH (ref 83–108)
PO2 BLDA: 325 MMHG — HIGH (ref 83–108)
POTASSIUM BLDA-SCNC: 3.3 MMOL/L — LOW (ref 3.5–4.9)
POTASSIUM BLDA-SCNC: 3.7 MMOL/L — SIGNIFICANT CHANGE UP (ref 3.5–4.9)
POTASSIUM BLDA-SCNC: 3.9 MMOL/L — SIGNIFICANT CHANGE UP (ref 3.5–4.9)
POTASSIUM BLDA-SCNC: 4.1 MMOL/L — SIGNIFICANT CHANGE UP (ref 3.5–4.9)
POTASSIUM SERPL-MCNC: 3.5 MMOL/L — SIGNIFICANT CHANGE UP (ref 3.5–5.3)
POTASSIUM SERPL-MCNC: 4.1 MMOL/L — SIGNIFICANT CHANGE UP (ref 3.5–5.3)
POTASSIUM SERPL-MCNC: 4.1 MMOL/L — SIGNIFICANT CHANGE UP (ref 3.5–5.3)
POTASSIUM SERPL-SCNC: 3.5 MMOL/L — SIGNIFICANT CHANGE UP (ref 3.5–5.3)
POTASSIUM SERPL-SCNC: 4.1 MMOL/L — SIGNIFICANT CHANGE UP (ref 3.5–5.3)
POTASSIUM SERPL-SCNC: 4.1 MMOL/L — SIGNIFICANT CHANGE UP (ref 3.5–5.3)
PROT SERPL-MCNC: 3.6 G/DL — LOW (ref 6–8.3)
PROTHROM AB SERPL-ACNC: 18.7 SEC — HIGH (ref 10–12.9)
PROTHROM AB SERPL-ACNC: 20.9 SEC — HIGH (ref 10–12.9)
RBC # BLD: 2.43 M/UL — LOW (ref 4.2–5.8)
RBC # BLD: 2.68 M/UL — LOW (ref 4.2–5.8)
RBC # BLD: 2.87 M/UL — LOW (ref 4.2–5.8)
RBC # FLD: 13.2 % — SIGNIFICANT CHANGE UP (ref 10.3–14.5)
RBC # FLD: 14.6 % — HIGH (ref 10.3–14.5)
RBC # FLD: 15.2 % — HIGH (ref 10.3–14.5)
SAO2 % BLDA: 100 % — SIGNIFICANT CHANGE UP (ref 95–100)
SAO2 % BLDA: 99 % — SIGNIFICANT CHANGE UP (ref 95–100)
SODIUM BLDA-SCNC: 126 MMOL/L — LOW (ref 138–146)
SODIUM BLDA-SCNC: 127 MMOL/L — LOW (ref 138–146)
SODIUM BLDA-SCNC: 128 MMOL/L — LOW (ref 138–146)
SODIUM BLDA-SCNC: 132 MMOL/L — LOW (ref 138–146)
SODIUM SERPL-SCNC: 131 MMOL/L — LOW (ref 135–145)
SODIUM SERPL-SCNC: 133 MMOL/L — LOW (ref 135–145)
SODIUM SERPL-SCNC: 136 MMOL/L — SIGNIFICANT CHANGE UP (ref 135–145)
VANCOMYCIN FLD-MCNC: 8.3 UG/ML — SIGNIFICANT CHANGE UP
WBC # BLD: 14.1 K/UL — HIGH (ref 3.8–10.5)
WBC # BLD: 16.76 K/UL — HIGH (ref 3.8–10.5)
WBC # BLD: 19.48 K/UL — HIGH (ref 3.8–10.5)
WBC # FLD AUTO: 14.1 K/UL — HIGH (ref 3.8–10.5)
WBC # FLD AUTO: 16.76 K/UL — HIGH (ref 3.8–10.5)
WBC # FLD AUTO: 19.48 K/UL — HIGH (ref 3.8–10.5)

## 2019-04-07 PROCEDURE — 71045 X-RAY EXAM CHEST 1 VIEW: CPT | Mod: 26

## 2019-04-07 PROCEDURE — 71046 X-RAY EXAM CHEST 2 VIEWS: CPT | Mod: 26

## 2019-04-07 PROCEDURE — 71045 X-RAY EXAM CHEST 1 VIEW: CPT | Mod: 26,77

## 2019-04-07 PROCEDURE — 99291 CRITICAL CARE FIRST HOUR: CPT

## 2019-04-07 RX ORDER — HEPARIN SODIUM 5000 [USP'U]/ML
1500 INJECTION INTRAVENOUS; SUBCUTANEOUS
Qty: 25000 | Refills: 0 | Status: DISCONTINUED | OUTPATIENT
Start: 2019-04-07 | End: 2019-04-07

## 2019-04-07 RX ORDER — ELECTROLYTE SOLUTION,INJ
1 VIAL (ML) INTRAVENOUS
Qty: 0 | Refills: 0 | Status: DISCONTINUED | OUTPATIENT
Start: 2019-04-07 | End: 2019-04-07

## 2019-04-07 RX ORDER — FENTANYL CITRATE 50 UG/ML
0.5 INJECTION INTRAVENOUS
Qty: 2500 | Refills: 0 | Status: DISCONTINUED | OUTPATIENT
Start: 2019-04-07 | End: 2019-04-12

## 2019-04-07 RX ORDER — I.V. FAT EMULSION 20 G/100ML
0.7 EMULSION INTRAVENOUS
Qty: 50 | Refills: 0 | Status: DISCONTINUED | OUTPATIENT
Start: 2019-04-07 | End: 2019-04-07

## 2019-04-07 RX ORDER — PROPOFOL 10 MG/ML
5 INJECTION, EMULSION INTRAVENOUS
Qty: 1000 | Refills: 0 | Status: DISCONTINUED | OUTPATIENT
Start: 2019-04-07 | End: 2019-04-15

## 2019-04-07 RX ORDER — POTASSIUM CHLORIDE 20 MEQ
20 PACKET (EA) ORAL
Qty: 0 | Refills: 0 | Status: COMPLETED | OUTPATIENT
Start: 2019-04-07 | End: 2019-04-07

## 2019-04-07 RX ORDER — ALBUMIN HUMAN 25 %
500 VIAL (ML) INTRAVENOUS ONCE
Qty: 0 | Refills: 0 | Status: DISCONTINUED | OUTPATIENT
Start: 2019-04-07 | End: 2019-04-09

## 2019-04-07 RX ORDER — HEPARIN SODIUM 5000 [USP'U]/ML
INJECTION INTRAVENOUS; SUBCUTANEOUS
Qty: 25000 | Refills: 0 | Status: DISCONTINUED | OUTPATIENT
Start: 2019-04-07 | End: 2019-04-07

## 2019-04-07 RX ORDER — PHYTONADIONE (VIT K1) 5 MG
10 TABLET ORAL ONCE
Qty: 0 | Refills: 0 | Status: COMPLETED | OUTPATIENT
Start: 2019-04-07 | End: 2019-04-07

## 2019-04-07 RX ADMIN — PIPERACILLIN AND TAZOBACTAM 200 GRAM(S): 4; .5 INJECTION, POWDER, LYOPHILIZED, FOR SOLUTION INTRAVENOUS at 06:04

## 2019-04-07 RX ADMIN — Medication 6.87 MICROGRAM(S)/KG/MIN: at 06:04

## 2019-04-07 RX ADMIN — PIPERACILLIN AND TAZOBACTAM 200 GRAM(S): 4; .5 INJECTION, POWDER, LYOPHILIZED, FOR SOLUTION INTRAVENOUS at 18:09

## 2019-04-07 RX ADMIN — Medication 50 MILLILITER(S): at 18:00

## 2019-04-07 RX ADMIN — Medication 1 EACH: at 18:01

## 2019-04-07 RX ADMIN — PANTOPRAZOLE SODIUM 40 MILLIGRAM(S): 20 TABLET, DELAYED RELEASE ORAL at 11:38

## 2019-04-07 RX ADMIN — I.V. FAT EMULSION 20.8 GM/KG/DAY: 20 EMULSION INTRAVENOUS at 18:01

## 2019-04-07 RX ADMIN — PIPERACILLIN AND TAZOBACTAM 200 GRAM(S): 4; .5 INJECTION, POWDER, LYOPHILIZED, FOR SOLUTION INTRAVENOUS at 11:38

## 2019-04-07 RX ADMIN — Medication 50 MILLIEQUIVALENT(S): at 02:15

## 2019-04-07 RX ADMIN — HEPARIN SODIUM 1300 UNIT(S)/HR: 5000 INJECTION INTRAVENOUS; SUBCUTANEOUS at 07:29

## 2019-04-07 RX ADMIN — Medication 50 MILLIEQUIVALENT(S): at 06:04

## 2019-04-07 RX ADMIN — Medication 50 MILLILITER(S): at 00:47

## 2019-04-07 RX ADMIN — PROPOFOL 2.2 MICROGRAM(S)/KG/MIN: 10 INJECTION, EMULSION INTRAVENOUS at 19:12

## 2019-04-07 RX ADMIN — Medication 50 MILLILITER(S): at 09:55

## 2019-04-07 RX ADMIN — FENTANYL CITRATE 3.67 MICROGRAM(S)/KG/HR: 50 INJECTION INTRAVENOUS at 18:02

## 2019-04-07 RX ADMIN — Medication 102 MILLIGRAM(S): at 19:28

## 2019-04-07 RX ADMIN — Medication 250 MILLIGRAM(S): at 07:27

## 2019-04-07 RX ADMIN — Medication 1: at 18:14

## 2019-04-07 RX ADMIN — Medication 50 MILLIEQUIVALENT(S): at 01:00

## 2019-04-07 NOTE — PROGRESS NOTE ADULT - ATTENDING COMMENTS
The CXR has bilateral infiltrates c/w ARDS exacerbated by low oncotic pressure. Worsening shock, likely septic, with organ dysfunction. Discussed with Dr. Valencia who will take him to OR which is critical despite attendant risks and morbidities.  Reviewed with REGINE Wilder acting as my scribe.  Critical care time rendered 50 minutes

## 2019-04-07 NOTE — PROGRESS NOTE ADULT - SUBJECTIVE AND OBJECTIVE BOX
S: Patient examined bedside. Pt has no complaints. Denies CP/SOB/NV.     piperacillin/tazobactam IVPB. 3.375  heparin  flush 100 Units/mL Injectable 100  heparin  Infusion.   norepinephrine Infusion 0.05  piperacillin/tazobactam IVPB. 3.375        Vital Signs Last 24 Hrs  T(C): 37.1 (07 Apr 2019 06:46), Max: 37.7 (07 Apr 2019 01:34)  T(F): 98.7 (07 Apr 2019 06:46), Max: 99.9 (07 Apr 2019 01:34)  HR: 84 (07 Apr 2019 08:00) (76 - 130)  BP: 110/50 (06 Apr 2019 17:05) (84/44 - 110/50)  BP(mean): 78 (06 Apr 2019 17:05) (54 - 78)  RR: 21 (07 Apr 2019 08:00) (15 - 31)  SpO2: 98% (07 Apr 2019 08:00) (87% - 100%)  I&O's Summary    06 Apr 2019 07:01  -  07 Apr 2019 07:00  --------------------------------------------------------  IN: 3065.3 mL / OUT: 1004 mL / NET: 2061.3 mL    07 Apr 2019 07:01  -  07 Apr 2019 08:49  --------------------------------------------------------  IN: 90 mL / OUT: 0 mL / NET: 90 mL    PHYSICAL EXAM:  General: NAD  Neuro: A&Ox3, no focal deficits  Pulm: no respiratory distress  C/V:  RRR  R IJ C/D/I no signs of hematoma  Abd: NT, ND, no guarding, PHYLLIS drain w/ SS drainage  b/l LE w/ 2+ pitting edema, B/L DP/PT palpable, b/l LE warm to palp      LABS:                        7.8    19.48 )-----------( 339      ( 07 Apr 2019 05:27 )             24.4     04-07    131<L>  |  101  |  16  ----------------------------<  129<H>  4.1   |  20<L>  |  0.73    Ca    7.1<L>      07 Apr 2019 05:27  Phos  3.7     04-07  Mg     1.9     04-07    TPro  3.6<L>  /  Alb  1.3<L>  /  TBili  0.4  /  DBili  x   /  AST  15  /  ALT  31  /  AlkPhos  86  04-06    PT/INR - ( 06 Apr 2019 07:09 )   PT: 21.4 sec;   INR: 1.86          PTT - ( 07 Apr 2019 05:27 )  PTT:126.9 sec    Assessment and Plan:     85 yo M with history of b/l laparoscopic robotic-assisted inguinal hernia repair presenting with b/l segmental pulmonary embolisms, left lung abscess, pneumoperitoneum, and distal descending colon abscess (likely source of pneumoperitoneum), significant DVT burden now s/p IVCF placement and IR drainage of LLQ abdominal collection (4/5)    Recs:  Continue heparin gtt for PE, transition to PO anticoagulation when cleared by general surgery  signing off thank you for the consult

## 2019-04-07 NOTE — PROGRESS NOTE ADULT - ASSESSMENT
87 yo M with history of b/l laparoscopic robotic-assisted inguinal hernia repair presenting with b/l segmental pulmonary embolisms, left lung abscess, pneumoperitoneum, and distal descending colon abscess (likely source of pneumoperitoneum), significant DVT burden now s/p IVCF placement and IR drainage of LLQ abdominal collection (4/5) now in septic shock, CXR concerning for developing ARDS in the setting intra-abdominal sepsis. Will plan for Ex-lap and abdominal washout today  Neuro: tylenol prn for pain  CV: levo for SBP>100; statin; hold home asa/plavix for now. albumin 25%q8  Pulm: HFNC, bilateral PE's;  CT surgery following for lung abscess, no acute intervention needed at this time  GI: NPO / IVF - s/p IR drainage LLQ collection; pneumoperitoneum of unknown etiology; serial abdominal exams  : rao; strict I'Os  ID: colon abscess and lung abscess; Vanco (4/5--), Zosyn (4/5--)   Endo: ISS  Heme: heparin gtt for iliofemoral DVT / bilateral PE; s/p IVC Filter   PPx: SCDs; (already on hep gtt)  Lines: PIVs, PICC, R A line   Wounds: abdominal PHYLLIS drain x1  PT/OT: Not ordered 87 yo M with history of b/l laparoscopic robotic-assisted inguinal hernia repair presenting with b/l segmental pulmonary embolisms, left lung abscess, pneumoperitoneum, and distal descending colon abscess (likely source of pneumoperitoneum), significant DVT burden now s/p IVCF placement and IR drainage of LLQ abdominal collection (4/5) now in septic shock, with likely ATN, CXR concerning for developing ARDS in the setting intra-abdominal sepsis. Will plan for Ex-lap and abdominal washout today  Neuro: tylenol prn for pain  CV: levo for SBP>100; statin; hold home asa/plavix for now. albumin 25%q8  Pulm: HFNC, bilateral PE's;  CT surgery following for lung abscess, no acute intervention needed at this time  GI: NPO / IVF - s/p IR drainage LLQ collection; pneumoperitoneum of unknown etiology; serial abdominal exams  : rao; strict I'Os  ID: colon abscess and lung abscess; Vanco (4/5--), Zosyn (4/5--)   Endo: ISS  Heme: heparin gtt for iliofemoral DVT / bilateral PE; s/p IVC Filter   PPx: SCDs; (already on hep gtt)  Lines: PIVs, PICC, R A line   Wounds: abdominal PHYLLIS drain x1  PT/OT: Not ordered

## 2019-04-07 NOTE — BRIEF OPERATIVE NOTE - OPERATION/FINDINGS
Preop Dx: Perforated viscus  Postop Dx: Sepsis of unknown origin  Procedure: Exploratory laparotomy, lysis of adhesions, sigmoidectomy, abdominal packing and placement of ABThera VAC  Findings: Exploratory laparotomy performed. 3L of clear ascites encountered and suctioned. Portion of small bowel adherent to sigmoid colon and left pelvic side wall with fibrinous exudate. Lysis of adhesions performed.  10cm Portion of deserosalized bowel resected with primary anastomosis. Sigmoid Colon with small area of induration but fibrinous exudate throughout mobilized and resected. No obvious perforation identified. Stomach examined and without abnormalities. Preop Dx: Perforated viscus  Postop Dx: Sepsis of unknown origin  Procedure: Exploratory laparotomy, lysis of adhesions, sigmoidectomy, abdominal packing and placement of ABThera VAC  Findings: Exploratory laparotomy performed. 3L of clear ascites encountered and suctioned. Portion of small bowel adherent to sigmoid colon and left pelvic side wall with fibrinous exudate. Lysis of adhesions performed.  10cm Portion of deserosalized bowel resected with primary anastomosis. Sigmoid Colon with small area of induration but fibrinous exudate throughout mobilized and resected. No obvious perforation identified. Stomach examined and without abnormalities. Some bleeding noted initially in pelvis and then in LUQ. Some bleeding vessels identified but generalized ooze continued. Surgicel placed onto raw oozing areas. Oozing continued so lap pads packed into Pelvis, LUQ and ultimately Right paracolic gutter (9 lap pads total: 3 in pelvis, 3 in LUQ, and 3 in Right paracolic gutter). Colon left in discontinuity and ABThera VAC placed. 2x drains placed (Right sided drain in Right paracolic gutter, Left drain in pelvis). IR drain (on Left paracolic gutter) left in place.

## 2019-04-07 NOTE — PROGRESS NOTE ADULT - SUBJECTIVE AND OBJECTIVE BOX
INTERVAL HPI/OVERNIGHT EVENTS:       MEDICATIONS  (STANDING):  albumin human 25% IVPB 50 milliLiter(s) IV Intermittent every 8 hours  dextrose 5%. 1000 milliLiter(s) (50 mL/Hr) IV Continuous <Continuous>  dextrose 50% Injectable 12.5 Gram(s) IV Push once  dextrose 50% Injectable 25 Gram(s) IV Push once  dextrose 50% Injectable 25 Gram(s) IV Push once  fat emulsion (Plant Based) 20% Infusion 0.7 Gm/kG/Day (21.379 mL/Hr) IV Continuous <Continuous>  heparin  flush 100 Units/mL Injectable 100 Unit(s) IV Push every other day  heparin  Infusion.  Unit(s)/Hr (13 mL/Hr) IV Continuous <Continuous>  insulin lispro (HumaLOG) corrective regimen sliding scale   SubCutaneous every 6 hours  norepinephrine Infusion 0.05 MICROgram(s)/kG/Min (6.872 mL/Hr) IV Continuous <Continuous>  pantoprazole  Injectable 40 milliGRAM(s) IV Push daily  Parenteral Nutrition - Adult 1 Each (63 mL/Hr) TPN Continuous <Continuous>  piperacillin/tazobactam IVPB. 3.375 Gram(s) IV Intermittent every 6 hours    MEDICATIONS  (PRN):  dextrose 40% Gel 15 Gram(s) Oral once PRN Blood Glucose LESS THAN 70 milliGRAM(s)/deciliter  glucagon  Injectable 1 milliGRAM(s) IntraMuscular once PRN Glucose LESS THAN 70 milligrams/deciliter  ondansetron Injectable 4 milliGRAM(s) IV Push every 6 hours PRN Nausea      Drug Dosing Weight  Height (cm): 180 (2019 07:52)  Weight (kg): 73.3 (2019 15:10)  BMI (kg/m2): 22.6 (2019 07:52)  BSA (m2): 1.92 (2019 07:52)      PAST MEDICAL & SURGICAL HISTORY:  Esophageal reflux  Acute diarrhea  HLD (hyperlipidemia)  HTN (hypertension)  Prostate cancer  Stented coronary artery: x3 , per pt.  Atherosclerosis of coronary artery: CAD (coronary artery disease)  S/P hernia repair  Surgery, elective: cardiac stent x3  History of prostate surgery      ICU Vital Signs Last 24 Hrs  T(C): 36.7 (2019 09:20), Max: 37.7 (2019 01:34)  T(F): 98.1 (2019 09:20), Max: 99.9 (2019 01:34)  HR: 84 (2019 10:00) (76 - 130)  BP: 110/50 (2019 17:05) (84/44 - 110/50)  BP(mean): 78 (2019 17:05) (54 - 78)  ABP: 122/44 (2019 10:00) (92/46 - 166/66)  ABP(mean): 68 (2019 10:00) (60 - 94)  RR: 24 (2019 10:00) (16 - 31)  SpO2: 98% (2019 10:00) (87% - 100%)      ABG - ( 2019 18:13 )  pH, Arterial: 7.49  pH, Blood: x     /  pCO2: 30    /  pO2: 117   / HCO3: 22    / Base Excess: -0.6  /  SaO2: 98                    2019 07:01  -  2019 07:00  --------------------------------------------------------  IN:    Albumin 25%: 150 mL    heparin  Infusion.: 70 mL    heparin  Infusion.: 75 mL    heparin  Infusion.: 13 mL    heparin Infusion: 153 mL    IV PiggyBack: 950 mL    norepinephrine Infusion: 210.1 mL    norepinephrine Infusion: 64.2 mL    sodium chloride 0.9%: 880 mL    Sodium Chloride 0.9% IV Bolus: 500 mL  Total IN: 3065.3 mL    OUT:    Bulb: 105 mL    Indwelling Catheter - Urethral: 899 mL  Total OUT: 1004 mL    Total NET: 2061.3 mL      2019 07:01  -  2019 11:12  --------------------------------------------------------  IN:    heparin  Infusion.: 26 mL    norepinephrine Infusion: 64 mL  Total IN: 90 mL    OUT:    Indwelling Catheter - Urethral: 18 mL  Total OUT: 18 mL    Total NET: 72 mL        PHYSICAL EXAM:          LABS:  CBC Full  -  ( 2019 05:27 )  WBC Count : 19.48 K/uL  RBC Count : 2.43 M/uL  Hemoglobin : 7.8 g/dL  Hematocrit : 24.4 %  Platelet Count - Automated : 339 K/uL  Mean Cell Volume : 100.4 fl  Mean Cell Hemoglobin : 32.1 pg  Mean Cell Hemoglobin Concentration : 32.0 gm/dL          131<L>  |  101  |  16  ----------------------------<  129<H>  4.1   |  20<L>  |  0.73    Ca    7.1<L>      2019 05:27  Phos  3.7     -  Mg     1.9         TPro  3.6<L>  /  Alb  1.3<L>  /  TBili  0.4  /  DBili  x   /  AST  15  /  ALT  31  /  AlkPhos  86  -06    PT/INR - ( 2019 07:09 )   PT: 21.4 sec;   INR: 1.86          PTT - ( 2019 10:26 )  PTT:105.8 sec  Urinalysis Basic - ( 2019 16:40 )    Color: Yellow / Appearance: Clear / S.015 / pH: x  Gluc: x / Ketone: NEGATIVE  / Bili: Negative / Urobili: 0.2 E.U./dL   Blood: x / Protein: NEGATIVE mg/dL / Nitrite: NEGATIVE   Leuk Esterase: NEGATIVE / RBC: x / WBC x   Sq Epi: x / Non Sq Epi: x / Bacteria: x INTERVAL HPI/OVERNIGHT EVENTS: o/n: PTT 55.5 hep gtt adjusted, Hgb 8.2, PICC at bedside by Alejandra, potassium 3.5 repleted , sinus tachycardia (approx 110) resolved spontaneously. AM , hep gtt held for one hour and decreased to 13 cc/hr  4/6: edema noted on exam and albumin 1.3, started albumin 25%. MAPs 50s at noon, unresponsive to NS 500cc , levo started, R radial A line placed, alejandra to place PICC. Abd Cx +GPCs, few GNR, echo without evidence of R heart strain. B lines on bedside US in afternoon, HL IVF and given 20 lasix    Pt seen and examined at bedside. Tolerating HFNC. Denies abdominal pain/CP/SOB    MEDICATIONS  (STANDING):  albumin human 25% IVPB 50 milliLiter(s) IV Intermittent every 8 hours  dextrose 5%. 1000 milliLiter(s) (50 mL/Hr) IV Continuous <Continuous>  dextrose 50% Injectable 12.5 Gram(s) IV Push once  dextrose 50% Injectable 25 Gram(s) IV Push once  dextrose 50% Injectable 25 Gram(s) IV Push once  fat emulsion (Plant Based) 20% Infusion 0.7 Gm/kG/Day (21.379 mL/Hr) IV Continuous <Continuous>  heparin  flush 100 Units/mL Injectable 100 Unit(s) IV Push every other day  heparin  Infusion.  Unit(s)/Hr (13 mL/Hr) IV Continuous <Continuous>  insulin lispro (HumaLOG) corrective regimen sliding scale   SubCutaneous every 6 hours  norepinephrine Infusion 0.05 MICROgram(s)/kG/Min (6.872 mL/Hr) IV Continuous <Continuous>  pantoprazole  Injectable 40 milliGRAM(s) IV Push daily  Parenteral Nutrition - Adult 1 Each (63 mL/Hr) TPN Continuous <Continuous>  piperacillin/tazobactam IVPB. 3.375 Gram(s) IV Intermittent every 6 hours    MEDICATIONS  (PRN):  dextrose 40% Gel 15 Gram(s) Oral once PRN Blood Glucose LESS THAN 70 milliGRAM(s)/deciliter  glucagon  Injectable 1 milliGRAM(s) IntraMuscular once PRN Glucose LESS THAN 70 milligrams/deciliter  ondansetron Injectable 4 milliGRAM(s) IV Push every 6 hours PRN Nausea      Drug Dosing Weight  Height (cm): 180 (2019 07:52)  Weight (kg): 73.3 (2019 15:10)  BMI (kg/m2): 22.6 (2019 07:52)  BSA (m2): 1.92 (2019 07:52)      PAST MEDICAL & SURGICAL HISTORY:  Esophageal reflux  Acute diarrhea  HLD (hyperlipidemia)  HTN (hypertension)  Prostate cancer  Stented coronary artery: x3 , per pt.  Atherosclerosis of coronary artery: CAD (coronary artery disease)  S/P hernia repair  Surgery, elective: cardiac stent x3  History of prostate surgery      ICU Vital Signs Last 24 Hrs  T(C): 36.7 (2019 09:20), Max: 37.7 (2019 01:34)  T(F): 98.1 (2019 09:20), Max: 99.9 (2019 01:34)  HR: 84 (2019 10:00) (76 - 130)  BP: 110/50 (2019 17:05) (84/44 - 110/50)  BP(mean): 78 (2019 17:05) (54 - 78)  ABP: 122/44 (2019 10:00) (92/46 - 166/66)  ABP(mean): 68 (2019 10:00) (60 - 94)  RR: 24 (2019 10:00) (16 - 31)  SpO2: 98% (2019 10:00) (87% - 100%)      ABG - ( 2019 18:13 )  pH, Arterial: 7.49  pH, Blood: x     /  pCO2: 30    /  pO2: 117   / HCO3: 22    / Base Excess: -0.6  /  SaO2: 98                    2019 07:01  -  2019 07:00  --------------------------------------------------------  IN:    Albumin 25%: 150 mL    heparin  Infusion.: 70 mL    heparin  Infusion.: 75 mL    heparin  Infusion.: 13 mL    heparin Infusion: 153 mL    IV PiggyBack: 950 mL    norepinephrine Infusion: 210.1 mL    norepinephrine Infusion: 64.2 mL    sodium chloride 0.9%: 880 mL    Sodium Chloride 0.9% IV Bolus: 500 mL  Total IN: 3065.3 mL    OUT:    Bulb: 105 mL    Indwelling Catheter - Urethral: 899 mL  Total OUT: 1004 mL    Total NET: 2061.3 mL      2019 07:01  -  2019 11:12  --------------------------------------------------------  IN:    heparin  Infusion.: 26 mL    norepinephrine Infusion: 64 mL  Total IN: 90 mL    OUT:    Indwelling Catheter - Urethral: 18 mL  Total OUT: 18 mL    Total NET: 72 mL        PHYSICAL EXAM:  Gen: cachectic, no acute distress  Neuro: A&Ox3, CN grossly intact  HEENT: MMM  Pulm: breathing comfortably on HFNC, b/l rhonchi  C/V:  RRR  Abd: NTND, no guarding, PHYLLIS drain w/ SS drainage  : rao draining clear urine. +scrotal edema  Ext: WWP, 2+ pitting edema from feet to upper thighs and UEs  Vasc: 1+ palp signal through edema of feet; palp pop and fem bilaterally  Skin: no rashes        LABS:  CBC Full  -  ( 2019 05:27 )  WBC Count : 19.48 K/uL  RBC Count : 2.43 M/uL  Hemoglobin : 7.8 g/dL  Hematocrit : 24.4 %  Platelet Count - Automated : 339 K/uL  Mean Cell Volume : 100.4 fl  Mean Cell Hemoglobin : 32.1 pg  Mean Cell Hemoglobin Concentration : 32.0 gm/dL          131<L>  |  101  |  16  ----------------------------<  129<H>  4.1   |  20<L>  |  0.73    Ca    7.1<L>      2019 05:27  Phos  3.7     04-07  Mg     1.9     -07    TPro  3.6<L>  /  Alb  1.3<L>  /  TBili  0.4  /  DBili  x   /  AST  15  /  ALT  31  /  AlkPhos  86  04-06    PT/INR - ( 2019 07:09 )   PT: 21.4 sec;   INR: 1.86          PTT - ( 2019 10:26 )  PTT:105.8 sec  Urinalysis Basic - ( 2019 16:40 )    Color: Yellow / Appearance: Clear / S.015 / pH: x  Gluc: x / Ketone: NEGATIVE  / Bili: Negative / Urobili: 0.2 E.U./dL   Blood: x / Protein: NEGATIVE mg/dL / Nitrite: NEGATIVE   Leuk Esterase: NEGATIVE / RBC: x / WBC x   Sq Epi: x / Non Sq Epi: x / Bacteria: x

## 2019-04-08 LAB
ALBUMIN SERPL ELPH-MCNC: 2.1 G/DL — LOW (ref 3.3–5)
ALP SERPL-CCNC: 36 U/L — LOW (ref 40–120)
ALT FLD-CCNC: 12 U/L — SIGNIFICANT CHANGE UP (ref 10–45)
ANION GAP SERPL CALC-SCNC: 11 MMOL/L — SIGNIFICANT CHANGE UP (ref 5–17)
ANION GAP SERPL CALC-SCNC: 6 MMOL/L — SIGNIFICANT CHANGE UP (ref 5–17)
APTT BLD: 31.7 SEC — SIGNIFICANT CHANGE UP (ref 27.5–36.3)
APTT BLD: 32.1 SEC — SIGNIFICANT CHANGE UP (ref 27.5–36.3)
AST SERPL-CCNC: 10 U/L — SIGNIFICANT CHANGE UP (ref 10–40)
BASE EXCESS BLDA CALC-SCNC: 0.9 MMOL/L — SIGNIFICANT CHANGE UP (ref -2–3)
BILIRUB DIRECT SERPL-MCNC: 0.3 MG/DL — HIGH (ref 0–0.2)
BILIRUB INDIRECT FLD-MCNC: 0.6 MG/DL — SIGNIFICANT CHANGE UP (ref 0.2–1)
BILIRUB SERPL-MCNC: 0.9 MG/DL — SIGNIFICANT CHANGE UP (ref 0.2–1.2)
BUN SERPL-MCNC: 17 MG/DL — SIGNIFICANT CHANGE UP (ref 7–23)
BUN SERPL-MCNC: 19 MG/DL — SIGNIFICANT CHANGE UP (ref 7–23)
CALCIUM SERPL-MCNC: 7 MG/DL — LOW (ref 8.4–10.5)
CALCIUM SERPL-MCNC: 7.6 MG/DL — LOW (ref 8.4–10.5)
CHLORIDE SERPL-SCNC: 104 MMOL/L — SIGNIFICANT CHANGE UP (ref 96–108)
CHLORIDE SERPL-SCNC: 106 MMOL/L — SIGNIFICANT CHANGE UP (ref 96–108)
CO2 SERPL-SCNC: 21 MMOL/L — LOW (ref 22–31)
CO2 SERPL-SCNC: 25 MMOL/L — SIGNIFICANT CHANGE UP (ref 22–31)
CREAT SERPL-MCNC: 0.46 MG/DL — LOW (ref 0.5–1.3)
CREAT SERPL-MCNC: 0.52 MG/DL — SIGNIFICANT CHANGE UP (ref 0.5–1.3)
EXTRA SST TUBE: SIGNIFICANT CHANGE UP
FIBRINOGEN PPP-MCNC: 234 MG/DL — LOW (ref 258–438)
GAS PNL BLDA: SIGNIFICANT CHANGE UP
GLUCOSE BLDC GLUCOMTR-MCNC: 211 MG/DL — HIGH (ref 70–99)
GLUCOSE BLDC GLUCOMTR-MCNC: 216 MG/DL — HIGH (ref 70–99)
GLUCOSE BLDC GLUCOMTR-MCNC: 218 MG/DL — HIGH (ref 70–99)
GLUCOSE BLDC GLUCOMTR-MCNC: 224 MG/DL — HIGH (ref 70–99)
GLUCOSE BLDC GLUCOMTR-MCNC: 228 MG/DL — HIGH (ref 70–99)
GLUCOSE BLDC GLUCOMTR-MCNC: 280 MG/DL — HIGH (ref 70–99)
GLUCOSE SERPL-MCNC: 215 MG/DL — HIGH (ref 70–99)
GLUCOSE SERPL-MCNC: 219 MG/DL — HIGH (ref 70–99)
HCO3 BLDA-SCNC: 24 MMOL/L — SIGNIFICANT CHANGE UP (ref 21–28)
HCT VFR BLD CALC: 22.4 % — LOW (ref 39–50)
HCT VFR BLD CALC: 23.8 % — LOW (ref 39–50)
HCT VFR BLD CALC: 24.3 % — LOW (ref 39–50)
HGB BLD-MCNC: 7.7 G/DL — LOW (ref 13–17)
HGB BLD-MCNC: 8.2 G/DL — LOW (ref 13–17)
HGB BLD-MCNC: 8.4 G/DL — LOW (ref 13–17)
INR BLD: 1.56 — HIGH (ref 0.88–1.16)
INR BLD: 1.57 — HIGH (ref 0.88–1.16)
MAGNESIUM SERPL-MCNC: 1.7 MG/DL — SIGNIFICANT CHANGE UP (ref 1.6–2.6)
MCHC RBC-ENTMCNC: 31.2 PG — SIGNIFICANT CHANGE UP (ref 27–34)
MCHC RBC-ENTMCNC: 31.3 PG — SIGNIFICANT CHANGE UP (ref 27–34)
MCHC RBC-ENTMCNC: 31.7 PG — SIGNIFICANT CHANGE UP (ref 27–34)
MCHC RBC-ENTMCNC: 34.4 GM/DL — SIGNIFICANT CHANGE UP (ref 32–36)
MCHC RBC-ENTMCNC: 34.5 GM/DL — SIGNIFICANT CHANGE UP (ref 32–36)
MCHC RBC-ENTMCNC: 34.6 GM/DL — SIGNIFICANT CHANGE UP (ref 32–36)
MCV RBC AUTO: 90.5 FL — SIGNIFICANT CHANGE UP (ref 80–100)
MCV RBC AUTO: 91.1 FL — SIGNIFICANT CHANGE UP (ref 80–100)
MCV RBC AUTO: 91.7 FL — SIGNIFICANT CHANGE UP (ref 80–100)
NRBC # BLD: 0 /100 WBCS — SIGNIFICANT CHANGE UP (ref 0–0)
PCO2 BLDA: 35 MMHG — SIGNIFICANT CHANGE UP (ref 35–48)
PH BLDA: 7.46 — HIGH (ref 7.35–7.45)
PHOSPHATE SERPL-MCNC: 1.8 MG/DL — LOW (ref 2.5–4.5)
PLATELET # BLD AUTO: 100 K/UL — LOW (ref 150–400)
PLATELET # BLD AUTO: 104 K/UL — LOW (ref 150–400)
PLATELET # BLD AUTO: 106 K/UL — LOW (ref 150–400)
PO2 BLDA: 123 MMHG — HIGH (ref 83–108)
POTASSIUM SERPL-MCNC: 3 MMOL/L — LOW (ref 3.5–5.3)
POTASSIUM SERPL-MCNC: 4.2 MMOL/L — SIGNIFICANT CHANGE UP (ref 3.5–5.3)
POTASSIUM SERPL-SCNC: 3 MMOL/L — LOW (ref 3.5–5.3)
POTASSIUM SERPL-SCNC: 4.2 MMOL/L — SIGNIFICANT CHANGE UP (ref 3.5–5.3)
PROT SERPL-MCNC: 3.4 G/DL — LOW (ref 6–8.3)
PROTHROM AB SERPL-ACNC: 17.9 SEC — HIGH (ref 10–12.9)
PROTHROM AB SERPL-ACNC: 18 SEC — HIGH (ref 10–12.9)
RBC # BLD: 2.46 M/UL — LOW (ref 4.2–5.8)
RBC # BLD: 2.63 M/UL — LOW (ref 4.2–5.8)
RBC # BLD: 2.65 M/UL — LOW (ref 4.2–5.8)
RBC # FLD: 15 % — HIGH (ref 10.3–14.5)
RBC # FLD: 15.5 % — HIGH (ref 10.3–14.5)
RBC # FLD: 15.8 % — HIGH (ref 10.3–14.5)
SAO2 % BLDA: 99 % — SIGNIFICANT CHANGE UP (ref 95–100)
SODIUM SERPL-SCNC: 135 MMOL/L — SIGNIFICANT CHANGE UP (ref 135–145)
SODIUM SERPL-SCNC: 138 MMOL/L — SIGNIFICANT CHANGE UP (ref 135–145)
T4 AB SER-ACNC: <3 UG/DL — LOW (ref 3.17–11.72)
TSH SERPL-MCNC: 4.15 UIU/ML — SIGNIFICANT CHANGE UP (ref 0.35–4.94)
WBC # BLD: 11.3 K/UL — HIGH (ref 3.8–10.5)
WBC # BLD: 13.83 K/UL — HIGH (ref 3.8–10.5)
WBC # BLD: 8.91 K/UL — SIGNIFICANT CHANGE UP (ref 3.8–10.5)
WBC # FLD AUTO: 11.3 K/UL — HIGH (ref 3.8–10.5)
WBC # FLD AUTO: 13.83 K/UL — HIGH (ref 3.8–10.5)
WBC # FLD AUTO: 8.91 K/UL — SIGNIFICANT CHANGE UP (ref 3.8–10.5)

## 2019-04-08 PROCEDURE — 71045 X-RAY EXAM CHEST 1 VIEW: CPT | Mod: 26

## 2019-04-08 PROCEDURE — 99291 CRITICAL CARE FIRST HOUR: CPT

## 2019-04-08 RX ORDER — ALBUMIN HUMAN 25 %
250 VIAL (ML) INTRAVENOUS ONCE
Qty: 0 | Refills: 0 | Status: COMPLETED | OUTPATIENT
Start: 2019-04-08 | End: 2019-04-08

## 2019-04-08 RX ORDER — POTASSIUM PHOSPHATE, MONOBASIC POTASSIUM PHOSPHATE, DIBASIC 236; 224 MG/ML; MG/ML
30 INJECTION, SOLUTION INTRAVENOUS ONCE
Qty: 0 | Refills: 0 | Status: COMPLETED | OUTPATIENT
Start: 2019-04-08 | End: 2019-04-08

## 2019-04-08 RX ORDER — AMPICILLIN SODIUM AND SULBACTAM SODIUM 250; 125 MG/ML; MG/ML
3 INJECTION, POWDER, FOR SUSPENSION INTRAMUSCULAR; INTRAVENOUS EVERY 6 HOURS
Qty: 0 | Refills: 0 | Status: DISCONTINUED | OUTPATIENT
Start: 2019-04-08 | End: 2019-04-09

## 2019-04-08 RX ORDER — I.V. FAT EMULSION 20 G/100ML
0.7 EMULSION INTRAVENOUS
Qty: 50 | Refills: 0 | Status: DISCONTINUED | OUTPATIENT
Start: 2019-04-08 | End: 2019-04-08

## 2019-04-08 RX ORDER — INSULIN LISPRO 100/ML
VIAL (ML) SUBCUTANEOUS EVERY 6 HOURS
Qty: 0 | Refills: 0 | Status: DISCONTINUED | OUTPATIENT
Start: 2019-04-08 | End: 2019-04-08

## 2019-04-08 RX ORDER — ELECTROLYTE SOLUTION,INJ
1 VIAL (ML) INTRAVENOUS
Qty: 0 | Refills: 0 | Status: DISCONTINUED | OUTPATIENT
Start: 2019-04-08 | End: 2019-04-08

## 2019-04-08 RX ORDER — NYSTATIN CREAM 100000 [USP'U]/G
1 CREAM TOPICAL
Qty: 0 | Refills: 0 | Status: DISCONTINUED | OUTPATIENT
Start: 2019-04-08 | End: 2019-04-30

## 2019-04-08 RX ORDER — INSULIN HUMAN 100 [IU]/ML
3 INJECTION, SOLUTION SUBCUTANEOUS
Qty: 100 | Refills: 0 | Status: DISCONTINUED | OUTPATIENT
Start: 2019-04-08 | End: 2019-04-11

## 2019-04-08 RX ORDER — MAGNESIUM SULFATE 500 MG/ML
1 VIAL (ML) INJECTION ONCE
Qty: 0 | Refills: 0 | Status: COMPLETED | OUTPATIENT
Start: 2019-04-08 | End: 2019-04-08

## 2019-04-08 RX ORDER — POTASSIUM CHLORIDE 20 MEQ
20 PACKET (EA) ORAL
Qty: 0 | Refills: 0 | Status: COMPLETED | OUTPATIENT
Start: 2019-04-08 | End: 2019-04-08

## 2019-04-08 RX ORDER — NOREPINEPHRINE BITARTRATE/D5W 8 MG/250ML
0.05 PLASTIC BAG, INJECTION (ML) INTRAVENOUS
Qty: 16 | Refills: 0 | Status: DISCONTINUED | OUTPATIENT
Start: 2019-04-08 | End: 2019-04-28

## 2019-04-08 RX ORDER — CHLORHEXIDINE GLUCONATE 213 G/1000ML
1 SOLUTION TOPICAL
Qty: 0 | Refills: 0 | Status: DISCONTINUED | OUTPATIENT
Start: 2019-04-08 | End: 2019-05-09

## 2019-04-08 RX ADMIN — Medication 3: at 02:48

## 2019-04-08 RX ADMIN — I.V. FAT EMULSION 20.83 GM/KG/DAY: 20 EMULSION INTRAVENOUS at 18:22

## 2019-04-08 RX ADMIN — Medication 50 MILLILITER(S): at 00:45

## 2019-04-08 RX ADMIN — Medication 100 MILLIEQUIVALENT(S): at 08:46

## 2019-04-08 RX ADMIN — AMPICILLIN SODIUM AND SULBACTAM SODIUM 200 GRAM(S): 250; 125 INJECTION, POWDER, FOR SUSPENSION INTRAMUSCULAR; INTRAVENOUS at 15:03

## 2019-04-08 RX ADMIN — Medication 100 MILLIEQUIVALENT(S): at 12:15

## 2019-04-08 RX ADMIN — PIPERACILLIN AND TAZOBACTAM 200 GRAM(S): 4; .5 INJECTION, POWDER, LYOPHILIZED, FOR SOLUTION INTRAVENOUS at 12:16

## 2019-04-08 RX ADMIN — CHLORHEXIDINE GLUCONATE 1 APPLICATION(S): 213 SOLUTION TOPICAL at 08:48

## 2019-04-08 RX ADMIN — Medication 100 GRAM(S): at 08:47

## 2019-04-08 RX ADMIN — Medication 3.44 MICROGRAM(S)/KG/MIN: at 22:01

## 2019-04-08 RX ADMIN — Medication 4: at 10:58

## 2019-04-08 RX ADMIN — Medication 4: at 16:23

## 2019-04-08 RX ADMIN — PIPERACILLIN AND TAZOBACTAM 200 GRAM(S): 4; .5 INJECTION, POWDER, LYOPHILIZED, FOR SOLUTION INTRAVENOUS at 06:49

## 2019-04-08 RX ADMIN — Medication 1 EACH: at 18:22

## 2019-04-08 RX ADMIN — Medication 3.44 MICROGRAM(S)/KG/MIN: at 10:43

## 2019-04-08 RX ADMIN — NYSTATIN CREAM 1 APPLICATION(S): 100000 CREAM TOPICAL at 16:50

## 2019-04-08 RX ADMIN — Medication 50 MILLILITER(S): at 16:24

## 2019-04-08 RX ADMIN — PIPERACILLIN AND TAZOBACTAM 200 GRAM(S): 4; .5 INJECTION, POWDER, LYOPHILIZED, FOR SOLUTION INTRAVENOUS at 00:44

## 2019-04-08 RX ADMIN — PANTOPRAZOLE SODIUM 40 MILLIGRAM(S): 20 TABLET, DELAYED RELEASE ORAL at 12:16

## 2019-04-08 RX ADMIN — FENTANYL CITRATE 3.67 MICROGRAM(S)/KG/HR: 50 INJECTION INTRAVENOUS at 18:35

## 2019-04-08 RX ADMIN — POTASSIUM PHOSPHATE, MONOBASIC POTASSIUM PHOSPHATE, DIBASIC 83.33 MILLIMOLE(S): 236; 224 INJECTION, SOLUTION INTRAVENOUS at 08:46

## 2019-04-08 RX ADMIN — Medication 50 MILLILITER(S): at 08:47

## 2019-04-08 RX ADMIN — INSULIN HUMAN 3 UNIT(S)/HR: 100 INJECTION, SOLUTION SUBCUTANEOUS at 21:19

## 2019-04-08 RX ADMIN — PROPOFOL 2.2 MICROGRAM(S)/KG/MIN: 10 INJECTION, EMULSION INTRAVENOUS at 15:01

## 2019-04-08 RX ADMIN — Medication 2: at 06:49

## 2019-04-08 RX ADMIN — AMPICILLIN SODIUM AND SULBACTAM SODIUM 200 GRAM(S): 250; 125 INJECTION, POWDER, FOR SUSPENSION INTRAMUSCULAR; INTRAVENOUS at 21:18

## 2019-04-08 RX ADMIN — Medication 100 MILLIEQUIVALENT(S): at 10:43

## 2019-04-08 NOTE — ADVANCED PRACTICE NURSE CONSULT - RECOMMEDATIONS
Sacrum, bilateral buttocks and bilateral gluteal folds - cleanse with cleansing wipe, apply Nystatin powder and seal with Cavilon liquid skin barrier twice daily. Apply foam dressing to sacrum every 3 days or prn if soiled or saturated.   Continue use of AirTAP positioning system and wedges for turning and repositioning and bilateral heel protectors to offload heels.   Discussed assessment and recommendations with TERI WEINER.

## 2019-04-08 NOTE — ADVANCED PRACTICE NURSE CONSULT - ASSESSMENT
Patient presented with erythematous fungal rash on buttocks, bilateral buttocks and bilateral gluteal folds. Denuded skin noted on left side of sacrum measuring 2 cm x 1 cm x 0.1 cm likely secondary to bowel incontinence measuring Patient intubated and sedated, requires 2 person assist to turn and position in bed. Patient on an AirTAP positioning system with wedges for turning and repositioning and wearing bilateral heel protectors to offload heels. Patient presented with erythematous fungal rash on sacrum, bilateral buttocks and bilateral gluteal folds. Denuded skin noted on left side of sacrum measuring 2 cm x 1 cm x 0.1 cm likely secondary to bowel incontinence measuring Patient intubated and sedated, requires 2 person assist to turn and position in bed. Patient on an AirTAP positioning system with wedges for turning and repositioning and wearing bilateral heel protectors to offload heels.

## 2019-04-08 NOTE — ADVANCED PRACTICE NURSE CONSULT - REASON FOR CONSULT
[facility] nurse consult for 87 yo M, poor historian, with history of CAD s/p 3 stents (2012, 2009, 2000) still on ASA/Plavix, ETOH abuse, b/l inguinal hernias s/p laparoscopic robotic-assisted repair of bilateral inguinal hernias on 3/11/2019 (notably, 4.5L of ascites were also drained in the beginning of the procedure and sent for cytopathology) presenting with right inguinal hernia discomfort.   Patient s/p exploratory laparotomy, lysis of adhesions, sigmoidectomy, abdominal packing and placement of ABThera VAC 4/7/19.

## 2019-04-08 NOTE — PROGRESS NOTE ADULT - ASSESSMENT
87 yo M with history of b/l laparoscopic robotic-assisted inguinal hernia repair presenting with b/l segmental pulmonary embolisms, left lung abscess, pneumoperitoneum, and distal descending colon abscess (likely source of pneumoperitoneum), significant iliofemoral DVT burden, s/p IVCF placement and IR drainage of LLQ abdominal collection (4/5), now s/p ex-lap, LAURYN, sigmoidectomy, drain placement in R. paracolic gutter, and abthera vac placement (4/7).    Plan:  Neuro: sedated - propofol/fentanyl  CV: levo for SBP>100; statin; albumin 25%q8  Pulm: AC 40/470/12/5, bilateral PE's;  CT following for empyema, no acute intervention needed at this time  GI: NPO, OGT to LIWS; malnourished, prealbumin 4; TPN w/ lipids daily; s/p IR drainage LLQ collection  : rao; strict I'Os  ID: colon abscess and lung abscess; IR drain growing klebsiella, E. coli., strep milleri, proteus vulgaris --- all sensitive to Unasyn 3g Q6 (4/8--- ) [dc'd Vanco (4/5--4/7), Zosyn (4/5--4/8)]   Endo: ISS; hyperglycemic to 200's; 15u insulin in TPN;   Heme: bilateral iliofemoral DVT / bilateral PE; s/p IVC Filter; heparin gtt dc'd in setting of active bleed  PPx: SCDs; hold heparin gtt  Lines: PIVs, PICC, R A line   Wounds/drains: abthera vac to Midline incision, LLQ IR drain, Left PHYLLIS (pelvis), right PHYLLIS (paracolic gutter)  PT/OT: Not ordered 85 yo M with history of b/l laparoscopic robotic-assisted inguinal hernia repair presenting with b/l segmental pulmonary embolisms, left lung abscess, pneumoperitoneum, and distal descending colon abscess (likely source of pneumoperitoneum), significant iliofemoral DVT burden, s/p IVCF placement and IR drainage of LLQ abdominal collection (4/5), now s/p ex-lap, LAURYN, sigmoidectomy, drain placement in R. paracolic gutter, and abthera vac placement (4/7).    Plan:  Neuro: sedated - propofol/fentanyl  CV: levo for SBP>100; statin; albumin 25%q8. Re anasarca checking TFTs, 24 hour urine protein  Pulm: AC 40/470/12/5, bilateral PE's;  CT following for empyema, no acute intervention needed at this time  GI: NPO, OGT to LIWS; malnourished, prealbumin 4; TPN w/ lipids daily; check prealbumin and 24 hour UUN; s/p IR drainage LLQ collection  : rao; strict I'Os, UO improved, check 2 hour urine creatinine for clearance  ID: colon abscess and lung abscess; IR drain growing klebsiella, E. coli., strep milleri, proteus vulgaris --- all sensitive to Unasyn 3g Q6 (4/8--- ) [dc'd Vanco (4/5--4/7), Zosyn (4/5--4/8)]   Endo: ISS; hyperglycemic to 200's; 15u insulin in TPN;   Heme: bilateral iliofemoral DVT / bilateral PE; s/p IVC Filter; heparin gtt dc'd in setting of active bleed  PPx: SCDs; hold heparin gtt  Lines: PIVs, PICC, R A line   Wounds/drains: abthera vac to Midline incision, LLQ IR drain, Left PHYLLIS (pelvis), right PHYLLIS (paracolic gutter)  PT/OT: Not ordered  Critical care time rendered 50 minutes

## 2019-04-08 NOTE — PROGRESS NOTE ADULT - SUBJECTIVE AND OBJECTIVE BOX
SUBJECTIVE:   Patient seen and examined by       Vitals - Range in last 12h  T(F): , Max: 98.1 (04-08-19 @ 01:51)  HR:  (79 - 96)  BP:  (103/57 - 119/63)  BP(mean):  (78 - 91)  ABP:  (96/68 - 150/62)  ABP(mean):  (72 - 96)  RR:  (13 - 15)  SpO2:  (100% - 100%)      Vitals - Most Recent  T(F): 97.3 (04-08-19 @ 05:58)  HR: 82 (04-08-19 @ 07:00)  BP: 117/57 (04-08-19 @ 07:00)  RR: 15 (04-08-19 @ 07:00)  SpO2: 100% (04-08-19 @ 07:00)  I&O's Detail    07 Apr 2019 07:01  -  08 Apr 2019 07:00  --------------------------------------------------------  IN:    Albumin 25%: 100 mL    Cryoprecipitate: 127 mL    fat emulsion (Plant Based) 20% Infusion: 270.4 mL    fentaNYL Infusion.: 48 mL    heparin  Infusion.: 65 mL    IV PiggyBack: 450 mL    norepinephrine Infusion: 781.2 mL    Packed Red Blood Cells: 350 mL    Plasma: 1158 mL    Platelets - Single Donor: 329 mL    propofol Infusion: 120 mL    TPN (Total Parenteral Nutrition): 750 mL  Total IN: 4548.6 mL    OUT:    Bulb: 735 mL    Bulb: 35 mL    Indwelling Catheter - Urethral: 584 mL    Nasoenteral Tube: 150 mL    VAC (Vacuum Assisted Closure) System: 2000 mL  Total OUT: 3504 mL    Total NET: 1044.6 mL        Mode: AC/ CMV (Assist Control/ Continuous Mandatory Ventilation)  RR (machine): 12  RR (patient): 15  TV (machine): 470  TV (patient): 466  FiO2: 40  PEEP: 5  ITime: 1  MAP: 9.2  PIP: 22      Physical Exam          LABS:                        7.7    8.91  )-----------( 104      ( 08 Apr 2019 05:51 )             22.4     04-08    138  |  106  |  17  ----------------------------<  215<H>  3.0<L>   |  21<L>  |  0.52    Ca    7.0<L>      08 Apr 2019 05:51  Phos  1.8     04-08  Mg     1.7     04-08    TPro  3.4<L>  /  Alb  2.1<L>  /  TBili  0.9  /  DBili  0.3<H>  /  AST  10  /  ALT  12  /  AlkPhos  36<L>  04-08    LIVER FUNCTIONS - ( 08 Apr 2019 05:51 )  Alb: 2.1 g/dL / Pro: 3.4 g/dL / ALK PHOS: 36 U/L / ALT: 12 U/L / AST: 10 U/L / GGT: x           PT/INR - ( 08 Apr 2019 05:51 )   PT: 18.0 sec;   INR: 1.57          PTT - ( 08 Apr 2019 05:51 )  PTT:31.7 sec      Culture - Body Fluid with Gram Stain (collected 05 Apr 2019 21:48)  Source: .Body Fluid abdominal drainage  Gram Stain (06 Apr 2019 12:43):    Numerous Gram Positive Cocci in Pairs and Chains    Few Gram Negative Rods    Rare White blood cells  Preliminary Report (07 Apr 2019 13:37):    Numerous Klebsiella pneumoniae    Numerous Escherichia coli    Numerous Proteus vulgaris group    Numerous Streptococcus milleri, viridans group    Numerous Bacteroides fragilis    Beta lactamase positive  Organism: Klebsiella pneumoniae  Escherichia coli  Proteus vulgaris group  Streptococcus milleri, viridans group  Streptococcus milleri, viridans group (07 Apr 2019 12:10)  Organism: Proteus vulgaris group (07 Apr 2019 12:10)  Organism: Streptococcus milleri, viridans group (07 Apr 2019 08:21)  Organism: Streptococcus milleri, viridans group (07 Apr 2019 08:21)  Organism: Escherichia coli (07 Apr 2019 08:19)  Organism: Klebsiella pneumoniae (07 Apr 2019 08:19)    Culture - Blood (collected 05 Apr 2019 17:05)  Source: .Blood Blood  Preliminary Report (07 Apr 2019 18:00):    No growth at 2 days.    Culture - Blood (collected 05 Apr 2019 17:05)  Source: .Blood Blood  Preliminary Report (07 Apr 2019 18:00):    No growth at 2 days.    Culture - Urine (collected 05 Apr 2019 17:04)  Source: .Urine Clean Catch (Midstream)  Final Report (06 Apr 2019 09:28):    No growth        MEDICATIONS  (STANDING):  albumin human  5% IVPB 500 milliLiter(s) IV Intermittent once  albumin human 25% IVPB 50 milliLiter(s) IV Intermittent every 8 hours  chlorhexidine 2% Cloths 1 Application(s) Topical <User Schedule>  dextrose 5%. 1000 milliLiter(s) (50 mL/Hr) IV Continuous <Continuous>  dextrose 50% Injectable 12.5 Gram(s) IV Push once  dextrose 50% Injectable 25 Gram(s) IV Push once  dextrose 50% Injectable 25 Gram(s) IV Push once  fat emulsion (Plant Based) 20% Infusion 0.7 Gm/kG/Day (20.8 mL/Hr) IV Continuous <Continuous>  fentaNYL   Infusion. 0.5 MICROgram(s)/kG/Hr (3.665 mL/Hr) IV Continuous <Continuous>  heparin  flush 100 Units/mL Injectable 100 Unit(s) IV Push every other day  insulin lispro (HumaLOG) corrective regimen sliding scale   SubCutaneous every 6 hours  magnesium sulfate  IVPB 1 Gram(s) IV Intermittent once  norepinephrine Infusion 0.05 MICROgram(s)/kG/Min (3.436 mL/Hr) IV Continuous <Continuous>  pantoprazole  Injectable 40 milliGRAM(s) IV Push daily  Parenteral Nutrition - Adult 1 Each (63 mL/Hr) TPN Continuous <Continuous>  piperacillin/tazobactam IVPB. 3.375 Gram(s) IV Intermittent every 6 hours  potassium chloride  20 mEq/100 mL IVPB 20 milliEquivalent(s) IV Intermittent every 1 hour  potassium phosphate IVPB 30 milliMole(s) IV Intermittent once  propofol Infusion 5 MICROgram(s)/kG/Min (2.199 mL/Hr) IV Continuous <Continuous>    MEDICATIONS  (PRN):  dextrose 40% Gel 15 Gram(s) Oral once PRN Blood Glucose LESS THAN 70 milliGRAM(s)/deciliter  glucagon  Injectable 1 milliGRAM(s) IntraMuscular once PRN Glucose LESS THAN 70 milligrams/deciliter  ondansetron Injectable 4 milliGRAM(s) IV Push every 6 hours PRN Nausea      RADIOLOGY & ADDITIONAL STUDIES: SUBJECTIVE:   Patient seen and examined by bedside. Intubated and sedated. ROS not obtainable. Maintaining MAPs on Levophed. Grimaces to abdominal palpation.       Vitals - Range in last 12h  T(F): , Max: 98.1 (04-08-19 @ 01:51)  HR:  (79 - 96)  BP:  (103/57 - 119/63)  BP(mean):  (78 - 91)  ABP:  (96/68 - 150/62)  ABP(mean):  (72 - 96)  RR:  (13 - 15)  SpO2:  (100% - 100%)      Vitals - Most Recent  T(F): 97.3 (04-08-19 @ 05:58)  HR: 82 (04-08-19 @ 07:00)  BP: 117/57 (04-08-19 @ 07:00)  RR: 15 (04-08-19 @ 07:00)  SpO2: 100% (04-08-19 @ 07:00)  I&O's Detail    07 Apr 2019 07:01  -  08 Apr 2019 07:00  --------------------------------------------------------  IN:    Albumin 25%: 100 mL    Cryoprecipitate: 127 mL    fat emulsion (Plant Based) 20% Infusion: 270.4 mL    fentaNYL Infusion.: 48 mL    heparin  Infusion.: 65 mL    IV PiggyBack: 450 mL    norepinephrine Infusion: 781.2 mL    Packed Red Blood Cells: 350 mL    Plasma: 1158 mL    Platelets - Single Donor: 329 mL    propofol Infusion: 120 mL    TPN (Total Parenteral Nutrition): 750 mL  Total IN: 4548.6 mL    OUT:    Bulb: 735 mL    Bulb: 35 mL    Indwelling Catheter - Urethral: 584 mL    Nasoenteral Tube: 150 mL    VAC (Vacuum Assisted Closure) System: 2000 mL  Total OUT: 3504 mL    Total NET: 1044.6 mL        Mode: AC/ CMV (Assist Control/ Continuous Mandatory Ventilation)  RR (machine): 12  RR (patient): 15  TV (machine): 470  TV (patient): 466  FiO2: 40  PEEP: 5  ITime: 1  MAP: 9.2  PIP: 22      Physical Exam  Neuro: no eye opening to verbal or mechanical stimuli  General: intubated, on vent  HEENT: PERRL, EOMI, MMM  Pulm: mild rhonchi bilaterally  C/V: nontachycardic, RRR, S1 S2, no murmurs  Abd: nondistended, but firm; tender to palpation; abthera vac in place w/ significant light serosanguinous output; IR drain w/ minimal seropurulent output; pelvic drain w/ significant serosanguinous output; R paracolic gutter drain w/ minimal output  Extremities: compression lower extremity dressing in place; 2+ edema of proximal thigh   : rao in place; significant scrotal edema  Skin: no ecchymosis and rashes    Bedside echo performed:  - possible small pericardial effusion and possible mild hypokinesis of LV wall motion  - B-lines bilaterally        LABS:                        7.7    8.91  )-----------( 104      ( 08 Apr 2019 05:51 )             22.4     04-08    138  |  106  |  17  ----------------------------<  215<H>  3.0<L>   |  21<L>  |  0.52    Ca    7.0<L>      08 Apr 2019 05:51  Phos  1.8     04-08  Mg     1.7     04-08    TPro  3.4<L>  /  Alb  2.1<L>  /  TBili  0.9  /  DBili  0.3<H>  /  AST  10  /  ALT  12  /  AlkPhos  36<L>  04-08    LIVER FUNCTIONS - ( 08 Apr 2019 05:51 )  Alb: 2.1 g/dL / Pro: 3.4 g/dL / ALK PHOS: 36 U/L / ALT: 12 U/L / AST: 10 U/L / GGT: x           PT/INR - ( 08 Apr 2019 05:51 )   PT: 18.0 sec;   INR: 1.57          PTT - ( 08 Apr 2019 05:51 )  PTT:31.7 sec      Culture - Body Fluid with Gram Stain (collected 05 Apr 2019 21:48)  Source: .Body Fluid abdominal drainage  Gram Stain (06 Apr 2019 12:43):    Numerous Gram Positive Cocci in Pairs and Chains    Few Gram Negative Rods    Rare White blood cells  Preliminary Report (07 Apr 2019 13:37):    Numerous Klebsiella pneumoniae    Numerous Escherichia coli    Numerous Proteus vulgaris group    Numerous Streptococcus milleri, viridans group    Numerous Bacteroides fragilis    Beta lactamase positive  Organism: Klebsiella pneumoniae  Escherichia coli  Proteus vulgaris group  Streptococcus milleri, viridans group  Streptococcus milleri, viridans group (07 Apr 2019 12:10)  Organism: Proteus vulgaris group (07 Apr 2019 12:10)  Organism: Streptococcus milleri, viridans group (07 Apr 2019 08:21)  Organism: Streptococcus milleri, viridans group (07 Apr 2019 08:21)  Organism: Escherichia coli (07 Apr 2019 08:19)  Organism: Klebsiella pneumoniae (07 Apr 2019 08:19)    Culture - Blood (collected 05 Apr 2019 17:05)  Source: .Blood Blood  Preliminary Report (07 Apr 2019 18:00):    No growth at 2 days.    Culture - Blood (collected 05 Apr 2019 17:05)  Source: .Blood Blood  Preliminary Report (07 Apr 2019 18:00):    No growth at 2 days.    Culture - Urine (collected 05 Apr 2019 17:04)  Source: .Urine Clean Catch (Midstream)  Final Report (06 Apr 2019 09:28):    No growth      MEDICATIONS  (STANDING):  albumin human  5% IVPB 500 milliLiter(s) IV Intermittent once  albumin human 25% IVPB 50 milliLiter(s) IV Intermittent every 8 hours  ampicillin/sulbactam  IVPB 3 Gram(s) IV Intermittent every 6 hours  chlorhexidine 2% Cloths 1 Application(s) Topical <User Schedule>  dextrose 5%. 1000 milliLiter(s) (50 mL/Hr) IV Continuous <Continuous>  dextrose 50% Injectable 12.5 Gram(s) IV Push once  dextrose 50% Injectable 25 Gram(s) IV Push once  dextrose 50% Injectable 25 Gram(s) IV Push once  fat emulsion (Plant Based) 20% Infusion 0.7 Gm/kG/Day (20.83 mL/Hr) IV Continuous <Continuous>  fentaNYL   Infusion. 0.5 MICROgram(s)/kG/Hr (3.665 mL/Hr) IV Continuous <Continuous>  heparin  flush 100 Units/mL Injectable 100 Unit(s) IV Push every other day  insulin lispro (HumaLOG) corrective regimen sliding scale   SubCutaneous every 6 hours  norepinephrine Infusion 0.05 MICROgram(s)/kG/Min (3.436 mL/Hr) IV Continuous <Continuous>  pantoprazole  Injectable 40 milliGRAM(s) IV Push daily  Parenteral Nutrition - Adult 1 Each (83 mL/Hr) TPN Continuous <Continuous>  Parenteral Nutrition - Adult 1 Each (63 mL/Hr) TPN Continuous <Continuous>  propofol Infusion 5 MICROgram(s)/kG/Min (2.199 mL/Hr) IV Continuous <Continuous>    MEDICATIONS  (PRN):  dextrose 40% Gel 15 Gram(s) Oral once PRN Blood Glucose LESS THAN 70 milliGRAM(s)/deciliter  glucagon  Injectable 1 milliGRAM(s) IntraMuscular once PRN Glucose LESS THAN 70 milligrams/deciliter  ondansetron Injectable 4 milliGRAM(s) IV Push every 6 hours PRN Nausea          RADIOLOGY & ADDITIONAL STUDIES:    < from: Xray Chest 1 View- PORTABLE-Routine (04.08.19 @ 07:27) >  Bedside examination of the chest dated 4/8/2019 7:27 AM is compared to   the previous study of 4/7/2019.    Findings: Endotracheal tube, NG tube, left PICC, right central line, and   left chest tube again present. No significant change pulmonary edema   pattern and right pleural effusion. No left effusion. No pneumothorax.    Impression: No significant change.    < end of copied text > SUBJECTIVE:   Patient seen and examined by bedside. Intubated and sedated. ROS not obtainable. Maintaining MAPs on Levophed. Grimaces to abdominal palpation.       Vitals - Range in last 12h  T(F): , Max: 98.1 (04-08-19 @ 01:51)  HR:  (79 - 96)  BP:  (103/57 - 119/63)  BP(mean):  (78 - 91)  ABP:  (96/68 - 150/62)  ABP(mean):  (72 - 96)  RR:  (13 - 15)  SpO2:  (100% - 100%)      Vitals - Most Recent  T(F): 97.3 (04-08-19 @ 05:58)  HR: 82 (04-08-19 @ 07:00)  BP: 117/57 (04-08-19 @ 07:00)  RR: 15 (04-08-19 @ 07:00)  SpO2: 100% (04-08-19 @ 07:00)  I&O's Detail    07 Apr 2019 07:01  -  08 Apr 2019 07:00  --------------------------------------------------------  IN:    Albumin 25%: 100 mL    Cryoprecipitate: 127 mL    fat emulsion (Plant Based) 20% Infusion: 270.4 mL    fentaNYL Infusion.: 48 mL    heparin  Infusion.: 65 mL    IV PiggyBack: 450 mL    norepinephrine Infusion: 781.2 mL    Packed Red Blood Cells: 350 mL    Plasma: 1158 mL    Platelets - Single Donor: 329 mL    propofol Infusion: 120 mL    TPN (Total Parenteral Nutrition): 750 mL  Total IN: 4548.6 mL    OUT:    Bulb: 735 mL    Bulb: 35 mL    Indwelling Catheter - Urethral: 584 mL    Nasoenteral Tube: 150 mL    VAC (Vacuum Assisted Closure) System: 2000 mL  Total OUT: 3504 mL    Total NET: 1044.6 mL        Mode: AC/ CMV (Assist Control/ Continuous Mandatory Ventilation)  RR (machine): 12  RR (patient): 15  TV (machine): 470  TV (patient): 466  FiO2: 40  PEEP: 5  ITime: 1  MAP: 9.2  PIP: 22      Physical Exam  Neuro: no eye opening to verbal or mechanical stimuli  General: intubated, on vent  HEENT: PERRL, EOMI, MMM  Pulm: mild rhonchi bilaterally  C/V: nontachycardic, RRR, S1 S2, no murmurs  Abd: nondistended, but firm; tender to palpation; abthera vac in place w/ significant light serosanguinous output; IR drain w/ minimal seropurulent output; pelvic drain w/ significant serosanguinous output; R paracolic gutter drain w/ minimal output  Extremities: compression lower extremity dressing in place; anasarca   : rao in place; significant scrotal edema  Skin: no ecchymosis and rashes    Bedside echo performed:  - possible small pericardial effusion and possible mild hypokinesis of LV wall motion  - B-lines bilaterally        LABS:                        7.7    8.91  )-----------( 104      ( 08 Apr 2019 05:51 )             22.4     04-08    138  |  106  |  17  ----------------------------<  215<H>  3.0<L>   |  21<L>  |  0.52    Ca    7.0<L>      08 Apr 2019 05:51  Phos  1.8     04-08  Mg     1.7     04-08    TPro  3.4<L>  /  Alb  2.1<L>  /  TBili  0.9  /  DBili  0.3<H>  /  AST  10  /  ALT  12  /  AlkPhos  36<L>  04-08    LIVER FUNCTIONS - ( 08 Apr 2019 05:51 )  Alb: 2.1 g/dL / Pro: 3.4 g/dL / ALK PHOS: 36 U/L / ALT: 12 U/L / AST: 10 U/L / GGT: x           PT/INR - ( 08 Apr 2019 05:51 )   PT: 18.0 sec;   INR: 1.57          PTT - ( 08 Apr 2019 05:51 )  PTT:31.7 sec      Culture - Body Fluid with Gram Stain (collected 05 Apr 2019 21:48)  Source: .Body Fluid abdominal drainage  Gram Stain (06 Apr 2019 12:43):    Numerous Gram Positive Cocci in Pairs and Chains    Few Gram Negative Rods    Rare White blood cells  Preliminary Report (07 Apr 2019 13:37):    Numerous Klebsiella pneumoniae    Numerous Escherichia coli    Numerous Proteus vulgaris group    Numerous Streptococcus milleri, viridans group    Numerous Bacteroides fragilis    Beta lactamase positive  Organism: Klebsiella pneumoniae  Escherichia coli  Proteus vulgaris group  Streptococcus milleri, viridans group  Streptococcus milleri, viridans group (07 Apr 2019 12:10)  Organism: Proteus vulgaris group (07 Apr 2019 12:10)  Organism: Streptococcus milleri, viridans group (07 Apr 2019 08:21)  Organism: Streptococcus milleri, viridans group (07 Apr 2019 08:21)  Organism: Escherichia coli (07 Apr 2019 08:19)  Organism: Klebsiella pneumoniae (07 Apr 2019 08:19)    Culture - Blood (collected 05 Apr 2019 17:05)  Source: .Blood Blood  Preliminary Report (07 Apr 2019 18:00):    No growth at 2 days.    Culture - Blood (collected 05 Apr 2019 17:05)  Source: .Blood Blood  Preliminary Report (07 Apr 2019 18:00):    No growth at 2 days.    Culture - Urine (collected 05 Apr 2019 17:04)  Source: .Urine Clean Catch (Midstream)  Final Report (06 Apr 2019 09:28):    No growth      MEDICATIONS  (STANDING):  albumin human  5% IVPB 500 milliLiter(s) IV Intermittent once  albumin human 25% IVPB 50 milliLiter(s) IV Intermittent every 8 hours  ampicillin/sulbactam  IVPB 3 Gram(s) IV Intermittent every 6 hours  chlorhexidine 2% Cloths 1 Application(s) Topical <User Schedule>  dextrose 5%. 1000 milliLiter(s) (50 mL/Hr) IV Continuous <Continuous>  dextrose 50% Injectable 12.5 Gram(s) IV Push once  dextrose 50% Injectable 25 Gram(s) IV Push once  dextrose 50% Injectable 25 Gram(s) IV Push once  fat emulsion (Plant Based) 20% Infusion 0.7 Gm/kG/Day (20.83 mL/Hr) IV Continuous <Continuous>  fentaNYL   Infusion. 0.5 MICROgram(s)/kG/Hr (3.665 mL/Hr) IV Continuous <Continuous>  heparin  flush 100 Units/mL Injectable 100 Unit(s) IV Push every other day  insulin lispro (HumaLOG) corrective regimen sliding scale   SubCutaneous every 6 hours  norepinephrine Infusion 0.05 MICROgram(s)/kG/Min (3.436 mL/Hr) IV Continuous <Continuous>  pantoprazole  Injectable 40 milliGRAM(s) IV Push daily  Parenteral Nutrition - Adult 1 Each (83 mL/Hr) TPN Continuous <Continuous>  Parenteral Nutrition - Adult 1 Each (63 mL/Hr) TPN Continuous <Continuous>  propofol Infusion 5 MICROgram(s)/kG/Min (2.199 mL/Hr) IV Continuous <Continuous>    MEDICATIONS  (PRN):  dextrose 40% Gel 15 Gram(s) Oral once PRN Blood Glucose LESS THAN 70 milliGRAM(s)/deciliter  glucagon  Injectable 1 milliGRAM(s) IntraMuscular once PRN Glucose LESS THAN 70 milligrams/deciliter  ondansetron Injectable 4 milliGRAM(s) IV Push every 6 hours PRN Nausea          RADIOLOGY & ADDITIONAL STUDIES:    < from: Xray Chest 1 View- PORTABLE-Routine (04.08.19 @ 07:27) >  Bedside examination of the chest dated 4/8/2019 7:27 AM is compared to   the previous study of 4/7/2019.    Findings: Endotracheal tube, NG tube, left PICC, right central line, and   left chest tube again present. No significant change pulmonary edema   pattern and right pleural effusion. No left effusion. No pneumothorax.    Impression: No significant change.    < end of copied text >

## 2019-04-09 LAB
-  AMOXICILLIN/CLAVULANIC ACID: SIGNIFICANT CHANGE UP
-  METRONIDAZOLE: SIGNIFICANT CHANGE UP
-  MOXIFLOXACIN(AEROBIC): SIGNIFICANT CHANGE UP
ALBUMIN SERPL ELPH-MCNC: 1.9 G/DL — LOW (ref 3.3–5)
ALP SERPL-CCNC: 50 U/L — SIGNIFICANT CHANGE UP (ref 40–120)
ALT FLD-CCNC: 25 U/L — SIGNIFICANT CHANGE UP (ref 10–45)
ANION GAP SERPL CALC-SCNC: 6 MMOL/L — SIGNIFICANT CHANGE UP (ref 5–17)
ANISOCYTOSIS BLD QL: SLIGHT — SIGNIFICANT CHANGE UP
APTT BLD: 37.6 SEC — HIGH (ref 27.5–36.3)
AST SERPL-CCNC: 33 U/L — SIGNIFICANT CHANGE UP (ref 10–40)
BASE EXCESS BLDA CALC-SCNC: 3 MMOL/L — SIGNIFICANT CHANGE UP (ref -2–3)
BILIRUB SERPL-MCNC: 0.6 MG/DL — SIGNIFICANT CHANGE UP (ref 0.2–1.2)
BUN SERPL-MCNC: 17 MG/DL — SIGNIFICANT CHANGE UP (ref 7–23)
BURR CELLS BLD QL SMEAR: SIGNIFICANT CHANGE UP
CALCIUM SERPL-MCNC: 7.2 MG/DL — LOW (ref 8.4–10.5)
CHLORIDE SERPL-SCNC: 106 MMOL/L — SIGNIFICANT CHANGE UP (ref 96–108)
CO2 SERPL-SCNC: 26 MMOL/L — SIGNIFICANT CHANGE UP (ref 22–31)
COLLECT DURATION TIME UR: 24 HR — SIGNIFICANT CHANGE UP
CREAT SERPL-MCNC: 0.49 MG/DL — LOW (ref 0.5–1.3)
CULTURE RESULTS: SIGNIFICANT CHANGE UP
GLUCOSE BLDC GLUCOMTR-MCNC: 101 MG/DL — HIGH (ref 70–99)
GLUCOSE BLDC GLUCOMTR-MCNC: 113 MG/DL — HIGH (ref 70–99)
GLUCOSE BLDC GLUCOMTR-MCNC: 116 MG/DL — HIGH (ref 70–99)
GLUCOSE BLDC GLUCOMTR-MCNC: 121 MG/DL — HIGH (ref 70–99)
GLUCOSE BLDC GLUCOMTR-MCNC: 135 MG/DL — HIGH (ref 70–99)
GLUCOSE BLDC GLUCOMTR-MCNC: 141 MG/DL — HIGH (ref 70–99)
GLUCOSE BLDC GLUCOMTR-MCNC: 143 MG/DL — HIGH (ref 70–99)
GLUCOSE BLDC GLUCOMTR-MCNC: 150 MG/DL — HIGH (ref 70–99)
GLUCOSE BLDC GLUCOMTR-MCNC: 160 MG/DL — HIGH (ref 70–99)
GLUCOSE BLDC GLUCOMTR-MCNC: 163 MG/DL — HIGH (ref 70–99)
GLUCOSE BLDC GLUCOMTR-MCNC: 166 MG/DL — HIGH (ref 70–99)
GLUCOSE BLDC GLUCOMTR-MCNC: 177 MG/DL — HIGH (ref 70–99)
GLUCOSE BLDC GLUCOMTR-MCNC: 188 MG/DL — HIGH (ref 70–99)
GLUCOSE BLDC GLUCOMTR-MCNC: 197 MG/DL — HIGH (ref 70–99)
GLUCOSE BLDC GLUCOMTR-MCNC: 214 MG/DL — HIGH (ref 70–99)
GLUCOSE SERPL-MCNC: 157 MG/DL — HIGH (ref 70–99)
HCO3 BLDA-SCNC: 28 MMOL/L — SIGNIFICANT CHANGE UP (ref 21–28)
HCT VFR BLD CALC: 24.1 % — LOW (ref 39–50)
HGB BLD-MCNC: 7.9 G/DL — LOW (ref 13–17)
INR BLD: 2.13 — HIGH (ref 0.88–1.16)
LYMPHOCYTES # BLD AUTO: 5 % — LOW (ref 13–44)
MACROCYTES BLD QL: SLIGHT — SIGNIFICANT CHANGE UP
MAGNESIUM SERPL-MCNC: 2 MG/DL — SIGNIFICANT CHANGE UP (ref 1.6–2.6)
MANUAL DIF COMMENT BLD-IMP: SIGNIFICANT CHANGE UP
MANUAL SMEAR VERIFICATION: SIGNIFICANT CHANGE UP
MCHC RBC-ENTMCNC: 30.6 PG — SIGNIFICANT CHANGE UP (ref 27–34)
MCHC RBC-ENTMCNC: 32.8 GM/DL — SIGNIFICANT CHANGE UP (ref 32–36)
MCV RBC AUTO: 93.4 FL — SIGNIFICANT CHANGE UP (ref 80–100)
METHOD TYPE: SIGNIFICANT CHANGE UP
MICROCYTES BLD QL: SLIGHT — SIGNIFICANT CHANGE UP
MONOCYTES NFR BLD AUTO: 2 % — SIGNIFICANT CHANGE UP (ref 2–14)
NEUTROPHILS NFR BLD AUTO: 91 % — HIGH (ref 43–77)
NEUTS BAND # BLD: 2 % — SIGNIFICANT CHANGE UP
NRBC # BLD: 0 /100 WBCS — SIGNIFICANT CHANGE UP (ref 0–0)
ORGANISM # SPEC MICROSCOPIC CNT: SIGNIFICANT CHANGE UP
OVALOCYTES BLD QL SMEAR: SLIGHT — SIGNIFICANT CHANGE UP
PCO2 BLDA: 46 MMHG — SIGNIFICANT CHANGE UP (ref 35–48)
PH BLDA: 7.4 — SIGNIFICANT CHANGE UP (ref 7.35–7.45)
PHOSPHATE SERPL-MCNC: 1.9 MG/DL — LOW (ref 2.5–4.5)
PLAT MORPH BLD: ABNORMAL
PLATELET # BLD AUTO: 94 K/UL — LOW (ref 150–400)
PO2 BLDA: 36 MMHG — CRITICAL LOW (ref 83–108)
POIKILOCYTOSIS BLD QL AUTO: SLIGHT — SIGNIFICANT CHANGE UP
POLYCHROMASIA BLD QL SMEAR: SLIGHT — SIGNIFICANT CHANGE UP
POTASSIUM SERPL-MCNC: 4 MMOL/L — SIGNIFICANT CHANGE UP (ref 3.5–5.3)
POTASSIUM SERPL-SCNC: 4 MMOL/L — SIGNIFICANT CHANGE UP (ref 3.5–5.3)
PREALB SERPL-MCNC: 4 MG/DL — LOW (ref 20–40)
PROT 24H UR-MRATE: 697 MG/24 H — HIGH (ref 50–100)
PROT SERPL-MCNC: 3.8 G/DL — LOW (ref 6–8.3)
PROTHROM AB SERPL-ACNC: 24.6 SEC — HIGH (ref 10–12.9)
RBC # BLD: 2.58 M/UL — LOW (ref 4.2–5.8)
RBC # FLD: 15.9 % — HIGH (ref 10.3–14.5)
RBC BLD AUTO: ABNORMAL
SAO2 % BLDA: 65 % — LOW (ref 95–100)
SMUDGE CELLS # BLD: SIGNIFICANT CHANGE UP
SODIUM SERPL-SCNC: 138 MMOL/L — SIGNIFICANT CHANGE UP (ref 135–145)
SPECIMEN SOURCE: SIGNIFICANT CHANGE UP
SPHEROCYTES BLD QL SMEAR: SLIGHT — SIGNIFICANT CHANGE UP
TOTAL VOLUME - 24 HOUR: 850 ML — SIGNIFICANT CHANGE UP
TRIGL SERPL-MCNC: 65 MG/DL — SIGNIFICANT CHANGE UP (ref 10–149)
URINE CREATININE CALCULATION: 0.6 G/24 H — LOW (ref 1–2)
UUN 24H UR-MRATE: 10 G/24H — SIGNIFICANT CHANGE UP (ref 6–17)
WBC # BLD: 13.84 K/UL — HIGH (ref 3.8–10.5)
WBC # FLD AUTO: 13.84 K/UL — HIGH (ref 3.8–10.5)

## 2019-04-09 PROCEDURE — 99291 CRITICAL CARE FIRST HOUR: CPT

## 2019-04-09 PROCEDURE — 99223 1ST HOSP IP/OBS HIGH 75: CPT | Mod: GC

## 2019-04-09 PROCEDURE — 71045 X-RAY EXAM CHEST 1 VIEW: CPT | Mod: 26

## 2019-04-09 RX ORDER — PHYTONADIONE (VIT K1) 5 MG
5 TABLET ORAL ONCE
Qty: 0 | Refills: 0 | Status: COMPLETED | OUTPATIENT
Start: 2019-04-09 | End: 2019-04-09

## 2019-04-09 RX ORDER — CEFTRIAXONE 500 MG/1
2 INJECTION, POWDER, FOR SOLUTION INTRAMUSCULAR; INTRAVENOUS ONCE
Qty: 0 | Refills: 0 | Status: COMPLETED | OUTPATIENT
Start: 2019-04-09 | End: 2019-04-09

## 2019-04-09 RX ORDER — METRONIDAZOLE 500 MG
TABLET ORAL
Qty: 0 | Refills: 0 | Status: DISCONTINUED | OUTPATIENT
Start: 2019-04-09 | End: 2019-05-04

## 2019-04-09 RX ORDER — METRONIDAZOLE 500 MG
500 TABLET ORAL ONCE
Qty: 0 | Refills: 0 | Status: COMPLETED | OUTPATIENT
Start: 2019-04-09 | End: 2019-04-09

## 2019-04-09 RX ORDER — CEFEPIME 1 G/1
2000 INJECTION, POWDER, FOR SOLUTION INTRAMUSCULAR; INTRAVENOUS EVERY 12 HOURS
Qty: 0 | Refills: 0 | Status: DISCONTINUED | OUTPATIENT
Start: 2019-04-09 | End: 2019-04-11

## 2019-04-09 RX ORDER — I.V. FAT EMULSION 20 G/100ML
0.7 EMULSION INTRAVENOUS
Qty: 50 | Refills: 0 | Status: DISCONTINUED | OUTPATIENT
Start: 2019-04-09 | End: 2019-04-09

## 2019-04-09 RX ORDER — CEFTRIAXONE 500 MG/1
INJECTION, POWDER, FOR SOLUTION INTRAMUSCULAR; INTRAVENOUS
Qty: 0 | Refills: 0 | Status: DISCONTINUED | OUTPATIENT
Start: 2019-04-09 | End: 2019-04-09

## 2019-04-09 RX ORDER — ELECTROLYTE SOLUTION,INJ
1 VIAL (ML) INTRAVENOUS
Qty: 0 | Refills: 0 | Status: DISCONTINUED | OUTPATIENT
Start: 2019-04-09 | End: 2019-04-09

## 2019-04-09 RX ORDER — HUMAN INSULIN 100 [IU]/ML
30 INJECTION, SUSPENSION SUBCUTANEOUS ONCE
Qty: 0 | Refills: 0 | Status: COMPLETED | OUTPATIENT
Start: 2019-04-09 | End: 2019-04-09

## 2019-04-09 RX ORDER — ALBUMIN HUMAN 25 %
250 VIAL (ML) INTRAVENOUS ONCE
Qty: 0 | Refills: 0 | Status: COMPLETED | OUTPATIENT
Start: 2019-04-09 | End: 2019-04-09

## 2019-04-09 RX ORDER — METRONIDAZOLE 500 MG
500 TABLET ORAL EVERY 8 HOURS
Qty: 0 | Refills: 0 | Status: DISCONTINUED | OUTPATIENT
Start: 2019-04-09 | End: 2019-05-04

## 2019-04-09 RX ORDER — INSULIN HUMAN 100 [IU]/ML
7 INJECTION, SOLUTION SUBCUTANEOUS EVERY 6 HOURS
Qty: 0 | Refills: 0 | Status: DISCONTINUED | OUTPATIENT
Start: 2019-04-09 | End: 2019-04-09

## 2019-04-09 RX ADMIN — AMPICILLIN SODIUM AND SULBACTAM SODIUM 200 GRAM(S): 250; 125 INJECTION, POWDER, FOR SUSPENSION INTRAMUSCULAR; INTRAVENOUS at 02:31

## 2019-04-09 RX ADMIN — Medication 1 EACH: at 18:18

## 2019-04-09 RX ADMIN — PANTOPRAZOLE SODIUM 40 MILLIGRAM(S): 20 TABLET, DELAYED RELEASE ORAL at 11:41

## 2019-04-09 RX ADMIN — Medication 100 MILLIGRAM(S): at 18:18

## 2019-04-09 RX ADMIN — AMPICILLIN SODIUM AND SULBACTAM SODIUM 200 GRAM(S): 250; 125 INJECTION, POWDER, FOR SUSPENSION INTRAMUSCULAR; INTRAVENOUS at 07:23

## 2019-04-09 RX ADMIN — NYSTATIN CREAM 1 APPLICATION(S): 100000 CREAM TOPICAL at 07:12

## 2019-04-09 RX ADMIN — CEFTRIAXONE 100 GRAM(S): 500 INJECTION, POWDER, FOR SOLUTION INTRAMUSCULAR; INTRAVENOUS at 10:10

## 2019-04-09 RX ADMIN — PROPOFOL 2.2 MICROGRAM(S)/KG/MIN: 10 INJECTION, EMULSION INTRAVENOUS at 01:33

## 2019-04-09 RX ADMIN — Medication 3.44 MICROGRAM(S)/KG/MIN: at 11:12

## 2019-04-09 RX ADMIN — Medication 100 MILLIGRAM(S): at 10:10

## 2019-04-09 RX ADMIN — CHLORHEXIDINE GLUCONATE 1 APPLICATION(S): 213 SOLUTION TOPICAL at 07:13

## 2019-04-09 RX ADMIN — NYSTATIN CREAM 1 APPLICATION(S): 100000 CREAM TOPICAL at 18:30

## 2019-04-09 RX ADMIN — Medication 50 MILLILITER(S): at 09:08

## 2019-04-09 RX ADMIN — Medication 100 UNIT(S): at 11:41

## 2019-04-09 RX ADMIN — PROPOFOL 2.2 MICROGRAM(S)/KG/MIN: 10 INJECTION, EMULSION INTRAVENOUS at 14:33

## 2019-04-09 RX ADMIN — I.V. FAT EMULSION 20.83 GM/KG/DAY: 20 EMULSION INTRAVENOUS at 21:31

## 2019-04-09 RX ADMIN — CEFEPIME 100 MILLIGRAM(S): 1 INJECTION, POWDER, FOR SOLUTION INTRAMUSCULAR; INTRAVENOUS at 18:18

## 2019-04-09 RX ADMIN — Medication 50 MILLILITER(S): at 18:29

## 2019-04-09 RX ADMIN — Medication 125 MILLILITER(S): at 07:42

## 2019-04-09 RX ADMIN — Medication 125 MILLILITER(S): at 09:07

## 2019-04-09 RX ADMIN — Medication 101 MILLIGRAM(S): at 10:10

## 2019-04-09 NOTE — CONSULT NOTE ADULT - SUBJECTIVE AND OBJECTIVE BOX
INFECTIOUS DISEASE SERVICE INITIAL CONSULT NOTE    HPI:  87yo M with history of CAD s/p 3 stents (2012, 2009, 2000) still on ASA/Plavix, ETOH abuse, b/l inguinal hernias s/p laparoscopic robotic-assisted repair of bilateral inguinal hernias on 3/11/2019 (notably, 4.5L of ascites were also drained in the beginning of the procedure and sent for cytopathology) presenting with right inguinal hernia discomfort. Patient denies any associated fevers, chills, nausea, or emesis. Patient cannot remember last time he had bowel movement or passed flatus, but per nursing staff, patient a large bowel movement in his stretcher in the ED. Patient reports having a colonoscopy at age 70s which was reportedly normal. He denies ever having an endoscopy. In the ED, patient is afebrile, HR 90s, , RR20, saturation 95%. WBC 20.8, hgb 10.3, INR 1.82, PT 21, PTT 24, Na 130. CT ab/pelvis with IV contrast shows 1) bilateral segmental pulmonary emboli, 2) left lung empyema, 3) large pneumoperitoneum, 4) 10 x 5 cm abscess in LLQ (possible source of pneumoperitoneum), 5) 6 x 4 cm fluid collection in right groin, and 6) DVT in bilateral common femoral vein, central venous pelvic thrombus in the left common iliac and internal iliac veins.    PMH/PSx: CAD s/p 3 stents (2012, 2009, 2000) still on ASA/Plavix, ETOH abuse, b/l inguinal hernias s/p laparoscopic robotic-assisted repair of bilateral inguinal hernias on 3/11/2019 w/ drainage of 4.5 L of ascites,   Allergies: NKDA  Meds: see med rec  FH: patient denies FH of cancers or IBD  SH: patient reports significant alcohol use in youth but was unable to quantify the number of daily drinks. Patient denies smoking history or drug use (05 Apr 2019 19:49)      ADDITIONAL ID HPI:    PAST MEDICAL & SURGICAL HISTORY:  Esophageal reflux  Acute diarrhea  HLD (hyperlipidemia)  HTN (hypertension)  Prostate cancer  Stented coronary artery: x3 , per pt.  Atherosclerosis of coronary artery: CAD (coronary artery disease)  S/P hernia repair  Surgery, elective: cardiac stent x3  History of prostate surgery      REVIEW OF SYSTEMS:  Otherwise negative other than what is stated in the HPI.    ACTIVE ANTIMICROBIAL/ANTIBIOTIC MEDICATIONS:  cefTRIAXone   IVPB      metroNIDAZOLE  IVPB 500 milliGRAM(s) IV Intermittent every 8 hours  metroNIDAZOLE  IVPB          OTHER MEDICATIONS:  albumin human 25% IVPB 50 milliLiter(s) IV Intermittent every 8 hours  chlorhexidine 2% Cloths 1 Application(s) Topical <User Schedule>  dextrose 40% Gel 15 Gram(s) Oral once PRN  dextrose 5%. 1000 milliLiter(s) IV Continuous <Continuous>  dextrose 50% Injectable 12.5 Gram(s) IV Push once  dextrose 50% Injectable 25 Gram(s) IV Push once  dextrose 50% Injectable 25 Gram(s) IV Push once  fat emulsion (Plant Based) 20% Infusion 0.7 Gm/kG/Day IV Continuous <Continuous>  fentaNYL   Infusion. 0.5 MICROgram(s)/kG/Hr IV Continuous <Continuous>  glucagon  Injectable 1 milliGRAM(s) IntraMuscular once PRN  heparin  flush 100 Units/mL Injectable 100 Unit(s) IV Push every other day  insulin Infusion 3 Unit(s)/Hr IV Continuous <Continuous>  norepinephrine Infusion 0.05 MICROgram(s)/kG/Min IV Continuous <Continuous>  nystatin Powder 1 Application(s) Topical two times a day  ondansetron Injectable 4 milliGRAM(s) IV Push every 6 hours PRN  pantoprazole  Injectable 40 milliGRAM(s) IV Push daily  Parenteral Nutrition - Adult 1 Each TPN Continuous <Continuous>  Parenteral Nutrition - Adult 1 Each TPN Continuous <Continuous>  propofol Infusion 5 MICROgram(s)/kG/Min IV Continuous <Continuous>      ALLERGIES:  Allergies    No Known Allergies    Intolerances        SOCIAL HISTORY:    FAMILY HISTORY:  No pertinent family history in first degree relatives      VITAL SIGNS:  ICU Vital Signs Last 24 Hrs  T(C): 36.6 (09 Apr 2019 15:00), Max: 38.3 (08 Apr 2019 22:00)  T(F): 97.8 (09 Apr 2019 15:00), Max: 100.9 (08 Apr 2019 22:00)  HR: 95 (09 Apr 2019 16:19) (78 - 104)  BP: 109/40 (09 Apr 2019 16:00) (95/53 - 117/47)  BP(mean): 59 (09 Apr 2019 16:00) (59 - 89)  ABP: 130/48 (09 Apr 2019 15:00) (94/42 - 138/56)  ABP(mean): 76 (09 Apr 2019 15:00) (58 - 80)  RR: 18 (09 Apr 2019 16:19) (15 - 23)  SpO2: 100% (09 Apr 2019 16:19) (96% - 100%)      PHYSICAL EXAM:      LABS:                        7.9    13.84 )-----------( 94       ( 09 Apr 2019 06:10 )             24.1     04-09    138  |  106  |  17  ----------------------------<  157<H>  4.0   |  26  |  0.49<L>    Ca    7.2<L>      09 Apr 2019 06:10  Phos  1.9     04-09  Mg     2.0     04-09    TPro  3.8<L>  /  Alb  1.9<L>  /  TBili  0.6  /  DBili  x   /  AST  33  /  ALT  25  /  AlkPhos  50  04-09    PT/INR - ( 09 Apr 2019 06:10 )   PT: 24.6 sec;   INR: 2.13          PTT - ( 09 Apr 2019 06:10 )  PTT:37.6 sec      MICROBIOLOGY:    Culture - Body Fluid with Gram Stain (collected 04-05-19 @ 21:48)  Source: .Body Fluid abdominal drainage  Gram Stain (04-06-19 @ 12:43):    Numerous Gram Positive Cocci in Pairs and Chains    Few Gram Negative Rods    Rare White blood cells  Final Report (04-09-19 @ 08:39):    Numerous Klebsiella pneumoniae    Numerous Escherichia coli    Numerous Proteus vulgaris group    Numerous Streptococcus milleri, viridans group    Numerous Bacteroides fragilis    Beta lactamase positive  Organism: Klebsiella pneumoniae  Escherichia coli  Proteus vulgaris group  Streptococcus milleri, viridans group  Streptococcus milleri, viridans group  Bacteroides fragilis (04-09-19 @ 08:39)  Organism: Bacteroides fragilis (04-09-19 @ 08:39)      -  Amoxicillin/Clavulanic Acid: R 12      -  Metronidazole/Anaerobe: S 0.75      -  Moxifloxacin(Aerobic): R 6      Method Type: ETEST  Organism: Streptococcus milleri, viridans group (04-09-19 @ 08:39)      -  Ceftriaxone: S 0.75      -  Penicillin: S 0.125      Method Type: ETEST  Organism: Streptococcus milleri, viridans group (04-09-19 @ 08:39)      -  Clindamycin: R      -  Erythromycin: R      -  Levofloxacin: S      -  Vancomycin: S      Method Type: KB  Organism: Proteus vulgaris group (04-09-19 @ 08:39)      -  Ampicillin: R >16 These ampicillin results predict results for amoxicillin      -  Ampicillin/Sulbactam: S 8/4      -  Cefazolin: R >16      -  Ceftriaxone: S <=1 Enterobacter, Citrobacter, and Serratia may develop resistance during prolonged therapy      -  Ciprofloxacin: S <=0.5      -  Gentamicin: S <=1      -  Piperacillin/Tazobactam: S <=8      -  Tobramycin: S <=2      -  Trimethoprim/Sulfamethoxazole: S <=0.5/9.5      Method Type: CASSY  Organism: Escherichia coli (04-09-19 @ 08:39)      -  Ampicillin: S 4 These ampicillin results predict results for amoxicillin      -  Ampicillin/Sulbactam: S <=4/2      -  Cefazolin: S <=2      -  Ceftriaxone: S <=1 Enterobacter, Citrobacter, and Serratia may develop resistance during prolonged therapy      -  Ciprofloxacin: S <=0.5      -  Gentamicin: S 2      -  Piperacillin/Tazobactam: S <=8      -  Tobramycin: S <=2      -  Trimethoprim/Sulfamethoxazole: S <=0.5/9.5      Method Type: CASSY  Organism: Klebsiella pneumoniae (04-09-19 @ 08:39)      -  Ampicillin: R >16 These ampicillin results predict results for amoxicillin      -  Ampicillin/Sulbactam: S 8/4      -  Cefazolin: S <=2      -  Ceftriaxone: S <=1 Enterobacter, Citrobacter, and Serratia may develop resistance during prolonged therapy      -  Ciprofloxacin: S <=0.5      -  Gentamicin: S <=1      -  Piperacillin/Tazobactam: S <=8      -  Tobramycin: S <=2      -  Trimethoprim/Sulfamethoxazole: S <=0.5/9.5      Method Type: CASSY    Culture - Blood (collected 04-05-19 @ 17:05)  Source: .Blood Blood  Preliminary Report (04-08-19 @ 18:01):    No growth at 3 days.    Culture - Blood (collected 04-05-19 @ 17:05)  Source: .Blood Blood  Preliminary Report (04-08-19 @ 18:01):    No growth at 3 days.    Culture - Urine (collected 04-05-19 @ 17:04)  Source: .Urine Clean Catch (Midstream)  Final Report (04-06-19 @ 09:28):    No growth      RADIOLOGY & ADDITIONAL STUDIES:    CT Chest + Abdomen and Pelvis w/ IV Cont (04.05.19 @ 17:01)  FINDINGS: Large amount of ascites and intraperitoneal free air is present.  The major thoracic vessels arenormal in appearance. The heart is normal   in size.  No pericardial effusion is seen. Moderate coronary artery   disease. No mediastinal, hilar or axillary lymphadenopathy is seen.  Evaluation of the pulmonary parenchyma demonstrates bilateral segmental   pulmonary emboli.  Left lower lobe hydropneumothorax with enhancing rim   measuring approximately 8.3 by X.4 x 16.2 cm, suspicious for empyema.   Small right pleural effusion with passive atelectasis of lower lung zone.  Images of the upperabdomen demonstrate no focal hepatic abnormalities.     Distended gallbladder containing gallstones.  The pancreas is normal in   appearance.  Mild splenomegaly.  The adrenal glands are unremarkable. The kidneys are normal in   appearance. No aortic aneurysm is seen. No lymphadenopathy is seen.   Evaluation of the bowel demonstrates mild reactive thickening of loops of   small bowel, likely due to ascites . Diffuse anasarca.  Images of the pelvis demonstrate the prostate to be normal in appearance.     No filling defects are seen in the urinary bladder. Stable fluid   containing right inguinal hernia.  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  Evaluation of the osseous structures demonstrates degenerative changes.    IMPRESSION:  1.  Large amount of ascites and free intraperitoneal air.  2.  Bilateral pulmonary emboli.  3.  Left lower lobe hydropneumothorax with enhancing rim, suspicious for   empyema.  4.  Small right pleural effusion with passive atelectasis of lower lung   zone  5.  Mild thickening of small bowel loops, likely reactive.  6.  No change in fluid-filled right inguinal hernia. INFECTIOUS DISEASE SERVICE INITIAL CONSULT NOTE    HPI:  87yo M with history of CAD s/p 3 stents (2012, 2009, 2000) still on ASA/Plavix, ETOH abuse, b/l inguinal hernias s/p laparoscopic robotic-assisted repair of bilateral inguinal hernias on 3/11/2019 (notably, 4.5L of ascites were also drained in the beginning of the procedure and sent for cytopathology) presenting with right inguinal hernia discomfort. Patient denies any associated fevers, chills, nausea, or emesis. Patient cannot remember last time he had bowel movement or passed flatus, but per nursing staff, patient a large bowel movement in his stretcher in the ED. Patient reports having a colonoscopy at age 70s which was reportedly normal. He denies ever having an endoscopy. In the ED, patient is afebrile, HR 90s, , RR20, saturation 95%. WBC 20.8, hgb 10.3, INR 1.82, PT 21, PTT 24, Na 130. CT ab/pelvis with IV contrast shows 1) bilateral segmental pulmonary emboli, 2) left lung empyema, 3) large pneumoperitoneum, 4) 10 x 5 cm abscess in LLQ (possible source of pneumoperitoneum), 5) 6 x 4 cm fluid collection in right groin, and 6) DVT in bilateral common femoral vein, central venous pelvic thrombus in the left common iliac and internal iliac veins.    PMH/PSx: CAD s/p 3 stents (2012, 2009, 2000) still on ASA/Plavix, ETOH abuse, b/l inguinal hernias s/p laparoscopic robotic-assisted repair of bilateral inguinal hernias on 3/11/2019 w/ drainage of 4.5 L of ascites,   Allergies: NKDA  Meds: see med rec  FH: patient denies FH of cancers or IBD  SH: patient reports significant alcohol use in youth but was unable to quantify the number of daily drinks. Patient denies smoking history or drug use (05 Apr 2019 19:49)    ADDITIONAL ID HPI: Patient then went to IR for LLQ abscess was drained and an IVC filter. Abdominal culture from 4/5/19 showing Klebsiella pneumoniae, Escherichia coli, Proteus vulgaris group, Streptococcus milleri, viridans group & Bacteroides fragilis. Patient then underwent exploratory laparotomy on 4/7/2019 - with 3L of clear ascites suctioned, portion of small bowel adherent to sigmoid colon and left pelvic side wall with fibrinous exudate and LAURYN performed, sigmoidectomy, placement of right paracolic gutter and left drain in the pelvis, colon left in discontinuity and abdominal vac placed. SICU course complicated by likely septic shock, chest x-ray significant for bilateral infiltrates, remains intubated and sedated and on levophed. Patient last febrile 4/8 to 100.9 (rectal) - no blood cultures sent. Patient was on ID consulted for antibiotic recommendations.     PAST MEDICAL & SURGICAL HISTORY:  Esophageal reflux  Acute diarrhea  HLD (hyperlipidemia)  HTN (hypertension)  Prostate cancer  Stented coronary artery: x3 , per pt.  Atherosclerosis of coronary artery: CAD (coronary artery disease)  S/P hernia repair  Surgery, elective: cardiac stent x3  History of prostate surgery      REVIEW OF SYSTEMS:  Otherwise negative other than what is stated in the HPI.    ACTIVE ANTIMICROBIAL/ANTIBIOTIC MEDICATIONS:  cefTRIAXone   IVPB      metroNIDAZOLE  IVPB 500 milliGRAM(s) IV Intermittent every 8 hours  metroNIDAZOLE  IVPB          OTHER MEDICATIONS:  albumin human 25% IVPB 50 milliLiter(s) IV Intermittent every 8 hours  chlorhexidine 2% Cloths 1 Application(s) Topical <User Schedule>  dextrose 40% Gel 15 Gram(s) Oral once PRN  dextrose 5%. 1000 milliLiter(s) IV Continuous <Continuous>  dextrose 50% Injectable 12.5 Gram(s) IV Push once  dextrose 50% Injectable 25 Gram(s) IV Push once  dextrose 50% Injectable 25 Gram(s) IV Push once  fat emulsion (Plant Based) 20% Infusion 0.7 Gm/kG/Day IV Continuous <Continuous>  fentaNYL   Infusion. 0.5 MICROgram(s)/kG/Hr IV Continuous <Continuous>  glucagon  Injectable 1 milliGRAM(s) IntraMuscular once PRN  heparin  flush 100 Units/mL Injectable 100 Unit(s) IV Push every other day  insulin Infusion 3 Unit(s)/Hr IV Continuous <Continuous>  norepinephrine Infusion 0.05 MICROgram(s)/kG/Min IV Continuous <Continuous>  nystatin Powder 1 Application(s) Topical two times a day  ondansetron Injectable 4 milliGRAM(s) IV Push every 6 hours PRN  pantoprazole  Injectable 40 milliGRAM(s) IV Push daily  Parenteral Nutrition - Adult 1 Each TPN Continuous <Continuous>  Parenteral Nutrition - Adult 1 Each TPN Continuous <Continuous>  propofol Infusion 5 MICROgram(s)/kG/Min IV Continuous <Continuous>      ALLERGIES:  Allergies    No Known Allergies    Intolerances        SOCIAL HISTORY:    FAMILY HISTORY:  No pertinent family history in first degree relatives      VITAL SIGNS:  ICU Vital Signs Last 24 Hrs  T(C): 36.6 (09 Apr 2019 15:00), Max: 38.3 (08 Apr 2019 22:00)  T(F): 97.8 (09 Apr 2019 15:00), Max: 100.9 (08 Apr 2019 22:00)  HR: 95 (09 Apr 2019 16:19) (78 - 104)  BP: 109/40 (09 Apr 2019 16:00) (95/53 - 117/47)  BP(mean): 59 (09 Apr 2019 16:00) (59 - 89)  ABP: 130/48 (09 Apr 2019 15:00) (94/42 - 138/56)  ABP(mean): 76 (09 Apr 2019 15:00) (58 - 80)  RR: 18 (09 Apr 2019 16:19) (15 - 23)  SpO2: 100% (09 Apr 2019 16:19) (96% - 100%)      PHYSICAL EXAM:  General: Ill- appearing, cachexia, intubated and sedated   Head: temporal wasting  Pulmonary: Decreased breath sounds b/l, rhonchus breath sounds in upper lung fields   Cardiovascular: +S1S2, no murmurs  Abdominal: Distended, edematous, abdominal wound vac, + PHYLLIS drains serosanguinous fluid    Extremities: Diffuse anasarca   Neuro: sedated     LABS:                        7.9    13.84 )-----------( 94       ( 09 Apr 2019 06:10 )             24.1     04-09    138  |  106  |  17  ----------------------------<  157<H>  4.0   |  26  |  0.49<L>    Ca    7.2<L>      09 Apr 2019 06:10  Phos  1.9     04-09  Mg     2.0     04-09    TPro  3.8<L>  /  Alb  1.9<L>  /  TBili  0.6  /  DBili  x   /  AST  33  /  ALT  25  /  AlkPhos  50  04-09    PT/INR - ( 09 Apr 2019 06:10 )   PT: 24.6 sec;   INR: 2.13          PTT - ( 09 Apr 2019 06:10 )  PTT:37.6 sec      MICROBIOLOGY:    Culture - Body Fluid with Gram Stain (collected 04-05-19 @ 21:48)  Source: .Body Fluid abdominal drainage  Gram Stain (04-06-19 @ 12:43):    Numerous Gram Positive Cocci in Pairs and Chains    Few Gram Negative Rods    Rare White blood cells  Final Report (04-09-19 @ 08:39):    Numerous Klebsiella pneumoniae    Numerous Escherichia coli    Numerous Proteus vulgaris group    Numerous Streptococcus milleri, viridans group    Numerous Bacteroides fragilis    Beta lactamase positive  Organism: Klebsiella pneumoniae  Escherichia coli  Proteus vulgaris group  Streptococcus milleri, viridans group  Streptococcus milleri, viridans group  Bacteroides fragilis (04-09-19 @ 08:39)  Organism: Bacteroides fragilis (04-09-19 @ 08:39)      -  Amoxicillin/Clavulanic Acid: R 12      -  Metronidazole/Anaerobe: S 0.75      -  Moxifloxacin(Aerobic): R 6      Method Type: ETEST  Organism: Streptococcus milleri, viridans group (04-09-19 @ 08:39)      -  Ceftriaxone: S 0.75      -  Penicillin: S 0.125      Method Type: ETEST  Organism: Streptococcus milleri, viridans group (04-09-19 @ 08:39)      -  Clindamycin: R      -  Erythromycin: R      -  Levofloxacin: S      -  Vancomycin: S      Method Type: KB  Organism: Proteus vulgaris group (04-09-19 @ 08:39)      -  Ampicillin: R >16 These ampicillin results predict results for amoxicillin      -  Ampicillin/Sulbactam: S 8/4      -  Cefazolin: R >16      -  Ceftriaxone: S <=1 Enterobacter, Citrobacter, and Serratia may develop resistance during prolonged therapy      -  Ciprofloxacin: S <=0.5      -  Gentamicin: S <=1      -  Piperacillin/Tazobactam: S <=8      -  Tobramycin: S <=2      -  Trimethoprim/Sulfamethoxazole: S <=0.5/9.5      Method Type: CASSY  Organism: Escherichia coli (04-09-19 @ 08:39)      -  Ampicillin: S 4 These ampicillin results predict results for amoxicillin      -  Ampicillin/Sulbactam: S <=4/2      -  Cefazolin: S <=2      -  Ceftriaxone: S <=1 Enterobacter, Citrobacter, and Serratia may develop resistance during prolonged therapy      -  Ciprofloxacin: S <=0.5      -  Gentamicin: S 2      -  Piperacillin/Tazobactam: S <=8      -  Tobramycin: S <=2      -  Trimethoprim/Sulfamethoxazole: S <=0.5/9.5      Method Type: CASSY  Organism: Klebsiella pneumoniae (04-09-19 @ 08:39)      -  Ampicillin: R >16 These ampicillin results predict results for amoxicillin      -  Ampicillin/Sulbactam: S 8/4      -  Cefazolin: S <=2      -  Ceftriaxone: S <=1 Enterobacter, Citrobacter, and Serratia may develop resistance during prolonged therapy      -  Ciprofloxacin: S <=0.5      -  Gentamicin: S <=1      -  Piperacillin/Tazobactam: S <=8      -  Tobramycin: S <=2      -  Trimethoprim/Sulfamethoxazole: S <=0.5/9.5      Method Type: CASSY    Culture - Blood (collected 04-05-19 @ 17:05)  Source: .Blood Blood  Preliminary Report (04-08-19 @ 18:01):    No growth at 3 days.    Culture - Blood (collected 04-05-19 @ 17:05)  Source: .Blood Blood  Preliminary Report (04-08-19 @ 18:01):    No growth at 3 days.    Culture - Urine (collected 04-05-19 @ 17:04)  Source: .Urine Clean Catch (Midstream)  Final Report (04-06-19 @ 09:28):    No growth      RADIOLOGY & ADDITIONAL STUDIES:    CT Chest + Abdomen and Pelvis w/ IV Cont (04.05.19 @ 17:01)  FINDINGS: Large amount of ascites and intraperitoneal free air is present.  The major thoracic vessels arenormal in appearance. The heart is normal   in size.  No pericardial effusion is seen. Moderate coronary artery   disease. No mediastinal, hilar or axillary lymphadenopathy is seen.  Evaluation of the pulmonary parenchyma demonstrates bilateral segmental   pulmonary emboli.  Left lower lobe hydropneumothorax with enhancing rim   measuring approximately 8.3 by X.4 x 16.2 cm, suspicious for empyema.   Small right pleural effusion with passive atelectasis of lower lung zone.  Images of the upperabdomen demonstrate no focal hepatic abnormalities.     Distended gallbladder containing gallstones.  The pancreas is normal in   appearance.  Mild splenomegaly.  The adrenal glands are unremarkable. The kidneys are normal in   appearance. No aortic aneurysm is seen. No lymphadenopathy is seen.   Evaluation of the bowel demonstrates mild reactive thickening of loops of   small bowel, likely due to ascites . Diffuse anasarca.  Images of the pelvis demonstrate the prostate to be normal in appearance.     No filling defects are seen in the urinary bladder. Stable fluid   containing right inguinal hernia.  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  Evaluation of the osseous structures demonstrates degenerative changes.    IMPRESSION:  1.  Large amount of ascites and free intraperitoneal air.  2.  Bilateral pulmonary emboli.  3.  Left lower lobe hydropneumothorax with enhancing rim, suspicious for   empyema.  4.  Small right pleural effusion with passive atelectasis of lower lung   zone  5.  Mild thickening of small bowel loops, likely reactive.  6.  No change in fluid-filled right inguinal hernia.

## 2019-04-09 NOTE — PROGRESS NOTE ADULT - SUBJECTIVE AND OBJECTIVE BOX
24 hr events:   O/N: h/h stable, insulin gtt for f/s in 200s, went into bigeminy, calculated 111 units/24 hours but given 30 NPH@5am and scheduled for 7q6h of regular insulin so total would be 15 from bag + 30 NPH + 28 regular  = 73ish in bag scheduled today  4/8: A-line stopped working, TFTs TSH 4.18. Switched from Zosyn to Unasyn. Rectal temp 99.6    SUBJECTIVE:  Intubated and sedated, BP was low and UO decreasing, transfused albumin.     MEDICATIONS  (STANDING):  albumin human  5% IVPB 500 milliLiter(s) IV Intermittent once  albumin human 25% IVPB 50 milliLiter(s) IV Intermittent every 8 hours  ampicillin/sulbactam  IVPB 3 Gram(s) IV Intermittent every 6 hours  chlorhexidine 2% Cloths 1 Application(s) Topical <User Schedule>  dextrose 5%. 1000 milliLiter(s) (50 mL/Hr) IV Continuous <Continuous>  dextrose 50% Injectable 12.5 Gram(s) IV Push once  dextrose 50% Injectable 25 Gram(s) IV Push once  dextrose 50% Injectable 25 Gram(s) IV Push once  fat emulsion (Plant Based) 20% Infusion 0.7 Gm/kG/Day (20.83 mL/Hr) IV Continuous <Continuous>  fentaNYL   Infusion. 0.5 MICROgram(s)/kG/Hr (3.665 mL/Hr) IV Continuous <Continuous>  heparin  flush 100 Units/mL Injectable 100 Unit(s) IV Push every other day  insulin Infusion 3 Unit(s)/Hr (3 mL/Hr) IV Continuous <Continuous>  insulin NPH human recombinant 30 Unit(s) SubCutaneous once  insulin regular  human recombinant 7 Unit(s) SubCutaneous every 6 hours  norepinephrine Infusion 0.05 MICROgram(s)/kG/Min (3.436 mL/Hr) IV Continuous <Continuous>  nystatin Powder 1 Application(s) Topical two times a day  pantoprazole  Injectable 40 milliGRAM(s) IV Push daily  Parenteral Nutrition - Adult 1 Each (83 mL/Hr) TPN Continuous <Continuous>  propofol Infusion 5 MICROgram(s)/kG/Min (2.199 mL/Hr) IV Continuous <Continuous>    MEDICATIONS  (PRN):  dextrose 40% Gel 15 Gram(s) Oral once PRN Blood Glucose LESS THAN 70 milliGRAM(s)/deciliter  glucagon  Injectable 1 milliGRAM(s) IntraMuscular once PRN Glucose LESS THAN 70 milligrams/deciliter  ondansetron Injectable 4 milliGRAM(s) IV Push every 6 hours PRN Nausea      Abbott:	  Daily Abbott Order Placed	   Indication: Strict I and O's    Central Line: Medication / TPN Administration       ICU Vital Signs Last 24 Hrs  T(C): 37.1 (09 Apr 2019 06:09), Max: 38.3 (08 Apr 2019 22:00)  T(F): 98.7 (09 Apr 2019 06:09), Max: 100.9 (08 Apr 2019 22:00)  HR: 104 (09 Apr 2019 05:00) (78 - 104)  BP: 103/46 (09 Apr 2019 05:00) (95/53 - 117/58)  BP(mean): 65 (09 Apr 2019 05:00) (65 - 89)  ABP: 120/56 (09 Apr 2019 05:00) (80/50 - 126/56)  ABP(mean): 72 (09 Apr 2019 05:00) (58 - 84)  RR: 17 (09 Apr 2019 05:00) (14 - 23)  SpO2: 98% (09 Apr 2019 05:00) (96% - 100%)      Physical Exam:  General: Sedated and intubated.  Pulmonary: Bilateral decrease breath sounds over the bases, intubated on mechanical vent.  Cardiovascular: NSR, no murmurs  Abdominal: distended, edematous, VAC in place and intact, PHYLLIS drains serosanguinous x3 intact.    Extremities: ++edema, WWP.   Neuro: Sedated       Vent settings:  Mode: AC/ CMV (Assist Control/ Continuous Mandatory Ventilation), RR (machine): 12, TV (machine): 470, FiO2: 40, PEEP: 5, ITime: 1, MAP: 9.4, PIP: 19    I&O's Summary    08 Apr 2019 07:01  -  09 Apr 2019 07:00  --------------------------------------------------------  IN: 4269.3 mL / OUT: 3310 mL / NET: 959.3 mL        LABS:                        7.9    13.84 )-----------( 94       ( 09 Apr 2019 06:10 )             24.1     04-09    138  |  106  |  17  ----------------------------<  157<H>  4.0   |  26  |  0.49<L>    Ca    7.2<L>      09 Apr 2019 06:10  Phos  1.9     04-09  Mg     2.0     04-09    TPro  3.8<L>  /  Alb  1.9<L>  /  TBili  0.6  /  DBili  x   /  AST  33  /  ALT  25  /  AlkPhos  50  04-09    PT/INR - ( 09 Apr 2019 06:10 )   PT: 24.6 sec;   INR: 2.13          PTT - ( 09 Apr 2019 06:10 )  PTT:37.6 sec    CAPILLARY BLOOD GLUCOSE      POCT Blood Glucose.: 163 mg/dL (09 Apr 2019 05:59)  POCT Blood Glucose.: 160 mg/dL (09 Apr 2019 05:10)  POCT Blood Glucose.: 166 mg/dL (09 Apr 2019 04:21)  POCT Blood Glucose.: 177 mg/dL (09 Apr 2019 03:13)  POCT Blood Glucose.: 188 mg/dL (09 Apr 2019 02:15)  POCT Blood Glucose.: 197 mg/dL (09 Apr 2019 01:18)  POCT Blood Glucose.: 214 mg/dL (09 Apr 2019 00:04)  POCT Blood Glucose.: 224 mg/dL (08 Apr 2019 22:47)  POCT Blood Glucose.: 216 mg/dL (08 Apr 2019 21:37)  POCT Blood Glucose.: 211 mg/dL (08 Apr 2019 16:19)  POCT Blood Glucose.: 228 mg/dL (08 Apr 2019 10:36)    LIVER FUNCTIONS - ( 09 Apr 2019 06:10 )  Alb: 1.9 g/dL / Pro: 3.8 g/dL / ALK PHOS: 50 U/L / ALT: 25 U/L / AST: 33 U/L / GGT: x             Cultures:    RADIOLOGY & ADDITIONAL STUDIES:

## 2019-04-09 NOTE — CONSULT NOTE ADULT - ASSESSMENT
85yo M w/ hx of CAD s/p 3 stents, HLD, BPH, hx of ETOH abuse, dementia, b/l inguinal hernias s/p laparoscopic robotic-assisted repair of bilateral inguinal hernias on 3/11/2019, ascites of unclear etiology. He presented from his rehabilitation facility for FTT and R inguinal discomfort. CT in ED with bilateral pulmonary embolism, Left lung abscess, pneumoperitoneum, intraabdominal ascites, LLQ abscess, right inguinal fluid collection. Patient then went to IR for LLQ abscess was drained and an IVC filter. Abdominal culture from 4/5/19 showing Klebsiella pneumoniae, Escherichia coli, Proteus vulgaris group, Streptococcus milleri, viridans group & Bacteroides fragilis. Patient then underwent exploratory laparotomy on 4/7/2019 - with 3L of clear ascites suctioned, portion of small bowel adherent to sigmoid colon and left pelvic side wall with fibrinous exudate and LAURYN performed, sigmoidectomy, placement of right paracolic gutter and left drain in the pelvis, colon left in discontinuity and abdominal vac placed. SICU course complicated by likely septic shock, chest x-ray significant for bilateral infiltrates, remains intubated and sedated and on levophed. Patient last febrile 4/8 to 100.9 (rectal) - no blood cultures sent. Patient was on ID consulted for antibiotic recommendations.     - change ceftriaxone to cefepime 2g every 12 hours because Proteus vulgaris group can produce ampC beta-lactamase, and inducible beta-lactamase can mediated resistance to third-generation cephalosporins  - c/w metronidazole 500mg every 8 hours  - CTS following for undrained L empyema - low threshold for drainage   - plan to return to OR tomorrow    ID Team 1 will follow.

## 2019-04-09 NOTE — PROGRESS NOTE ADULT - SUBJECTIVE AND OBJECTIVE BOX
Patient discussed on morning rounds with Dr. Reaves    Operation / Date: None by our team    SUBJECTIVE ASSESSMENT:  Patient intubated and sedated.        Vital Signs Last 24 Hrs  T(C): 37 (09 Apr 2019 09:42), Max: 38.3 (08 Apr 2019 22:00)  T(F): 98.6 (09 Apr 2019 09:42), Max: 100.9 (08 Apr 2019 22:00)  HR: 98 (09 Apr 2019 14:00) (78 - 104)  BP: 101/50 (09 Apr 2019 14:00) (95/53 - 117/47)  BP(mean): 79 (09 Apr 2019 14:00) (62 - 89)  RR: 18 (09 Apr 2019 14:00) (15 - 23)  SpO2: 98% (09 Apr 2019 14:00) (96% - 100%)  I&O's Detail    08 Apr 2019 07:01  -  09 Apr 2019 07:00  --------------------------------------------------------  IN:    Albumin 25%: 100 mL    fat emulsion (Plant Based) 20% Infusion: 249.6 mL    fentaNYL Infusion.: 157 mL    insulin Infusion: 47 mL    IV PiggyBack: 1499.8 mL    norepinephrine Infusion: 74 mL    norepinephrine Infusion: 410.1 mL    propofol Infusion: 190 mL    TPN (Total Parenteral Nutrition): 1787 mL  Total IN: 4514.5 mL    OUT:    Bulb: 215 mL    Bulb: 490 mL    Indwelling Catheter - Urethral: 695 mL    Nasoenteral Tube: 175 mL    VAC (Vacuum Assisted Closure) System: 2775 mL  Total OUT: 4350 mL    Total NET: 164.5 mL      09 Apr 2019 07:01  -  09 Apr 2019 14:29  --------------------------------------------------------  IN:    Albumin 25%: 50 mL    Albumin 5%  - 250 mL: 500 mL    fentaNYL Infusion.: 49 mL    insulin Infusion: 28 mL    IV PiggyBack: 200 mL    norepinephrine Infusion: 124.7 mL    propofol Infusion: 43 mL    TPN (Total Parenteral Nutrition): 581 mL  Total IN: 1575.7 mL    OUT:    Bulb: 100 mL    Indwelling Catheter - Urethral: 415 mL    VAC (Vacuum Assisted Closure) System: 225 mL  Total OUT: 740 mL    Total NET: 835.7 mL            PHYSICAL EXAM:    GEN: NAD, lying in bed.  Not over breathing the vent.  Sedated.    Psych: Unable to examine.   Neuro: Unable to examine.   HEENT: Eyes closed.  Pupils reactive to light.    CV: S1S2, regular, no murmurs appreciated.  No carotid bruits.  No JVD  Lungs: scattered rhonchi  ABD: Soft  EXT: Warm and well perfused.  ++peripheral edema/anasarca.  Left arm>>Right arm.  B/L LE ++edema, pitting.    Musculoskeletal: unable to examine.   PV: Pedal pulses palpable      LABS:                        7.9    13.84 )-----------( 94       ( 09 Apr 2019 06:10 )             24.1       COUMADIN:  No    PT/INR - ( 09 Apr 2019 06:10 )   PT: 24.6 sec;   INR: 2.13          PTT - ( 09 Apr 2019 06:10 )  PTT:37.6 sec    04-09    138  |  106  |  17  ----------------------------<  157<H>  4.0   |  26  |  0.49<L>    Ca    7.2<L>      09 Apr 2019 06:10  Phos  1.9     04-09  Mg     2.0     04-09    TPro  3.8<L>  /  Alb  1.9<L>  /  TBili  0.6  /  DBili  x   /  AST  33  /  ALT  25  /  AlkPhos  50  04-09          MEDICATIONS  (STANDING):  albumin human 25% IVPB 50 milliLiter(s) IV Intermittent every 8 hours  cefTRIAXone   IVPB      chlorhexidine 2% Cloths 1 Application(s) Topical <User Schedule>  dextrose 5%. 1000 milliLiter(s) (50 mL/Hr) IV Continuous <Continuous>  dextrose 50% Injectable 12.5 Gram(s) IV Push once  dextrose 50% Injectable 25 Gram(s) IV Push once  dextrose 50% Injectable 25 Gram(s) IV Push once  fat emulsion (Plant Based) 20% Infusion 0.7 Gm/kG/Day (20.83 mL/Hr) IV Continuous <Continuous>  fentaNYL   Infusion. 0.5 MICROgram(s)/kG/Hr (3.665 mL/Hr) IV Continuous <Continuous>  heparin  flush 100 Units/mL Injectable 100 Unit(s) IV Push every other day  insulin Infusion 3 Unit(s)/Hr (3 mL/Hr) IV Continuous <Continuous>  metroNIDAZOLE  IVPB 500 milliGRAM(s) IV Intermittent every 8 hours  metroNIDAZOLE  IVPB      norepinephrine Infusion 0.05 MICROgram(s)/kG/Min (3.436 mL/Hr) IV Continuous <Continuous>  nystatin Powder 1 Application(s) Topical two times a day  pantoprazole  Injectable 40 milliGRAM(s) IV Push daily  Parenteral Nutrition - Adult 1 Each (83 mL/Hr) TPN Continuous <Continuous>  Parenteral Nutrition - Adult 1 Each (83 mL/Hr) TPN Continuous <Continuous>  propofol Infusion 5 MICROgram(s)/kG/Min (2.199 mL/Hr) IV Continuous <Continuous>    MEDICATIONS  (PRN):  dextrose 40% Gel 15 Gram(s) Oral once PRN Blood Glucose LESS THAN 70 milliGRAM(s)/deciliter  glucagon  Injectable 1 milliGRAM(s) IntraMuscular once PRN Glucose LESS THAN 70 milligrams/deciliter  ondansetron Injectable 4 milliGRAM(s) IV Push every 6 hours PRN Nausea        RADIOLOGY & ADDITIONAL TESTS:

## 2019-04-09 NOTE — PROGRESS NOTE ADULT - ASSESSMENT
87 y/o Male, poor historian, with history of CAD s/p 3 stents (2012, 2009, 2000) still on ASA/Plavix, ETOH abuse, b/l inguinal hernias s/p laparoscopic robotic-assisted repair of bilateral inguinal hernias on 3/11/2019 (notably, 4.5L of ascites were also drained in the beginning of the procedure and sent for cytopathology) presented to [facility] ED with right inguinal hernia discomfort. In the ED, afebrile, HR 90s, , RR 20, saturation 95%. WBC 20.8. CT ab/pelvis with IV contrast revealed bilateral pulmonary emboli, left lung abscess, large pneumoperitoneum and large ascites consistent with bowel perforation, 10 x 5cm abscess LLQ, 6x4cm fluid collection right groin, DVT b/l common femoral vein, central venous pelvic thrombus in left common iliac and internal iliac veins. Vascular Surgery consulted for b/l PEs, DVTs. Thoracic Surgery, Dr. Reaves, consulted for left lung abscess and for possible surgical intervention.     Plan:  Problem 1: Lung abscess  - Case and images reviewed with Dr. Reaves; There is no urgent surgical intervention indicated for lung abscess at this time.   -Gen surgery to possibly take him back to the OR tomorrow for washout.      Problem 2: B/l pulmonary emboli, DVT bilateral common femoral vein. Central venous pelvic thrombus in the left common iliac and internal iliac veins  - Vascular surgery following  - Continue heparin gtt, f/u coags per primary team.     Will continue to follow on the side for now.

## 2019-04-09 NOTE — PROGRESS NOTE ADULT - SUBJECTIVE AND OBJECTIVE BOX
On interval followup, the left arm PICC site is clean, dry and non-inflamed. Afebrile. Notes, cultures, events and clinical status are followed.

## 2019-04-09 NOTE — PROGRESS NOTE ADULT - ASSESSMENT
87 yo M with history of b/l laparoscopic robotic-assisted inguinal hernia repair presenting with b/l segmental pulmonary embolisms, left lung abscess, pneumoperitoneum, and distal descending colon abscess (likely source of pneumoperitoneum), significant DVT burden s/p IVCF placement and IR drainage of LLQ abdominal collection (4/5) this admission with RTOR for sigmoidectomy, abdominal packing for bleeding and placement of abthera vac currently with open abdomen in septic shock. For OR today,     Neuro: propofol/fentanyl  CV: levo for SBP>100; statin; albumin 25%q8  Pulm: AC 40/470/12/5, bilateral PE's;  CT following for lung abscess, no acute intervention needed at this time  GI: NPO, OGT to LIWS, s/p IR drainage LLQ collection  : rao; strict I'Os  ID: +kleb, proteus, strep in abd Cx, unasyn (4/8--) /// Vanco (4/5-4/7), Zosyn (4/5-4/8)   Endo: insulin gtt transition to insulin in TPN, given NPH this morning as a bridge  Heme: heparin gtt for iliofemoral DVT / bilateral PE; s/p IVC Filter   PPx: SCDs; hold heparin gtt  Lines: PIVs, PICC, R A line   Wounds/drains: abthera vac to Midline incision, LLQ IR drain, Left PHYLLIS (pelvis), right PHYLLIS (paracolic gutter)  PT/OT: Not ordered

## 2019-04-10 LAB
ALBUMIN SERPL ELPH-MCNC: 2 G/DL — LOW (ref 3.3–5)
ALBUMIN SERPL ELPH-MCNC: 2.3 G/DL — LOW (ref 3.3–5)
ALP SERPL-CCNC: 52 U/L — SIGNIFICANT CHANGE UP (ref 40–120)
ALP SERPL-CCNC: 55 U/L — SIGNIFICANT CHANGE UP (ref 40–120)
ALT FLD-CCNC: 27 U/L — SIGNIFICANT CHANGE UP (ref 10–45)
ALT FLD-CCNC: 30 U/L — SIGNIFICANT CHANGE UP (ref 10–45)
ANION GAP SERPL CALC-SCNC: 5 MMOL/L — SIGNIFICANT CHANGE UP (ref 5–17)
ANION GAP SERPL CALC-SCNC: 7 MMOL/L — SIGNIFICANT CHANGE UP (ref 5–17)
APTT BLD: 35.9 SEC — SIGNIFICANT CHANGE UP (ref 27.5–36.3)
APTT BLD: 37.2 SEC — HIGH (ref 27.5–36.3)
APTT BLD: 40.1 SEC — HIGH (ref 27.5–36.3)
AST SERPL-CCNC: 33 U/L — SIGNIFICANT CHANGE UP (ref 10–40)
AST SERPL-CCNC: 37 U/L — SIGNIFICANT CHANGE UP (ref 10–40)
BASE EXCESS BLDA CALC-SCNC: 5.1 MMOL/L — HIGH (ref -2–3)
BASE EXCESS BLDA CALC-SCNC: 5.7 MMOL/L — HIGH (ref -2–3)
BILIRUB SERPL-MCNC: 0.9 MG/DL — SIGNIFICANT CHANGE UP (ref 0.2–1.2)
BILIRUB SERPL-MCNC: 1.1 MG/DL — SIGNIFICANT CHANGE UP (ref 0.2–1.2)
BLD GP AB SCN SERPL QL: NEGATIVE — SIGNIFICANT CHANGE UP
BUN SERPL-MCNC: 20 MG/DL — SIGNIFICANT CHANGE UP (ref 7–23)
BUN SERPL-MCNC: 21 MG/DL — SIGNIFICANT CHANGE UP (ref 7–23)
CALCIUM SERPL-MCNC: 7.1 MG/DL — LOW (ref 8.4–10.5)
CALCIUM SERPL-MCNC: 7.4 MG/DL — LOW (ref 8.4–10.5)
CHLORIDE SERPL-SCNC: 103 MMOL/L — SIGNIFICANT CHANGE UP (ref 96–108)
CHLORIDE SERPL-SCNC: 104 MMOL/L — SIGNIFICANT CHANGE UP (ref 96–108)
CO2 SERPL-SCNC: 28 MMOL/L — SIGNIFICANT CHANGE UP (ref 22–31)
CO2 SERPL-SCNC: 28 MMOL/L — SIGNIFICANT CHANGE UP (ref 22–31)
CREAT SERPL-MCNC: 0.41 MG/DL — LOW (ref 0.5–1.3)
CREAT SERPL-MCNC: 0.44 MG/DL — LOW (ref 0.5–1.3)
CULTURE RESULTS: SIGNIFICANT CHANGE UP
CULTURE RESULTS: SIGNIFICANT CHANGE UP
GLUCOSE BLDC GLUCOMTR-MCNC: 128 MG/DL — HIGH (ref 70–99)
GLUCOSE BLDC GLUCOMTR-MCNC: 142 MG/DL — HIGH (ref 70–99)
GLUCOSE BLDC GLUCOMTR-MCNC: 149 MG/DL — HIGH (ref 70–99)
GLUCOSE BLDC GLUCOMTR-MCNC: 171 MG/DL — HIGH (ref 70–99)
GLUCOSE SERPL-MCNC: 171 MG/DL — HIGH (ref 70–99)
GLUCOSE SERPL-MCNC: 178 MG/DL — HIGH (ref 70–99)
GRAM STN FLD: SIGNIFICANT CHANGE UP
HCO3 BLDA-SCNC: 30 MMOL/L — HIGH (ref 21–28)
HCO3 BLDA-SCNC: 31 MMOL/L — HIGH (ref 21–28)
HCT VFR BLD CALC: 23 % — LOW (ref 39–50)
HCT VFR BLD CALC: 23.2 % — LOW (ref 39–50)
HCT VFR BLD CALC: 25.8 % — LOW (ref 39–50)
HGB BLD-MCNC: 7.5 G/DL — LOW (ref 13–17)
HGB BLD-MCNC: 7.5 G/DL — LOW (ref 13–17)
HGB BLD-MCNC: 8.4 G/DL — LOW (ref 13–17)
INR BLD: 1.82 — HIGH (ref 0.88–1.16)
INR BLD: 2.04 — HIGH (ref 0.88–1.16)
INR BLD: 2.33 — HIGH (ref 0.88–1.16)
LACTATE SERPL-SCNC: 1.1 MMOL/L — SIGNIFICANT CHANGE UP (ref 0.5–2)
MAGNESIUM SERPL-MCNC: 2 MG/DL — SIGNIFICANT CHANGE UP (ref 1.6–2.6)
MAGNESIUM SERPL-MCNC: 2 MG/DL — SIGNIFICANT CHANGE UP (ref 1.6–2.6)
MCHC RBC-ENTMCNC: 30.7 PG — SIGNIFICANT CHANGE UP (ref 27–34)
MCHC RBC-ENTMCNC: 30.9 PG — SIGNIFICANT CHANGE UP (ref 27–34)
MCHC RBC-ENTMCNC: 31.5 PG — SIGNIFICANT CHANGE UP (ref 27–34)
MCHC RBC-ENTMCNC: 32.3 GM/DL — SIGNIFICANT CHANGE UP (ref 32–36)
MCHC RBC-ENTMCNC: 32.6 GM/DL — SIGNIFICANT CHANGE UP (ref 32–36)
MCHC RBC-ENTMCNC: 32.6 GM/DL — SIGNIFICANT CHANGE UP (ref 32–36)
MCV RBC AUTO: 94.9 FL — SIGNIFICANT CHANGE UP (ref 80–100)
MCV RBC AUTO: 95.1 FL — SIGNIFICANT CHANGE UP (ref 80–100)
MCV RBC AUTO: 96.6 FL — SIGNIFICANT CHANGE UP (ref 80–100)
NRBC # BLD: 0 /100 WBCS — SIGNIFICANT CHANGE UP (ref 0–0)
PCO2 BLDA: 44 MMHG — SIGNIFICANT CHANGE UP (ref 35–48)
PCO2 BLDA: 51 MMHG — HIGH (ref 35–48)
PF4 HEPARIN CMPLX AB SER-ACNC: NEGATIVE — SIGNIFICANT CHANGE UP
PF4 HEPARIN CMPLX AB SERPL QL IA: <0.6 U/ML — SIGNIFICANT CHANGE UP (ref 0–0.9)
PH BLDA: 7.4 — SIGNIFICANT CHANGE UP (ref 7.35–7.45)
PH BLDA: 7.46 — HIGH (ref 7.35–7.45)
PHOSPHATE SERPL-MCNC: 2.8 MG/DL — SIGNIFICANT CHANGE UP (ref 2.5–4.5)
PHOSPHATE SERPL-MCNC: 3.5 MG/DL — SIGNIFICANT CHANGE UP (ref 2.5–4.5)
PLATELET # BLD AUTO: 110 K/UL — LOW (ref 150–400)
PLATELET # BLD AUTO: 129 K/UL — LOW (ref 150–400)
PLATELET # BLD AUTO: 88 K/UL — LOW (ref 150–400)
PO2 BLDA: 140 MMHG — HIGH (ref 83–108)
PO2 BLDA: 81 MMHG — LOW (ref 83–108)
POTASSIUM SERPL-MCNC: 4.3 MMOL/L — SIGNIFICANT CHANGE UP (ref 3.5–5.3)
POTASSIUM SERPL-MCNC: 4.5 MMOL/L — SIGNIFICANT CHANGE UP (ref 3.5–5.3)
POTASSIUM SERPL-SCNC: 4.3 MMOL/L — SIGNIFICANT CHANGE UP (ref 3.5–5.3)
POTASSIUM SERPL-SCNC: 4.5 MMOL/L — SIGNIFICANT CHANGE UP (ref 3.5–5.3)
PROT SERPL-MCNC: 4.1 G/DL — LOW (ref 6–8.3)
PROT SERPL-MCNC: 4.2 G/DL — LOW (ref 6–8.3)
PROTHROM AB SERPL-ACNC: 20.9 SEC — HIGH (ref 10–12.9)
PROTHROM AB SERPL-ACNC: 23.6 SEC — HIGH (ref 10–12.9)
PROTHROM AB SERPL-ACNC: 27 SEC — HIGH (ref 10–12.9)
RBC # BLD: 2.38 M/UL — LOW (ref 4.2–5.8)
RBC # BLD: 2.44 M/UL — LOW (ref 4.2–5.8)
RBC # BLD: 2.72 M/UL — LOW (ref 4.2–5.8)
RBC # FLD: 15.5 % — HIGH (ref 10.3–14.5)
RBC # FLD: 15.6 % — HIGH (ref 10.3–14.5)
RBC # FLD: 15.8 % — HIGH (ref 10.3–14.5)
RH IG SCN BLD-IMP: POSITIVE — SIGNIFICANT CHANGE UP
SAO2 % BLDA: 96 % — SIGNIFICANT CHANGE UP (ref 95–100)
SAO2 % BLDA: 99 % — SIGNIFICANT CHANGE UP (ref 95–100)
SODIUM SERPL-SCNC: 137 MMOL/L — SIGNIFICANT CHANGE UP (ref 135–145)
SODIUM SERPL-SCNC: 138 MMOL/L — SIGNIFICANT CHANGE UP (ref 135–145)
SPECIMEN SOURCE: SIGNIFICANT CHANGE UP
SPECIMEN SOURCE: SIGNIFICANT CHANGE UP
WBC # BLD: 11.04 K/UL — HIGH (ref 3.8–10.5)
WBC # BLD: 11.6 K/UL — HIGH (ref 3.8–10.5)
WBC # BLD: 11.91 K/UL — HIGH (ref 3.8–10.5)
WBC # FLD AUTO: 11.04 K/UL — HIGH (ref 3.8–10.5)
WBC # FLD AUTO: 11.6 K/UL — HIGH (ref 3.8–10.5)
WBC # FLD AUTO: 11.91 K/UL — HIGH (ref 3.8–10.5)

## 2019-04-10 PROCEDURE — 99233 SBSQ HOSP IP/OBS HIGH 50: CPT | Mod: GC

## 2019-04-10 PROCEDURE — 99291 CRITICAL CARE FIRST HOUR: CPT

## 2019-04-10 PROCEDURE — 93010 ELECTROCARDIOGRAM REPORT: CPT

## 2019-04-10 PROCEDURE — 71045 X-RAY EXAM CHEST 1 VIEW: CPT | Mod: 26,76

## 2019-04-10 RX ORDER — I.V. FAT EMULSION 20 G/100ML
0.7 EMULSION INTRAVENOUS
Qty: 50 | Refills: 0 | Status: DISCONTINUED | OUTPATIENT
Start: 2019-04-10 | End: 2019-04-10

## 2019-04-10 RX ORDER — PHYTONADIONE (VIT K1) 5 MG
10 TABLET ORAL ONCE
Qty: 0 | Refills: 0 | Status: COMPLETED | OUTPATIENT
Start: 2019-04-10 | End: 2019-04-10

## 2019-04-10 RX ORDER — ACETAMINOPHEN 500 MG
1000 TABLET ORAL ONCE
Qty: 0 | Refills: 0 | Status: COMPLETED | OUTPATIENT
Start: 2019-04-10 | End: 2019-04-10

## 2019-04-10 RX ORDER — ELECTROLYTE SOLUTION,INJ
1 VIAL (ML) INTRAVENOUS
Qty: 0 | Refills: 0 | Status: DISCONTINUED | OUTPATIENT
Start: 2019-04-10 | End: 2019-04-10

## 2019-04-10 RX ORDER — FENTANYL CITRATE 50 UG/ML
25 INJECTION INTRAVENOUS ONCE
Qty: 0 | Refills: 0 | Status: DISCONTINUED | OUTPATIENT
Start: 2019-04-10 | End: 2019-04-10

## 2019-04-10 RX ORDER — VANCOMYCIN HCL 1 G
1000 VIAL (EA) INTRAVENOUS EVERY 12 HOURS
Qty: 0 | Refills: 0 | Status: DISCONTINUED | OUTPATIENT
Start: 2019-04-10 | End: 2019-04-11

## 2019-04-10 RX ORDER — FUROSEMIDE 40 MG
20 TABLET ORAL ONCE
Qty: 0 | Refills: 0 | Status: COMPLETED | OUTPATIENT
Start: 2019-04-10 | End: 2019-04-10

## 2019-04-10 RX ADMIN — Medication 400 MILLIGRAM(S): at 18:16

## 2019-04-10 RX ADMIN — Medication 100 MILLIGRAM(S): at 11:53

## 2019-04-10 RX ADMIN — PROPOFOL 2.2 MICROGRAM(S)/KG/MIN: 10 INJECTION, EMULSION INTRAVENOUS at 07:31

## 2019-04-10 RX ADMIN — Medication 1 EACH: at 18:25

## 2019-04-10 RX ADMIN — Medication 100 MILLIGRAM(S): at 02:08

## 2019-04-10 RX ADMIN — PANTOPRAZOLE SODIUM 40 MILLIGRAM(S): 20 TABLET, DELAYED RELEASE ORAL at 13:22

## 2019-04-10 RX ADMIN — Medication 50 MILLILITER(S): at 02:49

## 2019-04-10 RX ADMIN — Medication 1000 MILLIGRAM(S): at 19:06

## 2019-04-10 RX ADMIN — FENTANYL CITRATE 25 MICROGRAM(S): 50 INJECTION INTRAVENOUS at 19:06

## 2019-04-10 RX ADMIN — Medication 3.44 MICROGRAM(S)/KG/MIN: at 07:31

## 2019-04-10 RX ADMIN — CEFEPIME 100 MILLIGRAM(S): 1 INJECTION, POWDER, FOR SOLUTION INTRAMUSCULAR; INTRAVENOUS at 05:21

## 2019-04-10 RX ADMIN — Medication 50 MILLILITER(S): at 11:55

## 2019-04-10 RX ADMIN — Medication 250 MILLIGRAM(S): at 17:31

## 2019-04-10 RX ADMIN — CHLORHEXIDINE GLUCONATE 1 APPLICATION(S): 213 SOLUTION TOPICAL at 05:23

## 2019-04-10 RX ADMIN — Medication 100 MILLIGRAM(S): at 17:30

## 2019-04-10 RX ADMIN — NYSTATIN CREAM 1 APPLICATION(S): 100000 CREAM TOPICAL at 22:06

## 2019-04-10 RX ADMIN — Medication 250 MILLIGRAM(S): at 13:21

## 2019-04-10 RX ADMIN — Medication 20 MILLIGRAM(S): at 18:16

## 2019-04-10 RX ADMIN — FENTANYL CITRATE 25 MICROGRAM(S): 50 INJECTION INTRAVENOUS at 17:30

## 2019-04-10 RX ADMIN — I.V. FAT EMULSION 20.83 GM/KG/DAY: 20 EMULSION INTRAVENOUS at 22:06

## 2019-04-10 RX ADMIN — CEFEPIME 100 MILLIGRAM(S): 1 INJECTION, POWDER, FOR SOLUTION INTRAMUSCULAR; INTRAVENOUS at 20:32

## 2019-04-10 RX ADMIN — FENTANYL CITRATE 3.67 MICROGRAM(S)/KG/HR: 50 INJECTION INTRAVENOUS at 02:58

## 2019-04-10 RX ADMIN — Medication 102 MILLIGRAM(S): at 01:23

## 2019-04-10 RX ADMIN — NYSTATIN CREAM 1 APPLICATION(S): 100000 CREAM TOPICAL at 05:23

## 2019-04-10 NOTE — PROGRESS NOTE ADULT - SUBJECTIVE AND OBJECTIVE BOX
Surgery Post-Op Note, PCN:     Pre-Op Dx: PERFORATED BOWEL; ABSCESS; EMPYEMA; PULMONARY EM  Perforated viscus  Perforated bowel    Procedure: Exploratory laparotomy, end colostomy creation, hemostatic packing removal, fascial closure, vac placement in abdominal SQ space    Surgeon: Erik    Subjective:  Patient seen at bedside shortly after transfer from OR back to SICU. Remains intubated and sedated on propofol and fentanyl. ROS not obtainable      Vital Signs Last 24 Hrs  T(C): 37.3 (10 Apr 2019 06:19), Max: 37.8 (09 Apr 2019 21:25)  T(F): 99.2 (10 Apr 2019 06:19), Max: 100 (09 Apr 2019 21:25)  HR: 103 (10 Apr 2019 07:53) (92 - 103)  BP: 113/66 (10 Apr 2019 07:00) (100/53 - 131/54)  BP(mean): 90 (10 Apr 2019 07:00) (59 - 91)  RR: 16 (10 Apr 2019 07:15) (16 - 20)  SpO2: 100% (10 Apr 2019 07:53) (98% - 100%)    Physical Exam:  Neuro: no eye opening to verbal or mechanical stimuli  General: intubated, on vent-AC  HEENT: PERRL, EOMI, MMM  Pulm: mild rhonchi bilaterally, decreased breath sounds at lung bases bilaterally  C/V: nontachycardic, RRR, S1 S2, no murmurs  Abd: nondistended, but firm; grimace to palpation; LLQ IR drain w/ light serosanguinous output; LUQ PHYLLIS drain w/ serosanguinous output; R paracolic gutter drain w/ serosanguinous output; abdominal incision w/ wound vac in place (fascia closed 4/10, vac in subcutaneous space)  Extremities: compression lower extremity dressing in place; anasarca   : rao in place; significant 2+ scrotal edema  Skin: no ecchymosis and rashes      LABS:                        8.4    11.04 )-----------( 110      ( 10 Apr 2019 11:01 )             25.8     04-10    138  |  103  |  20  ----------------------------<  178<H>  4.3   |  28  |  0.44<L>    Ca    7.4<L>      10 Apr 2019 05:21  Phos  2.8     04-10  Mg     2.0     04-10    TPro  4.2<L>  /  Alb  2.3<L>  /  TBili  0.9  /  DBili  x   /  AST  37  /  ALT  30  /  AlkPhos  55  04-10    PT/INR - ( 10 Apr 2019 11:01 )   PT: 20.9 sec;   INR: 1.82          PTT - ( 10 Apr 2019 11:01 )  PTT:35.9 sec  CAPILLARY BLOOD GLUCOSE      POCT Blood Glucose.: 171 mg/dL (10 Apr 2019 06:21)  POCT Blood Glucose.: 143 mg/dL (09 Apr 2019 23:30)  POCT Blood Glucose.: 135 mg/dL (09 Apr 2019 18:57)  POCT Blood Glucose.: 101 mg/dL (09 Apr 2019 16:55)  POCT Blood Glucose.: 116 mg/dL (09 Apr 2019 15:01)  POCT Blood Glucose.: 113 mg/dL (09 Apr 2019 13:01)    LIVER FUNCTIONS - ( 10 Apr 2019 05:21 )  Alb: 2.3 g/dL / Pro: 4.2 g/dL / ALK PHOS: 55 U/L / ALT: 30 U/L / AST: 37 U/L / GGT: x               Radiology and Additional Studies:    Post op cxr:  - bilateral effusion and consolidation appear worse than AM cxr Surgery Post-Op Note, PCN:     Pre-Op Dx: PERFORATED BOWEL; ABSCESS; EMPYEMA; PULMONARY EM  Perforated viscus  Perforated bowel    Procedure: Exploratory laparotomy, end colostomy creation, hemostatic packing removal, fascial closure, vac placement in abdominal SQ space    Surgeon: Erik    Subjective:  Patient seen at bedside shortly after transfer from OR back to SICU. Remains intubated and sedated on propofol and fentanyl. ROS not obtainable      Vital Signs Last 24 Hrs  T(C): 37.3 (10 Apr 2019 06:19), Max: 37.8 (09 Apr 2019 21:25)  T(F): 99.2 (10 Apr 2019 06:19), Max: 100 (09 Apr 2019 21:25)  HR: 103 (10 Apr 2019 07:53) (92 - 103)  BP: 113/66 (10 Apr 2019 07:00) (100/53 - 131/54)  BP(mean): 90 (10 Apr 2019 07:00) (59 - 91)  RR: 16 (10 Apr 2019 07:15) (16 - 20)  SpO2: 100% (10 Apr 2019 07:53) (98% - 100%)    Physical Exam:  Neuro: no eye opening to verbal or mechanical stimuli  General: intubated, on vent-AC  HEENT: PERRL, EOMI, MMM  Pulm: mild rhonchi bilaterally, decreased breath sounds at lung bases bilaterally  C/V: nontachycardic, RRR, S1 S2, no murmurs  Abd: nondistended, but firm; grimace to palpation; LLQ IR drain w/ light serosanguinous output; LUQ PHYLLIS drain w/ serosanguinous output; R paracolic gutter drain w/ serosanguinous output; abdominal incision w/ wound vac in place (fascia closed 4/10, vac in subcutaneous space)  Extremities: compression lower extremity dressing in place; anasarca   : rao in place; significant 2+ scrotal edema  Skin: no ecchymosis and rashes      LABS:                        8.4    11.04 )-----------( 110      ( 10 Apr 2019 11:01 )             25.8     04-10    138  |  103  |  20  ----------------------------<  178<H>  4.3   |  28  |  0.44<L>    Ca    7.4<L>      10 Apr 2019 05:21  Phos  2.8     04-10  Mg     2.0     04-10    TPro  4.2<L>  /  Alb  2.3<L>  /  TBili  0.9  /  DBili  x   /  AST  37  /  ALT  30  /  AlkPhos  55  04-10    PT/INR - ( 10 Apr 2019 11:01 )   PT: 20.9 sec;   INR: 1.82          PTT - ( 10 Apr 2019 11:01 )  PTT:35.9 sec  CAPILLARY BLOOD GLUCOSE      POCT Blood Glucose.: 171 mg/dL (10 Apr 2019 06:21)  POCT Blood Glucose.: 143 mg/dL (09 Apr 2019 23:30)  POCT Blood Glucose.: 135 mg/dL (09 Apr 2019 18:57)  POCT Blood Glucose.: 101 mg/dL (09 Apr 2019 16:55)  POCT Blood Glucose.: 116 mg/dL (09 Apr 2019 15:01)  POCT Blood Glucose.: 113 mg/dL (09 Apr 2019 13:01)    LIVER FUNCTIONS - ( 10 Apr 2019 05:21 )  Alb: 2.3 g/dL / Pro: 4.2 g/dL / ALK PHOS: 55 U/L / ALT: 30 U/L / AST: 37 U/L / GGT: x               Radiology and Additional Studies:    Addendum:  - sputum cx growing presumptive MRSA; started on vancomycin 1g BID per ID    Post op cxr:  - bilateral effusion and consolidation appear worse than AM cxr

## 2019-04-10 NOTE — PROGRESS NOTE ADULT - ASSESSMENT
87yo M w/ hx of CAD s/p 3 stents, HLD, BPH, hx of ETOH abuse, dementia, b/l inguinal hernias s/p laparoscopic robotic-assisted repair of bilateral inguinal hernias on 3/11/2019, ascites of unclear etiology. He presented from his rehabilitation facility for FTT and R inguinal discomfort. CT in ED with bilateral pulmonary embolism, Left lung abscess, pneumoperitoneum, intraabdominal ascites, LLQ abscess, right inguinal fluid collection. Patient then went to IR for LLQ abscess was drained and an IVC filter. Abdominal culture from 4/5/19 showing Klebsiella pneumoniae, Escherichia coli, Proteus vulgaris group, Streptococcus milleri, viridans group & Bacteroides fragilis. Patient then underwent exploratory laparotomy on 4/7/2019 - with 3L of clear ascites suctioned, portion of small bowel adherent to sigmoid colon and left pelvic side wall with fibrinous exudate and LAURYN performed, sigmoidectomy, placement of right paracolic gutter and left drain in the pelvis, colon left in discontinuity and abdominal vac placed. SICU course complicated by likely septic shock, chest x-ray significant for bilateral infiltrates, remains intubated and sedated and on levophed. Patient last febrile 4/8 to 100.9 (rectal) - no blood cultures sent. Patient was on ID consulted for antibiotic recommendations.     - c/w cefepime 2g every 12 hours because Proteus vulgaris group can produce ampC beta-lactamase, and inducible beta-lactamase can mediated resistance to third-generation cephalosporins  - c/w metronidazole 500mg every 8 hours  - started on vancomycin 1g q12 for MRSA in sputum   - CTS following for undrained L empyema - low threshold for drainage   - s/p OR 4/10 - Exploratory laparotomy, matured end colostomy and Fascia closed     ID Team 1 will follow.

## 2019-04-10 NOTE — BRIEF OPERATIVE NOTE - OPERATION/FINDINGS
Exploratory laparotomy. Removal of abthera vac. Raw surface oozing noted, but much less so than prior case. Small bowel anastomosis intact. Mild fibrinous exudate along small bowel in left abdomen. Descending colon stump brought up to skin as matured end colostomy. Fascia closed with running PDS suture. Wound vac applied to midline subq tissue.

## 2019-04-10 NOTE — PROGRESS NOTE ADULT - ASSESSMENT
85 yo M with history of b/l laparoscopic robotic-assisted inguinal hernia repair presenting with b/l segmental pulmonary embolisms, left lung abscess, pneumoperitoneum, and distal descending colon abscess (likely source of pneumoperitoneum), significant iliofemoral DVT burden, s/p IVCF placement and IR drainage of LLQ abdominal collection (4/5), s/p ex-lap, LAURYN, sigmoidectomy, drain placement in R. paracolic gutter, and abthera vac placement (4/7), now s/p planned RTOR, packing removal, and end colostomy creation (4/10).    Plan:  Neuro: sedated - propofol/fentanyl  CV: levo for SBP>100; statin; albumin 25%q8  Pulm: intubated, on vent-AC, bilateral PE's; CT following for empyema, no acute intervention needed at this time  GI: NPO, OGT to LIWS; malnourished, prealbumin 4; TPN w/ lipids daily; check prealbumin and 24 hour UUN; s/p IR drainage LLQ collection (4/5)  : rao; strict I'Os, UO improved, check 2 hour urine creatinine for clearance  ID: colon abscess and lung abscess; IR drain growing klebsiella, E. coli., strep milleri, proteus vulgarisflagyl (4/9-), cefepime (4/9-)//ceftriaxone (4/9-4/9)// unasyn (4/8-4/9),  Vanco (4/5-4/7), Zosyn (4/5-4/8)   Endo: ISS; insulin w/ TPN;   Heme: bilateral iliofemoral DVT / bilateral PE; s/p IVC Filter; heparin gtt dc'd in setting of active bleed  PPx: SCDs; hold heparin gtt  Lines: PIVs, PICC, R A line  Wounds/drains: LLQ IR drain (4/5 -- ), LUQ PHYLLIS (4/7 -- ), R paracolic gutter drain (4/7 --) --- all drains to LIWS; wound vac in abdominal incision SQ space (4/10 --)  PT/OT: Not ordered 87 yo M with history of b/l laparoscopic robotic-assisted inguinal hernia repair presenting with b/l segmental pulmonary embolisms, left lung abscess, pneumoperitoneum, and distal descending colon abscess (likely source of pneumoperitoneum), significant iliofemoral DVT burden, s/p IVCF placement and IR drainage of LLQ abdominal collection (4/5), s/p ex-lap, LAURYN, sigmoidectomy, drain placement in R. paracolic gutter, and abthera vac placement (4/7), now s/p planned RTOR, packing removal, and end colostomy creation (4/10).    Addendum:  - sputum cx growing presumptive MRSA; started on vancomycin 1g BID per ID    Plan:  Neuro: sedated - propofol/fentanyl  CV: levo for SBP>100; statin; albumin 25%q8  Pulm: intubated, on vent-AC, bilateral PE's; CT following for empyema, no acute intervention needed at this time  GI: NPO, OGT to LIWS; malnourished, prealbumin 4; TPN w/ lipids daily; check prealbumin and 24 hour UUN; s/p IR drainage LLQ collection (4/5)  : rao; strict I'Os, UO improved, check 2 hour urine creatinine for clearance  ID: +kleb, proteus, strep in abd Cx, flagyl (4/9-), cefepime (4/9-); Sputum cx: MRSA, vancomycin (4/10 --) [Dc'd//ceftriaxone (4/9-4/9)// unasyn (4/8-4/9),  Vanco (4/5-4/7), Zosyn (4/5-4/8)]  Endo: ISS; insulin w/ TPN;   Heme: bilateral iliofemoral DVT / bilateral PE; s/p IVC Filter; heparin gtt dc'd in setting of active bleed  PPx: SCDs; hold heparin gtt  Lines: PIVs, PICC, R A line  Wounds/drains: LLQ IR drain (4/5 -- ), LUQ PHYLLIS (4/7 -- ), R paracolic gutter drain (4/7 --) --- all drains to LIWS; wound vac in abdominal incision SQ space (4/10 --)  PT/OT: Not ordered

## 2019-04-10 NOTE — PROGRESS NOTE ADULT - SUBJECTIVE AND OBJECTIVE BOX
INFECTIOUS DISEASE CONSULT PROGRESS NOTE    INTERVAL HPI/OVERNIGHT EVENTS:    ACTIVE ANTIMICROBIALS/ANTIBIOTICS:  cefepime   IVPB 2000 milliGRAM(s) IV Intermittent every 12 hours  metroNIDAZOLE  IVPB 500 milliGRAM(s) IV Intermittent every 8 hours  metroNIDAZOLE  IVPB          VITAL SIGNS:  ICU Vital Signs Last 24 Hrs  T(C): 37.3 (10 Apr 2019 06:19), Max: 37.8 (09 Apr 2019 21:25)  T(F): 99.2 (10 Apr 2019 06:19), Max: 100 (09 Apr 2019 21:25)  HR: 103 (10 Apr 2019 07:53) (92 - 103)  BP: 113/66 (10 Apr 2019 07:00) (100/53 - 131/54)  BP(mean): 90 (10 Apr 2019 07:00) (59 - 91)  ABP: 116/70 (10 Apr 2019 07:15) (94/42 - 138/64)  ABP(mean): 90 (10 Apr 2019 07:15) (62 - 90)  RR: 16 (10 Apr 2019 07:15) (16 - 20)  SpO2: 100% (10 Apr 2019 07:53) (98% - 100%)      PHYSICAL EXAM:      LABS:                        7.5    11.91 )-----------( 129      ( 10 Apr 2019 05:21 )             23.0       04-10    138  |  103  |  20  ----------------------------<  178<H>  4.3   |  28  |  0.44<L>    Ca    7.4<L>      10 Apr 2019 05:21  Phos  2.8     04-10  Mg     2.0     04-10    TPro  4.2<L>  /  Alb  2.3<L>  /  TBili  0.9  /  DBili  x   /  AST  37  /  ALT  30  /  AlkPhos  55  04-10          MICROBIOLOGY:      RADIOLOGY & ADDITIONAL STUDIES: INFECTIOUS DISEASE CONSULT PROGRESS NOTE    INTERVAL HPI/OVERNIGHT EVENTS: Low grade temp 100. WBC 11. Unable to obtain ROS - intubated.     ACTIVE ANTIMICROBIALS/ANTIBIOTICS:  cefepime   IVPB 2000 milliGRAM(s) IV Intermittent every 12 hours  metroNIDAZOLE  IVPB 500 milliGRAM(s) IV Intermittent every 8 hours  metroNIDAZOLE  IVPB        VITAL SIGNS:  ICU Vital Signs Last 24 Hrs  T(C): 37.3 (10 Apr 2019 06:19), Max: 37.8 (09 Apr 2019 21:25)  T(F): 99.2 (10 Apr 2019 06:19), Max: 100 (09 Apr 2019 21:25)  HR: 103 (10 Apr 2019 07:53) (92 - 103)  BP: 113/66 (10 Apr 2019 07:00) (100/53 - 131/54)  BP(mean): 90 (10 Apr 2019 07:00) (59 - 91)  ABP: 116/70 (10 Apr 2019 07:15) (94/42 - 138/64)  ABP(mean): 90 (10 Apr 2019 07:15) (62 - 90)  RR: 16 (10 Apr 2019 07:15) (16 - 20)  SpO2: 100% (10 Apr 2019 07:53) (98% - 100%)      PHYSICAL EXAM:  PHYSICAL EXAM:  General: Ill- appearing, cachexia, intubated and sedated   Head: temporal wasting  Pulmonary: Decreased breath sounds b/l, rhonchus breath sounds in upper lung fields   Cardiovascular: +S1S2, no murmurs  Abdominal: Distended, edematous, abdominal wound vac, + PHYLLIS drains serosanguinous fluid    Extremities: Diffuse anasarca   Neuro: sedated    LABS:                        7.5    11.91 )-----------( 129      ( 10 Apr 2019 05:21 )             23.0       04-10    138  |  103  |  20  ----------------------------<  178<H>  4.3   |  28  |  0.44<L>    Ca    7.4<L>      10 Apr 2019 05:21  Phos  2.8     04-10  Mg     2.0     04-10    TPro  4.2<L>  /  Alb  2.3<L>  /  TBili  0.9  /  DBili  x   /  AST  37  /  ALT  30  /  AlkPhos  55  04-10      MICROBIOLOGY:    Culture - Sputum (collected 09 Apr 2019 11:48)  Source: .Sputum Sputum  Gram Stain (10 Apr 2019 11:38):    No epithelial cells    Numerous White blood cells    Few Gram positive cocci in pairs  Preliminary Report (10 Apr 2019 11:49):    Moderate Staphylococcus aureus    presumptive Methicillin resistant    Confirmation to follow within 24 hours Result called to and read back byRosalia Jacobson RN  04/10/2019 11:47:35    Susceptibility to follow.    Accompanied by normal respiratory bev    Culture - Blood (04.05.19 @ 17:05)    Specimen Source: .Blood Blood    Culture Results:   No growth at 5 days.

## 2019-04-10 NOTE — PROGRESS NOTE ADULT - ATTENDING COMMENTS
s/p fascial closure today. Sputum cx with MRSA. L lung abscess/empyema. Start vancomycin 1g IV q12h; continue cefepime and Flagyl for polymicrobial peritonitis.

## 2019-04-10 NOTE — ADVANCED PRACTICE NURSE CONSULT - ASSESSMENT
Wound vac leaking due to ostomy appliance placed beneath vac drape. Old appliance removed, vac dressing bolstered to prevent leakage, and new 1 3/4" Lidia appliance placed. Vac at 125 mm/Hg negative pressure.

## 2019-04-10 NOTE — PROGRESS NOTE ADULT - SUBJECTIVE AND OBJECTIVE BOX
Interval History  o/n: mn labs plts 88 - ordered 1u, INR 2.33 ordered vit K and 2 ffp for 6am  4/9 : BP systolic ~90 and UO 20-25cc, 5% albumin given, Plan to go to the OR tmrw, started flagyl and ceftriaxone . Vit K given. Insulin 55units in TPN. Sputum culture sent, EP consulted - NTD for the bigeminy. ID consulted,changed from ceftriaxone to cefepime      SUBJECTIVE:   Patient seen and examined by bedside. Intubated, sedated, ROS no obtainable.        Vitals - Range in last 12h  T(F): , Max: 99.2 (04-10-19 @ 01:10)  HR:  (92 - 103)  BP:  (105/60 - 131/54)  BP(mean):  (63 - 91)  ABP:  (116/70 - 136/66)  ABP(mean):  (70 - 90)  RR:  (16 - 20)  SpO2:  (98% - 100%)  CVP(mm Hg):  (4 - 7)  CVP(cm H2O): --  CO: --  CI: --  PA: --  PA(mean): --  PA(direct): --  PCWP: --    Vitals - Most Recent  T(F): 99.2 (04-10-19 @ 06:19)  HR: 103 (04-10-19 @ 07:53)  BP: 113/66 (04-10-19 @ 07:00)  RR: 16 (04-10-19 @ 07:15)  SpO2: 100% (04-10-19 @ 07:53)  I&O's Detail    09 Apr 2019 07:01  -  10 Apr 2019 07:00  --------------------------------------------------------  IN:    Albumin 25%: 150 mL    Albumin 5%  - 250 mL: 500 mL    fat emulsion (Plant Based) 20% Infusion: 228.8 mL    fentaNYL Infusion.: 168 mL    insulin Infusion: 32.2 mL    IV PiggyBack: 450 mL    norepinephrine Infusion: 378.2 mL    Plasma: 460 mL    Platelets - Single Donor: 220 mL    propofol Infusion: 153 mL    TPN (Total Parenteral Nutrition): 1992 mL  Total IN: 4732.2 mL    OUT:    Bulb: 50 mL    Bulb: 330 mL    Bulb: 20 mL    Indwelling Catheter - Urethral: 1070 mL    Nasoenteral Tube: 175 mL    VAC (Vacuum Assisted Closure) System: 1600 mL  Total OUT: 3245 mL    Total NET: 1487.2 mL      10 Apr 2019 07:01  -  10 Apr 2019 11:12  --------------------------------------------------------  IN:    fat emulsion (Plant Based) 20% Infusion: 20.8 mL    fentaNYL Infusion.: 7 mL    norepinephrine Infusion: 12.8 mL    propofol Infusion: 8 mL    TPN (Total Parenteral Nutrition): 83 mL  Total IN: 131.6 mL    OUT:    Indwelling Catheter - Urethral: 35 mL  Total OUT: 35 mL    Total NET: 96.6 mL        Mode: AC/ CMV (Assist Control/ Continuous Mandatory Ventilation)  RR (machine): 12  RR (patient): 18  TV (machine): 470  TV (patient): 371  FiO2: 40  PEEP: 5  ITime: 1  MAP: 8  PIP: 16      Physical Exam  Neuro: no eye opening to verbal or mechanical stimuli  General: intubated, on vent-AC  HEENT: PERRL, EOMI, MMM  Pulm: mild rhonchi bilaterally, decreased breath sounds at lung bases bilaterally  C/V: nontachycardic, RRR, S1 S2, no murmurs  Abd: nondistended, but firm; tender to palpation; abthera vac in place w/ significant light serosanguinous output; IR drain w/ minimal seropurulent output; pelvic drain w/ significant serosanguinous output; R paracolic gutter drain w/ minimal output  Extremities: compression lower extremity dressing in place; anasarca   : rao in place; significant 2+ scrotal edema  Skin: no ecchymosis and rashes          LABS:                        8.4    11.04 )-----------( 110      ( 10 Apr 2019 11:01 )             25.8     04-10    138  |  103  |  20  ----------------------------<  178<H>  4.3   |  28  |  0.44<L>    Ca    7.4<L>      10 Apr 2019 05:21  Phos  2.8     04-10  Mg     2.0     04-10    TPro  4.2<L>  /  Alb  2.3<L>  /  TBili  0.9  /  DBili  x   /  AST  37  /  ALT  30  /  AlkPhos  55  04-10    LIVER FUNCTIONS - ( 10 Apr 2019 05:21 )  Alb: 2.3 g/dL / Pro: 4.2 g/dL / ALK PHOS: 55 U/L / ALT: 30 U/L / AST: 37 U/L / GGT: x           PT/INR - ( 10 Apr 2019 05:21 )   PT: 23.6 sec;   INR: 2.04          PTT - ( 10 Apr 2019 05:21 )  PTT:37.2 sec        MEDICATIONS  (STANDING):  albumin human 25% IVPB 50 milliLiter(s) IV Intermittent every 8 hours  cefepime   IVPB 2000 milliGRAM(s) IV Intermittent every 12 hours  chlorhexidine 2% Cloths 1 Application(s) Topical <User Schedule>  dextrose 5%. 1000 milliLiter(s) (50 mL/Hr) IV Continuous <Continuous>  dextrose 50% Injectable 12.5 Gram(s) IV Push once  dextrose 50% Injectable 25 Gram(s) IV Push once  dextrose 50% Injectable 25 Gram(s) IV Push once  fat emulsion (Plant Based) 20% Infusion 0.7 Gm/kG/Day (20.83 mL/Hr) IV Continuous <Continuous>  fentaNYL   Infusion. 0.5 MICROgram(s)/kG/Hr (3.665 mL/Hr) IV Continuous <Continuous>  heparin  flush 100 Units/mL Injectable 100 Unit(s) IV Push every other day  insulin Infusion 3 Unit(s)/Hr (3 mL/Hr) IV Continuous <Continuous>  metroNIDAZOLE  IVPB 500 milliGRAM(s) IV Intermittent every 8 hours  metroNIDAZOLE  IVPB      norepinephrine Infusion 0.05 MICROgram(s)/kG/Min (3.436 mL/Hr) IV Continuous <Continuous>  nystatin Powder 1 Application(s) Topical two times a day  pantoprazole  Injectable 40 milliGRAM(s) IV Push daily  Parenteral Nutrition - Adult 1 Each (83 mL/Hr) TPN Continuous <Continuous>  Parenteral Nutrition - Adult 1 Each (83 mL/Hr) TPN Continuous <Continuous>  propofol Infusion 5 MICROgram(s)/kG/Min (2.199 mL/Hr) IV Continuous <Continuous>    MEDICATIONS  (PRN):  dextrose 40% Gel 15 Gram(s) Oral once PRN Blood Glucose LESS THAN 70 milliGRAM(s)/deciliter  glucagon  Injectable 1 milliGRAM(s) IntraMuscular once PRN Glucose LESS THAN 70 milligrams/deciliter  ondansetron Injectable 4 milliGRAM(s) IV Push every 6 hours PRN Nausea      RADIOLOGY & ADDITIONAL STUDIES:    AM CXR: no significant change; persistent effusions and consolidations bilaterally

## 2019-04-10 NOTE — PROGRESS NOTE ADULT - ASSESSMENT
85 yo M with history of b/l laparoscopic robotic-assisted inguinal hernia repair presenting with b/l segmental pulmonary embolisms, left lung abscess, pneumoperitoneum, and distal descending colon abscess (likely source of pneumoperitoneum), significant iliofemoral DVT burden, s/p IVCF placement and IR drainage of LLQ abdominal collection (4/5), s/p ex-lap, LAURYN, sigmoidectomy, drain placement in R. paracolic gutter, and abthera vac placement (4/7).    - OR for planned abdominal exploration, removal of hemostatic packing, and likely end colostomy creation    Neuro: propofol/fentanyl  CV: levo for SBP>100; statin; albumin 25%q8.  Pulm: AC 40/470/12/5, bilateral PE's;  CT following for lung abscess, no acute intervention needed at this time  GI: NPO, OGT to LIWS, s/p IR drainage LLQ collection; malnourished w/ prealbumin 4 --- TPN w/ daily lipids (triglycerides 65, 4/10)  : rao; strict I'Os  ID: +kleb, proteus, strep in abd Cx, flagyl (4/9-), cefepime (4/9-)//ceftriaxone (4/9-4/9)// unasyn (4/8-4/9),  Vanco (4/5-4/7), Zosyn (4/5-4/8)   Endo: insulin gtt transition to insulin in TPN, given NPH this morning as a bridge  Heme: heparin gtt for iliofemoral DVT / bilateral PE; s/p IVC Filter   PPx: SCDs; hold heparin gtt  Lines: PIVs, PICC, R A line   Wounds/drains: abthera vac to Midline incision, LLQ IR drain, Left PHYLLIS (pelvis), right PHYLLIS (paracolic gutter)

## 2019-04-11 LAB
-  CEFAZOLIN: SIGNIFICANT CHANGE UP
-  CLINDAMYCIN: SIGNIFICANT CHANGE UP
-  DAPTOMYCIN: SIGNIFICANT CHANGE UP
-  ERYTHROMYCIN: SIGNIFICANT CHANGE UP
-  LINEZOLID: SIGNIFICANT CHANGE UP
-  OXACILLIN: SIGNIFICANT CHANGE UP
-  PENICILLIN: SIGNIFICANT CHANGE UP
-  RIFAMPIN: SIGNIFICANT CHANGE UP
-  TETRACYCLINE: SIGNIFICANT CHANGE UP
-  TRIMETHOPRIM/SULFAMETHOXAZOLE: SIGNIFICANT CHANGE UP
-  VANCOMYCIN: SIGNIFICANT CHANGE UP
ALBUMIN SERPL ELPH-MCNC: 1.9 G/DL — LOW (ref 3.3–5)
ALP SERPL-CCNC: 56 U/L — SIGNIFICANT CHANGE UP (ref 40–120)
ALT FLD-CCNC: 20 U/L — SIGNIFICANT CHANGE UP (ref 10–45)
ANION GAP SERPL CALC-SCNC: 6 MMOL/L — SIGNIFICANT CHANGE UP (ref 5–17)
APTT BLD: 36.3 SEC — SIGNIFICANT CHANGE UP (ref 27.5–36.3)
AST SERPL-CCNC: 25 U/L — SIGNIFICANT CHANGE UP (ref 10–40)
BILIRUB SERPL-MCNC: 1.3 MG/DL — HIGH (ref 0.2–1.2)
BUN SERPL-MCNC: 23 MG/DL — SIGNIFICANT CHANGE UP (ref 7–23)
CALCIUM SERPL-MCNC: 7 MG/DL — LOW (ref 8.4–10.5)
CHLORIDE SERPL-SCNC: 104 MMOL/L — SIGNIFICANT CHANGE UP (ref 96–108)
CO2 SERPL-SCNC: 30 MMOL/L — SIGNIFICANT CHANGE UP (ref 22–31)
CREAT SERPL-MCNC: 0.4 MG/DL — LOW (ref 0.5–1.3)
CULTURE RESULTS: SIGNIFICANT CHANGE UP
GLUCOSE BLDC GLUCOMTR-MCNC: 151 MG/DL — HIGH (ref 70–99)
GLUCOSE BLDC GLUCOMTR-MCNC: 162 MG/DL — HIGH (ref 70–99)
GLUCOSE BLDC GLUCOMTR-MCNC: 91 MG/DL — SIGNIFICANT CHANGE UP (ref 70–99)
GLUCOSE SERPL-MCNC: 148 MG/DL — HIGH (ref 70–99)
HCT VFR BLD CALC: 28.4 % — LOW (ref 39–50)
HGB BLD-MCNC: 9.2 G/DL — LOW (ref 13–17)
INR BLD: 2.59 — HIGH (ref 0.88–1.16)
MAGNESIUM SERPL-MCNC: 2 MG/DL — SIGNIFICANT CHANGE UP (ref 1.6–2.6)
MCHC RBC-ENTMCNC: 30.8 PG — SIGNIFICANT CHANGE UP (ref 27–34)
MCHC RBC-ENTMCNC: 32.4 GM/DL — SIGNIFICANT CHANGE UP (ref 32–36)
MCV RBC AUTO: 95 FL — SIGNIFICANT CHANGE UP (ref 80–100)
METHOD TYPE: SIGNIFICANT CHANGE UP
METHOD TYPE: SIGNIFICANT CHANGE UP
NRBC # BLD: 0 /100 WBCS — SIGNIFICANT CHANGE UP (ref 0–0)
ORGANISM # SPEC MICROSCOPIC CNT: SIGNIFICANT CHANGE UP
PHOSPHATE SERPL-MCNC: 3.2 MG/DL — SIGNIFICANT CHANGE UP (ref 2.5–4.5)
PLATELET # BLD AUTO: 126 K/UL — LOW (ref 150–400)
POTASSIUM SERPL-MCNC: 4.8 MMOL/L — SIGNIFICANT CHANGE UP (ref 3.5–5.3)
POTASSIUM SERPL-SCNC: 4.8 MMOL/L — SIGNIFICANT CHANGE UP (ref 3.5–5.3)
PROT SERPL-MCNC: 4 G/DL — LOW (ref 6–8.3)
PROTHROM AB SERPL-ACNC: 30.2 SEC — HIGH (ref 10–12.9)
RBC # BLD: 2.99 M/UL — LOW (ref 4.2–5.8)
RBC # FLD: 16.1 % — HIGH (ref 10.3–14.5)
SODIUM SERPL-SCNC: 140 MMOL/L — SIGNIFICANT CHANGE UP (ref 135–145)
SPECIMEN SOURCE: SIGNIFICANT CHANGE UP
SURGICAL PATHOLOGY STUDY: SIGNIFICANT CHANGE UP
VANCOMYCIN FLD-MCNC: 8.2 UG/ML — SIGNIFICANT CHANGE UP
WBC # BLD: 13.44 K/UL — HIGH (ref 3.8–10.5)
WBC # FLD AUTO: 13.44 K/UL — HIGH (ref 3.8–10.5)

## 2019-04-11 PROCEDURE — 71045 X-RAY EXAM CHEST 1 VIEW: CPT | Mod: 26

## 2019-04-11 PROCEDURE — 99233 SBSQ HOSP IP/OBS HIGH 50: CPT | Mod: GC

## 2019-04-11 RX ORDER — PHYTONADIONE (VIT K1) 5 MG
10 TABLET ORAL ONCE
Qty: 0 | Refills: 0 | Status: COMPLETED | OUTPATIENT
Start: 2019-04-11 | End: 2019-04-11

## 2019-04-11 RX ORDER — INSULIN LISPRO 100/ML
VIAL (ML) SUBCUTANEOUS EVERY 6 HOURS
Qty: 0 | Refills: 0 | Status: DISCONTINUED | OUTPATIENT
Start: 2019-04-11 | End: 2019-05-08

## 2019-04-11 RX ORDER — FUROSEMIDE 40 MG
20 TABLET ORAL ONCE
Qty: 0 | Refills: 0 | Status: COMPLETED | OUTPATIENT
Start: 2019-04-11 | End: 2019-04-11

## 2019-04-11 RX ORDER — ELECTROLYTE SOLUTION,INJ
1 VIAL (ML) INTRAVENOUS
Qty: 0 | Refills: 0 | Status: DISCONTINUED | OUTPATIENT
Start: 2019-04-11 | End: 2019-04-11

## 2019-04-11 RX ORDER — I.V. FAT EMULSION 20 G/100ML
0.7 EMULSION INTRAVENOUS
Qty: 50 | Refills: 0 | Status: DISCONTINUED | OUTPATIENT
Start: 2019-04-11 | End: 2019-04-11

## 2019-04-11 RX ORDER — FENTANYL CITRATE 50 UG/ML
25 INJECTION INTRAVENOUS ONCE
Qty: 0 | Refills: 0 | Status: DISCONTINUED | OUTPATIENT
Start: 2019-04-11 | End: 2019-04-11

## 2019-04-11 RX ORDER — CEFEPIME 1 G/1
2000 INJECTION, POWDER, FOR SOLUTION INTRAMUSCULAR; INTRAVENOUS EVERY 12 HOURS
Qty: 0 | Refills: 0 | Status: DISCONTINUED | OUTPATIENT
Start: 2019-04-11 | End: 2019-04-30

## 2019-04-11 RX ORDER — VANCOMYCIN HCL 1 G
1000 VIAL (EA) INTRAVENOUS EVERY 12 HOURS
Qty: 0 | Refills: 0 | Status: DISCONTINUED | OUTPATIENT
Start: 2019-04-11 | End: 2019-04-12

## 2019-04-11 RX ADMIN — FENTANYL CITRATE 25 MICROGRAM(S): 50 INJECTION INTRAVENOUS at 09:39

## 2019-04-11 RX ADMIN — PANTOPRAZOLE SODIUM 40 MILLIGRAM(S): 20 TABLET, DELAYED RELEASE ORAL at 11:40

## 2019-04-11 RX ADMIN — Medication 20 MILLIGRAM(S): at 08:39

## 2019-04-11 RX ADMIN — PROPOFOL 2.2 MICROGRAM(S)/KG/MIN: 10 INJECTION, EMULSION INTRAVENOUS at 18:52

## 2019-04-11 RX ADMIN — Medication 100 MILLIGRAM(S): at 02:20

## 2019-04-11 RX ADMIN — FENTANYL CITRATE 25 MICROGRAM(S): 50 INJECTION INTRAVENOUS at 10:57

## 2019-04-11 RX ADMIN — Medication 2: at 22:59

## 2019-04-11 RX ADMIN — Medication 20 MILLIGRAM(S): at 18:53

## 2019-04-11 RX ADMIN — I.V. FAT EMULSION 20.83 GM/KG/DAY: 20 EMULSION INTRAVENOUS at 22:20

## 2019-04-11 RX ADMIN — CEFEPIME 100 MILLIGRAM(S): 1 INJECTION, POWDER, FOR SOLUTION INTRAMUSCULAR; INTRAVENOUS at 18:54

## 2019-04-11 RX ADMIN — NYSTATIN CREAM 1 APPLICATION(S): 100000 CREAM TOPICAL at 06:48

## 2019-04-11 RX ADMIN — Medication 2: at 11:38

## 2019-04-11 RX ADMIN — PROPOFOL 2.2 MICROGRAM(S)/KG/MIN: 10 INJECTION, EMULSION INTRAVENOUS at 00:41

## 2019-04-11 RX ADMIN — CEFEPIME 100 MILLIGRAM(S): 1 INJECTION, POWDER, FOR SOLUTION INTRAMUSCULAR; INTRAVENOUS at 06:47

## 2019-04-11 RX ADMIN — Medication 100 UNIT(S): at 11:39

## 2019-04-11 RX ADMIN — Medication 250 MILLIGRAM(S): at 18:53

## 2019-04-11 RX ADMIN — Medication 102 MILLIGRAM(S): at 12:57

## 2019-04-11 RX ADMIN — FENTANYL CITRATE 3.67 MICROGRAM(S)/KG/HR: 50 INJECTION INTRAVENOUS at 14:51

## 2019-04-11 RX ADMIN — Medication 100 MILLIGRAM(S): at 18:53

## 2019-04-11 RX ADMIN — CHLORHEXIDINE GLUCONATE 1 APPLICATION(S): 213 SOLUTION TOPICAL at 06:48

## 2019-04-11 RX ADMIN — Medication 1 EACH: at 18:53

## 2019-04-11 RX ADMIN — Medication 250 MILLIGRAM(S): at 06:48

## 2019-04-11 RX ADMIN — Medication 100 MILLIGRAM(S): at 09:38

## 2019-04-11 RX ADMIN — NYSTATIN CREAM 1 APPLICATION(S): 100000 CREAM TOPICAL at 18:54

## 2019-04-11 NOTE — PROGRESS NOTE ADULT - ASSESSMENT
85 yo M with recent bilateral robotic inguinal hernia repair on 3/11, now with perforated diverticulitis s/p ex lap, sigmoidectomy, and drain placement with open abdomen on 4/7, RTOR on 4/10 for end colostomy and closure of fascia.     Plan:  - Neuro: remains sedated on prop/fent gtts  - CV: levophed for MAP > 65. S/p Lasix 20 iv at 8 am, may need another dose in PM. Net goal neg 1 L/ 24 hrs  - Resp: daily CPAP trials, failed earlier today. Improved diuresis will help. Daily CXR. F/u thoracic surg recs for empyema  - GI/FEN: sweat only via colostomy, no other function. Continue TPN, will discuss inserting Dobhoff tomorrow for eventual enteral feeds. OGT to wall suction.  - /renal: Abbott, strict I/Os while diuresing  - ID: Vanc, cefepime, flagyl, f/u ID recs (MRSA in sputum, kleb/proteus/strep/bacteroides in abd ctx). Blood ctx neg.  - Heme: INR 2.59, s/p Vit K 10 mg IV per Dr. Valencia today. Will eventually need full therapeutic anticoag for DVT/PE. HIT panel neg.  - Lines: Cordis out today. Continue PICC and a line.  - Wounds/drains: Monitor 3 drains (LLQ, LUQ, paracolic gutter) and wound vac output  - PT: early Mob with PT  - Dispo: SICU while working on diuresis and eventual extubation

## 2019-04-11 NOTE — PROGRESS NOTE ADULT - ASSESSMENT
87 yo M with history of b/l laparoscopic robotic-assisted inguinal hernia repair presenting with b/l segmental pulmonary embolisms, left lung abscess, pneumoperitoneum, and distal descending colon abscess (likely source of pneumoperitoneum), significant DVT burden s/p IVCF placement and IR drainage of LLQ abdominal collection (4/5) this admission with RTOR for sigmoidectomy, abdominal packing for bleeding and placement of abthera vac currently with open abdomen in septic shock      Neuro: propofol/fentanyl  CV: maintaining SBP>100 off pressors; statin;  Pulm: intubated; on vent; CPAP trial in AM; bilateral PE's; bilateral effusions, Lasix PRN as tolerated; CT following for lung abscess, no acute intervention needed at this time  GI: NPO, OGT to LIWS, s/p IR drainage LLQ collection; malnourished w/ prealbumin 4 --- TPN w/ daily lipids (triglycerides 65, 4/10)  : rao; strict I'Os  ID: +kleb, proteus, strep in abd Cx, flagyl (4/9-), cefepime (4/9-); Sputum cx: MRSA, vancomycin (4/10 --) [Dc'd//ceftriaxone (4/9-4/9)// unasyn (4/8-4/9),  Vanco (4/5-4/7), Zosyn (4/5-4/8)]  Endo: insulin gtt transition to insulin in TPN, given NPH this morning as a bridge  Heme: heparin gtt for iliofemoral DVT / bilateral PE; s/p IVC Filter (4/5)  PPx: SCDs; hold heparin gtt  Lines: PIVs, PICC, R A line   Wounds/drains: abthera vac to Midline incision, LLQ IR drain, Left PHYLLIS (pelvis), right PHYLLIS (paracolic gutter) - all JPs to LIWS  PT/OT: Not ordered 87 yo M with history of b/l laparoscopic robotic-assisted inguinal hernia repair presenting with b/l segmental pulmonary embolisms, left lung abscess, pneumoperitoneum, and distal descending colon abscess (likely source of pneumoperitoneum), significant iliofemoral DVT burden, s/p IVCF placement and IR drainage of LLQ abdominal collection (4/5), s/p ex-lap, LAURYN, sigmoidectomy, drain placement in R. paracolic gutter, and abthera vac placement (4/7), now s/p planned RTOR, packing removal, and end colostomy creation (4/10).    Plan:  Neuro: sedated - propofol/fentanyl  CV: levo for SBP>100; statin; remains fluid overloaded w/ significant anasarca and bilateral effusions; IV lasix PRN  Pulm: intubated, on vent-AC, bilateral PE's; CT following for empyema, no acute intervention needed at this time  GI: NPO, OGT to LIWS; malnourished, prealbumin 4; TPN w/ lipids daily; check prealbumin  : rao; strict I'Os, UO improved, check 2 hour urine creatinine for clearance  ID: +kleb, proteus, strep in abd Cx, flagyl (4/9-), cefepime (4/9-); Sputum cx: MRSA, vancomycin (4/10 --) [Dc'd//ceftriaxone (4/9-4/9)// unasyn (4/8-4/9),  Vanco (4/5-4/7), Zosyn (4/5-4/8)]  Endo: ISS; insulin w/ TPN;  Heme: bilateral iliofemoral DVT / bilateral PE; s/p IVC Filter; heparin gtt dc'd in setting of active bleed  PPx: SCDs; hold heparin gtt  Lines: PIVs, PICC, R A line  Wounds/drains: LLQ IR drain (4/5 -- ), LUQ PHYLLIS (4/7 -- ), R paracolic gutter drain (4/7 --) --- all drains to LIWS; wound vac in abdominal incision SQ space (4/10 --)  PT/OT: Not ordered

## 2019-04-11 NOTE — PHYSICAL THERAPY INITIAL EVALUATION ADULT - PLANNED THERAPY INTERVENTIONS, PT EVAL
balance training/gait training/postural re-education/strengthening/bed mobility training/transfer training

## 2019-04-11 NOTE — PROGRESS NOTE ADULT - ATTENDING COMMENTS
Fever, worsening leukocytosis. Bcx. Continue vancomycin (check trough 5pm today) for MRSA pneumonia and cefepime and Flagyl for polymicrobial peritonitis. Consider palliative care evaluation for GOC discussion.

## 2019-04-11 NOTE — PHYSICAL THERAPY INITIAL EVALUATION ADULT - DIAGNOSIS, PT EVAL
4I: Impaired Joint Mobility, Motor Function, Muscle Performance, and Range of Motion Associated with Bony or Soft Tissue Surgery, 5A: Primary Prevention/Risk Reduction for Loss of Balance and Falling

## 2019-04-11 NOTE — PHYSICAL THERAPY INITIAL EVALUATION ADULT - PERTINENT HX OF CURRENT PROBLEM, REHAB EVAL
87 yo M with history of b/l laparoscopic robotic-assisted inguinal hernia repair presenting with b/l segmental pulmonary embolisms, left lung abscess, pneumoperitoneum, and distal descending colon abscess significant iliofemoral DVT burden, s/p IVCF placement and IR drainage of LLQ abdominal collection (4/5), s/p ex-lap, LAURYN, sigmoidectomy, drain placement in R. paracolic gutter, and abthera vac placement (4/7), now s/p planned RTOR, packing removal, and end colostomy creation

## 2019-04-11 NOTE — PHYSICAL THERAPY INITIAL EVALUATION ADULT - GENERAL OBSERVATIONS, REHAB EVAL
pt received semi-supine in bed + LLQ IR drain, LUQ PHYLLIS drain, R paracolic gutter drain, abdominal incision w/ wound vac C/D/I, +rao, +TLC, +TPN, intubated vent set to AC/CMV FiO2 40% RT Juan present, cleared for PT by SICU team

## 2019-04-11 NOTE — PROGRESS NOTE ADULT - SUBJECTIVE AND OBJECTIVE BOX
SUBJECTIVE:         Vitals - Range in last 12h  T(F): , Max: 98.9 (04-10-19 @ 21:50)  HR:  (78 - 100)  BP:  (93/58 - 102/66)  BP(mean):  (68 - 83)  ABP:  (94/56 - 110/52)  ABP(mean):  (62 - 74)  RR:  (13 - 18)  SpO2:  (97% - 100%)  CVP(mm Hg):  (1 - 7)  CVP(cm H2O): --  CO: --  CI: --  PA: --  PA(mean): --  PA(direct): --  PCWP: --    Vitals - Most Recent  T(F): 97.6 (04-11-19 @ 06:00)  HR: 85 (04-11-19 @ 06:06)  BP: 102/63 (04-11-19 @ 06:01)  RR: 15 (04-11-19 @ 06:06)  SpO2: 100% (04-11-19 @ 06:06)  I&O's Detail    09 Apr 2019 07:01  -  10 Apr 2019 07:00  --------------------------------------------------------  IN:    Albumin 25%: 150 mL    Albumin 5%  - 250 mL: 500 mL    fat emulsion (Plant Based) 20% Infusion: 228.8 mL    fentaNYL Infusion.: 168 mL    insulin Infusion: 32.2 mL    IV PiggyBack: 450 mL    norepinephrine Infusion: 378.2 mL    Plasma: 460 mL    Platelets - Single Donor: 220 mL    propofol Infusion: 153 mL    TPN (Total Parenteral Nutrition): 1992 mL  Total IN: 4732.2 mL    OUT:    Bulb: 50 mL    Bulb: 330 mL    Bulb: 20 mL    Indwelling Catheter - Urethral: 1070 mL    Nasoenteral Tube: 175 mL    VAC (Vacuum Assisted Closure) System: 1600 mL  Total OUT: 3245 mL    Total NET: 1487.2 mL      10 Apr 2019 07:01  -  11 Apr 2019 06:48  --------------------------------------------------------  IN:    Albumin 25%: 50 mL    fat emulsion (Plant Based) 20% Infusion: 20.8 mL    fat emulsion (Plant Based) 20% Infusion: 145.6 mL    fentaNYL Infusion.: 133 mL    IV PiggyBack: 500 mL    norepinephrine Infusion: 226.8 mL    propofol Infusion: 98 mL    TPN (Total Parenteral Nutrition): 1582.4 mL  Total IN: 2756.6 mL    OUT:    Bulb: 240 mL    Bulb: 330 mL    Bulb: 240 mL    Indwelling Catheter - Urethral: 1340 mL    Nasoenteral Tube: 200 mL    VAC (Vacuum Assisted Closure) System: 50 mL  Total OUT: 2400 mL    Total NET: 356.6 mL        Mode: AC/ CMV (Assist Control/ Continuous Mandatory Ventilation)  RR (machine): 12  RR (patient): 16  TV (machine): 500  TV (patient): 542  FiO2: 40  PEEP: 8  ITime: 1  MAP: 8  PIP: 12      Physical Exam          LABS:                        9.2    13.44 )-----------( 126      ( 11 Apr 2019 06:17 )             28.4     04-11    140  |  104  |  23  ----------------------------<  148<H>  4.8   |  30  |  0.40<L>    Ca    7.0<L>      11 Apr 2019 05:41  Phos  3.2     04-11  Mg     2.0     04-11    TPro  4.0<L>  /  Alb  1.9<L>  /  TBili  1.3<H>  /  DBili  x   /  AST  25  /  ALT  20  /  AlkPhos  56  04-11    LIVER FUNCTIONS - ( 11 Apr 2019 05:41 )  Alb: 1.9 g/dL / Pro: 4.0 g/dL / ALK PHOS: 56 U/L / ALT: 20 U/L / AST: 25 U/L / GGT: x           PT/INR - ( 11 Apr 2019 05:41 )   PT: 30.2 sec;   INR: 2.59          PTT - ( 11 Apr 2019 05:41 )  PTT:36.3 sec      Culture - Sputum (collected 09 Apr 2019 11:48)  Source: .Sputum Sputum  Gram Stain (10 Apr 2019 11:38):    No epithelial cells    Numerous White blood cells    Few Gram positive cocci in pairs  Preliminary Report (10 Apr 2019 11:49):    Moderate Staphylococcus aureus    presumptive Methicillin resistant    Confirmation to follow within 24 hours Result called to and read back byRosalia Jacobson RN  04/10/2019 11:47:35    Susceptibility to follow.    Accompanied by normal respiratory bev        MEDICATIONS  (STANDING):  cefepime   IVPB 2000 milliGRAM(s) IV Intermittent every 12 hours  chlorhexidine 2% Cloths 1 Application(s) Topical <User Schedule>  dextrose 5%. 1000 milliLiter(s) (50 mL/Hr) IV Continuous <Continuous>  dextrose 50% Injectable 12.5 Gram(s) IV Push once  dextrose 50% Injectable 25 Gram(s) IV Push once  dextrose 50% Injectable 25 Gram(s) IV Push once  fat emulsion (Plant Based) 20% Infusion 0.7 Gm/kG/Day (20.83 mL/Hr) IV Continuous <Continuous>  fentaNYL   Infusion. 0.5 MICROgram(s)/kG/Hr (3.665 mL/Hr) IV Continuous <Continuous>  heparin  flush 100 Units/mL Injectable 100 Unit(s) IV Push every other day  insulin Infusion 3 Unit(s)/Hr (3 mL/Hr) IV Continuous <Continuous>  metroNIDAZOLE  IVPB 500 milliGRAM(s) IV Intermittent every 8 hours  metroNIDAZOLE  IVPB      norepinephrine Infusion 0.05 MICROgram(s)/kG/Min (3.436 mL/Hr) IV Continuous <Continuous>  nystatin Powder 1 Application(s) Topical two times a day  pantoprazole  Injectable 40 milliGRAM(s) IV Push daily  Parenteral Nutrition - Adult 1 Each (83 mL/Hr) TPN Continuous <Continuous>  propofol Infusion 5 MICROgram(s)/kG/Min (2.199 mL/Hr) IV Continuous <Continuous>  vancomycin  IVPB 1000 milliGRAM(s) IV Intermittent every 12 hours    MEDICATIONS  (PRN):  dextrose 40% Gel 15 Gram(s) Oral once PRN Blood Glucose LESS THAN 70 milliGRAM(s)/deciliter  glucagon  Injectable 1 milliGRAM(s) IntraMuscular once PRN Glucose LESS THAN 70 milligrams/deciliter  ondansetron Injectable 4 milliGRAM(s) IV Push every 6 hours PRN Nausea      RADIOLOGY & ADDITIONAL STUDIES: Interval Events:   o/n: 101.1 fever at 6pm, tylenol given; levo down to 15, now off Levophed  4/10: OR in AM for exlap - abthera vac removed, previous small bowel anastomosis intact, end colostomy w/ descending colon, fascia closed, wound vac in SQ space; post op labs: hgb 8.4; platelet 110; lactate normal 1.1; HIT panel ordered; MRSA in sputum cx; vancomycin 1g BID; IV lasix 20 for fluid overload; nurse aware to titrate Levo; albumin dc'd b/c it could be making anasarca worse        SUBJECTIVE:  Patient seen at bedside in SICU. Remains intubated and sedated. ROS not obtainable. Was weened off levophed overnight and maintaining MAPs.        Vitals - Range in last 12h  T(F): , Max: 98.9 (04-10-19 @ 21:50)  HR:  (78 - 100)  BP:  (93/58 - 102/66)  BP(mean):  (68 - 83)  ABP:  (94/56 - 110/52)  ABP(mean):  (62 - 74)  RR:  (13 - 18)  SpO2:  (97% - 100%)  CVP(mm Hg):  (1 - 7)  CVP(cm H2O): --  CO: --  CI: --  PA: --  PA(mean): --  PA(direct): --  PCWP: --    Vitals - Most Recent  T(F): 97.6 (04-11-19 @ 06:00)  HR: 85 (04-11-19 @ 06:06)  BP: 102/63 (04-11-19 @ 06:01)  RR: 15 (04-11-19 @ 06:06)  SpO2: 100% (04-11-19 @ 06:06)  I&O's Detail    09 Apr 2019 07:01  -  10 Apr 2019 07:00  --------------------------------------------------------  IN:    Albumin 25%: 150 mL    Albumin 5%  - 250 mL: 500 mL    fat emulsion (Plant Based) 20% Infusion: 228.8 mL    fentaNYL Infusion.: 168 mL    insulin Infusion: 32.2 mL    IV PiggyBack: 450 mL    norepinephrine Infusion: 378.2 mL    Plasma: 460 mL    Platelets - Single Donor: 220 mL    propofol Infusion: 153 mL    TPN (Total Parenteral Nutrition): 1992 mL  Total IN: 4732.2 mL    OUT:    Bulb: 50 mL    Bulb: 330 mL    Bulb: 20 mL    Indwelling Catheter - Urethral: 1070 mL    Nasoenteral Tube: 175 mL    VAC (Vacuum Assisted Closure) System: 1600 mL  Total OUT: 3245 mL    Total NET: 1487.2 mL      10 Apr 2019 07:01  -  11 Apr 2019 06:48  --------------------------------------------------------  IN:    Albumin 25%: 50 mL    fat emulsion (Plant Based) 20% Infusion: 20.8 mL    fat emulsion (Plant Based) 20% Infusion: 145.6 mL    fentaNYL Infusion.: 133 mL    IV PiggyBack: 500 mL    norepinephrine Infusion: 226.8 mL    propofol Infusion: 98 mL    TPN (Total Parenteral Nutrition): 1582.4 mL  Total IN: 2756.6 mL    OUT:    Bulb: 240 mL    Bulb: 330 mL    Bulb: 240 mL    Indwelling Catheter - Urethral: 1340 mL    Nasoenteral Tube: 200 mL    VAC (Vacuum Assisted Closure) System: 50 mL  Total OUT: 2400 mL    Total NET: 356.6 mL        Mode: AC/ CMV (Assist Control/ Continuous Mandatory Ventilation)  RR (machine): 12  RR (patient): 16  TV (machine): 500  TV (patient): 542  FiO2: 40  PEEP: 8  ITime: 1  MAP: 8  PIP: 12      Physical Exam  Neuro: no eye opening to verbal or mechanical stimuli  General: intubated, on vent-AC  HEENT: PERRL, EOMI, MMM  Pulm: mild rhonchi bilaterally, decreased breath sounds at lung bases bilaterally  C/V: nontachycardic, RRR, S1 S2, no murmurs  Abd: nondistended, but firm; grimace to palpation; LLQ IR drain w/ light serosanguinous output; LUQ PHYLLIS drain w/ serosanguinous output; R paracolic gutter drain w/ serosanguinous output; abdominal incision w/ wound vac in place (fascia closed 4/10, vac in subcutaneous space)  Extremities: compression lower extremity dressing in place; anasarca   : rao in place; significant 2+ scrotal edema  Skin: no ecchymosis and rashes        LABS:                        9.2    13.44 )-----------( 126      ( 11 Apr 2019 06:17 )             28.4     04-11    140  |  104  |  23  ----------------------------<  148<H>  4.8   |  30  |  0.40<L>    Ca    7.0<L>      11 Apr 2019 05:41  Phos  3.2     04-11  Mg     2.0     04-11    TPro  4.0<L>  /  Alb  1.9<L>  /  TBili  1.3<H>  /  DBili  x   /  AST  25  /  ALT  20  /  AlkPhos  56  04-11    LIVER FUNCTIONS - ( 11 Apr 2019 05:41 )  Alb: 1.9 g/dL / Pro: 4.0 g/dL / ALK PHOS: 56 U/L / ALT: 20 U/L / AST: 25 U/L / GGT: x           PT/INR - ( 11 Apr 2019 05:41 )   PT: 30.2 sec;   INR: 2.59          PTT - ( 11 Apr 2019 05:41 )  PTT:36.3 sec      Culture - Sputum (collected 09 Apr 2019 11:48)  Source: .Sputum Sputum  Gram Stain (10 Apr 2019 11:38):    No epithelial cells    Numerous White blood cells    Few Gram positive cocci in pairs  Preliminary Report (10 Apr 2019 11:49):    Moderate Staphylococcus aureus    presumptive Methicillin resistant    Confirmation to follow within 24 hours Result called to and read back byRosalia Jacobson RN  04/10/2019 11:47:35    Susceptibility to follow.    Accompanied by normal respiratory bev        MEDICATIONS  (STANDING):  cefepime   IVPB 2000 milliGRAM(s) IV Intermittent every 12 hours  chlorhexidine 2% Cloths 1 Application(s) Topical <User Schedule>  dextrose 5%. 1000 milliLiter(s) (50 mL/Hr) IV Continuous <Continuous>  dextrose 50% Injectable 12.5 Gram(s) IV Push once  dextrose 50% Injectable 25 Gram(s) IV Push once  dextrose 50% Injectable 25 Gram(s) IV Push once  fat emulsion (Plant Based) 20% Infusion 0.7 Gm/kG/Day (20.83 mL/Hr) IV Continuous <Continuous>  fentaNYL   Infusion. 0.5 MICROgram(s)/kG/Hr (3.665 mL/Hr) IV Continuous <Continuous>  heparin  flush 100 Units/mL Injectable 100 Unit(s) IV Push every other day  insulin Infusion 3 Unit(s)/Hr (3 mL/Hr) IV Continuous <Continuous>  metroNIDAZOLE  IVPB 500 milliGRAM(s) IV Intermittent every 8 hours  metroNIDAZOLE  IVPB      norepinephrine Infusion 0.05 MICROgram(s)/kG/Min (3.436 mL/Hr) IV Continuous <Continuous>  nystatin Powder 1 Application(s) Topical two times a day  pantoprazole  Injectable 40 milliGRAM(s) IV Push daily  Parenteral Nutrition - Adult 1 Each (83 mL/Hr) TPN Continuous <Continuous>  propofol Infusion 5 MICROgram(s)/kG/Min (2.199 mL/Hr) IV Continuous <Continuous>  vancomycin  IVPB 1000 milliGRAM(s) IV Intermittent every 12 hours    MEDICATIONS  (PRN):  dextrose 40% Gel 15 Gram(s) Oral once PRN Blood Glucose LESS THAN 70 milliGRAM(s)/deciliter  glucagon  Injectable 1 milliGRAM(s) IntraMuscular once PRN Glucose LESS THAN 70 milligrams/deciliter  ondansetron Injectable 4 milliGRAM(s) IV Push every 6 hours PRN Nausea      RADIOLOGY & ADDITIONAL STUDIES: Interval Events:   o/n: 101.1 fever at 6pm, tylenol given; levo down to 15, now off Levophed  4/10: OR in AM for exlap - abthera vac removed, previous small bowel anastomosis intact, end colostomy w/ descending colon, fascia closed, wound vac in SQ space; post op labs: hgb 8.4; platelet 110; lactate normal 1.1; HIT panel ordered; MRSA in sputum cx; vancomycin 1g BID; IV lasix 20 for fluid overload; nurse aware to titrate Levo; albumin dc'd b/c it could be making anasarca worse        SUBJECTIVE:  Patient seen at bedside in SICU. Remains intubated and sedated. ROS not obtainable. Diuresed w/ IV 20 IV Lasix at 6PM. Levo requirements stable and maintaining MAPs.        Vitals - Range in last 12h  T(F): , Max: 98.9 (04-10-19 @ 21:50)  HR:  (78 - 100)  BP:  (93/58 - 102/66)  BP(mean):  (68 - 83)  ABP:  (94/56 - 110/52)  ABP(mean):  (62 - 74)  RR:  (13 - 18)  SpO2:  (97% - 100%)  CVP(mm Hg):  (1 - 7)  CVP(cm H2O): --  CO: --  CI: --  PA: --  PA(mean): --  PA(direct): --  PCWP: --    Vitals - Most Recent  T(F): 97.6 (04-11-19 @ 06:00)  HR: 85 (04-11-19 @ 06:06)  BP: 102/63 (04-11-19 @ 06:01)  RR: 15 (04-11-19 @ 06:06)  SpO2: 100% (04-11-19 @ 06:06)  I&O's Detail    09 Apr 2019 07:01  -  10 Apr 2019 07:00  --------------------------------------------------------  IN:    Albumin 25%: 150 mL    Albumin 5%  - 250 mL: 500 mL    fat emulsion (Plant Based) 20% Infusion: 228.8 mL    fentaNYL Infusion.: 168 mL    insulin Infusion: 32.2 mL    IV PiggyBack: 450 mL    norepinephrine Infusion: 378.2 mL    Plasma: 460 mL    Platelets - Single Donor: 220 mL    propofol Infusion: 153 mL    TPN (Total Parenteral Nutrition): 1992 mL  Total IN: 4732.2 mL    OUT:    Bulb: 50 mL    Bulb: 330 mL    Bulb: 20 mL    Indwelling Catheter - Urethral: 1070 mL    Nasoenteral Tube: 175 mL    VAC (Vacuum Assisted Closure) System: 1600 mL  Total OUT: 3245 mL    Total NET: 1487.2 mL      10 Apr 2019 07:01  -  11 Apr 2019 06:48  --------------------------------------------------------  IN:    Albumin 25%: 50 mL    fat emulsion (Plant Based) 20% Infusion: 20.8 mL    fat emulsion (Plant Based) 20% Infusion: 145.6 mL    fentaNYL Infusion.: 133 mL    IV PiggyBack: 500 mL    norepinephrine Infusion: 226.8 mL    propofol Infusion: 98 mL    TPN (Total Parenteral Nutrition): 1582.4 mL  Total IN: 2756.6 mL    OUT:    Bulb: 240 mL    Bulb: 330 mL    Bulb: 240 mL    Indwelling Catheter - Urethral: 1340 mL    Nasoenteral Tube: 200 mL    VAC (Vacuum Assisted Closure) System: 50 mL  Total OUT: 2400 mL    Total NET: 356.6 mL        Mode: AC/ CMV (Assist Control/ Continuous Mandatory Ventilation)  RR (machine): 12  RR (patient): 16  TV (machine): 500  TV (patient): 542  FiO2: 40  PEEP: 8  ITime: 1  MAP: 8  PIP: 12      Physical Exam  Neuro: no eye opening to verbal or mechanical stimuli  General: intubated, on vent-AC  HEENT: PERRL, EOMI, MMM  Pulm: mild rhonchi bilaterally, decreased breath sounds at lung bases bilaterally  C/V: nontachycardic, RRR, S1 S2, no murmurs  Abd: nondistended, but firm; grimace to palpation; LLQ IR drain w/ light serosanguinous output; LUQ PHYLLIS drain w/ serosanguinous output; R paracolic gutter drain w/ serosanguinous output; abdominal incision w/ wound vac in place (fascia closed 4/10, vac in subcutaneous space)  Extremities: compression lower extremity dressing in place; anasarca; 2+ edema of bilateral upper extremities  : rao in place; significant 2+ scrotal edema  Skin: no ecchymosis and rashes        LABS:                        9.2    13.44 )-----------( 126      ( 11 Apr 2019 06:17 )             28.4     04-11    140  |  104  |  23  ----------------------------<  148<H>  4.8   |  30  |  0.40<L>    Ca    7.0<L>      11 Apr 2019 05:41  Phos  3.2     04-11  Mg     2.0     04-11    TPro  4.0<L>  /  Alb  1.9<L>  /  TBili  1.3<H>  /  DBili  x   /  AST  25  /  ALT  20  /  AlkPhos  56  04-11    LIVER FUNCTIONS - ( 11 Apr 2019 05:41 )  Alb: 1.9 g/dL / Pro: 4.0 g/dL / ALK PHOS: 56 U/L / ALT: 20 U/L / AST: 25 U/L / GGT: x           PT/INR - ( 11 Apr 2019 05:41 )   PT: 30.2 sec;   INR: 2.59          PTT - ( 11 Apr 2019 05:41 )  PTT:36.3 sec      Culture - Sputum (collected 09 Apr 2019 11:48)  Source: .Sputum Sputum  Gram Stain (10 Apr 2019 11:38):    No epithelial cells    Numerous White blood cells    Few Gram positive cocci in pairs  Preliminary Report (10 Apr 2019 11:49):    Moderate Staphylococcus aureus    presumptive Methicillin resistant    Confirmation to follow within 24 hours Result called to and read back byRosalia Jacobson RN  04/10/2019 11:47:35    Susceptibility to follow.    Accompanied by normal respiratory bev        MEDICATIONS  (STANDING):  cefepime   IVPB 2000 milliGRAM(s) IV Intermittent every 12 hours  chlorhexidine 2% Cloths 1 Application(s) Topical <User Schedule>  dextrose 5%. 1000 milliLiter(s) (50 mL/Hr) IV Continuous <Continuous>  dextrose 50% Injectable 12.5 Gram(s) IV Push once  dextrose 50% Injectable 25 Gram(s) IV Push once  dextrose 50% Injectable 25 Gram(s) IV Push once  fat emulsion (Plant Based) 20% Infusion 0.7 Gm/kG/Day (20.83 mL/Hr) IV Continuous <Continuous>  fentaNYL   Infusion. 0.5 MICROgram(s)/kG/Hr (3.665 mL/Hr) IV Continuous <Continuous>  heparin  flush 100 Units/mL Injectable 100 Unit(s) IV Push every other day  insulin Infusion 3 Unit(s)/Hr (3 mL/Hr) IV Continuous <Continuous>  metroNIDAZOLE  IVPB 500 milliGRAM(s) IV Intermittent every 8 hours  metroNIDAZOLE  IVPB      norepinephrine Infusion 0.05 MICROgram(s)/kG/Min (3.436 mL/Hr) IV Continuous <Continuous>  nystatin Powder 1 Application(s) Topical two times a day  pantoprazole  Injectable 40 milliGRAM(s) IV Push daily  Parenteral Nutrition - Adult 1 Each (83 mL/Hr) TPN Continuous <Continuous>  propofol Infusion 5 MICROgram(s)/kG/Min (2.199 mL/Hr) IV Continuous <Continuous>  vancomycin  IVPB 1000 milliGRAM(s) IV Intermittent every 12 hours    MEDICATIONS  (PRN):  dextrose 40% Gel 15 Gram(s) Oral once PRN Blood Glucose LESS THAN 70 milliGRAM(s)/deciliter  glucagon  Injectable 1 milliGRAM(s) IntraMuscular once PRN Glucose LESS THAN 70 milligrams/deciliter  ondansetron Injectable 4 milliGRAM(s) IV Push every 6 hours PRN Nausea      RADIOLOGY & ADDITIONAL STUDIES:    < from: Xray Chest 1 View- PORTABLE-Routine (04.11.19 @ 01:49) >    Indication: Perforated bowel. Abscess. Abnormal breath sounds. eval lungs    Bedside examination of the chest dated 4/11/2019 1:49 AM is compared to   the previous study of 4/10/2019.    Findings: Study limited by patient rotation. Endotracheal tube, NG tube,   and bilateral central lines remain in place. Allowing for differences in   patient positioning, there has probably been no significant change in   extensive bilateral infiltrates and small bilateral pleural effusions.    Impression: No significant change.    < end of copied text >

## 2019-04-11 NOTE — PROGRESS NOTE ADULT - ASSESSMENT
85yo M w/ hx of CAD s/p 3 stents, HLD, BPH, hx of ETOH abuse, dementia, b/l inguinal hernias s/p laparoscopic robotic-assisted repair of bilateral inguinal hernias on 3/11/2019, ascites of unclear etiology. He presented from his rehabilitation facility for FTT and R inguinal discomfort. CT in ED with bilateral pulmonary embolism, Left lung abscess, pneumoperitoneum, intraabdominal ascites, LLQ abscess, right inguinal fluid collection. Patient then went to IR for LLQ abscess was drained and an IVC filter. Abdominal culture from 4/5/19 showing Klebsiella pneumoniae, Escherichia coli, Proteus vulgaris group, Streptococcus milleri, viridans group & Bacteroides fragilis. Patient then underwent exploratory laparotomy on 4/7/2019 - with 3L of clear ascites suctioned, portion of small bowel adherent to sigmoid colon and left pelvic side wall with fibrinous exudate and LAURYN performed, sigmoidectomy, placement of right paracolic gutter and left drain in the pelvis, colon left in discontinuity and abdominal vac placed. SICU course complicated by likely septic shock, chest x-ray significant for bilateral infiltrates, remains intubated and sedated and on levophed. Patient was on ID consulted for antibiotic recommendations.     - c/w cefepime 2g every 12 hours because Proteus vulgaris group can produce ampC beta-lactamase, and inducible beta-lactamase can mediated resistance to third-generation cephalosporins  - c/w metronidazole 500mg every 8 hours  - c/w vancomycin 1g q12 for MRSA in sputum   - Vancomycin level 17:00 on 4/11 (patient given additional vancomycin 4-hours apart on 4/10 incorrectly)   - CTS following for undrained L empyema - low threshold for drainage   - s/p OR 4/10 - Exploratory laparotomy, matured end colostomy and Fascia closed     ID Team 1 will follow.

## 2019-04-11 NOTE — CHART NOTE - NSCHARTNOTEFT_GEN_A_CORE
Admitting Diagnosis:   Patient is a 86y old  Male who presents with a chief complaint of right inguinal discomfort (11 Apr 2019 09:54)    PAST MEDICAL & SURGICAL HISTORY:  Esophageal reflux  Acute diarrhea  HLD (hyperlipidemia)  HTN (hypertension)  Prostate cancer  Stented coronary artery: x3 , per pt.  Atherosclerosis of coronary artery: CAD (coronary artery disease)  S/P hernia repair  Surgery, elective: cardiac stent x3  History of prostate surgery    Current Nutrition Order:   TPN via PICC line 126 gm AA, 389 gm Dex, 50 gm 20% lipid to provide 2327 kcal, 29.8 kcal/kg IBW, 1.6gm/kg IBW, 23.4 non protein kcal/kg, GIR 3.44)     PO Intake: N/A    GI Issues: non-distended abdomen, +colostomy (    Pain: Unable to assess at this time as pt vented    Skin Integrity: midline abdomen wound vac    Labs:   04-11    140  |  104  |  23  ----------------------------<  148<H>  4.8   |  30  |  0.40<L>    Ca    7.0<L>      11 Apr 2019 05:41  Phos  3.2     04-11  Mg     2.0     04-11    TPro  4.0<L>  /  Alb  1.9<L>  /  TBili  1.3<H>  /  DBili  x   /  AST  25  /  ALT  20  /  AlkPhos  56  04-11    CAPILLARY BLOOD GLUCOSE    POCT Blood Glucose.: 151 mg/dL (11 Apr 2019 10:44)  POCT Blood Glucose.: 91 mg/dL (11 Apr 2019 05:31)  POCT Blood Glucose.: 128 mg/dL (10 Apr 2019 21:36)    Medications:  MEDICATIONS  (STANDING):  cefepime   IVPB 2000 milliGRAM(s) IV Intermittent every 12 hours  chlorhexidine 2% Cloths 1 Application(s) Topical <User Schedule>  dextrose 5%. 1000 milliLiter(s) (50 mL/Hr) IV Continuous <Continuous>  dextrose 50% Injectable 12.5 Gram(s) IV Push once  dextrose 50% Injectable 25 Gram(s) IV Push once  dextrose 50% Injectable 25 Gram(s) IV Push once  fat emulsion (Plant Based) 20% Infusion 0.7 Gm/kG/Day (20.83 mL/Hr) IV Continuous <Continuous>  fentaNYL   Infusion. 0.5 MICROgram(s)/kG/Hr (3.665 mL/Hr) IV Continuous <Continuous>  heparin  flush 100 Units/mL Injectable 100 Unit(s) IV Push every other day  insulin lispro (HumaLOG) corrective regimen sliding scale   SubCutaneous every 6 hours  metroNIDAZOLE  IVPB 500 milliGRAM(s) IV Intermittent every 8 hours  metroNIDAZOLE  IVPB      norepinephrine Infusion 0.05 MICROgram(s)/kG/Min (3.436 mL/Hr) IV Continuous <Continuous>  nystatin Powder 1 Application(s) Topical two times a day  pantoprazole  Injectable 40 milliGRAM(s) IV Push daily  Parenteral Nutrition - Adult 1 Each (83 mL/Hr) TPN Continuous <Continuous>  Parenteral Nutrition - Adult 1 Each (83 mL/Hr) TPN Continuous <Continuous>  propofol Infusion 5 MICROgram(s)/kG/Min (2.199 mL/Hr) IV Continuous <Continuous>    MEDICATIONS  (PRN):  dextrose 40% Gel 15 Gram(s) Oral once PRN Blood Glucose LESS THAN 70 milliGRAM(s)/deciliter  glucagon  Injectable 1 milliGRAM(s) IntraMuscular once PRN Glucose LESS THAN 70 milligrams/deciliter  ondansetron Injectable 4 milliGRAM(s) IV Push every 6 hours PRN Nausea    Weight:  Daily     78.6 (4/11)    Weight Change:     Nutrition Focused Physical Exam: Completed [   ]  Not Pertinent [   ]  Muscle Wasting- Temporal [   ]  Clavicle/Pectoral [   ]  Shoulder/Deltoid [   ]  Scapula [   ]  Interosseous [   ]  Quadriceps [   ]  Gastrocnemius [   ]  Fat Wasting- Orbital [   ]  Buccal [   ]  Triceps [   ]  Rib [   ]  Suspect [PCM] 2/2 to physical assessment, [poor intake], and [wt loss]; please see malnutrition chart note.    Estimated energy needs:   Height 71"; .7#; #; 94%IBW  IBW used to calculate energy needs as pt vented. Needs estimated for maintenance in older adults; increased for malnutrition, infection.    4922-9445 kcal (25-30 kcal/kg), 117-156 gm (1.4-1.6 gm/kg), fluid needs per team    Subjective: 87 yo M with history of b/l laparoscopic robotic-assisted inguinal hernia repair presenting with b/l segmental pulmonary embolisms, left lung abscess, pneumoperitoneum, and distal descending colon abscess (likely source of pneumoperitoneum), significant iliofemoral DVT burden, s/p IVCF placement and IR drainage of LLQ abdominal collection (4/5), s/p ex-lap, LAURYN, sigmoidectomy, drain placement in R. paracolic gutter, and abthera vac placement (4/7), now s/p planned RTOR, packing removal, and end colostomy creation (4/10). Intubated, on vent-AC. Propofol running at 2 mL/hr (~53 kcal). Remains fluid overloaded w/ significant anasarca and bilateral effusions. sputum cx growing presumptive MRSA- on contact isolation. Pt started on TPN 4/7. Recommend continue current TPN order-see full recs below. RD to monitor and f/u per protocol.    Previous Nutrition Diagnosis:  Malnutrition (suspected severe) RT inadequate energy intake 2/2 lack of appetite and poor PO intake 2/2 multiple hospitalizations AEB muscle loss, fat loss, weight fluctuation, and inadequate energy intake    Active [ X  ]  Resolved [   ]    If resolved, new PES:     Goal: Consistently meet % of EER; pt will show no further s/s malnutrition throughout admit    Recommendations:  1.     Education: N/A 2/2 pt's medical status    Risk Level: High [  X ] Moderate [   ] Low [   ] Admitting Diagnosis:   Patient is a 86y old  Male who presents with a chief complaint of right inguinal discomfort (11 Apr 2019 09:54)    PAST MEDICAL & SURGICAL HISTORY:  Esophageal reflux  Acute diarrhea  HLD (hyperlipidemia)  HTN (hypertension)  Prostate cancer  Stented coronary artery: x3 , per pt.  Atherosclerosis of coronary artery: CAD (coronary artery disease)  S/P hernia repair  Surgery, elective: cardiac stent x3  History of prostate surgery    Current Nutrition Order:   TPN via PICC line 126 gm AA, 389 gm Dex, 50 gm 20% lipid to provide 2327 kcal, 29.8 kcal/kg IBW, 1.6gm/kg IBW, 23.4 non protein kcal/kg, GIR 3.44)     PO Intake: N/A    GI Issues: non-distended abdomen, +colostomy (output not documented in flow sheet)    Pain: Unable to assess at this time as pt vented    Skin Integrity: midline abdomen wound vac    Labs:   04-11    140  |  104  |  23  ----------------------------<  148<H>  4.8   |  30  |  0.40<L>    Ca    7.0<L>      11 Apr 2019 05:41  Phos  3.2     04-11  Mg     2.0     04-11    TPro  4.0<L>  /  Alb  1.9<L>  /  TBili  1.3<H>  /  DBili  x   /  AST  25  /  ALT  20  /  AlkPhos  56  04-11    CAPILLARY BLOOD GLUCOSE    POCT Blood Glucose.: 151 mg/dL (11 Apr 2019 10:44)  POCT Blood Glucose.: 91 mg/dL (11 Apr 2019 05:31)  POCT Blood Glucose.: 128 mg/dL (10 Apr 2019 21:36)    Medications:  MEDICATIONS  (STANDING):  cefepime   IVPB 2000 milliGRAM(s) IV Intermittent every 12 hours  chlorhexidine 2% Cloths 1 Application(s) Topical <User Schedule>  dextrose 5%. 1000 milliLiter(s) (50 mL/Hr) IV Continuous <Continuous>  dextrose 50% Injectable 12.5 Gram(s) IV Push once  dextrose 50% Injectable 25 Gram(s) IV Push once  dextrose 50% Injectable 25 Gram(s) IV Push once  fat emulsion (Plant Based) 20% Infusion 0.7 Gm/kG/Day (20.83 mL/Hr) IV Continuous <Continuous>  fentaNYL   Infusion. 0.5 MICROgram(s)/kG/Hr (3.665 mL/Hr) IV Continuous <Continuous>  heparin  flush 100 Units/mL Injectable 100 Unit(s) IV Push every other day  insulin lispro (HumaLOG) corrective regimen sliding scale   SubCutaneous every 6 hours  metroNIDAZOLE  IVPB 500 milliGRAM(s) IV Intermittent every 8 hours  metroNIDAZOLE  IVPB      norepinephrine Infusion 0.05 MICROgram(s)/kG/Min (3.436 mL/Hr) IV Continuous <Continuous>  nystatin Powder 1 Application(s) Topical two times a day  pantoprazole  Injectable 40 milliGRAM(s) IV Push daily  Parenteral Nutrition - Adult 1 Each (83 mL/Hr) TPN Continuous <Continuous>  Parenteral Nutrition - Adult 1 Each (83 mL/Hr) TPN Continuous <Continuous>  propofol Infusion 5 MICROgram(s)/kG/Min (2.199 mL/Hr) IV Continuous <Continuous>    MEDICATIONS  (PRN):  dextrose 40% Gel 15 Gram(s) Oral once PRN Blood Glucose LESS THAN 70 milliGRAM(s)/deciliter  glucagon  Injectable 1 milliGRAM(s) IntraMuscular once PRN Glucose LESS THAN 70 milligrams/deciliter  ondansetron Injectable 4 milliGRAM(s) IV Push every 6 hours PRN Nausea    Weight:  Daily     78.6 (4/11)    Weight Change: Pt weighed 161.7lb on admission, wt gain likely 2/2 fluid. Continue to monitor daily weights while on TPN.    Nutrition Focused Physical Exam: Completed [X on 4/6 ]  Not Pertinent [   ]  Per physical assessment: Muscle Wasting- Temporal [  X ]  Clavicle/Pectoral [  X ]  Shoulder/Deltoid [ X  ]  Scapula [ X  ]  Interosseous [  X ]  Quadriceps [   ]  Gastrocnemius [   ]; Fat Wasting- Orbital [ X  ]  Buccal [ X  ]  Triceps [   ]  Rib [ X  ]--> Suspect severe protein-calorie malnutrition 2/2 to physical assessment, inadequate energy intake of <75% for >1 month    Estimated energy needs:   Height 71"; .7#; #; 94%IBW  IBW used to calculate energy needs as pt vented. Needs estimated for maintenance in older adults; increased for malnutrition, infection.    1794-8221 kcal (25-30 kcal/kg), 117-156 gm (1.4-1.6 gm/kg), fluid needs per team    Subjective: 85 yo M with history of b/l laparoscopic robotic-assisted inguinal hernia repair presenting with b/l segmental pulmonary embolisms, left lung abscess, pneumoperitoneum, and distal descending colon abscess (likely source of pneumoperitoneum), significant iliofemoral DVT burden, s/p IVCF placement and IR drainage of LLQ abdominal collection (4/5), s/p ex-lap, LAURYN, sigmoidectomy, drain placement in R. paracolic gutter, and abthera vac placement (4/7), now s/p planned RTOR, packing removal, and end colostomy creation (4/10). Intubated, on vent-AC. Propofol running at 2 mL/hr (~53 kcal). Remains fluid overloaded w/ significant anasarca and bilateral effusions. sputum cx growing presumptive MRSA- on contact isolation. Pt started on TPN 4/7. Recommend continue current TPN order-see full recs below. RD to monitor and f/u per protocol.    Previous Nutrition Diagnosis:  Malnutrition (suspected severe) RT inadequate energy intake 2/2 lack of appetite and poor PO intake 2/2 multiple hospitalizations AEB muscle loss, fat loss, weight fluctuation, and inadequate energy intake    Active [ X ]  Resolved [   ]    If resolved, new PES:     Goal: Consistently meet % of EER; pt will show no further s/s malnutrition throughout admit    Recommendations:  1. Recommend goal TPN via PICC: 126 gm AA, 389 gm Dex, 50 gm 20% lipid to provide 2327 kcal, 29.8 kcal/kg IBW, 1.6gm/kg IBW, 23.4 non protein kcal/kg, GIR 3.44).  2. Recommend checking TG daily (discussed with team), until TG stable for 1 week then can start checking TG weekly. Check Mg, Phos, K daily and POC BG Q6hrs. Trend daily weights. Fluids and lytes per MD discretion.    Education: N/A 2/2 pt's medical status    Risk Level: High [  X ] Moderate [   ] Low [   ]

## 2019-04-11 NOTE — PROGRESS NOTE ADULT - SUBJECTIVE AND OBJECTIVE BOX
INFECTIOUS DISEASE CONSULT PROGRESS NOTE    INTERVAL HPI/OVERNIGHT EVENTS:    ACTIVE ANTIMICROBIALS/ANTIBIOTICS:  cefepime   IVPB 2000 milliGRAM(s) IV Intermittent every 12 hours  metroNIDAZOLE  IVPB 500 milliGRAM(s) IV Intermittent every 8 hours  metroNIDAZOLE  IVPB      vancomycin  IVPB 1000 milliGRAM(s) IV Intermittent every 12 hours      VITAL SIGNS:  ICU Vital Signs Last 24 Hrs  T(C): 36.4 (11 Apr 2019 06:00), Max: 38.4 (10 Apr 2019 17:51)  T(F): 97.6 (11 Apr 2019 06:00), Max: 101.1 (10 Apr 2019 17:51)  HR: 106 (11 Apr 2019 09:35) (78 - 107)  BP: 117/75 (11 Apr 2019 09:35) (90/55 - 117/75)  BP(mean): 88 (11 Apr 2019 09:35) (64 - 89)  ABP: 112/70 (11 Apr 2019 09:35) (94/56 - 124/60)  ABP(mean): 86 (11 Apr 2019 09:35) (62 - 88)  RR: 22 (11 Apr 2019 09:35) (11 - 32)  SpO2: 97% (11 Apr 2019 09:35) (94% - 100%)      PHYSICAL EXAM:      LABS:                        9.2    13.44 )-----------( 126      ( 11 Apr 2019 06:17 )             28.4       04-11    140  |  104  |  23  ----------------------------<  148<H>  4.8   |  30  |  0.40<L>    Ca    7.0<L>      11 Apr 2019 05:41  Phos  3.2     04-11  Mg     2.0     04-11    TPro  4.0<L>  /  Alb  1.9<L>  /  TBili  1.3<H>  /  DBili  x   /  AST  25  /  ALT  20  /  AlkPhos  56  04-11      Lactate, Blood: 1.1 mmoL/L (04-10-19 @ 11:01)      MICROBIOLOGY:    Culture - Sputum (collected 04-09-19 @ 11:48)  Source: .Sputum Sputum  Gram Stain (04-10-19 @ 11:38):    No epithelial cells    Numerous White blood cells    Few Gram positive cocci in pairs  Preliminary Report (04-10-19 @ 11:49):    Moderate Staphylococcus aureus    presumptive Methicillin resistant    Confirmation to follow within 24 hours Result called to and read back byRosalia Jacobson RN  04/10/2019 11:47:35    Susceptibility to follow.    Accompanied by normal respiratory bev        RADIOLOGY & ADDITIONAL STUDIES: INFECTIOUS DISEASE CONSULT PROGRESS NOTE    INTERVAL HPI/OVERNIGHT EVENTS: Patient with a temp of 101.1, no blood cultures done. WBC elevated to 13 in the AM. Levo requirements decreasing.     ACTIVE ANTIMICROBIALS/ANTIBIOTICS:  cefepime   IVPB 2000 milliGRAM(s) IV Intermittent every 12 hours  metroNIDAZOLE  IVPB 500 milliGRAM(s) IV Intermittent every 8 hours  metroNIDAZOLE  IVPB      vancomycin  IVPB 1000 milliGRAM(s) IV Intermittent every 12 hours      VITAL SIGNS:  ICU Vital Signs Last 24 Hrs  T(C): 36.4 (11 Apr 2019 06:00), Max: 38.4 (10 Apr 2019 17:51)  T(F): 97.6 (11 Apr 2019 06:00), Max: 101.1 (10 Apr 2019 17:51)  HR: 106 (11 Apr 2019 09:35) (78 - 107)  BP: 117/75 (11 Apr 2019 09:35) (90/55 - 117/75)  BP(mean): 88 (11 Apr 2019 09:35) (64 - 89)  ABP: 112/70 (11 Apr 2019 09:35) (94/56 - 124/60)  ABP(mean): 86 (11 Apr 2019 09:35) (62 - 88)  RR: 22 (11 Apr 2019 09:35) (11 - 32)  SpO2: 97% (11 Apr 2019 09:35) (94% - 100%)      PHYSICAL EXAM:  General: Ill- appearing, cachexia, intubated and sedated   Head: temporal wasting  Pulmonary: Decreased breath sounds b/l, rhonchus breath sounds in upper lung fields   Cardiovascular: +S1S2, no murmurs  Abdominal: Distended, edematous, abdominal wound vac, +ostomy    Extremities: Diffuse anasarca   Neuro: sedated    LABS:                        9.2    13.44 )-----------( 126      ( 11 Apr 2019 06:17 )             28.4       04-11    140  |  104  |  23  ----------------------------<  148<H>  4.8   |  30  |  0.40<L>    Ca    7.0<L>      11 Apr 2019 05:41  Phos  3.2     04-11  Mg     2.0     04-11    TPro  4.0<L>  /  Alb  1.9<L>  /  TBili  1.3<H>  /  DBili  x   /  AST  25  /  ALT  20  /  AlkPhos  56  04-11      Lactate, Blood: 1.1 mmoL/L (04-10-19 @ 11:01)      MICROBIOLOGY:    Culture - Sputum (collected 04-09-19 @ 11:48)  Source: .Sputum Sputum  Gram Stain (04-10-19 @ 11:38):    No epithelial cells    Numerous White blood cells    Few Gram positive cocci in pairs  Preliminary Report (04-10-19 @ 11:49):    Moderate Staphylococcus aureus    presumptive Methicillin resistant    Confirmation to follow within 24 hours Result called to and read back byRosalia Jacobson RN  04/10/2019 11:47:35    Susceptibility to follow.    Accompanied by normal respiratory bev        RADIOLOGY & ADDITIONAL STUDIES:

## 2019-04-11 NOTE — PROGRESS NOTE ADULT - SUBJECTIVE AND OBJECTIVE BOX
SUBJECTIVE: intubated, sedated, unable to gather RoS.    Vital Signs Last 24 Hrs  T(C): 37.5 (11 Apr 2019 13:53), Max: 38.4 (10 Apr 2019 17:51)  T(F): 99.5 (11 Apr 2019 13:53), Max: 101.1 (10 Apr 2019 17:51)  HR: 92 (11 Apr 2019 16:24) (78 - 110)  BP: 111/65 (11 Apr 2019 16:00) (64/47 - 117/75)  BP(mean): 85 (11 Apr 2019 16:00) (57 - 89)  RR: 18 (11 Apr 2019 16:00) (13 - 26)  SpO2: 97% (11 Apr 2019 16:24) (94% - 100%)    Physical Exam:  General: NAD, intubated, sedated  Pulmonary: intubated on AC, decreased breath sounds at bases  Cardiovascular: NSR  Abdominal: soft, ND, minimally tender at midline wound, no peritoneal signs. Wound vac functioning. Paracolic gutter drain serosang, LUQ/LLQ drains serous.  Extremities: WWP, 2+ pitting edema in all extremities  Neuro: opens eyes to commands, responds to pain    I&O's Summary    10 Apr 2019 07:01  -  11 Apr 2019 07:00  --------------------------------------------------------  IN: 3436.9 mL / OUT: 3010 mL / NET: 426.9 mL    11 Apr 2019 07:01  -  11 Apr 2019 17:38  --------------------------------------------------------  IN: 831.2 mL / OUT: 1850 mL / NET: -1018.8 mL        LABS:                        9.2    13.44 )-----------( 126      ( 11 Apr 2019 06:17 )             28.4     04-11    140  |  104  |  23  ----------------------------<  148<H>  4.8   |  30  |  0.40<L>    Ca    7.0<L>      11 Apr 2019 05:41  Phos  3.2     04-11  Mg     2.0     04-11    TPro  4.0<L>  /  Alb  1.9<L>  /  TBili  1.3<H>  /  DBili  x   /  AST  25  /  ALT  20  /  AlkPhos  56  04-11    PT/INR - ( 11 Apr 2019 05:41 )   PT: 30.2 sec;   INR: 2.59          PTT - ( 11 Apr 2019 05:41 )  PTT:36.3 sec    CAPILLARY BLOOD GLUCOSE      POCT Blood Glucose.: 151 mg/dL (11 Apr 2019 10:44)  POCT Blood Glucose.: 91 mg/dL (11 Apr 2019 05:31)  POCT Blood Glucose.: 128 mg/dL (10 Apr 2019 21:36)    LIVER FUNCTIONS - ( 11 Apr 2019 05:41 )  Alb: 1.9 g/dL / Pro: 4.0 g/dL / ALK PHOS: 56 U/L / ALT: 20 U/L / AST: 25 U/L / GGT: x             RADIOLOGY & ADDITIONAL STUDIES:

## 2019-04-11 NOTE — PHYSICAL THERAPY INITIAL EVALUATION ADULT - IMPAIRMENTS FOUND, PT EVAL
gross motor/poor safety awareness/integumentary integrity/muscle strength/aerobic capacity/endurance/fine motor/gait, locomotion, and balance/posture

## 2019-04-11 NOTE — PHYSICAL THERAPY INITIAL EVALUATION ADULT - DID THE PATIENT HAVE SURGERY?
Exploratory laparotomy, end colostomy creation, hemostatic packing removal, fascial closure, vac placement in abdominal SQ space/yes

## 2019-04-12 DIAGNOSIS — J85.1 ABSCESS OF LUNG WITH PNEUMONIA: ICD-10-CM

## 2019-04-12 LAB
ALBUMIN SERPL ELPH-MCNC: 1.6 G/DL — LOW (ref 3.3–5)
ALP SERPL-CCNC: 54 U/L — SIGNIFICANT CHANGE UP (ref 40–120)
ALT FLD-CCNC: 19 U/L — SIGNIFICANT CHANGE UP (ref 10–45)
ANION GAP SERPL CALC-SCNC: 7 MMOL/L — SIGNIFICANT CHANGE UP (ref 5–17)
APTT BLD: 38.3 SEC — HIGH (ref 27.5–36.3)
AST SERPL-CCNC: 28 U/L — SIGNIFICANT CHANGE UP (ref 10–40)
BILIRUB SERPL-MCNC: 1.2 MG/DL — SIGNIFICANT CHANGE UP (ref 0.2–1.2)
BUN SERPL-MCNC: 25 MG/DL — HIGH (ref 7–23)
CALCIUM SERPL-MCNC: 6.9 MG/DL — LOW (ref 8.4–10.5)
CHLORIDE SERPL-SCNC: 102 MMOL/L — SIGNIFICANT CHANGE UP (ref 96–108)
CO2 SERPL-SCNC: 32 MMOL/L — HIGH (ref 22–31)
CREAT SERPL-MCNC: 0.43 MG/DL — LOW (ref 0.5–1.3)
GLUCOSE BLDC GLUCOMTR-MCNC: 112 MG/DL — HIGH (ref 70–99)
GLUCOSE BLDC GLUCOMTR-MCNC: 129 MG/DL — HIGH (ref 70–99)
GLUCOSE BLDC GLUCOMTR-MCNC: 143 MG/DL — HIGH (ref 70–99)
GLUCOSE BLDC GLUCOMTR-MCNC: 151 MG/DL — HIGH (ref 70–99)
GLUCOSE SERPL-MCNC: 110 MG/DL — HIGH (ref 70–99)
HCT VFR BLD CALC: 26.5 % — LOW (ref 39–50)
HGB BLD-MCNC: 8.6 G/DL — LOW (ref 13–17)
INR BLD: 2.3 — HIGH (ref 0.88–1.16)
MAGNESIUM SERPL-MCNC: 2.1 MG/DL — SIGNIFICANT CHANGE UP (ref 1.6–2.6)
MCHC RBC-ENTMCNC: 30.6 PG — SIGNIFICANT CHANGE UP (ref 27–34)
MCHC RBC-ENTMCNC: 32.5 GM/DL — SIGNIFICANT CHANGE UP (ref 32–36)
MCV RBC AUTO: 94.3 FL — SIGNIFICANT CHANGE UP (ref 80–100)
NRBC # BLD: 0 /100 WBCS — SIGNIFICANT CHANGE UP (ref 0–0)
PHOSPHATE SERPL-MCNC: 3.4 MG/DL — SIGNIFICANT CHANGE UP (ref 2.5–4.5)
PLATELET # BLD AUTO: 127 K/UL — LOW (ref 150–400)
POTASSIUM SERPL-MCNC: 4.1 MMOL/L — SIGNIFICANT CHANGE UP (ref 3.5–5.3)
POTASSIUM SERPL-SCNC: 4.1 MMOL/L — SIGNIFICANT CHANGE UP (ref 3.5–5.3)
PROT SERPL-MCNC: 3.9 G/DL — LOW (ref 6–8.3)
PROTHROM AB SERPL-ACNC: 26.7 SEC — HIGH (ref 10–12.9)
RBC # BLD: 2.81 M/UL — LOW (ref 4.2–5.8)
RBC # FLD: 15.7 % — HIGH (ref 10.3–14.5)
SODIUM SERPL-SCNC: 141 MMOL/L — SIGNIFICANT CHANGE UP (ref 135–145)
TROPONIN T SERPL-MCNC: 0.02 NG/ML — HIGH (ref 0–0.01)
TROPONIN T SERPL-MCNC: 0.03 NG/ML — HIGH (ref 0–0.01)
WBC # BLD: 12.23 K/UL — HIGH (ref 3.8–10.5)
WBC # FLD AUTO: 12.23 K/UL — HIGH (ref 3.8–10.5)

## 2019-04-12 PROCEDURE — 71045 X-RAY EXAM CHEST 1 VIEW: CPT | Mod: 26,77

## 2019-04-12 PROCEDURE — 99233 SBSQ HOSP IP/OBS HIGH 50: CPT | Mod: GC

## 2019-04-12 PROCEDURE — 99252 IP/OBS CONSLTJ NEW/EST SF 35: CPT

## 2019-04-12 PROCEDURE — 99291 CRITICAL CARE FIRST HOUR: CPT

## 2019-04-12 PROCEDURE — 71045 X-RAY EXAM CHEST 1 VIEW: CPT | Mod: 26

## 2019-04-12 PROCEDURE — 99232 SBSQ HOSP IP/OBS MODERATE 35: CPT | Mod: GC

## 2019-04-12 RX ORDER — FUROSEMIDE 40 MG
20 TABLET ORAL ONCE
Qty: 0 | Refills: 0 | Status: COMPLETED | OUTPATIENT
Start: 2019-04-12 | End: 2019-04-12

## 2019-04-12 RX ORDER — PHYTONADIONE (VIT K1) 5 MG
10 TABLET ORAL ONCE
Qty: 0 | Refills: 0 | Status: COMPLETED | OUTPATIENT
Start: 2019-04-12 | End: 2019-04-12

## 2019-04-12 RX ORDER — FENTANYL CITRATE 50 UG/ML
0.5 INJECTION INTRAVENOUS
Qty: 2500 | Refills: 0 | Status: DISCONTINUED | OUTPATIENT
Start: 2019-04-12 | End: 2019-04-15

## 2019-04-12 RX ORDER — ELECTROLYTE SOLUTION,INJ
1 VIAL (ML) INTRAVENOUS
Qty: 0 | Refills: 0 | Status: DISCONTINUED | OUTPATIENT
Start: 2019-04-12 | End: 2019-04-12

## 2019-04-12 RX ORDER — FUROSEMIDE 40 MG
20 TABLET ORAL ONCE
Qty: 0 | Refills: 0 | Status: DISCONTINUED | OUTPATIENT
Start: 2019-04-12 | End: 2019-04-12

## 2019-04-12 RX ORDER — FUROSEMIDE 40 MG
40 TABLET ORAL ONCE
Qty: 0 | Refills: 0 | Status: COMPLETED | OUTPATIENT
Start: 2019-04-12 | End: 2019-04-12

## 2019-04-12 RX ORDER — VANCOMYCIN HCL 1 G
1250 VIAL (EA) INTRAVENOUS EVERY 12 HOURS
Qty: 0 | Refills: 0 | Status: DISCONTINUED | OUTPATIENT
Start: 2019-04-12 | End: 2019-04-15

## 2019-04-12 RX ORDER — ACETAMINOPHEN 500 MG
1000 TABLET ORAL ONCE
Qty: 0 | Refills: 0 | Status: COMPLETED | OUTPATIENT
Start: 2019-04-12 | End: 2019-04-12

## 2019-04-12 RX ORDER — I.V. FAT EMULSION 20 G/100ML
0.7 EMULSION INTRAVENOUS
Qty: 50 | Refills: 0 | Status: DISCONTINUED | OUTPATIENT
Start: 2019-04-12 | End: 2019-04-12

## 2019-04-12 RX ADMIN — Medication 250 MILLIGRAM(S): at 06:51

## 2019-04-12 RX ADMIN — Medication 100 MILLIGRAM(S): at 20:26

## 2019-04-12 RX ADMIN — Medication 100 MILLIGRAM(S): at 09:45

## 2019-04-12 RX ADMIN — Medication 40 MILLIGRAM(S): at 16:38

## 2019-04-12 RX ADMIN — CHLORHEXIDINE GLUCONATE 1 APPLICATION(S): 213 SOLUTION TOPICAL at 06:52

## 2019-04-12 RX ADMIN — I.V. FAT EMULSION 20.83 GM/KG/DAY: 20 EMULSION INTRAVENOUS at 22:25

## 2019-04-12 RX ADMIN — PANTOPRAZOLE SODIUM 40 MILLIGRAM(S): 20 TABLET, DELAYED RELEASE ORAL at 12:34

## 2019-04-12 RX ADMIN — CEFEPIME 100 MILLIGRAM(S): 1 INJECTION, POWDER, FOR SOLUTION INTRAMUSCULAR; INTRAVENOUS at 17:57

## 2019-04-12 RX ADMIN — FENTANYL CITRATE 3.67 MICROGRAM(S)/KG/HR: 50 INJECTION INTRAVENOUS at 15:34

## 2019-04-12 RX ADMIN — Medication 102 MILLIGRAM(S): at 14:00

## 2019-04-12 RX ADMIN — Medication 20 MILLIGRAM(S): at 09:45

## 2019-04-12 RX ADMIN — Medication 3.44 MICROGRAM(S)/KG/MIN: at 02:06

## 2019-04-12 RX ADMIN — CEFEPIME 100 MILLIGRAM(S): 1 INJECTION, POWDER, FOR SOLUTION INTRAMUSCULAR; INTRAVENOUS at 06:51

## 2019-04-12 RX ADMIN — Medication 400 MILLIGRAM(S): at 18:04

## 2019-04-12 RX ADMIN — Medication 100 MILLIGRAM(S): at 02:06

## 2019-04-12 RX ADMIN — Medication 2: at 12:34

## 2019-04-12 RX ADMIN — Medication 166.67 MILLIGRAM(S): at 17:57

## 2019-04-12 RX ADMIN — NYSTATIN CREAM 1 APPLICATION(S): 100000 CREAM TOPICAL at 06:52

## 2019-04-12 NOTE — PROGRESS NOTE ADULT - SUBJECTIVE AND OBJECTIVE BOX
On interval followup, the left arm PICC site is clean, dry and non-inflamed. The low grade fever, notes, cultures and clinical status are followed.

## 2019-04-12 NOTE — PROGRESS NOTE ADULT - SUBJECTIVE AND OBJECTIVE BOX
Interval History:  ON: another lasix 20 at 7pm, Dr. Jean Paul batres IR drainage of lung abscess, new t wave inversions noted, cards consulted  4/11: HIT panel negative,IV 20 Lasix in am, responded, PT ordered. Failed CPAP trial after 5 min, decreased insulin in TPN,. INR 2.59 - given vit K 10. Worked w. PT, cordis removed    SUBJECTIVE:   Patient seen and examined by bedside. Intubated, sedated, ROS not obtainable. EKG last night, shows new inverted T-waves in anteroinferior leads. Trops 0.03, no change from troponins from March. Cardiology consulted overnight. Low suspicion for ACS at this time, but high risk for demand ischemia given patient is off home DAPT and not anticoagulated in setting of recent intraoperative bleeding.       Vitals - Range in last 12h  T(F): , Max: 97.5 (04-11-19 @ 23:08)  HR:  (84 - 100)  BP:  (95/57 - 114/59)  BP(mean):  (64 - 78)  ABP:  (76/70 - 107/69)  ABP(mean):  (72 - 90)  RR:  (15 - 20)  SpO2:  (94% - 98%)  CVP(mm Hg): --  CVP(cm H2O): --  CO: --  CI: --  PA: --  PA(mean): --  PA(direct): --  PCWP: --    Vitals - Most Recent  T(F): 97.2 (04-12-19 @ 05:48)  HR: 84 (04-12-19 @ 07:00)  BP: 102/58 (04-12-19 @ 07:00)  RR: 17 (04-12-19 @ 07:00)  SpO2: 95% (04-12-19 @ 07:00)  I&O's Detail    11 Apr 2019 07:01  -  12 Apr 2019 07:00  --------------------------------------------------------  IN:    fat emulsion (Plant Based) 20% Infusion: 228.8 mL    fat emulsion (Plant Based) 20% Infusion: 62.4 mL    fentaNYL Infusion.: 147 mL    IV PiggyBack: 950 mL    norepinephrine Infusion: 254 mL    propofol Infusion: 59 mL    TPN (Total Parenteral Nutrition): 1999.2 mL  Total IN: 3700.4 mL    OUT:    Bulb: 225 mL    Bulb: 225 mL    Bulb: 150 mL    Indwelling Catheter - Urethral: 2400 mL    Nasoenteral Tube: 450 mL    VAC (Vacuum Assisted Closure) System: 50 mL  Total OUT: 3500 mL    Total NET: 200.4 mL        Mode: AC/ CMV (Assist Control/ Continuous Mandatory Ventilation)  RR (machine): 12  RR (patient): 13  TV (machine): 500  TV (patient): 534  FiO2: 40  PEEP: 5  ITime: 1  MAP: 15  PIP: 22      Physical Exam  Neuro: no eye opening to verbal or mechanical stimuli  General: intubated, on vent-AC  HEENT: PERRL, EOMI, MMM  Pulm: rhonchi bilaterally, decreased breath sounds at lung bases bilaterally  C/V: nontachycardic, RRR, S1 S2, no murmurs  Abd: nondistended, but firm; LLQ IR drain w/ light serosanguinous output; LUQ PHYLLIS drain w/ serosanguinous output; R paracolic gutter drain w/ serosanguinous output; abdominal incision w/ wound vac in place w/ good deal and serosanguinous output (fascia closed 4/10, vac in subcutaneous space)  Extremities: compression lower extremity dressing in place; anasarca; 2+ edema of bilateral upper extremities  : rao in place; significant 2+ scrotal edema  Skin: no ecchymosis and rashes        LABS:                        8.6    12.23 )-----------( 127      ( 12 Apr 2019 05:44 )             26.5     04-12    141  |  102  |  25<H>  ----------------------------<  110<H>  4.1   |  32<H>  |  0.43<L>    Ca    6.9<L>      12 Apr 2019 05:44  Phos  3.4     04-12  Mg     2.1     04-12    TPro  3.9<L>  /  Alb  1.6<L>  /  TBili  1.2  /  DBili  x   /  AST  28  /  ALT  19  /  AlkPhos  54  04-12    LIVER FUNCTIONS - ( 12 Apr 2019 05:44 )  Alb: 1.6 g/dL / Pro: 3.9 g/dL / ALK PHOS: 54 U/L / ALT: 19 U/L / AST: 28 U/L / GGT: x           PT/INR - ( 12 Apr 2019 05:44 )   PT: 26.7 sec;   INR: 2.30          PTT - ( 12 Apr 2019 05:44 )  PTT:38.3 sec      Culture - Sputum (collected 09 Apr 2019 11:48)  Source: .Sputum Sputum  Gram Stain (10 Apr 2019 11:38):    No epithelial cells    Numerous White blood cells    Few Gram positive cocci in pairs  Final Report (11 Apr 2019 12:20):    Moderate Methicillin resistant Staphylococcus aureus    Result called to and read back byRosalia Jacobson RN  04/10/2019 11:47:35    Accompanied by normal respiratory bev  Organism: Methicillin resistant Staphylococcus aureus  Methicillin resistant Staphylococcus aureus (11 Apr 2019 12:20)  Organism: Methicillin resistant Staphylococcus aureus (11 Apr 2019 12:20)  Organism: Methicillin resistant Staphylococcus aureus (11 Apr 2019 12:20)        MEDICATIONS  (STANDING):  cefepime   IVPB 2000 milliGRAM(s) IV Intermittent every 12 hours  chlorhexidine 2% Cloths 1 Application(s) Topical <User Schedule>  dextrose 5%. 1000 milliLiter(s) (50 mL/Hr) IV Continuous <Continuous>  dextrose 50% Injectable 12.5 Gram(s) IV Push once  dextrose 50% Injectable 25 Gram(s) IV Push once  dextrose 50% Injectable 25 Gram(s) IV Push once  fat emulsion (Plant Based) 20% Infusion 0.7 Gm/kG/Day (20.83 mL/Hr) IV Continuous <Continuous>  fentaNYL   Infusion. 0.5 MICROgram(s)/kG/Hr (3.665 mL/Hr) IV Continuous <Continuous>  heparin  flush 100 Units/mL Injectable 100 Unit(s) IV Push every other day  insulin lispro (HumaLOG) corrective regimen sliding scale   SubCutaneous every 6 hours  metroNIDAZOLE  IVPB 500 milliGRAM(s) IV Intermittent every 8 hours  metroNIDAZOLE  IVPB      norepinephrine Infusion 0.05 MICROgram(s)/kG/Min (3.436 mL/Hr) IV Continuous <Continuous>  nystatin Powder 1 Application(s) Topical two times a day  pantoprazole  Injectable 40 milliGRAM(s) IV Push daily  Parenteral Nutrition - Adult 1 Each (83 mL/Hr) TPN Continuous <Continuous>  propofol Infusion 5 MICROgram(s)/kG/Min (2.199 mL/Hr) IV Continuous <Continuous>  vancomycin  IVPB 1000 milliGRAM(s) IV Intermittent every 12 hours    MEDICATIONS  (PRN):  dextrose 40% Gel 15 Gram(s) Oral once PRN Blood Glucose LESS THAN 70 milliGRAM(s)/deciliter  glucagon  Injectable 1 milliGRAM(s) IntraMuscular once PRN Glucose LESS THAN 70 milligrams/deciliter  ondansetron Injectable 4 milliGRAM(s) IV Push every 6 hours PRN Nausea      RADIOLOGY & ADDITIONAL STUDIES:

## 2019-04-12 NOTE — CONSULT NOTE ADULT - ATTENDING COMMENTS
Briefly, 85 yo male with DM, dementia, EtOH abuse, b/l inguinal/umbilical hernia repair 3/11, p/w R groin pain, found to have 6 X 4cm R groin collection, ascites of unclear etiology (no mention of cirrhosis on CT, cytology negative for malignant cells), 10 X 5cm LLQ abscess (s/p IR drainage 4/5 cx with fecal bev including P. vulgaris), undrained L empyema, s/p ex-lap, LAURYN, bowel resection 4/7 (no bowel perforation encountered). Plan for RTOR tomorrow. Change ceftriaxone to cefepime 2g IV q12h (to target P. vulgaris, which can produce an inducible ampC beta-lactamase); continue Flagyl; f/u GOC.
Patient discussed with Chief Resident    Agree with the plan and statements as documented
Agree with above assessment and plan  Mildly elevated trop likely secondary to demand ischemia and not true NSTEMI  Recommend conservative med management for now  DAPT once H/H stable  Beta blocker if patient can be weaned off pressors  Will cont to follow
note FH:: no pertinent positive elements in first degree relatives  SH: etoh abuse in youth but no longer, nonsmoker  ROS: as per HPI, otherwise no pertinent positive elements    LLL lung abscess, treating conservatively with abx for now  intra abd collection sp drainage.  vanco zosyn
Significant intraabdominal process with a large intrapulmonary abscess (left) that extends over almost the entire posterior hemithorax. This is most likely the result of a  connection between the intraabdominal process and thorax. Drainage of this may be beneficial since it will serve as source control but may also increase his morbidity given the possibility of a bronchopleural fistula, significant cytokine storm leading to sepsis, and/or poor/incomplete drainage because of the extent of the infectious process. Bedside ultrasound does show a good accessible area for chest tube placement. All of these risks were discussed with the family at bedside as well as HCP (brother). Team will decide regarding intervention in the next few days and keep us informed.

## 2019-04-12 NOTE — CONSULT NOTE ADULT - SUBJECTIVE AND OBJECTIVE BOX
Cardiologist: Dr. Luiz Garcia    CHIEF COMPLAINT: R inguinal discomfort    HPI:    86M w/PMHx CAD s/p mult PCI (last 2012; KIRSTY mLAD, dRCA, pOM2), HTN, HLD, recent b/l lap RA b/l inguinal hernia repair (3/11) initially sent in from [facility] for R inguinal hernia discomfort. In ED had CT C/A/P w/con which revealed 6x4cm R groin fluid collection, 10x5cm abscess in LLQ w/assoc pneumoperitoneum, L lung empyema, b/l segmental PE, and DVT in b/l CF, L common iliac and internal iliac veins. Admitted to SICU.    On admission started on hep gtt for DVT/PE, however home DAPT held for planned surgical intervention. Underwent IVCF placement on 4/5. At same time had IR drainage of LLQ abd collection, w/cx growing numerous organisms (Klebsiella pneumoniae, Escherichia coli, Proteus vulgaris group, Streptococcus milleri, viridans group & Bacteroides fragilis). ID c/s, started on cefepime and flagyl, vanc added when sputum grew MRSA. On 4/7 underwent exlap w/drainage of 3L ascites, LAURYN, SB resxn w/primary anastomosis, sigmoidectomy (left in discontinuity), abd packing, and drain/VAC placement (left open). Case c/b pelvic bleeding, hep gtt d/c. Taken back to OR on 4/10 for end-colostomy creation and fascia closure. Noted to still have some bleeding, so hep gtt still not restarted. Hosp course c/b septic shock req levo gtt and resp failure req mech ventilation. Noted on routine tele check to have new possible TWI, cards c/s for further assessment.    Of note, pt last seen by outpt cards Dr. Garcia on 2/28 for pre-op assessment prior to planned inguinal hernia repair. Pt doing well at that time w/o cardiac complaints, stable on DAPT.    PAST MEDICAL & SURGICAL HISTORY:  Esophageal reflux  Acute diarrhea  HLD (hyperlipidemia)  HTN (hypertension)  Prostate cancer  Stented coronary artery: x3 , per pt.  Atherosclerosis of coronary artery: CAD (coronary artery disease)  S/P hernia repair  Surgery, elective: cardiac stent x3  History of prostate surgery    [ ] Diabetes   [x] Hypertension  [x] Hyperlipidemia  [x] CAD  [x] PCI  [ ] CABG    PREVIOUS DIAGNOSTIC TESTING:    [x] Echocardiogram:  TTE (4/6/19): Norm LV, no WMA, EF 50-55%. Trace AI/MR/PI. PASP 39. Mild/mod TR.  [ ] Stress Test:  [x] Catheterization: 	  [facility] (2/29/12): Patent stent mLAD and dRCA. KIRSTY x1 90% pOM2. POBA 80% ostial OM1    FAMILY HISTORY:  No pertinent family history in first degree relatives    SOCIAL HISTORY:    [x] Non-smoker  [ ] Current Smoker  [ ] Former Smoker  [ ] Alcohol Use  [ ] Drug Use    ALLERGIES/INTOLERANCES:  No Known Allergies    HOME MEDICATIONS: ASA81, Plavix 75, Lipitor 20, Lopressor 25, Flomax    INPATIENT MEDICATIONS:  norepinephrine Infusion 0.05 MICROgram(s)/kG/Min (3.436 mL/Hr) IV Continuous <Continuous>    heparin  flush 100 Units/mL Injectable 100 Unit(s) IV Push every other day    cefepime   IVPB 2000 milliGRAM(s) IV Intermittent every 12 hours  chlorhexidine 2% Cloths 1 Application(s) Topical <User Schedule>  dextrose 40% Gel 15 Gram(s) Oral once PRN  dextrose 5%. 1000 milliLiter(s) IV Continuous <Continuous>  dextrose 50% Injectable 12.5 Gram(s) IV Push once  dextrose 50% Injectable 25 Gram(s) IV Push once  dextrose 50% Injectable 25 Gram(s) IV Push once  fat emulsion (Plant Based) 20% Infusion 0.7 Gm/kG/Day IV Continuous <Continuous>  fentaNYL   Infusion. 0.5 MICROgram(s)/kG/Hr IV Continuous <Continuous>  glucagon  Injectable 1 milliGRAM(s) IntraMuscular once PRN  insulin lispro (HumaLOG) corrective regimen sliding scale   SubCutaneous every 6 hours  metroNIDAZOLE  IVPB 500 milliGRAM(s) IV Intermittent every 8 hours  metroNIDAZOLE  IVPB      nystatin Powder 1 Application(s) Topical two times a day  ondansetron Injectable 4 milliGRAM(s) IV Push every 6 hours PRN  pantoprazole  Injectable 40 milliGRAM(s) IV Push daily  Parenteral Nutrition - Adult 1 Each TPN Continuous <Continuous>  propofol Infusion 5 MICROgram(s)/kG/Min IV Continuous <Continuous>  vancomycin  IVPB 1000 milliGRAM(s) IV Intermittent every 12 hours      REVIEW OF SYSTEMS:  [ ] All other review of systems are negative unless indicated above.  [x] Unable to obtain due to: intub/sed    PHYSICAL EXAM:    T(C): 36.4 (04-11-19 @ 23:08), Max: 37.5 (04-11-19 @ 13:53)  HR: 86 (04-12-19 @ 04:00) (84 - 110)  BP: 95/57 (04-12-19 @ 04:00) (64/47 - 117/75)  RR: 15 (04-12-19 @ 04:00) (15 - 26)  SpO2: 98% (04-12-19 @ 04:00) (94% - 100%)  Wt(kg): --    I&O's Summary    10 Apr 2019 07:01  -  11 Apr 2019 07:00  --------------------------------------------------------  IN: 3436.9 mL / OUT: 3010 mL / NET: 426.9 mL    11 Apr 2019 07:01  -  12 Apr 2019 04:50  --------------------------------------------------------  IN: 2990.1 mL / OUT: 2870 mL / NET: 120.1 mL    GENERAL: Ill-appearing, cachectic, intubated, sedated, diffuse anasarca  CARDIOVASCULAR: RRR, normal S1 S2, no M/R/G, no JVD, neg HJR, nondisplaced PMI  RESPIRATORY: Limited exam, dec BS at bases, diffuse rhonchi  GASTROINTESTINAL: soft, wound vac in place, mult drains  EXTREMITIES: Diffuse anasarca w/3+ pitting edema up to thighs  NEURO: Sedated    TELEMETRY: 	      ECG:  	  EKG (4/5/19): NSR@93. Low voltage in limb leads. Q-waves in ant leads.  EKG (4/12/19): Sinus tachy @103 w/PACs and PVCs. Low voltage in limb leads. New diffuse TWI, most pronounced in inf (II, III, avF) and precordial (V2-V6) leads.  	  LABS:                        9.2    13.44 )-----------( 126      ( 11 Apr 2019 06:17 )             28.4     04-11    140  |  104  |  23  ----------------------------<  148<H>  4.8   |  30  |  0.40<L>    Ca    7.0<L>      11 Apr 2019 05:41  Phos  3.2     04-11  Mg     2.0     04-11    TPro  4.0<L>  /  Alb  1.9<L>  /  TBili  1.3<H>  /  DBili  x   /  AST  25  /  ALT  20  /  AlkPhos  56  04-11      Lipid Profile:   HgA1c: Hemoglobin A1C, Whole Blood: 4.9 % (03-12 @ 07:09)    TSH:     CARDIAC MARKERS:  Trop: 0.03    proBNP: Serum Pro-Brain Natriuretic Peptide: 4338 pg/mL (04-05 @ 15:08)      RADIOLOGY:  CT C/A/P w/con (4/5/19):  1.  Large amount of ascites and free intraperitoneal air.  2.  Bilateral pulmonary emboli.  3.  Left lower lobe hydropneumothorax with enhancing rim, suspicious for   empyema.  4.  Small right pleural effusion with passive atelectasis of lower lung   zone  5.  Mild thickening of small bowel loops, likely reactive.  6.  No change in fluid-filled right inguinal hernia.

## 2019-04-12 NOTE — CONSULT NOTE ADULT - SUBJECTIVE AND OBJECTIVE BOX
85yo M initially presenting for right groin discomfort in the setting of a recent bilateral inguinal hernia repair (3/11/19). CT abd/pelvis was done initially which showed a large pneumoperitoneum, abdominal abscess, fluid collection in the right groin, multiple DVT's, and a left lung empyema. We are consulted for the latter most issue. He is currently intubated and sedated and cannot give a history. Per the primary team and documentation, the patient initially went for an IR guided drainage o the LLQ abscess, and had an IVC filter placed. Cultures from then grew K. Pneumoniae, E. coli, Proteus, Strep Milleri, Strep viridans, and B. fragilis. The patient then had an ex-lap with 3L of ascites drained, with a portion of small bowel adherent to sigmoid colon and left pelvic side wall with fibrinous exudate and LAURYN performed, sigmoidectomy, placement of right paracolic gutter and left drain in the pelvis, colon left in discontinuity and abdominal vac placed. SICU course complicated by likely septic shock, chest x-ray significant for bilateral infiltrates, remains intubated and sedated and on levophed. Patient last febrile 4/8 to 100.9 (rectal) - no blood cultures sent. Patient was on ID consulted for antibiotic recommendations.     Has history of CAD s/p 3 stents (2012, 2009, 2000) still on ASA/Plavix, ETOH abuse, b/l inguinal hernias s/p laparoscopic robotic-assisted repair of bilateral inguinal hernias on 3/11/2019 (notably, 4.5L of ascites were also drained in the beginning of the procedure and sent for cytopathology)    PMH/PSx: CAD s/p 3 stents (2012, 2009, 2000) still on ASA/Plavix, ETOH abuse, b/l inguinal hernias s/p laparoscopic robotic-assisted repair of bilateral inguinal hernias on 3/11/2019 w/ drainage of 4.5 L of ascites,   Allergies: NKDA  Meds: see med rec  FH: patient denies FH of cancers or IBD  SH: patient reports significant alcohol use in youth but was unable to quantify the number of daily drinks. Patient denies smoking history or drug use (05 Apr 2019 19:49)      VITAL SIGNS:  ICU Vital Signs Last 24 Hrs  T(C): 36.2 (12 Apr 2019 05:48), Max: 37.5 (11 Apr 2019 13:53)  T(F): 97.2 (12 Apr 2019 05:48), Max: 99.5 (11 Apr 2019 13:53)  HR: 82 (12 Apr 2019 09:00) (82 - 110)  BP: 105/60 (12 Apr 2019 09:00) (64/47 - 114/59)  BP(mean): 74 (12 Apr 2019 09:00) (57 - 86)  ABP: 82/76 (12 Apr 2019 09:00) (76/70 - 108/80)  ABP(mean): 78 (12 Apr 2019 09:00) (64 - 94)  RR: 16 (12 Apr 2019 09:00) (15 - 25)  SpO2: 97% (12 Apr 2019 09:00) (94% - 98%)    Mode: AC/ CMV (Assist Control/ Continuous Mandatory Ventilation), RR (machine): 12, TV (machine): 500, FiO2: 40, PEEP: 5, ITime: 1, MAP: 15, PIP: 22    04-11 @ 07:01 - 04-12 @ 07:00  --------------------------------------------------------  IN: 3700.4 mL / OUT: 3500 mL / NET: 200.4 mL    04-12 @ 07:01 - 04-12 @ 10:06  --------------------------------------------------------  IN: 595.6 mL / OUT: 100 mL / NET: 495.6 mL      CAPILLARY BLOOD GLUCOSE      POCT Blood Glucose.: 143 mg/dL (12 Apr 2019 06:42)      PHYSICAL EXAM:  Constitutional: resting comfortably in bed, NAD  HEENT: NC/AT; PERRL, anicteric sclera; no oropharyngeal erythema or exudates; MMM  Neck: supple, no JVD  Respiratory: CTA B/L, no W/R/R; respirations appear non-labored, speaking full sentences  Cardiovascular: +S1/S2, RRR  Gastrointestinal: abdomen soft, NT/ND; +BS x4  Extremities: WWP; no clubbing, cyanosis or edema  Vascular: 2+ radial, DP/PT and femoral pulses B/L      MEDICATIONS:  MEDICATIONS  (STANDING):  cefepime   IVPB 2000 milliGRAM(s) IV Intermittent every 12 hours  chlorhexidine 2% Cloths 1 Application(s) Topical <User Schedule>  dextrose 5%. 1000 milliLiter(s) (50 mL/Hr) IV Continuous <Continuous>  dextrose 50% Injectable 12.5 Gram(s) IV Push once  dextrose 50% Injectable 25 Gram(s) IV Push once  dextrose 50% Injectable 25 Gram(s) IV Push once  fat emulsion (Plant Based) 20% Infusion 0.7 Gm/kG/Day (20.83 mL/Hr) IV Continuous <Continuous>  fentaNYL   Infusion. 0.5 MICROgram(s)/kG/Hr (3.665 mL/Hr) IV Continuous <Continuous>  furosemide   Injectable 20 milliGRAM(s) IV Push once  heparin  flush 100 Units/mL Injectable 100 Unit(s) IV Push every other day  insulin lispro (HumaLOG) corrective regimen sliding scale   SubCutaneous every 6 hours  metroNIDAZOLE  IVPB 500 milliGRAM(s) IV Intermittent every 8 hours  metroNIDAZOLE  IVPB      norepinephrine Infusion 0.05 MICROgram(s)/kG/Min (3.436 mL/Hr) IV Continuous <Continuous>  nystatin Powder 1 Application(s) Topical two times a day  pantoprazole  Injectable 40 milliGRAM(s) IV Push daily  Parenteral Nutrition - Adult 1 Each (83 mL/Hr) TPN Continuous <Continuous>  propofol Infusion 5 MICROgram(s)/kG/Min (2.199 mL/Hr) IV Continuous <Continuous>  vancomycin  IVPB 1000 milliGRAM(s) IV Intermittent every 12 hours    MEDICATIONS  (PRN):  dextrose 40% Gel 15 Gram(s) Oral once PRN Blood Glucose LESS THAN 70 milliGRAM(s)/deciliter  glucagon  Injectable 1 milliGRAM(s) IntraMuscular once PRN Glucose LESS THAN 70 milligrams/deciliter  ondansetron Injectable 4 milliGRAM(s) IV Push every 6 hours PRN Nausea      ALLERGIES:  Allergies    No Known Allergies    Intolerances        LABS:                        8.6    12.23 )-----------( 127      ( 12 Apr 2019 05:44 )             26.5     04-12    141  |  102  |  25<H>  ----------------------------<  110<H>  4.1   |  32<H>  |  0.43<L>    Ca    6.9<L>      12 Apr 2019 05:44  Phos  3.4     04-12  Mg     2.1     04-12    TPro  3.9<L>  /  Alb  1.6<L>  /  TBili  1.2  /  DBili  x   /  AST  28  /  ALT  19  /  AlkPhos  54  04-12    PT/INR - ( 12 Apr 2019 05:44 )   PT: 26.7 sec;   INR: 2.30          PTT - ( 12 Apr 2019 05:44 )  PTT:38.3 sec      RADIOLOGY & ADDITIONAL TESTS: 87yo M initially presenting for right groin discomfort in the setting of a recent bilateral inguinal hernia repair (3/11/19). CT abd/pelvis was done initially which showed a large pneumoperitoneum, abdominal abscess, fluid collection in the right groin, multiple DVT's, and a left lung abscess. We are consulted for the latter most issue. He is currently intubated and sedated, thus unable to give history.  Per the primary team and documentation, the patient initially had an IR guided drainage of the LLQ abscess followed by an IVC filter placement. Cultures from then grew K. Pneumoniae, E. coli, Proteus, Strep Milleri, Strep viridans, and B. fragilis - for which he was on abx for. His septic shock did not improve despite that, and he underwent an ex-lap. with 3L of ascites drained, with a portion of small bowel adherent to sigmoid colon and left pelvic side wall with fibrinous exudate and LUARYN performed, sigmoidectomy, placement of right paracolic gutter and left drain in the pelvis, colon left in discontinuity and abdominal vac placed. SICU course complicated by likely septic shock, chest x-ray significant for bilateral infiltrates, remains intubated and sedated and on levophed. Patient last febrile 4/8 to 100.9 (rectal) - no blood cultures sent. Patient was on ID consulted for antibiotic recommendations.     Has history of CAD s/p 3 stents (2012, 2009, 2000) still on ASA/Plavix, ETOH abuse, b/l inguinal hernias s/p laparoscopic robotic-assisted repair of bilateral inguinal hernias on 3/11/2019 (notably, 4.5L of ascites were also drained in the beginning of the procedure and sent for cytopathology)    PMH/PSx: CAD s/p 3 stents (2012, 2009, 2000) still on ASA/Plavix, ETOH abuse, b/l inguinal hernias s/p laparoscopic robotic-assisted repair of bilateral inguinal hernias on 3/11/2019 w/ drainage of 4.5 L of ascites,   Allergies: NKDA  Meds: see med rec  FH: patient denies FH of cancers or IBD  SH: patient reports significant alcohol use in youth but was unable to quantify the number of daily drinks. Patient denies smoking history or drug use (05 Apr 2019 19:49)      VITAL SIGNS:  ICU Vital Signs Last 24 Hrs  T(C): 36.2 (12 Apr 2019 05:48), Max: 37.5 (11 Apr 2019 13:53)  T(F): 97.2 (12 Apr 2019 05:48), Max: 99.5 (11 Apr 2019 13:53)  HR: 82 (12 Apr 2019 09:00) (82 - 110)  BP: 105/60 (12 Apr 2019 09:00) (64/47 - 114/59)  BP(mean): 74 (12 Apr 2019 09:00) (57 - 86)  ABP: 82/76 (12 Apr 2019 09:00) (76/70 - 108/80)  ABP(mean): 78 (12 Apr 2019 09:00) (64 - 94)  RR: 16 (12 Apr 2019 09:00) (15 - 25)  SpO2: 97% (12 Apr 2019 09:00) (94% - 98%)    Mode: AC/ CMV (Assist Control/ Continuous Mandatory Ventilation), RR (machine): 12, TV (machine): 500, FiO2: 40, PEEP: 5, ITime: 1, MAP: 15, PIP: 22    04-11 @ 07:01 - 04-12 @ 07:00  --------------------------------------------------------  IN: 3700.4 mL / OUT: 3500 mL / NET: 200.4 mL    04-12 @ 07:01 - 04-12 @ 10:06  --------------------------------------------------------  IN: 595.6 mL / OUT: 100 mL / NET: 495.6 mL      CAPILLARY BLOOD GLUCOSE      POCT Blood Glucose.: 143 mg/dL (12 Apr 2019 06:42)      PHYSICAL EXAM:  Constitutional: resting comfortably in bed, NAD  HEENT: NC/AT; PERRL, anicteric sclera; no oropharyngeal erythema or exudates; MMM  Neck: supple, no JVD  Respiratory: CTA B/L, no W/R/R; respirations appear non-labored, speaking full sentences  Cardiovascular: +S1/S2, RRR  Gastrointestinal: abdomen soft, NT/ND; +BS x4  Extremities: WWP; no clubbing, cyanosis or edema  Vascular: 2+ radial, DP/PT and femoral pulses B/L      MEDICATIONS:  MEDICATIONS  (STANDING):  cefepime   IVPB 2000 milliGRAM(s) IV Intermittent every 12 hours  chlorhexidine 2% Cloths 1 Application(s) Topical <User Schedule>  dextrose 5%. 1000 milliLiter(s) (50 mL/Hr) IV Continuous <Continuous>  dextrose 50% Injectable 12.5 Gram(s) IV Push once  dextrose 50% Injectable 25 Gram(s) IV Push once  dextrose 50% Injectable 25 Gram(s) IV Push once  fat emulsion (Plant Based) 20% Infusion 0.7 Gm/kG/Day (20.83 mL/Hr) IV Continuous <Continuous>  fentaNYL   Infusion. 0.5 MICROgram(s)/kG/Hr (3.665 mL/Hr) IV Continuous <Continuous>  furosemide   Injectable 20 milliGRAM(s) IV Push once  heparin  flush 100 Units/mL Injectable 100 Unit(s) IV Push every other day  insulin lispro (HumaLOG) corrective regimen sliding scale   SubCutaneous every 6 hours  metroNIDAZOLE  IVPB 500 milliGRAM(s) IV Intermittent every 8 hours  metroNIDAZOLE  IVPB      norepinephrine Infusion 0.05 MICROgram(s)/kG/Min (3.436 mL/Hr) IV Continuous <Continuous>  nystatin Powder 1 Application(s) Topical two times a day  pantoprazole  Injectable 40 milliGRAM(s) IV Push daily  Parenteral Nutrition - Adult 1 Each (83 mL/Hr) TPN Continuous <Continuous>  propofol Infusion 5 MICROgram(s)/kG/Min (2.199 mL/Hr) IV Continuous <Continuous>  vancomycin  IVPB 1000 milliGRAM(s) IV Intermittent every 12 hours    MEDICATIONS  (PRN):  dextrose 40% Gel 15 Gram(s) Oral once PRN Blood Glucose LESS THAN 70 milliGRAM(s)/deciliter  glucagon  Injectable 1 milliGRAM(s) IntraMuscular once PRN Glucose LESS THAN 70 milligrams/deciliter  ondansetron Injectable 4 milliGRAM(s) IV Push every 6 hours PRN Nausea      ALLERGIES:  Allergies    No Known Allergies    Intolerances        LABS:                        8.6    12.23 )-----------( 127      ( 12 Apr 2019 05:44 )             26.5     04-12    141  |  102  |  25<H>  ----------------------------<  110<H>  4.1   |  32<H>  |  0.43<L>    Ca    6.9<L>      12 Apr 2019 05:44  Phos  3.4     04-12  Mg     2.1     04-12    TPro  3.9<L>  /  Alb  1.6<L>  /  TBili  1.2  /  DBili  x   /  AST  28  /  ALT  19  /  AlkPhos  54  04-12    PT/INR - ( 12 Apr 2019 05:44 )   PT: 26.7 sec;   INR: 2.30          PTT - ( 12 Apr 2019 05:44 )  PTT:38.3 sec      RADIOLOGY & ADDITIONAL TESTS: 87 yo M initially presenting for right groin discomfort in the setting of a recent bilateral inguinal hernia repair (3/11/19). CT abd/pelvis was done initially which showed a large pneumoperitoneum, abdominal abscess, fluid collection in the right groin, multiple DVT's, and a left lung abscess. We are consulted for the latter most issue. He is currently intubated and sedated, thus unable to give history.  Per the primary team and documentation, the patient initially had an IR guided drainage of the LLQ abscess followed by an IVC filter placement. Cultures from then grew K. Pneumoniae, E. coli, Proteus, Strep Milleri, Strep viridans, and B. fragilis - for which he was on abx for. He had an ex-lap done for his pneumoperitoneum thereafter. No definitive defect was seen but he had 3L of ascites drained, release of adhesions, and a sigmoidectomy. His abdomen was left open and a 2nd ex-lap was done afterwards for bloody PHYLLIS drainage after the 1st ex-lap. Throughout the course, patient has been dependant on levophed    PMH/PSx: CAD s/p 3 stents (2012, 2009, 2000) still on ASA/Plavix, ETOH abuse, b/l inguinal hernias s/p laparoscopic robotic-assisted repair of bilateral inguinal hernias on 3/11/2019 w/ drainage of 4.5 L of ascites,   Allergies: NKDA  Meds: see med rec  FH: patient denies FH of cancers or IBD  SH: patient reports significant alcohol use in youth but was unable to quantify the number of daily drinks. Patient denies smoking history or drug use (05 Apr 2019 19:49)      VITAL SIGNS:  ICU Vital Signs Last 24 Hrs  T(C): 36.2 (12 Apr 2019 05:48), Max: 37.5 (11 Apr 2019 13:53)  T(F): 97.2 (12 Apr 2019 05:48), Max: 99.5 (11 Apr 2019 13:53)  HR: 82 (12 Apr 2019 09:00) (82 - 110)  BP: 105/60 (12 Apr 2019 09:00) (64/47 - 114/59)  BP(mean): 74 (12 Apr 2019 09:00) (57 - 86)  ABP: 82/76 (12 Apr 2019 09:00) (76/70 - 108/80)  ABP(mean): 78 (12 Apr 2019 09:00) (64 - 94)  RR: 16 (12 Apr 2019 09:00) (15 - 25)  SpO2: 97% (12 Apr 2019 09:00) (94% - 98%)    Mode: AC/ CMV (Assist Control/ Continuous Mandatory Ventilation), RR (machine): 12, TV (machine): 500, FiO2: 40, PEEP: 5, ITime: 1, MAP: 15, PIP: 22    04-11 @ 07:01 - 04-12 @ 07:00  --------------------------------------------------------  IN: 3700.4 mL / OUT: 3500 mL / NET: 200.4 mL    04-12 @ 07:01  - 04-12 @ 10:06  --------------------------------------------------------  IN: 595.6 mL / OUT: 100 mL / NET: 495.6 mL      CAPILLARY BLOOD GLUCOSE      POCT Blood Glucose.: 143 mg/dL (12 Apr 2019 06:42)      PHYSICAL EXAM:  Constitutional: resting comfortably in bed, NAD  HEENT: NC/AT; PERRL, anicteric sclera; no oropharyngeal erythema or exudates; MMM  Neck: supple, no JVD  Respiratory: CTA B/L, no W/R/R; respirations appear non-labored, speaking full sentences  Cardiovascular: +S1/S2, RRR  Gastrointestinal: abdomen soft, NT/ND; +BS x4  Extremities: WWP; no clubbing, cyanosis or edema  Vascular: 2+ radial, DP/PT and femoral pulses B/L      MEDICATIONS:  MEDICATIONS  (STANDING):  cefepime   IVPB 2000 milliGRAM(s) IV Intermittent every 12 hours  chlorhexidine 2% Cloths 1 Application(s) Topical <User Schedule>  dextrose 5%. 1000 milliLiter(s) (50 mL/Hr) IV Continuous <Continuous>  dextrose 50% Injectable 12.5 Gram(s) IV Push once  dextrose 50% Injectable 25 Gram(s) IV Push once  dextrose 50% Injectable 25 Gram(s) IV Push once  fat emulsion (Plant Based) 20% Infusion 0.7 Gm/kG/Day (20.83 mL/Hr) IV Continuous <Continuous>  fentaNYL   Infusion. 0.5 MICROgram(s)/kG/Hr (3.665 mL/Hr) IV Continuous <Continuous>  furosemide   Injectable 20 milliGRAM(s) IV Push once  heparin  flush 100 Units/mL Injectable 100 Unit(s) IV Push every other day  insulin lispro (HumaLOG) corrective regimen sliding scale   SubCutaneous every 6 hours  metroNIDAZOLE  IVPB 500 milliGRAM(s) IV Intermittent every 8 hours  metroNIDAZOLE  IVPB      norepinephrine Infusion 0.05 MICROgram(s)/kG/Min (3.436 mL/Hr) IV Continuous <Continuous>  nystatin Powder 1 Application(s) Topical two times a day  pantoprazole  Injectable 40 milliGRAM(s) IV Push daily  Parenteral Nutrition - Adult 1 Each (83 mL/Hr) TPN Continuous <Continuous>  propofol Infusion 5 MICROgram(s)/kG/Min (2.199 mL/Hr) IV Continuous <Continuous>  vancomycin  IVPB 1000 milliGRAM(s) IV Intermittent every 12 hours    MEDICATIONS  (PRN):  dextrose 40% Gel 15 Gram(s) Oral once PRN Blood Glucose LESS THAN 70 milliGRAM(s)/deciliter  glucagon  Injectable 1 milliGRAM(s) IntraMuscular once PRN Glucose LESS THAN 70 milligrams/deciliter  ondansetron Injectable 4 milliGRAM(s) IV Push every 6 hours PRN Nausea      ALLERGIES:  Allergies    No Known Allergies    Intolerances        LABS:                        8.6    12.23 )-----------( 127      ( 12 Apr 2019 05:44 )             26.5     04-12    141  |  102  |  25<H>  ----------------------------<  110<H>  4.1   |  32<H>  |  0.43<L>    Ca    6.9<L>      12 Apr 2019 05:44  Phos  3.4     04-12  Mg     2.1     04-12    TPro  3.9<L>  /  Alb  1.6<L>  /  TBili  1.2  /  DBili  x   /  AST  28  /  ALT  19  /  AlkPhos  54  04-12    PT/INR - ( 12 Apr 2019 05:44 )   PT: 26.7 sec;   INR: 2.30          PTT - ( 12 Apr 2019 05:44 )  PTT:38.3 sec      RADIOLOGY & ADDITIONAL TESTS: 87 yo M initially presenting on 4/5 for right groin discomfort in the setting of a recent bilateral inguinal hernia repair (3/11/19). CT abd/pelvis was done initially which showed a large pneumoperitoneum, abdominal abscess, fluid collection in the right groin, multiple DVT's, and a left lung abscess. We are consulted for the latter most issue. He is currently intubated and sedated, thus unable to give history and ROS unobtainable. Per the primary team and documentation, the patient initially had an IR guided drainage of the LLQ abscess followed by an IVC filter placement. Cultures from then grew K. Pneumoniae, E. coli, Proteus, Strep Milleri, Strep viridans, and B. fragilis - for which he is still on abx for. He had an ex-lap done for his pneumoperitoneum thereafter. No definitive defect was seen but he had 3L of ascites drained, release of adhesions, and a sigmoidectomy. His abdomen was left open and a 2nd ex-lap was done afterwards to close the wound. He still has significant leukocytosis with fluctuating pressor requirements. Though he has not been febrile, he still has the large lung mass and we are consulted on whether there needs to be an intervention for it, for definitive sepsis source control.        PAST MEDICAL & SURGICAL HISTORY:  Esophageal reflux  Acute diarrhea  HLD (hyperlipidemia)  HTN (hypertension)  Prostate cancer  Stented coronary artery: x3 , per pt.  Atherosclerosis of coronary artery: CAD (coronary artery disease)  S/P hernia repair  Surgery, elective: cardiac stent x3  History of prostate surgery     FAMILY HISTORY:  No pertinent family history in first degree relatives    VITAL SIGNS:  ICU Vital Signs Last 24 Hrs  T(C): 36.2 (12 Apr 2019 05:48), Max: 37.5 (11 Apr 2019 13:53)  T(F): 97.2 (12 Apr 2019 05:48), Max: 99.5 (11 Apr 2019 13:53)  HR: 82 (12 Apr 2019 09:00) (82 - 110)  BP: 105/60 (12 Apr 2019 09:00) (64/47 - 114/59)  BP(mean): 74 (12 Apr 2019 09:00) (57 - 86)  ABP: 82/76 (12 Apr 2019 09:00) (76/70 - 108/80)  ABP(mean): 78 (12 Apr 2019 09:00) (64 - 94)  RR: 16 (12 Apr 2019 09:00) (15 - 25)  SpO2: 97% (12 Apr 2019 09:00) (94% - 98%)    Mode: AC/ CMV (Assist Control/ Continuous Mandatory Ventilation), RR (machine): 12, TV (machine): 500, FiO2: 40, PEEP: 5, ITime: 1, MAP: 15, PIP: 22    04-11 @ 07:01  - 04-12 @ 07:00  --------------------------------------------------------  IN: 3700.4 mL / OUT: 3500 mL / NET: 200.4 mL    04-12 @ 07:01  - 04-12 @ 10:06  --------------------------------------------------------  IN: 595.6 mL / OUT: 100 mL / NET: 495.6 mL      CAPILLARY BLOOD GLUCOSE      POCT Blood Glucose.: 143 mg/dL (12 Apr 2019 06:42)      PHYSICAL EXAM:  Constitutional: Intubated, sedated  HEENT: dry mucus membranes  Neck: supple, no JVD  Respiratory: ronchorous bilaterally, mechanically ventilated  Cardiovascular: +S1/S2, RRR  Gastrointestinal: abdomen soft but patient grimaces to pain with palpation. 2 PHYLLIS drains and wound vac in place   Extremities: 2+ pitting edema in all extremities      MEDICATIONS:  MEDICATIONS  (STANDING):  cefepime   IVPB 2000 milliGRAM(s) IV Intermittent every 12 hours  chlorhexidine 2% Cloths 1 Application(s) Topical <User Schedule>  dextrose 5%. 1000 milliLiter(s) (50 mL/Hr) IV Continuous <Continuous>  dextrose 50% Injectable 12.5 Gram(s) IV Push once  dextrose 50% Injectable 25 Gram(s) IV Push once  dextrose 50% Injectable 25 Gram(s) IV Push once  fat emulsion (Plant Based) 20% Infusion 0.7 Gm/kG/Day (20.83 mL/Hr) IV Continuous <Continuous>  fentaNYL   Infusion. 0.5 MICROgram(s)/kG/Hr (3.665 mL/Hr) IV Continuous <Continuous>  furosemide   Injectable 20 milliGRAM(s) IV Push once  heparin  flush 100 Units/mL Injectable 100 Unit(s) IV Push every other day  insulin lispro (HumaLOG) corrective regimen sliding scale   SubCutaneous every 6 hours  metroNIDAZOLE  IVPB 500 milliGRAM(s) IV Intermittent every 8 hours  metroNIDAZOLE  IVPB      norepinephrine Infusion 0.05 MICROgram(s)/kG/Min (3.436 mL/Hr) IV Continuous <Continuous>  nystatin Powder 1 Application(s) Topical two times a day  pantoprazole  Injectable 40 milliGRAM(s) IV Push daily  Parenteral Nutrition - Adult 1 Each (83 mL/Hr) TPN Continuous <Continuous>  propofol Infusion 5 MICROgram(s)/kG/Min (2.199 mL/Hr) IV Continuous <Continuous>  vancomycin  IVPB 1000 milliGRAM(s) IV Intermittent every 12 hours    MEDICATIONS  (PRN):  dextrose 40% Gel 15 Gram(s) Oral once PRN Blood Glucose LESS THAN 70 milliGRAM(s)/deciliter  glucagon  Injectable 1 milliGRAM(s) IntraMuscular once PRN Glucose LESS THAN 70 milligrams/deciliter  ondansetron Injectable 4 milliGRAM(s) IV Push every 6 hours PRN Nausea      ALLERGIES:  Allergies    No Known Allergies    Intolerances        LABS:                        8.6    12.23 )-----------( 127      ( 12 Apr 2019 05:44 )             26.5     04-12    141  |  102  |  25<H>  ----------------------------<  110<H>  4.1   |  32<H>  |  0.43<L>    Ca    6.9<L>      12 Apr 2019 05:44  Phos  3.4     04-12  Mg     2.1     04-12    TPro  3.9<L>  /  Alb  1.6<L>  /  TBili  1.2  /  DBili  x   /  AST  28  /  ALT  19  /  AlkPhos  54  04-12    PT/INR - ( 12 Apr 2019 05:44 )   PT: 26.7 sec;   INR: 2.30          PTT - ( 12 Apr 2019 05:44 )  PTT:38.3 sec      RADIOLOGY & ADDITIONAL TESTS:   Xray reviewed - bilateral dense opacities and pleural effusion unchanged from prior studies.  CT scan from admission reviewed - large lung abscess in the left; bilateral effusions

## 2019-04-12 NOTE — PROGRESS NOTE ADULT - SUBJECTIVE AND OBJECTIVE BOX
Overnight, pt got another dose of lasix 20 mg iv. TWI noted, cards consulted. Possible type II demand ischemia.     SUBJECTIVE: Pt intubated/sedated this AM, unable to gather RoS.      Vital Signs Last 24 Hrs  T(C): 36.2 (12 Apr 2019 05:48), Max: 37.5 (11 Apr 2019 13:53)  T(F): 97.2 (12 Apr 2019 05:48), Max: 99.5 (11 Apr 2019 13:53)  HR: 86 (12 Apr 2019 08:00) (84 - 110)  BP: 99/61 (12 Apr 2019 08:00) (64/47 - 117/75)  BP(mean): 73 (12 Apr 2019 08:00) (57 - 88)  RR: 16 (12 Apr 2019 08:00) (15 - 25)  SpO2: 96% (12 Apr 2019 08:00) (94% - 98%)    Physical Exam:  General: NAD, sedated on prop/fent  HEENT: right neck soft, dressing c/d/i  Pulmonary: intubated on AC, decreased breath sounds at bases  Cardiovascular: NSR  Abdominal: OGT to LIWS. soft, ND, minimally tender at midline wound, no guarding/rigidity/rebound tenderness. Wound vac functioning. Paracolic gutter drain serosang, LUQ/LLQ drains yellow serous.  : Abbott  Extremities: WWP, 2+ pitting edema in all extremities. scrotal edema.  Neuro: opens eyes to commands, responds to pain  Lines: MARÍA PICC, R rad a line, PIVs      I&O's Summary    11 Apr 2019 07:01  -  12 Apr 2019 07:00  --------------------------------------------------------  IN: 3700.4 mL / OUT: 3500 mL / NET: 200.4 mL    12 Apr 2019 07:01  -  12 Apr 2019 09:20  --------------------------------------------------------  IN: 129.1 mL / OUT: 40 mL / NET: 89.1 mL        LABS:                        8.6    12.23 )-----------( 127      ( 12 Apr 2019 05:44 )             26.5     04-12    141  |  102  |  25<H>  ----------------------------<  110<H>  4.1   |  32<H>  |  0.43<L>    Ca    6.9<L>      12 Apr 2019 05:44  Phos  3.4     04-12  Mg     2.1     04-12    TPro  3.9<L>  /  Alb  1.6<L>  /  TBili  1.2  /  DBili  x   /  AST  28  /  ALT  19  /  AlkPhos  54  04-12    PT/INR - ( 12 Apr 2019 05:44 )   PT: 26.7 sec;   INR: 2.30          PTT - ( 12 Apr 2019 05:44 )  PTT:38.3 sec    CAPILLARY BLOOD GLUCOSE      POCT Blood Glucose.: 143 mg/dL (12 Apr 2019 06:42)  POCT Blood Glucose.: 162 mg/dL (11 Apr 2019 22:22)  POCT Blood Glucose.: 151 mg/dL (11 Apr 2019 10:44)    LIVER FUNCTIONS - ( 12 Apr 2019 05:44 )  Alb: 1.6 g/dL / Pro: 3.9 g/dL / ALK PHOS: 54 U/L / ALT: 19 U/L / AST: 28 U/L / GGT: x             RADIOLOGY & ADDITIONAL STUDIES: CXR today shows persistent pulm edema with extensive bilateral infiltrates and bilateral pleural effusions.

## 2019-04-12 NOTE — PROGRESS NOTE ADULT - ASSESSMENT
85 yo M with history of b/l laparoscopic robotic-assisted inguinal hernia repair presenting with b/l segmental pulmonary embolisms, left lung abscess, pneumoperitoneum, and distal descending colon abscess (likely source of pneumoperitoneum), significant iliofemoral DVT burden, s/p IVCF placement and IR drainage of LLQ abdominal collection (4/5), s/p ex-lap, LAURYN, sigmoidectomy, drain placement in R. paracolic gutter, and abthera vac placement (4/7), s/p planned RTOR, packing removal, and end colostomy creation (4/10), w/ new T-wave inversion on EKG (4/12) and concern for demand ischemia.    --- Cardiology following. Low suspicion for ACS at this time, but high risk for demand ischemia given patient is off home DAPT and not anticoagulated in setting of recent intraoperative bleeding. Repeat EKG and troponins in AM.     Plan:  Neuro: sedated - propofol/fentanyl  CV: cardiology following for new t-wave inversion of anteroinferior leads, cardiology following; cont to hold home asa/plavix for now; levo for SBP>100; statin; remains fluid overloaded w/ significant anasarca and bilateral effusions; IV lasix PRN  Pulm: intubated, on vent-AC, bilateral effusions and MRSA PNA; bilateral PE's; CT following for empyema, no acute intervention needed at this time  GI: NPO, OGT to LIWS; malnourished, prealbumin 4; TPN w/ lipids daily  : rao; strict I'Os, UO improved  ID: +kleb, proteus, strep in abd Cx, flagyl (4/9-), cefepime (4/9-); Sputum cx: MRSA, vancomycin (4/10 --) [Dc'd//ceftriaxone (4/9-4/9)// unasyn (4/8-4/9),  Vanco (4/5-4/7), Zosyn (4/5-4/8)]  Endo: ISS; insulin w/ TPN;  Heme: bilateral iliofemoral DVT / bilateral PE; s/p IVC Filter; heparin gtt dc'd in setting of active bleed  PPx: SCDs; hold heparin gtt  Lines: PIVs, PICC, R A line  Wounds/drains: LLQ IR drain (4/5 -- ), LUQ PHYLLIS (4/7 -- ), R paracolic gutter drain (4/7 --) --- all drains to LIWS; wound vac in abdominal incision SQ space (4/10 --)  PT/OT: Not ordered

## 2019-04-12 NOTE — CONSULT NOTE ADULT - ASSESSMENT
86M w/PMHx CAD s/p mult PCI (last 2012; KIRSTY mLAD, dRCA, pOM2), HTN, HLD, recent b/l lap RA b/l inguinal hernia repair (3/11) initially sent in from [facility] for R inguinal hernia discomfort. Found to have 6x4cm R groin fluid collection, 10x5cm abscess in LLQ w/assoc pneumoperitoneum, L lung empyema, b/l segmental PE, and DVT in b/l CF, L common iliac and internal iliac veins. Underwent exlap w/drainage of 3L ascites, LAURYN, SB resxn w/primary anastomosis, sigmoidectomy (left in discontinuity), abd packing, and drain/VAC placement on 4/7, followed by end-colostomy creation and fascial closure on 4/10. S/p IVCF on 4/5, initially on hep gtt for DVT/PE, however d/c on 4/7 due to intra-op bleeding and not restarted. Home DAPT held since admission. ID following for mutl-org induced septic shock. CTSx following for LL empyema. Noted incidentally to have EKG changes, cards c/s for further recs.    - EKG shows new interval diffuse TWI, most pronounced in inf and precordial leads (old Q-waves in ant leads). No STD or FIORDALIZA  - Trop 0.03, similar to levels during recent hosp for inguinal hernia repair. Unable to fully assess as pt sedated, but unlikely ACS.  - However, given critical illness and h/o CAD s/p mult PCI w/DAPT held since admission, pt at elev risk for demand ischemia and ISR/MI  - A/C and DAPT currently held 2/2 intraop bleeding  - As soon as acceptable from surgical standpoint would restart A/C and at least single antiplt agent (ASA81)  - Repeat EKG in AM. No need to trend CE for now. If major change in clinical status (i.e. worsening hemodynamics, worsening hypoxic resp failure, sig ectopy) can recheck  - When able to jacinta PO, restart high intensity lipitor  - Keep Hgb>8  - Diffuse anasarca 2/2 critical illness/hypoalbuminemia, however also w/b/l PLEF on CXR (satting 93% on 40% FiO2). Net +7L for hosp course  - Agree w/cautious use of diuretics in setting of septic shock, however would start standing lasix 20 IVP BID to help optimize resp status    Will d/w attending in AM. Cardiology c/s service to continue to follow.    --  Jovany Palomino MD  Cardiology PGY-4 86M w/PMHx CAD s/p mult PCI (last 2012; KIRSTY mLAD, dRCA, pOM2), HTN, HLD, recent b/l lap RA b/l inguinal hernia repair (3/11) initially sent in from [facility] for R inguinal hernia discomfort. Found to have 6x4cm R groin fluid collection, 10x5cm abscess in LLQ w/assoc pneumoperitoneum, L lung empyema, b/l segmental PE, and DVT in b/l CF, L common iliac and internal iliac veins. Underwent exlap w/drainage of 3L ascites, LAURYN, SB resxn w/primary anastomosis, sigmoidectomy (left in discontinuity), abd packing, and drain/VAC placement on 4/7, followed by end-colostomy creation and fascial closure on 4/10. S/p IVCF on 4/5, initially on hep gtt for DVT/PE, however d/c on 4/7 due to intra-op bleeding and not restarted. Home DAPT held since admission. ID following for mult-org induced septic shock. CTSx following for LL empyema. Noted incidentally to have EKG changes, cards c/s for further recs.    - EKG shows new interval diffuse TWI, most pronounced in inf and precordial leads (old Q-waves in ant leads). No STD or FIORDALIZA  - Trop 0.03, similar to levels during recent hosp for inguinal hernia repair. Unable to fully assess as pt sedated, but unlikely ACS.  - However, given critical illness and h/o CAD s/p mult PCI w/DAPT held since admission, pt at elev risk for demand ischemia and ISR/MI  - A/C and DAPT currently held 2/2 intraop bleeding  - As soon as acceptable from surgical standpoint would restart A/C and at least single antiplt agent (ASA81)  - Repeat EKG in AM. No need to trend CE for now. If major change in clinical status (i.e. worsening hemodynamics, worsening hypoxic resp failure, sig ectopy) can recheck  - When able to jacinta PO, restart high intensity lipitor  - Keep Hgb>8  - Diffuse anasarca 2/2 critical illness/hypoalbuminemia, however also w/b/l PLEF on CXR (satting 93% on 40% FiO2). Net +7L for hosp course  - Agree w/cautious use of diuretics in setting of septic shock, however would start standing lasix 20 IVP BID to help optimize resp status    Will d/w attending in AM. Cardiology c/s service to continue to follow.    --  Jovany Palomino MD  Cardiology PGY-4

## 2019-04-12 NOTE — CONSULT NOTE ADULT - ASSESSMENT
87 yo M initially presenting for right groin discomfort in the setting of a recent bilateral inguinal hernia repair (3/11/19). CT abd/pelvis was done initially which showed a large pneumoperitoneum, abdominal abscess, fluid collection in the right groin, multiple DVT's, and a left lung abscess 87 yo M initially presenting for right groin discomfort in the setting of a recent bilateral inguinal hernia repair (3/11/19). CT abd/pelvis showed a large pneumoperitoneum, abdominal abscess, and fluid collection in the right groin that was surgically managed. We are consulted for a left lung abscess that was found on this initial CT scan

## 2019-04-12 NOTE — CONSULT NOTE ADULT - PROBLEM SELECTOR RECOMMENDATION 9
Patient has a large left-sided pulmonary abscess. Based on the well-demarcated area with sharp angles between the pleura, this is likely a pulmonary abscess rather than an empyema. Though his oxygenation has held steady, he still has significant leukocytosis with fluctuating levophed requirements. This can also be 2/2 his abdominal process but a chest tube to drain the abscess may be reasonable in his case for better source control of his sepsis. However, in conversation with the sister and brother-in-law at bedside Patient has a large left-sided pulmonary abscess. Based on the well-demarcated area with sharp angles between the pleura, this is likely a pulmonary abscess rather than an empyema. Though his oxygenation has held steady, he still has significant leukocytosis with fluctuating levophed requirements. This can also be 2/2 his abdominal process but a chest tube to drain the abscess may be reasonable in his case for better source control of his sepsis. However, drainage with chest tube may lead to complications (BPF, shock, etc.). In conversation with the sister and brother-in-law at bedside as well as the primary team, decided that it be better to establish goals of care before intervening on her abscess. In the event that we proceed with the intervention, will need to normalize her coagulation panel beforehand.

## 2019-04-12 NOTE — PROGRESS NOTE ADULT - ASSESSMENT
87yo M w/ hx of CAD s/p 3 stents, HLD, BPH, hx of ETOH abuse, dementia, b/l inguinal hernias s/p laparoscopic robotic-assisted repair of bilateral inguinal hernias on 3/11/2019, ascites of unclear etiology. He presented from his rehabilitation facility for FTT and R inguinal discomfort. CT in ED with bilateral pulmonary embolism, Left lung abscess, pneumoperitoneum, intraabdominal ascites, LLQ abscess, right inguinal fluid collection. Patient then went to IR for LLQ abscess was drained and an IVC filter. Abdominal culture from 4/5/19 showing Klebsiella pneumoniae, Escherichia coli, Proteus vulgaris group, Streptococcus milleri, viridans group & Bacteroides fragilis. Patient then underwent exploratory laparotomy on 4/7/2019 - with 3L of clear ascites suctioned, portion of small bowel adherent to sigmoid colon and left pelvic side wall with fibrinous exudate and LAURYN performed, sigmoidectomy, placement of right paracolic gutter and left drain in the pelvis, colon left in discontinuity and abdominal vac placed. SICU course complicated by likely septic shock, chest x-ray significant for bilateral infiltrates. Patient was on ID consulted for antibiotic recommendations.     - c/w cefepime 2g every 12 hours & metronidazole 500mg every 8 hours w/ last day of treatment on 4/20/19  - c/w vancomycin 1250mhg q12 for MRSA in sputum & lung abscess w/ last day of treatment 5/4/19, please check vancomycin troughs appropriately and adjusted with goal 12-18   - Pulmonary following for LLL abscess - possibly will put in chest tube, f/u pulm rec's    - f/u GOC   - if patient is discharged on IV antibiotics please get weekly CBC, CMP, ESR, CRP and appropriate vancomycin troughs and faxed to Dr. Brett Zamudio office - [phone] and set-up outpatient ID follow-up: [phone] (should select option 2) after 2-3 weeks of discharge       ID will sign off. Reconsult for questions. 87yo M w/ hx of CAD s/p 3 stents, HLD, BPH, hx of ETOH abuse, dementia, b/l inguinal hernias s/p laparoscopic robotic-assisted repair of bilateral inguinal hernias on 3/11/2019, ascites of unclear etiology. He presented from his rehabilitation facility for FTT and R inguinal discomfort. CT in ED with bilateral pulmonary embolism, Left lung abscess, pneumoperitoneum, intraabdominal ascites, LLQ abscess, right inguinal fluid collection. Patient then went to IR for LLQ abscess was drained and an IVC filter. Abdominal culture from 4/5/19 showing Klebsiella pneumoniae, Escherichia coli, Proteus vulgaris group, Streptococcus milleri, viridans group & Bacteroides fragilis. Patient then underwent exploratory laparotomy on 4/7/2019 - with 3L of clear ascites suctioned, portion of small bowel adherent to sigmoid colon and left pelvic side wall with fibrinous exudate and LAURYN performed, sigmoidectomy, placement of right paracolic gutter and left drain in the pelvis, colon left in discontinuity and abdominal vac placed. SICU course complicated by likely septic shock, chest x-ray significant for bilateral infiltrates. Patient was on ID consulted for antibiotic recommendations.     - c/w cefepime 2g every 12 hours & metronidazole 500mg every 8 hours w/ last day of treatment on 4/20/19  - c/w vancomycin 1250mg q12 for MRSA in sputum & lung abscess w/ last day of treatment 5/4/19, please check vancomycin troughs appropriately and adjusted with goal 12-18   - Pulmonary following for LLL abscess - possibly will put in chest tube, f/u pulm rec's    - f/u GOC   - if patient is discharged on IV antibiotics please get weekly CBC, CMP, ESR, CRP and appropriate vancomycin troughs and faxed to Dr. Brett Zamudio office - [phone] and set-up outpatient ID follow-up: [phone] (should select option 2) after 2-3 weeks of discharge       ID will sign off. Reconsult for questions.

## 2019-04-12 NOTE — PROGRESS NOTE ADULT - ASSESSMENT
85 yo M with recent bilateral robotic inguinal hernia repair on 3/11, now with perforated diverticulitis s/p ex lap, sigmoidectomy, and drain placement with open abdomen on 4/7, RTOR on 4/10 for end colostomy and closure of fascia.     Plan:  - Neuro: remains sedated on prop/fent gtts. Daily sedation holiday.  - CV: levophed for MAP > 65, wean as tolerated. F/u cards recs for type 2 demand ischemia with h/o coronary stents: daily EKG, trend trops which are down from 0.03 to 0.02, restart ASA 81 once cleared by Dr. Valencia, start lipitor 80 once dobhoff in later today, keep Hb > 8. Lasix 20 iv bid per cards, but be careful with hemodynamics given current septic shock.   - Resp: IR empyema drainage today per thoracic surgery (Dr. Reaves) recs. Daily CXR. CPAP as tolerated.  - GI/FEN: If lower OGT output today, may consider switch OGT to Dobhoff in PM for trickle enteral feeds/meds. On TPN.   - /renal: Abbott, strict I/Os while diuresing  - ID: Vanc, cefepime, flagyl, f/u ID recs (MRSA in sputum, kleb/proteus/strep/bacteroides in abd ctx). Blood ctx neg.  - Heme: INR 2.3, goal of 2, but Let INR drift down on its own, no Vit K today. Will eventually need full therapeutic anticoag for DVT/PE.  - Lines: Continue PICC and a line.  - Wounds/drains: Monitor 3 drains (LLQ, LUQ, paracolic gutter) and wound vac output  - PT: continue working in bed with PT  - Dispo: SICU  - plan discussed with SICU team and with Dr. Valencia

## 2019-04-13 LAB
ANION GAP SERPL CALC-SCNC: 6 MMOL/L — SIGNIFICANT CHANGE UP (ref 5–17)
APTT BLD: 33.3 SEC — SIGNIFICANT CHANGE UP (ref 27.5–36.3)
BUN SERPL-MCNC: 33 MG/DL — HIGH (ref 7–23)
CALCIUM SERPL-MCNC: 7 MG/DL — LOW (ref 8.4–10.5)
CHLORIDE SERPL-SCNC: 101 MMOL/L — SIGNIFICANT CHANGE UP (ref 96–108)
CO2 SERPL-SCNC: 31 MMOL/L — SIGNIFICANT CHANGE UP (ref 22–31)
CREAT SERPL-MCNC: 0.47 MG/DL — LOW (ref 0.5–1.3)
GLUCOSE BLDC GLUCOMTR-MCNC: 142 MG/DL — HIGH (ref 70–99)
GLUCOSE BLDC GLUCOMTR-MCNC: 149 MG/DL — HIGH (ref 70–99)
GLUCOSE BLDC GLUCOMTR-MCNC: 152 MG/DL — HIGH (ref 70–99)
GLUCOSE BLDC GLUCOMTR-MCNC: 161 MG/DL — HIGH (ref 70–99)
GLUCOSE SERPL-MCNC: 125 MG/DL — HIGH (ref 70–99)
HCT VFR BLD CALC: 24.7 % — LOW (ref 39–50)
HGB BLD-MCNC: 8 G/DL — LOW (ref 13–17)
INR BLD: 2.39 — HIGH (ref 0.88–1.16)
MAGNESIUM SERPL-MCNC: 2.2 MG/DL — SIGNIFICANT CHANGE UP (ref 1.6–2.6)
MCHC RBC-ENTMCNC: 30.9 PG — SIGNIFICANT CHANGE UP (ref 27–34)
MCHC RBC-ENTMCNC: 32.4 GM/DL — SIGNIFICANT CHANGE UP (ref 32–36)
MCV RBC AUTO: 95.4 FL — SIGNIFICANT CHANGE UP (ref 80–100)
NRBC # BLD: 0 /100 WBCS — SIGNIFICANT CHANGE UP (ref 0–0)
PHOSPHATE SERPL-MCNC: 3.9 MG/DL — SIGNIFICANT CHANGE UP (ref 2.5–4.5)
PLATELET # BLD AUTO: 113 K/UL — LOW (ref 150–400)
POTASSIUM SERPL-MCNC: 3.9 MMOL/L — SIGNIFICANT CHANGE UP (ref 3.5–5.3)
POTASSIUM SERPL-SCNC: 3.9 MMOL/L — SIGNIFICANT CHANGE UP (ref 3.5–5.3)
PROTHROM AB SERPL-ACNC: 27.7 SEC — HIGH (ref 10–12.9)
RBC # BLD: 2.59 M/UL — LOW (ref 4.2–5.8)
RBC # FLD: 15.9 % — HIGH (ref 10.3–14.5)
SODIUM SERPL-SCNC: 138 MMOL/L — SIGNIFICANT CHANGE UP (ref 135–145)
WBC # BLD: 10.38 K/UL — SIGNIFICANT CHANGE UP (ref 3.8–10.5)
WBC # FLD AUTO: 10.38 K/UL — SIGNIFICANT CHANGE UP (ref 3.8–10.5)

## 2019-04-13 PROCEDURE — 71250 CT THORAX DX C-: CPT | Mod: 26

## 2019-04-13 PROCEDURE — 71045 X-RAY EXAM CHEST 1 VIEW: CPT | Mod: 26

## 2019-04-13 PROCEDURE — 99233 SBSQ HOSP IP/OBS HIGH 50: CPT | Mod: GC

## 2019-04-13 RX ORDER — FUROSEMIDE 40 MG
40 TABLET ORAL ONCE
Qty: 0 | Refills: 0 | Status: COMPLETED | OUTPATIENT
Start: 2019-04-13 | End: 2019-04-13

## 2019-04-13 RX ORDER — POTASSIUM CHLORIDE 20 MEQ
20 PACKET (EA) ORAL ONCE
Qty: 0 | Refills: 0 | Status: COMPLETED | OUTPATIENT
Start: 2019-04-13 | End: 2019-04-13

## 2019-04-13 RX ORDER — ELECTROLYTE SOLUTION,INJ
1 VIAL (ML) INTRAVENOUS
Qty: 0 | Refills: 0 | Status: DISCONTINUED | OUTPATIENT
Start: 2019-04-13 | End: 2019-04-13

## 2019-04-13 RX ORDER — ELECTROLYTE SOLUTION,INJ
1 VIAL (ML) INTRAVENOUS
Qty: 0 | Refills: 0 | Status: DISCONTINUED | OUTPATIENT
Start: 2019-04-13 | End: 2019-04-14

## 2019-04-13 RX ORDER — POTASSIUM CHLORIDE 20 MEQ
40 PACKET (EA) ORAL ONCE
Qty: 0 | Refills: 0 | Status: COMPLETED | OUTPATIENT
Start: 2019-04-13 | End: 2019-04-13

## 2019-04-13 RX ORDER — I.V. FAT EMULSION 20 G/100ML
0.7 EMULSION INTRAVENOUS
Qty: 50 | Refills: 0 | Status: DISCONTINUED | OUTPATIENT
Start: 2019-04-13 | End: 2019-04-13

## 2019-04-13 RX ORDER — FUROSEMIDE 40 MG
60 TABLET ORAL ONCE
Qty: 0 | Refills: 0 | Status: COMPLETED | OUTPATIENT
Start: 2019-04-13 | End: 2019-04-13

## 2019-04-13 RX ADMIN — Medication 100 MILLIGRAM(S): at 09:50

## 2019-04-13 RX ADMIN — NYSTATIN CREAM 1 APPLICATION(S): 100000 CREAM TOPICAL at 17:15

## 2019-04-13 RX ADMIN — Medication 1 EACH: at 17:29

## 2019-04-13 RX ADMIN — NYSTATIN CREAM 1 APPLICATION(S): 100000 CREAM TOPICAL at 07:10

## 2019-04-13 RX ADMIN — Medication 166.67 MILLIGRAM(S): at 17:13

## 2019-04-13 RX ADMIN — FENTANYL CITRATE 3.67 MICROGRAM(S)/KG/HR: 50 INJECTION INTRAVENOUS at 03:39

## 2019-04-13 RX ADMIN — Medication 100 MILLIGRAM(S): at 02:00

## 2019-04-13 RX ADMIN — Medication 20 MILLIEQUIVALENT(S): at 09:51

## 2019-04-13 RX ADMIN — Medication 40 MILLIGRAM(S): at 07:48

## 2019-04-13 RX ADMIN — Medication 100 UNIT(S): at 11:18

## 2019-04-13 RX ADMIN — Medication 40 MILLIEQUIVALENT(S): at 12:39

## 2019-04-13 RX ADMIN — CHLORHEXIDINE GLUCONATE 1 APPLICATION(S): 213 SOLUTION TOPICAL at 07:05

## 2019-04-13 RX ADMIN — Medication 2: at 11:18

## 2019-04-13 RX ADMIN — CEFEPIME 100 MILLIGRAM(S): 1 INJECTION, POWDER, FOR SOLUTION INTRAMUSCULAR; INTRAVENOUS at 05:32

## 2019-04-13 RX ADMIN — CEFEPIME 100 MILLIGRAM(S): 1 INJECTION, POWDER, FOR SOLUTION INTRAMUSCULAR; INTRAVENOUS at 17:13

## 2019-04-13 RX ADMIN — Medication 2: at 17:29

## 2019-04-13 RX ADMIN — Medication 166.67 MILLIGRAM(S): at 07:09

## 2019-04-13 RX ADMIN — Medication 100 MILLIGRAM(S): at 17:12

## 2019-04-13 RX ADMIN — PROPOFOL 2.2 MICROGRAM(S)/KG/MIN: 10 INJECTION, EMULSION INTRAVENOUS at 12:39

## 2019-04-13 RX ADMIN — PANTOPRAZOLE SODIUM 40 MILLIGRAM(S): 20 TABLET, DELAYED RELEASE ORAL at 11:18

## 2019-04-13 RX ADMIN — Medication 60 MILLIGRAM(S): at 09:50

## 2019-04-13 RX ADMIN — Medication 1000 MILLIGRAM(S): at 09:43

## 2019-04-13 NOTE — PROGRESS NOTE ADULT - SUBJECTIVE AND OBJECTIVE BOX
SUBJECTIVE:     cefepime   IVPB 2000 milliGRAM(s) IV Intermittent every 12 hours  heparin  flush 100 Units/mL Injectable 100 Unit(s) IV Push every other day  metroNIDAZOLE  IVPB 500 milliGRAM(s) IV Intermittent every 8 hours  metroNIDAZOLE  IVPB      norepinephrine Infusion 0.05 MICROgram(s)/kG/Min IV Continuous <Continuous>  vancomycin  IVPB 1250 milliGRAM(s) IV Intermittent every 12 hours      Vital Signs Last 24 Hrs  T(C): 38 (13 Apr 2019 09:14), Max: 38.3 (12 Apr 2019 18:00)  T(F): 100.4 (13 Apr 2019 09:14), Max: 101 (12 Apr 2019 18:00)  HR: 100 (13 Apr 2019 11:00) (51 - 100)  BP: 99/66 (13 Apr 2019 11:00) (85/51 - 119/74)  BP(mean): 77 (13 Apr 2019 11:00) (66 - 88)  RR: 19 (13 Apr 2019 11:00) (13 - 19)  SpO2: 100% (13 Apr 2019 11:00) (90% - 100%)  I&O's Detail    12 Apr 2019 07:01  -  13 Apr 2019 07:00  --------------------------------------------------------  IN:    fat emulsion (Plant Based) 20% Infusion: 208 mL    fat emulsion (Plant Based) 20% Infusion: 62.4 mL    fentaNYL Infusion.: 42 mL    fentaNYL Infusion.: 126 mL    Glucerna 1.5: 100 mL    IV PiggyBack: 300 mL    norepinephrine Infusion: 370 mL    propofol Infusion: 96 mL    Solution: 500 mL    Solution: 100 mL    Solution: 150 mL    TPN (Total Parenteral Nutrition): 1969.3 mL  Total IN: 4023.7 mL    OUT:    Bulb: 175 mL    Bulb: 100 mL    Bulb: 175 mL    Indwelling Catheter - Urethral: 2035 mL    Nasoenteral Tube: 10 mL    VAC (Vacuum Assisted Closure) System: 20 mL  Total OUT: 2515 mL    Total NET: 1508.7 mL      13 Apr 2019 07:01  -  13 Apr 2019 11:48  --------------------------------------------------------  IN:    Enteral Tube Flush: 50 mL    fat emulsion (Plant Based) 20% Infusion: 41.6 mL    fentaNYL Infusion.: 24 mL    Glucerna 1.5: 40 mL    IV PiggyBack: 100 mL    norepinephrine Infusion: 48.6 mL    propofol Infusion: 4 mL    TPN (Total Parenteral Nutrition): 264 mL  Total IN: 572.2 mL    OUT:    Indwelling Catheter - Urethral: 1075 mL    VAC (Vacuum Assisted Closure) System: 5 mL  Total OUT: 1080 mL    Total NET: -507.8 mL          General: NAD, resting comfortably in bed  C/V: NSR  Pulm: Nonlabored breathing, no respiratory distress  Abd: nondistended, soft, nontender.  Extrem: WWP, no edema, SCDs in place    LABS:                        8.0    10.38 )-----------( 113      ( 13 Apr 2019 05:50 )             24.7     04-13    138  |  101  |  33<H>  ----------------------------<  125<H>  3.9   |  31  |  0.47<L>    Ca    7.0<L>      13 Apr 2019 05:50  Phos  3.9     04-13  Mg     2.2     04-13    TPro  3.9<L>  /  Alb  1.6<L>  /  TBili  1.2  /  DBili  x   /  AST  28  /  ALT  19  /  AlkPhos  54  04-12    PT/INR - ( 13 Apr 2019 05:50 )   PT: 27.7 sec;   INR: 2.39          PTT - ( 13 Apr 2019 05:50 )  PTT:33.3 sec INTERVAL HPI/OVERNIGHT EVENTS:    PRESSORS: [x] YES [ ] NO    Pressor requirements downtrending. On CPAP trial. CT of chest today to check for progression of empyema. Given IV Lasix 60 mg this AM.      MEDICATIONS  (STANDING):  cefepime   IVPB 2000 milliGRAM(s) IV Intermittent every 12 hours  chlorhexidine 2% Cloths 1 Application(s) Topical <User Schedule>  dextrose 5%. 1000 milliLiter(s) (50 mL/Hr) IV Continuous <Continuous>  dextrose 50% Injectable 12.5 Gram(s) IV Push once  dextrose 50% Injectable 25 Gram(s) IV Push once  dextrose 50% Injectable 25 Gram(s) IV Push once  fentaNYL   Infusion. 0.5 MICROgram(s)/kG/Hr (3.665 mL/Hr) IV Continuous <Continuous>  heparin  flush 100 Units/mL Injectable 100 Unit(s) IV Push every other day  insulin lispro (HumaLOG) corrective regimen sliding scale   SubCutaneous every 6 hours  metroNIDAZOLE  IVPB 500 milliGRAM(s) IV Intermittent every 8 hours  metroNIDAZOLE  IVPB      norepinephrine Infusion 0.05 MICROgram(s)/kG/Min (3.436 mL/Hr) IV Continuous <Continuous>  nystatin Powder 1 Application(s) Topical two times a day  pantoprazole  Injectable 40 milliGRAM(s) IV Push daily  Parenteral Nutrition - Adult 1 Each (81 mL/Hr) TPN Continuous <Continuous>  propofol Infusion 5 MICROgram(s)/kG/Min (2.199 mL/Hr) IV Continuous <Continuous>  vancomycin  IVPB 1250 milliGRAM(s) IV Intermittent every 12 hours    MEDICATIONS  (PRN):  dextrose 40% Gel 15 Gram(s) Oral once PRN Blood Glucose LESS THAN 70 milliGRAM(s)/deciliter  glucagon  Injectable 1 milliGRAM(s) IntraMuscular once PRN Glucose LESS THAN 70 milligrams/deciliter  ondansetron Injectable 4 milliGRAM(s) IV Push every 6 hours PRN Nausea      Drug Dosing Weight  Height (cm): 180 (07 Apr 2019 11:26)  Weight (kg): 73.3 (07 Apr 2019 11:26)  BMI (kg/m2): 22.6 (07 Apr 2019 11:26)  BSA (m2): 1.92 (07 Apr 2019 11:26)      PAST MEDICAL & SURGICAL HISTORY:  Esophageal reflux  Acute diarrhea  HLD (hyperlipidemia)  HTN (hypertension)  Prostate cancer  Stented coronary artery: x3 , per pt.  Atherosclerosis of coronary artery: CAD (coronary artery disease)  S/P hernia repair  Surgery, elective: cardiac stent x3  History of prostate surgery      ICU Vital Signs Last 24 Hrs  T(C): 38 (13 Apr 2019 09:14), Max: 38.3 (12 Apr 2019 18:00)  T(F): 100.4 (13 Apr 2019 09:14), Max: 101 (12 Apr 2019 18:00)  HR: 100 (13 Apr 2019 11:00) (51 - 100)  BP: 99/66 (13 Apr 2019 11:00) (85/51 - 119/74)  BP(mean): 77 (13 Apr 2019 11:00) (66 - 88)  ABP: 114/48 (13 Apr 2019 11:00) (46/44 - 136/54)  ABP(mean): 72 (13 Apr 2019 11:00) (32 - 82)  RR: 19 (13 Apr 2019 11:00) (13 - 19)  SpO2: 100% (13 Apr 2019 11:00) (90% - 100%)            12 Apr 2019 07:01  -  13 Apr 2019 07:00  --------------------------------------------------------  IN:    fat emulsion (Plant Based) 20% Infusion: 208 mL    fat emulsion (Plant Based) 20% Infusion: 62.4 mL    fentaNYL Infusion.: 42 mL    fentaNYL Infusion.: 126 mL    Glucerna 1.5: 100 mL    IV PiggyBack: 300 mL    norepinephrine Infusion: 370 mL    propofol Infusion: 96 mL    Solution: 500 mL    Solution: 100 mL    Solution: 150 mL    TPN (Total Parenteral Nutrition): 1969.3 mL  Total IN: 4023.7 mL    OUT:    Bulb: 175 mL    Bulb: 100 mL    Bulb: 175 mL    Indwelling Catheter - Urethral: 2035 mL    Nasoenteral Tube: 10 mL    VAC (Vacuum Assisted Closure) System: 20 mL  Total OUT: 2515 mL    Total NET: 1508.7 mL      13 Apr 2019 07:01  -  13 Apr 2019 11:53  --------------------------------------------------------  IN:    Enteral Tube Flush: 50 mL    fat emulsion (Plant Based) 20% Infusion: 41.6 mL    fentaNYL Infusion.: 24 mL    Glucerna 1.5: 40 mL    IV PiggyBack: 100 mL    norepinephrine Infusion: 48.6 mL    propofol Infusion: 4 mL    TPN (Total Parenteral Nutrition): 264 mL  Total IN: 572.2 mL    OUT:    Indwelling Catheter - Urethral: 1075 mL    VAC (Vacuum Assisted Closure) System: 5 mL  Total OUT: 1080 mL    Total NET: -507.8 mL          Mode: AC/ CMV (Assist Control/ Continuous Mandatory Ventilation)  RR (machine): 12  TV (machine): 500  FiO2: 40  PEEP: 5  ITime: 1  MAP: 9.9  PIP: 21      Physical Exam:  Gen: NAD, on CPAP this AM  CV: RRR  Pulm: No respiratory distress  Abd: Soft, nondistended +ostomy with output. LUQ and LLQ draining serous likely ascitic fluid.   Ext: 2+ LE pitting edema. 1+ UE pitting edema        LABS:  CBC Full  -  ( 13 Apr 2019 05:50 )  WBC Count : 10.38 K/uL  RBC Count : 2.59 M/uL  Hemoglobin : 8.0 g/dL  Hematocrit : 24.7 %  Platelet Count - Automated : 113 K/uL  Mean Cell Volume : 95.4 fl  Mean Cell Hemoglobin : 30.9 pg  Mean Cell Hemoglobin Concentration : 32.4 gm/dL  Auto Neutrophil # : x  Auto Lymphocyte # : x  Auto Monocyte # : x  Auto Eosinophil # : x  Auto Basophil # : x  Auto Neutrophil % : x  Auto Lymphocyte % : x  Auto Monocyte % : x  Auto Eosinophil % : x  Auto Basophil % : x    04-13    138  |  101  |  33<H>  ----------------------------<  125<H>  3.9   |  31  |  0.47<L>    Ca    7.0<L>      13 Apr 2019 05:50  Phos  3.9     04-13  Mg     2.2     04-13    TPro  3.9<L>  /  Alb  1.6<L>  /  TBili  1.2  /  DBili  x   /  AST  28  /  ALT  19  /  AlkPhos  54  04-12    PT/INR - ( 13 Apr 2019 05:50 )   PT: 27.7 sec;   INR: 2.39          PTT - ( 13 Apr 2019 05:50 )  PTT:33.3 sec

## 2019-04-13 NOTE — PROGRESS NOTE ADULT - SUBJECTIVE AND OBJECTIVE BOX
STATUS POST:    4/7: Exploratory laparotomy, lysis of adhesions, SBR (serosal tear) w/ primary anastomosis, sigmoidectomy, abdominal packing and placement of ABThera VAC; EBL 1L, given 6pRBC, 2FFP  4/10: Planned re-exploration; ex lap, abdominal packing removed, end colostomy w/ descending colon, fascial closure w/ vac placement --- (Findings: less bleeding, SB anastomosis intact) - EBL: 50, IVF: 600; 1prbc, uop: 125      SUBJECTIVE: Intubated and sedated. Does not respond to questions.    cefepime   IVPB 2000 milliGRAM(s) IV Intermittent every 12 hours  heparin  flush 100 Units/mL Injectable 100 Unit(s) IV Push every other day  metroNIDAZOLE  IVPB 500 milliGRAM(s) IV Intermittent every 8 hours  metroNIDAZOLE  IVPB      norepinephrine Infusion 0.05 MICROgram(s)/kG/Min IV Continuous <Continuous>  vancomycin  IVPB 1250 milliGRAM(s) IV Intermittent every 12 hours      Vital Signs Last 24 Hrs  T(C): 38 (13 Apr 2019 09:14), Max: 38.3 (12 Apr 2019 18:00)  T(F): 100.4 (13 Apr 2019 09:14), Max: 101 (12 Apr 2019 18:00)  HR: 100 (13 Apr 2019 11:00) (51 - 100)  BP: 99/66 (13 Apr 2019 11:00) (85/51 - 119/74)  BP(mean): 77 (13 Apr 2019 11:00) (66 - 88)  RR: 19 (13 Apr 2019 11:00) (13 - 19)  SpO2: 100% (13 Apr 2019 11:00) (90% - 100%)  I&O's Detail    12 Apr 2019 07:01  -  13 Apr 2019 07:00  --------------------------------------------------------  IN:    fat emulsion (Plant Based) 20% Infusion: 208 mL    fat emulsion (Plant Based) 20% Infusion: 62.4 mL    fentaNYL Infusion.: 42 mL    fentaNYL Infusion.: 126 mL    Glucerna 1.5: 100 mL    IV PiggyBack: 300 mL    norepinephrine Infusion: 370 mL    propofol Infusion: 96 mL    Solution: 500 mL    Solution: 100 mL    Solution: 150 mL    TPN (Total Parenteral Nutrition): 1969.3 mL  Total IN: 4023.7 mL    OUT:    Bulb: 175 mL    Bulb: 100 mL    Bulb: 175 mL    Indwelling Catheter - Urethral: 2035 mL    Nasoenteral Tube: 10 mL    VAC (Vacuum Assisted Closure) System: 20 mL  Total OUT: 2515 mL    Total NET: 1508.7 mL      13 Apr 2019 07:01  -  13 Apr 2019 11:52  --------------------------------------------------------  IN:    Enteral Tube Flush: 50 mL    fat emulsion (Plant Based) 20% Infusion: 41.6 mL    fentaNYL Infusion.: 24 mL    Glucerna 1.5: 40 mL    IV PiggyBack: 100 mL    norepinephrine Infusion: 48.6 mL    propofol Infusion: 4 mL    TPN (Total Parenteral Nutrition): 264 mL  Total IN: 572.2 mL    OUT:    Indwelling Catheter - Urethral: 1075 mL    VAC (Vacuum Assisted Closure) System: 5 mL  Total OUT: 1080 mL    Total NET: -507.8 mL          General: NAD, resting comfortably in bed, ventilated and sedated  HEENT: MMM, intubated on VC-AC  C/V: NSR on monitor  Pulm: Nonlabored breathing, no respiratory distress  Abd: soft, ostomy serosanguinous drainage, PHYLLIS x 3 serous, midline vac in place without leak, nondistended, nontender  : Abbott with light straw colored urine  Extrem: diffusely edematous to the mid thigh and distal forearms        LABS:                        8.0    10.38 )-----------( 113      ( 13 Apr 2019 05:50 )             24.7     04-13    138  |  101  |  33<H>  ----------------------------<  125<H>  3.9   |  31  |  0.47<L>    Ca    7.0<L>      13 Apr 2019 05:50  Phos  3.9     04-13  Mg     2.2     04-13    TPro  3.9<L>  /  Alb  1.6<L>  /  TBili  1.2  /  DBili  x   /  AST  28  /  ALT  19  /  AlkPhos  54  04-12    PT/INR - ( 13 Apr 2019 05:50 )   PT: 27.7 sec;   INR: 2.39          PTT - ( 13 Apr 2019 05:50 )  PTT:33.3 sec      RADIOLOGY & ADDITIONAL STUDIES: pending CT chest

## 2019-04-13 NOTE — PROGRESS NOTE ADULT - ASSESSMENT
87 yo M with history of b/l laparoscopic robotic-assisted inguinal hernia repair presenting with b/l segmental pulmonary embolisms, left lung abscess, pneumoperitoneum, and distal descending colon abscess (likely source of pneumoperitoneum), significant iliofemoral DVT burden, s/p IVCF placement and IR drainage of LLQ abdominal collection (4/5), s/p ex-lap, LAURYN, sigmoidectomy, drain placement in R. paracolic gutter, and abthera vac placement (4/7), now s/p planned RTOR, packing removal, and end colostomy creation (4/10).    Plan:  Neuro: propofol/fentanyl  CV: levo for MAP>65; statin; remains fluid overloaded w/ significant anasarca and bilateral effusions; IV lasix PRN  Pulm: 40/470/12/5, intubated since 4/7 on vent-AC, bilateral PE's; CT following for empyema, no acute intervention needed at this time  GI: NPO, dobhoff glucerna 1.5@10/hr; malnourished, prealbumin 4; TPN w/ lipids daily;   : rao; strict I'Os,   ID: +kleb, proteus, strep in abd Cx, flagyl (4/9-4/20), cefepime (4/9-4/20); Sputum cx: MRSA, vancomycin (4/10-5/4) [Dc'd//ceftriaxone (4/9-4/9)// unasyn (4/8-4/9),  Vanco (4/5-4/7), Zosyn (4/5-4/8)]  Endo: ISS; insulin w/ TPN;  Heme: bilateral iliofemoral DVT / bilateral PE; s/p IVC Filter; heparin gtt dc'd in setting of active bleed  PPx: SCDs; hold heparin gtt  Lines: PIVs, PICC (4/6--), L A line (4/13--) /// D/C: R cordis (4/7-4/11)   Wounds/drains: LLQ IR drain (4/5 -- ), LUQ PHYLLIS (4/7 -- ), R paracolic gutter drain (4/7 --) --- all drains to LIWS; wound vac in abdominal incision SQ space (4/10 --)  PT/OT: early mob 4/11 - worked with PT.

## 2019-04-13 NOTE — PROGRESS NOTE ADULT - PROVIDER SPECIALTY LIST ADULT
[facility] [address] Veronica Smith M.D.  [facility], [address] [place] [address]  [phone]          COLONOSCOPY DISCHARGE INSTRUCTIONS    Suzie Ordaz  [id number]  1949    DISCOMFORT:  Redness at IV site- apply warm compress to area; if redness or soreness persist- contact your physician  There may be a slight amount of blood passed from the rectum  Gaseous discomfort- walking, belching will help relieve any discomfort  You may not operate a vehicle for 12 hours  You may not engage in an occupation involving machinery or appliances for the  rest of today  You may not drink alcoholic beverages for at least 12 hours  Avoid making any critical decisions for at least 24 hours    DIET:   You may resume your normal diet, but some patients find that heavy or large  meals may lead to indigestion or vomiting. I suggest a light meal as first food  intake. I recommend a whole food, plant-based diet for your overall health. ACTIVITY:  You may resume your normal daily activities. It is recommended that you spend the remainder of the day resting - avoid any strenuous activity. CALL M.D. IF ANY SIGN OF:   Increasing pain, nausea, vomiting  Abdominal distension (swelling)  Significant bleeding (oral or rectal)  Fever   Pain in chest area  Shortness of breath    Additional Instructions:   Call Dr. Veronica Smith if any questions or problems at [phone]   You should receive the biopsy results by phone or mail within 3 weeks, if not, call  my office for the results      Should have a repeat colonoscopy in 5 years. Colonoscopy showed one small polyp removed and L sided diverticulosis. [facility] Activation    Thank you for requesting access to [facility]. Please follow the instructions below to securely access and download your online medical record. [facility] allows you to send messages to your doctor, view your test results, renew your prescriptions, schedule appointments, and more.     How Do I Sign Surgery Up?    1. In your internet browser, go to [email or web address]  2. Click on the First Time User? Click Here link in the Sign In box. You will be redirect to the New Member Sign Up page. 3. Enter your [employer] Access Code exactly as it appears below. You will not need to use this code after youve completed the sign-up process. If you do not sign up before the expiration date, you must request a new code. MyChart Access Code: Activation code not generated  Current [employer] Status: Active (This is the date your [employer] access code will )    4. Enter the last four digits of your Social Security Number (xxxx) and Date of Birth (mm/dd/yyyy) as indicated and click Submit. You will be taken to the next sign-up page. 5. Create a [employer] ID. This will be your [employer] login ID and cannot be changed, so think of one that is secure and easy to remember. 6. Create a [employer] password. You can change your password at any time. 7. Enter your Password Reset Question and Answer. This can be used at a later time if you forget your password. 8. Enter your e-mail address. You will receive e-mail notification when new information is available in [address] 9. Click Sign Up. You can now view and download portions of your medical record. 10. Click the Download Summary menu link to download a portable copy of your medical information. Additional Information    If you have questions, please visit the Frequently Asked Questions section of the [employer] website at [email or web address] Remember, [employer] is NOT to be used for urgent needs. For medical emergencies, dial 911. SICU

## 2019-04-13 NOTE — PROGRESS NOTE ADULT - ATTENDING COMMENTS
Patient seen and examined with house-staff during bedside rounds.  Resident note read, including vitals, physical findings, laboratory data, and radiological reports.   Revisions included below.  Direct personal management at bed side and extensive interpretation of the data.  Plan was outlined and discussed in details with the housestaff.  Decision making of high complexity  Action taken for acute disease activity to reflect the level of care provided:  - medication reconciliation  - review laboratory data  I reviewed the CT scan of the chest. A CT scan revealed loculated hydropneumothorax. I would repeat the CT scan to evaluate the rest of the pleural space prior to drainage of the hydropneumothorax. Patient consistent with empyema related to the abdominal abscess. The patient is on antibiotic.B CT scan of the chest revealed right upper lobe and right lower lobe infiltrate. The right upper lobe there is no formal groundglass opacity but the right lower lobe was consulted.

## 2019-04-14 LAB
APPEARANCE UR: CLEAR — SIGNIFICANT CHANGE UP
APTT BLD: 30.2 SEC — SIGNIFICANT CHANGE UP (ref 27.5–36.3)
APTT BLD: 30.4 SEC — SIGNIFICANT CHANGE UP (ref 27.5–36.3)
BILIRUB UR-MCNC: NEGATIVE — SIGNIFICANT CHANGE UP
COLOR SPEC: YELLOW — SIGNIFICANT CHANGE UP
DIFF PNL FLD: ABNORMAL
GLUCOSE BLDC GLUCOMTR-MCNC: 126 MG/DL — HIGH (ref 70–99)
GLUCOSE BLDC GLUCOMTR-MCNC: 130 MG/DL — HIGH (ref 70–99)
GLUCOSE BLDC GLUCOMTR-MCNC: 147 MG/DL — HIGH (ref 70–99)
GLUCOSE BLDC GLUCOMTR-MCNC: 154 MG/DL — HIGH (ref 70–99)
GLUCOSE BLDC GLUCOMTR-MCNC: 165 MG/DL — HIGH (ref 70–99)
GLUCOSE UR QL: NEGATIVE — SIGNIFICANT CHANGE UP
INR BLD: 1.77 — HIGH (ref 0.88–1.16)
INR BLD: 2.07 — HIGH (ref 0.88–1.16)
KETONES UR-MCNC: NEGATIVE — SIGNIFICANT CHANGE UP
LACTATE SERPL-SCNC: 1.4 MMOL/L — SIGNIFICANT CHANGE UP (ref 0.5–2)
LEUKOCYTE ESTERASE UR-ACNC: NEGATIVE — SIGNIFICANT CHANGE UP
NITRITE UR-MCNC: NEGATIVE — SIGNIFICANT CHANGE UP
PH UR: 5.5 — SIGNIFICANT CHANGE UP (ref 5–8)
PROT UR-MCNC: 30 MG/DL
PROTHROM AB SERPL-ACNC: 20.4 SEC — HIGH (ref 10–12.9)
PROTHROM AB SERPL-ACNC: 23.9 SEC — HIGH (ref 10–12.9)
SP GR SPEC: >=1.03 — SIGNIFICANT CHANGE UP (ref 1–1.03)
UROBILINOGEN FLD QL: 0.2 E.U./DL — SIGNIFICANT CHANGE UP

## 2019-04-14 PROCEDURE — 99233 SBSQ HOSP IP/OBS HIGH 50: CPT | Mod: GC

## 2019-04-14 PROCEDURE — 71045 X-RAY EXAM CHEST 1 VIEW: CPT | Mod: 26

## 2019-04-14 RX ORDER — FUROSEMIDE 40 MG
20 TABLET ORAL ONCE
Qty: 0 | Refills: 0 | Status: COMPLETED | OUTPATIENT
Start: 2019-04-14 | End: 2019-04-14

## 2019-04-14 RX ORDER — ALBUMIN HUMAN 25 %
250 VIAL (ML) INTRAVENOUS ONCE
Qty: 0 | Refills: 0 | Status: COMPLETED | OUTPATIENT
Start: 2019-04-14 | End: 2019-04-14

## 2019-04-14 RX ORDER — ACETAMINOPHEN 500 MG
1000 TABLET ORAL ONCE
Qty: 0 | Refills: 0 | Status: COMPLETED | OUTPATIENT
Start: 2019-04-14 | End: 2019-04-14

## 2019-04-14 RX ORDER — POTASSIUM CHLORIDE 20 MEQ
40 PACKET (EA) ORAL ONCE
Qty: 0 | Refills: 0 | Status: COMPLETED | OUTPATIENT
Start: 2019-04-14 | End: 2019-04-14

## 2019-04-14 RX ORDER — PHYTONADIONE (VIT K1) 5 MG
10 TABLET ORAL ONCE
Qty: 0 | Refills: 0 | Status: COMPLETED | OUTPATIENT
Start: 2019-04-14 | End: 2019-04-14

## 2019-04-14 RX ADMIN — NYSTATIN CREAM 1 APPLICATION(S): 100000 CREAM TOPICAL at 18:29

## 2019-04-14 RX ADMIN — Medication 100 MILLIGRAM(S): at 17:01

## 2019-04-14 RX ADMIN — Medication 166.67 MILLIGRAM(S): at 07:48

## 2019-04-14 RX ADMIN — CEFEPIME 100 MILLIGRAM(S): 1 INJECTION, POWDER, FOR SOLUTION INTRAMUSCULAR; INTRAVENOUS at 18:26

## 2019-04-14 RX ADMIN — Medication 40 MILLIEQUIVALENT(S): at 01:33

## 2019-04-14 RX ADMIN — CHLORHEXIDINE GLUCONATE 1 APPLICATION(S): 213 SOLUTION TOPICAL at 06:33

## 2019-04-14 RX ADMIN — Medication 1000 MILLIGRAM(S): at 10:26

## 2019-04-14 RX ADMIN — Medication 3.44 MICROGRAM(S)/KG/MIN: at 18:00

## 2019-04-14 RX ADMIN — Medication 3.44 MICROGRAM(S)/KG/MIN: at 18:26

## 2019-04-14 RX ADMIN — Medication 2: at 16:43

## 2019-04-14 RX ADMIN — Medication 20 MILLIGRAM(S): at 01:33

## 2019-04-14 RX ADMIN — Medication 100 MILLIGRAM(S): at 09:30

## 2019-04-14 RX ADMIN — FENTANYL CITRATE 3.67 MICROGRAM(S)/KG/HR: 50 INJECTION INTRAVENOUS at 09:32

## 2019-04-14 RX ADMIN — Medication 100 MILLIGRAM(S): at 02:00

## 2019-04-14 RX ADMIN — PANTOPRAZOLE SODIUM 40 MILLIGRAM(S): 20 TABLET, DELAYED RELEASE ORAL at 12:02

## 2019-04-14 RX ADMIN — Medication 125 MILLILITER(S): at 12:02

## 2019-04-14 RX ADMIN — Medication 400 MILLIGRAM(S): at 09:17

## 2019-04-14 RX ADMIN — Medication 102 MILLIGRAM(S): at 09:17

## 2019-04-14 RX ADMIN — Medication 166.67 MILLIGRAM(S): at 18:26

## 2019-04-14 RX ADMIN — CEFEPIME 100 MILLIGRAM(S): 1 INJECTION, POWDER, FOR SOLUTION INTRAMUSCULAR; INTRAVENOUS at 06:40

## 2019-04-14 RX ADMIN — NYSTATIN CREAM 1 APPLICATION(S): 100000 CREAM TOPICAL at 06:33

## 2019-04-14 RX ADMIN — Medication 3.44 MICROGRAM(S)/KG/MIN: at 10:26

## 2019-04-14 NOTE — PROGRESS NOTE ADULT - ATTENDING COMMENTS
Patient seen and examined with house-staff during bedside rounds.  Resident note read, including vitals, physical findings, laboratory data, and radiological reports.   Revisions included below.  Direct personal management at bed side and extensive interpretation of the data.  Plan was outlined and discussed in details with the housestaff.  Decision making of high complexity  Action taken for acute disease activity to reflect the level of care provided:  - medication reconciliation  - review laboratory data  follow on INR   transfused FFP and given vit K  will require CT drainage of the fluid collection  continue antibiotics  BP is stable

## 2019-04-14 NOTE — PROGRESS NOTE ADULT - SUBJECTIVE AND OBJECTIVE BOX
INTERVAL HPI/OVERNIGHT EVENTS:  Pt seen and examined at bedside this morning in the SICU. No acute events overnight however this morning developed a SIRS vs Septic response with tachycardia to the 120s, tachypnea to the 20s, and febrile with a rectal temp to 102.7.      MEDICATIONS  (STANDING):  cefepime   IVPB 2000 milliGRAM(s) IV Intermittent every 12 hours  chlorhexidine 2% Cloths 1 Application(s) Topical <User Schedule>  dextrose 5%. 1000 milliLiter(s) (50 mL/Hr) IV Continuous <Continuous>  dextrose 50% Injectable 12.5 Gram(s) IV Push once  dextrose 50% Injectable 25 Gram(s) IV Push once  dextrose 50% Injectable 25 Gram(s) IV Push once  fentaNYL   Infusion. 0.5 MICROgram(s)/kG/Hr (3.665 mL/Hr) IV Continuous <Continuous>  heparin  flush 100 Units/mL Injectable 100 Unit(s) IV Push every other day  insulin lispro (HumaLOG) corrective regimen sliding scale   SubCutaneous every 6 hours  metroNIDAZOLE  IVPB 500 milliGRAM(s) IV Intermittent every 8 hours  metroNIDAZOLE  IVPB      norepinephrine Infusion 0.05 MICROgram(s)/kG/Min (3.436 mL/Hr) IV Continuous <Continuous>  nystatin Powder 1 Application(s) Topical two times a day  pantoprazole  Injectable 40 milliGRAM(s) IV Push daily  propofol Infusion 5 MICROgram(s)/kG/Min (2.199 mL/Hr) IV Continuous <Continuous>  vancomycin  IVPB 1250 milliGRAM(s) IV Intermittent every 12 hours    MEDICATIONS  (PRN):  dextrose 40% Gel 15 Gram(s) Oral once PRN Blood Glucose LESS THAN 70 milliGRAM(s)/deciliter  glucagon  Injectable 1 milliGRAM(s) IntraMuscular once PRN Glucose LESS THAN 70 milligrams/deciliter  ondansetron Injectable 4 milliGRAM(s) IV Push every 6 hours PRN Nausea      Drug Dosing Weight  Height (cm): 180 (07 Apr 2019 11:26)  Weight (kg): 73.3 (07 Apr 2019 11:26)  BMI (kg/m2): 22.6 (07 Apr 2019 11:26)  BSA (m2): 1.92 (07 Apr 2019 11:26)      PAST MEDICAL & SURGICAL HISTORY:  Esophageal reflux  Acute diarrhea  HLD (hyperlipidemia)  HTN (hypertension)  Prostate cancer  Stented coronary artery: x3 , per pt.  Atherosclerosis of coronary artery: CAD (coronary artery disease)  S/P hernia repair  Surgery, elective: cardiac stent x3  History of prostate surgery        ICU Vital Signs Last 24 Hrs  T(C): 39.3 (14 Apr 2019 09:00), Max: 39.3 (14 Apr 2019 09:00)  T(F): 102.7 (14 Apr 2019 09:00), Max: 102.7 (14 Apr 2019 09:00)  HR: 118 (14 Apr 2019 09:00) (78 - 124)  BP: 91/50 (14 Apr 2019 09:00) (83/55 - 119/74)  BP(mean): 59 (14 Apr 2019 09:00) (59 - 88)  ABP: 94/28 (14 Apr 2019 09:00) (94/28 - 136/54)  ABP(mean): 50 (14 Apr 2019 09:00) (50 - 84)  RR: 25 (14 Apr 2019 09:00) (14 - 26)  SpO2: 91% (14 Apr 2019 09:00) (91% - 100%)          I&O's Detail    13 Apr 2019 07:01  -  14 Apr 2019 07:00  --------------------------------------------------------  IN:    Enteral Tube Flush: 100 mL    fat emulsion (Plant Based) 20% Infusion: 41.6 mL    fat emulsion (Plant Based) 20% Infusion: 168 mL    fentaNYL Infusion.: 119 mL    Glucerna 1.5: 650 mL    IV PiggyBack: 200 mL    norepinephrine Infusion: 348.8 mL    propofol Infusion: 64 mL    Solution: 500 mL    Solution: 50 mL    TPN (Total Parenteral Nutrition): 1860 mL  Total IN: 4101.4 mL    OUT:    Bulb: 400 mL    Bulb: 50 mL    Bulb: 100 mL    Colostomy: 400 mL    Indwelling Catheter - Urethral: 3640 mL    VAC (Vacuum Assisted Closure) System: 35 mL  Total OUT: 4625 mL    Total NET: -523.6 mL      14 Apr 2019 07:01  -  14 Apr 2019 09:28  --------------------------------------------------------  IN:    fentaNYL Infusion.: 25 mL    Glucerna 1.5: 100 mL    norepinephrine Infusion: 23.7 mL    Plasma: 300 mL    Solution: 200 mL    TPN (Total Parenteral Nutrition): 150 mL  Total IN: 798.7 mL    OUT:    Colostomy: 300 mL    Indwelling Catheter - Urethral: 180 mL  Total OUT: 480 mL    Total NET: 318.7 mL          Mode: AC/ CMV (Assist Control/ Continuous Mandatory Ventilation)  RR (machine): 12  TV (machine): 500  FiO2: 40  PEEP: 5  ITime: 1  MAP: 10.2  PIP: 20      Physical exam:  Gen: Age appropriate male in NAD  CV: tachycardic  Pulm: tachypneic with b/l breath sounds  Abd: Soft, nondistended +ostomy with output. LUQ and LLQ draining serous likely ascitic fluid. Midline NPWT dressing in place.  Ext: 2+ LE pitting edema. 1+ UE pitting edema    LABS:  CBC Full  -  ( 13 Apr 2019 05:50 )  WBC Count : 10.38 K/uL  RBC Count : 2.59 M/uL  Hemoglobin : 8.0 g/dL  Hematocrit : 24.7 %  Platelet Count - Automated : 113 K/uL  Mean Cell Volume : 95.4 fl  Mean Cell Hemoglobin : 30.9 pg  Mean Cell Hemoglobin Concentration : 32.4 gm/dL      04-13    138  |  101  |  33<H>  ----------------------------<  125<H>  3.9   |  31  |  0.47<L>    Ca    7.0<L>      13 Apr 2019 05:50  Phos  3.9     04-13  Mg     2.2     04-13      PT/INR - ( 13 Apr 2019 05:50 )   PT: 27.7 sec;   INR: 2.39          PTT - ( 13 Apr 2019 05:50 )  PTT:33.3 sec      RADIOLOGY & ADDITIONAL STUDIES:  CXR from this morning overall is improved from yesterday post diuresis. The right diaphragm is not opacified however the left sided pleural effusion remains substantial.

## 2019-04-14 NOTE — PROGRESS NOTE ADULT - ASSESSMENT
87 yo M with history of b/l laparoscopic robotic-assisted inguinal hernia repair presenting with b/l segmental pulmonary embolisms,  significant iliofemoral DVT burden s/p IVCF placement and perforated sigmoid colon  s/p ex-lap, LAURYN, sigmoidectomy, drain placement in R. paracolic gutter, and abthera vac placement (4/7), now s/p planned RTOR, packing removal, and end colostomy creation (4/10).  Concern for acute sepsis this morning, plan for bedside pigtail drainage of left lung collection for source control.    Plan:  Neuro: propofol/fentanyl  CV: levo for MAP>65; statin; remains fluid overloaded w/ significant anasarca and bilateral effusions; IV lasix PRN  Pulm: 40/470/12/5, intubated since 4/7 on vent-AC, bilateral PE's; plan for bedside pigtail catheter by pulmonology today  GI: NPO, dobhoff glucerna 1.5@50/hr; malnourished, prealbumin 4; weaning TPN as pt is at goal for feeds.  : rao; strict I'Os, BUN/Cr rising, responded to lasix well yesterday however remains anasarcic, will consider further diuresis.  ID: +kleb, proteus, strep in abd Cx, flagyl (4/9-4/20), cefepime (4/9-4/20); Sputum cx: MRSA, vancomycin (4/10-5/4) [Dc'd//ceftriaxone (4/9-4/9)// unasyn (4/8-4/9),  Vanco (4/5-4/7), Zosyn (4/5-4/8)]  Endo: ISS; insulin w/ TPN;  Heme: bilateral iliofemoral DVT / bilateral PE; s/p IVC Filter; heparin gtt dc'd in setting of active bleed  PPx: SCDs; hold heparin gtt  Lines: PIVs, PICC (4/6--), L A line (4/13--) /// D/C: R cordis (4/7-4/11)   Wounds/drains: LLQ IR drain (4/5 -- ), LUQ PHYLLIS (4/7 -- ), R paracolic gutter drain (4/7 --) --- all drains to LIWS; wound vac in abdominal incision SQ space (4/10 --)  PT/OT: early mob 4/11 - worked with PT.

## 2019-04-14 NOTE — CHART NOTE - NSCHARTNOTEFT_GEN_A_CORE
MTB PRE-BRONCHOSCOPY RISK ASSESSMENT  ------------------------------------------------------------    Procedure Date: 4/13/19    Provider Name: Melissa Stern    Reason for Bronchoscopy: Acute hypoxemic respiratory failure    Location of Procedure (check one):   [  ] Endoscopy  [  ] Emergency Department  [ X ] Intensive Care Unit  [  ] Operating Room  [  ] Other: _________    RISK ASSESSMENT  I. Patient symptoms (check all that apply): >/ 3 of these = SIGNIFICANT RISK for TB  [  ] Coughing > 2 to 3 weeks                 [  ] Unexplained fever >/ 2 weeks   [  ] Unusual weakness or fatigue  [  ] Unexplained weight loss > 10lbs.  [  ] Hemoptysis                                   [  ] Unusual or night sweating  [X ] NON-APPLICABLE    II. TB history (Check all that apply): >/ 1 of these = SIGNIFICANT RISK for TB  [  ] Sputum smear/culture (+) for acid fast bacilli (AFB)                           [  ] Abnormal CXR or CT suggestive of TB; date(s) & description __________  [  ] Positive TB skin or blood test; date: __________                               [  ] On medication for latent TB or disease; list medication(s) ____________  [  ] TB diagnosed in the past; year treated: _______                                [  ] Inadequately treated TB  [  ] Current close contact of a person known or suspected to have TB  [ X ] NON-APPLICABLE    III. Additional Risk factors for TB (Check all that apply): Consider these in relation to symptoms and history  [  ] Person has conditions placing them at higher risk for TB disease (i.e. immunosuppressive therapy)  [  ] Person has lived in a country for 3 months or more where TB is common  [  ] Person lives in a high risk environment for TB (i.e. long term care, health care worker, incarcerated, homeless)  [  ] Person injects illicit drugs  [  ] Person is HIV positive or at high risk for HIV    ****************************************************************  BASED ON THE TB RISK ASSESSMENT ABOVE, THE PATIENT'S RISK FOR TB IS:                       [ X ] LOW RISK FOR TB            [  ] SIGNIFICANT RISK FOR TB  ****************************************************************    IV. Based on the Determined Risk for TB, the following Action(s) are Recommended:  [X ] Low risk for TB infection --> Proceed with the diagnostic procedure    [  ] Significant risk for TB infection:  1. Perform the procedure in a negative pressure room, with appropriate personal protective equipment (PPE) for healthcare personnel (i.e. N95 respirator)    2. If it is not feasible to move the patient or defer the procedure:    a. Use a single-bedded room in a low traffic area to perform the bronchoscopy procedure    b. Place a portable high-efficiency particulate air (HEPA) filter in the space prior to starting the procedure and keep the door closed. Refer to Infection Control policy titled "Tuberculosis Control Strategy Plan" for additional information.    c. All healthcare personnel in the procedure room shall wear and N95 disposible respirator.    3. Documentation of the tuberculosis risk assessment is to be included in the patient's medical record.

## 2019-04-15 ENCOUNTER — RESULT REVIEW (OUTPATIENT)
Age: 84
End: 2019-04-15

## 2019-04-15 LAB
ALBUMIN FLD-MCNC: <0.2 G/DL — SIGNIFICANT CHANGE UP
ALBUMIN SERPL ELPH-MCNC: 1.7 G/DL — LOW (ref 3.3–5)
ALP SERPL-CCNC: 50 U/L — SIGNIFICANT CHANGE UP (ref 40–120)
ALT FLD-CCNC: 11 U/L — SIGNIFICANT CHANGE UP (ref 10–45)
AMYLASE FLD-CCNC: 20 U/L — SIGNIFICANT CHANGE UP
ANION GAP SERPL CALC-SCNC: 7 MMOL/L — SIGNIFICANT CHANGE UP (ref 5–17)
APTT BLD: 27.7 SEC — SIGNIFICANT CHANGE UP (ref 27.5–36.3)
APTT BLD: 32.7 SEC — SIGNIFICANT CHANGE UP (ref 27.5–36.3)
AST SERPL-CCNC: 19 U/L — SIGNIFICANT CHANGE UP (ref 10–40)
BILIRUB DIRECT SERPL-MCNC: 0.3 MG/DL — HIGH (ref 0–0.2)
BILIRUB INDIRECT FLD-MCNC: 0.5 MG/DL — SIGNIFICANT CHANGE UP (ref 0.2–1)
BILIRUB SERPL-MCNC: 0.8 MG/DL — SIGNIFICANT CHANGE UP (ref 0.2–1.2)
BLD GP AB SCN SERPL QL: NEGATIVE — SIGNIFICANT CHANGE UP
BUN SERPL-MCNC: 35 MG/DL — HIGH (ref 7–23)
CALCIUM SERPL-MCNC: 7.5 MG/DL — LOW (ref 8.4–10.5)
CHLORIDE SERPL-SCNC: 100 MMOL/L — SIGNIFICANT CHANGE UP (ref 96–108)
CO2 SERPL-SCNC: 30 MMOL/L — SIGNIFICANT CHANGE UP (ref 22–31)
CREAT FLD-MCNC: 0.71 MG/DL — SIGNIFICANT CHANGE UP
CREAT SERPL-MCNC: 0.47 MG/DL — LOW (ref 0.5–1.3)
FIBRINOGEN PPP-MCNC: 459 MG/DL — HIGH (ref 258–438)
GLUCOSE BLDC GLUCOMTR-MCNC: 134 MG/DL — HIGH (ref 70–99)
GLUCOSE BLDC GLUCOMTR-MCNC: 145 MG/DL — HIGH (ref 70–99)
GLUCOSE BLDC GLUCOMTR-MCNC: 158 MG/DL — HIGH (ref 70–99)
GLUCOSE BLDC GLUCOMTR-MCNC: 168 MG/DL — HIGH (ref 70–99)
GLUCOSE FLD-MCNC: 78 MG/DL — SIGNIFICANT CHANGE UP
GLUCOSE SERPL-MCNC: 140 MG/DL — HIGH (ref 70–99)
GRAM STN FLD: SIGNIFICANT CHANGE UP
GRAM STN FLD: SIGNIFICANT CHANGE UP
HCT VFR BLD CALC: 24.4 % — LOW (ref 39–50)
HGB BLD-MCNC: 7.9 G/DL — LOW (ref 13–17)
INR BLD: 1.66 — HIGH (ref 0.88–1.16)
INR BLD: 2.42 — HIGH (ref 0.88–1.16)
LDH SERPL L TO P-CCNC: 347 U/L — HIGH (ref 50–242)
LDH SERPL L TO P-CCNC: >2500 U/L — SIGNIFICANT CHANGE UP
MAGNESIUM SERPL-MCNC: 2.2 MG/DL — SIGNIFICANT CHANGE UP (ref 1.6–2.6)
MCHC RBC-ENTMCNC: 31.2 PG — SIGNIFICANT CHANGE UP (ref 27–34)
MCHC RBC-ENTMCNC: 32.4 GM/DL — SIGNIFICANT CHANGE UP (ref 32–36)
MCV RBC AUTO: 96.4 FL — SIGNIFICANT CHANGE UP (ref 80–100)
NRBC # BLD: 0 /100 WBCS — SIGNIFICANT CHANGE UP (ref 0–0)
PH FLD: 6.81 — SIGNIFICANT CHANGE UP
PHOSPHATE SERPL-MCNC: 3.5 MG/DL — SIGNIFICANT CHANGE UP (ref 2.5–4.5)
PLATELET # BLD AUTO: 153 K/UL — SIGNIFICANT CHANGE UP (ref 150–400)
POTASSIUM SERPL-MCNC: 4 MMOL/L — SIGNIFICANT CHANGE UP (ref 3.5–5.3)
POTASSIUM SERPL-SCNC: 4 MMOL/L — SIGNIFICANT CHANGE UP (ref 3.5–5.3)
PREALB SERPL-MCNC: 7 MG/DL — LOW (ref 20–40)
PROT FLD-MCNC: 1.5 G/DL — SIGNIFICANT CHANGE UP
PROT SERPL-MCNC: 4.6 G/DL — LOW (ref 6–8.3)
PROTHROM AB SERPL-ACNC: 19 SEC — HIGH (ref 10–12.9)
PROTHROM AB SERPL-ACNC: 28.1 SEC — HIGH (ref 10–12.9)
RBC # BLD: 2.53 M/UL — LOW (ref 4.2–5.8)
RBC # FLD: 15.9 % — HIGH (ref 10.3–14.5)
RH IG SCN BLD-IMP: POSITIVE — SIGNIFICANT CHANGE UP
SODIUM SERPL-SCNC: 137 MMOL/L — SIGNIFICANT CHANGE UP (ref 135–145)
SPECIMEN SOURCE FLD: SIGNIFICANT CHANGE UP
SPECIMEN SOURCE: SIGNIFICANT CHANGE UP
VANCOMYCIN FLD-MCNC: 29.5 UG/ML
VANCOMYCIN TROUGH SERPL-MCNC: 26 UG/ML — CRITICAL HIGH (ref 10–20)
WBC # BLD: 9.84 K/UL — SIGNIFICANT CHANGE UP (ref 3.8–10.5)
WBC # FLD AUTO: 9.84 K/UL — SIGNIFICANT CHANGE UP (ref 3.8–10.5)

## 2019-04-15 PROCEDURE — 71045 X-RAY EXAM CHEST 1 VIEW: CPT | Mod: 26

## 2019-04-15 PROCEDURE — 99291 CRITICAL CARE FIRST HOUR: CPT

## 2019-04-15 PROCEDURE — 99232 SBSQ HOSP IP/OBS MODERATE 35: CPT | Mod: GC,25

## 2019-04-15 PROCEDURE — 32557 INSERT CATH PLEURA W/ IMAGE: CPT | Mod: GC

## 2019-04-15 RX ORDER — HYDROMORPHONE HYDROCHLORIDE 2 MG/ML
0.5 INJECTION INTRAMUSCULAR; INTRAVENOUS; SUBCUTANEOUS EVERY 4 HOURS
Qty: 0 | Refills: 0 | Status: DISCONTINUED | OUTPATIENT
Start: 2019-04-15 | End: 2019-04-15

## 2019-04-15 RX ORDER — PROPOFOL 10 MG/ML
5 INJECTION, EMULSION INTRAVENOUS
Qty: 1000 | Refills: 0 | Status: DISCONTINUED | OUTPATIENT
Start: 2019-04-15 | End: 2019-04-16

## 2019-04-15 RX ORDER — DORNASE ALFA 1 MG/ML
5 SOLUTION RESPIRATORY (INHALATION) EVERY 12 HOURS
Qty: 0 | Refills: 0 | Status: DISCONTINUED | OUTPATIENT
Start: 2019-04-15 | End: 2019-04-16

## 2019-04-15 RX ORDER — HYDROMORPHONE HYDROCHLORIDE 2 MG/ML
1 INJECTION INTRAMUSCULAR; INTRAVENOUS; SUBCUTANEOUS EVERY 4 HOURS
Qty: 0 | Refills: 0 | Status: DISCONTINUED | OUTPATIENT
Start: 2019-04-15 | End: 2019-04-15

## 2019-04-15 RX ORDER — ASPIRIN/CALCIUM CARB/MAGNESIUM 324 MG
81 TABLET ORAL DAILY
Qty: 0 | Refills: 0 | Status: DISCONTINUED | OUTPATIENT
Start: 2019-04-15 | End: 2019-05-09

## 2019-04-15 RX ORDER — ASCORBIC ACID 60 MG
500 TABLET,CHEWABLE ORAL DAILY
Qty: 0 | Refills: 0 | Status: DISCONTINUED | OUTPATIENT
Start: 2019-04-15 | End: 2019-04-17

## 2019-04-15 RX ORDER — ZINC SULFATE TAB 220 MG (50 MG ZINC EQUIVALENT) 220 (50 ZN) MG
220 TAB ORAL DAILY
Qty: 0 | Refills: 0 | Status: DISCONTINUED | OUTPATIENT
Start: 2019-04-15 | End: 2019-05-08

## 2019-04-15 RX ORDER — ALTEPLASE 100 MG
10 KIT INTRAVENOUS EVERY 12 HOURS
Qty: 0 | Refills: 0 | Status: DISCONTINUED | OUTPATIENT
Start: 2019-04-15 | End: 2019-04-16

## 2019-04-15 RX ORDER — ATORVASTATIN CALCIUM 80 MG/1
80 TABLET, FILM COATED ORAL AT BEDTIME
Qty: 0 | Refills: 0 | Status: DISCONTINUED | OUTPATIENT
Start: 2019-04-15 | End: 2019-05-08

## 2019-04-15 RX ORDER — FUROSEMIDE 40 MG
40 TABLET ORAL ONCE
Qty: 0 | Refills: 0 | Status: COMPLETED | OUTPATIENT
Start: 2019-04-15 | End: 2019-04-15

## 2019-04-15 RX ORDER — FENTANYL CITRATE 50 UG/ML
0.5 INJECTION INTRAVENOUS
Qty: 2500 | Refills: 0 | Status: DISCONTINUED | OUTPATIENT
Start: 2019-04-15 | End: 2019-04-17

## 2019-04-15 RX ADMIN — Medication: at 01:18

## 2019-04-15 RX ADMIN — Medication 100 MILLIGRAM(S): at 17:04

## 2019-04-15 RX ADMIN — Medication 40 MILLIGRAM(S): at 16:57

## 2019-04-15 RX ADMIN — Medication 100 MILLIGRAM(S): at 09:50

## 2019-04-15 RX ADMIN — PROPOFOL 2.2 MICROGRAM(S)/KG/MIN: 10 INJECTION, EMULSION INTRAVENOUS at 17:14

## 2019-04-15 RX ADMIN — Medication 100 UNIT(S): at 11:34

## 2019-04-15 RX ADMIN — PROPOFOL 2.2 MICROGRAM(S)/KG/MIN: 10 INJECTION, EMULSION INTRAVENOUS at 11:34

## 2019-04-15 RX ADMIN — NYSTATIN CREAM 1 APPLICATION(S): 100000 CREAM TOPICAL at 17:15

## 2019-04-15 RX ADMIN — Medication 2: at 17:03

## 2019-04-15 RX ADMIN — Medication 81 MILLIGRAM(S): at 16:57

## 2019-04-15 RX ADMIN — FENTANYL CITRATE 3.67 MICROGRAM(S)/KG/HR: 50 INJECTION INTRAVENOUS at 05:10

## 2019-04-15 RX ADMIN — ATORVASTATIN CALCIUM 80 MILLIGRAM(S): 80 TABLET, FILM COATED ORAL at 21:23

## 2019-04-15 RX ADMIN — CEFEPIME 100 MILLIGRAM(S): 1 INJECTION, POWDER, FOR SOLUTION INTRAMUSCULAR; INTRAVENOUS at 17:14

## 2019-04-15 RX ADMIN — PANTOPRAZOLE SODIUM 40 MILLIGRAM(S): 20 TABLET, DELAYED RELEASE ORAL at 11:34

## 2019-04-15 RX ADMIN — NYSTATIN CREAM 1 APPLICATION(S): 100000 CREAM TOPICAL at 06:56

## 2019-04-15 RX ADMIN — Medication 100 MILLIGRAM(S): at 02:27

## 2019-04-15 RX ADMIN — CHLORHEXIDINE GLUCONATE 1 APPLICATION(S): 213 SOLUTION TOPICAL at 06:56

## 2019-04-15 RX ADMIN — ZINC SULFATE TAB 220 MG (50 MG ZINC EQUIVALENT) 220 MILLIGRAM(S): 220 (50 ZN) TAB at 11:34

## 2019-04-15 RX ADMIN — CEFEPIME 100 MILLIGRAM(S): 1 INJECTION, POWDER, FOR SOLUTION INTRAMUSCULAR; INTRAVENOUS at 06:56

## 2019-04-15 RX ADMIN — FENTANYL CITRATE 3.67 MICROGRAM(S)/KG/HR: 50 INJECTION INTRAVENOUS at 21:23

## 2019-04-15 RX ADMIN — Medication 500 MILLIGRAM(S): at 16:58

## 2019-04-15 RX ADMIN — Medication 2: at 12:28

## 2019-04-15 NOTE — PROGRESS NOTE ADULT - ASSESSMENT
85 yo M initially presenting for right groin discomfort in the setting of a recent bilateral inguinal hernia repair (3/11/19). Currently admitted in SICU after surgical intervention for abdominal abscess and pneumoperitoneum. Consulted for left lung abscess

## 2019-04-15 NOTE — PROGRESS NOTE ADULT - PROBLEM SELECTOR PLAN 1
Repeat scan done on 4/13 shows persistent large fluid collection in his left lung. Patient was febrile yesterday with persistent levophed requirements. Though his abdominal issues may be contributing, his persistent lung abscess needs to be intervened for additional source control at this point. Patient at bedside understands the risks and benefits of the procedure and gestures that he is in agreement. We will proceed with the chest tube for drainage, but INR must be corrected prior to procedure. Again, there is risk from the procedure including transient bactremia and BPF formation. However these risks have been discussed with family and decision was made to proceed. Will confirm with family members again though.    Recommend  - INR reversal with FFP and Chest tube later today with INR<2  - Continue with current Abx regimen Repeat scan done on 4/13 shows persistent large fluid collection in his left lung. Patient was febrile yesterday with persistent levophed requirements. Though his abdominal issues may be contributing, his persistent lung abscess needs to be intervened for additional source control at this point. Patient at bedside understands the risks and benefits of the procedure and gestures that he is in agreement. We will proceed with the chest tube for drainage, but INR must be corrected prior to procedure. Again, there is risk from the procedure including transient bactremia and BPF formation. However these risks have been discussed with family and decision was made to proceed. I have spoken to his HCP (Vikash Cha - brother), who has given verbal permission to proceed with the procedure and has been consented.    Recommend  - INR reversal with FFP and Chest tube later today with INR<2  - Continue with current Abx regimen Repeat scan done on 4/13 shows persistent large fluid collection in his left lung. Patient was febrile yesterday with persistent levophed requirements. Though his abdominal issues may be contributing, his persistent lung abscess needs to be intervened for additional source control at this point.  We will proceed with the chest tube for drainage, but INR must be corrected prior to procedure. Again, there is risk from the procedure including transient bacteremia and BPF formation. However these risks have been discussed with family and decision was made to proceed. I have spoken to his HCP (Vikash Cha - brother), who has given verbal permission to proceed with the procedure and has been consented.    Recommend  - INR reversal with FFP and Chest tube later today with INR<2  - Continue with current Abx regimen

## 2019-04-15 NOTE — PROGRESS NOTE ADULT - SUBJECTIVE AND OBJECTIVE BOX
INTERVAL HISTORY:  Patient seen and examined at bedside. Intubated but off of sedation aside from fentanyl. He gestures that he has no pain and is able to recall the discussion regarding the chest tube. No events overnight per nursing at bedside    VITAL SIGNS:  ICU Vital Signs Last 24 Hrs  T(C): 36 (15 Apr 2019 04:00), Max: 39.3 (14 Apr 2019 09:00)  T(F): 96.8 (15 Apr 2019 04:00), Max: 102.7 (14 Apr 2019 09:00)  HR: 76 (15 Apr 2019 06:00) (66 - 118)  BP: 118/59 (15 Apr 2019 06:00) (90/53 - 118/59)  BP(mean): 70 (15 Apr 2019 06:00) (59 - 86)  ABP: 122/36 (15 Apr 2019 06:00) (94/28 - 130/42)  ABP(mean): 62 (15 Apr 2019 06:00) (50 - 74)  RR: 12 (15 Apr 2019 06:00) (12 - 25)  SpO2: 96% (15 Apr 2019 06:00) (91% - 100%)    Mode: AC/ CMV (Assist Control/ Continuous Mandatory Ventilation), RR (machine): 12, TV (machine): 400, FiO2: 40, PEEP: 5, ITime: 1, MAP: 8, PIP: 22    04-14 @ 07:01  -  04-15 @ 07:00  --------------------------------------------------------  IN: 2866.6 mL / OUT: 2170 mL / NET: 696.6 mL      CAPILLARY BLOOD GLUCOSE      POCT Blood Glucose.: 134 mg/dL (15 Apr 2019 05:30)      PHYSICAL EXAM:  Constitutional: intubated but seems comfortable and NAD  HEENT: Some crusted secretions around the oral cavity, dry oral mucosa, PERRL, anicteric sclera  Neck: supple, JVD+  Respiratory: Currently on pressure support, scant thin secretions from in-line suction of ET tube; ronchorous bilaterally with high pitched wheezes heard on both sides but with L>R,  Cardiovascular: +S1/S2, RRR  Gastrointestinal: abdomen soft, 3 drains in place with colostomy - sites appear clean and dry, without any tenderness around it  Extremities: warm but with 2+ edema in all extremities and is anasarcicL      MEDICATIONS:  MEDICATIONS  (STANDING):  cefepime   IVPB 2000 milliGRAM(s) IV Intermittent every 12 hours  chlorhexidine 2% Cloths 1 Application(s) Topical <User Schedule>  dextrose 5%. 1000 milliLiter(s) (50 mL/Hr) IV Continuous <Continuous>  dextrose 50% Injectable 12.5 Gram(s) IV Push once  dextrose 50% Injectable 25 Gram(s) IV Push once  dextrose 50% Injectable 25 Gram(s) IV Push once  fentaNYL   Infusion. 0.5 MICROgram(s)/kG/Hr (3.665 mL/Hr) IV Continuous <Continuous>  heparin  flush 100 Units/mL Injectable 100 Unit(s) IV Push every other day  insulin lispro (HumaLOG) corrective regimen sliding scale   SubCutaneous every 6 hours  metroNIDAZOLE  IVPB 500 milliGRAM(s) IV Intermittent every 8 hours  metroNIDAZOLE  IVPB      norepinephrine Infusion 0.05 MICROgram(s)/kG/Min (3.436 mL/Hr) IV Continuous <Continuous>  nystatin Powder 1 Application(s) Topical two times a day  pantoprazole  Injectable 40 milliGRAM(s) IV Push daily  propofol Infusion 5 MICROgram(s)/kG/Min (2.199 mL/Hr) IV Continuous <Continuous>    MEDICATIONS  (PRN):  dextrose 40% Gel 15 Gram(s) Oral once PRN Blood Glucose LESS THAN 70 milliGRAM(s)/deciliter  glucagon  Injectable 1 milliGRAM(s) IntraMuscular once PRN Glucose LESS THAN 70 milligrams/deciliter  ondansetron Injectable 4 milliGRAM(s) IV Push every 6 hours PRN Nausea      ALLERGIES:  Allergies    No Known Allergies    Intolerances        LABS:                        7.9    9.84  )-----------( 153      ( 15 Apr 2019 02:55 )             24.4     04-15    137  |  100  |  35<H>  ----------------------------<  140<H>  4.0   |  30  |  0.47<L>    Ca    7.5<L>      15 Apr 2019 02:55  Phos  3.5     04-15  Mg     2.2     04-15    TPro  4.6<L>  /  Alb  1.7<L>  /  TBili  0.8  /  DBili  0.3<H>  /  AST  19  /  ALT  11  /  AlkPhos  50  04-15    PT/INR - ( 15 Apr 2019 02:55 )   PT: 28.1 sec;   INR: 2.42          PTT - ( 15 Apr 2019 02:55 )  PTT:32.7 sec  Urinalysis Basic - ( 14 Apr 2019 17:54 )    Color: Yellow / Appearance: Clear / SG: >=1.030 / pH: x  Gluc: x / Ketone: NEGATIVE  / Bili: Negative / Urobili: 0.2 E.U./dL   Blood: x / Protein: 30 mg/dL / Nitrite: NEGATIVE   Leuk Esterase: NEGATIVE / RBC: < 5 /HPF / WBC > 10 /HPF   Sq Epi: x / Non Sq Epi: 5-10 /HPF / Bacteria: Present /HPF        RADIOLOGY & ADDITIONAL TESTS:   CXR reviewed - bilateral dense opacities with blunted left CPA unchanged from prior film.

## 2019-04-15 NOTE — PROGRESS NOTE ADULT - ATTENDING COMMENTS
Assessment: Patient personally seen and examined myself during rounds with the Fellow    CRITICAL CARE TIME SPENT in evaluation and management, reassessments, review and interpretation of labs and x-rays, ventilator and hemodynamic management, formulating a plan and coordinating care: ___90____ MIN.  Time does not include procedural time.    Lillie Corbin MD

## 2019-04-15 NOTE — PROGRESS NOTE ADULT - ATTENDING COMMENTS
Persistent intermittent fever in the setting of a large left sided lung abscess most likely secondary to intraabdominal process. Chest tube drainage will most likely be helpful in source control. Risks (bacteremia, BPF, incomplete drainage) were extensively discussed with family and team. Continue with antibiotics; consider antifungal coverage. Chest tube planned for today.

## 2019-04-15 NOTE — PROGRESS NOTE ADULT - SUBJECTIVE AND OBJECTIVE BOX
INTERVAL HISTORY:  patient admitted for septic shock 2.2 abdominal abscess, cardiology following for EKG changes, and mildly elevated troponins.  patient able to follow commands, denies chest pain    MEDICATIONS:  norepinephrine Infusion 0.05 MICROgram(s)/kG/Min IV Continuous <Continuous>  cefepime   IVPB 2000 milliGRAM(s) IV Intermittent every 12 hours  metroNIDAZOLE  IVPB 500 milliGRAM(s) IV Intermittent every 8 hours  metroNIDAZOLE  IVPB      fentaNYL   Infusion. 0.5 MICROgram(s)/kG/Hr IV Continuous <Continuous>  ondansetron Injectable 4 milliGRAM(s) IV Push every 6 hours PRN  propofol Infusion 5 MICROgram(s)/kG/Min IV Continuous <Continuous>  pantoprazole  Injectable 40 milliGRAM(s) IV Push daily  atorvastatin 80 milliGRAM(s) Oral at bedtime  glucagon  Injectable 1 milliGRAM(s) IntraMuscular once PRN  insulin lispro (HumaLOG) corrective regimen sliding scale   SubCutaneous every 6 hours  ascorbic acid Syrup 500 milliGRAM(s) Oral daily  chlorhexidine 2% Cloths 1 Application(s) Topical <User Schedule>  dextrose 5%. 1000 milliLiter(s) IV Continuous <Continuous>  heparin  flush 100 Units/mL Injectable 100 Unit(s) IV Push every other day  nystatin Powder 1 Application(s) Topical two times a day  zinc sulfate 220 milliGRAM(s) Oral daily      PHYSICAL EXAM:  T(C): 37 (04-15-19 @ 09:00), Max: 37.8 (04-14-19 @ 14:00)  HR: 84 (04-15-19 @ 12:00) (66 - 94)  BP: 107/56 (04-15-19 @ 12:00) (90/48 - 118/59)  RR: 19 (04-15-19 @ 12:00) (11 - 23)  SpO2: 99% (04-15-19 @ 12:00) (94% - 100%)  Wt(kg): --  I&O's Summary    14 Apr 2019 07:01  -  15 Apr 2019 07:00  --------------------------------------------------------  IN: 2866.6 mL / OUT: 2250 mL / NET: 616.6 mL    15 Apr 2019 07:01  -  15 Apr 2019 12:48  --------------------------------------------------------  IN: 1947 mL / OUT: 135 mL / NET: 1812 mL          Appearance: awake intubated,         Cardiovascular: Normal S1 S2,    Respiratory: intubated, limited exam, rhonchi, no wheezing, +rales at the bases      Gastrointestinal:  Soft, multiple abdominal drains, ostomy      Neurologic: awake, follows commands, moves all ext  Extremities: anasarca    LABS:	 	  CARDIAC MARKERS:                                7.9    9.84  )-----------( 153      ( 15 Apr 2019 02:55 )             24.4     04-15    137  |  100  |  35<H>  ----------------------------<  140<H>  4.0   |  30  |  0.47<L>    Ca    7.5<L>      15 Apr 2019 02:55  Phos  3.5     04-15  Mg     2.2     04-15    TPro  4.6<L>  /  Alb  1.7<L>  /  TBili  0.8  /  DBili  0.3<H>  /  AST  19  /  ALT  11  /  AlkPhos  50  04-15       ASSESSMENT/PLAN: 	    86M w/PMHx CAD s/p mult PCI (last 2012; KIRSTY mLAD, dRCA, pOM2), HTN, HLD, recent b/l lap RA b/l inguinal hernia repair (3/11) initially sent in from [facility] for R inguinal hernia discomfort. Found to have 6x4cm R groin fluid collection, 10x5cm abscess in LLQ w/assoc pneumoperitoneum, L lung empyema, b/l segmental PE, and DVT in b/l CF, L common iliac and internal iliac veins. Underwent exlap w/drainage of 3L ascites, LAURYN, SB resxn w/primary anastomosis, sigmoidectomy (left in discontinuity), abd packing, and drain/VAC placement on 4/7, followed by end-colostomy creation and fascial closure on 4/10. S/p IVCF on 4/5, initially on hep gtt for DVT/PE, however d/c on 4/7 due to intra-op bleeding and not restarted. Home DAPT held since admission. ID following for mult-org induced septic shock. CTSx following for LL empyema. Noted incidentally to have EKG changes, cards c/s for further recs.    1.  diffuse TWI in patient with hx of CAD s/p multiple PCIs in the past, he does not require DAPT for now at least aspirin once cleared from surgical stand point, still on Levophed to start metoprolol eventually and statin, consider transfusion of PRBC to keep hg >8.0 given his history.       will continue to follow with Dr. Corbin.

## 2019-04-15 NOTE — PROGRESS NOTE ADULT - SUBJECTIVE AND OBJECTIVE BOX
On interval followup, the left arm PICC site is clean, dry and non-inflamed. The low grade fever, notes, cultures and progress are followed.

## 2019-04-15 NOTE — CHART NOTE - NSCHARTNOTEFT_GEN_A_CORE
Admitting Diagnosis:   Patient is a 86y old  Male who presents with a chief complaint of right inguinal discomfort (15 Apr 2019 12:47)    PAST MEDICAL & SURGICAL HISTORY:  Esophageal reflux  Acute diarrhea  HLD (hyperlipidemia)  HTN (hypertension)  Prostate cancer  Stented coronary artery: x3 , per pt.  Atherosclerosis of coronary artery: CAD (coronary artery disease)  S/P hernia repair  Surgery, elective: cardiac stent x3  History of prostate surgery    Current Nutrition Order: Glucerna 1.5 @ goal 50 mL/hr (1800 kcal, 99 gm pro, 911 mL free water, 120% RDI vitamin/mineral)     PO Intake: N/A as pt on EN    GI Issues: 475 mL output from colostomy per 24h per flow sheet    Pain: Unable to fully assess at this time 2/2 pt's medical status    Skin Integrity: midline abdomen wound vac, IAD sacral/gluteal area (erythema) per flow sheet    Labs:   04-15    137  |  100  |  35<H>  ----------------------------<  140<H>  4.0   |  30  |  0.47<L>    Ca    7.5<L>      15 Apr 2019 02:55  Phos  3.5     04-15  Mg     2.2     04-15    TPro  4.6<L>  /  Alb  1.7<L>  /  TBili  0.8  /  DBili  0.3<H>  /  AST  19  /  ALT  11  /  AlkPhos  50  04-15    CAPILLARY BLOOD GLUCOSE    POCT Blood Glucose.: 168 mg/dL (15 Apr 2019 16:23)  POCT Blood Glucose.: 158 mg/dL (15 Apr 2019 11:41)  POCT Blood Glucose.: 134 mg/dL (15 Apr 2019 05:30)  POCT Blood Glucose.: 165 mg/dL (14 Apr 2019 23:08)    Medications:  MEDICATIONS  (STANDING):  alteplase  Injectable for Pleural Effusion 10 milliGRAM(s) IntraPleural. every 12 hours  ascorbic acid Syrup 500 milliGRAM(s) Oral daily  aspirin  chewable 81 milliGRAM(s) Oral daily  atorvastatin 80 milliGRAM(s) Oral at bedtime  cefepime   IVPB 2000 milliGRAM(s) IV Intermittent every 12 hours  chlorhexidine 2% Cloths 1 Application(s) Topical <User Schedule>  dextrose 5%. 1000 milliLiter(s) (50 mL/Hr) IV Continuous <Continuous>  dextrose 50% Injectable 12.5 Gram(s) IV Push once  dextrose 50% Injectable 25 Gram(s) IV Push once  dextrose 50% Injectable 25 Gram(s) IV Push once  dornase ezequiel Solution for Pleural Effusion 5 milliGRAM(s) IntraPleural. every 12 hours  fentaNYL   Infusion. 0.5 MICROgram(s)/kG/Hr (3.665 mL/Hr) IV Continuous <Continuous>  heparin  flush 100 Units/mL Injectable 100 Unit(s) IV Push every other day  insulin lispro (HumaLOG) corrective regimen sliding scale   SubCutaneous every 6 hours  metroNIDAZOLE  IVPB 500 milliGRAM(s) IV Intermittent every 8 hours  metroNIDAZOLE  IVPB      norepinephrine Infusion 0.05 MICROgram(s)/kG/Min (3.436 mL/Hr) IV Continuous <Continuous>  nystatin Powder 1 Application(s) Topical two times a day  pantoprazole  Injectable 40 milliGRAM(s) IV Push daily  propofol Infusion 5 MICROgram(s)/kG/Min (2.199 mL/Hr) IV Continuous <Continuous>  zinc sulfate 220 milliGRAM(s) Oral daily    MEDICATIONS  (PRN):  dextrose 40% Gel 15 Gram(s) Oral once PRN Blood Glucose LESS THAN 70 milliGRAM(s)/deciliter  glucagon  Injectable 1 milliGRAM(s) IntraMuscular once PRN Glucose LESS THAN 70 milligrams/deciliter  ondansetron Injectable 4 milliGRAM(s) IV Push every 6 hours PRN Nausea    Weight:  78.6 (4/11)    Weight Change: Pt weighed 161.7lb on admission, wt gain likely 2/2 fluid. Please obtain current weight and continue to trend weights for further assessment.    Nutrition Focused Physical Exam: Completed [X on 4/6 ]  Not Pertinent [   ]  Per physical assessment: Muscle Wasting- Temporal [  X ]  Clavicle/Pectoral [  X ]  Shoulder/Deltoid [ X  ]  Scapula [ X  ]  Interosseous [  X ]  Quadriceps [   ]  Gastrocnemius [   ]  Fat Wasting- Orbital [ X  ]  Buccal [ X  ]  Triceps [   ]  Rib [ X  ]  Suspect severe protein-calorie malnutrition 2/2 to physical assessment, inadequate energy intake of <75% for >1 month    Estimated energy needs:   Height 71"; .7#; #; 94% IBW  IBW used to calculate energy needs as pt vented. Needs estimated for maintenance in older adults; increased for malnutrition, infection.    3918-7717 kcal (25-30 kcal/kg), 117-156 gm (1.5-2.0 gm/kg), fluid needs per team    Subjective: 87 yo M with history of b/l laparoscopic robotic-assisted inguinal hernia repair presenting with b/l segmental pulmonary embolisms, left lung abscess, pneumoperitoneum, and distal descending colon abscess (likely source of pneumoperitoneum), significant iliofemoral DVT burden, s/p IVCF placement and IR drainage of LLQ abdominal collection (4/5), s/p ex-lap, LAURYN, sigmoidectomy, drain placement in R. paracolic gutter, and abthera vac placement (4/7), s/p planned RTOR, packing removal, and end colostomy creation (4/10), now w/ MRSA PNA, persistent coagulopathy, plan for bedside pigtail drainage of left lung abscess for source control. Pt remains intubated. Propofol running at 20 mL/hr (528 kcal) but plan to decrease Propofol after procedure, per PA do not include calories from Propofol in TF recs at this time. Intermittent IV Lasix as tolerated, remains anasarcic, further diuresis being considered per team. Pt started on TPN 4/7. TPN weaned off on 4/14 and pt started on TF. Pt tolerating Glucerna 1.5 @ goal 50 mL/hr per RN. Per team, plan to continue Glucerna 1.5 at this time. Recommend increase to goal 58 mL/hr with Prostat SF 1x/day to best meet pt's needs at this time- please see below for full EN recs. Discussed plan with team. RD to monitor and f/u per high risk protocol.    Previous Nutrition Diagnosis:  Malnutrition (suspected severe) RT inadequate energy intake 2/2 lack of appetite and poor PO intake 2/2 multiple hospitalizations AEB muscle loss, fat loss, weight fluctuation, and inadequate energy intake    Active [ X ]  Resolved [   ]    If resolved, new PES:     Goal: Consistently meet % of EER; pt will show no further s/s malnutrition throughout admit    Recommendations:  1. Recommend Glucerna 1.5 at 50 mL/hr increase to goal 58 mL/hr with Prostat SF 1x/day to best meet pt's needs at this time (2188 kcal, 130 gm pro, 1057 mL free water, 139% RDI vitamin/mineral, 21.4 non protein kcal/kg, 28 kcal/kg, 1.67 gm pro/kg)- water flushes per team. Order for volume based feeding protocol & monitor for signs of intolerance. If team unable to wean Propofol, can adjust TF regimen.  2. Obtain current weight and trend weekly weights for further assessment.  3. Monitor labs.    Education: N/A 2/2 pt's medical status    Risk Level: High [  X ] Moderate [   ] Low [   ]

## 2019-04-15 NOTE — PROGRESS NOTE ADULT - SUBJECTIVE AND OBJECTIVE BOX
SUBJECTIVE:   Patient seen and examined by bedside. Intubated, but alert. Squeezes hand to command but otherwise does not respond to yes or no questions. ROS not obtainable.       Vitals - Range in last 12h  T(F): , Max: 98.5 (04-14-19 @ 21:35)  HR:  (66 - 76)  BP:  (90/53 - 118/59)  BP(mean):  (65 - 86)  ABP:  (114/34 - 130/42)  ABP(mean):  (58 - 72)  RR:  (12 - 14)  SpO2:  (95% - 100%)  CVP(mm Hg): --  CVP(cm H2O): --  CO: --  CI: --  PA: --  PA(mean): --  PA(direct): --  PCWP: --    Vitals - Most Recent  T(F): 96.8 (04-15-19 @ 04:00)  HR: 76 (04-15-19 @ 06:00)  BP: 118/59 (04-15-19 @ 06:00)  RR: 12 (04-15-19 @ 06:00)  SpO2: 96% (04-15-19 @ 06:00)  I&O's Detail    13 Apr 2019 07:01  -  14 Apr 2019 07:00  --------------------------------------------------------  IN:    Enteral Tube Flush: 100 mL    fat emulsion (Plant Based) 20% Infusion: 41.6 mL    fat emulsion (Plant Based) 20% Infusion: 168 mL    fentaNYL Infusion.: 119 mL    Glucerna 1.5: 650 mL    IV PiggyBack: 200 mL    norepinephrine Infusion: 348.8 mL    propofol Infusion: 64 mL    Solution: 500 mL    Solution: 50 mL    TPN (Total Parenteral Nutrition): 1860 mL  Total IN: 4101.4 mL    OUT:    Bulb: 400 mL    Bulb: 50 mL    Bulb: 100 mL    Colostomy: 400 mL    Indwelling Catheter - Urethral: 3640 mL    VAC (Vacuum Assisted Closure) System: 35 mL  Total OUT: 4625 mL    Total NET: -523.6 mL      14 Apr 2019 07:01  -  15 Apr 2019 06:31  --------------------------------------------------------  IN:    Albumin 5%  - 250 mL: 250 mL    fentaNYL Infusion.: 295.9 mL    Glucerna 1.5: 150 mL    IV PiggyBack: 300 mL    norepinephrine Infusion: 495.7 mL    Plasma: 825 mL    Solution: 100 mL    Solution: 250 mL    Solution: 50 mL    TPN (Total Parenteral Nutrition): 150 mL  Total IN: 2866.6 mL    OUT:    Bulb: 400 mL    Bulb: 150 mL    Colostomy: 425 mL    Indwelling Catheter - Urethral: 1170 mL    VAC (Vacuum Assisted Closure) System: 25 mL  Total OUT: 2170 mL    Total NET: 696.6 mL        Mode: AC/ CMV (Assist Control/ Continuous Mandatory Ventilation)  RR (machine): 12  RR (patient): 12  TV (machine): 400  TV (patient): 378  FiO2: 40  PEEP: 5  ITime: 1  MAP: 8  PIP: 22      Physical Exam  Neuro: intubated, awake, squeezes hand to command  General: no acute distress  HEENT: PERRL, EOMI, MMM  Pulm: intubated, on vent-AC, severe rhonchi bilaterally  C/V: S1S2, non-tachycardic   Abd: nondistended, but firm; LLQ IR drain w/ light serosanguinous output; LUQ PHYLLIS drain w/ serosanguinous output; R paracolic gutter drain w/ serosanguinous output; abdominal incision w/ wound vac in place w/ good seal and serosanguinous output (fascia closed 4/10, vac in subcutaneous space)  Extremities: diffuse anasarca; 2+ pitting edema of bilateral upper and lower extremities  : rao in place; significant 2+ scrotal edema        LABS:                        7.9    9.84  )-----------( 153      ( 15 Apr 2019 02:55 )             24.4     04-15    137  |  100  |  35<H>  ----------------------------<  140<H>  4.0   |  30  |  0.47<L>    Ca    7.5<L>      15 Apr 2019 02:55  Phos  3.5     04-15  Mg     2.2     04-15    TPro  4.6<L>  /  Alb  1.7<L>  /  TBili  0.8  /  DBili  0.3<H>  /  AST  19  /  ALT  11  /  AlkPhos  50  04-15    LIVER FUNCTIONS - ( 15 Apr 2019 02:55 )  Alb: 1.7 g/dL / Pro: 4.6 g/dL / ALK PHOS: 50 U/L / ALT: 11 U/L / AST: 19 U/L / GGT: x           PT/INR - ( 15 Apr 2019 02:55 )   PT: 28.1 sec;   INR: 2.42          PTT - ( 15 Apr 2019 02:55 )  PTT:32.7 sec  Urinalysis Basic - ( 14 Apr 2019 17:54 )    Color: Yellow / Appearance: Clear / SG: >=1.030 / pH: x  Gluc: x / Ketone: NEGATIVE  / Bili: Negative / Urobili: 0.2 E.U./dL   Blood: x / Protein: 30 mg/dL / Nitrite: NEGATIVE   Leuk Esterase: NEGATIVE / RBC: < 5 /HPF / WBC > 10 /HPF   Sq Epi: x / Non Sq Epi: 5-10 /HPF / Bacteria: Present /HPF        Culture - Blood (collected 12 Apr 2019 21:04)  Source: .Blood Blood-Venous  Preliminary Report (14 Apr 2019 22:00):    No growth at 2 days.    Culture - Blood (collected 12 Apr 2019 20:33)  Source: .Blood Blood-Peripheral  Preliminary Report (14 Apr 2019 21:00):    No growth at 2 days.        MEDICATIONS  (STANDING):  cefepime   IVPB 2000 milliGRAM(s) IV Intermittent every 12 hours  chlorhexidine 2% Cloths 1 Application(s) Topical <User Schedule>  dextrose 5%. 1000 milliLiter(s) (50 mL/Hr) IV Continuous <Continuous>  dextrose 50% Injectable 12.5 Gram(s) IV Push once  dextrose 50% Injectable 25 Gram(s) IV Push once  dextrose 50% Injectable 25 Gram(s) IV Push once  fentaNYL   Infusion. 0.5 MICROgram(s)/kG/Hr (3.665 mL/Hr) IV Continuous <Continuous>  heparin  flush 100 Units/mL Injectable 100 Unit(s) IV Push every other day  insulin lispro (HumaLOG) corrective regimen sliding scale   SubCutaneous every 6 hours  metroNIDAZOLE  IVPB 500 milliGRAM(s) IV Intermittent every 8 hours  metroNIDAZOLE  IVPB      norepinephrine Infusion 0.05 MICROgram(s)/kG/Min (3.436 mL/Hr) IV Continuous <Continuous>  nystatin Powder 1 Application(s) Topical two times a day  pantoprazole  Injectable 40 milliGRAM(s) IV Push daily  propofol Infusion 5 MICROgram(s)/kG/Min (2.199 mL/Hr) IV Continuous <Continuous>  vancomycin  IVPB 1250 milliGRAM(s) IV Intermittent every 12 hours    MEDICATIONS  (PRN):  dextrose 40% Gel 15 Gram(s) Oral once PRN Blood Glucose LESS THAN 70 milliGRAM(s)/deciliter  glucagon  Injectable 1 milliGRAM(s) IntraMuscular once PRN Glucose LESS THAN 70 milligrams/deciliter  ondansetron Injectable 4 milliGRAM(s) IV Push every 6 hours PRN Nausea      RADIOLOGY & ADDITIONAL STUDIES: Interval History:  ON: INR 2.42, 4 FFP ordered  4/14: Plan for bedside pigtail placement by pulm, ffp and vit K given for INR Levo increased with Fever unable to diurese. Cxs sent.     SUBJECTIVE:   Patient seen and examined by bedside. Intubated, but off propofol this morning and alert. Squeezes hand to command but otherwise does not respond to yes or no questions. ROS not obtainable. Remains HD stable on Levophed.       Vitals - Range in last 12h  T(F): , Max: 98.5 (04-14-19 @ 21:35)  HR:  (66 - 76)  BP:  (90/53 - 118/59)  BP(mean):  (65 - 86)  ABP:  (114/34 - 130/42)  ABP(mean):  (58 - 72)  RR:  (12 - 14)  SpO2:  (95% - 100%)  CVP(mm Hg): --  CVP(cm H2O): --  CO: --  CI: --  PA: --  PA(mean): --  PA(direct): --  PCWP: --    Vitals - Most Recent  T(F): 96.8 (04-15-19 @ 04:00)  HR: 76 (04-15-19 @ 06:00)  BP: 118/59 (04-15-19 @ 06:00)  RR: 12 (04-15-19 @ 06:00)  SpO2: 96% (04-15-19 @ 06:00)  I&O's Detail    13 Apr 2019 07:01  -  14 Apr 2019 07:00  --------------------------------------------------------  IN:    Enteral Tube Flush: 100 mL    fat emulsion (Plant Based) 20% Infusion: 41.6 mL    fat emulsion (Plant Based) 20% Infusion: 168 mL    fentaNYL Infusion.: 119 mL    Glucerna 1.5: 650 mL    IV PiggyBack: 200 mL    norepinephrine Infusion: 348.8 mL    propofol Infusion: 64 mL    Solution: 500 mL    Solution: 50 mL    TPN (Total Parenteral Nutrition): 1860 mL  Total IN: 4101.4 mL    OUT:    Bulb: 400 mL    Bulb: 50 mL    Bulb: 100 mL    Colostomy: 400 mL    Indwelling Catheter - Urethral: 3640 mL    VAC (Vacuum Assisted Closure) System: 35 mL  Total OUT: 4625 mL    Total NET: -523.6 mL      14 Apr 2019 07:01  -  15 Apr 2019 06:31  --------------------------------------------------------  IN:    Albumin 5%  - 250 mL: 250 mL    fentaNYL Infusion.: 295.9 mL    Glucerna 1.5: 150 mL    IV PiggyBack: 300 mL    norepinephrine Infusion: 495.7 mL    Plasma: 825 mL    Solution: 100 mL    Solution: 250 mL    Solution: 50 mL    TPN (Total Parenteral Nutrition): 150 mL  Total IN: 2866.6 mL    OUT:    Bulb: 400 mL    Bulb: 150 mL    Colostomy: 425 mL    Indwelling Catheter - Urethral: 1170 mL    VAC (Vacuum Assisted Closure) System: 25 mL  Total OUT: 2170 mL    Total NET: 696.6 mL        Mode: AC/ CMV (Assist Control/ Continuous Mandatory Ventilation)  RR (machine): 12  RR (patient): 12  TV (machine): 400  TV (patient): 378  FiO2: 40  PEEP: 5  ITime: 1  MAP: 8  PIP: 22      Physical Exam  Neuro: intubated, awake, squeezes hand to command  General: no acute distress  HEENT: PERRL, EOMI, MMM  Pulm: intubated, on vent-AC, severe rhonchi bilaterally  C/V: S1S2, non-tachycardic   Abd: nondistended, but firm; LLQ IR drain w/ light serosanguinous output; LUQ PHYLLIS drain w/ serosanguinous output; R paracolic gutter drain w/ serosanguinous output; abdominal incision w/ wound vac in place w/ good seal and serosanguinous output (fascia closed 4/10, vac in subcutaneous space)  Extremities: diffuse anasarca; 2+ pitting edema of bilateral upper and lower extremities  : rao in place; significant 2+ scrotal edema  Skin: mild blanching erythema on thighs, no macerations on anasarcic         LABS:                        7.9    9.84  )-----------( 153      ( 15 Apr 2019 02:55 )             24.4     04-15    137  |  100  |  35<H>  ----------------------------<  140<H>  4.0   |  30  |  0.47<L>    Ca    7.5<L>      15 Apr 2019 02:55  Phos  3.5     04-15  Mg     2.2     04-15    TPro  4.6<L>  /  Alb  1.7<L>  /  TBili  0.8  /  DBili  0.3<H>  /  AST  19  /  ALT  11  /  AlkPhos  50  04-15    LIVER FUNCTIONS - ( 15 Apr 2019 02:55 )  Alb: 1.7 g/dL / Pro: 4.6 g/dL / ALK PHOS: 50 U/L / ALT: 11 U/L / AST: 19 U/L / GGT: x           PT/INR - ( 15 Apr 2019 02:55 )   PT: 28.1 sec;   INR: 2.42          PTT - ( 15 Apr 2019 02:55 )  PTT:32.7 sec  Urinalysis Basic - ( 14 Apr 2019 17:54 )    Color: Yellow / Appearance: Clear / SG: >=1.030 / pH: x  Gluc: x / Ketone: NEGATIVE  / Bili: Negative / Urobili: 0.2 E.U./dL   Blood: x / Protein: 30 mg/dL / Nitrite: NEGATIVE   Leuk Esterase: NEGATIVE / RBC: < 5 /HPF / WBC > 10 /HPF   Sq Epi: x / Non Sq Epi: 5-10 /HPF / Bacteria: Present /HPF        Culture - Blood (collected 12 Apr 2019 21:04)  Source: .Blood Blood-Venous  Preliminary Report (14 Apr 2019 22:00):    No growth at 2 days.    Culture - Blood (collected 12 Apr 2019 20:33)  Source: .Blood Blood-Peripheral  Preliminary Report (14 Apr 2019 21:00):    No growth at 2 days.        MEDICATIONS  (STANDING):  cefepime   IVPB 2000 milliGRAM(s) IV Intermittent every 12 hours  chlorhexidine 2% Cloths 1 Application(s) Topical <User Schedule>  dextrose 5%. 1000 milliLiter(s) (50 mL/Hr) IV Continuous <Continuous>  dextrose 50% Injectable 12.5 Gram(s) IV Push once  dextrose 50% Injectable 25 Gram(s) IV Push once  dextrose 50% Injectable 25 Gram(s) IV Push once  fentaNYL   Infusion. 0.5 MICROgram(s)/kG/Hr (3.665 mL/Hr) IV Continuous <Continuous>  heparin  flush 100 Units/mL Injectable 100 Unit(s) IV Push every other day  insulin lispro (HumaLOG) corrective regimen sliding scale   SubCutaneous every 6 hours  metroNIDAZOLE  IVPB 500 milliGRAM(s) IV Intermittent every 8 hours  metroNIDAZOLE  IVPB      norepinephrine Infusion 0.05 MICROgram(s)/kG/Min (3.436 mL/Hr) IV Continuous <Continuous>  nystatin Powder 1 Application(s) Topical two times a day  pantoprazole  Injectable 40 milliGRAM(s) IV Push daily  propofol Infusion 5 MICROgram(s)/kG/Min (2.199 mL/Hr) IV Continuous <Continuous>  vancomycin  IVPB 1250 milliGRAM(s) IV Intermittent every 12 hours    MEDICATIONS  (PRN):  dextrose 40% Gel 15 Gram(s) Oral once PRN Blood Glucose LESS THAN 70 milliGRAM(s)/deciliter  glucagon  Injectable 1 milliGRAM(s) IntraMuscular once PRN Glucose LESS THAN 70 milligrams/deciliter  ondansetron Injectable 4 milliGRAM(s) IV Push every 6 hours PRN Nausea      RADIOLOGY & ADDITIONAL STUDIES: Interval History:  ON: INR 2.42, 4 FFP ordered  4/14: Plan for bedside pigtail placement by pulm, ffp and vit K given for INR Levo increased with Fever unable to diurese. Cxs sent.     SUBJECTIVE:   Patient seen and examined by bedside. Intubated, but off propofol this morning and alert. Squeezes hand to command but otherwise does not respond to yes or no questions. ROS not obtainable. Remains HD stable on Levophed.       Vitals - Range in last 12h  T(F): , Max: 98.5 (04-14-19 @ 21:35)  HR:  (66 - 76)  BP:  (90/53 - 118/59)  BP(mean):  (65 - 86)  ABP:  (114/34 - 130/42)  ABP(mean):  (58 - 72)  RR:  (12 - 14)  SpO2:  (95% - 100%)  CVP(mm Hg): --  CVP(cm H2O): --  CO: --  CI: --  PA: --  PA(mean): --  PA(direct): --  PCWP: --    Vitals - Most Recent  T(F): 96.8 (04-15-19 @ 04:00)  HR: 76 (04-15-19 @ 06:00)  BP: 118/59 (04-15-19 @ 06:00)  RR: 12 (04-15-19 @ 06:00)  SpO2: 96% (04-15-19 @ 06:00)  I&O's Detail    13 Apr 2019 07:01  -  14 Apr 2019 07:00  --------------------------------------------------------  IN:    Enteral Tube Flush: 100 mL    fat emulsion (Plant Based) 20% Infusion: 41.6 mL    fat emulsion (Plant Based) 20% Infusion: 168 mL    fentaNYL Infusion.: 119 mL    Glucerna 1.5: 650 mL    IV PiggyBack: 200 mL    norepinephrine Infusion: 348.8 mL    propofol Infusion: 64 mL    Solution: 500 mL    Solution: 50 mL    TPN (Total Parenteral Nutrition): 1860 mL  Total IN: 4101.4 mL    OUT:    Bulb: 400 mL    Bulb: 50 mL    Bulb: 100 mL    Colostomy: 400 mL    Indwelling Catheter - Urethral: 3640 mL    VAC (Vacuum Assisted Closure) System: 35 mL  Total OUT: 4625 mL    Total NET: -523.6 mL      14 Apr 2019 07:01  -  15 Apr 2019 06:31  --------------------------------------------------------  IN:    Albumin 5%  - 250 mL: 250 mL    fentaNYL Infusion.: 295.9 mL    Glucerna 1.5: 150 mL    IV PiggyBack: 300 mL    norepinephrine Infusion: 495.7 mL    Plasma: 825 mL    Solution: 100 mL    Solution: 250 mL    Solution: 50 mL    TPN (Total Parenteral Nutrition): 150 mL  Total IN: 2866.6 mL    OUT:    Bulb: 400 mL    Bulb: 150 mL    Colostomy: 425 mL    Indwelling Catheter - Urethral: 1170 mL    VAC (Vacuum Assisted Closure) System: 25 mL  Total OUT: 2170 mL    Total NET: 696.6 mL        Mode: AC/ CMV (Assist Control/ Continuous Mandatory Ventilation)  RR (machine): 12  RR (patient): 12  TV (machine): 400  TV (patient): 378  FiO2: 40  PEEP: 5  ITime: 1  MAP: 8  PIP: 22      Physical Exam  Neuro: intubated, awake, squeezes hand to command  General: no acute distress  HEENT: PERRL, EOMI, MMM  Pulm: intubated, on vent-AC, severe rhonchi bilaterally  C/V: S1S2, non-tachycardic   Abd: nondistended, but firm; LLQ IR drain w/ light serosanguinous output; LUQ PHYLLIS drain w/ serosanguinous output; R paracolic gutter drain w/ serosanguinous output; abdominal incision w/ wound vac in place w/ good seal and serosanguinous output (fascia closed 4/10, vac in subcutaneous space)  Extremities: diffuse anasarca; 2+ pitting edema of bilateral upper and lower extremities  : rao in place; significant 2+ scrotal edema  Skin: mild blanching erythema on thighs, no macerations; anasarchic         LABS:                        7.9    9.84  )-----------( 153      ( 15 Apr 2019 02:55 )             24.4     04-15    137  |  100  |  35<H>  ----------------------------<  140<H>  4.0   |  30  |  0.47<L>    Ca    7.5<L>      15 Apr 2019 02:55  Phos  3.5     04-15  Mg     2.2     04-15    TPro  4.6<L>  /  Alb  1.7<L>  /  TBili  0.8  /  DBili  0.3<H>  /  AST  19  /  ALT  11  /  AlkPhos  50  04-15    LIVER FUNCTIONS - ( 15 Apr 2019 02:55 )  Alb: 1.7 g/dL / Pro: 4.6 g/dL / ALK PHOS: 50 U/L / ALT: 11 U/L / AST: 19 U/L / GGT: x           PT/INR - ( 15 Apr 2019 02:55 )   PT: 28.1 sec;   INR: 2.42          PTT - ( 15 Apr 2019 02:55 )  PTT:32.7 sec  Urinalysis Basic - ( 14 Apr 2019 17:54 )    Color: Yellow / Appearance: Clear / SG: >=1.030 / pH: x  Gluc: x / Ketone: NEGATIVE  / Bili: Negative / Urobili: 0.2 E.U./dL   Blood: x / Protein: 30 mg/dL / Nitrite: NEGATIVE   Leuk Esterase: NEGATIVE / RBC: < 5 /HPF / WBC > 10 /HPF   Sq Epi: x / Non Sq Epi: 5-10 /HPF / Bacteria: Present /HPF        Culture - Blood (collected 12 Apr 2019 21:04)  Source: .Blood Blood-Venous  Preliminary Report (14 Apr 2019 22:00):    No growth at 2 days.    Culture - Blood (collected 12 Apr 2019 20:33)  Source: .Blood Blood-Peripheral  Preliminary Report (14 Apr 2019 21:00):    No growth at 2 days.        MEDICATIONS  (STANDING):  cefepime   IVPB 2000 milliGRAM(s) IV Intermittent every 12 hours  chlorhexidine 2% Cloths 1 Application(s) Topical <User Schedule>  dextrose 5%. 1000 milliLiter(s) (50 mL/Hr) IV Continuous <Continuous>  dextrose 50% Injectable 12.5 Gram(s) IV Push once  dextrose 50% Injectable 25 Gram(s) IV Push once  dextrose 50% Injectable 25 Gram(s) IV Push once  fentaNYL   Infusion. 0.5 MICROgram(s)/kG/Hr (3.665 mL/Hr) IV Continuous <Continuous>  heparin  flush 100 Units/mL Injectable 100 Unit(s) IV Push every other day  insulin lispro (HumaLOG) corrective regimen sliding scale   SubCutaneous every 6 hours  metroNIDAZOLE  IVPB 500 milliGRAM(s) IV Intermittent every 8 hours  metroNIDAZOLE  IVPB      norepinephrine Infusion 0.05 MICROgram(s)/kG/Min (3.436 mL/Hr) IV Continuous <Continuous>  nystatin Powder 1 Application(s) Topical two times a day  pantoprazole  Injectable 40 milliGRAM(s) IV Push daily  propofol Infusion 5 MICROgram(s)/kG/Min (2.199 mL/Hr) IV Continuous <Continuous>  vancomycin  IVPB 1250 milliGRAM(s) IV Intermittent every 12 hours    MEDICATIONS  (PRN):  dextrose 40% Gel 15 Gram(s) Oral once PRN Blood Glucose LESS THAN 70 milliGRAM(s)/deciliter  glucagon  Injectable 1 milliGRAM(s) IntraMuscular once PRN Glucose LESS THAN 70 milligrams/deciliter  ondansetron Injectable 4 milliGRAM(s) IV Push every 6 hours PRN Nausea      RADIOLOGY & ADDITIONAL STUDIES:

## 2019-04-15 NOTE — PROGRESS NOTE ADULT - ASSESSMENT
87 yo M with CAD (s/p 3 stents) and h/o EtOH abuse, s/p bilateral robotic inguinal hernia repair on 3/11, now with perforated diverticulitis s/p ex lap, sigmoidectomy, and drain placement with open abdomen on 4/7, RTOR on 4/10 for end colostomy and closure of fascia. Also with bilateral PE, iliofemoral DVT (s/p IVC filter), and pulm empyema.     Plan incomplete 87 yo M with CAD (s/p 3 stents) and h/o EtOH abuse, s/p bilateral robotic inguinal hernia repair on 3/11, now with perforated diverticulitis s/p ex lap, sigmoidectomy, and drain placement with open abdomen on 4/7, RTOR on 4/10 for end colostomy and closure of fascia. Also with bilateral PE, iliofemoral DVT (s/p IVC filter on 4/5), and LLL pulmonary empyema.     Plan:  - Neuro: on fentanyl gtt, consider IV pushes  - CV: levophed for MAP > 65, wean as tolerated. Start lipitor 80 per cards. Restart ASA 81 this evening after pigtail catheter. Diurese per SICU  - Resp: pigtail catheter empyema drainage today with pulm. Daily CXR. CPAP as tolerated.  - GI/FEN: tube feeds are at 50 (goal)  - /renal: Abbott, strict I/Os while diuresing  - ID: Vanc, cefepime, flagyl, f/u ID recs (MRSA in sputum, kleb/proteus/strep/bacteroides in abd ctx). F/u new Blood ctx  - Heme: INR 2.42, give FFP today before pulm procedure. Will eventually need full therapeutic anticoag for DVT/PE, likely hep gtt tomorrow 24 hrs after pulm procedure.  - Lines: Continue PICC and a line.  - Wounds/drains: Monitor 3 drains (LLQ, LUQ, paracolic gutter) and wound vac output, vac change today  - PT: continue working with PT  - Dispo: SICU  - plan discussed with SICU team and with Dr. Valencia

## 2019-04-15 NOTE — PROGRESS NOTE ADULT - SUBJECTIVE AND OBJECTIVE BOX
24 hr events: Fever 102 yesterday afternoon, new bctx sent. Unable to diurese during daytime due to higher pressor requirements, however pressor requirements decreased overnight. Ordered for FFP to reverse INR before pigtail chest tube catheter today at bedside with pulm.    SUBJECTIVE: Pt awake and tracking me in the room. No abd pain, no N/V.    Vital Signs Last 24 Hrs  T(C): 36 (15 Apr 2019 04:00), Max: 39.3 (14 Apr 2019 09:00)  T(F): 96.8 (15 Apr 2019 04:00), Max: 102.7 (14 Apr 2019 09:00)  HR: 76 (15 Apr 2019 06:00) (66 - 118)  BP: 118/59 (15 Apr 2019 06:00) (90/53 - 118/59)  BP(mean): 70 (15 Apr 2019 06:00) (59 - 86)  RR: 12 (15 Apr 2019 06:00) (12 - 25)  SpO2: 96% (15 Apr 2019 06:00) (91% - 100%)    Physical Exam:  General: NAD, awake, eyes open  HEENT: dobhoff in place  Pulmonary: intubated, upper lobe rhonchi, lower lobe decreased breath sounds, less crackles than before  Cardiovascular: NSR  Abdominal: soft, NT/ND, vac on and functioning in midline wound, JPs (LUQ, LLQ, paracolic gutter) x 3 all with yellow serous fluid, no guarding/rigidity/rebound tenderness  : Abbott, scrotal edema persistent  Extremities: WWP, persistent but decreased amount of pitting edema in calves/thighs  Neuro: follows commands, 5/5 strength in hands and feet, normal sensation  Lines: MARÍA PICC, R rad a line, PIVs    I&O's Summary    14 Apr 2019 07:01  -  15 Apr 2019 07:00  --------------------------------------------------------  IN: 2866.6 mL / OUT: 2170 mL / NET: 696.6 mL        LABS:                        7.9    9.84  )-----------( 153      ( 15 Apr 2019 02:55 )             24.4     04-15    137  |  100  |  35<H>  ----------------------------<  140<H>  4.0   |  30  |  0.47<L>    Ca    7.5<L>      15 Apr 2019 02:55  Phos  3.5     04-15  Mg     2.2     04-15    TPro  4.6<L>  /  Alb  1.7<L>  /  TBili  0.8  /  DBili  0.3<H>  /  AST  19  /  ALT  11  /  AlkPhos  50  04-15    PT/INR - ( 15 Apr 2019 02:55 )   PT: 28.1 sec;   INR: 2.42          PTT - ( 15 Apr 2019 02:55 )  PTT:32.7 sec  Urinalysis Basic - ( 14 Apr 2019 17:54 )    Color: Yellow / Appearance: Clear / SG: >=1.030 / pH: x  Gluc: x / Ketone: NEGATIVE  / Bili: Negative / Urobili: 0.2 E.U./dL   Blood: x / Protein: 30 mg/dL / Nitrite: NEGATIVE   Leuk Esterase: NEGATIVE / RBC: < 5 /HPF / WBC > 10 /HPF   Sq Epi: x / Non Sq Epi: 5-10 /HPF / Bacteria: Present /HPF      CAPILLARY BLOOD GLUCOSE  POCT Blood Glucose.: 134 mg/dL (15 Apr 2019 05:30)  POCT Blood Glucose.: 165 mg/dL (14 Apr 2019 23:08)  POCT Blood Glucose.: 154 mg/dL (14 Apr 2019 16:03)  POCT Blood Glucose.: 147 mg/dL (14 Apr 2019 11:31)    LIVER FUNCTIONS - ( 15 Apr 2019 02:55 )  Alb: 1.7 g/dL / Pro: 4.6 g/dL / ALK PHOS: 50 U/L / ALT: 11 U/L / AST: 19 U/L / GGT: x             RADIOLOGY & ADDITIONAL STUDIES:  < from: CT Chest No Cont (04.13.19 @ 14:44) >    Development of new right upper lobe infiltrate which may be due to   pneumonia or a sequela of known pulmonary embolism.    Persistent large walled off complex collection of fluid and gas lung the   posterior aspect of the left lower lung. Difficult to differentiate lung   abscess from empyema.    Persistent pleural effusions, right greater than left. Pulmonary   congestion.    Small pneumoperitoneum persists in the upper abdomen.          < from: Xray Chest 1 View- PORTABLE-Routine (04.14.19 @ 06:33) >  Impression: Congestion and/or infiltrates. Improvement bilateral effusions

## 2019-04-15 NOTE — PROGRESS NOTE ADULT - ASSESSMENT
85 yo M with history of b/l laparoscopic robotic-assisted inguinal hernia repair presenting with b/l segmental pulmonary embolisms, left lung abscess, pneumoperitoneum, and distal descending colon abscess (likely source of pneumoperitoneum), significant iliofemoral DVT burden, s/p IVCF placement and IR drainage of LLQ abdominal collection (4/5), s/p ex-lap, LAURYN, sigmoidectomy, drain placement in R. paracolic gutter, and abthera vac placement (4/7), s/p planned RTOR, packing removal, and end colostomy creation (4/10), now w/ MRSA PNA, persistent coagulopathy, plan for bedside pigtail drainage of left lung abscess for source control      Plan:  Neuro: off propofol, fentanyl gtt for pain;   CV: cont to hold home asa/plavix for now; levo for MAP>65; statin; remains fluid overloaded w/ significant anasarca and bilateral effusions; IV lasix PRN  Pulm: 40/470/12/5, intubated since 4/7 on vent-AC, bilateral PE's; plan for bedside pigtail catheter by pulmonology today  GI: NPO, dobhoff glucerna 1.5@50/hr; malnourished, prealbumin 4; weaning TPN as pt is at goal for feeds.  : rao; strict I'Os, BUN/Cr rising, responded to lasix well yesterday however remains anasarcic, will consider further diuresis.  ID: +kleb, proteus, strep in abd Cx, flagyl (4/9-4/20), cefepime (4/9-4/20); Sputum cx: MRSA, vancomycin (4/10-5/4) [Dc'd//ceftriaxone (4/9-4/9)// unasyn (4/8-4/9),  Vanco (4/5-4/7), Zosyn (4/5-4/8)]  Endo: ISS; insulin w/ TPN;  Heme: persistent coagulopathy; bilateral iliofemoral DVT / bilateral PE; s/p IVC Filter; heparin gtt dc'd in setting of active bleed  PPx: SCDs; hold heparin gtt  Lines: PIVs, PICC (4/6--), L A line (4/13--) /// D/C: R cordis (4/7-4/11)   Wounds/drains: LLQ IR drain (4/5 -- ), LUQ PHYLLIS (4/7 -- ), R paracolic gutter drain (4/7 --) --- all drains to LIWS; wound vac in abdominal incision SQ space (4/10 --)  PT/OT: early mob 4/11 - worked with PT. 87 yo M with history of b/l laparoscopic robotic-assisted inguinal hernia repair presenting with b/l segmental pulmonary embolisms, left lung abscess, pneumoperitoneum, and distal descending colon abscess (likely source of pneumoperitoneum), significant iliofemoral DVT burden, s/p IVCF placement and IR drainage of LLQ abdominal collection (4/5), s/p ex-lap, LAURYN, sigmoidectomy, drain placement in R. paracolic gutter, and abthera vac placement (4/7), s/p planned RTOR, packing removal, and end colostomy creation (4/10), now w/ MRSA PNA, persistent coagulopathy, plan for bedside pigtail drainage of left lung abscess for source control      Plan:  Neuro: off propofol, fentanyl gtt for pain;   CV: cont to hold home asa/plavix for now; levo for MAP>65; statin; remains fluid overloaded w/ significant anasarca and bilateral effusions; IV lasix PRN  Pulm: 40/470/12/5, intubated since 4/7 on vent-AC, bilateral PE's; MRSA + Proteus PNA; interval increase in L pulmonary abscess, plan for bedside pigtail catheter by pulmonology today  GI: NPO, dobhoff glucerna 1.5@50/hr; malnourished, prealbumin 4; weaning TPN as pt is at goal for feeds.  : rao; strict I'Os, BUN uptrending, intermittent IV lasix as tolerated; remains anasarcic, will consider further diuresis.  ID: +kleb, proteus, strep in abd Cx, flagyl (4/9-4/20), cefepime (4/9-4/20); Sputum cx: MRSA; vancomycin (4/10-5/4) [Dc'd//ceftriaxone (4/9-4/9)// unasyn (4/8-4/9),  Vanco (4/5-4/7), Zosyn (4/5-4/8)]  Endo: ISS; insulin w/ TPN;  Heme: persistent coagulopathy; bilateral iliofemoral DVT / bilateral PE; s/p IVC Filter; heparin gtt dc'd in setting of active bleed  PPx: SCDs; hold heparin gtt  Lines: PIVs, PICC (4/6--), L A line (4/13--) /// D/C: R cordis (4/7-4/11)   Wounds/drains: LLQ IR drain (4/5 -- ), LUQ PHYLLIS (4/7 -- ), R paracolic gutter drain (4/7 --) --- all drains to LIWS; wound vac in abdominal incision SQ space (4/10 --)  PT/OT: early mob 4/11 - worked with PT. 85 yo M with history of b/l laparoscopic robotic-assisted inguinal hernia repair presenting with b/l segmental pulmonary embolisms, left lung abscess, pneumoperitoneum, and distal descending colon abscess (likely source of pneumoperitoneum), significant iliofemoral DVT burden, s/p IVCF placement and IR drainage of LLQ abdominal collection (4/5), s/p ex-lap, LAURYN, sigmoidectomy, drain placement in R. paracolic gutter, and abthera vac placement (4/7), s/p planned RTOR, packing removal, and end colostomy creation (4/10), now w/ MRSA PNA, persistent coagulopathy, plan for bedside pigtail drainage of left lung abscess for source control      Plan:  Neuro: off propofol, fentanyl gtt for pain;   CV: cont to hold home asa/plavix for now; levo for MAP>65 on stable dose for now; statin; remains fluid overloaded w/ significant anasarca and bilateral effusions; IV lasix PRN  Pulm: 40/470/12/5, intubated since 4/7 on vent-AC, bilateral PE's; MRSA + Proteus PNA; interval increase in L pulmonary abscess, plan for bedside pigtail catheter by pulmonology today  GI: NPO, dobhoff glucerna 1.5@50/hr; malnourished, prealbumin 4; weaning TPN as pt is at goal for feeds.  : rao; strict I'Os, BUN uptrending, intermittent IV lasix as tolerated; remains anasarcic, will consider further diuresis.  ID: +kleb, proteus, strep in abd Cx, flagyl (4/9-4/20), cefepime (4/9-4/20); Sputum cx: MRSA; vancomycin (4/10-5/4) [Dc'd//ceftriaxone (4/9-4/9)// unasyn (4/8-4/9),  Vanco (4/5-4/7), Zosyn (4/5-4/8)]  Endo: ISS; insulin w/ TPN;  Heme: persistent coagulopathy; bilateral iliofemoral DVT / bilateral PE; s/p IVC Filter; heparin gtt dc'd in setting of active bleed  PPx: SCDs; hold heparin gtt  Lines: PIVs, PICC (4/6--), L A line (4/13--) /// D/C: R cordis (4/7-4/11)   Wounds/drains: LLQ IR drain (4/5 -- ), LUQ PHYLLIS (4/7 -- ), R paracolic gutter drain (4/7 --) --- all drains to LIWS; wound vac in abdominal incision SQ space (4/10 --)  PT/OT: early mob 4/11 - worked with PT.     Critical care time rendered 50 minutes

## 2019-04-16 LAB
-  AMPICILLIN/SULBACTAM: SIGNIFICANT CHANGE UP
-  AMPICILLIN: SIGNIFICANT CHANGE UP
-  CEFAZOLIN: SIGNIFICANT CHANGE UP
-  CEFTRIAXONE: SIGNIFICANT CHANGE UP
-  GENTAMICIN: SIGNIFICANT CHANGE UP
-  PIPERACILLIN/TAZOBACTAM: SIGNIFICANT CHANGE UP
-  TOBRAMYCIN: SIGNIFICANT CHANGE UP
-  TRIMETHOPRIM/SULFAMETHOXAZOLE: SIGNIFICANT CHANGE UP
-  VANCOMYCIN: SIGNIFICANT CHANGE UP
ALBUMIN SERPL ELPH-MCNC: 1.7 G/DL — LOW (ref 3.3–5)
ALP SERPL-CCNC: 54 U/L — SIGNIFICANT CHANGE UP (ref 40–120)
ALT FLD-CCNC: 10 U/L — SIGNIFICANT CHANGE UP (ref 10–45)
ANION GAP SERPL CALC-SCNC: 8 MMOL/L — SIGNIFICANT CHANGE UP (ref 5–17)
ANION GAP SERPL CALC-SCNC: 8 MMOL/L — SIGNIFICANT CHANGE UP (ref 5–17)
APTT BLD: 28.8 SEC — SIGNIFICANT CHANGE UP (ref 27.5–36.3)
AST SERPL-CCNC: 13 U/L — SIGNIFICANT CHANGE UP (ref 10–40)
B PERT IGG+IGM PNL SER: SIGNIFICANT CHANGE UP
BILIRUB DIRECT SERPL-MCNC: 0.3 MG/DL — HIGH (ref 0–0.2)
BILIRUB INDIRECT FLD-MCNC: 0.2 MG/DL — SIGNIFICANT CHANGE UP (ref 0.2–1)
BILIRUB SERPL-MCNC: 0.5 MG/DL — SIGNIFICANT CHANGE UP (ref 0.2–1.2)
BUN SERPL-MCNC: 35 MG/DL — HIGH (ref 7–23)
BUN SERPL-MCNC: 40 MG/DL — HIGH (ref 7–23)
CALCIUM SERPL-MCNC: 7.4 MG/DL — LOW (ref 8.4–10.5)
CALCIUM SERPL-MCNC: 7.6 MG/DL — LOW (ref 8.4–10.5)
CHLORIDE SERPL-SCNC: 101 MMOL/L — SIGNIFICANT CHANGE UP (ref 96–108)
CHLORIDE SERPL-SCNC: 102 MMOL/L — SIGNIFICANT CHANGE UP (ref 96–108)
CHOLEST FLD-MCNC: 51 MG/DL — SIGNIFICANT CHANGE UP
CO2 SERPL-SCNC: 33 MMOL/L — HIGH (ref 22–31)
CO2 SERPL-SCNC: 33 MMOL/L — HIGH (ref 22–31)
COLOR FLD: SIGNIFICANT CHANGE UP
COMMENT - FLUIDS: SIGNIFICANT CHANGE UP
COMMENT - FLUIDS: SIGNIFICANT CHANGE UP
CREAT SERPL-MCNC: 0.52 MG/DL — SIGNIFICANT CHANGE UP (ref 0.5–1.3)
CREAT SERPL-MCNC: 0.53 MG/DL — SIGNIFICANT CHANGE UP (ref 0.5–1.3)
FLUID INTAKE SUBSTANCE CLASS: SIGNIFICANT CHANGE UP
FLUID SEGMENTED GRANULOCYTES: SIGNIFICANT CHANGE UP %
GLUCOSE BLDC GLUCOMTR-MCNC: 163 MG/DL — HIGH (ref 70–99)
GLUCOSE BLDC GLUCOMTR-MCNC: 165 MG/DL — HIGH (ref 70–99)
GLUCOSE BLDC GLUCOMTR-MCNC: 178 MG/DL — HIGH (ref 70–99)
GLUCOSE BLDC GLUCOMTR-MCNC: 184 MG/DL — HIGH (ref 70–99)
GLUCOSE SERPL-MCNC: 166 MG/DL — HIGH (ref 70–99)
GLUCOSE SERPL-MCNC: 185 MG/DL — HIGH (ref 70–99)
GRAM STN FLD: SIGNIFICANT CHANGE UP
HCT VFR BLD CALC: 27.7 % — LOW (ref 39–50)
HGB BLD-MCNC: 8.7 G/DL — LOW (ref 13–17)
INR BLD: 2.23 — HIGH (ref 0.88–1.16)
MAGNESIUM SERPL-MCNC: 2.2 MG/DL — SIGNIFICANT CHANGE UP (ref 1.6–2.6)
MCHC RBC-ENTMCNC: 30.5 PG — SIGNIFICANT CHANGE UP (ref 27–34)
MCHC RBC-ENTMCNC: 31.4 GM/DL — LOW (ref 32–36)
MCV RBC AUTO: 97.2 FL — SIGNIFICANT CHANGE UP (ref 80–100)
METHOD TYPE: SIGNIFICANT CHANGE UP
METHOD TYPE: SIGNIFICANT CHANGE UP
NRBC # BLD: 0 /100 WBCS — SIGNIFICANT CHANGE UP (ref 0–0)
PHOSPHATE SERPL-MCNC: 2.9 MG/DL — SIGNIFICANT CHANGE UP (ref 2.5–4.5)
PLATELET # BLD AUTO: 233 K/UL — SIGNIFICANT CHANGE UP (ref 150–400)
POTASSIUM SERPL-MCNC: 3.6 MMOL/L — SIGNIFICANT CHANGE UP (ref 3.5–5.3)
POTASSIUM SERPL-MCNC: 3.7 MMOL/L — SIGNIFICANT CHANGE UP (ref 3.5–5.3)
POTASSIUM SERPL-SCNC: 3.6 MMOL/L — SIGNIFICANT CHANGE UP (ref 3.5–5.3)
POTASSIUM SERPL-SCNC: 3.7 MMOL/L — SIGNIFICANT CHANGE UP (ref 3.5–5.3)
PROT SERPL-MCNC: 4.7 G/DL — LOW (ref 6–8.3)
PROTHROM AB SERPL-ACNC: 25.8 SEC — HIGH (ref 10–12.9)
PT 50/50: 13.8 SECS — HIGH (ref 9.7–13.5)
RBC # BLD: 2.85 M/UL — LOW (ref 4.2–5.8)
RBC # FLD: 16 % — HIGH (ref 10.3–14.5)
RCV VOL RI: HIGH /UL (ref 0–5)
SODIUM SERPL-SCNC: 142 MMOL/L — SIGNIFICANT CHANGE UP (ref 135–145)
SODIUM SERPL-SCNC: 143 MMOL/L — SIGNIFICANT CHANGE UP (ref 135–145)
THROMBIN TIME: 21.6 SEC — SIGNIFICANT CHANGE UP (ref 17.6–24)
TOTAL NUCLEATED CELL COUNT, BODY FLUID: HIGH /UL (ref 0–5)
TRIGL FLD-MCNC: 176 MG/DL — SIGNIFICANT CHANGE UP
TUBE TYPE: SIGNIFICANT CHANGE UP
VANCOMYCIN TROUGH SERPL-MCNC: 17.3 UG/ML — SIGNIFICANT CHANGE UP (ref 10–20)
VANCOMYCIN TROUGH SERPL-MCNC: 20.9 UG/ML — HIGH (ref 10–20)
WBC # BLD: 13.41 K/UL — HIGH (ref 3.8–10.5)
WBC # FLD AUTO: 13.41 K/UL — HIGH (ref 3.8–10.5)

## 2019-04-16 PROCEDURE — 71045 X-RAY EXAM CHEST 1 VIEW: CPT | Mod: 26,76

## 2019-04-16 PROCEDURE — 99291 CRITICAL CARE FIRST HOUR: CPT

## 2019-04-16 PROCEDURE — 99233 SBSQ HOSP IP/OBS HIGH 50: CPT

## 2019-04-16 PROCEDURE — 76705 ECHO EXAM OF ABDOMEN: CPT | Mod: 26

## 2019-04-16 RX ORDER — POTASSIUM CHLORIDE 20 MEQ
40 PACKET (EA) ORAL ONCE
Qty: 0 | Refills: 0 | Status: COMPLETED | OUTPATIENT
Start: 2019-04-16 | End: 2019-04-16

## 2019-04-16 RX ORDER — FUROSEMIDE 40 MG
40 TABLET ORAL ONCE
Qty: 0 | Refills: 0 | Status: COMPLETED | OUTPATIENT
Start: 2019-04-16 | End: 2019-04-16

## 2019-04-16 RX ORDER — ALBUMIN HUMAN 25 %
50 VIAL (ML) INTRAVENOUS EVERY 8 HOURS
Qty: 0 | Refills: 0 | Status: DISCONTINUED | OUTPATIENT
Start: 2019-04-16 | End: 2019-04-24

## 2019-04-16 RX ORDER — DORNASE ALFA 1 MG/ML
5 SOLUTION RESPIRATORY (INHALATION) ONCE
Qty: 0 | Refills: 0 | Status: DISCONTINUED | OUTPATIENT
Start: 2019-04-16 | End: 2019-04-16

## 2019-04-16 RX ORDER — FUROSEMIDE 40 MG
80 TABLET ORAL ONCE
Qty: 0 | Refills: 0 | Status: COMPLETED | OUTPATIENT
Start: 2019-04-16 | End: 2019-04-16

## 2019-04-16 RX ORDER — ALTEPLASE 100 MG
10 KIT INTRAVENOUS ONCE
Qty: 0 | Refills: 0 | Status: DISCONTINUED | OUTPATIENT
Start: 2019-04-16 | End: 2019-04-16

## 2019-04-16 RX ORDER — COLLAGENASE CLOSTRIDIUM HIST. 250 UNIT/G
1 OINTMENT (GRAM) TOPICAL ONCE
Qty: 0 | Refills: 0 | Status: COMPLETED | OUTPATIENT
Start: 2019-04-16 | End: 2019-04-16

## 2019-04-16 RX ORDER — VANCOMYCIN HCL 1 G
1250 VIAL (EA) INTRAVENOUS EVERY 12 HOURS
Qty: 0 | Refills: 0 | Status: DISCONTINUED | OUTPATIENT
Start: 2019-04-16 | End: 2019-04-16

## 2019-04-16 RX ORDER — POTASSIUM CHLORIDE 20 MEQ
20 PACKET (EA) ORAL
Qty: 0 | Refills: 0 | Status: COMPLETED | OUTPATIENT
Start: 2019-04-16 | End: 2019-04-16

## 2019-04-16 RX ADMIN — Medication 40 MILLIGRAM(S): at 14:35

## 2019-04-16 RX ADMIN — FENTANYL CITRATE 3.67 MICROGRAM(S)/KG/HR: 50 INJECTION INTRAVENOUS at 21:42

## 2019-04-16 RX ADMIN — Medication 81 MILLIGRAM(S): at 11:55

## 2019-04-16 RX ADMIN — Medication 40 MILLIEQUIVALENT(S): at 19:42

## 2019-04-16 RX ADMIN — CEFEPIME 100 MILLIGRAM(S): 1 INJECTION, POWDER, FOR SOLUTION INTRAMUSCULAR; INTRAVENOUS at 18:53

## 2019-04-16 RX ADMIN — Medication 3.44 MICROGRAM(S)/KG/MIN: at 14:35

## 2019-04-16 RX ADMIN — ZINC SULFATE TAB 220 MG (50 MG ZINC EQUIVALENT) 220 MILLIGRAM(S): 220 (50 ZN) TAB at 11:53

## 2019-04-16 RX ADMIN — Medication 80 MILLIGRAM(S): at 17:09

## 2019-04-16 RX ADMIN — Medication 50 MILLILITER(S): at 16:47

## 2019-04-16 RX ADMIN — PANTOPRAZOLE SODIUM 40 MILLIGRAM(S): 20 TABLET, DELAYED RELEASE ORAL at 11:53

## 2019-04-16 RX ADMIN — NYSTATIN CREAM 1 APPLICATION(S): 100000 CREAM TOPICAL at 18:58

## 2019-04-16 RX ADMIN — Medication 40 MILLIGRAM(S): at 00:47

## 2019-04-16 RX ADMIN — Medication 100 MILLIGRAM(S): at 18:54

## 2019-04-16 RX ADMIN — Medication 100 MILLIGRAM(S): at 01:01

## 2019-04-16 RX ADMIN — PROPOFOL 2.2 MICROGRAM(S)/KG/MIN: 10 INJECTION, EMULSION INTRAVENOUS at 00:57

## 2019-04-16 RX ADMIN — CEFEPIME 100 MILLIGRAM(S): 1 INJECTION, POWDER, FOR SOLUTION INTRAMUSCULAR; INTRAVENOUS at 05:51

## 2019-04-16 RX ADMIN — Medication 166.67 MILLIGRAM(S): at 09:06

## 2019-04-16 RX ADMIN — Medication 500 MILLIGRAM(S): at 11:54

## 2019-04-16 RX ADMIN — Medication 2: at 06:15

## 2019-04-16 RX ADMIN — Medication 2: at 11:52

## 2019-04-16 RX ADMIN — NYSTATIN CREAM 1 APPLICATION(S): 100000 CREAM TOPICAL at 05:52

## 2019-04-16 RX ADMIN — Medication 3.44 MICROGRAM(S)/KG/MIN: at 05:51

## 2019-04-16 RX ADMIN — Medication 1 APPLICATION(S): at 23:57

## 2019-04-16 RX ADMIN — Medication 20 MILLIEQUIVALENT(S): at 07:35

## 2019-04-16 RX ADMIN — Medication 100 MILLIGRAM(S): at 10:45

## 2019-04-16 RX ADMIN — ATORVASTATIN CALCIUM 80 MILLIGRAM(S): 80 TABLET, FILM COATED ORAL at 21:23

## 2019-04-16 RX ADMIN — Medication 40 MILLIEQUIVALENT(S): at 23:56

## 2019-04-16 RX ADMIN — Medication 3.44 MICROGRAM(S)/KG/MIN: at 09:06

## 2019-04-16 RX ADMIN — Medication 2: at 23:55

## 2019-04-16 RX ADMIN — CHLORHEXIDINE GLUCONATE 1 APPLICATION(S): 213 SOLUTION TOPICAL at 05:51

## 2019-04-16 RX ADMIN — Medication 50 MILLILITER(S): at 23:56

## 2019-04-16 RX ADMIN — Medication 2: at 16:33

## 2019-04-16 NOTE — PROGRESS NOTE ADULT - SUBJECTIVE AND OBJECTIVE BOX
Interval History:  ON: ET tube advanced 3cm, pulm administered tPa, given additional lasix 40 ovn  4/15: Vanco level: 26; vanc held; INR 1.66. Fibrinogen sent before FFP-[id number]. mixing studies on tues Vit C and Zinc ordered, Sputum cx + proteus vulgaris and staph aureus. Chest tube placed in afternoon after 4 units of ffp. asa started tonight lipitor started. given lasix in afternoon. Wound vac changed. CXR for chest tube placed Given lasix 40 x1 in afteernoon rounds, Vac changed.      SUBJECTIVE:   Patient seen and examined by bedside. Intubated and sedated in AM. ROS not obtainable.       Vitals - Range in last 12h  T(F): , Max: 98.9 (04-16-19 @ 01:42)  HR:  (76 - 89)  BP:  (88/56 - 119/68)  BP(mean):  (67 - 90)  ABP:  (80/44 - 166/94)  ABP(mean):  (50 - 110)  RR:  (12 - 20)  SpO2:  (95% - 100%)  CVP(mm Hg): --  CVP(cm H2O): --  CO: --  CI: --  PA: --  PA(mean): --  PA(direct): --  PCWP: --    Vitals - Most Recent  T(F): 98.7 (04-16-19 @ 09:00)  HR: 82 (04-16-19 @ 12:00)  BP: 115/63 (04-16-19 @ 12:00)  RR: 17 (04-16-19 @ 12:00)  SpO2: 100% (04-16-19 @ 12:00)  I&O's Detail    15 Apr 2019 07:01  -  16 Apr 2019 07:00  --------------------------------------------------------  IN:    Enteral Tube Flush: 170 mL    fentaNYL Infusion.: 150 mL    fentaNYL Infusion.: 132 mL    Glucerna 1.5: 1050 mL    IV PiggyBack: 400 mL    norepinephrine Infusion: 494 mL    Plasma: 1400 mL    propofol Infusion: 128 mL    propofol Infusion: 64.4 mL    Solution: 50 mL  Total IN: 4038.4 mL    OUT:    Bulb: 250 mL    Bulb: 50 mL    Bulb: 650 mL    Chest Tube: 300 mL    Colostomy: 225 mL    Indwelling Catheter - Urethral: 2605 mL    VAC (Vacuum Assisted Closure) System: 25 mL  Total OUT: 4105 mL    Total NET: -66.6 mL      16 Apr 2019 07:01  -  16 Apr 2019 13:10  --------------------------------------------------------  IN:    Enteral Tube Flush: 35 mL    fentaNYL Infusion.: 30 mL    Glucerna 1.5: 150 mL    IV PiggyBack: 100 mL    norepinephrine Infusion: 93 mL    Solution: 250 mL  Total IN: 658 mL    OUT:    Colostomy: 425 mL    Indwelling Catheter - Urethral: 219 mL  Total OUT: 644 mL    Total NET: 14 mL        Mode: AC/ CMV (Assist Control/ Continuous Mandatory Ventilation)  RR (machine): 12  RR (patient): 14  TV (machine): 500  TV (patient): 499  FiO2: 40  PEEP: 5  ITime: 1  MAP: 8  PIP: 21      Physical Exam  Neuro: intubated, sedated  General: no acute distress  HEENT: PERRL, EOMI, MMM, dobhoff in place w/ TFs  Pulm: intubated, on vent-AC, chest tube in place L posterior chest wallw/ gray output, severe rhonchi bilaterally, worse R>L  C/V: S1S2, non-tachycardic   Abd: nondistended, but firm; LLQ IR drain w/ light serosanguinous output; LUQ PHYLLIS drain w/ serosanguinous output; R paracolic gutter drain w/ serosanguinous output; abdominal incision w/ wound vac in place w/ good seal and serosanguinous output (fascia closed 4/10, vac in subcutaneous space)  Extremities: diffuse anasarca; 2+ pitting edema of bilateral upper and lower extremities  : rao in place; significant 2+ scrotal edema  Skin: mild blanching erythema on thighs, no macerations; anasarcic         LABS:                        8.7    13.41 )-----------( 233      ( 16 Apr 2019 03:38 )             27.7     04-16    142  |  101  |  40<H>  ----------------------------<  166<H>  3.6   |  33<H>  |  0.52    Ca    7.6<L>      16 Apr 2019 03:38  Phos  2.9     04-16  Mg     2.2     04-16    TPro  4.7<L>  /  Alb  1.7<L>  /  TBili  0.5  /  DBili  0.3<H>  /  AST  13  /  ALT  10  /  AlkPhos  54  04-16    LIVER FUNCTIONS - ( 16 Apr 2019 03:38 )  Alb: 1.7 g/dL / Pro: 4.7 g/dL / ALK PHOS: 54 U/L / ALT: 10 U/L / AST: 13 U/L / GGT: x           PT/INR - ( 16 Apr 2019 03:38 )   PT: 25.8 sec;   INR: 2.23          PTT - ( 16 Apr 2019 03:38 )  PTT:28.8 sec  Urinalysis Basic - ( 14 Apr 2019 17:54 )    Color: Yellow / Appearance: Clear / SG: >=1.030 / pH: x  Gluc: x / Ketone: NEGATIVE  / Bili: Negative / Urobili: 0.2 E.U./dL   Blood: x / Protein: 30 mg/dL / Nitrite: NEGATIVE   Leuk Esterase: NEGATIVE / RBC: < 5 /HPF / WBC > 10 /HPF   Sq Epi: x / Non Sq Epi: 5-10 /HPF / Bacteria: Present /HPF        Culture - Fungal, Body Fluid (collected 16 Apr 2019 01:07)  Source: .Body Fluid pleural fluid  Preliminary Report (16 Apr 2019 09:16):    Testing in progress    Culture - Body Fluid with Gram Stain (collected 15 Apr 2019 17:00)  Source: .Body Fluid pleural fluid  Gram Stain (16 Apr 2019 12:48):    **Please Note**: This is a Corrected Report** Previously reported as:    No organisms seen    Numerous white blood cells    Corrected to:    Few Gram Negative Rods    Few Gram Positive Cocci in Pairs and Chains    Numerous White blood cells    ROLA yuan RN  04/16/2019 12:47:48  Preliminary Report (16 Apr 2019 12:50):    Numerous Gram Negative Rods Identification and susceptibility to follow.    Few Gram Positive Cocci Identification to follow.    Culture - Blood (collected 14 Apr 2019 18:30)  Source: .Blood Blood  Preliminary Report (15 Apr 2019 19:00):    No growth at 1 day.    Culture - Sputum (collected 14 Apr 2019 18:30)  Source: .Sputum Sputum  Gram Stain (15 Apr 2019 15:10):    No epithelial cells    Numerous White blood cells    Rare Gram Negative Rods  Preliminary Report (16 Apr 2019 12:54):    Moderate Proteus vulgaris    Moderate Staphylococcus aureus presumptive Methicillin resistant    Confirmation to follow within 24 hours Susceptibility to follow.    Floor previously notified.    Accompanied by normal respiratory bve  Organism: Proteus vulgaris  Staphylococcus aureus (16 Apr 2019 12:56)  Organism: Staphylococcus aureus (16 Apr 2019 12:56)  Organism: Proteus vulgaris (16 Apr 2019 12:43)        MEDICATIONS  (STANDING):  albumin human 25% IVPB 50 milliLiter(s) IV Intermittent every 8 hours  alteplase  Injectable for Pleural Effusion 10 milliGRAM(s) IntraPleural. every 12 hours  ascorbic acid Syrup 500 milliGRAM(s) Oral daily  aspirin  chewable 81 milliGRAM(s) Oral daily  atorvastatin 80 milliGRAM(s) Oral at bedtime  cefepime   IVPB 2000 milliGRAM(s) IV Intermittent every 12 hours  chlorhexidine 2% Cloths 1 Application(s) Topical <User Schedule>  dextrose 5%. 1000 milliLiter(s) (50 mL/Hr) IV Continuous <Continuous>  dextrose 50% Injectable 12.5 Gram(s) IV Push once  dextrose 50% Injectable 25 Gram(s) IV Push once  dextrose 50% Injectable 25 Gram(s) IV Push once  dornase ezequiel Solution for Pleural Effusion 5 milliGRAM(s) IntraPleural. every 12 hours  fentaNYL   Infusion. 0.5 MICROgram(s)/kG/Hr (3.665 mL/Hr) IV Continuous <Continuous>  furosemide   Injectable 40 milliGRAM(s) IV Push once  heparin  flush 100 Units/mL Injectable 100 Unit(s) IV Push every other day  insulin lispro (HumaLOG) corrective regimen sliding scale   SubCutaneous every 6 hours  metroNIDAZOLE  IVPB 500 milliGRAM(s) IV Intermittent every 8 hours  metroNIDAZOLE  IVPB      norepinephrine Infusion 0.05 MICROgram(s)/kG/Min (3.436 mL/Hr) IV Continuous <Continuous>  nystatin Powder 1 Application(s) Topical two times a day  pantoprazole  Injectable 40 milliGRAM(s) IV Push daily  zinc sulfate 220 milliGRAM(s) Oral daily    MEDICATIONS  (PRN):  dextrose 40% Gel 15 Gram(s) Oral once PRN Blood Glucose LESS THAN 70 milliGRAM(s)/deciliter  glucagon  Injectable 1 milliGRAM(s) IntraMuscular once PRN Glucose LESS THAN 70 milligrams/deciliter  ondansetron Injectable 4 milliGRAM(s) IV Push every 6 hours PRN Nausea      RADIOLOGY & ADDITIONAL STUDIES:

## 2019-04-16 NOTE — PROGRESS NOTE ADULT - SUBJECTIVE AND OBJECTIVE BOX
INTERVAL HISTORY: ON s/p Left sided chest tube. still on pressors, received dose of lasix   	  MEDICATIONS:  norepinephrine Infusion 0.05 MICROgram(s)/kG/Min IV Continuous <Continuous>  cefepime   IVPB 2000 milliGRAM(s) IV Intermittent every 12 hours  metroNIDAZOLE  IVPB 500 milliGRAM(s) IV Intermittent every 8 hours  metroNIDAZOLE  IVPB      dornase ezequiel Solution for Pleural Effusion 5 milliGRAM(s) IntraPleural. every 12 hours    fentaNYL   Infusion. 0.5 MICROgram(s)/kG/Hr IV Continuous <Continuous>  ondansetron Injectable 4 milliGRAM(s) IV Push every 6 hours PRN    pantoprazole  Injectable 40 milliGRAM(s) IV Push daily    atorvastatin 80 milliGRAM(s) Oral at bedtime     glucagon  Injectable 1 milliGRAM(s) IntraMuscular once PRN  insulin lispro (HumaLOG) corrective regimen sliding scale   SubCutaneous every 6 hours    albumin human 25% IVPB 50 milliLiter(s) IV Intermittent every 8 hours  alteplase  Injectable for Pleural Effusion 10 milliGRAM(s) IntraPleural. every 12 hours  ascorbic acid Syrup 500 milliGRAM(s) Oral daily  aspirin  chewable 81 milliGRAM(s) Oral daily  chlorhexidine 2% Cloths 1 Application(s) Topical <User Schedule>  dextrose 5%. 1000 milliLiter(s) IV Continuous <Continuous>  heparin  flush 100 Units/mL Injectable 100 Unit(s) IV Push every other day  nystatin Powder 1 Application(s) Topical two times a day  zinc sulfate 220 milliGRAM(s) Oral daily      PHYSICAL EXAM:  T(C): 37.7 (04-16-19 @ 14:13), Max: 37.7 (04-16-19 @ 14:13)  HR: 90 (04-16-19 @ 14:00) (68 - 114)  BP: 106/63 (04-16-19 @ 14:00) (88/56 - 119/68)  RR: 16 (04-16-19 @ 14:00) (11 - 34)  SpO2: 100% (04-16-19 @ 14:00) (92% - 100%)  Wt(kg): --  I&O's Summary    15 Apr 2019 07:01  -  16 Apr 2019 07:00  --------------------------------------------------------  IN: 4038.4 mL / OUT: 4105 mL / NET: -66.6 mL    16 Apr 2019 07:01  -  16 Apr 2019 14:56  --------------------------------------------------------  IN: 800 mL / OUT: 679 mL / NET: 121 mL          Appearance: NAD, sedated   HEENT:   Normal oral mucosa, PERRL, EOMI	  Lymphatic: No lymphadenopathy  Cardiovascular: Normal S1 S2, No JVD, No murmurs   Respiratory: Lungs clear to auscultation	  Psychiatry: A & O x 3  Gastrointestinal:  Soft, Non-tender, + BS	  Skin: No rashes, No ecchymoses, No cyanosis  Neurologic: Non-focal  Extremities: No clubbing, cyanosis or edema    LABS:	 	  CARDIAC MARKERS:                                8.7    13.41 )-----------( 233      ( 16 Apr 2019 03:38 )             27.7     04-16    142  |  101  |  40<H>  ----------------------------<  166<H>  3.6   |  33<H>  |  0.52    Ca    7.6<L>      16 Apr 2019 03:38  Phos  2.9     04-16  Mg     2.2     04-16    TPro  4.7<L>  /  Alb  1.7<L>  /  TBili  0.5  /  DBili  0.3<H>  /  AST  13  /  ALT  10  /  AlkPhos  54  04-16       ASSESSMENT/PLAN: 	      86M w/PMHx CAD s/p mult PCI (last 2012; KIRSTY mLAD, dRCA, pOM2), HTN, HLD, recent b/l lap RA b/l inguinal hernia repair (3/11) initially sent in from [facility] for R inguinal hernia discomfort. Found to have 6x4cm R groin fluid collection, 10x5cm abscess in LLQ w/assoc pneumoperitoneum, L lung empyema, b/l segmental PE, and DVT in b/l CF, L common iliac and internal iliac veins. Underwent exlap w/drainage of 3L ascites, LAURYN, SB resxn w/primary anastomosis, sigmoidectomy (left in discontinuity), abd packing, and drain/VAC placement on 4/7, followed by end-colostomy creation and fascial closure on 4/10. S/p IVCF on 4/5, initially on hep gtt for DVT/PE, however d/c on 4/7 due to intra-op bleeding and not restarted. Home DAPT held since admission. ID following for mult-org induced septic shock. CTSx following for LL empyema. Noted incidentally to have EKG changes, cards c/s for further recs.    1.  diffuse TWI in patient with hx of CAD s/p multiple PCIs in the past, he does not require DAPT for now continuet aspirin statin, still on Levophed to start metoprolol,  keep hg >8.0 given his ischemic history.   monitor electrolytes, K+ >4.0 mg>2.0      will continue to follow with Dr. Corbin.

## 2019-04-16 NOTE — ADVANCED PRACTICE NURSE CONSULT - RECOMMEDATIONS
Collagenase to sacral wound daily, Medihoney to left heel wound. Spoke with EVGENY Landry and house staff.

## 2019-04-16 NOTE — PROGRESS NOTE ADULT - ASSESSMENT
87 yo M with CAD (s/p 3 stents) and h/o EtOH abuse, s/p bilateral robotic inguinal hernia repair on 3/11, now with perforated diverticulitis s/p ex lap, sigmoidectomy, and drain placement with open abdomen on 4/7, RTOR on 4/10 for end colostomy and closure of fascia. Also with bilateral PE, iliofemoral DVT (s/p IVC filter on 4/5), and LLL pulmonary empyema s/p chest tube drainage on 4/15.     Plan:  - Neuro: prop/fent gtts, daily sedation holidays  - CV: septic shock, levophed for MAP > 65, wean as tolerated. Consider workup for adrenal insufficiency. On ASA/statin. Diurese per SICU  - Resp: SBT today with CPAP. Monitor chest tube output. Daily CXR. Need to consider trach soon.  - GI/FEN: tube feeds are at 50 (goal). Add Prostat given protein malnutrition. Cont PPI  - /renal: Abbott, strict I/Os while diuresing  - Endo: ISS  - ID: Vanc, cefepime, flagyl, f/u ID recs (MRSA in sputum, kleb/proteus/strep/bacteroides in abd ctx). F/u ctx  - Heme: INR 2.23, consider Vit K today, no need for FFP  - Lines: Continue PICC and a line  - Wounds/drains: Monitor 3 drains (LLQ, LUQ, paracolic gutter) and wound vac output. Next vac change Wed  - PT: continue working with PT  - Dispo: SICU 85 yo M with CAD (s/p 3 stents) and h/o EtOH abuse, s/p bilateral robotic inguinal hernia repair on 3/11, now with perforated diverticulitis s/p ex lap, sigmoidectomy, and drain placement with open abdomen on 4/7, RTOR on 4/10 for end colostomy and closure of fascia. Also with bilateral PE, iliofemoral DVT (s/p IVC filter on 4/5), and LLL pulmonary empyema s/p chest tube drainage on 4/15.     Plan:  - Neuro: prop/fent gtts, daily sedation holidays  - CV: septic shock, levophed for MAP > 65, wean as tolerated. Consider workup for adrenal insufficiency. On ASA/statin. Diurese per SICU  - Resp: SBT today with CPAP. Monitor chest tube output. Daily CXR. Need to consider trach soon.  - GI/FEN: GI consult for possible liver failure/portal hypertension. Tube feeds are at 50 (goal). Add Prostat given protein malnutrition. Cont PPI.  - /renal: Abbott, strict I/Os while diuresing  - Endo: ISS  - ID: Vanc, cefepime, flagyl, f/u ID recs (MRSA in sputum, kleb/proteus/strep/bacteroides in abd ctx). F/u ctx  - Heme: INR 2.23, consider Vit K today, no need for FFP at this time. Get heme consult.  - Lines: Continue PICC and a line  - Wounds/drains: Monitor 3 drains (LLQ, LUQ, paracolic gutter) and wound vac output. Next vac change Wed  - PT: continue working with PT  - Dispo: SICU  - discussed with Dr. Valencia

## 2019-04-16 NOTE — PROGRESS NOTE ADULT - ASSESSMENT
87 yo M with history of b/l laparoscopic robotic-assisted inguinal hernia repair presenting with b/l segmental pulmonary embolisms, left lung abscess, pneumoperitoneum, and distal descending colon abscess (likely source of pneumoperitoneum), significant iliofemoral DVT burden, s/p IVCF placement and IR drainage of LLQ abdominal collection (4/5), s/p ex-lap, LAURYN, sigmoidectomy, drain placement in R. paracolic gutter, and abthera vac placement (4/7), s/p planned RTOR, packing removal, and end colostomy creation (4/10), now w/ MRSA + proteus PNA, persistent coagulopathy, LLL pulmonary empyema s/p bedside pigtail drainage (4/15)    Plan:   Neuro: Fentanyl, dc propofol   CV: levo for MAP>65; remains fluid overloaded w/ significant anasarca and bilateral effusions; albumin 25%q8hrs, IV lasix PRN asa 81 lipitor, statin  Pulm: 40/470/12/5, intubated since 4/7 on vent-AC, bilateral PE's; Empyema- s/p CTx on 4/15  GI: NPO, dobhoff glucerna 1.5@50/hr; malnourished, prealbumin 4; weaning TPN as pt is at goal for feeds. Vit C, zinc supplementation; GI consult for evaluation of hypoalbuminemia and rule out portal hypertension vs hepatopathy of unclear etiology  : rao; strict I'Os, BUN/Cr rising, remains anasarcic w/ Lasix prn, cont diuresis.  ID: +kleb, proteus, strep in abd Cx, flagyl (4/9-4/20), cefepime (4/9-4/20); Sputum cx: MRSA and proteus (sensitive to cefepine) , vancomycin (4/10-5/4) [Dc'd//ceftriaxone (4/9-4/9)// unasyn (4/8-4/9),  Vanco (4/5-4/7), Zosyn (4/5-4/8)]  Endo: ISS; insulin w/ TPN;  Heme: bilateral iliofemoral DVT / bilateral PE; s/p IVC Filter; heparin gtt dc'd in setting of active bleed. Prolonged INR. PTT- refactory to FFP and Vit K. Mixing study wnr;  PPx: SCDs; hold heparin gtt  Lines: PIVs, PICC (4/6--), L A line (4/13--) /// D/C: R cordis (4/7-4/11)   Wounds/drains: LLQ IR drain (4/5 -- ), LUQ PHYLLIS (4/7 -- ), R paracolic gutter drain (4/7 --) --- all drains to LIWS; wound vac in abdominal incision SQ space; vac [facility] (4/10 --)  PT/OT: early mob 4/11 - worked with PT. 85 yo M with history of b/l laparoscopic robotic-assisted inguinal hernia repair presenting with b/l segmental pulmonary embolisms, left lung abscess, pneumoperitoneum, and distal descending colon abscess (likely source of pneumoperitoneum), significant iliofemoral DVT burden, s/p IVCF placement and IR drainage of LLQ abdominal collection (4/5), s/p ex-lap, LAURYN, sigmoidectomy, drain placement in R. paracolic gutter, and abthera vac placement (4/7), s/p planned RTOR, packing removal, and end colostomy creation (4/10), now w/ MRSA + proteus PNA, persistent coagulopathy, LLL pulmonary empyema s/p bedside pigtail drainage (4/15)    Plan:   Neuro: Fentanyl, dc propofol   CV: levo for MAP>65; remains fluid overloaded w/ significant anasarca and bilateral effusions; albumin 25%q8hrs, IV lasix PRN asa 81 lipitor, statin  Pulm: 40/470/12/5, intubated since 4/7 on vent-AC, bilateral PE's; Empyema- s/p CTx on 4/15  GI: NPO, dobhoff glucerna 1.5@50/hr; malnourished, prealbumin 4; weaning TPN as pt is at goal for feeds. Vit C, zinc supplementation; GI consult for evaluation of hypoalbuminemia and rule out portal hypertension vs hepatopathy of unclear etiology  : rao; strict I'Os, BUN/Cr rising, remains anasarcic w/ Lasix prn, cont diuresis.  ID: +kleb, proteus, strep in abd Cx, flagyl (4/9-4/20), cefepime (4/9-4/20); Sputum cx: MRSA and proteus (sensitive to cefepine) , vancomycin (4/10-5/4) [Dc'd//ceftriaxone (4/9-4/9)// unasyn (4/8-4/9),  Vanco (4/5-4/7), Zosyn (4/5-4/8)]  Endo: ISS; insulin w/ TPN;  Heme: bilateral iliofemoral DVT / bilateral PE; s/p IVC Filter; heparin gtt dc'd in setting of active bleed. Prolonged INR. PTT- refactory to FFP and Vit K. Mixing study wnr;  PPx: SCDs; hold heparin gtt  Lines: PIVs, PICC (4/6--), L A line (4/13--) /// D/C: R cordis (4/7-4/11)   Wounds/drains: LLQ IR drain (4/5 -- ), LUQ PHYLLIS (4/7 -- ), R paracolic gutter drain (4/7 --) --- all drains to LIWS; wound vac in abdominal incision SQ space; vac [facility] (4/10 --)  PT/OT: early mob 4/11 - worked with PT.   Critical care time rendered 50 minutes

## 2019-04-16 NOTE — PROGRESS NOTE ADULT - SUBJECTIVE AND OBJECTIVE BOX
INTERVAL HISTORY:  Patient seen and examined at bedside. Still intubated and sedated but able to answer questions and gestured that he does not have pain.    VITAL SIGNS:  ICU Vital Signs Last 24 Hrs  T(C): 37.7 (16 Apr 2019 14:13), Max: 37.7 (16 Apr 2019 14:13)  T(F): 99.8 (16 Apr 2019 14:13), Max: 99.8 (16 Apr 2019 14:13)  HR: 102 (16 Apr 2019 16:00) (74 - 114)  BP: 86/45 (16 Apr 2019 16:00) (84/52 - 119/68)  BP(mean): 58 (16 Apr 2019 16:00) (58 - 90)  ABP: 108/40 (16 Apr 2019 16:00) (80/44 - 166/94)  ABP(mean): 60 (16 Apr 2019 16:00) (50 - 126)  RR: 12 (16 Apr 2019 16:00) (11 - 34)  SpO2: 98% (16 Apr 2019 16:00) (95% - 100%)    Mode: AC/ CMV (Assist Control/ Continuous Mandatory Ventilation), RR (machine): 12, TV (machine): 500, FiO2: 40, PEEP: 5, ITime: 1, MAP: 9, PIP: 20    04-15 @ 07:01  -  04-16 @ 07:00  --------------------------------------------------------  IN: 4038.4 mL / OUT: 4105 mL / NET: -66.6 mL    04-16 @ 07:01  -  04-16 @ 17:07  --------------------------------------------------------  IN: 993 mL / OUT: 1409 mL / NET: -416 mL      CAPILLARY BLOOD GLUCOSE      POCT Blood Glucose.: 165 mg/dL (16 Apr 2019 16:07)      PHYSICAL EXAM:  Constitutional: Intubated and sedated;   HEENT: NC/AT; PERRL, anicteric sclera; no oropharyngeal erythema or exudates; MMM  Neck: supple, no JVD  Respiratory: ronchorous bilaterally  Cardiovascular: +S1/S2, RRR  Gastrointestinal: abdomen soft, NT/ND; PHYLLIS drains and colostomy site unchanged and appear C/D/I  Extremities: warm but still anasarcic with 2+ pitting edema in all extremities        MEDICATIONS:  MEDICATIONS  (STANDING):  albumin human 25% IVPB 50 milliLiter(s) IV Intermittent every 8 hours  alteplase  Injectable for Pleural Effusion 10 milliGRAM(s) IntraPleural. every 12 hours  ascorbic acid Syrup 500 milliGRAM(s) Oral daily  aspirin  chewable 81 milliGRAM(s) Oral daily  atorvastatin 80 milliGRAM(s) Oral at bedtime  cefepime   IVPB 2000 milliGRAM(s) IV Intermittent every 12 hours  chlorhexidine 2% Cloths 1 Application(s) Topical <User Schedule>  dextrose 5%. 1000 milliLiter(s) (50 mL/Hr) IV Continuous <Continuous>  dextrose 50% Injectable 12.5 Gram(s) IV Push once  dextrose 50% Injectable 25 Gram(s) IV Push once  dextrose 50% Injectable 25 Gram(s) IV Push once  dornase zeequiel Solution for Pleural Effusion 5 milliGRAM(s) IntraPleural. every 12 hours  fentaNYL   Infusion. 0.5 MICROgram(s)/kG/Hr (3.665 mL/Hr) IV Continuous <Continuous>  furosemide   Injectable 80 milliGRAM(s) IV Push once  heparin  flush 100 Units/mL Injectable 100 Unit(s) IV Push every other day  insulin lispro (HumaLOG) corrective regimen sliding scale   SubCutaneous every 6 hours  metroNIDAZOLE  IVPB 500 milliGRAM(s) IV Intermittent every 8 hours  metroNIDAZOLE  IVPB      norepinephrine Infusion 0.05 MICROgram(s)/kG/Min (3.436 mL/Hr) IV Continuous <Continuous>  nystatin Powder 1 Application(s) Topical two times a day  pantoprazole  Injectable 40 milliGRAM(s) IV Push daily  zinc sulfate 220 milliGRAM(s) Oral daily    MEDICATIONS  (PRN):  dextrose 40% Gel 15 Gram(s) Oral once PRN Blood Glucose LESS THAN 70 milliGRAM(s)/deciliter  glucagon  Injectable 1 milliGRAM(s) IntraMuscular once PRN Glucose LESS THAN 70 milligrams/deciliter  ondansetron Injectable 4 milliGRAM(s) IV Push every 6 hours PRN Nausea      ALLERGIES:  Allergies    No Known Allergies    Intolerances        LABS:                        8.7    13.41 )-----------( 233      ( 16 Apr 2019 03:38 )             27.7     04-16    142  |  101  |  40<H>  ----------------------------<  166<H>  3.6   |  33<H>  |  0.52    Ca    7.6<L>      16 Apr 2019 03:38  Phos  2.9     04-16  Mg     2.2     04-16    TPro  4.7<L>  /  Alb  1.7<L>  /  TBili  0.5  /  DBili  0.3<H>  /  AST  13  /  ALT  10  /  AlkPhos  54  04-16    PT/INR - ( 16 Apr 2019 03:38 )   PT: 25.8 sec;   INR: 2.23          PTT - ( 16 Apr 2019 03:38 )  PTT:28.8 sec  Urinalysis Basic - ( 14 Apr 2019 17:54 )    Color: Yellow / Appearance: Clear / SG: >=1.030 / pH: x  Gluc: x / Ketone: NEGATIVE  / Bili: Negative / Urobili: 0.2 E.U./dL   Blood: x / Protein: 30 mg/dL / Nitrite: NEGATIVE   Leuk Esterase: NEGATIVE / RBC: < 5 /HPF / WBC > 10 /HPF   Sq Epi: x / Non Sq Epi: 5-10 /HPF / Bacteria: Present /HPF        RADIOLOGY & ADDITIONAL TESTS:   CXR reviewed - chest tube in place. improved left side opacities.

## 2019-04-16 NOTE — PROGRESS NOTE ADULT - ASSESSMENT
87 yo M initially presenting for right groin discomfort in the setting of a recent bilateral inguinal hernia repair (3/11/19). Currently admitted in SICU after surgical intervention for abdominal abscess and pneumoperitoneum. Consulted for left lung abscess

## 2019-04-16 NOTE — PROGRESS NOTE ADULT - SUBJECTIVE AND OBJECTIVE BOX
24 hr events:    SUBJECTIVE:    Pain:   Abd Pain: [ ] YES [ ] NO  Nausea: [ ] YES [ ] NO            Vomiting: [ ] YES [ ] NO  Diarrhea: [ ] YES [ ] NO         Constipation: [ ] YES [ ] NO     Chest Pain: [ ] YES [ ] NO    SOB:  [ ] YES [ ] NO  Flatus: [ ] YES [ ] NO   BM: [ ] YES [ ] NO  Voiding: [ ] YES [ ] NO    Vital Signs Last 24 Hrs  T(C): 37.2 (16 Apr 2019 01:42), Max: 37.6 (15 Apr 2019 22:01)  T(F): 98.9 (16 Apr 2019 01:42), Max: 99.6 (15 Apr 2019 22:01)  HR: 89 (16 Apr 2019 06:41) (68 - 89)  BP: 111/65 (16 Apr 2019 05:00) (96/51 - 119/64)  BP(mean): 77 (16 Apr 2019 05:00) (69 - 90)  RR: 16 (16 Apr 2019 06:41) (11 - 34)  SpO2: 98% (16 Apr 2019 06:41) (92% - 100%)    Physical Exam:  General: NAD  Pulmonary: Nonlabored breathing, no respiratory distress  Cardiovascular: NSR  Abdominal: soft, NT/ND, no organomegaly  Extremities: WWP, normal strength, no clubbing/cyanosis/edema  Neuro: A/O x3, no focal deficits, normal sensation  Pulses: palpable distal pulses    Lines/drains/tubes:    I&O's Summary    15 Apr 2019 07:01  -  16 Apr 2019 07:00  --------------------------------------------------------  IN: 3602.4 mL / OUT: 3050 mL / NET: 552.4 mL        LABS:                        8.7    13.41 )-----------( 233      ( 16 Apr 2019 03:38 )             27.7     04-16    142  |  101  |  40<H>  ----------------------------<  166<H>  3.6   |  33<H>  |  0.52    Ca    7.6<L>      16 Apr 2019 03:38  Phos  2.9     04-16  Mg     2.2     04-16    TPro  4.7<L>  /  Alb  1.7<L>  /  TBili  0.5  /  DBili  0.3<H>  /  AST  13  /  ALT  10  /  AlkPhos  54  04-16    PT/INR - ( 16 Apr 2019 03:38 )   PT: 25.8 sec;   INR: 2.23          PTT - ( 16 Apr 2019 03:38 )  PTT:28.8 sec  Urinalysis Basic - ( 14 Apr 2019 17:54 )    Color: Yellow / Appearance: Clear / SG: >=1.030 / pH: x  Gluc: x / Ketone: NEGATIVE  / Bili: Negative / Urobili: 0.2 E.U./dL   Blood: x / Protein: 30 mg/dL / Nitrite: NEGATIVE   Leuk Esterase: NEGATIVE / RBC: < 5 /HPF / WBC > 10 /HPF   Sq Epi: x / Non Sq Epi: 5-10 /HPF / Bacteria: Present /HPF      CAPILLARY BLOOD GLUCOSE      POCT Blood Glucose.: 163 mg/dL (16 Apr 2019 06:07)  POCT Blood Glucose.: 145 mg/dL (15 Apr 2019 23:14)  POCT Blood Glucose.: 168 mg/dL (15 Apr 2019 16:23)  POCT Blood Glucose.: 158 mg/dL (15 Apr 2019 11:41)    LIVER FUNCTIONS - ( 16 Apr 2019 03:38 )  Alb: 1.7 g/dL / Pro: 4.7 g/dL / ALK PHOS: 54 U/L / ALT: 10 U/L / AST: 13 U/L / GGT: x             RADIOLOGY & ADDITIONAL STUDIES:  < from: Xray Chest 1 View-PORTABLE IMMEDIATE (04.15.19 @ 23:47) >  FINDINGS: Again today tip of the ETT is above the clavicle, 8 cm above   the lara. Enteric tube, left PICC line, left pigtail chest tube,   cardiac loop recorder, left upper abdominal drain are unchanged.  No   change in bilateral multifocal consolidations. There is a decrease in   right pleural effusion. Small left pleural effusion. No pneumothorax.    < end of copied text > 24 hr events:  ON: ET tube advanced 3cm, pulm administered tPa, given additional lasix 40 ovn  4/15: Vanco level: 26; vanc held; INR 1.66. Fibrinogen sent before FFP-459. mixing studies on tues Vit C and Zinc ordered, Sputum cx + proteus vulgaris and staph aureus. Chest tube placed in afternoon after 4 units of ffp. asa started tonight lipitor started. given lasix in afternoon. Wound vac changed. CXR for chest tube placed Given lasix 40 x1 in afteernoon rounds, Vac changed.    SUBJECTIVE: pt intubated/sedated this AM, unable to gather RoS.    Vital Signs Last 24 Hrs  T(C): 37.2 (16 Apr 2019 01:42), Max: 37.6 (15 Apr 2019 22:01)  T(F): 98.9 (16 Apr 2019 01:42), Max: 99.6 (15 Apr 2019 22:01)  HR: 89 (16 Apr 2019 06:41) (68 - 89)  BP: 111/65 (16 Apr 2019 05:00) (96/51 - 119/64)  BP(mean): 77 (16 Apr 2019 05:00) (69 - 90)  RR: 16 (16 Apr 2019 06:41) (11 - 34)  SpO2: 98% (16 Apr 2019 06:41) (92% - 100%)    Physical Exam:  General: NAD  HEENT: dobhoff in place with TFs running  Pulmonary: intubated, upper lobe rhonchi, lower lobe decreased breath sounds. chest tube to water seal  Cardiovascular: NSR  Abdominal: soft, NT/ND, vac on and functioning in midline wound, JPs (LUQ, LLQ, paracolic gutter) x 3 all with yellow serous fluid, no guarding/rigidity/rebound tenderness  : Abbott, scrotal edema persistent  Extremities: WWP, persistent but decreased amount of pitting edema in calves/thighs  Neuro: sedated  Lines: MARÍA PICC, R rad a line, PIVs    I&O's Summary    15 Apr 2019 07:01  -  16 Apr 2019 07:00  --------------------------------------------------------  IN: 3602.4 mL / OUT: 3050 mL / NET: 552.4 mL        LABS:                        8.7    13.41 )-----------( 233      ( 16 Apr 2019 03:38 )             27.7     04-16    142  |  101  |  40<H>  ----------------------------<  166<H>  3.6   |  33<H>  |  0.52    Ca    7.6<L>      16 Apr 2019 03:38  Phos  2.9     04-16  Mg     2.2     04-16    TPro  4.7<L>  /  Alb  1.7<L>  /  TBili  0.5  /  DBili  0.3<H>  /  AST  13  /  ALT  10  /  AlkPhos  54  04-16    PT/INR - ( 16 Apr 2019 03:38 )   PT: 25.8 sec;   INR: 2.23          PTT - ( 16 Apr 2019 03:38 )  PTT:28.8 sec  Urinalysis Basic - ( 14 Apr 2019 17:54 )    Color: Yellow / Appearance: Clear / SG: >=1.030 / pH: x  Gluc: x / Ketone: NEGATIVE  / Bili: Negative / Urobili: 0.2 E.U./dL   Blood: x / Protein: 30 mg/dL / Nitrite: NEGATIVE   Leuk Esterase: NEGATIVE / RBC: < 5 /HPF / WBC > 10 /HPF   Sq Epi: x / Non Sq Epi: 5-10 /HPF / Bacteria: Present /HPF      CAPILLARY BLOOD GLUCOSE  POCT Blood Glucose.: 163 mg/dL (16 Apr 2019 06:07)  POCT Blood Glucose.: 145 mg/dL (15 Apr 2019 23:14)  POCT Blood Glucose.: 168 mg/dL (15 Apr 2019 16:23)  POCT Blood Glucose.: 158 mg/dL (15 Apr 2019 11:41)    LIVER FUNCTIONS - ( 16 Apr 2019 03:38 )  Alb: 1.7 g/dL / Pro: 4.7 g/dL / ALK PHOS: 54 U/L / ALT: 10 U/L / AST: 13 U/L / GGT: x             RADIOLOGY & ADDITIONAL STUDIES:  < from: Xray Chest 1 View-PORTABLE IMMEDIATE (04.15.19 @ 23:47) >  FINDINGS: Again today tip of the ETT is above the clavicle, 8 cm above   the lara. Enteric tube, left PICC line, left pigtail chest tube,   cardiac loop recorder, left upper abdominal drain are unchanged.  No   change in bilateral multifocal consolidations. There is a decrease in   right pleural effusion. Small left pleural effusion. No pneumothorax.

## 2019-04-16 NOTE — ADVANCED PRACTICE NURSE CONSULT - ASSESSMENT
Pt has developed an unstageable pressure injury at previously denuded site on sacrum measuring approx 3x2.9 cm, eschar covering approx 40% of wound bed, minimal drainage. Stage 3 pressure injury also noted on left heel measuring 1x1x0.2 cm, scant serosanguinous drainage, subcutaneous tissue visible. Pt has Z-ivonne boots on bilaterally, being turned with wedges from [employer].

## 2019-04-16 NOTE — PROGRESS NOTE ADULT - PROBLEM SELECTOR PLAN 1
s/p left chest tube insertion. Initially drained fluid was purulent, highly exudative on the fluid analysis, and has shown proteus and strep anginosus on gram stain. 300cc's of hemorrhagic fluid has been drained in the last 24 hours after tpa/dorinase administration last night. Though the fluid was complex, it is still continuing to drain and additional lytic agents are likely not needed as of now. There is intermittent mild air leak noted in the water seal and it is unclear whether this was truly a lung abscess vs an empyema at this point, but there is higher suspicion for the former.     Recommend  - Continue to drain chest tube on water seal  - Antibiotics to continue  - Will monitor degree of air leak and POCUS exam daily.

## 2019-04-17 LAB
-  AMPICILLIN/SULBACTAM: SIGNIFICANT CHANGE UP
-  AMPICILLIN: SIGNIFICANT CHANGE UP
-  CEFAZOLIN: SIGNIFICANT CHANGE UP
-  CEFAZOLIN: SIGNIFICANT CHANGE UP
-  CEFTRIAXONE: SIGNIFICANT CHANGE UP
-  CEFTRIAXONE: SIGNIFICANT CHANGE UP
-  CLINDAMYCIN: SIGNIFICANT CHANGE UP
-  CLINDAMYCIN: SIGNIFICANT CHANGE UP
-  DAPTOMYCIN: SIGNIFICANT CHANGE UP
-  ERYTHROMYCIN: SIGNIFICANT CHANGE UP
-  ERYTHROMYCIN: SIGNIFICANT CHANGE UP
-  GENTAMICIN: SIGNIFICANT CHANGE UP
-  LINEZOLID: SIGNIFICANT CHANGE UP
-  OXACILLIN: SIGNIFICANT CHANGE UP
-  PENICILLIN: SIGNIFICANT CHANGE UP
-  PENICILLIN: SIGNIFICANT CHANGE UP
-  PIPERACILLIN/TAZOBACTAM: SIGNIFICANT CHANGE UP
-  RIFAMPIN: SIGNIFICANT CHANGE UP
-  TETRACYCLINE: SIGNIFICANT CHANGE UP
-  TOBRAMYCIN: SIGNIFICANT CHANGE UP
-  TRIMETHOPRIM/SULFAMETHOXAZOLE: SIGNIFICANT CHANGE UP
-  TRIMETHOPRIM/SULFAMETHOXAZOLE: SIGNIFICANT CHANGE UP
-  VANCOMYCIN: SIGNIFICANT CHANGE UP
-  VANCOMYCIN: SIGNIFICANT CHANGE UP
ANION GAP SERPL CALC-SCNC: 7 MMOL/L — SIGNIFICANT CHANGE UP (ref 5–17)
APTT BLD: 31.8 SEC — SIGNIFICANT CHANGE UP (ref 27.5–36.3)
BUN SERPL-MCNC: 39 MG/DL — HIGH (ref 7–23)
CALCIUM SERPL-MCNC: 7.6 MG/DL — LOW (ref 8.4–10.5)
CHLORIDE SERPL-SCNC: 103 MMOL/L — SIGNIFICANT CHANGE UP (ref 96–108)
CO2 SERPL-SCNC: 33 MMOL/L — HIGH (ref 22–31)
CREAT SERPL-MCNC: 0.5 MG/DL — SIGNIFICANT CHANGE UP (ref 0.5–1.3)
CULTURE RESULTS: SIGNIFICANT CHANGE UP
FACT II INHIB PPP-ACNC: 35 % — LOW (ref 74–142)
FACT IX PPP CHRO-ACNC: 86 % — SIGNIFICANT CHANGE UP (ref 69–167)
FACT V ACT/NOR PPP: 49 % — LOW (ref 70–143)
FACT VII ACT/NOR PPP: 11 % — LOW (ref 53–149)
FACT X ACT/NOR PPP: 46 % — LOW (ref 68–149)
FACT XII ACT/NOR PPP: 57 % — LOW (ref 73–148)
FACT XIIA PPP-ACNC: 33 % — LOW (ref 51–180)
GLUCOSE BLDC GLUCOMTR-MCNC: 152 MG/DL — HIGH (ref 70–99)
GLUCOSE BLDC GLUCOMTR-MCNC: 153 MG/DL — HIGH (ref 70–99)
GLUCOSE BLDC GLUCOMTR-MCNC: 157 MG/DL — HIGH (ref 70–99)
GLUCOSE BLDC GLUCOMTR-MCNC: 184 MG/DL — HIGH (ref 70–99)
GLUCOSE SERPL-MCNC: 172 MG/DL — HIGH (ref 70–99)
HCT VFR BLD CALC: 25.4 % — LOW (ref 39–50)
HGB BLD-MCNC: 8 G/DL — LOW (ref 13–17)
INR BLD: 2.44 — HIGH (ref 0.88–1.16)
MAGNESIUM SERPL-MCNC: 2 MG/DL — SIGNIFICANT CHANGE UP (ref 1.6–2.6)
MCHC RBC-ENTMCNC: 30.5 PG — SIGNIFICANT CHANGE UP (ref 27–34)
MCHC RBC-ENTMCNC: 31.5 GM/DL — LOW (ref 32–36)
MCV RBC AUTO: 96.9 FL — SIGNIFICANT CHANGE UP (ref 80–100)
METHOD TYPE: SIGNIFICANT CHANGE UP
NON-GYNECOLOGICAL CYTOLOGY STUDY: SIGNIFICANT CHANGE UP
NRBC # BLD: 0 /100 WBCS — SIGNIFICANT CHANGE UP (ref 0–0)
PHOSPHATE SERPL-MCNC: 2.1 MG/DL — LOW (ref 2.5–4.5)
PLATELET # BLD AUTO: 234 K/UL — SIGNIFICANT CHANGE UP (ref 150–400)
POTASSIUM SERPL-MCNC: 3.9 MMOL/L — SIGNIFICANT CHANGE UP (ref 3.5–5.3)
POTASSIUM SERPL-SCNC: 3.9 MMOL/L — SIGNIFICANT CHANGE UP (ref 3.5–5.3)
PROTHROM AB SERPL-ACNC: 28.3 SEC — HIGH (ref 10–12.9)
RBC # BLD: 2.62 M/UL — LOW (ref 4.2–5.8)
RBC # FLD: 16.2 % — HIGH (ref 10.3–14.5)
SODIUM SERPL-SCNC: 143 MMOL/L — SIGNIFICANT CHANGE UP (ref 135–145)
SPECIMEN SOURCE: SIGNIFICANT CHANGE UP
VANCOMYCIN FLD-MCNC: 18.2 UG/ML — SIGNIFICANT CHANGE UP
WBC # BLD: 12.52 K/UL — HIGH (ref 3.8–10.5)
WBC # FLD AUTO: 12.52 K/UL — HIGH (ref 3.8–10.5)

## 2019-04-17 PROCEDURE — 99233 SBSQ HOSP IP/OBS HIGH 50: CPT | Mod: GC

## 2019-04-17 PROCEDURE — 99291 CRITICAL CARE FIRST HOUR: CPT

## 2019-04-17 PROCEDURE — 99233 SBSQ HOSP IP/OBS HIGH 50: CPT

## 2019-04-17 PROCEDURE — 71045 X-RAY EXAM CHEST 1 VIEW: CPT | Mod: 26

## 2019-04-17 RX ORDER — VANCOMYCIN HCL 1 G
1000 VIAL (EA) INTRAVENOUS ONCE
Qty: 0 | Refills: 0 | Status: COMPLETED | OUTPATIENT
Start: 2019-04-17 | End: 2019-04-17

## 2019-04-17 RX ORDER — POTASSIUM CHLORIDE 20 MEQ
40 PACKET (EA) ORAL ONCE
Qty: 0 | Refills: 0 | Status: COMPLETED | OUTPATIENT
Start: 2019-04-17 | End: 2019-04-17

## 2019-04-17 RX ORDER — MIDODRINE HYDROCHLORIDE 2.5 MG/1
5 TABLET ORAL EVERY 8 HOURS
Qty: 0 | Refills: 0 | Status: DISCONTINUED | OUTPATIENT
Start: 2019-04-17 | End: 2019-04-18

## 2019-04-17 RX ORDER — FUROSEMIDE 40 MG
40 TABLET ORAL ONCE
Qty: 0 | Refills: 0 | Status: COMPLETED | OUTPATIENT
Start: 2019-04-17 | End: 2019-04-17

## 2019-04-17 RX ORDER — FENTANYL CITRATE 50 UG/ML
25 INJECTION INTRAVENOUS
Qty: 0 | Refills: 0 | Status: DISCONTINUED | OUTPATIENT
Start: 2019-04-17 | End: 2019-04-24

## 2019-04-17 RX ORDER — FENTANYL CITRATE 50 UG/ML
12.5 INJECTION INTRAVENOUS
Qty: 0 | Refills: 0 | Status: DISCONTINUED | OUTPATIENT
Start: 2019-04-17 | End: 2019-04-24

## 2019-04-17 RX ORDER — ASCORBIC ACID 60 MG
250 TABLET,CHEWABLE ORAL DAILY
Qty: 0 | Refills: 0 | Status: DISCONTINUED | OUTPATIENT
Start: 2019-04-17 | End: 2019-05-08

## 2019-04-17 RX ADMIN — Medication 50 MILLILITER(S): at 07:45

## 2019-04-17 RX ADMIN — Medication 40 MILLIGRAM(S): at 22:03

## 2019-04-17 RX ADMIN — Medication 2: at 12:26

## 2019-04-17 RX ADMIN — Medication 81 MILLIGRAM(S): at 12:20

## 2019-04-17 RX ADMIN — Medication 2: at 05:26

## 2019-04-17 RX ADMIN — MIDODRINE HYDROCHLORIDE 5 MILLIGRAM(S): 2.5 TABLET ORAL at 10:47

## 2019-04-17 RX ADMIN — Medication 100 MILLIGRAM(S): at 18:26

## 2019-04-17 RX ADMIN — Medication 3.44 MICROGRAM(S)/KG/MIN: at 05:26

## 2019-04-17 RX ADMIN — CEFEPIME 100 MILLIGRAM(S): 1 INJECTION, POWDER, FOR SOLUTION INTRAMUSCULAR; INTRAVENOUS at 05:25

## 2019-04-17 RX ADMIN — ZINC SULFATE TAB 220 MG (50 MG ZINC EQUIVALENT) 220 MILLIGRAM(S): 220 (50 ZN) TAB at 12:19

## 2019-04-17 RX ADMIN — FENTANYL CITRATE 25 MICROGRAM(S): 50 INJECTION INTRAVENOUS at 22:15

## 2019-04-17 RX ADMIN — Medication 40 MILLIEQUIVALENT(S): at 12:19

## 2019-04-17 RX ADMIN — NYSTATIN CREAM 1 APPLICATION(S): 100000 CREAM TOPICAL at 05:27

## 2019-04-17 RX ADMIN — PANTOPRAZOLE SODIUM 40 MILLIGRAM(S): 20 TABLET, DELAYED RELEASE ORAL at 12:11

## 2019-04-17 RX ADMIN — Medication 2: at 18:29

## 2019-04-17 RX ADMIN — FENTANYL CITRATE 25 MICROGRAM(S): 50 INJECTION INTRAVENOUS at 14:05

## 2019-04-17 RX ADMIN — Medication 250 MILLIGRAM(S): at 12:28

## 2019-04-17 RX ADMIN — Medication 100 MILLIGRAM(S): at 10:48

## 2019-04-17 RX ADMIN — CEFEPIME 100 MILLIGRAM(S): 1 INJECTION, POWDER, FOR SOLUTION INTRAMUSCULAR; INTRAVENOUS at 18:25

## 2019-04-17 RX ADMIN — NYSTATIN CREAM 1 APPLICATION(S): 100000 CREAM TOPICAL at 18:27

## 2019-04-17 RX ADMIN — MIDODRINE HYDROCHLORIDE 5 MILLIGRAM(S): 2.5 TABLET ORAL at 22:03

## 2019-04-17 RX ADMIN — CHLORHEXIDINE GLUCONATE 1 APPLICATION(S): 213 SOLUTION TOPICAL at 05:26

## 2019-04-17 RX ADMIN — FENTANYL CITRATE 25 MICROGRAM(S): 50 INJECTION INTRAVENOUS at 14:30

## 2019-04-17 RX ADMIN — Medication 100 MILLIGRAM(S): at 02:02

## 2019-04-17 RX ADMIN — Medication 50 MILLILITER(S): at 18:31

## 2019-04-17 RX ADMIN — Medication 40 MILLIEQUIVALENT(S): at 22:04

## 2019-04-17 RX ADMIN — Medication 40 MILLIGRAM(S): at 12:12

## 2019-04-17 RX ADMIN — FENTANYL CITRATE 25 MICROGRAM(S): 50 INJECTION INTRAVENOUS at 22:36

## 2019-04-17 RX ADMIN — ATORVASTATIN CALCIUM 80 MILLIGRAM(S): 80 TABLET, FILM COATED ORAL at 22:04

## 2019-04-17 RX ADMIN — Medication 250 MILLIGRAM(S): at 06:46

## 2019-04-17 NOTE — PROGRESS NOTE ADULT - ATTENDING COMMENTS
Assessment: Patient personally seen and examined myself during rounds with the Fellow  ON DATE 4/17/19    CRITICAL CARE TIME SPENT in evaluation and management, reassessments, review and interpretation of labs and x-rays, ventilator and hemodynamic management, formulating a plan and coordinating care: ___90____ MIN.  Time does not include procedural time.    Lillie Corbin MD

## 2019-04-17 NOTE — PROGRESS NOTE ADULT - SUBJECTIVE AND OBJECTIVE BOX
INTERVAL HISTORY:  	no overnight events,  MEDICATIONS:  midodrine 5 milliGRAM(s) Oral every 8 hours  norepinephrine Infusion 0.05 MICROgram(s)/kG/Min IV Continuous <Continuous>  cefepime   IVPB 2000 milliGRAM(s) IV Intermittent every 12 hours  metroNIDAZOLE  IVPB 500 milliGRAM(s) IV Intermittent every 8 hours  metroNIDAZOLE  IVPB      fentaNYL    Injectable 12.5 MICROGram(s) IV Push every 3 hours PRN  fentaNYL    Injectable 25 MICROGram(s) IV Push every 3 hours PRN  ondansetron Injectable 4 milliGRAM(s) IV Push every 6 hours PRN  pantoprazole  Injectable 40 milliGRAM(s) IV Push daily  atorvastatin 80 milliGRAM(s) Oral at bedtime      albumin human 25% IVPB 50 milliLiter(s) IV Intermittent every 8 hours  ascorbic acid Syrup 250 milliGRAM(s) Oral daily  aspirin  chewable 81 milliGRAM(s) Oral daily  chlorhexidine 2% Cloths 1 Application(s) Topical <User Schedule>  dextrose 5%. 1000 milliLiter(s) IV Continuous <Continuous>  heparin  flush 100 Units/mL Injectable 100 Unit(s) IV Push every other day  nystatin Powder 1 Application(s) Topical two times a day  zinc sulfate 220 milliGRAM(s) Oral daily           PHYSICAL EXAM:  T(C): 37.1 (04-17-19 @ 13:38), Max: 37.4 (04-17-19 @ 09:25)  HR: 82 (04-17-19 @ 15:00) (78 - 109)  BP: 99/50 (04-17-19 @ 15:00) (87/56 - 115/62)  RR: 23 (04-17-19 @ 15:00) (12 - 25)  SpO2: 100% (04-17-19 @ 15:00) (99% - 100%)  Wt(kg): --  I&O's Summary    16 Apr 2019 07:01  -  17 Apr 2019 07:00  --------------------------------------------------------  IN: 3009 mL / OUT: 5054 mL / NET: -2045 mL    17 Apr 2019 07:01  -  17 Apr 2019 16:27  --------------------------------------------------------  IN: 646 mL / OUT: 890 mL / NET: -244 mL          Gen: NAD, alert, follows commands,   Neck: supple  HEENT: Intubated    Cardiovascular: Normal S1 S2, No JVD, No murmurs, No edema  Respiratory: limited exam, rhonchi   Abd:   Soft, Non-tender, + BS	  Extremities: anasarca however much improved           TELEMETRY: 	  few pvcs,    ECG:  	sinus apcs, no acute findings        LABS:	 	    CARDIAC MARKERS:                                  8.0    12.52 )-----------( 234      ( 17 Apr 2019 05:00 )             25.4     04-17    143  |  103  |  39<H>  ----------------------------<  172<H>  3.9   |  33<H>  |  0.50    Ca    7.6<L>      17 Apr 2019 05:00  Phos  2.1     04-17  Mg     2.0     04-17    TPro  4.7<L>  /  Alb  1.7<L>  /  TBili  0.5  /  DBili  0.3<H>  /  AST  13  /  ALT  10  /  AlkPhos  54  04-16         ASSESSMENT/PLAN: 	      86M w/PMHx CAD s/p mult PCI (last 2012; KIRSTY mLAD, dRCA, pOM2), HTN, HLD, recent b/l lap RA b/l inguinal hernia repair (3/11) initially sent in from [facility] for R inguinal hernia discomfort. Found to have 6x4cm R groin fluid collection, 10x5cm abscess in LLQ w/assoc pneumoperitoneum, L lung empyema, b/l segmental PE, and DVT in b/l CF, L common iliac and internal iliac veins. Underwent exlap w/drainage of 3L ascites, LAURYN, SB resxn w/primary anastomosis, sigmoidectomy (left in discontinuity), abd packing, and drain/VAC placement on 4/7, followed by end-colostomy creation and fascial closure on 4/10. S/p IVCF on 4/5, initially on hep gtt for DVT/PE, however d/c on 4/7 due to intra-op bleeding and not restarted. Home DAPT held since admission. ID following for mult-org induced septic shock. CTSx following for LL empyema. Noted incidentally to have EKG changes, cards c/s for further recs.    1.  elevated troponin, resolved, hx of CAD s/p multiple PCIs in the past, he does not require DAPT for now    -start standing lasix 40mg TID   - continue aspirin statin,   -still on Levophed will start metoprolol once hemodynamically stable,    -keep hg >8.0 given his ischemic history.     -monitor electrolytes, K+ >4.0 mg>2.0      will continue to follow with Dr. Corbin.

## 2019-04-17 NOTE — PROGRESS NOTE ADULT - SUBJECTIVE AND OBJECTIVE BOX
INFECTIOUS DISEASE CONSULT PROGRESS NOTE  86M w/ hx of CAD s/p 3 stents, HLD, BPH, hx of ETOH abuse, dementia, b/l inguinal hernias s/p laparoscopic robotic-assisted repair of bilateral inguinal hernias on 3/11/2019, ascites of unclear etiology. He presented from his rehabilitation facility for FTT and R inguinal discomfort. CT in ED with bilateral pulmonary embolism, Left lung abscess, pneumoperitoneum, intraabdominal ascites, LLQ abscess, right inguinal fluid collection. Patient then went to IR for LLQ abscess was drained and an IVC filter. Abdominal culture from 4/5/19 showing Klebsiella pneumoniae, Escherichia coli, Proteus vulgaris group, Streptococcus milleri, viridans group & Bacteroides fragilis. Patient then underwent exploratory laparotomy on 4/7/2019 - with 3L of clear ascites suctioned, portion of small bowel adherent to sigmoid colon and left pelvic side wall with fibrinous exudate and LAURYN performed, sigmoidectomy, placement of right paracolic gutter and left drain in the pelvis, colon left in discontinuity and abdominal vac placed. SICU course complicated by likely septic shock, chest x-ray significant for bilateral infiltrates. Patient was on ID consulted for antibiotic recommendations.       ACTIVE ANTIMICROBIALS/ANTIBIOTICS:  cefepime   IVPB 2000 milliGRAM(s) IV Intermittent every 12 hours  metroNIDAZOLE  IVPB 500 milliGRAM(s) IV Intermittent every 8 hours  metroNIDAZOLE  IVPB          VITAL SIGNS:  ICU Vital Signs Last 24 Hrs  T(C): 37.1 (17 Apr 2019 13:38), Max: 37.4 (17 Apr 2019 09:25)  T(F): 98.8 (17 Apr 2019 13:38), Max: 99.4 (17 Apr 2019 09:25)  HR: 92 (17 Apr 2019 14:00) (78 - 109)  BP: 104/55 (17 Apr 2019 14:00) (84/52 - 115/62)  BP(mean): 67 (17 Apr 2019 14:00) (58 - 88)  ABP: 178/124 (17 Apr 2019 14:00) (92/74 - 208/192)  ABP(mean): 142 (17 Apr 2019 14:00) (60 - 200)  RR: 24 (17 Apr 2019 14:00) (12 - 25)  SpO2: 100% (17 Apr 2019 14:00) (98% - 100%)      PHYSICAL EXAM:  Constitutional: intubated, minimally sedated  Head: NC/AT  Eyes: PERRL, anicteric sclera  ENMT: no rhinorrhea; clear oropharynx; MMM, OGT  Neck: supple  Respiratory: CTA B/L  Cardiovascular: +S1/S2, RRR  Gastrointestinal: soft, NT/ND; intact BS, abdominal woundvac midline  Extremities: WWP; no clubbing, cyanosis, +3 pitting edema in upper and lower extremities  Vascular: 2+ radial, DP/PT pulses B/L  Dermatologic: skin warm and dry; no visible rashes or lesions  Neurologic: following commands    LABS:                        8.0    12.52 )-----------( 234      ( 17 Apr 2019 05:00 )             25.4       04-17    143  |  103  |  39<H>  ----------------------------<  172<H>  3.9   |  33<H>  |  0.50    Ca    7.6<L>      17 Apr 2019 05:00  Phos  2.1     04-17  Mg     2.0     04-17    TPro  4.7<L>  /  Alb  1.7<L>  /  TBili  0.5  /  DBili  0.3<H>  /  AST  13  /  ALT  10  /  AlkPhos  54  04-16          MICROBIOLOGY:    Culture - Fungal, Body Fluid (collected 04-16-19 @ 01:07)  Source: .Body Fluid pleural fluid  Preliminary Report (04-16-19 @ 09:16):    Testing in progress    Culture - Body Fluid with Gram Stain (collected 04-15-19 @ 17:00)  Source: .Body Fluid pleural fluid  Gram Stain (04-16-19 @ 12:48):    **Please Note**: This is a Corrected Report** Previously reported as:    No organisms seen    Numerous white blood cells    Corrected to:    Few Gram Negative Rods    Few Gram Positive Cocci in Pairs and Chains    Numerous White blood cells    ROLA yuan RN  04/16/2019 12:47:48  Preliminary Report (04-16-19 @ 15:15):    Numerous Proteus vulgaris Susceptibility to follow.    Few Streptococcus anginosus Susceptibility to follow.  Organism: Bacteroides ovatus  Streptococcus anginosus  Streptococcus anginosus  Streptococcus anginosus  Streptococcus anginosus (04-17-19 @ 12:43)  Organism: Bacteroides ovatus (04-17-19 @ 12:43)      -  Ampicillin: R >16      -  Ampicillin/Sulbactam: S 8/4      -  Cefazolin: R >16      -  Ceftriaxone: S <=1      -  Gentamicin: S <=1      -  Piperacillin/Tazobactam: S <=8      -  Tobramycin: S <=2      -  Trimethoprim/Sulfamethoxazole: S <=0.5/9.5      Method Type: CASSY  Organism: Streptococcus anginosus (04-17-19 @ 12:42)      -  Ceftriaxone: S 0.25      -  Penicillin: S 0.064      Method Type: ETEST  Organism: Streptococcus anginosus (04-17-19 @ 12:41)      -  Clindamycin: S      -  Erythromycin: S      -  Vancomycin: S      Method Type: KB  Organism: Streptococcus anginosus (04-17-19 @ 09:53)      Method Type: KB  Organism: Streptococcus anginosus (04-17-19 @ 09:44)      -  Clindamycin: R      -  Erythromycin: R      -  Vancomycin: S      Method Type: KB    Culture - Blood (collected 04-14-19 @ 18:30)  Source: .Blood Blood  Preliminary Report (04-16-19 @ 19:00):    No growth at 2 days.    Culture - Sputum (collected 04-14-19 @ 18:30)  Source: .Sputum Sputum  Gram Stain (04-15-19 @ 15:10):    No epithelial cells    Numerous White blood cells    Rare Gram Negative Rods  Final Report (04-17-19 @ 12:17):    Moderate Proteus vulgaris    Moderate Methicillin resistant Staphylococcus aureus    Floor previously notified.    Accompanied by normal respiratory bev  Organism: Proteus vulgaris  Methicillin resistant Staphylococcus aureus  Methicillin resistant Staphylococcus aureus (04-17-19 @ 12:15)  Organism: Methicillin resistant Staphylococcus aureus (04-17-19 @ 12:15)      -  Vancomycin: S 1.5      Method Type: ETEST  Organism: Methicillin resistant Staphylococcus aureus (04-17-19 @ 12:15)      -  Cefazolin: R >16      -  Clindamycin: R >4      -  Daptomycin: S 1      -  Erythromycin: R >4      -  Linezolid: S 4      -  Oxacillin: R >2      -  Penicillin: R >8      -  RIF- Rifampin: S <=1 Should not be used as monotherapy      -  Tetra/Doxy: S <=1      -  Trimethoprim/Sulfamethoxazole: S <=0.5/9.5      Method Type: CASSY  Organism: Proteus vulgaris (04-16-19 @ 12:43)      -  Ampicillin: R >16      -  Ampicillin/Sulbactam: S <=4/2      -  Cefazolin: R 16      -  Ceftriaxone: S <=1      -  Gentamicin: S <=1      -  Piperacillin/Tazobactam: S <=8      -  Tobramycin: S <=2      -  Trimethoprim/Sulfamethoxazole: S <=0.5/9.5      Method Type: CASSY        RADIOLOGY & ADDITIONAL STUDIES:

## 2019-04-17 NOTE — PROGRESS NOTE ADULT - SUBJECTIVE AND OBJECTIVE BOX
INTERVAL HISTORY:  Patient seen and examined at bedside. No acute events overnight. Still intubated but currently titrating down sedation and on CPAP.     VITAL SIGNS:  ICU Vital Signs Last 24 Hrs  T(C): 37.1 (17 Apr 2019 13:38), Max: 37.4 (17 Apr 2019 09:25)  T(F): 98.8 (17 Apr 2019 13:38), Max: 99.4 (17 Apr 2019 09:25)  HR: 82 (17 Apr 2019 15:00) (78 - 109)  BP: 99/50 (17 Apr 2019 15:00) (87/56 - 115/62)  BP(mean): 62 (17 Apr 2019 15:00) (62 - 92)  ABP: 182/134 (17 Apr 2019 15:00) (92/74 - 210/164)  ABP(mean): 150 (17 Apr 2019 15:00) (64 - 200)  RR: 23 (17 Apr 2019 15:00) (12 - 25)  SpO2: 100% (17 Apr 2019 15:00) (99% - 100%)    Mode: CPAP with PS, FiO2: 40, PEEP: 5, PS: 5, MAP: 6.5, PIP: 14    04-16 @ 07:01  -  04-17 @ 07:00  --------------------------------------------------------  IN: 3009 mL / OUT: 5054 mL / NET: -2045 mL    04-17 @ 07:01 - 04-17 @ 16:26  --------------------------------------------------------  IN: 646 mL / OUT: 890 mL / NET: -244 mL      CAPILLARY BLOOD GLUCOSE      POCT Blood Glucose.: 153 mg/dL (17 Apr 2019 11:30)      PHYSICAL EXAM:  Constitutional: intubated and sedated but alert and able to follow commands  HEENT: NC/AT; PERRL, anicteric sclera; no oropharyngeal erythema or exudates; MMM  Neck: supple, no JVD  Respiratory: ronchorous bilaterally, no W/R/R; respirations appear non-labored, speaking full sentences; Chest tube site draining now serous appearing fluid with fluctuations of fluid column in tubing with respiration. Mild intermittent air leak noted in waterseal.   Cardiovascular: +S1/S2, RRR  Gastrointestinal: drain sites and colostomy unchanged, abdomen soft, NT/ND; +BS x4  Extremities: still with persistent pitting edema of all 4 extremities. LE edema may be mildly improved.     MEDICATIONS:  MEDICATIONS  (STANDING):  albumin human 25% IVPB 50 milliLiter(s) IV Intermittent every 8 hours  ascorbic acid Syrup 250 milliGRAM(s) Oral daily  aspirin  chewable 81 milliGRAM(s) Oral daily  atorvastatin 80 milliGRAM(s) Oral at bedtime  cefepime   IVPB 2000 milliGRAM(s) IV Intermittent every 12 hours  chlorhexidine 2% Cloths 1 Application(s) Topical <User Schedule>  dextrose 5%. 1000 milliLiter(s) (50 mL/Hr) IV Continuous <Continuous>  dextrose 50% Injectable 12.5 Gram(s) IV Push once  dextrose 50% Injectable 25 Gram(s) IV Push once  dextrose 50% Injectable 25 Gram(s) IV Push once  heparin  flush 100 Units/mL Injectable 100 Unit(s) IV Push every other day  insulin lispro (HumaLOG) corrective regimen sliding scale   SubCutaneous every 6 hours  metroNIDAZOLE  IVPB 500 milliGRAM(s) IV Intermittent every 8 hours  metroNIDAZOLE  IVPB      midodrine 5 milliGRAM(s) Oral every 8 hours  norepinephrine Infusion 0.05 MICROgram(s)/kG/Min (3.436 mL/Hr) IV Continuous <Continuous>  nystatin Powder 1 Application(s) Topical two times a day  pantoprazole  Injectable 40 milliGRAM(s) IV Push daily  zinc sulfate 220 milliGRAM(s) Oral daily    MEDICATIONS  (PRN):  dextrose 40% Gel 15 Gram(s) Oral once PRN Blood Glucose LESS THAN 70 milliGRAM(s)/deciliter  fentaNYL    Injectable 12.5 MICROGram(s) IV Push every 3 hours PRN Moderate Pain (4 - 6)  fentaNYL    Injectable 25 MICROGram(s) IV Push every 3 hours PRN Severe Pain (7 - 10)  glucagon  Injectable 1 milliGRAM(s) IntraMuscular once PRN Glucose LESS THAN 70 milligrams/deciliter  ondansetron Injectable 4 milliGRAM(s) IV Push every 6 hours PRN Nausea      ALLERGIES:  Allergies    No Known Allergies    Intolerances        LABS:                        8.0    12.52 )-----------( 234      ( 17 Apr 2019 05:00 )             25.4     04-17    143  |  103  |  39<H>  ----------------------------<  172<H>  3.9   |  33<H>  |  0.50    Ca    7.6<L>      17 Apr 2019 05:00  Phos  2.1     04-17  Mg     2.0     04-17    TPro  4.7<L>  /  Alb  1.7<L>  /  TBili  0.5  /  DBili  0.3<H>  /  AST  13  /  ALT  10  /  AlkPhos  54  04-16    PT/INR - ( 17 Apr 2019 05:00 )   PT: 28.3 sec;   INR: 2.44          PTT - ( 17 Apr 2019 05:00 )  PTT:31.8 sec      RADIOLOGY & ADDITIONAL TESTS:

## 2019-04-17 NOTE — PROGRESS NOTE ADULT - ASSESSMENT
85 yo M initially presenting for right groin discomfort in the setting of a recent bilateral inguinal hernia repair (3/11/19). CT abd/pelvis showed a large pneumoperitoneum, abdominal abscess, and fluid collection in the right groin that was surgically managed. We are consulted for a left lung abscess that was found on this initial CT scan

## 2019-04-17 NOTE — PROGRESS NOTE ADULT - ASSESSMENT
86M w/ hx of CAD s/p 3 stents, HLD, BPH, hx of ETOH abuse, dementia, b/l inguinal hernias s/p laparoscopic robotic-assisted repair of bilateral inguinal hernias on 3/11/2019, ascites of unclear etiology. He presented from his rehabilitation facility for FTT and R inguinal discomfort. CT in ED with bilateral pulmonary embolism, Left lung abscess, pneumoperitoneum, intraabdominal ascites, LLQ abscess, right inguinal fluid collection. Patient then went to IR for LLQ abscess was drained and an IVC filter. Abdominal culture from 4/5/19 showing Klebsiella pneumoniae, Escherichia coli, Proteus vulgaris group, Streptococcus milleri, viridans group & Bacteroides fragilis. Patient then underwent exploratory laparotomy on 4/7/2019 - with 3L of clear ascites suctioned, portion of small bowel adherent to sigmoid colon and left pelvic side wall with fibrinous exudate and LAURYN performed, sigmoidectomy, placement of right paracolic gutter and left drain in the pelvis, colon left in discontinuity and abdominal vac placed. SICU course complicated by likely septic shock, chest x-ray significant for bilateral infiltrates. Patient was on ID consulted for antibiotic recommendations.     -c/w cefepime 2g q12h  -c/w vancomycin 1g 12h  -c/w flagyl 500mg q8h  -continue all antibiotics through 5/4/19 86M w/ hx of CAD s/p 3 stents, HLD, BPH, hx of ETOH abuse, dementia, b/l inguinal hernias s/p laparoscopic robotic-assisted repair of bilateral inguinal hernias on 3/11/2019, ascites of unclear etiology. He presented from his rehabilitation facility for FTT and R inguinal discomfort. CT in ED with bilateral pulmonary embolism, Left lung abscess, pneumoperitoneum, intraabdominal ascites, LLQ abscess, right inguinal fluid collection. Patient then went to IR for LLQ abscess was drained and an IVC filter. Abdominal culture from 4/5/19 showing Klebsiella pneumoniae, Escherichia coli, Proteus vulgaris group, Streptococcus milleri, viridans group & Bacteroides fragilis. Patient then underwent exploratory laparotomy on 4/7/2019 - with 3L of clear ascites suctioned, portion of small bowel adherent to sigmoid colon and left pelvic side wall with fibrinous exudate and LAURYN performed, sigmoidectomy, placement of right paracolic gutter and left drain in the pelvis, colon left in discontinuity and abdominal vac placed. SICU course complicated by likely septic shock, chest x-ray significant for LLL abscess/empyema s/p drainage 4/15--cx same as above. ID consulted for antibiotic recommendations.     -c/w cefepime 2g q12h  -c/w vancomycin 1g 12h--monitor at least weekly trough  -c/w flagyl 500mg q8h  -continue above antibiotics through 5/4/19 to complete ~4 week course  -consider palliative care evaluation to assess GOC given FTT, dementia, and prolonged admission    Please reconsult with ?  ID Team 1

## 2019-04-17 NOTE — PROGRESS NOTE ADULT - PROBLEM SELECTOR PLAN 1
s/p left chest tube insertion on 4/16. Chest tube drainage has decreased since insertion, but given that there is still some air leak seen, would still keep it in. The fluid column within the chest tube moves with each breath, which may suggest that the fluid collection was an empyema rather than a lung abscess. However, with the mild airleak that is seen, it is not discernible. But because of the possibility that it was an empyema, would give it time to possibly resolve the air leak/ pleurodese, as well as continuing to drain it. Because it is still draining, would not give any more lytic agents as it may be caustic to extrapulmonary organs as the pulmonary infection may have been 2/2 bacterial translocation from his abdominal infection, suggestive of communication between the two spaces.    Recommend  - Continue to drain chest tube on water seal. Will monitor daily for airleaks and drainage.  - Antibiotics to continue per cultures s/p left chest tube insertion on 4/16. Chest tube drainage has decreased since insertion, but given that there is still some air leak seen, would still keep it in. The fluid column within the chest tube moves with each breath, which may suggest that the fluid collection was an empyema rather than a lung abscess. However, with the mild air leak that is seen, it is not discernible. But because of the possibility that it was an empyema, would give it time to possibly resolve the air leak/ pleurodeses, as well as continuing to drain it. Because it is still draining, would not give any more lytic agents as it may be caustic to extrapulmonary organs as the pulmonary infection may have been 2/2 bacterial translocation from his abdominal infection, suggestive of communication between the two spaces.    Recommend  - Continue to drain chest tube on water seal. Will monitor daily for air leaks and drainage.  - Antibiotics to continue per cultures

## 2019-04-17 NOTE — PROGRESS NOTE ADULT - ATTENDING COMMENTS
Lung abscess/Empyema s/p chest tube placement (drained 350 ml, hemorrhagic fluid) with resolution of persistent fever. Chest tube showed minimal movement of water column. Chest tube was flushed with saline. Plan for bedside ultrasound to decide about chest tube removal. Rest as above.

## 2019-04-17 NOTE — CONSULT NOTE ADULT - SUBJECTIVE AND OBJECTIVE BOX
Hematology Oncology Consult Note (Dr. Marin)  Discussed with Dr. Marin and recommendations reviewed with the primary team.    The patient was seen and examined    CAN FELIX is a 86y Male with history of b/l inguinal herniorrhaphy recently complicated by abdominal abscess now s/p small bowel/sigmoid resection and drainage with drains in place, found to have b/l PE, b/l femoral DVT and iliac vv DVT initially placed on heparin gtt stopped d/t bleeding at drainage sites, then found to have a Proteus lung empyema now s/p chest tube, and MRSA pna.  Pt has been intermittently receiving Vitamin K for treatment of coagulopathy with bleeding, but now with worsening coagulopathy and team hoping to start pt on anticoagulation for treatment of multiple VTE.    Pt not sedated, but intubated so ROS obtained to best ability.  Notes that he drinks alcohol.  Notes abd and chest pain.  Otherwise all other ROS neg.    PAST MEDICAL & SURGICAL HISTORY:  Esophageal reflux  Acute diarrhea  HLD (hyperlipidemia)  HTN (hypertension)  Prostate cancer  Stented coronary artery: x3 , per pt.  Atherosclerosis of coronary artery: CAD (coronary artery disease)  S/P hernia repair  Surgery, elective: cardiac stent x3  History of prostate surgery      Allergies:  NKDA      Medications:  MEDICATIONS  (STANDING):  albumin human 25% IVPB 50 milliLiter(s) IV Intermittent every 8 hours  ascorbic acid Syrup 250 milliGRAM(s) Oral daily  aspirin  chewable 81 milliGRAM(s) Oral daily  atorvastatin 80 milliGRAM(s) Oral at bedtime  cefepime   IVPB 2000 milliGRAM(s) IV Intermittent every 12 hours  chlorhexidine 2% Cloths 1 Application(s) Topical <User Schedule>  dextrose 5%. 1000 milliLiter(s) (50 mL/Hr) IV Continuous <Continuous>  dextrose 50% Injectable 12.5 Gram(s) IV Push once  dextrose 50% Injectable 25 Gram(s) IV Push once  dextrose 50% Injectable 25 Gram(s) IV Push once  heparin  flush 100 Units/mL Injectable 100 Unit(s) IV Push every other day  insulin lispro (HumaLOG) corrective regimen sliding scale   SubCutaneous every 6 hours  metroNIDAZOLE  IVPB 500 milliGRAM(s) IV Intermittent every 8 hours  metroNIDAZOLE  IVPB      midodrine 5 milliGRAM(s) Oral every 8 hours  norepinephrine Infusion 0.05 MICROgram(s)/kG/Min (3.436 mL/Hr) IV Continuous <Continuous>  nystatin Powder 1 Application(s) Topical two times a day  pantoprazole  Injectable 40 milliGRAM(s) IV Push daily  zinc sulfate 220 milliGRAM(s) Oral daily    MEDICATIONS  (PRN):  dextrose 40% Gel 15 Gram(s) Oral once PRN Blood Glucose LESS THAN 70 milliGRAM(s)/deciliter  fentaNYL    Injectable 12.5 MICROGram(s) IV Push every 3 hours PRN Moderate Pain (4 - 6)  fentaNYL    Injectable 25 MICROGram(s) IV Push every 3 hours PRN Severe Pain (7 - 10)  glucagon  Injectable 1 milliGRAM(s) IntraMuscular once PRN Glucose LESS THAN 70 milligrams/deciliter  ondansetron Injectable 4 milliGRAM(s) IV Push every 6 hours PRN Nausea    Social History:  ? etoh    FAMILY HISTORY:  unknown    PHYSICAL EXAM:    T(F): 98.8 (04-17-19 @ 13:38), Max: 99.4 (04-17-19 @ 09:25)  HR: 82 (04-17-19 @ 15:00) (78 - 109)  BP: 99/50 (04-17-19 @ 15:00) (87/56 - 115/62)  RR: 23 (04-17-19 @ 15:00) (12 - 25)  SpO2: 100% (04-17-19 @ 15:00) (99% - 100%)      Gen: anasarca  HEENT: normocephalic/atraumatic, no conjunctival pallor, no scleral icterus, no oral thrush/mucosal bleeding/mucositis  Neck: supple, no masses  Cardiovascular: RR, nl S1S2, no murmurs/rubs/gallops  Respiratory: clear air entry b/l  Gastrointestinal: BS+, soft, NT/ND, no masses, no splenomegaly, no hepatomegaly, no evidence for ascites  Extremities: no clubbing/cyanosis, no edema, no calf tenderness  Vascular:  DP/PT 2+ b/l  Neurological: CN 2-12 grossly intact, no focal deficits  Skin: no rash on visible skin  Lymph Nodes:  no cervical/supraclavicular LAD  Musculoskeletal:  full ROM  Psychiatric:  mood stable    Labs:                          8.0    12.52 )-----------( 234      ( 17 Apr 2019 05:00 )             25.4     CBC Full  -  ( 17 Apr 2019 05:00 )  WBC Count : 12.52 K/uL  RBC Count : 2.62 M/uL  Hemoglobin : 8.0 g/dL  Hematocrit : 25.4 %  Platelet Count - Automated : 234 K/uL  Mean Cell Volume : 96.9 fl  Mean Cell Hemoglobin : 30.5 pg  Mean Cell Hemoglobin Concentration : 31.5 gm/dL  Auto Neutrophil # : x  Auto Lymphocyte # : x  Auto Monocyte # : x  Auto Eosinophil # : x  Auto Basophil # : x  Auto Neutrophil % : x  Auto Lymphocyte % : x  Auto Monocyte % : x  Auto Eosinophil % : x  Auto Basophil % : x    PT/INR - ( 17 Apr 2019 05:00 )   PT: 28.3 sec;   INR: 2.44          PTT - ( 17 Apr 2019 05:00 )  PTT:31.8 sec    04-17    143  |  103  |  39<H>  ----------------------------<  172<H>  3.9   |  33<H>  |  0.50    Ca    7.6<L>      17 Apr 2019 05:00  Phos  2.1     04-17  Mg     2.0     04-17    TPro  4.7<L>  /  Alb  1.7<L>  /  TBili  0.5  /  DBili  0.3<H>  /  AST  13  /  ALT  10  /  AlkPhos  54  04-16

## 2019-04-17 NOTE — PROGRESS NOTE ADULT - SUBJECTIVE AND OBJECTIVE BOX
Interval History:  ON: Vanc trough 20.9  4/16: K repleted; vanco trough 17; vanco given @7am; hgb 8.7 [7.9]; INR 2.23 [1.66]; propofol dc'd, OT for early mobility, Doboff replaced, CXR confirm placement, Adv ET Tube 3 cm, confirmed on CXR, RUQ US doppler ordred. Lasix 40 IV given at 1pm, 25%[id number] ordered. No plan for Alteplase for chest tube; sputum proteus resistant to ampicillin and ancef -- sensitive to cefepime per micro    SUBJECTIVE:   Patient seen and examined by bedside.       Vitals - Range in last 12h  T(F): , Max: 99.4 (04-17-19 @ 09:25)  HR:  (78 - 100)  BP:  (103/58 - 113/58)  BP(mean):  (69 - 82)  ABP:  (92/74 - 208/192)  ABP(mean):  (68 - 200)  RR:  (12 - 25)  SpO2:  (99% - 100%)  CVP(mm Hg): --  CVP(cm H2O): --  CO: --  CI: --  PA: --  PA(mean): --  PA(direct): --  PCWP: --    Vitals - Most Recent  T(F): 99.4 (04-17-19 @ 09:25)  HR: 94 (04-17-19 @ 11:50)  BP: 108/56 (04-17-19 @ 11:00)  RR: 21 (04-17-19 @ 11:00)  SpO2: 99% (04-17-19 @ 11:50)  I&O's Detail    16 Apr 2019 07:01  -  17 Apr 2019 07:00  --------------------------------------------------------  IN:    Albumin 25%: 150 mL    Enteral Tube Flush: 320 mL    fentaNYL Infusion.: 151 mL    Glucerna 1.5: 1100 mL    IV PiggyBack: 600 mL    norepinephrine Infusion: 438 mL    Solution: 250 mL  Total IN: 3009 mL    OUT:    Bulb: 25 mL    Bulb: 525 mL    Chest Tube: 60 mL    Colostomy: 1135 mL    Indwelling Catheter - Urethral: 3309 mL  Total OUT: 5054 mL    Total NET: -2045 mL      17 Apr 2019 07:01  -  17 Apr 2019 12:53  --------------------------------------------------------  IN:    fentaNYL Infusion.: 7 mL    Glucerna 1.5: 100 mL    norepinephrine Infusion: 18 mL  Total IN: 125 mL    OUT:    Indwelling Catheter - Urethral: 165 mL  Total OUT: 165 mL    Total NET: -40 mL        Mode: CPAP with PS  RR (patient): 27  FiO2: 40  PEEP: 5  PS: 5  MAP: 6.5  PIP: 14      Physical Exam          LABS:                        8.0    12.52 )-----------( 234      ( 17 Apr 2019 05:00 )             25.4     04-17    143  |  103  |  39<H>  ----------------------------<  172<H>  3.9   |  33<H>  |  0.50    Ca    7.6<L>      17 Apr 2019 05:00  Phos  2.1     04-17  Mg     2.0     04-17    TPro  4.7<L>  /  Alb  1.7<L>  /  TBili  0.5  /  DBili  0.3<H>  /  AST  13  /  ALT  10  /  AlkPhos  54  04-16    LIVER FUNCTIONS - ( 16 Apr 2019 03:38 )  Alb: 1.7 g/dL / Pro: 4.7 g/dL / ALK PHOS: 54 U/L / ALT: 10 U/L / AST: 13 U/L / GGT: x           PT/INR - ( 17 Apr 2019 05:00 )   PT: 28.3 sec;   INR: 2.44          PTT - ( 17 Apr 2019 05:00 )  PTT:31.8 sec      Culture - Fungal, Body Fluid (collected 16 Apr 2019 01:07)  Source: .Body Fluid pleural fluid  Preliminary Report (16 Apr 2019 09:16):    Testing in progress    Culture - Body Fluid with Gram Stain (collected 15 Apr 2019 17:00)  Source: .Body Fluid pleural fluid  Gram Stain (16 Apr 2019 12:48):    **Please Note**: This is a Corrected Report** Previously reported as:    No organisms seen    Numerous white blood cells    Corrected to:    Few Gram Negative Rods    Few Gram Positive Cocci in Pairs and Chains    Numerous White blood cells    ROLA lissy PEPPER  04/16/2019 12:47:48  Preliminary Report (16 Apr 2019 15:15):    Numerous Proteus vulgaris Susceptibility to follow.    Few Streptococcus anginosus Susceptibility to follow.  Organism: Bacteroides ovatus  Streptococcus anginosus  Streptococcus anginosus  Streptococcus anginosus  Streptococcus anginosus (17 Apr 2019 12:43)  Organism: Bacteroides ovatus (17 Apr 2019 12:43)  Organism: Streptococcus anginosus (17 Apr 2019 12:42)  Organism: Streptococcus anginosus (17 Apr 2019 12:41)  Organism: Streptococcus anginosus (17 Apr 2019 09:53)  Organism: Streptococcus anginosus (17 Apr 2019 09:44)    Culture - Blood (collected 14 Apr 2019 18:30)  Source: .Blood Blood  Preliminary Report (16 Apr 2019 19:00):    No growth at 2 days.    Culture - Sputum (collected 14 Apr 2019 18:30)  Source: .Sputum Sputum  Gram Stain (15 Apr 2019 15:10):    No epithelial cells    Numerous White blood cells    Rare Gram Negative Rods  Final Report (17 Apr 2019 12:17):    Moderate Proteus vulgaris    Moderate Methicillin resistant Staphylococcus aureus    Floor previously notified.    Accompanied by normal respiratory bev  Organism: Proteus vulgaris  Methicillin resistant Staphylococcus aureus  Methicillin resistant Staphylococcus aureus (17 Apr 2019 12:15)  Organism: Methicillin resistant Staphylococcus aureus (17 Apr 2019 12:15)  Organism: Methicillin resistant Staphylococcus aureus (17 Apr 2019 12:15)  Organism: Proteus vulgaris (16 Apr 2019 12:43)        MEDICATIONS  (STANDING):  albumin human 25% IVPB 50 milliLiter(s) IV Intermittent every 8 hours  ascorbic acid Syrup 250 milliGRAM(s) Oral daily  aspirin  chewable 81 milliGRAM(s) Oral daily  atorvastatin 80 milliGRAM(s) Oral at bedtime  cefepime   IVPB 2000 milliGRAM(s) IV Intermittent every 12 hours  chlorhexidine 2% Cloths 1 Application(s) Topical <User Schedule>  dextrose 5%. 1000 milliLiter(s) (50 mL/Hr) IV Continuous <Continuous>  dextrose 50% Injectable 12.5 Gram(s) IV Push once  dextrose 50% Injectable 25 Gram(s) IV Push once  dextrose 50% Injectable 25 Gram(s) IV Push once  heparin  flush 100 Units/mL Injectable 100 Unit(s) IV Push every other day  insulin lispro (HumaLOG) corrective regimen sliding scale   SubCutaneous every 6 hours  metroNIDAZOLE  IVPB 500 milliGRAM(s) IV Intermittent every 8 hours  metroNIDAZOLE  IVPB      midodrine 5 milliGRAM(s) Oral every 8 hours  norepinephrine Infusion 0.05 MICROgram(s)/kG/Min (3.436 mL/Hr) IV Continuous <Continuous>  nystatin Powder 1 Application(s) Topical two times a day  pantoprazole  Injectable 40 milliGRAM(s) IV Push daily  zinc sulfate 220 milliGRAM(s) Oral daily    MEDICATIONS  (PRN):  dextrose 40% Gel 15 Gram(s) Oral once PRN Blood Glucose LESS THAN 70 milliGRAM(s)/deciliter  fentaNYL    Injectable 12.5 MICROGram(s) IV Push every 3 hours PRN Moderate Pain (4 - 6)  fentaNYL    Injectable 25 MICROGram(s) IV Push every 3 hours PRN Severe Pain (7 - 10)  glucagon  Injectable 1 milliGRAM(s) IntraMuscular once PRN Glucose LESS THAN 70 milligrams/deciliter  ondansetron Injectable 4 milliGRAM(s) IV Push every 6 hours PRN Nausea      RADIOLOGY & ADDITIONAL STUDIES: Interval History:  ON: Vanc trough 20.9  4/16: K repleted; vanco trough 17; vanco given @7am; hgb 8.7 [7.9]; INR 2.23 [1.66]; propofol dc'd, OT for early mobility, Doboff replaced, CXR confirm placement, Adv ET Tube 3 cm, confirmed on CXR, RUQ US doppler ordred. Lasix 40 IV given at 1pm, 25%s2ocrmisu ordered. No plan for Alteplase for chest tube; sputum proteus resistant to ampicillin and ancef -- sensitive to cefepime per micro    SUBJECTIVE:   Patient seen and examined by bedside. Patient remains intubated, but alert this morning. Follows commands but was not able to answer yes/no questions. ROS not obtainable. Remains HD stable with stable pressor requirements while being diuresed.      Vitals - Range in last 12h  T(F): , Max: 99.4 (04-17-19 @ 09:25)  HR:  (78 - 100)  BP:  (103/58 - 113/58)  BP(mean):  (69 - 82)  ABP:  (92/74 - 208/192)  ABP(mean):  (68 - 200)  RR:  (12 - 25)  SpO2:  (99% - 100%)  CVP(mm Hg): --  CVP(cm H2O): --  CO: --  CI: --  PA: --  PA(mean): --  PA(direct): --  PCWP: --    Vitals - Most Recent  T(F): 99.4 (04-17-19 @ 09:25)  HR: 94 (04-17-19 @ 11:50)  BP: 108/56 (04-17-19 @ 11:00)  RR: 21 (04-17-19 @ 11:00)  SpO2: 99% (04-17-19 @ 11:50)  I&O's Detail    16 Apr 2019 07:01  -  17 Apr 2019 07:00  --------------------------------------------------------  IN:    Albumin 25%: 150 mL    Enteral Tube Flush: 320 mL    fentaNYL Infusion.: 151 mL    Glucerna 1.5: 1100 mL    IV PiggyBack: 600 mL    norepinephrine Infusion: 438 mL    Solution: 250 mL  Total IN: 3009 mL    OUT:    Bulb: 25 mL    Bulb: 525 mL    Chest Tube: 60 mL    Colostomy: 1135 mL    Indwelling Catheter - Urethral: 3309 mL  Total OUT: 5054 mL    Total NET: -2045 mL      17 Apr 2019 07:01  -  17 Apr 2019 12:53  --------------------------------------------------------  IN:    fentaNYL Infusion.: 7 mL    Glucerna 1.5: 100 mL    norepinephrine Infusion: 18 mL  Total IN: 125 mL    OUT:    Indwelling Catheter - Urethral: 165 mL  Total OUT: 165 mL    Total NET: -40 mL        Mode: CPAP with PS  RR (patient): 27  FiO2: 40  PEEP: 5  PS: 5  MAP: 6.5  PIP: 14      Physical Exam  Neuro: intubated, alert, able to follow simple commands  General: no acute distress  HEENT: PERRL, EOMI, MMM, dobhoff in place w/ TFs  Pulm: intubated, on vent, tolerating cpap w/ no signs of air hunger and RR in 20's, chest tube in place L posterior chest wall w/ gray output, severe rhonchi bilaterally, worse R>L  C/V: S1S2, non-tachycardic   Abd: nondistended, but firm; LLQ IR drain w/ light serosanguinous output; LUQ PHYLLIS drain w/ serosanguinous output; R paracolic gutter drain w/ serosanguinous output; abdominal incision w/ wound vac in place w/ good seal and serosanguinous output (fascia closed 4/10, vac in subcutaneous space)  Extremities: slight improvement in edema/ansarca of lower extremities; persisent 2+ pitting edema of bilateral upper and lower extremities  : rao in place; significant 2+ scrotal edema  Skin: mild blanching erythema on thighs, no macerations        LABS:                        8.0    12.52 )-----------( 234      ( 17 Apr 2019 05:00 )             25.4     04-17    143  |  103  |  39<H>  ----------------------------<  172<H>  3.9   |  33<H>  |  0.50    Ca    7.6<L>      17 Apr 2019 05:00  Phos  2.1     04-17  Mg     2.0     04-17    TPro  4.7<L>  /  Alb  1.7<L>  /  TBili  0.5  /  DBili  0.3<H>  /  AST  13  /  ALT  10  /  AlkPhos  54  04-16    LIVER FUNCTIONS - ( 16 Apr 2019 03:38 )  Alb: 1.7 g/dL / Pro: 4.7 g/dL / ALK PHOS: 54 U/L / ALT: 10 U/L / AST: 13 U/L / GGT: x           PT/INR - ( 17 Apr 2019 05:00 )   PT: 28.3 sec;   INR: 2.44          PTT - ( 17 Apr 2019 05:00 )  PTT:31.8 sec      Culture - Fungal, Body Fluid (collected 16 Apr 2019 01:07)  Source: .Body Fluid pleural fluid  Preliminary Report (16 Apr 2019 09:16):    Testing in progress    Culture - Body Fluid with Gram Stain (collected 15 Apr 2019 17:00)  Source: .Body Fluid pleural fluid  Gram Stain (16 Apr 2019 12:48):    **Please Note**: This is a Corrected Report** Previously reported as:    No organisms seen    Numerous white blood cells    Corrected to:    Few Gram Negative Rods    Few Gram Positive Cocci in Pairs and Chains    Numerous White blood cells    ROLA yuan RN  04/16/2019 12:47:48  Preliminary Report (16 Apr 2019 15:15):    Numerous Proteus vulgaris Susceptibility to follow.    Few Streptococcus anginosus Susceptibility to follow.  Organism: Bacteroides ovatus  Streptococcus anginosus  Streptococcus anginosus  Streptococcus anginosus  Streptococcus anginosus (17 Apr 2019 12:43)  Organism: Bacteroides ovatus (17 Apr 2019 12:43)  Organism: Streptococcus anginosus (17 Apr 2019 12:42)  Organism: Streptococcus anginosus (17 Apr 2019 12:41)  Organism: Streptococcus anginosus (17 Apr 2019 09:53)  Organism: Streptococcus anginosus (17 Apr 2019 09:44)    Culture - Blood (collected 14 Apr 2019 18:30)  Source: .Blood Blood  Preliminary Report (16 Apr 2019 19:00):    No growth at 2 days.    Culture - Sputum (collected 14 Apr 2019 18:30)  Source: .Sputum Sputum  Gram Stain (15 Apr 2019 15:10):    No epithelial cells    Numerous White blood cells    Rare Gram Negative Rods  Final Report (17 Apr 2019 12:17):    Moderate Proteus vulgaris    Moderate Methicillin resistant Staphylococcus aureus    Floor previously notified.    Accompanied by normal respiratory bev  Organism: Proteus vulgaris  Methicillin resistant Staphylococcus aureus  Methicillin resistant Staphylococcus aureus (17 Apr 2019 12:15)  Organism: Methicillin resistant Staphylococcus aureus (17 Apr 2019 12:15)  Organism: Methicillin resistant Staphylococcus aureus (17 Apr 2019 12:15)  Organism: Proteus vulgaris (16 Apr 2019 12:43)        MEDICATIONS  (STANDING):  albumin human 25% IVPB 50 milliLiter(s) IV Intermittent every 8 hours  ascorbic acid Syrup 250 milliGRAM(s) Oral daily  aspirin  chewable 81 milliGRAM(s) Oral daily  atorvastatin 80 milliGRAM(s) Oral at bedtime  cefepime   IVPB 2000 milliGRAM(s) IV Intermittent every 12 hours  chlorhexidine 2% Cloths 1 Application(s) Topical <User Schedule>  dextrose 5%. 1000 milliLiter(s) (50 mL/Hr) IV Continuous <Continuous>  dextrose 50% Injectable 12.5 Gram(s) IV Push once  dextrose 50% Injectable 25 Gram(s) IV Push once  dextrose 50% Injectable 25 Gram(s) IV Push once  heparin  flush 100 Units/mL Injectable 100 Unit(s) IV Push every other day  insulin lispro (HumaLOG) corrective regimen sliding scale   SubCutaneous every 6 hours  metroNIDAZOLE  IVPB 500 milliGRAM(s) IV Intermittent every 8 hours  metroNIDAZOLE  IVPB      midodrine 5 milliGRAM(s) Oral every 8 hours  norepinephrine Infusion 0.05 MICROgram(s)/kG/Min (3.436 mL/Hr) IV Continuous <Continuous>  nystatin Powder 1 Application(s) Topical two times a day  pantoprazole  Injectable 40 milliGRAM(s) IV Push daily  zinc sulfate 220 milliGRAM(s) Oral daily    MEDICATIONS  (PRN):  dextrose 40% Gel 15 Gram(s) Oral once PRN Blood Glucose LESS THAN 70 milliGRAM(s)/deciliter  fentaNYL    Injectable 12.5 MICROGram(s) IV Push every 3 hours PRN Moderate Pain (4 - 6)  fentaNYL    Injectable 25 MICROGram(s) IV Push every 3 hours PRN Severe Pain (7 - 10)  glucagon  Injectable 1 milliGRAM(s) IntraMuscular once PRN Glucose LESS THAN 70 milligrams/deciliter  ondansetron Injectable 4 milliGRAM(s) IV Push every 6 hours PRN Nausea      RADIOLOGY & ADDITIONAL STUDIES:    No AM cxr this roseline

## 2019-04-17 NOTE — CONSULT NOTE ADULT - ASSESSMENT
85 yo male with recent herniorrhaphy complicated with multiple infections/abscesses and now DVT and PE unable to anticoagulate initially now s/p IVC filter now with coagulopathy raising concern to initiate anticoagulation.    #Coagulopathy - prolonged PT/INR with normal PTT; partially corrected mixing study for PT with a normal direct thrombin time  -an isolated prolonged PT/INR with normal PTT raises concern for acquired factor VII def, vit K def, liver disease, lupus anticoagulant, or inhibitor of factor VII (also can be d/t warfarin, but this pt did not receive warfarin)  -factor assays revealed a low factor 2, 5, 7, 10, 12, but normal factor 9  -suggest checking Factor VIII level as a normal level is most likely to raise concern for liver disease  -would check LFTs  -will d/w Dr. Marin, but can probably trial daily Vit K administration to see if factors improve prior to initiation of a/c  -would check LA, antiphospholipid ab, b2-glycoprotein abs  -trend daily CBC, coags; pls check LDH, haptoglobin, d-dimer, fibrinogen in AM; pls also check ESR in AM    #Normocytic anemia  -check Fe studies, B12, folic acid levels  -doubt hemolysis, but will review labs above to assess for hemolysis    Will d/w Dr. Marin

## 2019-04-17 NOTE — PROGRESS NOTE ADULT - ASSESSMENT
87 yo M with history of b/l laparoscopic robotic-assisted inguinal hernia repair presenting with b/l segmental pulmonary embolisms, left lung abscess, pneumoperitoneum, and distal descending colon abscess (likely source of pneumoperitoneum), significant iliofemoral DVT burden, s/p IVCF placement and IR drainage of LLQ abdominal collection (4/5), s/p ex-lap, LAURYN, sigmoidectomy, drain placement in R. paracolic gutter, and abthera vac placement (4/7), s/p planned RTOR, packing removal, and end colostomy creation (4/10), now w/ MRSA + proteus PNA, persistent coagulopathy, LLL pulmonary empyema s/p bedside pigtail drainage (4/15)    Plan:   Neuro: DC Fentanyl, dc propofol, on Fentanyl prn, started Midodrine  CV: levo for MAP>65; remains fluid overloaded w/ significant anasarca and bilateral effusions; albumin 25%q8hrs, IV lasix 40 PRN asa 81 lipitor, statin  Pulm: Tolerating CPAP trial this am40/470/12/5, intubated since 4/7 on vent-AC, bilateral PE's; Empyema- s/p CTx on 4/15  GI: NPO, dobhoff glucerna 1.5@50/hr; malnourished, prealbumin 4; Vit C, zinc supplementation; RUQ US W/Doppler wnl  : rao; strict I'Os, BUN/Cr rising, remains anasarcic w/ Lasix prn, cont diuresis.  ID: +kleb, proteus, strep in abd Cx, flagyl (4/9-4/20), cefepime (4/9-4/20); Sputum cx: MRSA and proteus (sensitive to cefepine) , vancomycin (4/10-5/4) [Dc'd//ceftriaxone (4/9-4/9)// unasyn (4/8-4/9),  Vanco (4/5-4/7), Zosyn (4/5-4/8)]  Endo: ISS; insulin w/ TPN;  Heme: bilateral iliofemoral DVT / bilateral PE; s/p IVC Filter; heparin gtt dc'd in setting of active bleed. Prolonged INR. PTT- refactory to FFP and Vit K. Mixing study wnr;  PPx: SCDs; hold heparin gtt  Lines: PIVs, PICC (4/6--), L A line (4/13--) /// D/C: R cordis (4/7-4/11)   Wounds/drains: LLQ IR drain (4/5 -- ), LUQ PHYLLIS (4/7 -- ), R paracolic gutter drain (4/7 --) --- all drains to LIWS; wound vac in abdominal incision SQ space; vac [facility] (4/10 --)  PT/OT: early mob 4/11 - worked with PT. 87 yo M with history of b/l laparoscopic robotic-assisted inguinal hernia repair presenting with b/l segmental pulmonary embolisms, left lung abscess, pneumoperitoneum, and distal descending colon abscess (likely source of pneumoperitoneum), significant iliofemoral DVT burden, s/p IVCF placement and IR drainage of LLQ abdominal collection (4/5), s/p ex-lap, LAURYN, sigmoidectomy, drain placement in R. paracolic gutter, and abthera vac placement (4/7), s/p planned RTOR, packing removal, and end colostomy creation (4/10), now w/ MRSA + proteus PNA, persistent coagulopathy, LLL pulmonary empyema s/p bedside pigtail drainage (4/15)    Plan:   Neuro: DC Fentanyl, dc propofol, now on Fentanyl prn  CV: levo for MAP>65; aggressive diuresis, net negative 2L; remains fluid overloaded but with mild improvement of anasarca; albumin 25%q8hrs, IV lasix PRN asa 81 lipitor, statin  Pulm: Tolerating CPAP trial this am; 40/470/12/5; pleural effusions improved; intubated since 4/7 on vent-AC, bilateral PE's; Empyema- s/p CTx on 4/15  GI: NPO, dobhoff glucerna 1.5@50/hr; malnourished, prealbumin 4; Vit C, zinc supplementation; RUQ US W/Doppler wnl  : rao; strict I'Os, BUN/Cr rising, remains anasarcic w/ Lasix prn, cont diuresis.  ID: +kleb, proteus, strep in abd Cx, flagyl (4/9-4/20), cefepime (4/9-4/20); Sputum cx: MRSA and proteus, vancomycin (4/10-5/4); pleural fluid cx: strep anginosis, bacteroides ovas (sens pending)   - [Dc'd//ceftriaxone (4/9-4/9)// unasyn (4/8-4/9),  Vanco (4/5-4/7), Zosyn (4/5-4/8)]  Endo: ISS; insulin w/ TPN;  Heme: bilateral iliofemoral DVT / bilateral PE; s/p IVC Filter; heparin gtt dc'd in setting of active bleed. Prolonged INR. PTT- refactory to FFP and Vit K. Mixing study wnr;  PPx: SCDs; hold heparin gtt  Lines: PIVs, PICC (4/6--), L A line (4/13--) /// D/C: R cordis (4/7-4/11)   Wounds/drains: LLQ IR drain (4/5 -- ), LUQ PHYLLIS (4/7 -- ), R paracolic gutter drain (4/7 --) --- all drains to LIWS; wound vac in abdominal incision SQ space; vac [facility] (4/10 --)  PT/OT: early mob 4/11 - worked with PT. 87 yo M with history of b/l laparoscopic robotic-assisted inguinal hernia repair presenting with b/l segmental pulmonary embolisms, left lung abscess, pneumoperitoneum, and distal descending colon abscess (likely source of pneumoperitoneum), significant iliofemoral DVT burden, s/p IVCF placement and IR drainage of LLQ abdominal collection (4/5), s/p ex-lap, LAURYN, sigmoidectomy, drain placement in R. paracolic gutter, and abthera vac placement (4/7), s/p planned RTOR, packing removal, and end colostomy creation (4/10), now w/ MRSA + proteus PNA, persistent coagulopathy, LLL pulmonary empyema s/p bedside pigtail drainage (4/15)    Plan:   Neuro: DC Fentanyl, dc propofol, now on Fentanyl prn  CV: levo for MAP>65; aggressive diuresis, net negative 2L; remains fluid overloaded but with mild improvement of anasarca; albumin 25%q8hrs, IV lasix PRN asa 81 lipitor, statin  Pulm: Tolerating CPAP trial this am; 40/470/12/5; pleural effusions improved; intubated since 4/7 on vent-AC, bilateral PE's; Empyema- s/p CTx on 4/15  GI: NPO, dobhoff glucerna 1.5@50/hr; malnourished, prealbumin 4; Vit C, zinc supplementation; RUQ US W/Doppler wnl  : rao; strict I'Os, BUN/Cr rising, remains anasarcic w/ Lasix prn, cont diuresis.  ID: +kleb, proteus, strep in abd Cx, flagyl (4/9-4/20), cefepime (4/9-4/20); Sputum cx: MRSA and proteus, vancomycin (4/10-5/4); pleural fluid cx: strep anginosis, bacteroides ovas (sens pending)   - [Dc'd//ceftriaxone (4/9-4/9)// unasyn (4/8-4/9),  Vanco (4/5-4/7), Zosyn (4/5-4/8)]  Endo: ISS; insulin w/ TPN;  Heme: bilateral iliofemoral DVT / bilateral PE; s/p IVC Filter; heparin gtt dc'd in setting of active bleed. Prolonged INR. PTT- refactory to FFP and Vit K. Mixing study wnr;  PPx: SCDs; hold heparin gtt  Lines: PIVs, PICC (4/6--), L A line (4/13--) /// D/C: R cordis (4/7-4/11)   Wounds/drains: LLQ IR drain (4/5 -- ), LUQ PHYLLIS (4/7 -- ), R paracolic gutter drain (4/7 --) --- all drains to LIWS; wound vac in abdominal incision SQ space; vac [facility] (4/10 --)  PT/OT: early mob 4/11 - worked with PT.   Critical care time rendered 45 minutes

## 2019-04-18 LAB
ANION GAP SERPL CALC-SCNC: 7 MMOL/L — SIGNIFICANT CHANGE UP (ref 5–17)
APTT BLD: 32 SEC — SIGNIFICANT CHANGE UP (ref 27.5–36.3)
BUN SERPL-MCNC: 35 MG/DL — HIGH (ref 7–23)
CALCIUM SERPL-MCNC: 7.7 MG/DL — LOW (ref 8.4–10.5)
CHLORIDE SERPL-SCNC: 105 MMOL/L — SIGNIFICANT CHANGE UP (ref 96–108)
CO2 SERPL-SCNC: 33 MMOL/L — HIGH (ref 22–31)
CREAT SERPL-MCNC: 0.5 MG/DL — SIGNIFICANT CHANGE UP (ref 0.5–1.3)
D DIMER BLD IA.RAPID-MCNC: 2434 NG/ML DDU — HIGH
ERYTHROCYTE [SEDIMENTATION RATE] IN BLOOD: 20 MM/HR — SIGNIFICANT CHANGE UP
FACT VIII ACT/NOR PPP: 191 % — HIGH (ref 51–138)
FACT XIII ACT/NOR PPP CHRO: 51 % — SIGNIFICANT CHANGE UP (ref 51–163)
FERRITIN SERPL-MCNC: 333 NG/ML — SIGNIFICANT CHANGE UP (ref 30–400)
FIBRINOGEN PPP-MCNC: 272 MG/DL — SIGNIFICANT CHANGE UP (ref 258–438)
GLUCOSE BLDC GLUCOMTR-MCNC: 149 MG/DL — HIGH (ref 70–99)
GLUCOSE BLDC GLUCOMTR-MCNC: 158 MG/DL — HIGH (ref 70–99)
GLUCOSE BLDC GLUCOMTR-MCNC: 158 MG/DL — HIGH (ref 70–99)
GLUCOSE BLDC GLUCOMTR-MCNC: 160 MG/DL — HIGH (ref 70–99)
GLUCOSE BLDC GLUCOMTR-MCNC: 169 MG/DL — HIGH (ref 70–99)
GLUCOSE SERPL-MCNC: 164 MG/DL — HIGH (ref 70–99)
HAPTOGLOB SERPL-MCNC: 190 MG/DL — SIGNIFICANT CHANGE UP (ref 34–200)
HAV IGM SER-ACNC: SIGNIFICANT CHANGE UP
HBV CORE IGM SER-ACNC: SIGNIFICANT CHANGE UP
HBV SURFACE AG SER-ACNC: SIGNIFICANT CHANGE UP
HCT VFR BLD CALC: 23.7 % — LOW (ref 39–50)
HCV AB S/CO SERPL IA: 0.57 S/CO — SIGNIFICANT CHANGE UP
HCV AB SERPL-IMP: SIGNIFICANT CHANGE UP
HGB BLD-MCNC: 7.4 G/DL — LOW (ref 13–17)
INR BLD: 2.39 — HIGH (ref 0.88–1.16)
IRON SATN MFR SERPL: 23 UG/DL — LOW (ref 45–165)
IRON SATN MFR SERPL: 27 % — SIGNIFICANT CHANGE UP (ref 16–55)
LDH SERPL L TO P-CCNC: 190 U/L — SIGNIFICANT CHANGE UP (ref 50–242)
MAGNESIUM SERPL-MCNC: 2 MG/DL — SIGNIFICANT CHANGE UP (ref 1.6–2.6)
MCHC RBC-ENTMCNC: 30.7 PG — SIGNIFICANT CHANGE UP (ref 27–34)
MCHC RBC-ENTMCNC: 31.2 GM/DL — LOW (ref 32–36)
MCV RBC AUTO: 98.3 FL — SIGNIFICANT CHANGE UP (ref 80–100)
NRBC # BLD: 0 /100 WBCS — SIGNIFICANT CHANGE UP (ref 0–0)
PHOSPHATE SERPL-MCNC: 1.8 MG/DL — LOW (ref 2.5–4.5)
PLATELET # BLD AUTO: 236 K/UL — SIGNIFICANT CHANGE UP (ref 150–400)
POTASSIUM SERPL-MCNC: 3.9 MMOL/L — SIGNIFICANT CHANGE UP (ref 3.5–5.3)
POTASSIUM SERPL-SCNC: 3.9 MMOL/L — SIGNIFICANT CHANGE UP (ref 3.5–5.3)
PROTHROM AB SERPL-ACNC: 27.7 SEC — HIGH (ref 10–12.9)
RBC # BLD: 2.41 M/UL — LOW (ref 4.2–5.8)
RBC # FLD: 16 % — HIGH (ref 10.3–14.5)
SODIUM SERPL-SCNC: 145 MMOL/L — SIGNIFICANT CHANGE UP (ref 135–145)
TIBC SERPL-MCNC: 85 UG/DL — LOW (ref 220–430)
TRANSFERRIN SERPL-MCNC: 74 MG/DL — LOW (ref 200–360)
UIBC SERPL-MCNC: 62 UG/DL — LOW (ref 110–370)
VANCOMYCIN FLD-MCNC: 15 UG/ML — SIGNIFICANT CHANGE UP
VANCOMYCIN FLD-MCNC: 18.9 UG/ML — SIGNIFICANT CHANGE UP
WBC # BLD: 11.39 K/UL — HIGH (ref 3.8–10.5)
WBC # FLD AUTO: 11.39 K/UL — HIGH (ref 3.8–10.5)

## 2019-04-18 PROCEDURE — 99232 SBSQ HOSP IP/OBS MODERATE 35: CPT

## 2019-04-18 PROCEDURE — 99223 1ST HOSP IP/OBS HIGH 75: CPT

## 2019-04-18 PROCEDURE — 71045 X-RAY EXAM CHEST 1 VIEW: CPT | Mod: 26

## 2019-04-18 PROCEDURE — 99233 SBSQ HOSP IP/OBS HIGH 50: CPT | Mod: GC

## 2019-04-18 PROCEDURE — 99291 CRITICAL CARE FIRST HOUR: CPT

## 2019-04-18 RX ORDER — FUROSEMIDE 40 MG
40 TABLET ORAL ONCE
Qty: 0 | Refills: 0 | Status: COMPLETED | OUTPATIENT
Start: 2019-04-18 | End: 2019-04-18

## 2019-04-18 RX ORDER — VANCOMYCIN HCL 1 G
1000 VIAL (EA) INTRAVENOUS ONCE
Qty: 0 | Refills: 0 | Status: COMPLETED | OUTPATIENT
Start: 2019-04-18 | End: 2019-04-18

## 2019-04-18 RX ORDER — POTASSIUM CHLORIDE 20 MEQ
40 PACKET (EA) ORAL ONCE
Qty: 0 | Refills: 0 | Status: COMPLETED | OUTPATIENT
Start: 2019-04-18 | End: 2019-04-19

## 2019-04-18 RX ORDER — VANCOMYCIN HCL 1 G
1000 VIAL (EA) INTRAVENOUS DAILY
Qty: 0 | Refills: 0 | Status: DISCONTINUED | OUTPATIENT
Start: 2019-04-19 | End: 2019-04-24

## 2019-04-18 RX ORDER — POTASSIUM CHLORIDE 20 MEQ
40 PACKET (EA) ORAL ONCE
Qty: 0 | Refills: 0 | Status: COMPLETED | OUTPATIENT
Start: 2019-04-18 | End: 2019-04-18

## 2019-04-18 RX ORDER — FUROSEMIDE 40 MG
40 TABLET ORAL ONCE
Qty: 0 | Refills: 0 | Status: COMPLETED | OUTPATIENT
Start: 2019-04-18 | End: 2019-04-19

## 2019-04-18 RX ORDER — MIDODRINE HYDROCHLORIDE 2.5 MG/1
10 TABLET ORAL EVERY 8 HOURS
Qty: 0 | Refills: 0 | Status: DISCONTINUED | OUTPATIENT
Start: 2019-04-18 | End: 2019-04-20

## 2019-04-18 RX ADMIN — MIDODRINE HYDROCHLORIDE 5 MILLIGRAM(S): 2.5 TABLET ORAL at 13:01

## 2019-04-18 RX ADMIN — Medication 50 MILLILITER(S): at 08:05

## 2019-04-18 RX ADMIN — NYSTATIN CREAM 1 APPLICATION(S): 100000 CREAM TOPICAL at 18:15

## 2019-04-18 RX ADMIN — Medication 2: at 23:42

## 2019-04-18 RX ADMIN — Medication 3.44 MICROGRAM(S)/KG/MIN: at 00:07

## 2019-04-18 RX ADMIN — Medication 250 MILLIGRAM(S): at 12:56

## 2019-04-18 RX ADMIN — Medication 40 MILLIGRAM(S): at 16:13

## 2019-04-18 RX ADMIN — Medication 50 MILLILITER(S): at 16:12

## 2019-04-18 RX ADMIN — Medication 2: at 12:55

## 2019-04-18 RX ADMIN — FENTANYL CITRATE 25 MICROGRAM(S): 50 INJECTION INTRAVENOUS at 17:48

## 2019-04-18 RX ADMIN — Medication 50 MILLILITER(S): at 23:44

## 2019-04-18 RX ADMIN — Medication 40 MILLIEQUIVALENT(S): at 06:52

## 2019-04-18 RX ADMIN — Medication 40 MILLIGRAM(S): at 06:52

## 2019-04-18 RX ADMIN — Medication 100 MILLIGRAM(S): at 17:34

## 2019-04-18 RX ADMIN — Medication 2: at 06:52

## 2019-04-18 RX ADMIN — Medication 250 MILLIGRAM(S): at 06:52

## 2019-04-18 RX ADMIN — MIDODRINE HYDROCHLORIDE 5 MILLIGRAM(S): 2.5 TABLET ORAL at 05:16

## 2019-04-18 RX ADMIN — FENTANYL CITRATE 25 MICROGRAM(S): 50 INJECTION INTRAVENOUS at 10:13

## 2019-04-18 RX ADMIN — FENTANYL CITRATE 25 MICROGRAM(S): 50 INJECTION INTRAVENOUS at 09:06

## 2019-04-18 RX ADMIN — PANTOPRAZOLE SODIUM 40 MILLIGRAM(S): 20 TABLET, DELAYED RELEASE ORAL at 13:02

## 2019-04-18 RX ADMIN — Medication 100 MILLIGRAM(S): at 10:27

## 2019-04-18 RX ADMIN — Medication 2: at 00:29

## 2019-04-18 RX ADMIN — CHLORHEXIDINE GLUCONATE 1 APPLICATION(S): 213 SOLUTION TOPICAL at 05:16

## 2019-04-18 RX ADMIN — Medication 100 MILLIGRAM(S): at 01:27

## 2019-04-18 RX ADMIN — NYSTATIN CREAM 1 APPLICATION(S): 100000 CREAM TOPICAL at 05:16

## 2019-04-18 RX ADMIN — MIDODRINE HYDROCHLORIDE 10 MILLIGRAM(S): 2.5 TABLET ORAL at 21:25

## 2019-04-18 RX ADMIN — CEFEPIME 100 MILLIGRAM(S): 1 INJECTION, POWDER, FOR SOLUTION INTRAMUSCULAR; INTRAVENOUS at 18:19

## 2019-04-18 RX ADMIN — FENTANYL CITRATE 12.5 MICROGRAM(S): 50 INJECTION INTRAVENOUS at 06:00

## 2019-04-18 RX ADMIN — Medication 81 MILLIGRAM(S): at 13:02

## 2019-04-18 RX ADMIN — FENTANYL CITRATE 12.5 MICROGRAM(S): 50 INJECTION INTRAVENOUS at 01:27

## 2019-04-18 RX ADMIN — ZINC SULFATE TAB 220 MG (50 MG ZINC EQUIVALENT) 220 MILLIGRAM(S): 220 (50 ZN) TAB at 13:00

## 2019-04-18 RX ADMIN — FENTANYL CITRATE 12.5 MICROGRAM(S): 50 INJECTION INTRAVENOUS at 01:50

## 2019-04-18 RX ADMIN — FENTANYL CITRATE 12.5 MICROGRAM(S): 50 INJECTION INTRAVENOUS at 05:35

## 2019-04-18 RX ADMIN — Medication 50 MILLILITER(S): at 00:08

## 2019-04-18 RX ADMIN — CEFEPIME 100 MILLIGRAM(S): 1 INJECTION, POWDER, FOR SOLUTION INTRAMUSCULAR; INTRAVENOUS at 05:16

## 2019-04-18 RX ADMIN — ATORVASTATIN CALCIUM 80 MILLIGRAM(S): 80 TABLET, FILM COATED ORAL at 21:25

## 2019-04-18 NOTE — PROGRESS NOTE ADULT - SUBJECTIVE AND OBJECTIVE BOX
Heme/Onc Progress Note (Dr. Marin)  Discussed with Dr. Marin and plan reviewed with the primary team.    The patient was seen and examined.      CAN FELIX is a 86y Male with history of b/l inguinal herniorrhaphy recently complicated by abdominal abscess now s/p small bowel/sigmoid resection and drainage with drains in place, found to have b/l PE, b/l femoral DVT and iliac vv DVT initially placed on heparin gtt stopped d/t bleeding at drainage sites, then found to have a Proteus lung empyema now s/p chest tube, and MRSA pna.  Pt has been intermittently receiving Vitamin K for treatment of coagulopathy with bleeding, but now with worsening coagulopathy and team hoping to start pt on anticoagulation for treatment of multiple VTE.    Pt not sedated, but intubated so ROS obtained to best ability.  Notes that he drinks alcohol.  Notes abd and chest pain.  Otherwise all other ROS neg.    Interval History:  Pt continues to remain intubated, but not sedated so able to ask pt questions.  He reports no sig pain.  + wt loss>20 lbs.  No bleeding from drains/external tubes.    ROS is otherwise negative.      Allergies    No Known Allergies    Intolerances    Medications:  MEDICATIONS  (STANDING):  albumin human 25% IVPB 50 milliLiter(s) IV Intermittent every 8 hours  ascorbic acid Syrup 250 milliGRAM(s) Oral daily  aspirin  chewable 81 milliGRAM(s) Oral daily  atorvastatin 80 milliGRAM(s) Oral at bedtime  cefepime   IVPB 2000 milliGRAM(s) IV Intermittent every 12 hours  chlorhexidine 2% Cloths 1 Application(s) Topical <User Schedule>  dextrose 5%. 1000 milliLiter(s) (50 mL/Hr) IV Continuous <Continuous>  dextrose 50% Injectable 12.5 Gram(s) IV Push once  dextrose 50% Injectable 25 Gram(s) IV Push once  dextrose 50% Injectable 25 Gram(s) IV Push once  furosemide   Injectable 40 milliGRAM(s) IV Push once  heparin  flush 100 Units/mL Injectable 100 Unit(s) IV Push every other day  insulin lispro (HumaLOG) corrective regimen sliding scale   SubCutaneous every 6 hours  metroNIDAZOLE  IVPB 500 milliGRAM(s) IV Intermittent every 8 hours  metroNIDAZOLE  IVPB      midodrine 5 milliGRAM(s) Oral every 8 hours  norepinephrine Infusion 0.05 MICROgram(s)/kG/Min (3.436 mL/Hr) IV Continuous <Continuous>  nystatin Powder 1 Application(s) Topical two times a day  pantoprazole  Injectable 40 milliGRAM(s) IV Push daily  zinc sulfate 220 milliGRAM(s) Oral daily    MEDICATIONS  (PRN):  dextrose 40% Gel 15 Gram(s) Oral once PRN Blood Glucose LESS THAN 70 milliGRAM(s)/deciliter  fentaNYL    Injectable 12.5 MICROGram(s) IV Push every 3 hours PRN Moderate Pain (4 - 6)  fentaNYL    Injectable 25 MICROGram(s) IV Push every 3 hours PRN Severe Pain (7 - 10)  glucagon  Injectable 1 milliGRAM(s) IntraMuscular once PRN Glucose LESS THAN 70 milligrams/deciliter  ondansetron Injectable 4 milliGRAM(s) IV Push every 6 hours PRN Nausea      PHYSICAL EXAM:    Vital Signs Last 24 Hrs  T(C): 37.6 (18 Apr 2019 10:01), Max: 37.6 (18 Apr 2019 10:01)  T(F): 99.6 (18 Apr 2019 10:01), Max: 99.6 (18 Apr 2019 10:01)  HR: 94 (18 Apr 2019 13:00) (78 - 98)  BP: 100/50 (18 Apr 2019 13:00) (81/52 - 119/58)  BP(mean): 78 (18 Apr 2019 13:00) (57 - 97)  RR: 30 (18 Apr 2019 13:00) (12 - 36)  SpO2: 100% (18 Apr 2019 13:00) (100% - 100%)    Gen: anasarca  HEENT: normocephalic/atraumatic, no conjunctival pallor, no scleral icterus, no oral thrush/mucosal bleeding/mucositis  Neck: supple, no masses  Cardiovascular: RR, nl S1S2, no murmurs/rubs/gallops  Respiratory: clear air entry b/l  Gastrointestinal: BS+, soft, NT/ND, no masses, no splenomegaly, no hepatomegaly, no evidence for ascites  Extremities: no clubbing/cyanosis, no edema, no calf tenderness  Vascular:  DP/PT 2+ b/l  Neurological: CN 2-12 grossly intact, no focal deficits  Skin: no rash on visible skin  Lymph Nodes:  no cervical/supraclavicular LAD  Musculoskeletal:  full ROM  Psychiatric:  mood stable    Labs:                          7.4    11.39 )-----------( 236      ( 18 Apr 2019 05:26 )             23.7     CBC Full  -  ( 18 Apr 2019 05:26 )  WBC Count : 11.39 K/uL  RBC Count : 2.41 M/uL  Hemoglobin : 7.4 g/dL  Hematocrit : 23.7 %  Platelet Count - Automated : 236 K/uL  Mean Cell Volume : 98.3 fl  Mean Cell Hemoglobin : 30.7 pg  Mean Cell Hemoglobin Concentration : 31.2 gm/dL  Auto Neutrophil # : x  Auto Lymphocyte # : x  Auto Monocyte # : x  Auto Eosinophil # : x  Auto Basophil # : x  Auto Neutrophil % : x  Auto Lymphocyte % : x  Auto Monocyte % : x  Auto Eosinophil % : x  Auto Basophil % : x    PT/INR - ( 18 Apr 2019 13:56 )   PT: 27.7 sec;   INR: 2.39          PTT - ( 18 Apr 2019 13:56 )  PTT:32.0 sec    04-18    145  |  105  |  35<H>  ----------------------------<  164<H>  3.9   |  33<H>  |  0.50    Ca    7.7<L>      18 Apr 2019 05:26  Phos  1.8     04-18  Mg     2.0     04-18

## 2019-04-18 NOTE — PROGRESS NOTE ADULT - SUBJECTIVE AND OBJECTIVE BOX
On interval followup, the left arm PICC site is clean, dry and non-inflamed.  Temp 100 range. Notes, cultures and progress are followed.

## 2019-04-18 NOTE — PROGRESS NOTE ADULT - ASSESSMENT
85 yo M initially presenting for right groin discomfort in the setting of a recent bilateral inguinal hernia repair (3/11/19). CT abd/pelvis showed a large pneumoperitoneum, abdominal abscess, and fluid collection in the right groin that was surgically managed. We are consulted for a left lung abscess that was found on this initial CT scan - now s/p chest tube.

## 2019-04-18 NOTE — PROGRESS NOTE ADULT - PROBLEM SELECTOR PLAN 1
His chest tube drainage still remains miniscule. Although the fluid column is still consistently moving with each tidal volume, he has a much more consistent air leak. As explained yesterday, this may be more evidence that this was a lung abscess moreso than an empyema. We will have to observe for a few days to see the progress of this air leak to see if there is any time frame where we can remove it. We also recommend intermittent low suction to try and facilitate leak resolution. Drainage was likely necessary given the improving fever curve and vasopressor requirements that the patient has.     Recommend  - Continue to drain chest tube on intermittent low suction. Will continue to monitor daily for air leaks and drainage.  - Antibiotics to continue per cultures

## 2019-04-18 NOTE — PROGRESS NOTE ADULT - ATTENDING COMMENTS
Persistent air leak with no more drainage for last 24 hours. Fever has resolved after placing chest tube. Continue cefepime and flagyl for now. Chest tube removal once airleak is better. Low intermittent suction on chest tube. Rest as above

## 2019-04-18 NOTE — PROGRESS NOTE ADULT - ASSESSMENT
87 yo M with history of b/l laparoscopic robotic-assisted inguinal hernia repair presenting with b/l segmental pulmonary embolisms, left lung abscess, pneumoperitoneum, and distal descending colon abscess (likely source of pneumoperitoneum), significant iliofemoral DVT burden, s/p IVCF placement and IR drainage of LLQ abdominal collection (4/5), s/p ex-lap, LAURYN, sigmoidectomy, drain placement in R. paracolic gutter, and abthera vac placement (4/7), s/p planned RTOR, packing removal, and end colostomy creation (4/10), now w/ MRSA + proteus PNA, persistent coagulopathy, LLL pulmonary empyema s/p bedside pigtail drainage (4/15)    Plan:   Neuro: DC Fentanyl, dc propofol, now on Fentanyl prn  CV: levo for MAP>65; aggressive diuresis, net negative 2L; remains fluid overloaded but with mild improvement of anasarca; albumin 25%q8hrs, IV lasix PRN asa 81 lipitor, statin  Pulm: Tolerating CPAP trial this am; 40/470/12/5; pleural effusions improved; intubated since 4/7 on vent-AC, bilateral PE's; Empyema- s/p CTx on 4/15  GI: NPO, dobhoff glucerna 1.5@50/hr; malnourished, prealbumin 4; Vit C, zinc supplementation; RUQ US W/Doppler wnl  : rao; strict I'Os, BUN/Cr rising, remains anasarcic w/ Lasix prn, cont diuresis.  ID: +kleb, proteus, strep in abd Cx, flagyl (4/9-4/20), cefepime (4/9-4/20); Sputum cx: MRSA and proteus, vancomycin (4/10-5/4); pleural fluid cx: strep anginosis, bacteroides ovas (sens pending)   - [Dc'd//ceftriaxone (4/9-4/9)// unasyn (4/8-4/9),  Vanco (4/5-4/7), Zosyn (4/5-4/8)]  Endo: ISS; insulin w/ TPN;  Heme: bilateral iliofemoral DVT / bilateral PE; s/p IVC Filter; heparin gtt dc'd in setting of active bleed. Prolonged INR. PTT- refactory to FFP and Vit K. Mixing study wnr;  PPx: SCDs; hold heparin gtt  Lines: PIVs, PICC (4/6--), L A line (4/13--) /// D/C: R cordis (4/7-4/11)   Wounds/drains: LLQ IR drain (4/5 -- ), LUQ PHYLLIS (4/7 -- ), R paracolic gutter drain (4/7 --) --- all drains to LIWS; wound vac in abdominal incision SQ space; vac [facility] (4/10 --)  PT/OT: early mob 4/11 - worked with PT. 85 yo M with history of b/l laparoscopic robotic-assisted inguinal hernia repair presenting with b/l segmental pulmonary embolisms, left lung abscess, pneumoperitoneum, and distal descending colon abscess (likely source of pneumoperitoneum), significant iliofemoral DVT burden, s/p IVCF placement and IR drainage of LLQ abdominal collection (4/5), s/p ex-lap, LAURYN, sigmoidectomy, drain placement in R. paracolic gutter, and abthera vac placement (4/7), s/p planned RTOR, packing removal, and end colostomy creation (4/10), now w/ MRSA + proteus PNA, persistent coagulopathy, LLL pulmonary empyema s/p bedside pigtail drainage (4/15)    Plan:   Neuro: DC Fentanyl, dc propofol, now on Fentanyl prn  CV: levo for MAP>65; net negative 2.2 L; remains hypervolemic, but now w/ significant improvement of anasarca; continue aggressive diuresis w/ IV lasix, cont. albumin 25%q8hrs, asa 81 lipitor, statin  Pulm: Pleural effusions improved; Tolerating CPAP trial this am; 40/470/12/5; intubated since 4/7 on vent-AC, bilateral PE's; Empyema- s/p CTx on 4/15  GI: NPO, dobhoff glucerna 1.5@50/hr; malnourished, prealbumin 4; Vit C, zinc supplementation; RUQ US W/Doppler wnl; coagulopathy and hypoalbuminemia w/o transaminitis -- unclear if 2/2 to hepatopathy --- GI following   : rao; strict I'Os, continue diuresis  ID: +kleb, proteus, strep in abd Cx, flagyl (4/9-4/20), cefepime (4/9-4/20); Sputum cx: MRSA and proteus, vancomycin (4/10-5/4); pleural fluid cx: strep anginosis, bacteroides ovas  - [Dc'd//ceftriaxone (4/9-4/9)// unasyn (4/8-4/9),  Vanco (4/5-4/7), Zosyn (4/5-4/8)]  Endo: ISS; insulin w/ TPN;  Heme: bilateral iliofemoral DVT / bilateral PE; s/p IVC Filter; heparin gtt dc'd in setting of active bleed. Prolonged INR. PTT- refractory to FFP and Vit K. Mixing study wnr; Heme following  PPx: SCDs; hold heparin gtt  Lines: PIVs, PICC (4/6--), L A line (4/13--) /// D/C: R cordis (4/7-4/11)   Wounds/drains: LLQ IR drain (4/5 -- ), LUQ PHYLLIS (4/7 -- ), R paracolic gutter drain (4/7 --) --- all drains to LIWS; wound vac in abdominal incision SQ space; vac [facility] (4/10 --)  PT/OT: early mob 4/11 - worked with PT. 85 yo M with history of b/l laparoscopic robotic-assisted inguinal hernia repair presenting with b/l segmental pulmonary embolisms, left lung abscess, pneumoperitoneum, and distal descending colon abscess (likely source of pneumoperitoneum), significant iliofemoral DVT burden, s/p IVCF placement and IR drainage of LLQ abdominal collection (4/5), s/p ex-lap, LAURYN, sigmoidectomy, drain placement in R. paracolic gutter, and abthera vac placement (4/7), s/p planned RTOR, packing removal, and end colostomy creation (4/10), now w/ MRSA + proteus PNA, persistent coagulopathy, LLL pulmonary empyema s/p bedside pigtail drainage (4/15)    Plan:   Neuro: DC Fentanyl, dc propofol, now on Fentanyl prn  CV: levo for MAP>65; net negative 2.2 L; remains hypervolemic, but now w/ significant improvement of anasarca; continue aggressive diuresis w/ IV lasix, cont. albumin 25%q8hrs, asa 81 lipitor, statin  Pulm: Pleural effusions improved; Tolerating CPAP trial this am; 40/470/12/5; intubated since 4/7 on vent-AC, bilateral PE's; Empyema- s/p CTx on 4/15  GI: NPO, dobhoff glucerna 1.5@50/hr; malnourished, prealbumin 4; Vit C, zinc supplementation; RUQ US W/Doppler wnl; coagulopathy and hypoalbuminemia w/o transaminitis -- unclear if 2/2 to hepatopathy --- GI following   : rao; strict I'Os, continue diuresis  ID: +kleb, proteus, strep in abd Cx, flagyl (4/9-4/20), cefepime (4/9-4/20); Sputum cx: MRSA and proteus, vancomycin (4/10-5/4); pleural fluid cx: strep anginosis, bacteroides ovas  - [Dc'd//ceftriaxone (4/9-4/9)// unasyn (4/8-4/9),  Vanco (4/5-4/7), Zosyn (4/5-4/8)]  Endo: ISS; insulin w/ TPN;  Heme: bilateral iliofemoral DVT / bilateral PE; s/p IVC Filter; heparin gtt dc'd in setting of active bleed. Prolonged INR. PTT- refractory to FFP and Vit K. Mixing study wnr; Heme following  PPx: SCDs; hold heparin gtt  Lines: PIVs, PICC (4/6--), L A line (4/13--) /// D/C: R cordis (4/7-4/11)   Wounds/drains: LLQ IR drain (4/5 -- ), LUQ PHYLLIS (4/7 -- ), R paracolic gutter drain (4/7 --) --- all drains to LIWS; wound vac in abdominal incision SQ space; vac [facility] (4/10 --)  PT/OT: early mob 4/11 - worked with PT.     Critical care time rendered 45 minutes

## 2019-04-18 NOTE — PROGRESS NOTE ADULT - SUBJECTIVE AND OBJECTIVE BOX
INTERVAL HISTORY:  Patient seen and examined at bedside. Still is intubated but off sedation and alert and able to follow commands.  No events overnight, continues to be afebrile and HD improving with decreasing pressor requirements.    VITAL SIGNS:  ICU Vital Signs Last 24 Hrs  T(C): 37.6 (18 Apr 2019 10:01), Max: 37.6 (18 Apr 2019 10:01)  T(F): 99.6 (18 Apr 2019 10:01), Max: 99.6 (18 Apr 2019 10:01)  HR: 96 (18 Apr 2019 17:00) (78 - 101)  BP: 106/51 (18 Apr 2019 17:00) (89/49 - 119/58)  BP(mean): 74 (18 Apr 2019 17:00) (57 - 97)  ABP: 84/66 (18 Apr 2019 17:00) (84/66 - 176/128)  ABP(mean): 78 (18 Apr 2019 17:00) (68 - 144)  RR: 13 (18 Apr 2019 17:00) (12 - 36)  SpO2: 100% (18 Apr 2019 17:00) (97% - 100%)    Mode: CPAP with PS, FiO2: 40, PEEP: 5, PS: 5, MAP: 6.7, PIP: 11    04-17 @ 07:01  -  04-18 @ 07:00  --------------------------------------------------------  IN: 2522 mL / OUT: 4540 mL / NET: -2018 mL    04-18 @ 07:01  -  04-18 @ 17:43  --------------------------------------------------------  IN: 834 mL / OUT: 1960 mL / NET: -1126 mL      CAPILLARY BLOOD GLUCOSE      POCT Blood Glucose.: 149 mg/dL (18 Apr 2019 17:27)      PHYSICAL EXAM:  Constitutional: intubated, appears fatigued   Neck: supple, no JVD  Respiratory: still with bilateral ronchi, Chest tube site CDI, waterseal with more noticeable and consistent air leak but with persistent tidaling of the water column  Cardiovascular: +S1/S2, RRR  Gastrointestinal: unchanged abdominal exam  Extremities: persistent edema in all 4 extremities but LE's possibly with further improvement.    MEDICATIONS:  MEDICATIONS  (STANDING):  albumin human 25% IVPB 50 milliLiter(s) IV Intermittent every 8 hours  ascorbic acid Syrup 250 milliGRAM(s) Oral daily  aspirin  chewable 81 milliGRAM(s) Oral daily  atorvastatin 80 milliGRAM(s) Oral at bedtime  cefepime   IVPB 2000 milliGRAM(s) IV Intermittent every 12 hours  chlorhexidine 2% Cloths 1 Application(s) Topical <User Schedule>  dextrose 5%. 1000 milliLiter(s) (50 mL/Hr) IV Continuous <Continuous>  dextrose 50% Injectable 12.5 Gram(s) IV Push once  dextrose 50% Injectable 25 Gram(s) IV Push once  dextrose 50% Injectable 25 Gram(s) IV Push once  heparin  flush 100 Units/mL Injectable 100 Unit(s) IV Push every other day  insulin lispro (HumaLOG) corrective regimen sliding scale   SubCutaneous every 6 hours  metroNIDAZOLE  IVPB 500 milliGRAM(s) IV Intermittent every 8 hours  metroNIDAZOLE  IVPB      midodrine 5 milliGRAM(s) Oral every 8 hours  norepinephrine Infusion 0.05 MICROgram(s)/kG/Min (3.436 mL/Hr) IV Continuous <Continuous>  nystatin Powder 1 Application(s) Topical two times a day  pantoprazole  Injectable 40 milliGRAM(s) IV Push daily  zinc sulfate 220 milliGRAM(s) Oral daily    MEDICATIONS  (PRN):  dextrose 40% Gel 15 Gram(s) Oral once PRN Blood Glucose LESS THAN 70 milliGRAM(s)/deciliter  fentaNYL    Injectable 12.5 MICROGram(s) IV Push every 3 hours PRN Moderate Pain (4 - 6)  fentaNYL    Injectable 25 MICROGram(s) IV Push every 3 hours PRN Severe Pain (7 - 10)  glucagon  Injectable 1 milliGRAM(s) IntraMuscular once PRN Glucose LESS THAN 70 milligrams/deciliter  ondansetron Injectable 4 milliGRAM(s) IV Push every 6 hours PRN Nausea      ALLERGIES:  Allergies    No Known Allergies    Intolerances        LABS:                        7.4    11.39 )-----------( 236      ( 18 Apr 2019 05:26 )             23.7     04-18    145  |  105  |  35<H>  ----------------------------<  164<H>  3.9   |  33<H>  |  0.50    Ca    7.7<L>      18 Apr 2019 05:26  Phos  1.8     04-18  Mg     2.0     04-18      PT/INR - ( 18 Apr 2019 13:56 )   PT: 27.7 sec;   INR: 2.39          PTT - ( 18 Apr 2019 13:56 )  PTT:32.0 sec      RADIOLOGY & ADDITIONAL TESTS:   CXR today with improving fluffy infiltrates bilaterally

## 2019-04-18 NOTE — PROGRESS NOTE ADULT - SUBJECTIVE AND OBJECTIVE BOX
Interval History  ON: random vanc level 18, lasix 40 given with 40K  4/17: vanc trough 18, given 1g at 6AM. Midodrine 5 TID started. cpap trial in AM; DC fentanyl gtt --> fentanyl PRN; Net -2L; IV lasix 40 in AM with appropriate response; Heme consult-factor 7 low, factor 5, 8, ESR hptgn, ldh, d-dimer, fibrinogen sent. Wound vac changed    SUBJECTIVE:   Patient seen and examined by bedside. Tolerating CPAP.      Vitals - Range in last 12h  T(F): , Max: 98.8 (04-17-19 @ 21:40)  HR:  (78 - 94)  BP:  (95/62 - 119/58)  BP(mean):  (66 - 97)  ABP:  (88/52 - 176/128)  ABP(mean):  (68 - 144)  RR:  (12 - 29)  SpO2:  (100% - 100%)  CVP(mm Hg): --  CVP(cm H2O): --  CO: --  CI: --  PA: --  PA(mean): --  PA(direct): --  PCWP: --    Vitals - Most Recent  T(F): 97.9 (04-18-19 @ 05:45)  HR: 94 (04-18-19 @ 07:00)  BP: 119/58 (04-18-19 @ 07:00)  RR: 29 (04-18-19 @ 07:00)  SpO2: 100% (04-18-19 @ 07:00)  I&O's Detail    17 Apr 2019 07:01  -  18 Apr 2019 07:00  --------------------------------------------------------  IN:    Albumin 25%: 150 mL    Enteral Tube Flush: 370 mL    fentaNYL Infusion.: 7 mL    Glucerna 1.5: 1200 mL    IV PiggyBack: 500 mL    norepinephrine Infusion: 295 mL  Total IN: 2522 mL    OUT:    Bulb: 50 mL    Bulb: 100 mL    Bulb: 60 mL    Colostomy: 1550 mL    Indwelling Catheter - Urethral: 2765 mL    VAC (Vacuum Assisted Closure) System: 15 mL  Total OUT: 4540 mL    Total NET: -2018 mL        Mode: CPAP with PS  FiO2: 40  PEEP: 5  PS: 5      Physical Exam    Neuro: intubated, alert, able to follow simple commands  General: no acute distress  HEENT: PERRL, EOMI, MMM, dobhoff in place w/ TFs  Pulm: intubated, on vent, tolerating cpap w/ no signs of air hunger and RR in 20's, chest tube in place L posterior chest wall w/ gray output, severe rhonchi bilaterally, worse R>L  C/V: S1S2, non-tachycardic   Abd: nondistended, but firm; LLQ IR drain w/ light serosanguinous output; LUQ PHYLLIS drain w/ serosanguinous output; R paracolic gutter drain w/ serosanguinous output; abdominal incision w/ wound vac in place w/ good seal and serosanguinous output (fascia closed 4/10, vac in subcutaneous space)  Extremities: slight improvement in edema/ansarca of lower extremities; persisent 2+ pitting edema of bilateral upper and lower extremities  : rao in place; significant 2+ scrotal edema  Skin: mild blanching erythema on thighs, no macerations      LABS:                        7.4    11.39 )-----------( 236      ( 18 Apr 2019 05:26 )             23.7     04-18    145  |  105  |  35<H>  ----------------------------<  164<H>  3.9   |  33<H>  |  0.50    Ca    7.7<L>      18 Apr 2019 05:26  Phos  1.8     04-18  Mg     2.0     04-18        PT/INR - ( 17 Apr 2019 05:00 )   PT: 28.3 sec;   INR: 2.44          PTT - ( 17 Apr 2019 05:00 )  PTT:31.8 sec      Culture - Fungal, Body Fluid (collected 16 Apr 2019 01:07)  Source: .Body Fluid pleural fluid  Preliminary Report (16 Apr 2019 09:16):    Testing in progress    Culture - Body Fluid with Gram Stain (collected 15 Apr 2019 17:00)  Source: .Body Fluid pleural fluid  Gram Stain (16 Apr 2019 12:48):    **Please Note**: This is a Corrected Report** Previously reported as:    No organisms seen    Numerous white blood cells    Corrected to:    Few Gram Negative Rods    Few Gram Positive Cocci in Pairs and Chains    Numerous White blood cells    ROLA yuan RN  04/16/2019 12:47:48  Preliminary Report (16 Apr 2019 15:15):    Numerous Proteus vulgaris Susceptibility to follow.    Few Streptococcus anginosus Susceptibility to follow.  Organism: Bacteroides ovatus  Streptococcus anginosus  Streptococcus anginosus  Streptococcus anginosus  Streptococcus anginosus (17 Apr 2019 12:43)  Organism: Bacteroides ovatus (17 Apr 2019 12:43)  Organism: Streptococcus anginosus (17 Apr 2019 12:42)  Organism: Streptococcus anginosus (17 Apr 2019 12:41)  Organism: Streptococcus anginosus (17 Apr 2019 09:53)  Organism: Streptococcus anginosus (17 Apr 2019 09:44)        MEDICATIONS  (STANDING):  albumin human 25% IVPB 50 milliLiter(s) IV Intermittent every 8 hours  ascorbic acid Syrup 250 milliGRAM(s) Oral daily  aspirin  chewable 81 milliGRAM(s) Oral daily  atorvastatin 80 milliGRAM(s) Oral at bedtime  cefepime   IVPB 2000 milliGRAM(s) IV Intermittent every 12 hours  chlorhexidine 2% Cloths 1 Application(s) Topical <User Schedule>  dextrose 5%. 1000 milliLiter(s) (50 mL/Hr) IV Continuous <Continuous>  dextrose 50% Injectable 12.5 Gram(s) IV Push once  dextrose 50% Injectable 25 Gram(s) IV Push once  dextrose 50% Injectable 25 Gram(s) IV Push once  heparin  flush 100 Units/mL Injectable 100 Unit(s) IV Push every other day  insulin lispro (HumaLOG) corrective regimen sliding scale   SubCutaneous every 6 hours  metroNIDAZOLE  IVPB 500 milliGRAM(s) IV Intermittent every 8 hours  metroNIDAZOLE  IVPB      midodrine 5 milliGRAM(s) Oral every 8 hours  norepinephrine Infusion 0.05 MICROgram(s)/kG/Min (3.436 mL/Hr) IV Continuous <Continuous>  nystatin Powder 1 Application(s) Topical two times a day  pantoprazole  Injectable 40 milliGRAM(s) IV Push daily  zinc sulfate 220 milliGRAM(s) Oral daily    MEDICATIONS  (PRN):  dextrose 40% Gel 15 Gram(s) Oral once PRN Blood Glucose LESS THAN 70 milliGRAM(s)/deciliter  fentaNYL    Injectable 12.5 MICROGram(s) IV Push every 3 hours PRN Moderate Pain (4 - 6)  fentaNYL    Injectable 25 MICROGram(s) IV Push every 3 hours PRN Severe Pain (7 - 10)  glucagon  Injectable 1 milliGRAM(s) IntraMuscular once PRN Glucose LESS THAN 70 milligrams/deciliter  ondansetron Injectable 4 milliGRAM(s) IV Push every 6 hours PRN Nausea      RADIOLOGY & ADDITIONAL STUDIES: Interval History  ON: random vanc level 18, lasix 40 given with 40K  4/17: vanc trough 18, given 1g at 6AM. Midodrine 5 TID started. cpap trial in AM; DC fentanyl gtt --> fentanyl PRN; Net -2L; IV lasix 40 in AM with appropriate response; Heme consult-factor 7 low, factor 5, 8, ESR hptgn, ldh, d-dimer, fibrinogen sent. Wound vac changed    SUBJECTIVE:   Patient seen and examined by bedside. Patient not sedated, awake, alert, follows simple commands, and able to answer yes-no questions. Complains of pain around endotracheal tube, but denies abdominal pain. Resting comfortable and maintaining MAPs on Levophed gtt. Switched to CPAP in AM and was breathing comfortably, remained non-tachypneic.        Vitals - Range in last 12h  T(F): , Max: 98.8 (04-17-19 @ 21:40)  HR:  (78 - 94)  BP:  (95/62 - 119/58)  BP(mean):  (66 - 97)  ABP:  (88/52 - 176/128)  ABP(mean):  (68 - 144)  RR:  (12 - 29)  SpO2:  (100% - 100%)  CVP(mm Hg): --  CVP(cm H2O): --  CO: --  CI: --  PA: --  PA(mean): --  PA(direct): --  PCWP: --    Vitals - Most Recent  T(F): 97.9 (04-18-19 @ 05:45)  HR: 94 (04-18-19 @ 07:00)  BP: 119/58 (04-18-19 @ 07:00)  RR: 29 (04-18-19 @ 07:00)  SpO2: 100% (04-18-19 @ 07:00)  I&O's Detail    17 Apr 2019 07:01  -  18 Apr 2019 07:00  --------------------------------------------------------  IN:    Albumin 25%: 150 mL    Enteral Tube Flush: 370 mL    fentaNYL Infusion.: 7 mL    Glucerna 1.5: 1200 mL    IV PiggyBack: 500 mL    norepinephrine Infusion: 295 mL  Total IN: 2522 mL    OUT:    Bulb: 50 mL    Bulb: 100 mL    Bulb: 60 mL    Colostomy: 1550 mL    Indwelling Catheter - Urethral: 2765 mL    VAC (Vacuum Assisted Closure) System: 15 mL  Total OUT: 4540 mL    Total NET: -2018 mL        Mode: CPAP with PS  FiO2: 40  PEEP: 5  PS: 5      Physical Exam  Neuro: intubated, alert but non-verbal, able to follow simple commands  General: no acute distress  HEENT: PERRL, EOMI, MMM, dobhoff in place w/ TFs  Pulm: intubated, on vent, tolerating cpap w/ no signs of air hunger and RR in 20's, chest tube in place L posterior chest wall w/ gray output; breath sounds significantly improved w/ less rhonchi bilaterally, and improved breath sounds in lung bases  C/V: S1S2, non-tachycardic   Abd: nondistended, but firm; LLQ IR drain w/ light serosanguinous output; LUQ PHYLLIS drain w/ serosanguinous output; R paracolic gutter drain w/ serosanguinous output; abdominal incision w/ wound vac in place w/ good seal and serosanguinous output (fascia closed 4/10, vac in subcutaneous space)  Extremities: significant improvement in edema/ansarca of lower and upper extremities  : rao in place; improved scrotal edema  Skin: mild blanching erythema on thighs, no macerations      LABS:                        7.4    11.39 )-----------( 236      ( 18 Apr 2019 05:26 )             23.7     04-18    145  |  105  |  35<H>  ----------------------------<  164<H>  3.9   |  33<H>  |  0.50    Ca    7.7<L>      18 Apr 2019 05:26  Phos  1.8     04-18  Mg     2.0     04-18        PT/INR - ( 17 Apr 2019 05:00 )   PT: 28.3 sec;   INR: 2.44          PTT - ( 17 Apr 2019 05:00 )  PTT:31.8 sec      Culture - Fungal, Body Fluid (collected 16 Apr 2019 01:07)  Source: .Body Fluid pleural fluid  Preliminary Report (16 Apr 2019 09:16):    Testing in progress    Culture - Body Fluid with Gram Stain (collected 15 Apr 2019 17:00)  Source: .Body Fluid pleural fluid  Gram Stain (16 Apr 2019 12:48):    **Please Note**: This is a Corrected Report** Previously reported as:    No organisms seen    Numerous white blood cells    Corrected to:    Few Gram Negative Rods    Few Gram Positive Cocci in Pairs and Chains    Numerous White blood cells    ROLA yuan RN  04/16/2019 12:47:48  Preliminary Report (16 Apr 2019 15:15):    Numerous Proteus vulgaris Susceptibility to follow.    Few Streptococcus anginosus Susceptibility to follow.  Organism: Bacteroides ovatus  Streptococcus anginosus  Streptococcus anginosus  Streptococcus anginosus  Streptococcus anginosus (17 Apr 2019 12:43)  Organism: Bacteroides ovatus (17 Apr 2019 12:43)  Organism: Streptococcus anginosus (17 Apr 2019 12:42)  Organism: Streptococcus anginosus (17 Apr 2019 12:41)  Organism: Streptococcus anginosus (17 Apr 2019 09:53)  Organism: Streptococcus anginosus (17 Apr 2019 09:44)        MEDICATIONS  (STANDING):  albumin human 25% IVPB 50 milliLiter(s) IV Intermittent every 8 hours  ascorbic acid Syrup 250 milliGRAM(s) Oral daily  aspirin  chewable 81 milliGRAM(s) Oral daily  atorvastatin 80 milliGRAM(s) Oral at bedtime  cefepime   IVPB 2000 milliGRAM(s) IV Intermittent every 12 hours  chlorhexidine 2% Cloths 1 Application(s) Topical <User Schedule>  dextrose 5%. 1000 milliLiter(s) (50 mL/Hr) IV Continuous <Continuous>  dextrose 50% Injectable 12.5 Gram(s) IV Push once  dextrose 50% Injectable 25 Gram(s) IV Push once  dextrose 50% Injectable 25 Gram(s) IV Push once  heparin  flush 100 Units/mL Injectable 100 Unit(s) IV Push every other day  insulin lispro (HumaLOG) corrective regimen sliding scale   SubCutaneous every 6 hours  metroNIDAZOLE  IVPB 500 milliGRAM(s) IV Intermittent every 8 hours  metroNIDAZOLE  IVPB      midodrine 5 milliGRAM(s) Oral every 8 hours  norepinephrine Infusion 0.05 MICROgram(s)/kG/Min (3.436 mL/Hr) IV Continuous <Continuous>  nystatin Powder 1 Application(s) Topical two times a day  pantoprazole  Injectable 40 milliGRAM(s) IV Push daily  zinc sulfate 220 milliGRAM(s) Oral daily    MEDICATIONS  (PRN):  dextrose 40% Gel 15 Gram(s) Oral once PRN Blood Glucose LESS THAN 70 milliGRAM(s)/deciliter  fentaNYL    Injectable 12.5 MICROGram(s) IV Push every 3 hours PRN Moderate Pain (4 - 6)  fentaNYL    Injectable 25 MICROGram(s) IV Push every 3 hours PRN Severe Pain (7 - 10)  glucagon  Injectable 1 milliGRAM(s) IntraMuscular once PRN Glucose LESS THAN 70 milligrams/deciliter  ondansetron Injectable 4 milliGRAM(s) IV Push every 6 hours PRN Nausea      RADIOLOGY & ADDITIONAL STUDIES:    < from: US Abdomen Limited (04.16.19 @ 16:59) >  IMPRESSION:  The visualized portionsof the hepatic veins and portal veins are patent.     Mild ascites.  Right pleural effusion.    Cholelithiasis. Contracted gallbladder and ascites which may account for   its thickened wall.    Heterogeneous soft tissue structure in the epigastric region anterior to   the pancreas surrounded by ascites which could represent bowel or an   omental/peritoneal mass.    < end of copied text >

## 2019-04-18 NOTE — CONSULT NOTE ADULT - SUBJECTIVE AND OBJECTIVE BOX
HPI:    HPI VIA SICU STAFF AND EMR (Pt intubated)    87 yo M, poor historian, with history of CAD s/p 3 stents (2012, 2009, 2000) still on ASA/Plavix, ETOH abuse??, b/l inguinal hernias s/p laparoscopic robotic-assisted repair of bilateral inguinal hernias on 3/11/2019 which as was notable for 4.5L of ascites that was drained prior to starting the procedure and sent for cytopathology) presenting with right inguinal hernia discomfort. Patient denies any associated fevers, chills, nausea, or emesis. Patient cannot remember last time he had bowel movement or passed flatus, but per nursing staff, patient a large bowel movement in his stretcher in the ED. Patient reports having a colonoscopy at age 70s which was reportedly normal. Pt has multiple embolisms over the course of admission ( DVT in bilateral common femoral vein, central venous pelvic thrombus in the left common iliac and internal iliac veins.) . Pt worked up from a hematological perspective and noted to have liver factor deficiency which was initially believed to be 2/2 to malnutrition. No hx of liver disease in the past. GI consulted for evaluation of possible Non cirrhotic liver disease.     PMH/PSx: CAD s/p 3 stents (2012, 2009, 2000) still on ASA/Plavix, ETOH abuse, b/l inguinal hernias s/p laparoscopic robotic-assisted repair of bilateral inguinal hernias on 3/11/2019 w/ drainage of 4.5 L of ascites,   Allergies: NKDA  Meds: see med rec  FH: patient denies FH of cancers or IBD  SH: patient reports significant alcohol use in youth but was unable to quantify the number of daily drinks. Patient denies smoking history or drug use (05 Apr 2019 19:49)      PAST MEDICAL & SURGICAL HISTORY:  Esophageal reflux  Acute diarrhea  HLD (hyperlipidemia)  HTN (hypertension)  Prostate cancer  Stented coronary artery: x3 , per pt.  Atherosclerosis of coronary artery: CAD (coronary artery disease)  S/P hernia repair  Surgery, elective: cardiac stent x3  History of prostate surgery        MEDICATIONS  (STANDING):  albumin human 25% IVPB 50 milliLiter(s) IV Intermittent every 8 hours  ascorbic acid Syrup 250 milliGRAM(s) Oral daily  aspirin  chewable 81 milliGRAM(s) Oral daily  atorvastatin 80 milliGRAM(s) Oral at bedtime  cefepime   IVPB 2000 milliGRAM(s) IV Intermittent every 12 hours  chlorhexidine 2% Cloths 1 Application(s) Topical <User Schedule>  dextrose 5%. 1000 milliLiter(s) (50 mL/Hr) IV Continuous <Continuous>  dextrose 50% Injectable 12.5 Gram(s) IV Push once  dextrose 50% Injectable 25 Gram(s) IV Push once  dextrose 50% Injectable 25 Gram(s) IV Push once  furosemide   Injectable 40 milliGRAM(s) IV Push once  heparin  flush 100 Units/mL Injectable 100 Unit(s) IV Push every other day  insulin lispro (HumaLOG) corrective regimen sliding scale   SubCutaneous every 6 hours  metroNIDAZOLE  IVPB 500 milliGRAM(s) IV Intermittent every 8 hours  metroNIDAZOLE  IVPB      midodrine 5 milliGRAM(s) Oral every 8 hours  norepinephrine Infusion 0.05 MICROgram(s)/kG/Min (3.436 mL/Hr) IV Continuous <Continuous>  nystatin Powder 1 Application(s) Topical two times a day  pantoprazole  Injectable 40 milliGRAM(s) IV Push daily  zinc sulfate 220 milliGRAM(s) Oral daily    MEDICATIONS  (PRN):  dextrose 40% Gel 15 Gram(s) Oral once PRN Blood Glucose LESS THAN 70 milliGRAM(s)/deciliter  fentaNYL    Injectable 12.5 MICROGram(s) IV Push every 3 hours PRN Moderate Pain (4 - 6)  fentaNYL    Injectable 25 MICROGram(s) IV Push every 3 hours PRN Severe Pain (7 - 10)  glucagon  Injectable 1 milliGRAM(s) IntraMuscular once PRN Glucose LESS THAN 70 milligrams/deciliter  ondansetron Injectable 4 milliGRAM(s) IV Push every 6 hours PRN Nausea      Allergies    No Known Allergies    Intolerances        SOCIAL HISTORY:    FAMILY HISTORY:  No pertinent family history in first degree relatives      Vital Signs Last 24 Hrs  T(C): 37.6 (18 Apr 2019 10:01), Max: 37.6 (18 Apr 2019 10:01)  T(F): 99.6 (18 Apr 2019 10:01), Max: 99.6 (18 Apr 2019 10:01)  HR: 94 (18 Apr 2019 13:00) (78 - 98)  BP: 100/50 (18 Apr 2019 13:00) (81/52 - 119/58)  BP(mean): 78 (18 Apr 2019 13:00) (57 - 97)  RR: 30 (18 Apr 2019 13:00) (12 - 36)  SpO2: 100% (18 Apr 2019 13:00) (100% - 100%)    PHYSICAL EXAM:    GEN: intubatated, sedated  HEENT: anicteric  CHEST: equal chest rise  CVS: RRR  ABD; midline surgical packing with drain in place, ostomy site with stool draing  EXT: ascites.       LABS:                        7.4    11.39 )-----------( 236      ( 18 Apr 2019 05:26 )             23.7     04-18    145  |  105  |  35<H>  ----------------------------<  164<H>  3.9   |  33<H>  |  0.50    Ca    7.7<L>      18 Apr 2019 05:26  Phos  1.8     04-18  Mg     2.0     04-18      PT/INR - ( 18 Apr 2019 13:56 )   PT: 27.7 sec;   INR: 2.39          PTT - ( 18 Apr 2019 13:56 )  PTT:32.0 sec      RADIOLOGY & ADDITIONAL STUDIES:

## 2019-04-18 NOTE — PROGRESS NOTE ADULT - SUBJECTIVE AND OBJECTIVE BOX
Patient is a 86y old  Male who presents with a chief complaint of perforated diverticulitis (18 Apr 2019 15:20)    AM trial of CPAP    PMH/PSx: CAD s/p 3 stents (2012, 2009, 2000) still on ASA/Plavix, ETOH abuse, b/l inguinal hernias s/p laparoscopic robotic-assisted repair of bilateral inguinal hernias on 3/11/2019 w/ drainage of 4.5 L of ascites,   Allergies: NKDA  Meds: see med rec  FH: patient denies FH of cancers or IBD  SH: patient reports significant alcohol use in youth but was unable to quantify the number of daily drinks. Patient denies smoking history or drug use (05 Apr 2019 19:49)    PAST MEDICAL & SURGICAL HISTORY:  Esophageal reflux  Acute diarrhea  HLD (hyperlipidemia)  HTN (hypertension)  Prostate cancer  Stented coronary artery: x3 , per pt.  Atherosclerosis of coronary artery: CAD (coronary artery disease)  S/P hernia repair  Surgery, elective: cardiac stent x3  History of prostate surgery    FAMILY HISTORY:  No pertinent family history in first degree relatives    Social:  Allergies    No Known Allergies    Intolerances    	  MEDICATIONS:  midodrine 5 milliGRAM(s) Oral every 8 hours  norepinephrine Infusion 0.05 MICROgram(s)/kG/Min IV Continuous <Continuous>    cefepime   IVPB 2000 milliGRAM(s) IV Intermittent every 12 hours  metroNIDAZOLE  IVPB 500 milliGRAM(s) IV Intermittent every 8 hours  metroNIDAZOLE  IVPB          fentaNYL    Injectable 12.5 MICROGram(s) IV Push every 3 hours PRN  fentaNYL    Injectable 25 MICROGram(s) IV Push every 3 hours PRN  ondansetron Injectable 4 milliGRAM(s) IV Push every 6 hours PRN    pantoprazole  Injectable 40 milliGRAM(s) IV Push daily    atorvastatin 80 milliGRAM(s) Oral at bedtime  dextrose 40% Gel 15 Gram(s) Oral once PRN  dextrose 50% Injectable 12.5 Gram(s) IV Push once  dextrose 50% Injectable 25 Gram(s) IV Push once  dextrose 50% Injectable 25 Gram(s) IV Push once  glucagon  Injectable 1 milliGRAM(s) IntraMuscular once PRN  insulin lispro (HumaLOG) corrective regimen sliding scale   SubCutaneous every 6 hours    albumin human 25% IVPB 50 milliLiter(s) IV Intermittent every 8 hours  ascorbic acid Syrup 250 milliGRAM(s) Oral daily  aspirin  chewable 81 milliGRAM(s) Oral daily  chlorhexidine 2% Cloths 1 Application(s) Topical <User Schedule>  dextrose 5%. 1000 milliLiter(s) IV Continuous <Continuous>  heparin  flush 100 Units/mL Injectable 100 Unit(s) IV Push every other day  nystatin Powder 1 Application(s) Topical two times a day  zinc sulfate 220 milliGRAM(s) Oral daily            PHYSICAL EXAM:  T(C): 37.6 (04-18-19 @ 10:01), Max: 37.6 (04-18-19 @ 10:01)  HR: 101 (04-18-19 @ 16:18) (78 - 101)  BP: 92/47 (04-18-19 @ 15:00) (81/52 - 119/58)  RR: 32 (04-18-19 @ 15:00) (12 - 36)  SpO2: 97% (04-18-19 @ 16:18) (97% - 100%)  Wt(kg): --  I&O's Summary    17 Apr 2019 07:01  -  18 Apr 2019 07:00  --------------------------------------------------------  IN: 2522 mL / OUT: 4540 mL / NET: -2018 mL    18 Apr 2019 07:01  -  18 Apr 2019 16:37  --------------------------------------------------------  IN: 734 mL / OUT: 1760 mL / NET: -1026 mL          Gen: NAD, alert, follows commands,   Neck: supple  HEENT: Intubated    Cardiovascular: Normal S1 S2, No JVD, No murmurs, No edema  Respiratory: limited exam, rhonchi   Abd:   Soft, Non-tender, + BS	  Extremities: anasarca however much improved     TELEMETRY: 	    ECG:  	  RADIOLOGY:   DIAGNOSTIC TESTING:  [ ] Echocardiogram:  [ ]  Catheterization:  [ ] Stress Test:    OTHER: 	    LABS:	 	    CARDIAC MARKERS:                                  7.4    11.39 )-----------( 236      ( 18 Apr 2019 05:26 )             23.7     04-18    145  |  105  |  35<H>  ----------------------------<  164<H>  3.9   |  33<H>  |  0.50    Ca    7.7<L>      18 Apr 2019 05:26  Phos  1.8     04-18  Mg     2.0     04-18      proBNP:   Lipid Profile:   HgA1c:   TSH:     ASSESSMENT/PLAN: 	    86M w/PMHx CAD s/p mult PCI (last 2012; KIRSTY mLAD, dRCA, pOM2), HTN, HLD, recent b/l lap RA b/l inguinal hernia repair (3/11) initially sent in from [facility] for R inguinal hernia discomfort. Found to have 6x4cm R groin fluid collection, 10x5cm abscess in LLQ w/assoc pneumoperitoneum, L lung empyema, b/l segmental PE, and DVT in b/l CF, L common iliac and internal iliac veins. Underwent exlap w/drainage of 3L ascites, LAURYN, SB resxn w/primary anastomosis, sigmoidectomy (left in discontinuity), abd packing, and drain/VAC placement on 4/7, followed by end-colostomy creation and fascial closure on 4/10. S/p IVCF on 4/5, initially on hep gtt for DVT/PE, however d/c on 4/7 due to intra-op bleeding and not restarted. Home DAPT held since admission. ID following for mult-org induced septic shock. CTSx following for LL empyema. Noted incidentally to have EKG changes, cards c/s for further recs.    elevated troponin, resolved, hx of CAD s/p multiple PCIs in the past, he does not require DAPT for now  Lasix prn per primary team  - continue aspirin statin,   -still on Levophed will start metoprolol once hemodynamically stable,    -keep hg >8.0 given his ischemic history.     -monitor electrolytes, K+ >4.0 mg>2.0      will continue to follow with Dr. Corbin.

## 2019-04-18 NOTE — CONSULT NOTE ADULT - ASSESSMENT
85 yo M with history of b/l laparoscopic robotic-assisted inguinal hernia repair presenting with b/l segmental pulmonary embolisms, left lung abscess, pneumoperitoneum, and distal descending colon abscess (likely source of pneumoperitoneum), significant iliofemoral DVT burden, s/p IVCF placement and IR drainage of LLQ abdominal collection (4/5), s/p ex-lap, LAURYN, sigmoidectomy, drain placement in R. paracolic gutter with concern for liver related thromboembolic disease in the absence of cirrhosis.     - prolonged PT/INR with normal PTT w/ concern for aliver disease  -suggest checking Factor VIII level as a normal level is most likely to raise concern for liver disease  - LTs WNL - please recheck   -JOANN, ASMA, JOANN,  -Immunoglobulin  -Alpha 1 anti-trypsin, ceruloplasmin  -Hep B, Hep C   -SAAG 1.5 , ascitic pro < 2.5  (ddx cirrhosis, portal HTN, budd-chiari) - please obtain US with /dopplers to r/o   -Follow up Heme/onc recs     case d/w Dr. Wang 85 yo M with history of b/l laparoscopic robotic-assisted inguinal hernia repair presenting with b/l segmental pulmonary embolisms, left lung abscess, pneumoperitoneum, and distal descending colon abscess s/p sigoidectomy significant iliofemoral DVT burden, s/p IVCF placement with concern for liver related thromboembolic disease in the absence of cirrhosis.     #Ascites, hypoalbuminemia, thrombocytopenia -- ? Liver disease  - prolonged PT/INR with normal PTT w/ concern for a liver disease  - factor shows low factor 2, 5, 7, 10, 12, but normal factor 9  - LTs WNL - please recheck   - No evidence of cirrhosis on imaging or during laproscopic evaluation   - JOANN, ASMA, JOANN,  - Immunoglobulin  - Alpha 1 anti-trypsin, ceruloplasmin  - Hep B, Hep C   - Ferritin, Iron panel  - SAAG 1.5 , ascitic pro < 2.5  (ddx cirrhosis, portal HTN, budd-chiari) - please obtain US with /dopplers to r/o   - Follow up Heme/onc recs     case d/w Dr. Wang

## 2019-04-18 NOTE — PROGRESS NOTE ADULT - SUBJECTIVE AND OBJECTIVE BOX
24 hr events:  ON: random vanc level 18, lasix 40 given with 40K  4/17: vanc trough 18, given 1g at 6AM. Midodrine 5 TID started. cpap trial in AM; DC fentanyl gtt --> fentanyl PRN; Net -2L; IV lasix 40 in AM with appropriate response; Heme consult-factor 7 low, factor 5, 8, ESR hptgn, ldh, d-dimer, fibrinogen sent. Wound vac changed    SUBJECTIVE: Even though intubated, able to answer yes/no questions. Some abd pain, but no N/V. No chest pain, no dyspnea.    Vital Signs Last 24 Hrs  T(C): 37.6 (18 Apr 2019 10:01), Max: 37.6 (18 Apr 2019 10:01)  T(F): 99.6 (18 Apr 2019 10:01), Max: 99.6 (18 Apr 2019 10:01)  HR: 92 (18 Apr 2019 15:00) (78 - 98)  BP: 92/47 (18 Apr 2019 15:00) (81/52 - 119/58)  BP(mean): 66 (18 Apr 2019 15:00) (57 - 97)  RR: 32 (18 Apr 2019 15:00) (12 - 36)  SpO2: 100% (18 Apr 2019 15:00) (100% - 100%)    Physical Exam:  General: NAD  HEENT: dobhoff in place with TFs running  Pulmonary: intubated, upper lobe rhonchi, lower lobe decreased breath sounds. chest tube to water seal  Cardiovascular: NSR  Abdominal: soft, NT/ND, vac on and functioning in midline wound, JPs (LUQ, LLQ, paracolic gutter) x 3 all with yellow serous fluid, no guarding/rigidity/rebound tenderness  : Abbott, scrotal edema persistent but decreasing  Extremities: WWP, persistent but decreased amount of pitting edema in calves/thighs  Neuro: responsive to questions, awake, alert, tracking me in room  Lines: MARÍA SAGASTUME, R rad a line, PIVs    I&O's Summary    17 Apr 2019 07:01  -  18 Apr 2019 07:00  --------------------------------------------------------  IN: 2522 mL / OUT: 4540 mL / NET: -2018 mL    18 Apr 2019 07:01  -  18 Apr 2019 15:20  --------------------------------------------------------  IN: 620 mL / OUT: 1660 mL / NET: -1040 mL        LABS:                        7.4    11.39 )-----------( 236      ( 18 Apr 2019 05:26 )             23.7     04-18    145  |  105  |  35<H>  ----------------------------<  164<H>  3.9   |  33<H>  |  0.50    Ca    7.7<L>      18 Apr 2019 05:26  Phos  1.8     04-18  Mg     2.0     04-18      PT/INR - ( 18 Apr 2019 13:56 )   PT: 27.7 sec;   INR: 2.39          PTT - ( 18 Apr 2019 13:56 )  PTT:32.0 sec    CAPILLARY BLOOD GLUCOSE      POCT Blood Glucose.: 158 mg/dL (18 Apr 2019 12:39)  POCT Blood Glucose.: 160 mg/dL (18 Apr 2019 06:05)  POCT Blood Glucose.: 158 mg/dL (18 Apr 2019 00:25)  POCT Blood Glucose.: 157 mg/dL (17 Apr 2019 17:57)  POCT Blood Glucose.: 152 mg/dL (17 Apr 2019 16:44)

## 2019-04-18 NOTE — PROGRESS NOTE ADULT - ASSESSMENT
85 yo M with CAD (s/p 3 stents) and h/o EtOH abuse, s/p bilateral robotic inguinal hernia repair on 3/11, now with perforated diverticulitis s/p ex lap, sigmoidectomy, and drain placement with open abdomen on 4/7, RTOR on 4/10 for end colostomy and closure of fascia. Also with bilateral PE, iliofemoral DVT (s/p IVC filter on 4/5), and LLL pulmonary empyema s/p chest tube drainage on 4/15.     Plan:  - Neuro: prn fent  - CV: septic shock, levophed for MAP > 65, wean as tolerated. On midodrine 5 tid now. On ASA/statin. Diurese per SICU  - Resp: CPAP/t piece trials. Monitor chest tube output, f/u pulm recs about timing of removal. Daily CXR. Need to consider trach soon.  - GI/FEN: GI consult for possible liver failure/portal hypertension. Tube feeds are at goal. Cont PPI.  - /renal: Abbott, strict I/Os while diuresing  - Endo: ISS  - ID: Vanc, cefepime, flagyl, f/u ID recs (MRSA in sputum, kleb/proteus/strep/bacteroides in abd ctx). F/u ctx  - Heme: f/u heme consult  - Lines: Continue PICC and a line  - Wounds/drains: Monitor 3 drains (LLQ, LUQ, paracolic gutter) and wound vac output. Next vac change Frid  - PT: continue working with PT  - Dispo: SICU 85 yo M with CAD (s/p 3 stents) and h/o EtOH abuse, s/p bilateral robotic inguinal hernia repair on 3/11, now with perforated diverticulitis s/p ex lap, sigmoidectomy, and drain placement with open abdomen on 4/7, RTOR on 4/10 for end colostomy and closure of fascia. Also with bilateral PE, iliofemoral DVT (s/p IVC filter on 4/5), and LLL pulmonary empyema s/p chest tube drainage on 4/15.     Plan:  - Neuro: prn fent  - CV: septic shock, levophed for MAP > 65, wean as tolerated. On midodrine 5 tid now. On ASA/statin. Diurese per SICU  - Resp: CPAP/t piece trials. Monitor chest tube output, f/u pulm recs about timing of removal. Daily CXR. Need to consider trach soon.  - GI/FEN: GI consult for possible liver failure/portal hypertension. Tube feeds are at goal. Cont PPI.  - /renal: Abbott, strict I/Os while diuresing  - Endo: ISS  - ID: Vanc, cefepime, flagyl, f/u ID recs (MRSA in sputum, kleb/proteus/strep/bacteroides in abd ctx). F/u ctx  - Heme: f/u heme consult. INR 2.3 today (auto-anticoagulated possibly from hepatic disease), therapeutic for DVT/PE.   - Lines: Continue PICC and a line  - Wounds/drains: Monitor 3 drains (LLQ, LUQ, paracolic gutter) and wound vac output. Next vac change Frid  - PT: continue working with PT  - Dispo: SICU

## 2019-04-18 NOTE — PROGRESS NOTE ADULT - ASSESSMENT
87 yo male with recent herniorrhaphy complicated with multiple infections/abscesses and now DVT and PE unable to anticoagulate initially now s/p IVC filter now with coagulopathy raising concern to initiate anticoagulation.    #Coagulopathy - prolonged PT/INR with normal PTT; partially corrected mixing study for PT with a normal direct thrombin time  -an isolated prolonged PT/INR with normal PTT raises concern for acquired factor VII def, vit K def, liver disease, lupus anticoagulant, or inhibitor of factor VII (also can be d/t warfarin, but this pt did not receive warfarin) - in this patient the overwhelming cause of coagulopathy remains concern for liver disease gladys given pt report of alcoholism, though perhaps may also have an acquired lupus anticoagulant (if has an underlying malignancy)  -factor assays revealed a low factor 2, 5, 7, 10, 12, but normal factor 9; pls check Factor VIII level (since not made in liver, expect it to be normal if our suspicion is correct that underlying dz related to liver dz)  -pls check LFTs  -would check LA, antiphospholipid ab, b2-glycoprotein abs  -trend daily CBC, coags; pls check LDH, haptoglobin, d-dimer, fibrinogen in AM; pls also check ESR    #DVT/PE - unclear if VTE are provoked from recent surgery or if there is underlying hypercoagulable state such as malignancy or acquired d/o such as APLS or liver dz  -at this time, would consider checking age appropriate cancer screening which only includes PSA at this time since pt denies hx of smoking; unclear if pt has ever had colonoscopy - per pt no, pls obtain records from PMD  -await APLS labs  -given iliac v thromboses, pls check Jak2     #Normocytic anemia (mildly macrocytic when admitted)  -check Fe studies, B12, folic acid levels  -doubt hemolysis, but will review labs above to assess for hemolysis    D/W Dr. Marin; plan reviewed with primary team and Dr. eDlong

## 2019-04-19 LAB
-  AMOXICILLIN/CLAVULANIC ACID: SIGNIFICANT CHANGE UP
-  METRONIDAZOLE: SIGNIFICANT CHANGE UP
-  MOXIFLOXACIN(AEROBIC): SIGNIFICANT CHANGE UP
A1AT SERPL-MCNC: 165 MG/DL — SIGNIFICANT CHANGE UP (ref 90–200)
ANION GAP SERPL CALC-SCNC: 6 MMOL/L — SIGNIFICANT CHANGE UP (ref 5–17)
APTT BLD: 32 SEC — SIGNIFICANT CHANGE UP (ref 27.5–36.3)
BUN SERPL-MCNC: 33 MG/DL — HIGH (ref 7–23)
CALCIUM SERPL-MCNC: 7.8 MG/DL — LOW (ref 8.4–10.5)
CERULOPLASMIN SERPL-MCNC: 10 MG/DL — LOW (ref 15–30)
CHLORIDE SERPL-SCNC: 106 MMOL/L — SIGNIFICANT CHANGE UP (ref 96–108)
CO2 SERPL-SCNC: 32 MMOL/L — HIGH (ref 22–31)
COLLECT DURATION TIME UR: 24 HR — SIGNIFICANT CHANGE UP
CREAT SERPL-MCNC: 0.56 MG/DL — SIGNIFICANT CHANGE UP (ref 0.5–1.3)
CULTURE RESULTS: SIGNIFICANT CHANGE UP
DRVVT SCREEN TO CONFIRM RATIO: SIGNIFICANT CHANGE UP
GLUCOSE BLDC GLUCOMTR-MCNC: 137 MG/DL — HIGH (ref 70–99)
GLUCOSE BLDC GLUCOMTR-MCNC: 143 MG/DL — HIGH (ref 70–99)
GLUCOSE BLDC GLUCOMTR-MCNC: 177 MG/DL — HIGH (ref 70–99)
GLUCOSE BLDC GLUCOMTR-MCNC: 189 MG/DL — HIGH (ref 70–99)
GLUCOSE BLDC GLUCOMTR-MCNC: 191 MG/DL — HIGH (ref 70–99)
GLUCOSE SERPL-MCNC: 151 MG/DL — HIGH (ref 70–99)
HCT VFR BLD CALC: 23.1 % — LOW (ref 39–50)
HGB BLD-MCNC: 7.2 G/DL — LOW (ref 13–17)
INR BLD: 2.5 — HIGH (ref 0.88–1.16)
LA NT DPL PPP QL: 38.1 SEC — SIGNIFICANT CHANGE UP
MAGNESIUM SERPL-MCNC: 1.9 MG/DL — SIGNIFICANT CHANGE UP (ref 1.6–2.6)
MCHC RBC-ENTMCNC: 30.5 PG — SIGNIFICANT CHANGE UP (ref 27–34)
MCHC RBC-ENTMCNC: 31.2 GM/DL — LOW (ref 32–36)
MCV RBC AUTO: 97.9 FL — SIGNIFICANT CHANGE UP (ref 80–100)
METHOD TYPE: SIGNIFICANT CHANGE UP
NRBC # BLD: 0 /100 WBCS — SIGNIFICANT CHANGE UP (ref 0–0)
PHOSPHATE SERPL-MCNC: 1.8 MG/DL — LOW (ref 2.5–4.5)
PLATELET # BLD AUTO: 284 K/UL — SIGNIFICANT CHANGE UP (ref 150–400)
POTASSIUM SERPL-MCNC: 3.9 MMOL/L — SIGNIFICANT CHANGE UP (ref 3.5–5.3)
POTASSIUM SERPL-SCNC: 3.9 MMOL/L — SIGNIFICANT CHANGE UP (ref 3.5–5.3)
PREALB SERPL-MCNC: 7 MG/DL — LOW (ref 20–40)
PROTHROM AB SERPL-ACNC: 29.1 SEC — HIGH (ref 10–12.9)
RBC # BLD: 2.36 M/UL — LOW (ref 4.2–5.8)
RBC # FLD: 16.5 % — HIGH (ref 10.3–14.5)
SODIUM SERPL-SCNC: 144 MMOL/L — SIGNIFICANT CHANGE UP (ref 135–145)
SPECIMEN SOURCE: SIGNIFICANT CHANGE UP
TOTAL VOLUME - 24 HOUR: 2000 ML — SIGNIFICANT CHANGE UP
URINE CREATININE CALCULATION: 0.4 G/24 H — LOW (ref 1–2)
UUN 24H UR-MRATE: 8 G/24H — SIGNIFICANT CHANGE UP (ref 6–17)
WBC # BLD: 10.98 K/UL — HIGH (ref 3.8–10.5)
WBC # FLD AUTO: 10.98 K/UL — HIGH (ref 3.8–10.5)

## 2019-04-19 PROCEDURE — 71045 X-RAY EXAM CHEST 1 VIEW: CPT | Mod: 26

## 2019-04-19 PROCEDURE — 99291 CRITICAL CARE FIRST HOUR: CPT

## 2019-04-19 PROCEDURE — 99233 SBSQ HOSP IP/OBS HIGH 50: CPT | Mod: GC

## 2019-04-19 PROCEDURE — 99232 SBSQ HOSP IP/OBS MODERATE 35: CPT

## 2019-04-19 RX ORDER — MAGNESIUM SULFATE 500 MG/ML
2 VIAL (ML) INJECTION ONCE
Qty: 0 | Refills: 0 | Status: COMPLETED | OUTPATIENT
Start: 2019-04-19 | End: 2019-04-19

## 2019-04-19 RX ORDER — CHLORHEXIDINE GLUCONATE 213 G/1000ML
15 SOLUTION TOPICAL
Qty: 0 | Refills: 0 | Status: DISCONTINUED | OUTPATIENT
Start: 2019-04-19 | End: 2019-05-09

## 2019-04-19 RX ORDER — POTASSIUM CHLORIDE 20 MEQ
40 PACKET (EA) ORAL ONCE
Qty: 0 | Refills: 0 | Status: DISCONTINUED | OUTPATIENT
Start: 2019-04-19 | End: 2019-04-19

## 2019-04-19 RX ORDER — FUROSEMIDE 40 MG
40 TABLET ORAL ONCE
Qty: 0 | Refills: 0 | Status: COMPLETED | OUTPATIENT
Start: 2019-04-19 | End: 2019-04-19

## 2019-04-19 RX ORDER — HEPARIN SODIUM 5000 [USP'U]/ML
5000 INJECTION INTRAVENOUS; SUBCUTANEOUS EVERY 8 HOURS
Qty: 0 | Refills: 0 | Status: DISCONTINUED | OUTPATIENT
Start: 2019-04-19 | End: 2019-04-22

## 2019-04-19 RX ORDER — SODIUM,POTASSIUM PHOSPHATES 278-250MG
1 POWDER IN PACKET (EA) ORAL EVERY 4 HOURS
Qty: 0 | Refills: 0 | Status: COMPLETED | OUTPATIENT
Start: 2019-04-19 | End: 2019-04-19

## 2019-04-19 RX ORDER — POTASSIUM CHLORIDE 20 MEQ
40 PACKET (EA) ORAL EVERY 4 HOURS
Qty: 0 | Refills: 0 | Status: COMPLETED | OUTPATIENT
Start: 2019-04-19 | End: 2019-04-19

## 2019-04-19 RX ADMIN — PANTOPRAZOLE SODIUM 40 MILLIGRAM(S): 20 TABLET, DELAYED RELEASE ORAL at 11:57

## 2019-04-19 RX ADMIN — CHLORHEXIDINE GLUCONATE 15 MILLILITER(S): 213 SOLUTION TOPICAL at 18:50

## 2019-04-19 RX ADMIN — Medication 50 MILLILITER(S): at 09:19

## 2019-04-19 RX ADMIN — MIDODRINE HYDROCHLORIDE 10 MILLIGRAM(S): 2.5 TABLET ORAL at 05:28

## 2019-04-19 RX ADMIN — Medication 40 MILLIGRAM(S): at 17:20

## 2019-04-19 RX ADMIN — Medication 40 MILLIEQUIVALENT(S): at 11:56

## 2019-04-19 RX ADMIN — Medication 50 MILLILITER(S): at 17:20

## 2019-04-19 RX ADMIN — ATORVASTATIN CALCIUM 80 MILLIGRAM(S): 80 TABLET, FILM COATED ORAL at 22:02

## 2019-04-19 RX ADMIN — Medication 40 MILLIEQUIVALENT(S): at 09:16

## 2019-04-19 RX ADMIN — Medication 250 MILLIGRAM(S): at 11:56

## 2019-04-19 RX ADMIN — Medication 81 MILLIGRAM(S): at 11:56

## 2019-04-19 RX ADMIN — ZINC SULFATE TAB 220 MG (50 MG ZINC EQUIVALENT) 220 MILLIGRAM(S): 220 (50 ZN) TAB at 11:55

## 2019-04-19 RX ADMIN — CEFEPIME 100 MILLIGRAM(S): 1 INJECTION, POWDER, FOR SOLUTION INTRAMUSCULAR; INTRAVENOUS at 18:50

## 2019-04-19 RX ADMIN — FENTANYL CITRATE 12.5 MICROGRAM(S): 50 INJECTION INTRAVENOUS at 17:21

## 2019-04-19 RX ADMIN — Medication 100 MILLIGRAM(S): at 02:33

## 2019-04-19 RX ADMIN — Medication 3.44 MICROGRAM(S)/KG/MIN: at 05:27

## 2019-04-19 RX ADMIN — Medication 50 GRAM(S): at 07:33

## 2019-04-19 RX ADMIN — CEFEPIME 100 MILLIGRAM(S): 1 INJECTION, POWDER, FOR SOLUTION INTRAMUSCULAR; INTRAVENOUS at 05:27

## 2019-04-19 RX ADMIN — Medication 40 MILLIGRAM(S): at 07:33

## 2019-04-19 RX ADMIN — Medication 100 MILLIGRAM(S): at 18:49

## 2019-04-19 RX ADMIN — Medication 40 MILLIEQUIVALENT(S): at 00:50

## 2019-04-19 RX ADMIN — NYSTATIN CREAM 1 APPLICATION(S): 100000 CREAM TOPICAL at 19:27

## 2019-04-19 RX ADMIN — Medication 1 TABLET(S): at 22:01

## 2019-04-19 RX ADMIN — Medication 2: at 12:05

## 2019-04-19 RX ADMIN — Medication 250 MILLIGRAM(S): at 11:58

## 2019-04-19 RX ADMIN — NYSTATIN CREAM 1 APPLICATION(S): 100000 CREAM TOPICAL at 05:28

## 2019-04-19 RX ADMIN — CHLORHEXIDINE GLUCONATE 1 APPLICATION(S): 213 SOLUTION TOPICAL at 06:20

## 2019-04-19 RX ADMIN — MIDODRINE HYDROCHLORIDE 10 MILLIGRAM(S): 2.5 TABLET ORAL at 11:56

## 2019-04-19 RX ADMIN — Medication 1 TABLET(S): at 11:55

## 2019-04-19 RX ADMIN — Medication 40 MILLIGRAM(S): at 00:49

## 2019-04-19 RX ADMIN — Medication 100 MILLIGRAM(S): at 09:16

## 2019-04-19 RX ADMIN — Medication 1 TABLET(S): at 18:49

## 2019-04-19 RX ADMIN — HEPARIN SODIUM 5000 UNIT(S): 5000 INJECTION INTRAVENOUS; SUBCUTANEOUS at 22:02

## 2019-04-19 RX ADMIN — Medication 1 TABLET(S): at 09:17

## 2019-04-19 RX ADMIN — FENTANYL CITRATE 12.5 MICROGRAM(S): 50 INJECTION INTRAVENOUS at 17:30

## 2019-04-19 RX ADMIN — Medication 100 UNIT(S): at 12:06

## 2019-04-19 RX ADMIN — MIDODRINE HYDROCHLORIDE 10 MILLIGRAM(S): 2.5 TABLET ORAL at 22:02

## 2019-04-19 NOTE — PROGRESS NOTE ADULT - ATTENDING COMMENTS
Persistent airleak but minimal drainage at present. Bedside ultrasound showed residuals minimal organised pleural effusion. Plan to remove chest tube in next few days. Observe for air leak. rest as above

## 2019-04-19 NOTE — PROGRESS NOTE ADULT - ASSESSMENT
85 yo M with history of b/l laparoscopic robotic-assisted inguinal hernia repair presenting with b/l segmental pulmonary embolisms, left lung abscess, pneumoperitoneum, and distal descending colon abscess s/p sigoidectomy significant iliofemoral DVT burden, s/p IVCF placement with concern for liver related thromboembolic disease in the absence of cirrhosis.     #Ascites, hypoalbuminemia, thrombocytopenia -- ? Liver disease  - prolonged PT/INR with normal PTT w/ concern for a liver disease  - factor shows low factor 2, 5, 7, 10, 12, but normal factor 9  - LTs WNL - please recheck   - No evidence of cirrhosis on imaging or during laproscopic evaluation   - JOANN, ASMA, JOANN,  - Immunoglobulin  - Alpha 1 anti-trypsin, ceruloplasmin  - Hep B, Hep C   - Ferritin, Iron panel  - SAAG 1.5 , ascitic pro < 2.5  (ddx cirrhosis, portal HTN, budd-chiari) - please obtain US with /dopplers to r/o   - Follow up Heme/onc recs     case d/w Dr. Wang 87 yo M with history of b/l laparoscopic robotic-assisted inguinal hernia repair presenting with b/l segmental pulmonary embolisms, left lung abscess, pneumoperitoneum, and distal descending colon abscess s/p sigoidectomy significant iliofemoral DVT burden, s/p IVCF placement with concern for liver related thromboembolic disease in the absence of cirrhosis.     #Ascites, hypoalbuminemia, thrombocytopenia -- ? Liver disease  - prolonged PT/INR with normal PTT w/ concern for a liver disease  - factor shows low factor 2, 5, 7, 10, 12, but normal factor 9  - LTs WNL - please recheck   - No evidence of cirrhosis on imaging or during laproscopic evaluation   - JOANN, ASMA, JOANN,  - Immunoglobulin  - Alpha 1 anti-trypsin, ceruloplasmin  - acute hep panel neg  - iron studies not c/w hemochromatosis  - SAAG 1.5 , ascitic pro < 2.5  (ddx cirrhosis, portal HTN, budd-chiari) - please obtain US with /dopplers to r/o   - Follow up Heme/onc recs     case d/w Dr. Wang

## 2019-04-19 NOTE — PROGRESS NOTE ADULT - SUBJECTIVE AND OBJECTIVE BOX
Pt seen and examined at bedside.    PERTINENT REVIEW OF SYSTEMS:  CONSTITUTIONAL: No weakness, fevers or chills  HEENT: No visual changes; No vertigo or throat pain   GASTROINTESTINAL: No abdominal or epigastric pain. No nausea, vomiting, or hematemesis; No diarrhea or constipation. No melena or hematochezia.  NEUROLOGICAL: No numbness or weakness  SKIN: No itching, burning, rashes, or lesions     Allergies    No Known Allergies    Intolerances      MEDICATIONS:  MEDICATIONS  (STANDING):  albumin human 25% IVPB 50 milliLiter(s) IV Intermittent every 8 hours  ascorbic acid Syrup 250 milliGRAM(s) Oral daily  aspirin  chewable 81 milliGRAM(s) Oral daily  atorvastatin 80 milliGRAM(s) Oral at bedtime  cefepime   IVPB 2000 milliGRAM(s) IV Intermittent every 12 hours  chlorhexidine 2% Cloths 1 Application(s) Topical <User Schedule>  dextrose 5%. 1000 milliLiter(s) (50 mL/Hr) IV Continuous <Continuous>  dextrose 50% Injectable 12.5 Gram(s) IV Push once  dextrose 50% Injectable 25 Gram(s) IV Push once  dextrose 50% Injectable 25 Gram(s) IV Push once  heparin  flush 100 Units/mL Injectable 100 Unit(s) IV Push every other day  insulin lispro (HumaLOG) corrective regimen sliding scale   SubCutaneous every 6 hours  metroNIDAZOLE  IVPB 500 milliGRAM(s) IV Intermittent every 8 hours  metroNIDAZOLE  IVPB      midodrine 10 milliGRAM(s) Oral every 8 hours  norepinephrine Infusion 0.05 MICROgram(s)/kG/Min (3.436 mL/Hr) IV Continuous <Continuous>  nystatin Powder 1 Application(s) Topical two times a day  pantoprazole  Injectable 40 milliGRAM(s) IV Push daily  potassium acid phosphate/sodium acid phosphate tablet (K-PHOS No. 2) 1 Tablet(s) Oral every 4 hours  potassium chloride   Powder 40 milliEquivalent(s) Enteral Tube every 4 hours  vancomycin  IVPB 1000 milliGRAM(s) IV Intermittent daily  zinc sulfate 220 milliGRAM(s) Oral daily    MEDICATIONS  (PRN):  dextrose 40% Gel 15 Gram(s) Oral once PRN Blood Glucose LESS THAN 70 milliGRAM(s)/deciliter  fentaNYL    Injectable 12.5 MICROGram(s) IV Push every 3 hours PRN Moderate Pain (4 - 6)  fentaNYL    Injectable 25 MICROGram(s) IV Push every 3 hours PRN Severe Pain (7 - 10)  glucagon  Injectable 1 milliGRAM(s) IntraMuscular once PRN Glucose LESS THAN 70 milligrams/deciliter  ondansetron Injectable 4 milliGRAM(s) IV Push every 6 hours PRN Nausea    Vital Signs Last 24 Hrs  T(C): 37.6 (19 Apr 2019 01:40), Max: 38.1 (18 Apr 2019 22:00)  T(F): 99.7 (19 Apr 2019 01:40), Max: 100.5 (18 Apr 2019 22:00)  HR: 78 (19 Apr 2019 08:00) (74 - 101)  BP: 113/57 (19 Apr 2019 07:00) (87/51 - 117/59)  BP(mean): 93 (19 Apr 2019 07:00) (59 - 93)  RR: 29 (19 Apr 2019 08:00) (12 - 36)  SpO2: 100% (19 Apr 2019 08:00) (97% - 100%)    04-18 @ 07:01  -  04-19 @ 07:00  --------------------------------------------------------  IN: 2111 mL / OUT: 4260 mL / NET: -2149 mL    04-19 @ 07:01  -  04-19 @ 08:57  --------------------------------------------------------  IN: 506 mL / OUT: 150 mL / NET: 356 mL      PHYSICAL EXAM:    General: Well developed; well nourished; in no acute distress  HEENT: MMM, conjunctiva and sclera clear  Gastrointestinal: Soft non-tender non-distended; Normal bowel sounds; No hepatosplenomegaly. No rebound or guarding  Skin: Warm and dry. No obvious rash    LABS:                        7.2    10.98 )-----------( 284      ( 19 Apr 2019 06:05 )             23.1     04-19    144  |  106  |  33<H>  ----------------------------<  151<H>  3.9   |  32<H>  |  0.56    Ca    7.8<L>      19 Apr 2019 06:05  Phos  1.8     04-19  Mg     1.9     04-19      PT/INR - ( 19 Apr 2019 06:05 )   PT: 29.1 sec;   INR: 2.50          PTT - ( 19 Apr 2019 06:05 )  PTT:32.0 sec                  RADIOLOGY & ADDITIONAL STUDIES: Pt seen and examined at bedside.    PERTINENT REVIEW OF SYSTEMS:  CONSTITUTIONAL: No weakness, fevers or chills  HEENT: No visual changes; No vertigo or throat pain   GASTROINTESTINAL: No abdominal or epigastric pain. No nausea, vomiting, or hematemesis; No diarrhea or constipation. No melena or hematochezia.  NEUROLOGICAL: No numbness or weakness  SKIN: No itching, burning, rashes, or lesions     Allergies    No Known Allergies    Intolerances      MEDICATIONS:  MEDICATIONS  (STANDING):  albumin human 25% IVPB 50 milliLiter(s) IV Intermittent every 8 hours  ascorbic acid Syrup 250 milliGRAM(s) Oral daily  aspirin  chewable 81 milliGRAM(s) Oral daily  atorvastatin 80 milliGRAM(s) Oral at bedtime  cefepime   IVPB 2000 milliGRAM(s) IV Intermittent every 12 hours  chlorhexidine 2% Cloths 1 Application(s) Topical <User Schedule>  dextrose 5%. 1000 milliLiter(s) (50 mL/Hr) IV Continuous <Continuous>  dextrose 50% Injectable 12.5 Gram(s) IV Push once  dextrose 50% Injectable 25 Gram(s) IV Push once  dextrose 50% Injectable 25 Gram(s) IV Push once  heparin  flush 100 Units/mL Injectable 100 Unit(s) IV Push every other day  insulin lispro (HumaLOG) corrective regimen sliding scale   SubCutaneous every 6 hours  metroNIDAZOLE  IVPB 500 milliGRAM(s) IV Intermittent every 8 hours  metroNIDAZOLE  IVPB      midodrine 10 milliGRAM(s) Oral every 8 hours  norepinephrine Infusion 0.05 MICROgram(s)/kG/Min (3.436 mL/Hr) IV Continuous <Continuous>  nystatin Powder 1 Application(s) Topical two times a day  pantoprazole  Injectable 40 milliGRAM(s) IV Push daily  potassium acid phosphate/sodium acid phosphate tablet (K-PHOS No. 2) 1 Tablet(s) Oral every 4 hours  potassium chloride   Powder 40 milliEquivalent(s) Enteral Tube every 4 hours  vancomycin  IVPB 1000 milliGRAM(s) IV Intermittent daily  zinc sulfate 220 milliGRAM(s) Oral daily    MEDICATIONS  (PRN):  dextrose 40% Gel 15 Gram(s) Oral once PRN Blood Glucose LESS THAN 70 milliGRAM(s)/deciliter  fentaNYL    Injectable 12.5 MICROGram(s) IV Push every 3 hours PRN Moderate Pain (4 - 6)  fentaNYL    Injectable 25 MICROGram(s) IV Push every 3 hours PRN Severe Pain (7 - 10)  glucagon  Injectable 1 milliGRAM(s) IntraMuscular once PRN Glucose LESS THAN 70 milligrams/deciliter  ondansetron Injectable 4 milliGRAM(s) IV Push every 6 hours PRN Nausea    Vital Signs Last 24 Hrs  T(C): 37.6 (19 Apr 2019 01:40), Max: 38.1 (18 Apr 2019 22:00)  T(F): 99.7 (19 Apr 2019 01:40), Max: 100.5 (18 Apr 2019 22:00)  HR: 78 (19 Apr 2019 08:00) (74 - 101)  BP: 113/57 (19 Apr 2019 07:00) (87/51 - 117/59)  BP(mean): 93 (19 Apr 2019 07:00) (59 - 93)  RR: 29 (19 Apr 2019 08:00) (12 - 36)  SpO2: 100% (19 Apr 2019 08:00) (97% - 100%)    04-18 @ 07:01  -  04-19 @ 07:00  --------------------------------------------------------  IN: 2111 mL / OUT: 4260 mL / NET: -2149 mL    04-19 @ 07:01  -  04-19 @ 08:57  --------------------------------------------------------  IN: 506 mL / OUT: 150 mL / NET: 356 mL      PHYSICAL EXAM:    General: intubated, in no acute distress  HEENT: MMM, conjunctiva and sclera clear  Gastrointestinal: Soft non-tender non-distended; Normal bowel sounds; No hepatosplenomegaly. No rebound or guarding;  midline surgical packing with drain in place, ostomy site with stool draing  Skin: Warm and dry. No obvious rash    LABS:                        7.2    10.98 )-----------( 284      ( 19 Apr 2019 06:05 )             23.1     04-19    144  |  106  |  33<H>  ----------------------------<  151<H>  3.9   |  32<H>  |  0.56    Ca    7.8<L>      19 Apr 2019 06:05  Phos  1.8     04-19  Mg     1.9     04-19      PT/INR - ( 19 Apr 2019 06:05 )   PT: 29.1 sec;   INR: 2.50          PTT - ( 19 Apr 2019 06:05 )  PTT:32.0 sec                  RADIOLOGY & ADDITIONAL STUDIES:

## 2019-04-19 NOTE — PROGRESS NOTE ADULT - PROBLEM SELECTOR PLAN 1
Chest tube inserted on 4/15 has had no drainage overnight despite being on intermittent mild suction. The tube is flushing well with 10cc saline. No air leak was noted this morning. The fluid remaining in the proximal tube seems to move with each breath. Again, tidaling without an air leak may suggest that the collection was an empyema, and the suction overnight may have helped close the leak. Patient had a mild fever overnight but still is HD stable and pressor requirements have steadily come down. He tolerated a t-piece yesterday, with plans for possible extubation today. If the air leak continues to resolve for 24-48 hours, would consider removal of chest tube.    Recommend  - Continue to drain chest tube on intermittent low suction. Will continue to monitor daily for air leaks and drainage.  - Antibiotics to continue per cultures (Strep anginosus of varying strains, Bacteroides)

## 2019-04-19 NOTE — PROGRESS NOTE ADULT - SUBJECTIVE AND OBJECTIVE BOX
o/n: Tm of 100.5 at 10pm  4/18: Tolerated T-Peice trial for an hour - will attempt extubation tomorrow after further diuresis. lasix 40 x 2 today. GI consulted for ascites, 24 hr urea nitrogen    SUBJECTIVE:   Patient seen and examined by bedside. More lethargic in AM and did not respond to yes-no questions or follow commands. Was on Cpap, breathing comfortably, but tachypneic to RR 30. ROS not obtainable.      Vitals - Range in last 12h  T(F): , Max: 99.5 (04-19-19 @ 09:00)  HR:  (74 - 90)  BP:  (88/51 - 113/57)  BP(mean):  (61 - 93)  ABP:  (102/44 - 122/68)  ABP(mean):  (64 - 86)  RR:  (12 - 33)  SpO2:  (100% - 100%)  CVP(mm Hg): --  CVP(cm H2O): --  CO: --  CI: --  PA: --  PA(mean): --  PA(direct): --  PCWP: --    Vitals - Most Recent  T(F): 99.5 (04-19-19 @ 09:00)  HR: 80 (04-19-19 @ 13:00)  BP: 100/53 (04-19-19 @ 13:00)  RR: 15 (04-19-19 @ 13:00)  SpO2: 100% (04-19-19 @ 13:00)  I&O's Detail    18 Apr 2019 07:01  -  19 Apr 2019 07:00  --------------------------------------------------------  IN:    Albumin 25%: 100 mL    Enteral Tube Flush: 240 mL    Glucerna 1.5: 1200 mL    IV PiggyBack: 400 mL    norepinephrine Infusion: 171 mL  Total IN: 2111 mL    OUT:    Colostomy: 1450 mL    Indwelling Catheter - Urethral: 2810 mL  Total OUT: 4260 mL    Total NET: -2149 mL      19 Apr 2019 07:01  -  19 Apr 2019 14:23  --------------------------------------------------------  IN:    Albumin 25%: 50 mL    Enteral Tube Flush: 100 mL    Glucerna 1.5: 300 mL    IV PiggyBack: 100 mL    norepinephrine Infusion: 23 mL  Total IN: 573 mL    OUT:    Indwelling Catheter - Urethral: 605 mL  Total OUT: 605 mL    Total NET: -32 mL        Mode: AC/ CMV (Assist Control/ Continuous Mandatory Ventilation)  RR (machine): 12  RR (patient): 17  TV (machine): 500  TV (patient): 490  FiO2: 40  PEEP: 5  ITime: 1  MAP: 8.8  PIP: 19      Physical Exam  Neuro: intubated, alert but non-verbal, lethargic, did not follow command   General: no acute distress, breathing comfortably on CPAP, mildly tachypneic  HEENT: PERRL, EOMI, MMM, dobhoff in place w/ TFs  Pulm: intubated, on vent, cpap w/ RR in 30's, chest tube in place L posterior chest wall w/ gray output; breath sounds significantly improved w/ less rhonchi bilaterally, and improved breath sounds in lung bases  C/V: S1S2, non-tachycardic   Abd: nondistended, but firm; LLQ IR drain w/ light serosanguinous output; LUQ PHLYLIS drain w/ serosanguinous output; R paracolic gutter drain w/ serosanguinous output; abdominal incision w/ wound vac in place --- noted mild blanching erythema along incision, skin irritated from adhesive  Extremities: significant improvement in edema/ansarca of lower and upper extremities  : rao in place w/ leaking of urine extraluminally; scrotal edema resolved  Skin: mild blanching erythema on thighs, no macerations        LABS:                        7.2    10.98 )-----------( 284      ( 19 Apr 2019 06:05 )             23.1     04-19    144  |  106  |  33<H>  ----------------------------<  151<H>  3.9   |  32<H>  |  0.56    Ca    7.8<L>      19 Apr 2019 06:05  Phos  1.8     04-19  Mg     1.9     04-19        PT/INR - ( 19 Apr 2019 06:05 )   PT: 29.1 sec;   INR: 2.50          PTT - ( 19 Apr 2019 06:05 )  PTT:32.0 sec        MEDICATIONS  (STANDING):  albumin human 25% IVPB 50 milliLiter(s) IV Intermittent every 8 hours  ascorbic acid Syrup 250 milliGRAM(s) Oral daily  aspirin  chewable 81 milliGRAM(s) Oral daily  atorvastatin 80 milliGRAM(s) Oral at bedtime  cefepime   IVPB 2000 milliGRAM(s) IV Intermittent every 12 hours  chlorhexidine 2% Cloths 1 Application(s) Topical <User Schedule>  dextrose 5%. 1000 milliLiter(s) (50 mL/Hr) IV Continuous <Continuous>  dextrose 50% Injectable 12.5 Gram(s) IV Push once  dextrose 50% Injectable 25 Gram(s) IV Push once  dextrose 50% Injectable 25 Gram(s) IV Push once  heparin  flush 100 Units/mL Injectable 100 Unit(s) IV Push every other day  insulin lispro (HumaLOG) corrective regimen sliding scale   SubCutaneous every 6 hours  metroNIDAZOLE  IVPB 500 milliGRAM(s) IV Intermittent every 8 hours  metroNIDAZOLE  IVPB      midodrine 10 milliGRAM(s) Oral every 8 hours  norepinephrine Infusion 0.05 MICROgram(s)/kG/Min (3.436 mL/Hr) IV Continuous <Continuous>  nystatin Powder 1 Application(s) Topical two times a day  pantoprazole  Injectable 40 milliGRAM(s) IV Push daily  potassium acid phosphate/sodium acid phosphate tablet (K-PHOS No. 2) 1 Tablet(s) Oral every 4 hours  vancomycin  IVPB 1000 milliGRAM(s) IV Intermittent daily  zinc sulfate 220 milliGRAM(s) Oral daily    MEDICATIONS  (PRN):  dextrose 40% Gel 15 Gram(s) Oral once PRN Blood Glucose LESS THAN 70 milliGRAM(s)/deciliter  fentaNYL    Injectable 12.5 MICROGram(s) IV Push every 3 hours PRN Moderate Pain (4 - 6)  fentaNYL    Injectable 25 MICROGram(s) IV Push every 3 hours PRN Severe Pain (7 - 10)  glucagon  Injectable 1 milliGRAM(s) IntraMuscular once PRN Glucose LESS THAN 70 milligrams/deciliter  ondansetron Injectable 4 milliGRAM(s) IV Push every 6 hours PRN Nausea      RADIOLOGY & ADDITIONAL STUDIES:    AM cxr: pulmonary edema improved; interval improvement of L pleural effusion

## 2019-04-19 NOTE — PROGRESS NOTE ADULT - SUBJECTIVE AND OBJECTIVE BOX
INTERVAL HISTORY:  Patient seen at bedside. Overnight he had a slight fever to 100.5. UOP has been maintained with lasix pushes.  He is still intubated but not sedated, currently breathing well on PSV.    VITAL SIGNS:  ICU Vital Signs Last 24 Hrs  T(C): 37.6 (19 Apr 2019 01:40), Max: 38.1 (18 Apr 2019 22:00)  T(F): 99.7 (19 Apr 2019 01:40), Max: 100.5 (18 Apr 2019 22:00)  HR: 78 (19 Apr 2019 08:00) (74 - 101)  BP: 113/57 (19 Apr 2019 07:00) (87/51 - 117/59)  BP(mean): 93 (19 Apr 2019 07:00) (59 - 93)  ABP: 126/72 (19 Apr 2019 01:40) (84/66 - 168/114)  ABP(mean): 88 (19 Apr 2019 01:40) (60 - 142)  RR: 29 (19 Apr 2019 08:00) (12 - 36)  SpO2: 100% (19 Apr 2019 08:00) (97% - 100%)    Mode: CPAP with PS, FiO2: 40, PEEP: 5, PS: 8, MAP: 8, PIP: 15    04-18 @ 07:01 - 04-19 @ 07:00  --------------------------------------------------------  IN: 2111 mL / OUT: 4260 mL / NET: -2149 mL    04-19 @ 07:01  -  04-19 @ 08:50  --------------------------------------------------------  IN: 506 mL / OUT: 150 mL / NET: 356 mL      CAPILLARY BLOOD GLUCOSE      POCT Blood Glucose.: 143 mg/dL (19 Apr 2019 06:14)      PHYSICAL EXAM:  Constitutional: Intubated, alert and able to follow commands.   Neck: supple, no JVD  Respiratory: Still with coarse breath sounds and ronchi bilaterally; Chest tube site with mild tenderness but no erythema or drainage from the insertion site.   Cardiovascular: +S1/S2, RRR  Gastrointestinal: unchanged  Extremities: Warm, lower extremity edema beyond the knees are improved today. Upper extremity edema is still persistent and 2+ bilaterally      MEDICATIONS:  MEDICATIONS  (STANDING):  albumin human 25% IVPB 50 milliLiter(s) IV Intermittent every 8 hours  ascorbic acid Syrup 250 milliGRAM(s) Oral daily  aspirin  chewable 81 milliGRAM(s) Oral daily  atorvastatin 80 milliGRAM(s) Oral at bedtime  cefepime   IVPB 2000 milliGRAM(s) IV Intermittent every 12 hours  chlorhexidine 2% Cloths 1 Application(s) Topical <User Schedule>  dextrose 5%. 1000 milliLiter(s) (50 mL/Hr) IV Continuous <Continuous>  dextrose 50% Injectable 12.5 Gram(s) IV Push once  dextrose 50% Injectable 25 Gram(s) IV Push once  dextrose 50% Injectable 25 Gram(s) IV Push once  heparin  flush 100 Units/mL Injectable 100 Unit(s) IV Push every other day  insulin lispro (HumaLOG) corrective regimen sliding scale   SubCutaneous every 6 hours  metroNIDAZOLE  IVPB 500 milliGRAM(s) IV Intermittent every 8 hours  metroNIDAZOLE  IVPB      midodrine 10 milliGRAM(s) Oral every 8 hours  norepinephrine Infusion 0.05 MICROgram(s)/kG/Min (3.436 mL/Hr) IV Continuous <Continuous>  nystatin Powder 1 Application(s) Topical two times a day  pantoprazole  Injectable 40 milliGRAM(s) IV Push daily  potassium acid phosphate/sodium acid phosphate tablet (K-PHOS No. 2) 1 Tablet(s) Oral every 4 hours  potassium chloride   Powder 40 milliEquivalent(s) Enteral Tube every 4 hours  vancomycin  IVPB 1000 milliGRAM(s) IV Intermittent daily  zinc sulfate 220 milliGRAM(s) Oral daily    MEDICATIONS  (PRN):  dextrose 40% Gel 15 Gram(s) Oral once PRN Blood Glucose LESS THAN 70 milliGRAM(s)/deciliter  fentaNYL    Injectable 12.5 MICROGram(s) IV Push every 3 hours PRN Moderate Pain (4 - 6)  fentaNYL    Injectable 25 MICROGram(s) IV Push every 3 hours PRN Severe Pain (7 - 10)  glucagon  Injectable 1 milliGRAM(s) IntraMuscular once PRN Glucose LESS THAN 70 milligrams/deciliter  ondansetron Injectable 4 milliGRAM(s) IV Push every 6 hours PRN Nausea      ALLERGIES:  Allergies    No Known Allergies    Intolerances        LABS:                        7.2    10.98 )-----------( 284      ( 19 Apr 2019 06:05 )             23.1     04-19    144  |  106  |  33<H>  ----------------------------<  151<H>  3.9   |  32<H>  |  0.56    Ca    7.8<L>      19 Apr 2019 06:05  Phos  1.8     04-19  Mg     1.9     04-19      PT/INR - ( 19 Apr 2019 06:05 )   PT: 29.1 sec;   INR: 2.50          PTT - ( 19 Apr 2019 06:05 )  PTT:32.0 sec      RADIOLOGY & ADDITIONAL TESTS:   CXR: unchanged since yesterday with bilateral fluffy infiltrates in the lower lobes.

## 2019-04-19 NOTE — CHART NOTE - NSCHARTNOTEFT_GEN_A_CORE
Admitting Diagnosis:   Patient is a 86y old  Male who presents with a chief complaint of right inguinal discomfort (19 Apr 2019 08:57)    PAST MEDICAL & SURGICAL HISTORY:  Esophageal reflux  Acute diarrhea  HLD (hyperlipidemia)  HTN (hypertension)  Prostate cancer  Stented coronary artery: x3 , per pt.  Atherosclerosis of coronary artery: CAD (coronary artery disease)  S/P hernia repair  Surgery, elective: cardiac stent x3  History of prostate surgery    Current Nutrition Order: Glucerna 1.5 @ 50 mL/hr, Prostat SF 1x/day (1900 kcal, 114 gm pro, 911 mL free water, 120% RD vitamin/mineral)     PO Intake: N/A as pt on EN    GI Issues: WDL, +colostomy, +abd pain, no N/V per flow sheet/documentation    Pain: Unable to fully assess at this time 2/2 pt's medical status    Skin Integrity: wound vac, midline abdomen, L heel stage III, unstageable wound sacrum    Labs:   04-19    144  |  106  |  33<H>  ----------------------------<  151<H>  3.9   |  32<H>  |  0.56    Ca    7.8<L>      19 Apr 2019 06:05  Phos  1.8     04-19  Mg     1.9     04-19      CAPILLARY BLOOD GLUCOSE    POCT Blood Glucose.: 177 mg/dL (19 Apr 2019 12:03)  POCT Blood Glucose.: 191 mg/dL (19 Apr 2019 10:31)  POCT Blood Glucose.: 143 mg/dL (19 Apr 2019 06:14)  POCT Blood Glucose.: 169 mg/dL (18 Apr 2019 22:58)  POCT Blood Glucose.: 149 mg/dL (18 Apr 2019 17:27)    Medications:  MEDICATIONS  (STANDING):  albumin human 25% IVPB 50 milliLiter(s) IV Intermittent every 8 hours  ascorbic acid Syrup 250 milliGRAM(s) Oral daily  aspirin  chewable 81 milliGRAM(s) Oral daily  atorvastatin 80 milliGRAM(s) Oral at bedtime  cefepime   IVPB 2000 milliGRAM(s) IV Intermittent every 12 hours  chlorhexidine 2% Cloths 1 Application(s) Topical <User Schedule>  dextrose 5%. 1000 milliLiter(s) (50 mL/Hr) IV Continuous <Continuous>  dextrose 50% Injectable 12.5 Gram(s) IV Push once  dextrose 50% Injectable 25 Gram(s) IV Push once  dextrose 50% Injectable 25 Gram(s) IV Push once  heparin  flush 100 Units/mL Injectable 100 Unit(s) IV Push every other day  insulin lispro (HumaLOG) corrective regimen sliding scale   SubCutaneous every 6 hours  metroNIDAZOLE  IVPB 500 milliGRAM(s) IV Intermittent every 8 hours  metroNIDAZOLE  IVPB      midodrine 10 milliGRAM(s) Oral every 8 hours  norepinephrine Infusion 0.05 MICROgram(s)/kG/Min (3.436 mL/Hr) IV Continuous <Continuous>  nystatin Powder 1 Application(s) Topical two times a day  pantoprazole  Injectable 40 milliGRAM(s) IV Push daily  potassium acid phosphate/sodium acid phosphate tablet (K-PHOS No. 2) 1 Tablet(s) Oral every 4 hours  vancomycin  IVPB 1000 milliGRAM(s) IV Intermittent daily  zinc sulfate 220 milliGRAM(s) Oral daily    MEDICATIONS  (PRN):  dextrose 40% Gel 15 Gram(s) Oral once PRN Blood Glucose LESS THAN 70 milliGRAM(s)/deciliter  fentaNYL    Injectable 12.5 MICROGram(s) IV Push every 3 hours PRN Moderate Pain (4 - 6)  fentaNYL    Injectable 25 MICROGram(s) IV Push every 3 hours PRN Severe Pain (7 - 10)  glucagon  Injectable 1 milliGRAM(s) IntraMuscular once PRN Glucose LESS THAN 70 milligrams/deciliter  ondansetron Injectable 4 milliGRAM(s) IV Push every 6 hours PRN Nausea    Weight:  78.6 (4/11)    Weight Change: Pt weighed 161.7lb on admission, wt gain likely 2/2 fluid. Please obtain current weight and continue to trend weights for further assessment- d/w RN    Nutrition Focused Physical Exam: Completed [X on 4/6 ]  Not Pertinent [   ]  Per physical assessment: Muscle Wasting- Temporal [  X ]  Clavicle/Pectoral [  X ]  Shoulder/Deltoid [ X  ]  Scapula [ X  ]  Interosseous [  X ]  Quadriceps [   ]  Gastrocnemius [   ]  Fat Wasting- Orbital [ X  ]  Buccal [ X  ]  Triceps [   ]  Rib [ X  ]  Suspect severe protein-calorie malnutrition 2/2 to physical assessment, inadequate energy intake of <75% for >1 month    Estimated energy needs:   Height 71"; .7#; #; 94% IBW  IBW used to calculate energy needs as pt vented. Needs estimated for maintenance in older adults; increased for malnutrition, infection.    6771-8313 kcal (25-30 kcal/kg), 117-156 gm (1.5-2.0 gm/kg), fluid needs per team    Subjective: 87 yo M with history of b/l laparoscopic robotic-assisted inguinal hernia repair presenting with b/l segmental pulmonary embolisms, left lung abscess, pneumoperitoneum, and distal descending colon abscess (likely source of pneumoperitoneum), significant iliofemoral DVT burden, s/p IVCF placement and IR drainage of LLQ abdominal collection (4/5), s/p ex-lap, LAURYN, sigmoidectomy, drain placement in R. paracolic gutter, and abthera vac placement (4/7), s/p planned RTOR, packing removal, and end colostomy creation (4/10), now w/ MRSA PNA, persistent coagulopathy, plan for bedside pigtail drainage of left lung abscess for source control. GI following for possible liver disease but no evidence of cirrhosis on imaging or during laproscopic evaluation. PT following. Pt remains intubated, possible need for trach. No propofol running. Pt started on TPN 4/7. TPN weaned off on 4/14 and pt started on TF. Pt tolerating Glucerna 1.5 @ goal 50 mL/hr per RN. Recommend increase goal TF to 58 mL/hr with Prostat SF 1x/day to best meet pt's needs at this time- please see below for full EN recs. Discussed plan with team. RD to monitor and f/u per high risk protocol.    Previous Nutrition Diagnosis:  Malnutrition (suspected severe) RT inadequate energy intake 2/2 lack of appetite and poor PO intake 2/2 multiple hospitalizations AEB muscle loss, fat loss, weight fluctuation, and inadequate energy intake    Active [ X ]  Resolved [   ]    If resolved, new PES:     Goal: Consistently meet % of EER; pt will show no further s/s malnutrition throughout admit    Recommendations:  1. Recommend Glucerna 1.5 at 50 mL/hr increase to goal 58 mL/hr with Prostat SF 1x/day to best meet pt's needs at this time (2188 kcal, 130 gm pro, 1057 mL free water, 139% RDI vitamin/mineral, 21.4 non protein kcal/kg, 28 kcal/kg, 1.67 gm pro/kg)- water flushes per team. Order for volume based feeding protocol & monitor for signs of intolerance. If team unable to wean Propofol, can adjust TF regimen.  2. Obtain current weight and trend weekly weights for further assessment.  3. Monitor labs.    Education: N/A 2/2 pt's medical status    Risk Level: High [ X ] Moderate [   ] Low [   ] Admitting Diagnosis:   Patient is a 86y old  Male who presents with a chief complaint of right inguinal discomfort (19 Apr 2019 08:57)    PAST MEDICAL & SURGICAL HISTORY:  Esophageal reflux  Acute diarrhea  HLD (hyperlipidemia)  HTN (hypertension)  Prostate cancer  Stented coronary artery: x3 , per pt.  Atherosclerosis of coronary artery: CAD (coronary artery disease)  S/P hernia repair  Surgery, elective: cardiac stent x3  History of prostate surgery    Current Nutrition Order: Glucerna 1.5 @ 50 mL/hr, Prostat SF 1x/day (1900 kcal, 114 gm pro, 911 mL free water, 120% RD vitamin/mineral)     PO Intake: N/A as pt on EN    GI Issues: WDL, +colostomy, +abd pain, no N/V per flow sheet/documentation    Pain: Unable to fully assess at this time 2/2 pt's medical status    Skin Integrity: wound vac, midline abdomen, L heel stage III, unstageable wound sacrum    Labs:   04-19    144  |  106  |  33<H>  ----------------------------<  151<H>  3.9   |  32<H>  |  0.56    Ca    7.8<L>      19 Apr 2019 06:05  Phos  1.8     04-19  Mg     1.9     04-19      CAPILLARY BLOOD GLUCOSE    POCT Blood Glucose.: 177 mg/dL (19 Apr 2019 12:03)  POCT Blood Glucose.: 191 mg/dL (19 Apr 2019 10:31)  POCT Blood Glucose.: 143 mg/dL (19 Apr 2019 06:14)  POCT Blood Glucose.: 169 mg/dL (18 Apr 2019 22:58)  POCT Blood Glucose.: 149 mg/dL (18 Apr 2019 17:27)    Medications:  MEDICATIONS  (STANDING):  albumin human 25% IVPB 50 milliLiter(s) IV Intermittent every 8 hours  ascorbic acid Syrup 250 milliGRAM(s) Oral daily  aspirin  chewable 81 milliGRAM(s) Oral daily  atorvastatin 80 milliGRAM(s) Oral at bedtime  cefepime   IVPB 2000 milliGRAM(s) IV Intermittent every 12 hours  chlorhexidine 2% Cloths 1 Application(s) Topical <User Schedule>  dextrose 5%. 1000 milliLiter(s) (50 mL/Hr) IV Continuous <Continuous>  dextrose 50% Injectable 12.5 Gram(s) IV Push once  dextrose 50% Injectable 25 Gram(s) IV Push once  dextrose 50% Injectable 25 Gram(s) IV Push once  heparin  flush 100 Units/mL Injectable 100 Unit(s) IV Push every other day  insulin lispro (HumaLOG) corrective regimen sliding scale   SubCutaneous every 6 hours  metroNIDAZOLE  IVPB 500 milliGRAM(s) IV Intermittent every 8 hours  metroNIDAZOLE  IVPB      midodrine 10 milliGRAM(s) Oral every 8 hours  norepinephrine Infusion 0.05 MICROgram(s)/kG/Min (3.436 mL/Hr) IV Continuous <Continuous>  nystatin Powder 1 Application(s) Topical two times a day  pantoprazole  Injectable 40 milliGRAM(s) IV Push daily  potassium acid phosphate/sodium acid phosphate tablet (K-PHOS No. 2) 1 Tablet(s) Oral every 4 hours  vancomycin  IVPB 1000 milliGRAM(s) IV Intermittent daily  zinc sulfate 220 milliGRAM(s) Oral daily    MEDICATIONS  (PRN):  dextrose 40% Gel 15 Gram(s) Oral once PRN Blood Glucose LESS THAN 70 milliGRAM(s)/deciliter  fentaNYL    Injectable 12.5 MICROGram(s) IV Push every 3 hours PRN Moderate Pain (4 - 6)  fentaNYL    Injectable 25 MICROGram(s) IV Push every 3 hours PRN Severe Pain (7 - 10)  glucagon  Injectable 1 milliGRAM(s) IntraMuscular once PRN Glucose LESS THAN 70 milligrams/deciliter  ondansetron Injectable 4 milliGRAM(s) IV Push every 6 hours PRN Nausea    Weight:  78.6 (4/11)    Weight Change: Pt weighed 161.7lb on admission, wt gain likely 2/2 fluid. Please obtain current weight and continue to trend weights for further assessment- d/w RN    Nutrition Focused Physical Exam: Completed [X on 4/6 ]  Not Pertinent [   ]  Per physical assessment: Muscle Wasting- Temporal [  X ]  Clavicle/Pectoral [  X ]  Shoulder/Deltoid [ X  ]  Scapula [ X  ]  Interosseous [  X ]  Quadriceps [   ]  Gastrocnemius [   ]  Fat Wasting- Orbital [ X  ]  Buccal [ X  ]  Triceps [   ]  Rib [ X  ]  Suspect severe protein-calorie malnutrition 2/2 to physical assessment, inadequate energy intake of <75% for >1 month    Estimated energy needs:   Height 71"; .7#; #; 94% IBW  IBW used to calculate energy needs as pt vented. Needs estimated for maintenance in older adults; increased for malnutrition, infection.    9718-3089 kcal (25-30 kcal/kg), 117-156 gm (1.5-2.0 gm/kg), fluid needs per team    Subjective: 87 yo M with history of b/l laparoscopic robotic-assisted inguinal hernia repair presenting with b/l segmental pulmonary embolisms, left lung abscess, pneumoperitoneum, and distal descending colon abscess (likely source of pneumoperitoneum), significant iliofemoral DVT burden, s/p IVCF placement and IR drainage of LLQ abdominal collection (4/5), s/p ex-lap, LAURYN, sigmoidectomy, drain placement in R. paracolic gutter, and abthera vac placement (4/7), s/p planned RTOR, packing removal, and end colostomy creation (4/10), now w/ MRSA PNA, persistent coagulopathy, plan for bedside pigtail drainage of left lung abscess for source control. GI following for possible liver disease but no evidence of cirrhosis on imaging or during laproscopic evaluation. PT following. Pt remains intubated, possible need for trach. No propofol running. Pt started on TPN 4/7. TPN weaned off on 4/14 and pt started on TF. Pt tolerating Glucerna 1.5 @ goal 50 mL/hr per RN. Recommend increase goal TF to 58 mL/hr with Prostat SF 1x/day to best meet pt's needs at this time- please see below for full EN recs. prealbumin of 7, unable to fully assess as no CRP drawn. Discussed plan with team. RD to monitor and f/u per high risk protocol.    Previous Nutrition Diagnosis:  Malnutrition (suspected severe) RT inadequate energy intake 2/2 lack of appetite and poor PO intake 2/2 multiple hospitalizations AEB muscle loss, fat loss, weight fluctuation, and inadequate energy intake    Active [ X ]  Resolved [   ]    PES: Increased protein needs RT increased demand for nutrients AEB wounds    Goal: Consistently meet % of EER; pt will show no further s/s malnutrition throughout admit    Recommendations:  1. Recommend Glucerna 1.5 at 50 mL/hr increase to goal 58 mL/hr with Prostat SF 1x/day to best meet pt's needs at this time (2188 kcal, 130 gm pro, 1057 mL free water, 139% RDI vitamin/mineral, 21.4 non protein kcal/kg, 28 kcal/kg, 1.67 gm pro/kg)- water flushes per team. Order for volume based feeding protocol & monitor for signs of intolerance. If team unable to wean Propofol, can adjust TF regimen.  2. Obtain current weight and trend weekly weights for further assessment.  3. Monitor labs and replete electrolytes prn.    Education: N/A 2/2 pt's medical status    Risk Level: High [ X ] Moderate [   ] Low [   ]

## 2019-04-19 NOTE — PROGRESS NOTE ADULT - SUBJECTIVE AND OBJECTIVE BOX
Chief Complaint/Reason for Consult: cv mgmt  INTERVAL HPI: noted for possible extubation on midodrine, tele noted  	  MEDICATIONS:  midodrine 10 milliGRAM(s) Oral every 8 hours  norepinephrine Infusion 0.05 MICROgram(s)/kG/Min IV Continuous <Continuous>    cefepime   IVPB 2000 milliGRAM(s) IV Intermittent every 12 hours  metroNIDAZOLE  IVPB 500 milliGRAM(s) IV Intermittent every 8 hours  metroNIDAZOLE  IVPB      vancomycin  IVPB 1000 milliGRAM(s) IV Intermittent daily      fentaNYL    Injectable 12.5 MICROGram(s) IV Push every 3 hours PRN  fentaNYL    Injectable 25 MICROGram(s) IV Push every 3 hours PRN  ondansetron Injectable 4 milliGRAM(s) IV Push every 6 hours PRN    pantoprazole  Injectable 40 milliGRAM(s) IV Push daily    atorvastatin 80 milliGRAM(s) Oral at bedtime  dextrose 40% Gel 15 Gram(s) Oral once PRN  dextrose 50% Injectable 12.5 Gram(s) IV Push once  dextrose 50% Injectable 25 Gram(s) IV Push once  dextrose 50% Injectable 25 Gram(s) IV Push once  glucagon  Injectable 1 milliGRAM(s) IntraMuscular once PRN  insulin lispro (HumaLOG) corrective regimen sliding scale   SubCutaneous every 6 hours    albumin human 25% IVPB 50 milliLiter(s) IV Intermittent every 8 hours  ascorbic acid Syrup 250 milliGRAM(s) Oral daily  aspirin  chewable 81 milliGRAM(s) Oral daily  chlorhexidine 2% Cloths 1 Application(s) Topical <User Schedule>  dextrose 5%. 1000 milliLiter(s) IV Continuous <Continuous>  heparin  flush 100 Units/mL Injectable 100 Unit(s) IV Push every other day  nystatin Powder 1 Application(s) Topical two times a day  potassium acid phosphate/sodium acid phosphate tablet (K-PHOS No. 2) 1 Tablet(s) Oral every 4 hours  zinc sulfate 220 milliGRAM(s) Oral daily      REVIEW OF SYSTEMS:  [x] As per HPI  CONSTITUTIONAL: No fever, weight loss, or fatigue  RESPIRATORY: No cough, wheezing, chills or hemoptysis; No Shortness of Breath  CARDIOVASCULAR: No chest pain, palpitations, dizziness, or leg swelling  GASTROINTESTINAL: No abdominal or epigastric pain. No nausea, vomiting, or hematemesis; No diarrhea or constipation. No melena or hematochezia.  MUSCULOSKELETAL: No joint pain or swelling; No muscle, back, or extremity pain  [x] All others negative	  [ ] Unable to obtain    PHYSICAL EXAM:  T(C): 37.5 (04-19-19 @ 09:00), Max: 38.1 (04-18-19 @ 22:00)  HR: 80 (04-19-19 @ 13:00) (74 - 101)  BP: 100/53 (04-19-19 @ 13:00) (87/51 - 117/59)  RR: 15 (04-19-19 @ 13:00) (12 - 34)  SpO2: 100% (04-19-19 @ 13:00) (97% - 100%)  Wt(kg): --  I&O's Summary    18 Apr 2019 07:01  -  19 Apr 2019 07:00  --------------------------------------------------------  IN: 2111 mL / OUT: 4260 mL / NET: -2149 mL    19 Apr 2019 07:01  -  19 Apr 2019 14:16  --------------------------------------------------------  IN: 573 mL / OUT: 605 mL / NET: -32 mL          Appearance: Normal	  HEENT:   Normal oral mucosa  Cardiovascular: Normal S1 S2, No JVD, No murmurs, No edema  Respiratory: Lungs clear to auscultation	  Gastrointestinal:  Soft, Non-tender, + BS	  Extremities: Normal range of motion, No clubbing, cyanosis or edema  Vascular: Peripheral pulses palpable 2+ bilaterally    TELEMETRY: 	    ECG:   	  RADIOLOGY:   CXR:  CT:  US:    CARDIAC TESTING:  Echocardiogram:  Catheterization:  Stress Test:      LABS:	 	    CARDIAC MARKERS:                                  7.2    10.98 )-----------( 284      ( 19 Apr 2019 06:05 )             23.1     04-19    144  |  106  |  33<H>  ----------------------------<  151<H>  3.9   |  32<H>  |  0.56    Ca    7.8<L>      19 Apr 2019 06:05  Phos  1.8     04-19  Mg     1.9     04-19      proBNP:   Lipid Profile:   HgA1c:   TSH:     ASSESSMENT/PLAN: 	    86M w/PMHx CAD s/p mult PCI (last 2012; KIRSTY mLAD, dRCA, pOM2), HTN, HLD, recent b/l lap RA b/l inguinal hernia repair (3/11) initially sent in from [facility] for R inguinal hernia discomfort. Found to have 6x4cm R groin fluid collection, 10x5cm abscess in LLQ w/assoc pneumoperitoneum, L lung empyema, b/l segmental PE, and DVT in b/l CF, L common iliac and internal iliac veins. Underwent exlap w/drainage of 3L ascites, LAURYN, SB resxn w/primary anastomosis, sigmoidectomy (left in discontinuity), abd packing, and drain/VAC placement on 4/7, followed by end-colostomy creation and fascial closure on 4/10. S/p IVCF on 4/5, initially on hep gtt for DVT/PE, however d/c on 4/7 due to intra-op bleeding and not restarted. Home DAPT held since admission. ID following for mult-org induced septic shock. CTSx following for LL empyema. Noted incidentally to have EKG changes, cards c/s for further recs.    elevated troponin, resolved, hx of CAD s/p multiple PCIs in the past, he does not require DAPT for now  Lasix prn per primary team  - continue aspirin statin,   -still on Levophed will start metoprolol once hemodynamically stable,    -keep hg >8.0 given his ischemic history.     -monitor electrolytes, K+ >4.0 mg>2.0

## 2019-04-19 NOTE — PROGRESS NOTE ADULT - ASSESSMENT
87 yo M with history of b/l laparoscopic robotic-assisted inguinal hernia repair presenting with b/l segmental pulmonary embolisms, left lung abscess, pneumoperitoneum, and distal descending colon abscess (likely source of pneumoperitoneum), significant iliofemoral DVT burden, s/p IVCF placement and IR drainage of LLQ abdominal collection (4/5), s/p ex-lap, LAURYN, sigmoidectomy, drain placement in R. paracolic gutter, and abthera vac placement (4/7), s/p planned RTOR, packing removal, and end colostomy creation (4/10), now w/ MRSA + proteus PNA, persistent coagulopathy, LLL pulmonary empyema s/p bedside pigtail drainage (4/15)    Plan:   Neuro: DC Fentanyl, dc propofol, now on Fentanyl prn for pain control  CV: levo requirement downtrending w/ MAP goal>65; continue; midodrine 10mg q8h, net negative 2 L; remains hypervolemic, but now w/ significant improvement of anasarca; continue aggressive diuresis w/ IV lasix, cont. albumin 25%q8hrs, asa 81 lipitor, statin  Pulm: Pleural effusions resolved; Did not tolerate CPAP trial this am; 40/470/12/5; will hold off on t-piece trial; intubated since 4/7 on vent-AC, bilateral PE's; Empyema- s/p CTx on 4/15  GI: NPO, dobhoff glucerna 1.5@50/hr; malnourished, prealbumin 4; Vit C, zinc supplementation; RUQ US W/Doppler wnl; coagulopathy and hypoalbuminemia w/o transaminitis -- unclear if 2/2 to hepatopathy --- GI following   : rao; strict I'Os, consider replacing rao for persistent leaking; continue diuresis  ID: +kleb, proteus, strep in abd Cx, flagyl (4/9-4/20), cefepime (4/9-4/20); Sputum cx: MRSA and proteus, vancomycin (4/10-5/4); pleural fluid cx: strep anginosis, bacteroides ovas  - [Dc'd//ceftriaxone (4/9-4/9)// unasyn (4/8-4/9),  Vanco (4/5-4/7), Zosyn (4/5-4/8)]  Endo: ISS; insulin w/ TPN;  Heme: bilateral iliofemoral DVT / bilateral PE; s/p IVC Filter; heparin gtt dc'd in setting of active bleed. Prolonged INR. PTT- refractory to FFP and Vit K. Mixing study wnr; Heme following  PPx: SCDs; restart SQH (4/19 --)  Lines: PIVs, PICC (4/6--), L A line (4/13--) /// D/C: R cordis (4/7-4/11)   Wounds/drains:   - Wound vac dc'd today for periwound skin irritation and minimal output; WTD dressing QD  - LLQ IR drain (4/5 -- ), LUQ PHYLLIS (4/7 -- ), R paracolic gutter drain (4/7 --) --- all drains to LIWS  PT/OT: early mob 4/11 - worked with PT. 85 yo M with history of b/l laparoscopic robotic-assisted inguinal hernia repair presenting with b/l segmental pulmonary embolisms, left lung abscess, pneumoperitoneum, and distal descending colon abscess (likely source of pneumoperitoneum), significant iliofemoral DVT burden, s/p IVCF placement and IR drainage of LLQ abdominal collection (4/5), s/p ex-lap, LAURYN, sigmoidectomy, drain placement in R. paracolic gutter, and abthera vac placement (4/7), s/p planned RTOR, packing removal, and end colostomy creation (4/10), now w/ MRSA + proteus PNA, persistent coagulopathy, LLL pulmonary empyema s/p bedside pigtail drainage (4/15)    Plan:   Neuro: DC Fentanyl, dc propofol, now on Fentanyl prn for pain control  CV: levo requirement downtrending w/ MAP goal>65; continue; midodrine 10mg q8h, net negative 2 L; remains hypervolemic, but now w/ significant improvement of anasarca; continue aggressive diuresis w/ IV lasix, cont. albumin 25%q8hrs, asa 81 lipitor, statin  Pulm: Pleural effusions resolved; Did not tolerate CPAP trial this am; 40/470/12/5; will hold off on t-piece trial; intubated since 4/7 on vent-AC, bilateral PE's; Empyema- s/p CTx on 4/15  GI: NPO, dobhoff glucerna 1.5@50/hr; malnourished, prealbumin 4 increased to 7; Vit C, zinc supplementation; RUQ US W/Doppler wnl; coagulopathy and hypoalbuminemia w/o transaminitis -- unclear if 2/2 to hepatopathy --- GI following   : rao; strict I'Os, consider replacing rao for persistent leaking; continue diuresis  ID: +kleb, proteus, strep in abd Cx, flagyl (4/9-4/20), cefepime (4/9-4/20); Sputum cx: MRSA and proteus, vancomycin (4/10-5/4); pleural fluid cx: strep anginosis, bacteroides ovas  - [Dc'd//ceftriaxone (4/9-4/9)// unasyn (4/8-4/9),  Vanco (4/5-4/7), Zosyn (4/5-4/8)]  Endo: ISS; insulin w/ TPN;  Heme: bilateral iliofemoral DVT / bilateral PE; s/p IVC Filter; heparin gtt dc'd in setting of active bleed. Prolonged INR. PTT- refractory to FFP and Vit K. Mixing study wnr; Heme following  PPx: SCDs; restart SQH (4/19 --)  Lines: PIVs, PICC (4/6--), L A line (4/13--) /// D/C: R cordis (4/7-4/11)   Wounds/drains:   - Wound vac dc'd today for periwound skin irritation and minimal output; WTD dressing QD  - LLQ IR drain (4/5 -- ), LUQ PHYLLIS (4/7 -- ), R paracolic gutter drain (4/7 --) --- all drains to LIWS  PT/OT: early mob 4/11 - worked with PT.   Critical care time rendered 45 minutes

## 2019-04-19 NOTE — PROGRESS NOTE ADULT - ASSESSMENT
87 yo M initially presenting for right groin discomfort in the setting of a recent bilateral inguinal hernia repair (3/11/19). CT abd/pelvis showed a large pneumoperitoneum, abdominal abscess, and fluid collection in the right groin that was surgically managed. We are consulted for a left lung abscess that was found on this initial CT scan - now s/p chest tube.

## 2019-04-20 LAB
ALBUMIN SERPL ELPH-MCNC: 2.2 G/DL — LOW (ref 3.3–5)
ALP SERPL-CCNC: 38 U/L — LOW (ref 40–120)
ALT FLD-CCNC: 7 U/L — LOW (ref 10–45)
ANION GAP SERPL CALC-SCNC: 9 MMOL/L — SIGNIFICANT CHANGE UP (ref 5–17)
APTT BLD: 34 SEC — SIGNIFICANT CHANGE UP (ref 27.5–36.3)
AST SERPL-CCNC: 13 U/L — SIGNIFICANT CHANGE UP (ref 10–40)
BILIRUB DIRECT SERPL-MCNC: <0.2 MG/DL — SIGNIFICANT CHANGE UP (ref 0–0.2)
BILIRUB INDIRECT FLD-MCNC: >0.3 MG/DL — SIGNIFICANT CHANGE UP (ref 0.2–1)
BILIRUB SERPL-MCNC: 0.5 MG/DL — SIGNIFICANT CHANGE UP (ref 0.2–1.2)
BUN SERPL-MCNC: 35 MG/DL — HIGH (ref 7–23)
CALCIUM SERPL-MCNC: 8.1 MG/DL — LOW (ref 8.4–10.5)
CHLORIDE SERPL-SCNC: 108 MMOL/L — SIGNIFICANT CHANGE UP (ref 96–108)
CO2 SERPL-SCNC: 29 MMOL/L — SIGNIFICANT CHANGE UP (ref 22–31)
CREAT SERPL-MCNC: 0.55 MG/DL — SIGNIFICANT CHANGE UP (ref 0.5–1.3)
GLUCOSE BLDC GLUCOMTR-MCNC: 127 MG/DL — HIGH (ref 70–99)
GLUCOSE BLDC GLUCOMTR-MCNC: 152 MG/DL — HIGH (ref 70–99)
GLUCOSE BLDC GLUCOMTR-MCNC: 155 MG/DL — HIGH (ref 70–99)
GLUCOSE BLDC GLUCOMTR-MCNC: 97 MG/DL — SIGNIFICANT CHANGE UP (ref 70–99)
GLUCOSE SERPL-MCNC: 153 MG/DL — HIGH (ref 70–99)
HCT VFR BLD CALC: 25.7 % — LOW (ref 39–50)
HGB BLD-MCNC: 8.1 G/DL — LOW (ref 13–17)
IGA FLD-MCNC: 312 MG/DL — SIGNIFICANT CHANGE UP (ref 84–499)
IGG FLD-MCNC: 665 MG/DL — SIGNIFICANT CHANGE UP (ref 610–1660)
IGM SERPL-MCNC: 18 MG/DL — LOW (ref 35–242)
INR BLD: 2.4 — HIGH (ref 0.88–1.16)
KAPPA LC SER QL IFE: 11.52 MG/DL — HIGH (ref 0.33–1.94)
KAPPA/LAMBDA FREE LIGHT CHAIN RATIO, SERUM: 1.29 RATIO — SIGNIFICANT CHANGE UP (ref 0.26–1.65)
LAMBDA LC SER QL IFE: 8.94 MG/DL — HIGH (ref 0.57–2.63)
MAGNESIUM SERPL-MCNC: 2.4 MG/DL — SIGNIFICANT CHANGE UP (ref 1.6–2.6)
MCHC RBC-ENTMCNC: 30.3 PG — SIGNIFICANT CHANGE UP (ref 27–34)
MCHC RBC-ENTMCNC: 31.5 GM/DL — LOW (ref 32–36)
MCV RBC AUTO: 96.3 FL — SIGNIFICANT CHANGE UP (ref 80–100)
NRBC # BLD: 0 /100 WBCS — SIGNIFICANT CHANGE UP (ref 0–0)
PHOSPHATE SERPL-MCNC: 2 MG/DL — LOW (ref 2.5–4.5)
PLATELET # BLD AUTO: 305 K/UL — SIGNIFICANT CHANGE UP (ref 150–400)
POTASSIUM SERPL-MCNC: 4.2 MMOL/L — SIGNIFICANT CHANGE UP (ref 3.5–5.3)
POTASSIUM SERPL-SCNC: 4.2 MMOL/L — SIGNIFICANT CHANGE UP (ref 3.5–5.3)
PROT SERPL-MCNC: 5 G/DL — LOW (ref 6–8.3)
PROTHROM AB SERPL-ACNC: 27.9 SEC — HIGH (ref 10–12.9)
RBC # BLD: 2.67 M/UL — LOW (ref 4.2–5.8)
RBC # FLD: 17.2 % — HIGH (ref 10.3–14.5)
SODIUM SERPL-SCNC: 146 MMOL/L — HIGH (ref 135–145)
VANCOMYCIN TROUGH SERPL-MCNC: 15.7 UG/ML — SIGNIFICANT CHANGE UP (ref 10–20)
WBC # BLD: 8.87 K/UL — SIGNIFICANT CHANGE UP (ref 3.8–10.5)
WBC # FLD AUTO: 8.87 K/UL — SIGNIFICANT CHANGE UP (ref 3.8–10.5)

## 2019-04-20 PROCEDURE — 99291 CRITICAL CARE FIRST HOUR: CPT

## 2019-04-20 PROCEDURE — 71045 X-RAY EXAM CHEST 1 VIEW: CPT | Mod: 26

## 2019-04-20 RX ORDER — FUROSEMIDE 40 MG
40 TABLET ORAL ONCE
Qty: 0 | Refills: 0 | Status: COMPLETED | OUTPATIENT
Start: 2019-04-20 | End: 2019-04-20

## 2019-04-20 RX ORDER — ACETAMINOPHEN 500 MG
1000 TABLET ORAL ONCE
Qty: 0 | Refills: 0 | Status: COMPLETED | OUTPATIENT
Start: 2019-04-20 | End: 2019-04-20

## 2019-04-20 RX ORDER — POTASSIUM CHLORIDE 20 MEQ
40 PACKET (EA) ORAL ONCE
Qty: 0 | Refills: 0 | Status: COMPLETED | OUTPATIENT
Start: 2019-04-20 | End: 2019-04-20

## 2019-04-20 RX ORDER — MIDODRINE HYDROCHLORIDE 2.5 MG/1
15 TABLET ORAL EVERY 8 HOURS
Qty: 0 | Refills: 0 | Status: DISCONTINUED | OUTPATIENT
Start: 2019-04-20 | End: 2019-05-09

## 2019-04-20 RX ORDER — SODIUM,POTASSIUM PHOSPHATES 278-250MG
1 POWDER IN PACKET (EA) ORAL
Qty: 0 | Refills: 0 | Status: COMPLETED | OUTPATIENT
Start: 2019-04-20 | End: 2019-04-21

## 2019-04-20 RX ADMIN — MIDODRINE HYDROCHLORIDE 15 MILLIGRAM(S): 2.5 TABLET ORAL at 21:10

## 2019-04-20 RX ADMIN — HEPARIN SODIUM 5000 UNIT(S): 5000 INJECTION INTRAVENOUS; SUBCUTANEOUS at 05:44

## 2019-04-20 RX ADMIN — Medication 50 MILLILITER(S): at 15:51

## 2019-04-20 RX ADMIN — Medication 2: at 07:18

## 2019-04-20 RX ADMIN — NYSTATIN CREAM 1 APPLICATION(S): 100000 CREAM TOPICAL at 05:59

## 2019-04-20 RX ADMIN — ZINC SULFATE TAB 220 MG (50 MG ZINC EQUIVALENT) 220 MILLIGRAM(S): 220 (50 ZN) TAB at 11:03

## 2019-04-20 RX ADMIN — CHLORHEXIDINE GLUCONATE 1 APPLICATION(S): 213 SOLUTION TOPICAL at 05:59

## 2019-04-20 RX ADMIN — Medication 1 TABLET(S): at 17:06

## 2019-04-20 RX ADMIN — MIDODRINE HYDROCHLORIDE 15 MILLIGRAM(S): 2.5 TABLET ORAL at 13:17

## 2019-04-20 RX ADMIN — Medication 100 MILLIGRAM(S): at 02:23

## 2019-04-20 RX ADMIN — Medication 40 MILLIGRAM(S): at 05:44

## 2019-04-20 RX ADMIN — Medication 50 MILLILITER(S): at 08:00

## 2019-04-20 RX ADMIN — CHLORHEXIDINE GLUCONATE 15 MILLILITER(S): 213 SOLUTION TOPICAL at 17:06

## 2019-04-20 RX ADMIN — MIDODRINE HYDROCHLORIDE 10 MILLIGRAM(S): 2.5 TABLET ORAL at 05:59

## 2019-04-20 RX ADMIN — PANTOPRAZOLE SODIUM 40 MILLIGRAM(S): 20 TABLET, DELAYED RELEASE ORAL at 11:03

## 2019-04-20 RX ADMIN — Medication 1000 MILLIGRAM(S): at 14:22

## 2019-04-20 RX ADMIN — Medication 2: at 00:08

## 2019-04-20 RX ADMIN — ATORVASTATIN CALCIUM 80 MILLIGRAM(S): 80 TABLET, FILM COATED ORAL at 21:09

## 2019-04-20 RX ADMIN — FENTANYL CITRATE 12.5 MICROGRAM(S): 50 INJECTION INTRAVENOUS at 19:15

## 2019-04-20 RX ADMIN — Medication 81 MILLIGRAM(S): at 11:03

## 2019-04-20 RX ADMIN — Medication 400 MILLIGRAM(S): at 14:22

## 2019-04-20 RX ADMIN — HEPARIN SODIUM 5000 UNIT(S): 5000 INJECTION INTRAVENOUS; SUBCUTANEOUS at 13:17

## 2019-04-20 RX ADMIN — Medication 40 MILLIGRAM(S): at 13:22

## 2019-04-20 RX ADMIN — Medication 250 MILLIGRAM(S): at 12:46

## 2019-04-20 RX ADMIN — HEPARIN SODIUM 5000 UNIT(S): 5000 INJECTION INTRAVENOUS; SUBCUTANEOUS at 21:10

## 2019-04-20 RX ADMIN — Medication 100 MILLIGRAM(S): at 10:00

## 2019-04-20 RX ADMIN — CEFEPIME 100 MILLIGRAM(S): 1 INJECTION, POWDER, FOR SOLUTION INTRAMUSCULAR; INTRAVENOUS at 17:06

## 2019-04-20 RX ADMIN — FENTANYL CITRATE 12.5 MICROGRAM(S): 50 INJECTION INTRAVENOUS at 19:04

## 2019-04-20 RX ADMIN — Medication 100 MILLIGRAM(S): at 17:06

## 2019-04-20 RX ADMIN — Medication 40 MILLIEQUIVALENT(S): at 13:21

## 2019-04-20 RX ADMIN — CEFEPIME 100 MILLIGRAM(S): 1 INJECTION, POWDER, FOR SOLUTION INTRAMUSCULAR; INTRAVENOUS at 05:48

## 2019-04-20 RX ADMIN — Medication 3.44 MICROGRAM(S)/KG/MIN: at 05:44

## 2019-04-20 RX ADMIN — Medication 50 MILLILITER(S): at 00:09

## 2019-04-20 RX ADMIN — Medication 250 MILLIGRAM(S): at 11:03

## 2019-04-20 RX ADMIN — Medication 1 TABLET(S): at 11:03

## 2019-04-20 RX ADMIN — NYSTATIN CREAM 1 APPLICATION(S): 100000 CREAM TOPICAL at 17:07

## 2019-04-20 RX ADMIN — CHLORHEXIDINE GLUCONATE 15 MILLILITER(S): 213 SOLUTION TOPICAL at 05:44

## 2019-04-20 NOTE — PROGRESS NOTE ADULT - ASSESSMENT
85 yo M with history of b/l laparoscopic robotic-assisted inguinal hernia repair presenting with b/l segmental pulmonary embolisms, left lung abscess, pneumoperitoneum, and distal descending colon abscess s/p sigoidectomy significant iliofemoral DVT burden, s/p IVCF placement with concern for liver related thromboembolic disease in the absence of cirrhosis.     #Ascites, hypoalbuminemia, thrombocytopenia -- ? Liver disease  - prolonged PT/INR with normal PTT w/ concern for a liver disease  - factor shows low factor 2, 5, 7, 10, 12, but normal factor 9  - LTs WNL - please recheck   - No evidence of cirrhosis on imaging or during laproscopic evaluation   - Follow up JOANN, ASMA, AMA, anti LKM, IgG  - Alpha 1 anti-trypsin wnl, ceruloplasmin low- please check 24 hour urine copper  - acute hep panel neg- please check hep B core antibody today, Hep b surface ab  - iron studies not c/w hemochromatosis  -lipid wnl, A1c wnl  - SAAG 1.5 , ascitic pro < 2.5  (ddx cirrhosis, portal HTN, budd-chiari) - please obtain US with /dopplers to r/o julisa denise      case d/w Dr. Wang 85 yo M with history of b/l laparoscopic robotic-assisted inguinal hernia repair presenting with b/l segmental pulmonary embolisms, left lung abscess, pneumoperitoneum, and distal descending colon abscess s/p sigoidectomy significant iliofemoral DVT burden, s/p IVCF placement with concern for liver related thromboembolic disease in the absence of cirrhosis.     #Ascites, hypoalbuminemia, thrombocytopenia -- ? Liver disease  - prolonged PT/INR with normal PTT w/ concern for a liver disease  -- SAAG 1.5 , ascitic pro < 2.5  (ddx cirrhosis, portal HTN, budd-chiari) - please obtain US with /dopplers to r/o budd chiarai  - factor shows low factor 2, 5, 7, 10, 12, but normal factor 9  - LTs WNL - please recheck   - No evidence of cirrhosis on imaging or during laproscopic evaluation   - Follow up JOANN, ASMA, AMA, anti LKM, IgG  - Alpha 1 anti-trypsin wnl, ceruloplasmin low- please check 24 hour urine copper  - acute hep panel neg- please check hep B core antibody today, Hep b surface ab  - iron studies not c/w hemochromatosis  -lipid wnl, A1c wnl

## 2019-04-20 NOTE — PROGRESS NOTE ADULT - SUBJECTIVE AND OBJECTIVE BOX
Pt seen and examined at bedside.    PERTINENT REVIEW OF SYSTEMS:  CONSTITUTIONAL: No weakness, fevers or chills  HEENT: No visual changes; No vertigo or throat pain   GASTROINTESTINAL: No abdominal or epigastric pain. No nausea, vomiting, or hematemesis; No diarrhea or constipation. No melena or hematochezia.  NEUROLOGICAL: No numbness or weakness  SKIN: No itching, burning, rashes, or lesions     Allergies    No Known Allergies    Intolerances      MEDICATIONS:  MEDICATIONS  (STANDING):  albumin human 25% IVPB 50 milliLiter(s) IV Intermittent every 8 hours  ascorbic acid Syrup 250 milliGRAM(s) Oral daily  aspirin  chewable 81 milliGRAM(s) Oral daily  atorvastatin 80 milliGRAM(s) Oral at bedtime  cefepime   IVPB 2000 milliGRAM(s) IV Intermittent every 12 hours  chlorhexidine 0.12% Liquid 15 milliLiter(s) Oral Mucosa two times a day  chlorhexidine 2% Cloths 1 Application(s) Topical <User Schedule>  dextrose 5%. 1000 milliLiter(s) (50 mL/Hr) IV Continuous <Continuous>  dextrose 50% Injectable 12.5 Gram(s) IV Push once  dextrose 50% Injectable 25 Gram(s) IV Push once  dextrose 50% Injectable 25 Gram(s) IV Push once  heparin  flush 100 Units/mL Injectable 100 Unit(s) IV Push every other day  heparin  Injectable 5000 Unit(s) SubCutaneous every 8 hours  insulin lispro (HumaLOG) corrective regimen sliding scale   SubCutaneous every 6 hours  metroNIDAZOLE  IVPB 500 milliGRAM(s) IV Intermittent every 8 hours  metroNIDAZOLE  IVPB      midodrine 10 milliGRAM(s) Oral every 8 hours  norepinephrine Infusion 0.05 MICROgram(s)/kG/Min (3.436 mL/Hr) IV Continuous <Continuous>  nystatin Powder 1 Application(s) Topical two times a day  pantoprazole  Injectable 40 milliGRAM(s) IV Push daily  vancomycin  IVPB 1000 milliGRAM(s) IV Intermittent daily  zinc sulfate 220 milliGRAM(s) Oral daily    MEDICATIONS  (PRN):  dextrose 40% Gel 15 Gram(s) Oral once PRN Blood Glucose LESS THAN 70 milliGRAM(s)/deciliter  fentaNYL    Injectable 12.5 MICROGram(s) IV Push every 3 hours PRN Moderate Pain (4 - 6)  fentaNYL    Injectable 25 MICROGram(s) IV Push every 3 hours PRN Severe Pain (7 - 10)  glucagon  Injectable 1 milliGRAM(s) IntraMuscular once PRN Glucose LESS THAN 70 milligrams/deciliter  ondansetron Injectable 4 milliGRAM(s) IV Push every 6 hours PRN Nausea    Vital Signs Last 24 Hrs  T(C): 37.4 (20 Apr 2019 06:00), Max: 37.9 (19 Apr 2019 15:00)  T(F): 99.3 (20 Apr 2019 06:00), Max: 100.3 (19 Apr 2019 15:00)  HR: 80 (20 Apr 2019 09:00) (68 - 90)  BP: 109/50 (20 Apr 2019 09:00) (75/44 - 119/58)  BP(mean): 76 (20 Apr 2019 09:00) (59 - 93)  RR: 30 (20 Apr 2019 09:00) (11 - 41)  SpO2: 100% (20 Apr 2019 09:00) (97% - 100%)    04-19 @ 07:01  -  04-20 @ 07:00  --------------------------------------------------------  IN: 2826.8 mL / OUT: 3538 mL / NET: -711.2 mL    04-20 @ 07:01 - 04-20 @ 09:54  --------------------------------------------------------  IN: 110 mL / OUT: 435 mL / NET: -325 mL      PHYSICAL EXAM:    General: Well developed; well nourished; in no acute distress  HEENT: MMM, conjunctiva and sclera clear  Gastrointestinal: Soft non-tender non-distended; Normal bowel sounds; No hepatosplenomegaly. No rebound or guarding;  midline surgical packing with drain in place, ostomy site with stool  Skin: Warm and dry. No obvious rash    LABS:                        8.1    8.87  )-----------( 305      ( 20 Apr 2019 06:43 )             25.7     04-20    146<H>  |  108  |  35<H>  ----------------------------<  153<H>  4.2   |  29  |  0.55    Ca    8.1<L>      20 Apr 2019 06:43  Phos  2.0     04-20  Mg     2.4     04-20      PT/INR - ( 20 Apr 2019 06:43 )   PT: 27.9 sec;   INR: 2.40          PTT - ( 20 Apr 2019 06:43 )  PTT:34.0 sec                  RADIOLOGY & ADDITIONAL STUDIES: Pt seen and examined at bedside.  Pt extubated today    PERTINENT REVIEW OF SYSTEMS:  CONSTITUTIONAL: No weakness, fevers or chills  HEENT: No visual changes; No vertigo or throat pain   GASTROINTESTINAL: No abdominal or epigastric pain. No nausea, vomiting, or hematemesis; No diarrhea or constipation. No melena or hematochezia.  NEUROLOGICAL: No numbness or weakness  SKIN: No itching, burning, rashes, or lesions     Allergies    No Known Allergies    Intolerances      MEDICATIONS:  MEDICATIONS  (STANDING):  albumin human 25% IVPB 50 milliLiter(s) IV Intermittent every 8 hours  ascorbic acid Syrup 250 milliGRAM(s) Oral daily  aspirin  chewable 81 milliGRAM(s) Oral daily  atorvastatin 80 milliGRAM(s) Oral at bedtime  cefepime   IVPB 2000 milliGRAM(s) IV Intermittent every 12 hours  chlorhexidine 0.12% Liquid 15 milliLiter(s) Oral Mucosa two times a day  chlorhexidine 2% Cloths 1 Application(s) Topical <User Schedule>  dextrose 5%. 1000 milliLiter(s) (50 mL/Hr) IV Continuous <Continuous>  dextrose 50% Injectable 12.5 Gram(s) IV Push once  dextrose 50% Injectable 25 Gram(s) IV Push once  dextrose 50% Injectable 25 Gram(s) IV Push once  heparin  flush 100 Units/mL Injectable 100 Unit(s) IV Push every other day  heparin  Injectable 5000 Unit(s) SubCutaneous every 8 hours  insulin lispro (HumaLOG) corrective regimen sliding scale   SubCutaneous every 6 hours  metroNIDAZOLE  IVPB 500 milliGRAM(s) IV Intermittent every 8 hours  metroNIDAZOLE  IVPB      midodrine 10 milliGRAM(s) Oral every 8 hours  norepinephrine Infusion 0.05 MICROgram(s)/kG/Min (3.436 mL/Hr) IV Continuous <Continuous>  nystatin Powder 1 Application(s) Topical two times a day  pantoprazole  Injectable 40 milliGRAM(s) IV Push daily  vancomycin  IVPB 1000 milliGRAM(s) IV Intermittent daily  zinc sulfate 220 milliGRAM(s) Oral daily    MEDICATIONS  (PRN):  dextrose 40% Gel 15 Gram(s) Oral once PRN Blood Glucose LESS THAN 70 milliGRAM(s)/deciliter  fentaNYL    Injectable 12.5 MICROGram(s) IV Push every 3 hours PRN Moderate Pain (4 - 6)  fentaNYL    Injectable 25 MICROGram(s) IV Push every 3 hours PRN Severe Pain (7 - 10)  glucagon  Injectable 1 milliGRAM(s) IntraMuscular once PRN Glucose LESS THAN 70 milligrams/deciliter  ondansetron Injectable 4 milliGRAM(s) IV Push every 6 hours PRN Nausea    Vital Signs Last 24 Hrs  T(C): 37.4 (20 Apr 2019 06:00), Max: 37.9 (19 Apr 2019 15:00)  T(F): 99.3 (20 Apr 2019 06:00), Max: 100.3 (19 Apr 2019 15:00)  HR: 80 (20 Apr 2019 09:00) (68 - 90)  BP: 109/50 (20 Apr 2019 09:00) (75/44 - 119/58)  BP(mean): 76 (20 Apr 2019 09:00) (59 - 93)  RR: 30 (20 Apr 2019 09:00) (11 - 41)  SpO2: 100% (20 Apr 2019 09:00) (97% - 100%)    04-19 @ 07:01  -  04-20 @ 07:00  --------------------------------------------------------  IN: 2826.8 mL / OUT: 3538 mL / NET: -711.2 mL    04-20 @ 07:01  -  04-20 @ 09:54  --------------------------------------------------------  IN: 110 mL / OUT: 435 mL / NET: -325 mL      PHYSICAL EXAM:    General: thin, frail,  in no acute distress, following commands  HEENT: MMM, conjunctiva and sclera clear  Gastrointestinal: Soft non-tender non-distended; Normal bowel sounds; No hepatosplenomegaly. No rebound or guarding;  midline surgical packing with drain in place, ostomy site with stool  Skin: Warm and dry. No obvious rash    LABS:                        8.1    8.87  )-----------( 305      ( 20 Apr 2019 06:43 )             25.7     04-20    146<H>  |  108  |  35<H>  ----------------------------<  153<H>  4.2   |  29  |  0.55    Ca    8.1<L>      20 Apr 2019 06:43  Phos  2.0     04-20  Mg     2.4     04-20      PT/INR - ( 20 Apr 2019 06:43 )   PT: 27.9 sec;   INR: 2.40          PTT - ( 20 Apr 2019 06:43 )  PTT:34.0 sec                  RADIOLOGY & ADDITIONAL STUDIES:

## 2019-04-20 NOTE — PROGRESS NOTE ADULT - ATTENDING COMMENTS
- Acute hypoxic resp failure: Patient was extubated today. Tolerating well so far  - Septic shock: Increase dose of midodrine, titrate levophed. Continue vanco/cepepime and flagyl (multiple organism from various source)  - Lung abscess/empyema: CT chest pending. No drainage through Chest tube. No air leak today  - OOB to chair and Physical therapy.

## 2019-04-20 NOTE — PROGRESS NOTE ADULT - SUBJECTIVE AND OBJECTIVE BOX
S: No new issues/events overnight, no new med c/o    O: ICU Vital Signs Last 24 Hrs  T(F): 98.8 (04-20-19 @ 10:10), Max: 100.3 (04-19-19 @ 15:00)  HR: 84 (04-20-19 @ 10:00) (68 - 86)  BP: 94/50 (04-20-19 @ 10:00) (75/44 - 119/58)  BP(mean): 73 (04-20-19 @ 10:00) (59 - 93)  ABP: 130/46 (04-20-19 @ 10:00)  RR: 34 (04-20-19 @ 10:00) (11 - 41)  SpO2: 100% (04-20-19 @ 10:00) (97% - 100%)    PHYSICAL EXAM:       LABS:    04-20    146<H>  |  108  |  35<H>  ----------------------------<  153<H>  4.2   |  29  |  0.55    Ca    8.1<L>      20 Apr 2019 06:43  Phos  2.0     04-20  Mg     2.4     04-20    TPro  5.0<L>  /  Alb  2.2<L>  /  TBili  0.5  /  DBili  <0.2  /  AST  13  /  ALT  7<L>  /  AlkPhos  38<L>  04-20  LIVER FUNCTIONS - ( 20 Apr 2019 06:43 )  Alb: 2.2 g/dL / Pro: 5.0 g/dL / ALK PHOS: 38 U/L / ALT: 7 U/L / AST: 13 U/L / GGT: x                               8.1    8.87  )-----------( 305      ( 20 Apr 2019 06:43 )             25.7   PT/INR - ( 20 Apr 2019 06:43 )   PT: 27.9 sec;   INR: 2.40          PTT - ( 20 Apr 2019 06:43 )  PTT:34.0 sec  CAPILLARY BLOOD GLUCOSE      POCT Blood Glucose.: 97 mg/dL (20 Apr 2019 10:57)  POCT Blood Glucose.: 152 mg/dL (20 Apr 2019 06:56)  POCT Blood Glucose.: 155 mg/dL (20 Apr 2019 00:01)  POCT Blood Glucose.: 137 mg/dL (19 Apr 2019 18:55)  POCT Blood Glucose.: 177 mg/dL (19 Apr 2019 12:03)    MEDICATIONS  (STANDING):  albumin human 25% IVPB 50 milliLiter(s) IV Intermittent every 8 hours  ascorbic acid Syrup 250 milliGRAM(s) Oral daily  aspirin  chewable 81 milliGRAM(s) Oral daily  atorvastatin 80 milliGRAM(s) Oral at bedtime  cefepime   IVPB 2000 milliGRAM(s) IV Intermittent every 12 hours  chlorhexidine 0.12% Liquid 15 milliLiter(s) Oral Mucosa two times a day  chlorhexidine 2% Cloths 1 Application(s) Topical <User Schedule>  dextrose 5%. 1000 milliLiter(s) (50 mL/Hr) IV Continuous <Continuous>  dextrose 50% Injectable 12.5 Gram(s) IV Push once  dextrose 50% Injectable 25 Gram(s) IV Push once  dextrose 50% Injectable 25 Gram(s) IV Push once  heparin  flush 100 Units/mL Injectable 100 Unit(s) IV Push every other day  heparin  Injectable 5000 Unit(s) SubCutaneous every 8 hours  insulin lispro (HumaLOG) corrective regimen sliding scale   SubCutaneous every 6 hours  metroNIDAZOLE  IVPB 500 milliGRAM(s) IV Intermittent every 8 hours  metroNIDAZOLE  IVPB      midodrine 15 milliGRAM(s) Oral every 8 hours  norepinephrine Infusion 0.05 MICROgram(s)/kG/Min (3.436 mL/Hr) IV Continuous <Continuous>  nystatin Powder 1 Application(s) Topical two times a day  pantoprazole  Injectable 40 milliGRAM(s) IV Push daily  potassium acid phosphate/sodium acid phosphate tablet (K-PHOS No. 2) 1 Tablet(s) Oral four times a day with meals  vancomycin  IVPB 1000 milliGRAM(s) IV Intermittent daily  zinc sulfate 220 milliGRAM(s) Oral daily    MEDICATIONS  (PRN):  dextrose 40% Gel 15 Gram(s) Oral once PRN Blood Glucose LESS THAN 70 milliGRAM(s)/deciliter  fentaNYL    Injectable 12.5 MICROGram(s) IV Push every 3 hours PRN Moderate Pain (4 - 6)  fentaNYL    Injectable 25 MICROGram(s) IV Push every 3 hours PRN Severe Pain (7 - 10)  glucagon  Injectable 1 milliGRAM(s) IntraMuscular once PRN Glucose LESS THAN 70 milligrams/deciliter  ondansetron Injectable 4 milliGRAM(s) IV Push every 6 hours PRN Nausea      Abbott:	  [ ] None	[ ] Daily Abbott Order Placed	   Indication:	  [ ] Strict I and O's    [ ] Obstruction     [ ] Incontinence + Stage 3 or 4 Decubitus  Central Line:  [ ] None	   [ ]  Medication / TPN Administration     [ ] No Peripheral IV S: No new issues/events overnight, no new med c/o    O: ICU Vital Signs Last 24 Hrs  T(F): 98.8 (04-20-19 @ 10:10), Max: 100.3 (04-19-19 @ 15:00)  HR: 84 (04-20-19 @ 10:00) (68 - 86)  BP: 94/50 (04-20-19 @ 10:00) (75/44 - 119/58)  BP(mean): 73 (04-20-19 @ 10:00) (59 - 93)  ABP: 130/46 (04-20-19 @ 10:00)  RR: 34 (04-20-19 @ 10:00) (11 - 41)  SpO2: 100% (04-20-19 @ 10:00) (97% - 100%)    PHYSICAL EXAM:   Neurological: AAOx3, CNII-XII intact,  strength 5/5 b/l  Cardiovascular: RRR  Respiratory: CTA  Gastrointestinal: soft, midline incision with wet to drg dsg. colostomy pink with stool. PHYLLIS's all serosang. no sig bleeding.   Extremities: warm, has 3+ dependent edema (but improved compared with prior exam)   Vascular: no cyanosis/erythema    LABS:    04-20    146<H>  |  108  |  35<H>  ----------------------------<  153<H>  4.2   |  29  |  0.55    Ca    8.1<L>      20 Apr 2019 06:43  Phos  2.0     04-20  Mg     2.4     04-20    TPro  5.0<L>  /  Alb  2.2<L>  /  TBili  0.5  /  DBili  <0.2  /  AST  13  /  ALT  7<L>  /  AlkPhos  38<L>  04-20  LIVER FUNCTIONS - ( 20 Apr 2019 06:43 )  Alb: 2.2 g/dL / Pro: 5.0 g/dL / ALK PHOS: 38 U/L / ALT: 7 U/L / AST: 13 U/L / GGT: x                               8.1    8.87  )-----------( 305      ( 20 Apr 2019 06:43 )             25.7   PT/INR - ( 20 Apr 2019 06:43 )   PT: 27.9 sec;   INR: 2.40          PTT - ( 20 Apr 2019 06:43 )  PTT:34.0 sec  CAPILLARY BLOOD GLUCOSE      POCT Blood Glucose.: 97 mg/dL (20 Apr 2019 10:57)  POCT Blood Glucose.: 152 mg/dL (20 Apr 2019 06:56)  POCT Blood Glucose.: 155 mg/dL (20 Apr 2019 00:01)  POCT Blood Glucose.: 137 mg/dL (19 Apr 2019 18:55)  POCT Blood Glucose.: 177 mg/dL (19 Apr 2019 12:03)    MEDICATIONS  (STANDING):  albumin human 25% IVPB 50 milliLiter(s) IV Intermittent every 8 hours  ascorbic acid Syrup 250 milliGRAM(s) Oral daily  aspirin  chewable 81 milliGRAM(s) Oral daily  atorvastatin 80 milliGRAM(s) Oral at bedtime  cefepime   IVPB 2000 milliGRAM(s) IV Intermittent every 12 hours  chlorhexidine 0.12% Liquid 15 milliLiter(s) Oral Mucosa two times a day  chlorhexidine 2% Cloths 1 Application(s) Topical <User Schedule>  dextrose 5%. 1000 milliLiter(s) (50 mL/Hr) IV Continuous <Continuous>  dextrose 50% Injectable 12.5 Gram(s) IV Push once  dextrose 50% Injectable 25 Gram(s) IV Push once  dextrose 50% Injectable 25 Gram(s) IV Push once  heparin  flush 100 Units/mL Injectable 100 Unit(s) IV Push every other day  heparin  Injectable 5000 Unit(s) SubCutaneous every 8 hours  insulin lispro (HumaLOG) corrective regimen sliding scale   SubCutaneous every 6 hours  metroNIDAZOLE  IVPB 500 milliGRAM(s) IV Intermittent every 8 hours  metroNIDAZOLE  IVPB      midodrine 15 milliGRAM(s) Oral every 8 hours  norepinephrine Infusion 0.05 MICROgram(s)/kG/Min (3.436 mL/Hr) IV Continuous <Continuous>  nystatin Powder 1 Application(s) Topical two times a day  pantoprazole  Injectable 40 milliGRAM(s) IV Push daily  potassium acid phosphate/sodium acid phosphate tablet (K-PHOS No. 2) 1 Tablet(s) Oral four times a day with meals  vancomycin  IVPB 1000 milliGRAM(s) IV Intermittent daily  zinc sulfate 220 milliGRAM(s) Oral daily    MEDICATIONS  (PRN):  dextrose 40% Gel 15 Gram(s) Oral once PRN Blood Glucose LESS THAN 70 milliGRAM(s)/deciliter  fentaNYL    Injectable 12.5 MICROGram(s) IV Push every 3 hours PRN Moderate Pain (4 - 6)  fentaNYL    Injectable 25 MICROGram(s) IV Push every 3 hours PRN Severe Pain (7 - 10)  glucagon  Injectable 1 milliGRAM(s) IntraMuscular once PRN Glucose LESS THAN 70 milligrams/deciliter  ondansetron Injectable 4 milliGRAM(s) IV Push every 6 hours PRN Nausea      Abbott:	  [ ] None	[ ] Daily Abbott Order Placed	   Indication:	  [ ] Strict I and O's    [ ] Obstruction     [ ] Incontinence + Stage 3 or 4 Decubitus  Central Line:  [ ] None	   [ ]  Medication / TPN Administration     [ ] No Peripheral IV

## 2019-04-21 LAB
ANION GAP SERPL CALC-SCNC: 7 MMOL/L — SIGNIFICANT CHANGE UP (ref 5–17)
APTT BLD: 34.3 SEC — SIGNIFICANT CHANGE UP (ref 27.5–36.3)
BASE EXCESS BLDA CALC-SCNC: 2.3 MMOL/L — SIGNIFICANT CHANGE UP (ref -2–3)
BASE EXCESS BLDA CALC-SCNC: 4 MMOL/L — HIGH (ref -2–3)
BUN SERPL-MCNC: 36 MG/DL — HIGH (ref 7–23)
CALCIUM SERPL-MCNC: 8 MG/DL — LOW (ref 8.4–10.5)
CHLORIDE SERPL-SCNC: 113 MMOL/L — HIGH (ref 96–108)
CO2 SERPL-SCNC: 28 MMOL/L — SIGNIFICANT CHANGE UP (ref 22–31)
CREAT SERPL-MCNC: 0.54 MG/DL — SIGNIFICANT CHANGE UP (ref 0.5–1.3)
GAS PNL BLDA: SIGNIFICANT CHANGE UP
GLUCOSE BLDC GLUCOMTR-MCNC: 106 MG/DL — HIGH (ref 70–99)
GLUCOSE BLDC GLUCOMTR-MCNC: 140 MG/DL — HIGH (ref 70–99)
GLUCOSE BLDC GLUCOMTR-MCNC: 146 MG/DL — HIGH (ref 70–99)
GLUCOSE BLDC GLUCOMTR-MCNC: 161 MG/DL — HIGH (ref 70–99)
GLUCOSE BLDC GLUCOMTR-MCNC: 166 MG/DL — HIGH (ref 70–99)
GLUCOSE BLDC GLUCOMTR-MCNC: 171 MG/DL — HIGH (ref 70–99)
GLUCOSE BLDC GLUCOMTR-MCNC: 205 MG/DL — HIGH (ref 70–99)
GLUCOSE SERPL-MCNC: 168 MG/DL — HIGH (ref 70–99)
HBV SURFACE AB SER-ACNC: REACTIVE — SIGNIFICANT CHANGE UP
HBV SURFACE AG SER-ACNC: SIGNIFICANT CHANGE UP
HCO3 BLDA-SCNC: 25 MMOL/L — SIGNIFICANT CHANGE UP (ref 21–28)
HCO3 BLDA-SCNC: 26 MMOL/L — SIGNIFICANT CHANGE UP (ref 21–28)
HCT VFR BLD CALC: 32.9 % — LOW (ref 39–50)
HGB BLD-MCNC: 10.2 G/DL — LOW (ref 13–17)
INR BLD: 2.43 — HIGH (ref 0.88–1.16)
MAGNESIUM SERPL-MCNC: 2.2 MG/DL — SIGNIFICANT CHANGE UP (ref 1.6–2.6)
MCHC RBC-ENTMCNC: 30.3 PG — SIGNIFICANT CHANGE UP (ref 27–34)
MCHC RBC-ENTMCNC: 31 GM/DL — LOW (ref 32–36)
MCV RBC AUTO: 97.6 FL — SIGNIFICANT CHANGE UP (ref 80–100)
NRBC # BLD: 0 /100 WBCS — SIGNIFICANT CHANGE UP (ref 0–0)
PCO2 BLDA: 32 MMHG — LOW (ref 35–48)
PCO2 BLDA: 34 MMHG — LOW (ref 35–48)
PH BLDA: 7.49 — HIGH (ref 7.35–7.45)
PH BLDA: 7.53 — HIGH (ref 7.35–7.45)
PHOSPHATE SERPL-MCNC: 2.4 MG/DL — LOW (ref 2.5–4.5)
PLATELET # BLD AUTO: 438 K/UL — HIGH (ref 150–400)
PO2 BLDA: 103 MMHG — SIGNIFICANT CHANGE UP (ref 83–108)
PO2 BLDA: 131 MMHG — HIGH (ref 83–108)
POTASSIUM SERPL-MCNC: 4.2 MMOL/L — SIGNIFICANT CHANGE UP (ref 3.5–5.3)
POTASSIUM SERPL-SCNC: 4.2 MMOL/L — SIGNIFICANT CHANGE UP (ref 3.5–5.3)
PROTHROM AB SERPL-ACNC: 28.2 SEC — HIGH (ref 10–12.9)
RBC # BLD: 3.37 M/UL — LOW (ref 4.2–5.8)
RBC # FLD: 17.2 % — HIGH (ref 10.3–14.5)
SAO2 % BLDA: 98 % — SIGNIFICANT CHANGE UP (ref 95–100)
SAO2 % BLDA: 99 % — SIGNIFICANT CHANGE UP (ref 95–100)
SODIUM SERPL-SCNC: 148 MMOL/L — HIGH (ref 135–145)
WBC # BLD: 13.59 K/UL — HIGH (ref 3.8–10.5)
WBC # FLD AUTO: 13.59 K/UL — HIGH (ref 3.8–10.5)

## 2019-04-21 PROCEDURE — 71045 X-RAY EXAM CHEST 1 VIEW: CPT | Mod: 26,77

## 2019-04-21 PROCEDURE — 71045 X-RAY EXAM CHEST 1 VIEW: CPT | Mod: 26,76

## 2019-04-21 PROCEDURE — 93976 VASCULAR STUDY: CPT | Mod: 26

## 2019-04-21 PROCEDURE — 99291 CRITICAL CARE FIRST HOUR: CPT | Mod: 25

## 2019-04-21 PROCEDURE — 31500 INSERT EMERGENCY AIRWAY: CPT

## 2019-04-21 RX ORDER — ACETAMINOPHEN 500 MG
650 TABLET ORAL EVERY 6 HOURS
Qty: 0 | Refills: 0 | Status: DISCONTINUED | OUTPATIENT
Start: 2019-04-21 | End: 2019-05-09

## 2019-04-21 RX ORDER — FUROSEMIDE 40 MG
40 TABLET ORAL ONCE
Qty: 0 | Refills: 0 | Status: COMPLETED | OUTPATIENT
Start: 2019-04-21 | End: 2019-04-21

## 2019-04-21 RX ORDER — MIDAZOLAM HYDROCHLORIDE 1 MG/ML
4 INJECTION, SOLUTION INTRAMUSCULAR; INTRAVENOUS ONCE
Qty: 0 | Refills: 0 | Status: DISCONTINUED | OUTPATIENT
Start: 2019-04-21 | End: 2019-04-21

## 2019-04-21 RX ORDER — FENTANYL CITRATE 50 UG/ML
50 INJECTION INTRAVENOUS ONCE
Qty: 0 | Refills: 0 | Status: DISCONTINUED | OUTPATIENT
Start: 2019-04-21 | End: 2019-04-21

## 2019-04-21 RX ORDER — POTASSIUM CHLORIDE 20 MEQ
40 PACKET (EA) ORAL ONCE
Qty: 0 | Refills: 0 | Status: COMPLETED | OUTPATIENT
Start: 2019-04-21 | End: 2019-04-21

## 2019-04-21 RX ORDER — ACETAMINOPHEN 500 MG
1000 TABLET ORAL ONCE
Qty: 0 | Refills: 0 | Status: COMPLETED | OUTPATIENT
Start: 2019-04-21 | End: 2019-04-21

## 2019-04-21 RX ADMIN — Medication 50 MILLILITER(S): at 00:03

## 2019-04-21 RX ADMIN — CHLORHEXIDINE GLUCONATE 1 APPLICATION(S): 213 SOLUTION TOPICAL at 05:23

## 2019-04-21 RX ADMIN — FENTANYL CITRATE 50 MICROGRAM(S): 50 INJECTION INTRAVENOUS at 19:45

## 2019-04-21 RX ADMIN — Medication 3.44 MICROGRAM(S)/KG/MIN: at 07:36

## 2019-04-21 RX ADMIN — Medication 100 MILLIGRAM(S): at 02:47

## 2019-04-21 RX ADMIN — HEPARIN SODIUM 5000 UNIT(S): 5000 INJECTION INTRAVENOUS; SUBCUTANEOUS at 05:18

## 2019-04-21 RX ADMIN — Medication 40 MILLIGRAM(S): at 17:42

## 2019-04-21 RX ADMIN — FENTANYL CITRATE 12.5 MICROGRAM(S): 50 INJECTION INTRAVENOUS at 07:38

## 2019-04-21 RX ADMIN — HEPARIN SODIUM 5000 UNIT(S): 5000 INJECTION INTRAVENOUS; SUBCUTANEOUS at 22:23

## 2019-04-21 RX ADMIN — Medication 40 MILLIEQUIVALENT(S): at 17:44

## 2019-04-21 RX ADMIN — FENTANYL CITRATE 12.5 MICROGRAM(S): 50 INJECTION INTRAVENOUS at 07:26

## 2019-04-21 RX ADMIN — Medication 250 MILLIGRAM(S): at 12:40

## 2019-04-21 RX ADMIN — Medication 50 MILLILITER(S): at 07:36

## 2019-04-21 RX ADMIN — FENTANYL CITRATE 12.5 MICROGRAM(S): 50 INJECTION INTRAVENOUS at 00:48

## 2019-04-21 RX ADMIN — Medication 4: at 14:04

## 2019-04-21 RX ADMIN — Medication 40 MILLIGRAM(S): at 10:36

## 2019-04-21 RX ADMIN — MIDODRINE HYDROCHLORIDE 15 MILLIGRAM(S): 2.5 TABLET ORAL at 05:20

## 2019-04-21 RX ADMIN — MIDODRINE HYDROCHLORIDE 15 MILLIGRAM(S): 2.5 TABLET ORAL at 14:04

## 2019-04-21 RX ADMIN — Medication 400 MILLIGRAM(S): at 22:30

## 2019-04-21 RX ADMIN — Medication 2: at 17:42

## 2019-04-21 RX ADMIN — NYSTATIN CREAM 1 APPLICATION(S): 100000 CREAM TOPICAL at 05:24

## 2019-04-21 RX ADMIN — ATORVASTATIN CALCIUM 80 MILLIGRAM(S): 80 TABLET, FILM COATED ORAL at 22:20

## 2019-04-21 RX ADMIN — FENTANYL CITRATE 12.5 MICROGRAM(S): 50 INJECTION INTRAVENOUS at 14:20

## 2019-04-21 RX ADMIN — Medication 100 MILLIGRAM(S): at 17:43

## 2019-04-21 RX ADMIN — Medication 1000 MILLIGRAM(S): at 22:50

## 2019-04-21 RX ADMIN — Medication 100 MILLIGRAM(S): at 09:35

## 2019-04-21 RX ADMIN — Medication 1 TABLET(S): at 00:03

## 2019-04-21 RX ADMIN — FENTANYL CITRATE 12.5 MICROGRAM(S): 50 INJECTION INTRAVENOUS at 22:30

## 2019-04-21 RX ADMIN — Medication 81 MILLIGRAM(S): at 12:40

## 2019-04-21 RX ADMIN — Medication 650 MILLIGRAM(S): at 12:39

## 2019-04-21 RX ADMIN — MIDAZOLAM HYDROCHLORIDE 4 MILLIGRAM(S): 1 INJECTION, SOLUTION INTRAMUSCULAR; INTRAVENOUS at 19:59

## 2019-04-21 RX ADMIN — NYSTATIN CREAM 1 APPLICATION(S): 100000 CREAM TOPICAL at 18:00

## 2019-04-21 RX ADMIN — Medication 250 MILLIGRAM(S): at 14:05

## 2019-04-21 RX ADMIN — MIDODRINE HYDROCHLORIDE 15 MILLIGRAM(S): 2.5 TABLET ORAL at 22:22

## 2019-04-21 RX ADMIN — CHLORHEXIDINE GLUCONATE 15 MILLILITER(S): 213 SOLUTION TOPICAL at 05:20

## 2019-04-21 RX ADMIN — FENTANYL CITRATE 12.5 MICROGRAM(S): 50 INJECTION INTRAVENOUS at 00:29

## 2019-04-21 RX ADMIN — Medication 50 MILLILITER(S): at 15:28

## 2019-04-21 RX ADMIN — HEPARIN SODIUM 5000 UNIT(S): 5000 INJECTION INTRAVENOUS; SUBCUTANEOUS at 14:04

## 2019-04-21 RX ADMIN — PANTOPRAZOLE SODIUM 40 MILLIGRAM(S): 20 TABLET, DELAYED RELEASE ORAL at 12:40

## 2019-04-21 RX ADMIN — CEFEPIME 100 MILLIGRAM(S): 1 INJECTION, POWDER, FOR SOLUTION INTRAMUSCULAR; INTRAVENOUS at 17:42

## 2019-04-21 RX ADMIN — CEFEPIME 100 MILLIGRAM(S): 1 INJECTION, POWDER, FOR SOLUTION INTRAMUSCULAR; INTRAVENOUS at 05:22

## 2019-04-21 RX ADMIN — FENTANYL CITRATE 50 MICROGRAM(S): 50 INJECTION INTRAVENOUS at 19:28

## 2019-04-21 RX ADMIN — ZINC SULFATE TAB 220 MG (50 MG ZINC EQUIVALENT) 220 MILLIGRAM(S): 220 (50 ZN) TAB at 12:40

## 2019-04-21 RX ADMIN — FENTANYL CITRATE 12.5 MICROGRAM(S): 50 INJECTION INTRAVENOUS at 14:05

## 2019-04-21 RX ADMIN — FENTANYL CITRATE 12.5 MICROGRAM(S): 50 INJECTION INTRAVENOUS at 22:50

## 2019-04-21 RX ADMIN — CHLORHEXIDINE GLUCONATE 15 MILLILITER(S): 213 SOLUTION TOPICAL at 17:43

## 2019-04-21 RX ADMIN — Medication 1 TABLET(S): at 05:23

## 2019-04-21 RX ADMIN — Medication 650 MILLIGRAM(S): at 13:30

## 2019-04-21 NOTE — PROGRESS NOTE ADULT - SUBJECTIVE AND OBJECTIVE BOX
S: No new issues/events overnight, no new med c/o    O: ICU Vital Signs Last 24 Hrs  T(F): 100.5 (04-21-19 @ 10:56), Max: 100.5 (04-21-19 @ 10:56)  HR: 110 (04-21-19 @ 13:00) (68 - 110)  BP: 107/65 (04-21-19 @ 13:00) (76/39 - 127/56)  BP(mean): 84 (04-21-19 @ 13:00) (49 - 93)  ABP: 118/52 (04-21-19 @ 13:00)  RR: 40 (04-21-19 @ 13:00) (26 - 42)  SpO2: 96% (04-21-19 @ 13:00) (93% - 100%)    PHYSICAL EXAM:   Neurological: AAOx3, CNII-XII intact,  strength 5/5 b/l  Cardiovascular: RRR  Respiratory: CTA  Gastrointestinal: soft, midline incision with wet to drg dsg. colostomy pink with stool. PHYLLIS's all serosang. no sig bleeding.   Extremities: warm, has 3+ dependent edema (but improved compared with prior exam)   Vascular: no cyanosis/erythema        LABS:    04-21    148<H>  |  113<H>  |  36<H>  ----------------------------<  168<H>  4.2   |  28  |  0.54    Ca    8.0<L>      21 Apr 2019 06:41  Phos  2.4     04-21  Mg     2.2     04-21    TPro  5.0<L>  /  Alb  2.2<L>  /  TBili  0.5  /  DBili  <0.2  /  AST  13  /  ALT  7<L>  /  AlkPhos  38<L>  04-20  LIVER FUNCTIONS - ( 20 Apr 2019 06:43 )  Alb: 2.2 g/dL / Pro: 5.0 g/dL / ALK PHOS: 38 U/L / ALT: 7 U/L / AST: 13 U/L / GGT: x                               10.2   13.59 )-----------( 438      ( 21 Apr 2019 06:41 )             32.9   PT/INR - ( 21 Apr 2019 06:41 )   PT: 28.2 sec;   INR: 2.43          PTT - ( 21 Apr 2019 06:41 )  PTT:34.3 sec  CAPILLARY BLOOD GLUCOSE      POCT Blood Glucose.: 171 mg/dL (21 Apr 2019 11:16)  POCT Blood Glucose.: 140 mg/dL (21 Apr 2019 06:04)  POCT Blood Glucose.: 146 mg/dL (21 Apr 2019 00:16)  POCT Blood Glucose.: 127 mg/dL (20 Apr 2019 15:54)    MEDICATIONS  (STANDING):  albumin human 25% IVPB 50 milliLiter(s) IV Intermittent every 8 hours  ascorbic acid Syrup 250 milliGRAM(s) Oral daily  aspirin  chewable 81 milliGRAM(s) Oral daily  atorvastatin 80 milliGRAM(s) Oral at bedtime  cefepime   IVPB 2000 milliGRAM(s) IV Intermittent every 12 hours  chlorhexidine 0.12% Liquid 15 milliLiter(s) Oral Mucosa two times a day  chlorhexidine 2% Cloths 1 Application(s) Topical <User Schedule>  heparin  flush 100 Units/mL Injectable 100 Unit(s) IV Push every other day  heparin  Injectable 5000 Unit(s) SubCutaneous every 8 hours  insulin lispro (HumaLOG) corrective regimen sliding scale   SubCutaneous every 6 hours  metroNIDAZOLE  IVPB 500 milliGRAM(s) IV Intermittent every 8 hours  metroNIDAZOLE  IVPB      midodrine 15 milliGRAM(s) Oral every 8 hours  norepinephrine Infusion 0.05 MICROgram(s)/kG/Min (3.436 mL/Hr) IV Continuous <Continuous>  nystatin Powder 1 Application(s) Topical two times a day  pantoprazole  Injectable 40 milliGRAM(s) IV Push daily  vancomycin  IVPB 1000 milliGRAM(s) IV Intermittent daily  zinc sulfate 220 milliGRAM(s) Oral daily    MEDICATIONS  (PRN):  acetaminophen    Suspension .. 650 milliGRAM(s) Enteral Tube every 6 hours PRN Temp greater or equal to 38C (100.4F)  dextrose 40% Gel 15 Gram(s) Oral once PRN Blood Glucose LESS THAN 70 milliGRAM(s)/deciliter  fentaNYL    Injectable 12.5 MICROGram(s) IV Push every 3 hours PRN Moderate Pain (4 - 6)  fentaNYL    Injectable 25 MICROGram(s) IV Push every 3 hours PRN Severe Pain (7 - 10)  glucagon  Injectable 1 milliGRAM(s) IntraMuscular once PRN Glucose LESS THAN 70 milligrams/deciliter  ondansetron Injectable 4 milliGRAM(s) IV Push every 6 hours PRN Nausea      Abbott:	  [ ] None	[ x] Daily Abbott Order Placed	   Indication:	  [ x] Strict I and O's    [ ] Obstruction     [ ] Incontinence + Stage 3 or 4 Decubitus  Central Line:  [ ] None	   [ x]  Medication / TPN Administration     [ ] No Peripheral IV S: No new issues/events overnight, no new med c/o. Patient was re-intubated after episode of vomiting.    O: ICU Vital Signs Last 24 Hrs  T(F): 100.5 (04-21-19 @ 10:56), Max: 100.5 (04-21-19 @ 10:56)  HR: 110 (04-21-19 @ 13:00) (68 - 110)  BP: 107/65 (04-21-19 @ 13:00) (76/39 - 127/56)  BP(mean): 84 (04-21-19 @ 13:00) (49 - 93)  ABP: 118/52 (04-21-19 @ 13:00)  RR: 40 (04-21-19 @ 13:00) (26 - 42)  SpO2: 96% (04-21-19 @ 13:00) (93% - 100%)    PHYSICAL EXAM:   Neurological: AAOx3, CNII-XII intact,  strength 5/5 b/l  Cardiovascular: RRR  Respiratory: CTA  Gastrointestinal: soft, midline incision with wet to drg dsg. colostomy pink with stool. PHYLLIS's all serosang. no sig bleeding.   Extremities: warm, has 3+ dependent edema (but improved compared with prior exam)   Vascular: no cyanosis/erythema        LABS:    04-21    148<H>  |  113<H>  |  36<H>  ----------------------------<  168<H>  4.2   |  28  |  0.54    Ca    8.0<L>      21 Apr 2019 06:41  Phos  2.4     04-21  Mg     2.2     04-21    TPro  5.0<L>  /  Alb  2.2<L>  /  TBili  0.5  /  DBili  <0.2  /  AST  13  /  ALT  7<L>  /  AlkPhos  38<L>  04-20  LIVER FUNCTIONS - ( 20 Apr 2019 06:43 )  Alb: 2.2 g/dL / Pro: 5.0 g/dL / ALK PHOS: 38 U/L / ALT: 7 U/L / AST: 13 U/L / GGT: x                               10.2   13.59 )-----------( 438      ( 21 Apr 2019 06:41 )             32.9   PT/INR - ( 21 Apr 2019 06:41 )   PT: 28.2 sec;   INR: 2.43          PTT - ( 21 Apr 2019 06:41 )  PTT:34.3 sec  CAPILLARY BLOOD GLUCOSE      POCT Blood Glucose.: 171 mg/dL (21 Apr 2019 11:16)  POCT Blood Glucose.: 140 mg/dL (21 Apr 2019 06:04)  POCT Blood Glucose.: 146 mg/dL (21 Apr 2019 00:16)  POCT Blood Glucose.: 127 mg/dL (20 Apr 2019 15:54)    MEDICATIONS  (STANDING):  albumin human 25% IVPB 50 milliLiter(s) IV Intermittent every 8 hours  ascorbic acid Syrup 250 milliGRAM(s) Oral daily  aspirin  chewable 81 milliGRAM(s) Oral daily  atorvastatin 80 milliGRAM(s) Oral at bedtime  cefepime   IVPB 2000 milliGRAM(s) IV Intermittent every 12 hours  chlorhexidine 0.12% Liquid 15 milliLiter(s) Oral Mucosa two times a day  chlorhexidine 2% Cloths 1 Application(s) Topical <User Schedule>  heparin  flush 100 Units/mL Injectable 100 Unit(s) IV Push every other day  heparin  Injectable 5000 Unit(s) SubCutaneous every 8 hours  insulin lispro (HumaLOG) corrective regimen sliding scale   SubCutaneous every 6 hours  metroNIDAZOLE  IVPB 500 milliGRAM(s) IV Intermittent every 8 hours  metroNIDAZOLE  IVPB      midodrine 15 milliGRAM(s) Oral every 8 hours  norepinephrine Infusion 0.05 MICROgram(s)/kG/Min (3.436 mL/Hr) IV Continuous <Continuous>  nystatin Powder 1 Application(s) Topical two times a day  pantoprazole  Injectable 40 milliGRAM(s) IV Push daily  vancomycin  IVPB 1000 milliGRAM(s) IV Intermittent daily  zinc sulfate 220 milliGRAM(s) Oral daily    MEDICATIONS  (PRN):  acetaminophen    Suspension .. 650 milliGRAM(s) Enteral Tube every 6 hours PRN Temp greater or equal to 38C (100.4F)  dextrose 40% Gel 15 Gram(s) Oral once PRN Blood Glucose LESS THAN 70 milliGRAM(s)/deciliter  fentaNYL    Injectable 12.5 MICROGram(s) IV Push every 3 hours PRN Moderate Pain (4 - 6)  fentaNYL    Injectable 25 MICROGram(s) IV Push every 3 hours PRN Severe Pain (7 - 10)  glucagon  Injectable 1 milliGRAM(s) IntraMuscular once PRN Glucose LESS THAN 70 milligrams/deciliter  ondansetron Injectable 4 milliGRAM(s) IV Push every 6 hours PRN Nausea      Abbott:	  [ ] None	[ x] Daily Abbott Order Placed	   Indication:	  [ x] Strict I and O's    [ ] Obstruction     [ ] Incontinence + Stage 3 or 4 Decubitus  Central Line:  [ ] None	   [ x]  Medication / TPN Administration     [ ] No Peripheral IV

## 2019-04-21 NOTE — PROGRESS NOTE ADULT - SUBJECTIVE AND OBJECTIVE BOX
On interval followup, the left arm PICC site is clean, dry and non-inflamed.  T 99 range. Notes. cultures and progress are followed.

## 2019-04-21 NOTE — PROGRESS NOTE ADULT - ASSESSMENT
85 yo M with history of b/l laparoscopic robotic-assisted inguinal hernia repair presenting with b/l segmental pulmonary embolisms, left lung abscess, pneumoperitoneum, and distal descending colon abscess (likely source of pneumoperitoneum), significant iliofemoral DVT burden, s/p IVCF placement and IR drainage of LLQ abdominal collection (4/5), s/p ex-lap, LAURYN, sigmoidectomy, drain placement in R. paracolic gutter, and abthera vac placement (4/7), s/p planned RTOR, packing removal, and end colostomy creation (4/10), now w/ MRSA + proteus PNA, persistent coagulopathy, LLL pulmonary empyema s/p bedside pigtail drainage (4/15)    Neuro: Fentanyl prn  CV: levo for MAP>65, on midodrine 115q8h ; fluid overloaded w/ anasarca. been getting lasix diuresis; albumin 25%q8hrs, cont IV lasix 40, asa 81 lipitor, statin  Pulm:  bilateral PE's - currently not on ac due to bleeding risk; Empyema- s/p left CTx on 4/15, extubated yesterday. High flow NC as needed.   GI/FEN: NPO, dobhoff glucerna 1.5@50/hr; malnourished, repeat prealbumin better ; Vit C, zinc supplementation; GI consulted for w/u for persistent elevated INR. mild hypernatremia, start FW via NGT.   : rao for strict I&O while getting agressive diuresis.   ID: +kleb, proteus, strep in abd Cx, flagyl (4/9-4/20), cefepime (4/9-4/20); Sputum cx: MRSA and proteus (sensitive to cefepine) , vancomycin (4/10-5/4) [Dc'd//ceftriaxone (4/9-4/9)// unasyn (4/8-4/9),  Vanco (4/5-4/7), Zosyn (4/5-4/8)]  Endo: ISS  Heme: bilateral iliofemoral DVT / bilateral PE; s/p IVC Filter; elevated INR s/p multiple vit K, Heme consulted - Mixing study wnl; factor studies pending  PPx: SCDs; SQH  Lines: PIVs, PICC (4/6--), L A line (4/13--) /// D/C: R cordis (4/7-4/11)   Wounds/drains: LLQ IR drain (4/5 -- ), LUQ PHYLLIS (4/7 -- ), R paracolic gutter drain (4/7 --) --- all drains to LIWS; abdominal incision SQ space wet to dry  PT/OT: early mob 4/11 - worked with PT. OOB to stretcher chair daily.

## 2019-04-21 NOTE — PROCEDURE NOTE - NSPROCDETAILS_GEN_ALL_CORE
patient pre-oxygenated, tube inserted, placement confirmed
positive blood return obtained via catheter/connected to a pressurized flush line/all materials/supplies accounted for at end of procedure/location identified, draped/prepped, sterile technique used, needle inserted/introduced/sutured in place
location identified, draped/prepped, sterile technique used/supine position/sterile dressing applied/sterile technique, catheter placed/ultrasound guidance
dressing applied/supine position/percutaneous/Seldinger technique/secured in place/sterile dressing applied

## 2019-04-21 NOTE — PROCEDURE NOTE - NSINFORMCONSENT_GEN_A_CORE
This was an emergent procedure.
This was an emergent procedure.
Benefits, risks, and possible complications of procedure explained to patient/caregiver who verbalized understanding and gave verbal consent.
Benefits, risks, and possible complications of procedure explained to patient/caregiver who verbalized understanding and gave written consent.

## 2019-04-21 NOTE — PROCEDURE NOTE - SUPERVISORY STATEMENT
Chest X ray ordered to confirm tube position
Drainage to gravity. May need MIST2 protocol for mobilization of viscous purulent collection fluid.

## 2019-04-21 NOTE — PROGRESS NOTE ADULT - ASSESSMENT
87 yo M with history of b/l laparoscopic robotic-assisted inguinal hernia repair presenting with b/l segmental pulmonary embolisms, left lung abscess, pneumoperitoneum, and distal descending colon abscess (likely source of pneumoperitoneum), significant iliofemoral DVT burden, s/p IVCF placement and IR drainage of LLQ abdominal collection (4/5), s/p ex-lap, LAURYN, sigmoidectomy, drain placement in R. paracolic gutter, and abthera vac placement (4/7), s/p planned RTOR, packing removal, and end colostomy creation (4/10), now w/ MRSA + proteus PNA, persistent coagulopathy, LLL pulmonary empyema s/p bedside pigtail drainage (4/15)    Care per SICU primary.  Team 1C will continue to follow.

## 2019-04-21 NOTE — PROGRESS NOTE ADULT - SUBJECTIVE AND OBJECTIVE BOX
STATUS POST:    4/7: Exploratory laparotomy, lysis of adhesions, SBR (serosal tear) w/ primary anastomosis, sigmoidectomy, abdominal packing and placement of ABThera VAC; EBL 1L, given 6pRBC, 2FFP  4/10: Planned re-exploration; ex lap, abdominal packing removed, end colostomy w/ descending colon, fascial closure w/ vac placement --- (Findings: less bleeding, SB anastomosis intact) - EBL: 50, IVF: 600; 1prbc, uop: 125   4/15: Left chest tube     24 HOUR EVENTS:  4/20: extubated. lasix at 5AM and 1pm, vanc trough good (15.7), incr midodrine to 15q8h.   o/n: Uop downtrending; IV lasix 40 at 5AM     SUBJECTIVE: This morning, denies acute complaints. No nausea or vomiting. No dyspnea.    aspirin  chewable 81 milliGRAM(s) Oral daily  cefepime   IVPB 2000 milliGRAM(s) IV Intermittent every 12 hours  heparin  flush 100 Units/mL Injectable 100 Unit(s) IV Push every other day  heparin  Injectable 5000 Unit(s) SubCutaneous every 8 hours  metroNIDAZOLE  IVPB 500 milliGRAM(s) IV Intermittent every 8 hours  metroNIDAZOLE  IVPB      midodrine 15 milliGRAM(s) Oral every 8 hours  norepinephrine Infusion 0.05 MICROgram(s)/kG/Min IV Continuous <Continuous>  vancomycin  IVPB 1000 milliGRAM(s) IV Intermittent daily      Vital Signs Last 24 Hrs  T(C): 36.3 (21 Apr 2019 06:00), Max: 37.4 (20 Apr 2019 22:12)  T(F): 97.3 (21 Apr 2019 06:00), Max: 99.4 (20 Apr 2019 22:12)  HR: 108 (21 Apr 2019 07:00) (68 - 108)  BP: 92/52 (21 Apr 2019 07:00) (76/39 - 127/56)  BP(mean): 68 (21 Apr 2019 07:00) (49 - 89)  RR: 31 (21 Apr 2019 07:00) (20 - 40)  SpO2: 93% (21 Apr 2019 07:00) (93% - 100%)  I&O's Detail    20 Apr 2019 07:01  -  21 Apr 2019 07:00  --------------------------------------------------------  IN:    Albumin 25%: 50 mL    Albumin 5%  - 250 mL: 100 mL    Enteral Tube Flush: 180 mL    Glucerna 1.5: 700 mL    IV PiggyBack: 500 mL    norepinephrine Infusion: 94.8 mL  Total IN: 1624.8 mL    OUT:    Chest Tube: 22 mL    Colostomy: 850 mL    Indwelling Catheter - Urethral: 1554 mL  Total OUT: 2426 mL    Total NET: -801.2 mL          General: NAD, resting comfortably in bed, on levo  C/V: NSR on monitor  Pulm: Nonlabored breathing, no respiratory distress, left chest tube to wall suction  Abd: soft, NT/ND, tube feeds @ 50, ostomy pink and patent, midline incision with dressings in place  : Abbott in place with light straw colored urine  Extrem: WWP, no edema, SCDs in place        LABS:                        10.2   13.59 )-----------( 438      ( 21 Apr 2019 06:41 )             32.9     04-21    148<H>  |  113<H>  |  36<H>  ----------------------------<  168<H>  4.2   |  28  |  0.54    Ca    8.0<L>      21 Apr 2019 06:41  Phos  2.4     04-21  Mg     2.2     04-21    TPro  5.0<L>  /  Alb  2.2<L>  /  TBili  0.5  /  DBili  <0.2  /  AST  13  /  ALT  7<L>  /  AlkPhos  38<L>  04-20    PT/INR - ( 21 Apr 2019 06:41 )   PT: 28.2 sec;   INR: 2.43          PTT - ( 21 Apr 2019 06:41 )  PTT:34.3 sec

## 2019-04-21 NOTE — PROGRESS NOTE ADULT - ATTENDING COMMENTS
- Patient was reintubated again after episode of vomiting. He aspirated tube feed. Patient was placed on high flow nasal cannula but he reminded tachypneic due to aspiration pneumonia.  - CT chest to assess status of lung empyema is still pending  - Continue diuresis  - Feeding to be restarted  - He continued to require levophed for shock even after increasing midodrine to 15 mg tid  - Rest as above

## 2019-04-22 LAB
ALBUMIN SERPL ELPH-MCNC: 3.3 G/DL — SIGNIFICANT CHANGE UP (ref 3.3–5)
ALP SERPL-CCNC: 30 U/L — LOW (ref 40–120)
ALT FLD-CCNC: 5 U/L — LOW (ref 10–45)
ANION GAP SERPL CALC-SCNC: 11 MMOL/L — SIGNIFICANT CHANGE UP (ref 5–17)
ANION GAP SERPL CALC-SCNC: 12 MMOL/L — SIGNIFICANT CHANGE UP (ref 5–17)
APPEARANCE UR: ABNORMAL
APTT BLD: 37.1 SEC — HIGH (ref 27.5–36.3)
AST SERPL-CCNC: 17 U/L — SIGNIFICANT CHANGE UP (ref 10–40)
BASOPHILS # BLD AUTO: 0 K/UL — SIGNIFICANT CHANGE UP (ref 0–0.2)
BASOPHILS NFR BLD AUTO: 0 % — SIGNIFICANT CHANGE UP (ref 0–2)
BILIRUB DIRECT SERPL-MCNC: 0.2 MG/DL — SIGNIFICANT CHANGE UP (ref 0–0.2)
BILIRUB INDIRECT FLD-MCNC: 0.3 MG/DL — SIGNIFICANT CHANGE UP (ref 0.2–1)
BILIRUB SERPL-MCNC: 0.5 MG/DL — SIGNIFICANT CHANGE UP (ref 0.2–1.2)
BILIRUB UR-MCNC: NEGATIVE — SIGNIFICANT CHANGE UP
BUN SERPL-MCNC: 37 MG/DL — HIGH (ref 7–23)
BUN SERPL-MCNC: 38 MG/DL — HIGH (ref 7–23)
CALCIUM SERPL-MCNC: 8.1 MG/DL — LOW (ref 8.4–10.5)
CALCIUM SERPL-MCNC: 8.2 MG/DL — LOW (ref 8.4–10.5)
CHLORIDE SERPL-SCNC: 110 MMOL/L — HIGH (ref 96–108)
CHLORIDE SERPL-SCNC: 110 MMOL/L — HIGH (ref 96–108)
CO2 SERPL-SCNC: 25 MMOL/L — SIGNIFICANT CHANGE UP (ref 22–31)
CO2 SERPL-SCNC: 26 MMOL/L — SIGNIFICANT CHANGE UP (ref 22–31)
COLOR SPEC: YELLOW — SIGNIFICANT CHANGE UP
CREAT SERPL-MCNC: 0.73 MG/DL — SIGNIFICANT CHANGE UP (ref 0.5–1.3)
CREAT SERPL-MCNC: 0.76 MG/DL — SIGNIFICANT CHANGE UP (ref 0.5–1.3)
DIFF PNL FLD: ABNORMAL
EOSINOPHIL # BLD AUTO: 0 K/UL — SIGNIFICANT CHANGE UP (ref 0–0.5)
EOSINOPHIL NFR BLD AUTO: 0 % — SIGNIFICANT CHANGE UP (ref 0–6)
GLUCOSE BLDC GLUCOMTR-MCNC: 103 MG/DL — HIGH (ref 70–99)
GLUCOSE BLDC GLUCOMTR-MCNC: 117 MG/DL — HIGH (ref 70–99)
GLUCOSE BLDC GLUCOMTR-MCNC: 146 MG/DL — HIGH (ref 70–99)
GLUCOSE BLDC GLUCOMTR-MCNC: 159 MG/DL — HIGH (ref 70–99)
GLUCOSE SERPL-MCNC: 117 MG/DL — HIGH (ref 70–99)
GLUCOSE SERPL-MCNC: 122 MG/DL — HIGH (ref 70–99)
GLUCOSE UR QL: NEGATIVE — SIGNIFICANT CHANGE UP
HCT VFR BLD CALC: 27.3 % — LOW (ref 39–50)
HGB BLD-MCNC: 8.5 G/DL — LOW (ref 13–17)
INR BLD: 3.14 — HIGH (ref 0.88–1.16)
KETONES UR-MCNC: ABNORMAL MG/DL
LEUKOCYTE ESTERASE UR-ACNC: ABNORMAL
LYMPHOCYTES # BLD AUTO: 0.25 K/UL — LOW (ref 1–3.3)
LYMPHOCYTES # BLD AUTO: 1 % — LOW (ref 13–44)
MAGNESIUM SERPL-MCNC: 1.8 MG/DL — SIGNIFICANT CHANGE UP (ref 1.6–2.6)
MCHC RBC-ENTMCNC: 30.4 PG — SIGNIFICANT CHANGE UP (ref 27–34)
MCHC RBC-ENTMCNC: 31.1 GM/DL — LOW (ref 32–36)
MCV RBC AUTO: 97.5 FL — SIGNIFICANT CHANGE UP (ref 80–100)
MITOCHONDRIA AB SER-ACNC: SIGNIFICANT CHANGE UP
MONOCYTES # BLD AUTO: 0.25 K/UL — SIGNIFICANT CHANGE UP (ref 0–0.9)
MONOCYTES NFR BLD AUTO: 1 % — LOW (ref 2–14)
NEUTROPHILS # BLD AUTO: 24.88 K/UL — HIGH (ref 1.8–7.4)
NEUTROPHILS NFR BLD AUTO: 95 % — HIGH (ref 43–77)
NITRITE UR-MCNC: NEGATIVE — SIGNIFICANT CHANGE UP
NRBC # BLD: 0 /100 WBCS — SIGNIFICANT CHANGE UP (ref 0–0)
PH UR: 5.5 — SIGNIFICANT CHANGE UP (ref 5–8)
PHOSPHATE SERPL-MCNC: 2.7 MG/DL — SIGNIFICANT CHANGE UP (ref 2.5–4.5)
PLATELET # BLD AUTO: 421 K/UL — HIGH (ref 150–400)
POTASSIUM SERPL-MCNC: 3.8 MMOL/L — SIGNIFICANT CHANGE UP (ref 3.5–5.3)
POTASSIUM SERPL-MCNC: 4.4 MMOL/L — SIGNIFICANT CHANGE UP (ref 3.5–5.3)
POTASSIUM SERPL-SCNC: 3.8 MMOL/L — SIGNIFICANT CHANGE UP (ref 3.5–5.3)
POTASSIUM SERPL-SCNC: 4.4 MMOL/L — SIGNIFICANT CHANGE UP (ref 3.5–5.3)
PROT SERPL-MCNC: 5.2 G/DL — LOW (ref 6–8.3)
PROT UR-MCNC: 100 MG/DL
PROTHROM AB SERPL-ACNC: 36.7 SEC — HIGH (ref 10–12.9)
RBC # BLD: 2.8 M/UL — LOW (ref 4.2–5.8)
RBC # FLD: 17.1 % — HIGH (ref 10.3–14.5)
SMOOTH MUSCLE AB SER-ACNC: SIGNIFICANT CHANGE UP
SODIUM SERPL-SCNC: 146 MMOL/L — HIGH (ref 135–145)
SODIUM SERPL-SCNC: 148 MMOL/L — HIGH (ref 135–145)
SP GR SPEC: 1.02 — SIGNIFICANT CHANGE UP (ref 1–1.03)
UROBILINOGEN FLD QL: 0.2 E.U./DL — SIGNIFICANT CHANGE UP
WBC # BLD: 25.39 K/UL — HIGH (ref 3.8–10.5)
WBC # FLD AUTO: 25.39 K/UL — HIGH (ref 3.8–10.5)

## 2019-04-22 PROCEDURE — 71250 CT THORAX DX C-: CPT | Mod: 26

## 2019-04-22 PROCEDURE — 99291 CRITICAL CARE FIRST HOUR: CPT

## 2019-04-22 PROCEDURE — 74176 CT ABD & PELVIS W/O CONTRAST: CPT | Mod: 26

## 2019-04-22 PROCEDURE — 99232 SBSQ HOSP IP/OBS MODERATE 35: CPT

## 2019-04-22 PROCEDURE — 71045 X-RAY EXAM CHEST 1 VIEW: CPT | Mod: 26

## 2019-04-22 PROCEDURE — 99233 SBSQ HOSP IP/OBS HIGH 50: CPT | Mod: GC

## 2019-04-22 RX ORDER — ALBUMIN HUMAN 25 %
250 VIAL (ML) INTRAVENOUS ONCE
Qty: 0 | Refills: 0 | Status: COMPLETED | OUTPATIENT
Start: 2019-04-22 | End: 2019-04-22

## 2019-04-22 RX ORDER — ACETAMINOPHEN 500 MG
1000 TABLET ORAL ONCE
Qty: 0 | Refills: 0 | Status: COMPLETED | OUTPATIENT
Start: 2019-04-22 | End: 2019-04-22

## 2019-04-22 RX ORDER — IOHEXOL 300 MG/ML
30 INJECTION, SOLUTION INTRAVENOUS ONCE
Qty: 0 | Refills: 0 | Status: COMPLETED | OUTPATIENT
Start: 2019-04-22 | End: 2019-04-22

## 2019-04-22 RX ORDER — SODIUM CHLORIDE 9 MG/ML
1000 INJECTION, SOLUTION INTRAVENOUS
Qty: 0 | Refills: 0 | Status: DISCONTINUED | OUTPATIENT
Start: 2019-04-22 | End: 2019-04-23

## 2019-04-22 RX ORDER — MAGNESIUM SULFATE 500 MG/ML
2 VIAL (ML) INJECTION ONCE
Qty: 0 | Refills: 0 | Status: COMPLETED | OUTPATIENT
Start: 2019-04-22 | End: 2019-04-22

## 2019-04-22 RX ORDER — POTASSIUM CHLORIDE 20 MEQ
20 PACKET (EA) ORAL ONCE
Qty: 0 | Refills: 0 | Status: COMPLETED | OUTPATIENT
Start: 2019-04-22 | End: 2019-04-22

## 2019-04-22 RX ORDER — HEPARIN SODIUM 5000 [USP'U]/ML
1300 INJECTION INTRAVENOUS; SUBCUTANEOUS
Qty: 25000 | Refills: 0 | Status: DISCONTINUED | OUTPATIENT
Start: 2019-04-22 | End: 2019-04-23

## 2019-04-22 RX ADMIN — CHLORHEXIDINE GLUCONATE 1 APPLICATION(S): 213 SOLUTION TOPICAL at 05:55

## 2019-04-22 RX ADMIN — HEPARIN SODIUM 5000 UNIT(S): 5000 INJECTION INTRAVENOUS; SUBCUTANEOUS at 05:55

## 2019-04-22 RX ADMIN — Medication 81 MILLIGRAM(S): at 12:09

## 2019-04-22 RX ADMIN — Medication 500 MILLILITER(S): at 04:05

## 2019-04-22 RX ADMIN — ATORVASTATIN CALCIUM 80 MILLIGRAM(S): 80 TABLET, FILM COATED ORAL at 22:21

## 2019-04-22 RX ADMIN — NYSTATIN CREAM 1 APPLICATION(S): 100000 CREAM TOPICAL at 18:00

## 2019-04-22 RX ADMIN — CHLORHEXIDINE GLUCONATE 15 MILLILITER(S): 213 SOLUTION TOPICAL at 17:26

## 2019-04-22 RX ADMIN — MIDODRINE HYDROCHLORIDE 15 MILLIGRAM(S): 2.5 TABLET ORAL at 22:21

## 2019-04-22 RX ADMIN — PANTOPRAZOLE SODIUM 40 MILLIGRAM(S): 20 TABLET, DELAYED RELEASE ORAL at 12:09

## 2019-04-22 RX ADMIN — Medication 3.44 MICROGRAM(S)/KG/MIN: at 19:20

## 2019-04-22 RX ADMIN — HEPARIN SODIUM 5000 UNIT(S): 5000 INJECTION INTRAVENOUS; SUBCUTANEOUS at 13:40

## 2019-04-22 RX ADMIN — CEFEPIME 100 MILLIGRAM(S): 1 INJECTION, POWDER, FOR SOLUTION INTRAMUSCULAR; INTRAVENOUS at 17:25

## 2019-04-22 RX ADMIN — Medication 650 MILLIGRAM(S): at 13:00

## 2019-04-22 RX ADMIN — FENTANYL CITRATE 12.5 MICROGRAM(S): 50 INJECTION INTRAVENOUS at 12:00

## 2019-04-22 RX ADMIN — HEPARIN SODIUM 13 UNIT(S)/HR: 5000 INJECTION INTRAVENOUS; SUBCUTANEOUS at 20:54

## 2019-04-22 RX ADMIN — FENTANYL CITRATE 12.5 MICROGRAM(S): 50 INJECTION INTRAVENOUS at 12:15

## 2019-04-22 RX ADMIN — Medication 20 MILLIEQUIVALENT(S): at 13:40

## 2019-04-22 RX ADMIN — Medication 250 MILLILITER(S): at 04:51

## 2019-04-22 RX ADMIN — NYSTATIN CREAM 1 APPLICATION(S): 100000 CREAM TOPICAL at 05:56

## 2019-04-22 RX ADMIN — Medication 50 GRAM(S): at 13:40

## 2019-04-22 RX ADMIN — Medication 125 MILLILITER(S): at 17:26

## 2019-04-22 RX ADMIN — Medication 650 MILLIGRAM(S): at 12:12

## 2019-04-22 RX ADMIN — Medication 250 MILLIGRAM(S): at 12:08

## 2019-04-22 RX ADMIN — Medication 50 MILLILITER(S): at 07:42

## 2019-04-22 RX ADMIN — Medication 2: at 23:02

## 2019-04-22 RX ADMIN — IOHEXOL 30 MILLILITER(S): 300 INJECTION, SOLUTION INTRAVENOUS at 13:50

## 2019-04-22 RX ADMIN — Medication 100 MILLIGRAM(S): at 18:00

## 2019-04-22 RX ADMIN — SODIUM CHLORIDE 70 MILLILITER(S): 9 INJECTION, SOLUTION INTRAVENOUS at 13:41

## 2019-04-22 RX ADMIN — FENTANYL CITRATE 12.5 MICROGRAM(S): 50 INJECTION INTRAVENOUS at 06:01

## 2019-04-22 RX ADMIN — Medication 50 MILLILITER(S): at 23:01

## 2019-04-22 RX ADMIN — MIDODRINE HYDROCHLORIDE 15 MILLIGRAM(S): 2.5 TABLET ORAL at 05:55

## 2019-04-22 RX ADMIN — Medication 100 MILLIGRAM(S): at 10:45

## 2019-04-22 RX ADMIN — CEFEPIME 100 MILLIGRAM(S): 1 INJECTION, POWDER, FOR SOLUTION INTRAMUSCULAR; INTRAVENOUS at 05:55

## 2019-04-22 RX ADMIN — CHLORHEXIDINE GLUCONATE 15 MILLILITER(S): 213 SOLUTION TOPICAL at 05:55

## 2019-04-22 RX ADMIN — FENTANYL CITRATE 12.5 MICROGRAM(S): 50 INJECTION INTRAVENOUS at 06:20

## 2019-04-22 RX ADMIN — ZINC SULFATE TAB 220 MG (50 MG ZINC EQUIVALENT) 220 MILLIGRAM(S): 220 (50 ZN) TAB at 12:09

## 2019-04-22 RX ADMIN — MIDODRINE HYDROCHLORIDE 15 MILLIGRAM(S): 2.5 TABLET ORAL at 13:40

## 2019-04-22 RX ADMIN — Medication 3.44 MICROGRAM(S)/KG/MIN: at 02:42

## 2019-04-22 RX ADMIN — Medication 100 MILLIGRAM(S): at 01:36

## 2019-04-22 RX ADMIN — Medication 50 MILLILITER(S): at 15:19

## 2019-04-22 RX ADMIN — Medication 1000 MILLILITER(S): at 19:51

## 2019-04-22 RX ADMIN — Medication 50 MILLILITER(S): at 00:11

## 2019-04-22 NOTE — PROGRESS NOTE ADULT - ASSESSMENT
85 yo M with history of b/l laparoscopic robotic-assisted inguinal hernia repair presenting with b/l segmental pulmonary embolisms, left lung abscess, pneumoperitoneum, and distal descending colon abscess s/p sigoidectomy significant iliofemoral DVT burden, s/p IVCF placement with concern for liver related thromboembolic disease in the absence of cirrhosis.     #Ascites, hypoalbuminemia, thrombocytopenia -- ? Liver disease  - prolonged PT/INR with normal PTT w/ concern for a liver disease  -- SAAG 1.5 , ascitic pro < 2.5  (ddx cirrhosis, portal HTN, budd-chiari) - please obtain US with /dopplers to r/o budd chiarai  - factor shows low factor 2, 5, 7, 10, 12, but normal factor 9  - LTs WNL - please recheck   - No evidence of cirrhosis on imaging or during laproscopic evaluation   - Follow up JOANN, ASMA, AMA, anti LKM, IgG  - Alpha 1 anti-trypsin wnl, ceruloplasmin low- please check 24 hour urine copper  - Hep A/B/C neg  - iron studies not c/w hemochromatosis  -lipid wnl, A1c wnl 87 yo M with history of b/l laparoscopic robotic-assisted inguinal hernia repair presenting with b/l segmental pulmonary embolisms, left lung abscess, pneumoperitoneum, and distal descending colon abscess s/p sigoidectomy significant iliofemoral DVT burden, s/p IVCF placement with concern for liver related thromboembolic disease in the absence of cirrhosis.     #Ascites, hypoalbuminemia, thrombocytopenia -- ? Liver disease  - prolonged PT/INR with normal PTT w/ concern for a liver disease  -- SAAG 1.5 , ascitic pro < 2.5  (ddx cirrhosis, portal HTN, budd-chiari) -  US with /dopplers shows no evidence of budd chiarai  - factor shows low factor 2, 5, 7, 10, 12, but normal factor 9  - LTs WNL - please recheck   - No evidence of cirrhosis on imaging or during laproscopic evaluation   - Follow up JOANN, ASMA, AMA, anti LKM, IgG  - Alpha 1 anti-trypsin wnl, ceruloplasmin low- please check 24 hour urine copper  - Hep A/B/C neg  - iron studies not c/w hemochromatosis  -lipid wnl, A1c wnl 87 yo M with history of b/l laparoscopic robotic-assisted inguinal hernia repair presenting with b/l segmental pulmonary embolisms, left lung abscess, pneumoperitoneum, and distal descending colon abscess s/p sigoidectomy significant iliofemoral DVT burden, s/p IVCF placement with concern for liver related thromboembolic disease in the absence of cirrhosis.     #Ascites, hypoalbuminemia, thrombocytopenia -- ? Liver disease  - prolonged PT/INR with normal PTT w/ concern for a liver disease  -- SAAG 1.5 , ascitic pro < 2.5  (ddx cirrhosis, portal HTN, budd-chiari) -  US with /dopplers shows no evidence of budd chiarai  - factor shows low factor 2, 5, 7, 10, 12, but normal factor 9  - LTs WNL - please recheck   - No evidence of cirrhosis on imaging or during laproscopic evaluation   -ASMA, AMA, neg  - Follow up JOANN, anti LKM, IgG  - Alpha 1 anti-trypsin wnl, ceruloplasmin low- please check 24 hour urine copper  - Hep A/B/C neg  - iron studies not c/w hemochromatosis  -lipid wnl, A1c wnl

## 2019-04-22 NOTE — PROGRESS NOTE ADULT - SUBJECTIVE AND OBJECTIVE BOX
INTERVAL HISTORY: patient extubated and reintubated during the weekend still on Levophed.   	  MEDICATIONS:  metolazone 5 milliGRAM(s) Oral <User Schedule>  midodrine 15 milliGRAM(s) Oral every 8 hours  norepinephrine Infusion 0.05 MICROgram(s)/kG/Min IV Continuous <Continuous>    cefepime   IVPB 2000 milliGRAM(s) IV Intermittent every 12 hours  metroNIDAZOLE  IVPB 500 milliGRAM(s) IV Intermittent every 8 hours  metroNIDAZOLE  IVPB      vancomycin  IVPB 1000 milliGRAM(s) IV Intermittent daily      acetaminophen    Suspension .. 650 milliGRAM(s) Enteral Tube every 6 hours PRN  fentaNYL    Injectable 12.5 MICROGram(s) IV Push every 3 hours PRN  fentaNYL    Injectable 25 MICROGram(s) IV Push every 3 hours PRN  ondansetron Injectable 4 milliGRAM(s) IV Push every 6 hours PRN    pantoprazole  Injectable 40 milliGRAM(s) IV Push daily    atorvastatin 80 milliGRAM(s) Oral at bedtime  dextrose 40% Gel 15 Gram(s) Oral once PRN  dextrose 50% Injectable 12.5 Gram(s) IV Push once  dextrose 50% Injectable 25 Gram(s) IV Push once  dextrose 50% Injectable 25 Gram(s) IV Push once  glucagon  Injectable 1 milliGRAM(s) IntraMuscular once PRN  insulin lispro (HumaLOG) corrective regimen sliding scale   SubCutaneous every 6 hours    albumin human 25% IVPB 50 milliLiter(s) IV Intermittent every 8 hours  ascorbic acid Syrup 250 milliGRAM(s) Oral daily  aspirin  chewable 81 milliGRAM(s) Oral daily  chlorhexidine 0.12% Liquid 15 milliLiter(s) Oral Mucosa two times a day  chlorhexidine 2% Cloths 1 Application(s) Topical <User Schedule>  dextrose 5%. 1000 milliLiter(s) IV Continuous <Continuous>  dextrose 5%. 1000 milliLiter(s) IV Continuous <Continuous>  heparin  flush 100 Units/mL Injectable 100 Unit(s) IV Push every other day  heparin  Injectable 5000 Unit(s) SubCutaneous every 8 hours  nystatin Powder 1 Application(s) Topical two times a day  zinc sulfate 220 milliGRAM(s) Oral daily      PHYSICAL EXAM:  T(C): 38.6 (04-22-19 @ 11:16), Max: 38.6 (04-22-19 @ 11:16)  HR: 80 (04-22-19 @ 14:00) (80 - 130)  BP: 106/53 (04-22-19 @ 14:00) (84/58 - 137/68)  RR: 16 (04-22-19 @ 14:00) (16 - 40)  SpO2: 95% (04-22-19 @ 14:00) (92% - 100%)  Wt(kg): --  I&O's Summary    21 Apr 2019 07:01  -  22 Apr 2019 07:00  --------------------------------------------------------  IN: 2747 mL / OUT: 2437 mL / NET: 310 mL    22 Apr 2019 07:01  -  22 Apr 2019 14:51  --------------------------------------------------------  IN: 1642 mL / OUT: 235 mL / NET: 1407 mL          Appearance: sedated  HEENT: intubated	     Cardiovascular: Normal S1 S2,   Respiratory: b/l rhonchi, limited exam     Gastrointestinal:  Soft, Non-tender, + BS	wound vac in place     Neurologic: Non-focal  Extremities: anasarca, RUE with significant edema    LABS:	 	  CARDIAC MARKERS:                                8.5    25.39 )-----------( 421      ( 22 Apr 2019 05:23 )             27.3     04-22    148<H>  |  110<H>  |  37<H>  ----------------------------<  122<H>  3.8   |  26  |  0.76    Ca    8.1<L>      22 Apr 2019 05:23  Phos  2.7     04-22  Mg     1.8     04-22    TPro  5.2<L>  /  Alb  3.3  /  TBili  0.5  /  DBili  0.2  /  AST  17  /  ALT  5<L>  /  AlkPhos  30<L>  04-22       ASSESSMENT/PLAN:     86M w/PMHx CAD s/p mult PCI (last 2012; KIRSTY mLAD, dRCA, pOM2), HTN, HLD, recent b/l lap RA b/l inguinal hernia repair (3/11) initially sent in from [facility] for R inguinal hernia discomfort. Found to have 6x4cm R groin fluid collection, 10x5cm abscess in LLQ w/assoc pneumoperitoneum, L lung empyema, b/l segmental PE, and DVT in b/l CF, L common iliac and internal iliac veins. Underwent exlap w/drainage of 3L ascites, LAURYN, SB resxn w/primary anastomosis, sigmoidectomy (left in discontinuity), abd packing, and drain/VAC placement on 4/7, followed by end-colostomy creation and fascial closure on 4/10. S/p IVCF on 4/5, initially on hep gtt for DVT/PE, however d/c on 4/7 due to intra-op bleeding and not restarted. Home DAPT held since admission. ID following for mult-org induced septic shock. CTSx following for LL empyema. Noted incidentally to have EKG changes, cards c/s for further recs.    elevated troponin, resolved, hx of CAD s/p multiple PCIs in the past, he does not require DAPT for now to continue metolazone 5mg daily, lasix PRN, monitor electrolytes      - continue aspirin statin,   -still on Levophed will start metoprolol once hemodynamically stable,    -keep hg >8.0 given his ischemic history.     -monitor electrolytes, K+ >4.0 mg>2.0      will continue to follow with Dr. Corbin.

## 2019-04-22 NOTE — PROGRESS NOTE ADULT - ASSESSMENT
87 yo M with history of b/l laparoscopic robotic-assisted inguinal hernia repair presenting with b/l segmental pulmonary embolisms, left lung abscess, pneumoperitoneum, and distal descending colon abscess (likely source of pneumoperitoneum), significant iliofemoral DVT burden, s/p IVCF placement and IR drainage of LLQ abdominal collection (4/5), s/p ex-lap, LAURYN, sigmoidectomy, drain placement in R. paracolic gutter, and abthera vac placement (4/7), s/p planned RTOR, packing removal, and end colostomy creation (4/10), now w/ MRSA + proteus PNA, persistent coagulopathy, LLL pulmonary empyema s/p bedside pigtail drainage (4/15), s/p extubation c/b vomiting and aspiration, reintubated (4/21).     Neuro: Fentanyl prn  CV: levo requirements increasing; MAP goal >65; midodrine 15q8h; albumin 25%q8hrs, less hypervolemic and decreasing UOP in setting of likely septic process; hold lasix; asa 81 lipitor, statin, metolazone tid  Pulm: re-intubated 4/21, on assist control. bilateral PE's; Empyema- s/p left CTx on 4/15  GI: NPO, dobhoff glucerna 1.5@50/hr; malnourished, Vit C, zinc; RUQ US W/Doppler wnl  : rao. 24hr urine for copper.   ID: intermittent fevers and worsening leukocytosis; repeat CT C/A/P; likely new aspiration PNA vs new intra-abdominal pathology; +kleb, proteus, strep in abd Cx, flagyl (4/9-4/20), cefepime (4/9-4/20); Sputum cx: MRSA and proteus (sensitive to cefepime) , vancomycin (4/10-5/4) ///Dc'd: ceftriaxone (4/9-4/9)// unasyn (4/8-4/9),  Vanco (4/5-4/7), Zosyn (4/5-4/8)]  Endo: ISS  Heme: bilateral iliofemoral DVT / bilateral PE; s/p IVC Filter; elevated INR; Mixing study wnr; factors; + APAS; restarted on anticoagulation w. heparin gtt 4/22  PPx: SCDs; SQH  Lines: PIVs, PICC (4/6--), L A line (4/13--) /// D/C: R cordis (4/7-4/11)  Wounds/drains: LLQ IR drain (4/5 -- ), LUQ PHYLLIS (4/7 -- ), R paracolic gutter drain (4/7 --) --- all drains to LIWS; abdominal incision wet to dry;  PT/OT: early mob 4/11 - worked with PT. 87 yo M with history of b/l laparoscopic robotic-assisted inguinal hernia repair presenting with b/l segmental pulmonary embolisms, left lung abscess, pneumoperitoneum, and distal descending colon abscess (likely source of pneumoperitoneum), significant iliofemoral DVT burden, s/p IVCF placement and IR drainage of LLQ abdominal collection (4/5), s/p ex-lap, LAURYN, sigmoidectomy, drain placement in R. paracolic gutter, and abthera vac placement (4/7), s/p planned RTOR, packing removal, and end colostomy creation (4/10), now w/ MRSA + proteus PNA, persistent coagulopathy, LLL pulmonary empyema s/p bedside pigtail drainage (4/15), s/p extubation c/b vomiting and aspiration, reintubated (4/21).     Neuro: Fentanyl prn  CV: levo requirements increasing; MAP goal >65; midodrine 15q8h; albumin 25%q8hrs, may be intravascularly depleted now with decreasing UOP in setting of likely septic process; hold lasix; asa 81 lipitor, statin,   Pulm: re-intubated 4/21, on assist control. bilateral PE's; Empyema- s/p left CTx on 4/15  GI: NPO, dobhoff glucerna 1.5@50/hr; malnourished, Vit C, zinc; RUQ US W/Doppler wnl  : rao. 24hr urine for copper.   ID: intermittent fevers and worsening leukocytosis; repeat CT C/A/P; likely new aspiration PNA vs new intra-abdominal pathology; +kleb, proteus, strep in abd Cx, flagyl (4/9-4/20), cefepime (4/9-4/20); Sputum cx: MRSA and proteus (sensitive to cefepime) , vancomycin (4/10-5/4) ///Dc'd: ceftriaxone (4/9-4/9)// unasyn (4/8-4/9),  Vanco (4/5-4/7), Zosyn (4/5-4/8)]  Endo: ISS  Heme: bilateral iliofemoral DVT / bilateral PE; s/p IVC Filter; elevated INR; Mixing study wnr; factors; + APAS; restarted on anticoagulation w. heparin gtt 4/22  PPx: SCDs; SQH  Lines: PIVs, PICC (4/6--), L A line (4/13--) /// D/C: R cordis (4/7-4/11)  Wounds/drains: LLQ IR drain (4/5 -- ), LUQ PHYLLIS (4/7 -- ), R paracolic gutter drain (4/7 --) --- all drains to LIWS; abdominal incision wet to dry;  PT/OT: early mob 4/11 - worked with PT.

## 2019-04-22 NOTE — PROGRESS NOTE ADULT - PROBLEM SELECTOR PLAN 1
His chest tube still has minimal drainage with now a consistent air leak. Even with intermittent periods of air leak, we had planned to remove the chest tube pending a repeat CT Scan. However, in light of his recent aspiration complication, would wait another 24-48h until his hemodynamics further improve. Can still get the CT scan though for further evaluation. Bedside US today showed no fluid pocket    Recommend  - Continue to drain chest tube on intermittent low suction  - Chest CT scan to be repeated today.  - Antibiotics to continue per cultures

## 2019-04-22 NOTE — PROGRESS NOTE ADULT - SUBJECTIVE AND OBJECTIVE BOX
Interval History:   ON: vomited, no abdominal distension, no TF residual. re-intubated for aspiration w/ hypoxia and tachypnea, T 100.9/tachy/lowUOP/increased CR, given 500cc 5% albumin with improvement  4/21: lasix 40 @ 10am and 5pm and metolazone 5, Na 148 - added free water. on high flow,     SUBJECTIVE:         Vitals - Range in last 12h  T(F): , Max: 99.1 (04-22-19 @ 05:32)  HR:  (90 - 130)  BP:  (102/58 - 137/68)  BP(mean):  (77 - 98)  ABP:  (100/44 - 132/60)  ABP(mean):  (64 - 82)  RR:  (16 - 30)  SpO2:  (97% - 100%)  CVP(mm Hg): --  CVP(cm H2O): --  CO: --  CI: --  PA: --  PA(mean): --  PA(direct): --  PCWP: --    Vitals - Most Recent  T(F): 99.1 (04-22-19 @ 05:32)  HR: 96 (04-22-19 @ 07:00)  BP: 124/69 (04-22-19 @ 07:00)  RR: 23 (04-22-19 @ 07:00)  SpO2: 97% (04-22-19 @ 07:00)  I&O's Detail    21 Apr 2019 07:01  -  22 Apr 2019 07:00  --------------------------------------------------------  IN:    Albumin 25%: 150 mL    Albumin 5%  - 250 mL: 500 mL    Enteral Tube Flush: 540 mL    Free Water: 250 mL    Glucerna 1.5: 500 mL    IV PiggyBack: 500 mL    norepinephrine Infusion: 307 mL  Total IN: 2747 mL    OUT:    Chest Tube: 25 mL    Colostomy: 875 mL    Indwelling Catheter - Urethral: 1537 mL  Total OUT: 2437 mL    Total NET: 310 mL        Mode: AC/ CMV (Assist Control/ Continuous Mandatory Ventilation)  RR (machine): 12  RR (patient): 20  TV (machine): 500  TV (patient): 483  FiO2: 60  PEEP: 5  ITime: 1  MAP: 9.1  PIP: 20      Physical Exam          LABS:                        8.5    25.39 )-----------( 421      ( 22 Apr 2019 05:23 )             27.3     04-22    148<H>  |  110<H>  |  37<H>  ----------------------------<  122<H>  3.8   |  26  |  0.76    Ca    8.1<L>      22 Apr 2019 05:23  Phos  2.7     04-22  Mg     1.8     04-22        PT/INR - ( 22 Apr 2019 05:23 )   PT: 36.7 sec;   INR: 3.14          PTT - ( 22 Apr 2019 05:23 )  PTT:37.1 sec        MEDICATIONS  (STANDING):  albumin human 25% IVPB 50 milliLiter(s) IV Intermittent every 8 hours  ascorbic acid Syrup 250 milliGRAM(s) Oral daily  aspirin  chewable 81 milliGRAM(s) Oral daily  atorvastatin 80 milliGRAM(s) Oral at bedtime  cefepime   IVPB 2000 milliGRAM(s) IV Intermittent every 12 hours  chlorhexidine 0.12% Liquid 15 milliLiter(s) Oral Mucosa two times a day  chlorhexidine 2% Cloths 1 Application(s) Topical <User Schedule>  dextrose 5%. 1000 milliLiter(s) (50 mL/Hr) IV Continuous <Continuous>  dextrose 50% Injectable 12.5 Gram(s) IV Push once  dextrose 50% Injectable 25 Gram(s) IV Push once  dextrose 50% Injectable 25 Gram(s) IV Push once  heparin  flush 100 Units/mL Injectable 100 Unit(s) IV Push every other day  heparin  Injectable 5000 Unit(s) SubCutaneous every 8 hours  insulin lispro (HumaLOG) corrective regimen sliding scale   SubCutaneous every 6 hours  metolazone 5 milliGRAM(s) Oral <User Schedule>  metroNIDAZOLE  IVPB 500 milliGRAM(s) IV Intermittent every 8 hours  metroNIDAZOLE  IVPB      midodrine 15 milliGRAM(s) Oral every 8 hours  norepinephrine Infusion 0.05 MICROgram(s)/kG/Min (3.436 mL/Hr) IV Continuous <Continuous>  nystatin Powder 1 Application(s) Topical two times a day  pantoprazole  Injectable 40 milliGRAM(s) IV Push daily  vancomycin  IVPB 1000 milliGRAM(s) IV Intermittent daily  zinc sulfate 220 milliGRAM(s) Oral daily    MEDICATIONS  (PRN):  acetaminophen    Suspension .. 650 milliGRAM(s) Enteral Tube every 6 hours PRN Temp greater or equal to 38C (100.4F)  dextrose 40% Gel 15 Gram(s) Oral once PRN Blood Glucose LESS THAN 70 milliGRAM(s)/deciliter  fentaNYL    Injectable 12.5 MICROGram(s) IV Push every 3 hours PRN Moderate Pain (4 - 6)  fentaNYL    Injectable 25 MICROGram(s) IV Push every 3 hours PRN Severe Pain (7 - 10)  glucagon  Injectable 1 milliGRAM(s) IntraMuscular once PRN Glucose LESS THAN 70 milligrams/deciliter  ondansetron Injectable 4 milliGRAM(s) IV Push every 6 hours PRN Nausea      RADIOLOGY & ADDITIONAL STUDIES: Interval History:   ON: vomited, no abdominal distension, no TF residual. re-intubated for aspiration w/ hypoxia and tachypnea, T 100.9/tachy/lowUOP/increased CR, given 500cc 5% albumin with improvement  4/21: lasix 40 @ 10am and 5pm and metolazone 5, Na 148 - added free water. on high flow,     SUBJECTIVE:   Patient seen at bedside. Intubated,       Vitals - Range in last 12h  T(F): , Max: 99.1 (04-22-19 @ 05:32)  HR:  (90 - 130)  BP:  (102/58 - 137/68)  BP(mean):  (77 - 98)  ABP:  (100/44 - 132/60)  ABP(mean):  (64 - 82)  RR:  (16 - 30)  SpO2:  (97% - 100%)  CVP(mm Hg): --  CVP(cm H2O): --  CO: --  CI: --  PA: --  PA(mean): --  PA(direct): --  PCWP: --    Vitals - Most Recent  T(F): 99.1 (04-22-19 @ 05:32)  HR: 96 (04-22-19 @ 07:00)  BP: 124/69 (04-22-19 @ 07:00)  RR: 23 (04-22-19 @ 07:00)  SpO2: 97% (04-22-19 @ 07:00)  I&O's Detail    21 Apr 2019 07:01  -  22 Apr 2019 07:00  --------------------------------------------------------  IN:    Albumin 25%: 150 mL    Albumin 5%  - 250 mL: 500 mL    Enteral Tube Flush: 540 mL    Free Water: 250 mL    Glucerna 1.5: 500 mL    IV PiggyBack: 500 mL    norepinephrine Infusion: 307 mL  Total IN: 2747 mL    OUT:    Chest Tube: 25 mL    Colostomy: 875 mL    Indwelling Catheter - Urethral: 1537 mL  Total OUT: 2437 mL    Total NET: 310 mL        Mode: AC/ CMV (Assist Control/ Continuous Mandatory Ventilation)  RR (machine): 12  RR (patient): 20  TV (machine): 500  TV (patient): 483  FiO2: 60  PEEP: 5  ITime: 1  MAP: 9.1  PIP: 20      Physical Exam          LABS:                        8.5    25.39 )-----------( 421      ( 22 Apr 2019 05:23 )             27.3     04-22    148<H>  |  110<H>  |  37<H>  ----------------------------<  122<H>  3.8   |  26  |  0.76    Ca    8.1<L>      22 Apr 2019 05:23  Phos  2.7     04-22  Mg     1.8     04-22        PT/INR - ( 22 Apr 2019 05:23 )   PT: 36.7 sec;   INR: 3.14          PTT - ( 22 Apr 2019 05:23 )  PTT:37.1 sec        MEDICATIONS  (STANDING):  albumin human 25% IVPB 50 milliLiter(s) IV Intermittent every 8 hours  ascorbic acid Syrup 250 milliGRAM(s) Oral daily  aspirin  chewable 81 milliGRAM(s) Oral daily  atorvastatin 80 milliGRAM(s) Oral at bedtime  cefepime   IVPB 2000 milliGRAM(s) IV Intermittent every 12 hours  chlorhexidine 0.12% Liquid 15 milliLiter(s) Oral Mucosa two times a day  chlorhexidine 2% Cloths 1 Application(s) Topical <User Schedule>  dextrose 5%. 1000 milliLiter(s) (50 mL/Hr) IV Continuous <Continuous>  dextrose 50% Injectable 12.5 Gram(s) IV Push once  dextrose 50% Injectable 25 Gram(s) IV Push once  dextrose 50% Injectable 25 Gram(s) IV Push once  heparin  flush 100 Units/mL Injectable 100 Unit(s) IV Push every other day  heparin  Injectable 5000 Unit(s) SubCutaneous every 8 hours  insulin lispro (HumaLOG) corrective regimen sliding scale   SubCutaneous every 6 hours  metolazone 5 milliGRAM(s) Oral <User Schedule>  metroNIDAZOLE  IVPB 500 milliGRAM(s) IV Intermittent every 8 hours  metroNIDAZOLE  IVPB      midodrine 15 milliGRAM(s) Oral every 8 hours  norepinephrine Infusion 0.05 MICROgram(s)/kG/Min (3.436 mL/Hr) IV Continuous <Continuous>  nystatin Powder 1 Application(s) Topical two times a day  pantoprazole  Injectable 40 milliGRAM(s) IV Push daily  vancomycin  IVPB 1000 milliGRAM(s) IV Intermittent daily  zinc sulfate 220 milliGRAM(s) Oral daily    MEDICATIONS  (PRN):  acetaminophen    Suspension .. 650 milliGRAM(s) Enteral Tube every 6 hours PRN Temp greater or equal to 38C (100.4F)  dextrose 40% Gel 15 Gram(s) Oral once PRN Blood Glucose LESS THAN 70 milliGRAM(s)/deciliter  fentaNYL    Injectable 12.5 MICROGram(s) IV Push every 3 hours PRN Moderate Pain (4 - 6)  fentaNYL    Injectable 25 MICROGram(s) IV Push every 3 hours PRN Severe Pain (7 - 10)  glucagon  Injectable 1 milliGRAM(s) IntraMuscular once PRN Glucose LESS THAN 70 milligrams/deciliter  ondansetron Injectable 4 milliGRAM(s) IV Push every 6 hours PRN Nausea      RADIOLOGY & ADDITIONAL STUDIES: Interval History:   ON: vomited, no abdominal distension, no TF residual. re-intubated for aspiration w/ hypoxia and tachypnea, T 100.9/tachy/lowUOP/increased CR, given 500cc 5% albumin with improvement  4/21: lasix 40 @ 10am and 5pm and metolazone 5, Na 148 - added free water. on high flow,     SUBJECTIVE:   Patient seen at bedside. Intubated, opens eyes to verbal stimuli, but did not follow commands. ROS not obtainable.       Vitals - Range in last 12h  T(F): , Max: 99.1 (04-22-19 @ 05:32)  HR:  (90 - 130)  BP:  (102/58 - 137/68)  BP(mean):  (77 - 98)  ABP:  (100/44 - 132/60)  ABP(mean):  (64 - 82)  RR:  (16 - 30)  SpO2:  (97% - 100%)  CVP(mm Hg): --  CVP(cm H2O): --  CO: --  CI: --  PA: --  PA(mean): --  PA(direct): --  PCWP: --    Vitals - Most Recent  T(F): 99.1 (04-22-19 @ 05:32)  HR: 96 (04-22-19 @ 07:00)  BP: 124/69 (04-22-19 @ 07:00)  RR: 23 (04-22-19 @ 07:00)  SpO2: 97% (04-22-19 @ 07:00)  I&O's Detail    21 Apr 2019 07:01  -  22 Apr 2019 07:00  --------------------------------------------------------  IN:    Albumin 25%: 150 mL    Albumin 5%  - 250 mL: 500 mL    Enteral Tube Flush: 540 mL    Free Water: 250 mL    Glucerna 1.5: 500 mL    IV PiggyBack: 500 mL    norepinephrine Infusion: 307 mL  Total IN: 2747 mL    OUT:    Chest Tube: 25 mL    Colostomy: 875 mL    Indwelling Catheter - Urethral: 1537 mL  Total OUT: 2437 mL    Total NET: 310 mL        Mode: AC/ CMV (Assist Control/ Continuous Mandatory Ventilation)  RR (machine): 12  RR (patient): 20  TV (machine): 500  TV (patient): 483  FiO2: 60  PEEP: 5  ITime: 1  MAP: 9.1  PIP: 20      Physical Exam  Neuro: intubated, alert but non-verbal, lethargic, did not follow commands  General: no acute distress, breathing nonlabored on vent but mildly tachypneic  HEENT: PERRL, EOMI, MMD, dobhoff in place w/ TFs  Pulm: intubated, on vent-AC; chest tube in place L posterior chest wall w/ gray output; clear breath sounds on R, mild rhonchi and expiratory wheezing on L  C/V: S1S2, non-tachycardic   Abd: nondistended, but firm; TTP w/ mild guarding; LLQ IR and LUQ PHYLLIS drain w/ clear brown ascites output; R paracolic gutter drain w/ clear brown output; abdominal incision w/ wet to dry dressing in place; no active bleeding, no purulence   Extremities: significant improvement in edema/ansarca of lower extremities; persistent edema of upper extremity forearms   : rao in place; scrotal edema resolved  Skin: mild blanching erythema on thighs, no macerations        LABS:                        8.5    25.39 )-----------( 421      ( 22 Apr 2019 05:23 )             27.3     04-22    148<H>  |  110<H>  |  37<H>  ----------------------------<  122<H>  3.8   |  26  |  0.76    Ca    8.1<L>      22 Apr 2019 05:23  Phos  2.7     04-22  Mg     1.8     04-22        PT/INR - ( 22 Apr 2019 05:23 )   PT: 36.7 sec;   INR: 3.14          PTT - ( 22 Apr 2019 05:23 )  PTT:37.1 sec        MEDICATIONS  (STANDING):  albumin human 25% IVPB 50 milliLiter(s) IV Intermittent every 8 hours  ascorbic acid Syrup 250 milliGRAM(s) Oral daily  aspirin  chewable 81 milliGRAM(s) Oral daily  atorvastatin 80 milliGRAM(s) Oral at bedtime  cefepime   IVPB 2000 milliGRAM(s) IV Intermittent every 12 hours  chlorhexidine 0.12% Liquid 15 milliLiter(s) Oral Mucosa two times a day  chlorhexidine 2% Cloths 1 Application(s) Topical <User Schedule>  dextrose 5%. 1000 milliLiter(s) (50 mL/Hr) IV Continuous <Continuous>  dextrose 50% Injectable 12.5 Gram(s) IV Push once  dextrose 50% Injectable 25 Gram(s) IV Push once  dextrose 50% Injectable 25 Gram(s) IV Push once  heparin  flush 100 Units/mL Injectable 100 Unit(s) IV Push every other day  heparin  Injectable 5000 Unit(s) SubCutaneous every 8 hours  insulin lispro (HumaLOG) corrective regimen sliding scale   SubCutaneous every 6 hours  metolazone 5 milliGRAM(s) Oral <User Schedule>  metroNIDAZOLE  IVPB 500 milliGRAM(s) IV Intermittent every 8 hours  metroNIDAZOLE  IVPB      midodrine 15 milliGRAM(s) Oral every 8 hours  norepinephrine Infusion 0.05 MICROgram(s)/kG/Min (3.436 mL/Hr) IV Continuous <Continuous>  nystatin Powder 1 Application(s) Topical two times a day  pantoprazole  Injectable 40 milliGRAM(s) IV Push daily  vancomycin  IVPB 1000 milliGRAM(s) IV Intermittent daily  zinc sulfate 220 milliGRAM(s) Oral daily    MEDICATIONS  (PRN):  acetaminophen    Suspension .. 650 milliGRAM(s) Enteral Tube every 6 hours PRN Temp greater or equal to 38C (100.4F)  dextrose 40% Gel 15 Gram(s) Oral once PRN Blood Glucose LESS THAN 70 milliGRAM(s)/deciliter  fentaNYL    Injectable 12.5 MICROGram(s) IV Push every 3 hours PRN Moderate Pain (4 - 6)  fentaNYL    Injectable 25 MICROGram(s) IV Push every 3 hours PRN Severe Pain (7 - 10)  glucagon  Injectable 1 milliGRAM(s) IntraMuscular once PRN Glucose LESS THAN 70 milligrams/deciliter  ondansetron Injectable 4 milliGRAM(s) IV Push every 6 hours PRN Nausea      RADIOLOGY & ADDITIONAL STUDIES:    < from: US Abdomen Doppler (04.21.19 @ 14:10) >    INDICATION: Ascites. Coagulopathy. Hypoalbuminemia. Thrombocytopenia.   Evaluate for Budd-Chiari as etiology for liver disease.    PRIOR STUDIES: Abdominal ultrasound from 4/16/2019. CT of the abdomen and   pelvis from 4/5/2019.    FINDINGS:     Liver: Normal echogenicity with no visible focal abnormality. The liver   is mildly enlarged, measuring 17.7 cm.  There is exaggerated to-and-fro   flow within the hepatic veins, which are patent. The visualized portions   of the portal veins are patent.    Intrahepatic ducts: Not dilated.    Common bile duct: Normal diameter, measuring 0.5 cm.    Gallbladder: There are gallstone within a contracted gallbladder, similar   to prior exam.. No wall thickening or pericholecystic fluid.    Pancreas: The visualized portions were normal in appearance.      Abdominal aorta: The visualized portions were unremarkable.    Inferior vena cava: The visualized portions were normal in appearance.    Right kidney: Increased echogenicity, which can be seen in medical renal   disease. No focal lesions. Length of 11.2 cm.    Ascites: Small.    Also noted: Right pleural effusion.    IMPRESSION:  1.  No evidence of portal or hepatic vein thrombosis.  Exaggerated   to-and-fro flow within the hepatic veins, which can be seen in congestive   hepatopathy.  2.  Hepatomegaly.  3.  Cholelithiasis.  4.  Rightpleural effusion and small ascites.    < end of copied text > Interval History:   ON: vomited, no abdominal distension, no TF residual. re-intubated for aspiration w/ hypoxia and tachypnea, T 100.9/tachy/lowUOP/increased CR, given 500cc 5% albumin with improvement  4/21: lasix 40 @ 10am and 5pm and metolazone 5, Na 148 - added free water.     SUBJECTIVE:   Patient seen at bedside. Intubated, opens eyes to verbal stimuli, but did not follow commands. ROS not obtainable.       Vitals - Range in last 12h  T(F): , Max: 99.1 (04-22-19 @ 05:32)  HR:  (90 - 130)  BP:  (102/58 - 137/68)  BP(mean):  (77 - 98)  ABP:  (100/44 - 132/60)  ABP(mean):  (64 - 82)  RR:  (16 - 30)  SpO2:  (97% - 100%)  CVP(mm Hg): --  CVP(cm H2O): --  CO: --  CI: --  PA: --  PA(mean): --  PA(direct): --  PCWP: --    Vitals - Most Recent  T(F): 99.1 (04-22-19 @ 05:32)  HR: 96 (04-22-19 @ 07:00)  BP: 124/69 (04-22-19 @ 07:00)  RR: 23 (04-22-19 @ 07:00)  SpO2: 97% (04-22-19 @ 07:00)  I&O's Detail    21 Apr 2019 07:01  -  22 Apr 2019 07:00  --------------------------------------------------------  IN:    Albumin 25%: 150 mL    Albumin 5%  - 250 mL: 500 mL    Enteral Tube Flush: 540 mL    Free Water: 250 mL    Glucerna 1.5: 500 mL    IV PiggyBack: 500 mL    norepinephrine Infusion: 307 mL  Total IN: 2747 mL    OUT:    Chest Tube: 25 mL    Colostomy: 875 mL    Indwelling Catheter - Urethral: 1537 mL  Total OUT: 2437 mL    Total NET: 310 mL        Mode: AC/ CMV (Assist Control/ Continuous Mandatory Ventilation)  RR (machine): 12  RR (patient): 20  TV (machine): 500  TV (patient): 483  FiO2: 60  PEEP: 5  ITime: 1  MAP: 9.1  PIP: 20      Physical Exam  Neuro: intubated, alert but non-verbal, lethargic, did not follow commands  General: no acute distress, breathing nonlabored on vent but mildly tachypneic  HEENT: PERRL, EOMI, MMD, dobhoff in place w/ TFs  Pulm: intubated, on vent-AC; chest tube in place L posterior chest wall w/ gray output; clear breath sounds on R, mild rhonchi and expiratory wheezing on L  C/V: S1S2, non-tachycardic   Abd: nondistended, but firm; TTP w/ mild guarding; LLQ IR and LUQ PHYLLIS drain w/ clear brown ascites output; R paracolic gutter drain w/ clear brown output; abdominal incision w/ wet to dry dressing in place; no active bleeding, no purulence   Extremities: significant improvement in edema/ansarca of lower extremities; persistent edema of upper extremity forearms   : rao in place; scrotal edema resolved  Skin: mild blanching erythema on thighs, no macerations        LABS:                        8.5    25.39 )-----------( 421      ( 22 Apr 2019 05:23 )             27.3     04-22    148<H>  |  110<H>  |  37<H>  ----------------------------<  122<H>  3.8   |  26  |  0.76    Ca    8.1<L>      22 Apr 2019 05:23  Phos  2.7     04-22  Mg     1.8     04-22        PT/INR - ( 22 Apr 2019 05:23 )   PT: 36.7 sec;   INR: 3.14          PTT - ( 22 Apr 2019 05:23 )  PTT:37.1 sec        MEDICATIONS  (STANDING):  albumin human 25% IVPB 50 milliLiter(s) IV Intermittent every 8 hours  ascorbic acid Syrup 250 milliGRAM(s) Oral daily  aspirin  chewable 81 milliGRAM(s) Oral daily  atorvastatin 80 milliGRAM(s) Oral at bedtime  cefepime   IVPB 2000 milliGRAM(s) IV Intermittent every 12 hours  chlorhexidine 0.12% Liquid 15 milliLiter(s) Oral Mucosa two times a day  chlorhexidine 2% Cloths 1 Application(s) Topical <User Schedule>  dextrose 5%. 1000 milliLiter(s) (50 mL/Hr) IV Continuous <Continuous>  dextrose 50% Injectable 12.5 Gram(s) IV Push once  dextrose 50% Injectable 25 Gram(s) IV Push once  dextrose 50% Injectable 25 Gram(s) IV Push once  heparin  flush 100 Units/mL Injectable 100 Unit(s) IV Push every other day  heparin  Injectable 5000 Unit(s) SubCutaneous every 8 hours  insulin lispro (HumaLOG) corrective regimen sliding scale   SubCutaneous every 6 hours  metolazone 5 milliGRAM(s) Oral <User Schedule>  metroNIDAZOLE  IVPB 500 milliGRAM(s) IV Intermittent every 8 hours  metroNIDAZOLE  IVPB      midodrine 15 milliGRAM(s) Oral every 8 hours  norepinephrine Infusion 0.05 MICROgram(s)/kG/Min (3.436 mL/Hr) IV Continuous <Continuous>  nystatin Powder 1 Application(s) Topical two times a day  pantoprazole  Injectable 40 milliGRAM(s) IV Push daily  vancomycin  IVPB 1000 milliGRAM(s) IV Intermittent daily  zinc sulfate 220 milliGRAM(s) Oral daily    MEDICATIONS  (PRN):  acetaminophen    Suspension .. 650 milliGRAM(s) Enteral Tube every 6 hours PRN Temp greater or equal to 38C (100.4F)  dextrose 40% Gel 15 Gram(s) Oral once PRN Blood Glucose LESS THAN 70 milliGRAM(s)/deciliter  fentaNYL    Injectable 12.5 MICROGram(s) IV Push every 3 hours PRN Moderate Pain (4 - 6)  fentaNYL    Injectable 25 MICROGram(s) IV Push every 3 hours PRN Severe Pain (7 - 10)  glucagon  Injectable 1 milliGRAM(s) IntraMuscular once PRN Glucose LESS THAN 70 milligrams/deciliter  ondansetron Injectable 4 milliGRAM(s) IV Push every 6 hours PRN Nausea      RADIOLOGY & ADDITIONAL STUDIES:    < from: US Abdomen Doppler (04.21.19 @ 14:10) >    INDICATION: Ascites. Coagulopathy. Hypoalbuminemia. Thrombocytopenia.   Evaluate for Budd-Chiari as etiology for liver disease.    PRIOR STUDIES: Abdominal ultrasound from 4/16/2019. CT of the abdomen and   pelvis from 4/5/2019.    FINDINGS:     Liver: Normal echogenicity with no visible focal abnormality. The liver   is mildly enlarged, measuring 17.7 cm.  There is exaggerated to-and-fro   flow within the hepatic veins, which are patent. The visualized portions   of the portal veins are patent.    Intrahepatic ducts: Not dilated.    Common bile duct: Normal diameter, measuring 0.5 cm.    Gallbladder: There are gallstone within a contracted gallbladder, similar   to prior exam.. No wall thickening or pericholecystic fluid.    Pancreas: The visualized portions were normal in appearance.      Abdominal aorta: The visualized portions were unremarkable.    Inferior vena cava: The visualized portions were normal in appearance.    Right kidney: Increased echogenicity, which can be seen in medical renal   disease. No focal lesions. Length of 11.2 cm.    Ascites: Small.    Also noted: Right pleural effusion.    IMPRESSION:  1.  No evidence of portal or hepatic vein thrombosis.  Exaggerated   to-and-fro flow within the hepatic veins, which can be seen in congestive   hepatopathy.  2.  Hepatomegaly.  3.  Cholelithiasis.  4.  Rightpleural effusion and small ascites.    < end of copied text >

## 2019-04-22 NOTE — PROGRESS NOTE ADULT - ATTENDING COMMENTS
I have personally provided  50 minutes of critical care time concurrently with the resident/fellow and excludes time spent on separate procedures.   I have reviewed the resident's documentation and I agree with the resident's assessment and plan of care.

## 2019-04-22 NOTE — PROGRESS NOTE ADULT - SUBJECTIVE AND OBJECTIVE BOX
Pt seen and examined at bedside.    PERTINENT REVIEW OF SYSTEMS:  CONSTITUTIONAL: No weakness, fevers or chills  HEENT: No visual changes; No vertigo or throat pain   GASTROINTESTINAL: No abdominal or epigastric pain. No nausea, vomiting, or hematemesis; No diarrhea or constipation. No melena or hematochezia.  NEUROLOGICAL: No numbness or weakness  SKIN: No itching, burning, rashes, or lesions     Allergies    No Known Allergies    Intolerances      MEDICATIONS:  MEDICATIONS  (STANDING):  albumin human 25% IVPB 50 milliLiter(s) IV Intermittent every 8 hours  ascorbic acid Syrup 250 milliGRAM(s) Oral daily  aspirin  chewable 81 milliGRAM(s) Oral daily  atorvastatin 80 milliGRAM(s) Oral at bedtime  cefepime   IVPB 2000 milliGRAM(s) IV Intermittent every 12 hours  chlorhexidine 0.12% Liquid 15 milliLiter(s) Oral Mucosa two times a day  chlorhexidine 2% Cloths 1 Application(s) Topical <User Schedule>  dextrose 5%. 1000 milliLiter(s) (50 mL/Hr) IV Continuous <Continuous>  dextrose 50% Injectable 12.5 Gram(s) IV Push once  dextrose 50% Injectable 25 Gram(s) IV Push once  dextrose 50% Injectable 25 Gram(s) IV Push once  heparin  flush 100 Units/mL Injectable 100 Unit(s) IV Push every other day  heparin  Injectable 5000 Unit(s) SubCutaneous every 8 hours  insulin lispro (HumaLOG) corrective regimen sliding scale   SubCutaneous every 6 hours  metolazone 5 milliGRAM(s) Oral <User Schedule>  metroNIDAZOLE  IVPB 500 milliGRAM(s) IV Intermittent every 8 hours  metroNIDAZOLE  IVPB      midodrine 15 milliGRAM(s) Oral every 8 hours  norepinephrine Infusion 0.05 MICROgram(s)/kG/Min (3.436 mL/Hr) IV Continuous <Continuous>  nystatin Powder 1 Application(s) Topical two times a day  pantoprazole  Injectable 40 milliGRAM(s) IV Push daily  vancomycin  IVPB 1000 milliGRAM(s) IV Intermittent daily  zinc sulfate 220 milliGRAM(s) Oral daily    MEDICATIONS  (PRN):  acetaminophen    Suspension .. 650 milliGRAM(s) Enteral Tube every 6 hours PRN Temp greater or equal to 38C (100.4F)  dextrose 40% Gel 15 Gram(s) Oral once PRN Blood Glucose LESS THAN 70 milliGRAM(s)/deciliter  fentaNYL    Injectable 12.5 MICROGram(s) IV Push every 3 hours PRN Moderate Pain (4 - 6)  fentaNYL    Injectable 25 MICROGram(s) IV Push every 3 hours PRN Severe Pain (7 - 10)  glucagon  Injectable 1 milliGRAM(s) IntraMuscular once PRN Glucose LESS THAN 70 milligrams/deciliter  ondansetron Injectable 4 milliGRAM(s) IV Push every 6 hours PRN Nausea    Vital Signs Last 24 Hrs  T(C): 37.3 (22 Apr 2019 05:32), Max: 38.1 (21 Apr 2019 10:56)  T(F): 99.1 (22 Apr 2019 05:32), Max: 100.5 (21 Apr 2019 10:56)  HR: 96 (22 Apr 2019 07:00) (80 - 130)  BP: 124/69 (22 Apr 2019 07:00) (84/58 - 137/68)  BP(mean): 84 (22 Apr 2019 07:00) (68 - 98)  RR: 23 (22 Apr 2019 07:00) (16 - 42)  SpO2: 97% (22 Apr 2019 07:00) (92% - 100%)    04-21 @ 07:01  -  04-22 @ 07:00  --------------------------------------------------------  IN: 2747 mL / OUT: 2437 mL / NET: 310 mL      PHYSICAL EXAM:    General: Well developed; well nourished; in no acute distress  HEENT: MMM, conjunctiva and sclera clear  Gastrointestinal: Soft non-tender non-distended; Normal bowel sounds; No hepatosplenomegaly. No rebound or guarding  Skin: Warm and dry. No obvious rash    LABS:                        8.5    25.39 )-----------( 421      ( 22 Apr 2019 05:23 )             27.3     04-22    148<H>  |  110<H>  |  37<H>  ----------------------------<  122<H>  3.8   |  26  |  0.76    Ca    8.1<L>      22 Apr 2019 05:23  Phos  2.7     04-22  Mg     1.8     04-22      PT/INR - ( 22 Apr 2019 05:23 )   PT: 36.7 sec;   INR: 3.14          PTT - ( 22 Apr 2019 05:23 )  PTT:37.1 sec                  RADIOLOGY & ADDITIONAL STUDIES: Pt seen and examined at bedside.    PERTINENT REVIEW OF SYSTEMS:  CONSTITUTIONAL: No weakness, fevers or chills  HEENT: No visual changes; No vertigo or throat pain   GASTROINTESTINAL: No abdominal or epigastric pain. No nausea, vomiting, or hematemesis; No diarrhea or constipation. No melena or hematochezia.  NEUROLOGICAL: No numbness or weakness  SKIN: No itching, burning, rashes, or lesions     Allergies    No Known Allergies    Intolerances      MEDICATIONS:  MEDICATIONS  (STANDING):  albumin human 25% IVPB 50 milliLiter(s) IV Intermittent every 8 hours  ascorbic acid Syrup 250 milliGRAM(s) Oral daily  aspirin  chewable 81 milliGRAM(s) Oral daily  atorvastatin 80 milliGRAM(s) Oral at bedtime  cefepime   IVPB 2000 milliGRAM(s) IV Intermittent every 12 hours  chlorhexidine 0.12% Liquid 15 milliLiter(s) Oral Mucosa two times a day  chlorhexidine 2% Cloths 1 Application(s) Topical <User Schedule>  dextrose 5%. 1000 milliLiter(s) (50 mL/Hr) IV Continuous <Continuous>  dextrose 50% Injectable 12.5 Gram(s) IV Push once  dextrose 50% Injectable 25 Gram(s) IV Push once  dextrose 50% Injectable 25 Gram(s) IV Push once  heparin  flush 100 Units/mL Injectable 100 Unit(s) IV Push every other day  heparin  Injectable 5000 Unit(s) SubCutaneous every 8 hours  insulin lispro (HumaLOG) corrective regimen sliding scale   SubCutaneous every 6 hours  metolazone 5 milliGRAM(s) Oral <User Schedule>  metroNIDAZOLE  IVPB 500 milliGRAM(s) IV Intermittent every 8 hours  metroNIDAZOLE  IVPB      midodrine 15 milliGRAM(s) Oral every 8 hours  norepinephrine Infusion 0.05 MICROgram(s)/kG/Min (3.436 mL/Hr) IV Continuous <Continuous>  nystatin Powder 1 Application(s) Topical two times a day  pantoprazole  Injectable 40 milliGRAM(s) IV Push daily  vancomycin  IVPB 1000 milliGRAM(s) IV Intermittent daily  zinc sulfate 220 milliGRAM(s) Oral daily    MEDICATIONS  (PRN):  acetaminophen    Suspension .. 650 milliGRAM(s) Enteral Tube every 6 hours PRN Temp greater or equal to 38C (100.4F)  dextrose 40% Gel 15 Gram(s) Oral once PRN Blood Glucose LESS THAN 70 milliGRAM(s)/deciliter  fentaNYL    Injectable 12.5 MICROGram(s) IV Push every 3 hours PRN Moderate Pain (4 - 6)  fentaNYL    Injectable 25 MICROGram(s) IV Push every 3 hours PRN Severe Pain (7 - 10)  glucagon  Injectable 1 milliGRAM(s) IntraMuscular once PRN Glucose LESS THAN 70 milligrams/deciliter  ondansetron Injectable 4 milliGRAM(s) IV Push every 6 hours PRN Nausea    Vital Signs Last 24 Hrs  T(C): 37.3 (22 Apr 2019 05:32), Max: 38.1 (21 Apr 2019 10:56)  T(F): 99.1 (22 Apr 2019 05:32), Max: 100.5 (21 Apr 2019 10:56)  HR: 96 (22 Apr 2019 07:00) (80 - 130)  BP: 124/69 (22 Apr 2019 07:00) (84/58 - 137/68)  BP(mean): 84 (22 Apr 2019 07:00) (68 - 98)  RR: 23 (22 Apr 2019 07:00) (16 - 42)  SpO2: 97% (22 Apr 2019 07:00) (92% - 100%)    04-21 @ 07:01  -  04-22 @ 07:00  --------------------------------------------------------  IN: 2747 mL / OUT: 2437 mL / NET: 310 mL      PHYSICAL EXAM:    General: Well developed; well nourished; in no acute distress  HEENT: MMM, conjunctiva and sclera clear  Gastrointestinal: Soft non-tender non-distended; Normal bowel sounds; No hepatosplenomegaly. No rebound or guarding, ostomy brown stool  Skin: Warm and dry. No obvious rash    LABS:                        8.5    25.39 )-----------( 421      ( 22 Apr 2019 05:23 )             27.3     04-22    148<H>  |  110<H>  |  37<H>  ----------------------------<  122<H>  3.8   |  26  |  0.76    Ca    8.1<L>      22 Apr 2019 05:23  Phos  2.7     04-22  Mg     1.8     04-22      PT/INR - ( 22 Apr 2019 05:23 )   PT: 36.7 sec;   INR: 3.14          PTT - ( 22 Apr 2019 05:23 )  PTT:37.1 sec                  RADIOLOGY & ADDITIONAL STUDIES:

## 2019-04-22 NOTE — PROGRESS NOTE ADULT - ASSESSMENT
87 yo male with recent herniorrhaphy complicated with multiple infections/abscesses and now DVT and PE unable to anticoagulate initially now s/p IVC filter now with coagulopathy raising concern to initiate anticoagulation.    #Coagulopathy - prolonged PT/INR with normal PTT; partially corrected mixing study for PT with a normal direct thrombin time  -an isolated prolonged PT/INR with normal PTT raises concern for acquired factor VII def, vit K def, liver disease, lupus anticoagulant, or inhibitor of factor VII (also can be d/t warfarin, but this pt did not receive warfarin) - in this patient the overwhelming cause of coagulopathy remains concern for liver disease gladys given pt report of alcoholism, though perhaps may also have an acquired lupus anticoagulant (if has an underlying malignancy)  -factor assays revealed a low factor 2, 5, 7, 10, 12, but normal factor 9; pls check Factor VIII level (since not made in liver, expect it to be normal if our suspicion is correct that underlying dz related to liver dz)  -pls trend LFTs  -would check LA, antiphospholipid ab, b2-glycoprotein abs - LA neg but hexagonal phase positive  -trend daily CBC, coags; pls check LDH, haptoglobin, d-dimer, fibrinogen in AM; pls also check ESR    #DVT/PE - unclear if VTE are provoked from recent surgery or if there is underlying hypercoagulable state such as malignancy or acquired d/o such as APLS or liver dz  -at this time, would consider checking age appropriate cancer screening which only includes PSA at this time since pt denies hx of smoking; unclear if pt has ever had colonoscopy - per pt no, pls obtain records from PMD  -await APLS labs  -given iliac v thromboses, pls check Jak2     #Normocytic anemia (mildly macrocytic when admitted)  -check Fe studies, B12, folic acid levels  -doubt hemolysis, but will review labs above to assess for hemolysis    D/W Dr. Marin; plan reviewed with primary team and Dr. Delong

## 2019-04-22 NOTE — PROGRESS NOTE ADULT - SUBJECTIVE AND OBJECTIVE BOX
Heme/Onc Progress Note (Dr. Marin)  Discussed with Dr. Marin and plan reviewed with the primary team.    The patient was seen and examined.      CAN FELIX is a 86y Male with history of b/l inguinal herniorrhaphy recently complicated by abdominal abscess now s/p small bowel/sigmoid resection and drainage with drains in place, found to have b/l PE, b/l femoral DVT and iliac vv DVT initially placed on heparin gtt stopped d/t bleeding at drainage sites, then found to have a Proteus lung empyema now s/p chest tube, and MRSA pna.  Pt has been intermittently receiving Vitamin K for treatment of coagulopathy with bleeding, but now with worsening coagulopathy and team hoping to start pt on anticoagulation for treatment of multiple VTE.    Pt not sedated, but intubated so ROS obtained to best ability.  Notes that he drinks alcohol.  Notes abd and chest pain.  Otherwise all other ROS neg.    Interval History:  Pt was extubated on Saturday but reintubated on Sunday for possible aspiration.  He remains intubated, but not sedated so able to answer some yes/no questions.  No signficant pain.  ROS otherwise neg    + wt loss>20 lbs.  No bleeding from drains/external tubes.    Allergies    No Known Allergies    Intolerances    Medications:  MEDICATIONS  (STANDING):  albumin human 25% IVPB 50 milliLiter(s) IV Intermittent every 8 hours  ascorbic acid Syrup 250 milliGRAM(s) Oral daily  aspirin  chewable 81 milliGRAM(s) Oral daily  atorvastatin 80 milliGRAM(s) Oral at bedtime  cefepime   IVPB 2000 milliGRAM(s) IV Intermittent every 12 hours  chlorhexidine 0.12% Liquid 15 milliLiter(s) Oral Mucosa two times a day  chlorhexidine 2% Cloths 1 Application(s) Topical <User Schedule>  dextrose 5%. 1000 milliLiter(s) (50 mL/Hr) IV Continuous <Continuous>  dextrose 50% Injectable 12.5 Gram(s) IV Push once  dextrose 50% Injectable 25 Gram(s) IV Push once  dextrose 50% Injectable 25 Gram(s) IV Push once  heparin  flush 100 Units/mL Injectable 100 Unit(s) IV Push every other day  heparin  Injectable 5000 Unit(s) SubCutaneous every 8 hours  insulin lispro (HumaLOG) corrective regimen sliding scale   SubCutaneous every 6 hours  metolazone 5 milliGRAM(s) Oral <User Schedule>  metroNIDAZOLE  IVPB 500 milliGRAM(s) IV Intermittent every 8 hours  metroNIDAZOLE  IVPB      midodrine 15 milliGRAM(s) Oral every 8 hours  norepinephrine Infusion 0.05 MICROgram(s)/kG/Min (3.436 mL/Hr) IV Continuous <Continuous>  nystatin Powder 1 Application(s) Topical two times a day  pantoprazole  Injectable 40 milliGRAM(s) IV Push daily  vancomycin  IVPB 1000 milliGRAM(s) IV Intermittent daily  zinc sulfate 220 milliGRAM(s) Oral daily    MEDICATIONS  (PRN):  acetaminophen    Suspension .. 650 milliGRAM(s) Enteral Tube every 6 hours PRN Temp greater or equal to 38C (100.4F)  dextrose 40% Gel 15 Gram(s) Oral once PRN Blood Glucose LESS THAN 70 milliGRAM(s)/deciliter  fentaNYL    Injectable 12.5 MICROGram(s) IV Push every 3 hours PRN Moderate Pain (4 - 6)  fentaNYL    Injectable 25 MICROGram(s) IV Push every 3 hours PRN Severe Pain (7 - 10)  glucagon  Injectable 1 milliGRAM(s) IntraMuscular once PRN Glucose LESS THAN 70 milligrams/deciliter  ondansetron Injectable 4 milliGRAM(s) IV Push every 6 hours PRN Nausea    PHYSICAL EXAM:    Vital Signs Last 24 Hrs  T(C): 37.3 (22 Apr 2019 05:32), Max: 38.1 (21 Apr 2019 10:56)  T(F): 99.1 (22 Apr 2019 05:32), Max: 100.5 (21 Apr 2019 10:56)  HR: 98 (22 Apr 2019 10:00) (80 - 130)  BP: 110/55 (22 Apr 2019 10:00) (84/58 - 137/68)  BP(mean): 74 (22 Apr 2019 10:00) (68 - 98)  RR: 25 (22 Apr 2019 10:00) (16 - 42)  SpO2: 95% (22 Apr 2019 10:00) (92% - 100%)    Gen: anasarca  HEENT: normocephalic/atraumatic, no conjunctival pallor, no scleral icterus, no oral thrush/mucosal bleeding/mucositis  Neck: supple, no masses  Cardiovascular: RR, nl S1S2, no murmurs/rubs/gallops  Respiratory: clear air entry b/l  Gastrointestinal: BS+, soft, NT/ND, no masses, no splenomegaly, no hepatomegaly, no evidence for ascites  Extremities: no clubbing/cyanosis, no edema, no calf tenderness  Vascular:  DP/PT 2+ b/l  Neurological: CN 2-12 grossly intact, no focal deficits  Skin: no rash on visible skin  Lymph Nodes:  no cervical/supraclavicular LAD  Musculoskeletal:  full ROM  Psychiatric:  mood stable    Labs:                          8.5    25.39 )-----------( 421      ( 22 Apr 2019 05:23 )             27.3   CBC Full  -  ( 22 Apr 2019 05:23 )  WBC Count : 25.39 K/uL  RBC Count : 2.80 M/uL  Hemoglobin : 8.5 g/dL  Hematocrit : 27.3 %  Platelet Count - Automated : 421 K/uL  Mean Cell Volume : 97.5 fl  Mean Cell Hemoglobin : 30.4 pg  Mean Cell Hemoglobin Concentration : 31.1 gm/dL  Auto Neutrophil # : 24.88 K/uL  Auto Lymphocyte # : 0.25 K/uL  Auto Monocyte # : 0.25 K/uL  Auto Eosinophil # : 0.00 K/uL  Auto Basophil # : 0.00 K/uL  Auto Neutrophil % : 95.0 %  Auto Lymphocyte % : 1.0 %  Auto Monocyte % : 1.0 %  Auto Eosinophil % : 0.0 %  Auto Basophil % : 0.0 %    PT/INR - ( 22 Apr 2019 05:23 )   PT: 36.7 sec;   INR: 3.14          PTT - ( 22 Apr 2019 05:23 )  PTT:37.1 sec    04-22    148<H>  |  110<H>  |  37<H>  ----------------------------<  122<H>  3.8   |  26  |  0.76    Ca    8.1<L>      22 Apr 2019 05:23  Phos  2.7     04-22  Mg     1.8     04-22

## 2019-04-22 NOTE — PROGRESS NOTE ADULT - ATTENDING COMMENTS
Plan to check CT chest before removing chest tube pigtail. Continues to be intubated. Vitals stable, except shock. Rest as above

## 2019-04-22 NOTE — PROGRESS NOTE ADULT - SUBJECTIVE AND OBJECTIVE BOX
INTERVAL HISTORY:  Patient seen and examined at bedside. Over the weekend patient was extubated, however needed to be re-intubated for increased work of breathing combined with an episode of vomiting, leading to aspiration. He is currently intubated and sedated, and is unable to participate in ROS.     VITAL SIGNS:  ICU Vital Signs Last 24 Hrs  T(C): 37.3 (22 Apr 2019 05:32), Max: 37.7 (21 Apr 2019 17:25)  T(F): 99.1 (22 Apr 2019 05:32), Max: 99.8 (21 Apr 2019 17:25)  HR: 98 (22 Apr 2019 10:00) (80 - 130)  BP: 110/55 (22 Apr 2019 10:00) (84/58 - 137/68)  BP(mean): 74 (22 Apr 2019 10:00) (68 - 98)  ABP: 118/42 (22 Apr 2019 10:00) (94/48 - 134/42)  ABP(mean): 62 (22 Apr 2019 10:00) (62 - 82)  RR: 25 (22 Apr 2019 10:00) (16 - 40)  SpO2: 95% (22 Apr 2019 10:00) (92% - 100%)    Mode: AC/ CMV (Assist Control/ Continuous Mandatory Ventilation), RR (machine): 12, TV (machine): 500, FiO2: 40, PEEP: 5, ITime: 1, MAP: 11, PIP: 23    04-21 @ 07:01 - 04-22 @ 07:00  --------------------------------------------------------  IN: 2747 mL / OUT: 2437 mL / NET: 310 mL    04-22 @ 07:01 - 04-22 @ 11:05  --------------------------------------------------------  IN: 46 mL / OUT: 60 mL / NET: -14 mL      CAPILLARY BLOOD GLUCOSE      POCT Blood Glucose.: 117 mg/dL (22 Apr 2019 06:23)      PHYSICAL EXAM:  Constitutional: intubated and sedated  HEENT: constricted pupils bilaterally but reactive to light  Neck: supple, no JVD  Respiratory: lower lung field crackles bilaterally but improved lung sounds in apical and mid lung fields, and anterior; Oxygenating well on 40% FiO2. Chest tube site CDI and flushing well. There is a more consistent air leak today with 90cc's of serosanguinous fluid collected in the waterseal (since 4/19)  Cardiovascular: +S1/S2, RRR  Gastrointestinal: Unchanged appearance of PHYLLIS drains and colostomy  Extremities: Edema of lower extremities with seemingly plateaued improvement. UE's still edematous bilaterally      MEDICATIONS:  MEDICATIONS  (STANDING):  albumin human 25% IVPB 50 milliLiter(s) IV Intermittent every 8 hours  ascorbic acid Syrup 250 milliGRAM(s) Oral daily  aspirin  chewable 81 milliGRAM(s) Oral daily  atorvastatin 80 milliGRAM(s) Oral at bedtime  cefepime   IVPB 2000 milliGRAM(s) IV Intermittent every 12 hours  chlorhexidine 0.12% Liquid 15 milliLiter(s) Oral Mucosa two times a day  chlorhexidine 2% Cloths 1 Application(s) Topical <User Schedule>  dextrose 5%. 1000 milliLiter(s) (50 mL/Hr) IV Continuous <Continuous>  dextrose 50% Injectable 12.5 Gram(s) IV Push once  dextrose 50% Injectable 25 Gram(s) IV Push once  dextrose 50% Injectable 25 Gram(s) IV Push once  heparin  flush 100 Units/mL Injectable 100 Unit(s) IV Push every other day  heparin  Injectable 5000 Unit(s) SubCutaneous every 8 hours  insulin lispro (HumaLOG) corrective regimen sliding scale   SubCutaneous every 6 hours  metolazone 5 milliGRAM(s) Oral <User Schedule>  metroNIDAZOLE  IVPB 500 milliGRAM(s) IV Intermittent every 8 hours  metroNIDAZOLE  IVPB      midodrine 15 milliGRAM(s) Oral every 8 hours  norepinephrine Infusion 0.05 MICROgram(s)/kG/Min (3.436 mL/Hr) IV Continuous <Continuous>  nystatin Powder 1 Application(s) Topical two times a day  pantoprazole  Injectable 40 milliGRAM(s) IV Push daily  vancomycin  IVPB 1000 milliGRAM(s) IV Intermittent daily  zinc sulfate 220 milliGRAM(s) Oral daily    MEDICATIONS  (PRN):  acetaminophen    Suspension .. 650 milliGRAM(s) Enteral Tube every 6 hours PRN Temp greater or equal to 38C (100.4F)  dextrose 40% Gel 15 Gram(s) Oral once PRN Blood Glucose LESS THAN 70 milliGRAM(s)/deciliter  fentaNYL    Injectable 12.5 MICROGram(s) IV Push every 3 hours PRN Moderate Pain (4 - 6)  fentaNYL    Injectable 25 MICROGram(s) IV Push every 3 hours PRN Severe Pain (7 - 10)  glucagon  Injectable 1 milliGRAM(s) IntraMuscular once PRN Glucose LESS THAN 70 milligrams/deciliter  ondansetron Injectable 4 milliGRAM(s) IV Push every 6 hours PRN Nausea      ALLERGIES:  Allergies    No Known Allergies    Intolerances        LABS:                        8.5    25.39 )-----------( 421      ( 22 Apr 2019 05:23 )             27.3     04-22    148<H>  |  110<H>  |  37<H>  ----------------------------<  122<H>  3.8   |  26  |  0.76    Ca    8.1<L>      22 Apr 2019 05:23  Phos  2.7     04-22  Mg     1.8     04-22      PT/INR - ( 22 Apr 2019 05:23 )   PT: 36.7 sec;   INR: 3.14          PTT - ( 22 Apr 2019 05:23 )  PTT:37.1 sec      RADIOLOGY & ADDITIONAL TESTS:   CXR reviewed: Improved bilateral opacities in comparison to yesterday's imaging.

## 2019-04-23 LAB
% ALBUMIN: 61.1 % — SIGNIFICANT CHANGE UP
% ALPHA 1: 6.4 % — SIGNIFICANT CHANGE UP
% ALPHA 2: 10.4 % — SIGNIFICANT CHANGE UP
% BETA: 7.2 % — SIGNIFICANT CHANGE UP
% GAMMA: 14.9 % — SIGNIFICANT CHANGE UP
% M SPIKE: SIGNIFICANT CHANGE UP
ALBUMIN SERPL ELPH-MCNC: 2.9 G/DL — LOW (ref 3.6–5.5)
ALBUMIN/GLOB SERPL ELPH: 1.5 RATIO — SIGNIFICANT CHANGE UP
ALPHA1 GLOB SERPL ELPH-MCNC: 0.3 G/DL — SIGNIFICANT CHANGE UP (ref 0.1–0.4)
ALPHA2 GLOB SERPL ELPH-MCNC: 0.5 G/DL — SIGNIFICANT CHANGE UP (ref 0.5–1)
ANA TITR SER: NEGATIVE — SIGNIFICANT CHANGE UP
ANION GAP SERPL CALC-SCNC: 10 MMOL/L — SIGNIFICANT CHANGE UP (ref 5–17)
APTT 50/50 2HOUR INCUB: 45.2 SEC — HIGH (ref 27.5–37.4)
APTT BLD: 134.8 SEC — CRITICAL HIGH (ref 27.5–36.3)
APTT BLD: 36.1 SECS — SIGNIFICANT CHANGE UP (ref 27.5–37.4)
APTT BLD: 59.8 SEC — HIGH (ref 27.5–36.3)
APTT BLD: 82 SEC — HIGH (ref 27.5–36.3)
APTT BLD: 92.6 SEC — HIGH (ref 27.5–36.3)
APTT BLD: 99.9 SEC — HIGH (ref 27.5–36.3)
B-GLOBULIN SERPL ELPH-MCNC: 0.3 G/DL — LOW (ref 0.5–1)
BLD GP AB SCN SERPL QL: NEGATIVE — SIGNIFICANT CHANGE UP
BUN SERPL-MCNC: 40 MG/DL — HIGH (ref 7–23)
C DIFF GDH STL QL: NEGATIVE — SIGNIFICANT CHANGE UP
C DIFF GDH STL QL: SIGNIFICANT CHANGE UP
CALCIUM SERPL-MCNC: 8.3 MG/DL — LOW (ref 8.4–10.5)
CHLORIDE SERPL-SCNC: 106 MMOL/L — SIGNIFICANT CHANGE UP (ref 96–108)
CO2 SERPL-SCNC: 25 MMOL/L — SIGNIFICANT CHANGE UP (ref 22–31)
CREAT SERPL-MCNC: 0.91 MG/DL — SIGNIFICANT CHANGE UP (ref 0.5–1.3)
FACT IX PPP CHRO-ACNC: 66 % — LOW (ref 69–167)
FACT V ACT/NOR PPP: 33 % — LOW (ref 70–143)
FACT VIII ACT/NOR PPP: 183 % — HIGH (ref 51–138)
FACT X ACT/NOR PPP: 23 % — LOW (ref 68–149)
GAMMA GLOBULIN: 0.7 G/DL — SIGNIFICANT CHANGE UP (ref 0.6–1.6)
GLUCOSE BLDC GLUCOMTR-MCNC: 136 MG/DL — HIGH (ref 70–99)
GLUCOSE BLDC GLUCOMTR-MCNC: 148 MG/DL — HIGH (ref 70–99)
GLUCOSE BLDC GLUCOMTR-MCNC: 149 MG/DL — HIGH (ref 70–99)
GLUCOSE BLDC GLUCOMTR-MCNC: 161 MG/DL — HIGH (ref 70–99)
GLUCOSE SERPL-MCNC: 149 MG/DL — HIGH (ref 70–99)
GRAM STN FLD: SIGNIFICANT CHANGE UP
HCT VFR BLD CALC: 24.7 % — LOW (ref 39–50)
HCT VFR BLD CALC: 26.5 % — LOW (ref 39–50)
HGB BLD-MCNC: 7.9 G/DL — LOW (ref 13–17)
HGB BLD-MCNC: 8.3 G/DL — LOW (ref 13–17)
IGA FLD-MCNC: 266 MG/DL — SIGNIFICANT CHANGE UP (ref 84–499)
IGG FLD-MCNC: 582 MG/DL — LOW (ref 610–1660)
IGM SERPL-MCNC: 13 MG/DL — LOW (ref 35–242)
INR BLD: 4.7 — HIGH (ref 0.88–1.16)
INTERPRETATION SERPL IFE-IMP: SIGNIFICANT CHANGE UP
KAPPA LC SER QL IFE: 12.76 MG/DL — HIGH (ref 0.33–1.94)
KAPPA/LAMBDA FREE LIGHT CHAIN RATIO, SERUM: 1.17 RATIO — SIGNIFICANT CHANGE UP (ref 0.26–1.65)
LACTATE SERPL-SCNC: 1.6 MMOL/L — SIGNIFICANT CHANGE UP (ref 0.5–2)
LAMBDA LC SER QL IFE: 10.93 MG/DL — HIGH (ref 0.57–2.63)
M-SPIKE: SIGNIFICANT CHANGE UP (ref 0–0)
MAGNESIUM SERPL-MCNC: 2.2 MG/DL — SIGNIFICANT CHANGE UP (ref 1.6–2.6)
MCHC RBC-ENTMCNC: 30.6 PG — SIGNIFICANT CHANGE UP (ref 27–34)
MCHC RBC-ENTMCNC: 31 PG — SIGNIFICANT CHANGE UP (ref 27–34)
MCHC RBC-ENTMCNC: 31.3 GM/DL — LOW (ref 32–36)
MCHC RBC-ENTMCNC: 32 GM/DL — SIGNIFICANT CHANGE UP (ref 32–36)
MCV RBC AUTO: 96.9 FL — SIGNIFICANT CHANGE UP (ref 80–100)
MCV RBC AUTO: 97.8 FL — SIGNIFICANT CHANGE UP (ref 80–100)
NRBC # BLD: 0 /100 WBCS — SIGNIFICANT CHANGE UP (ref 0–0)
NRBC # BLD: 0 /100 WBCS — SIGNIFICANT CHANGE UP (ref 0–0)
PAT CTL 2H: 50.7 SEC — HIGH (ref 27.5–37.4)
PHOSPHATE SERPL-MCNC: 2.4 MG/DL — LOW (ref 2.5–4.5)
PLATELET # BLD AUTO: 425 K/UL — HIGH (ref 150–400)
PLATELET # BLD AUTO: 492 K/UL — HIGH (ref 150–400)
POTASSIUM SERPL-MCNC: 3.6 MMOL/L — SIGNIFICANT CHANGE UP (ref 3.5–5.3)
POTASSIUM SERPL-SCNC: 3.6 MMOL/L — SIGNIFICANT CHANGE UP (ref 3.5–5.3)
PROT PATTERN SERPL ELPH-IMP: SIGNIFICANT CHANGE UP
PROT SERPL-MCNC: 4.8 G/DL — LOW (ref 6–8.3)
PROT SERPL-MCNC: 4.8 G/DL — LOW (ref 6–8.3)
PROTHROM AB SERPL-ACNC: 55.7 SEC — HIGH (ref 10–12.9)
PT 50/50: 14.1 SECS — HIGH (ref 9.7–13.5)
RBC # BLD: 2.55 M/UL — LOW (ref 4.2–5.8)
RBC # BLD: 2.71 M/UL — LOW (ref 4.2–5.8)
RBC # FLD: 17.2 % — HIGH (ref 10.3–14.5)
RBC # FLD: 17.6 % — HIGH (ref 10.3–14.5)
RH IG SCN BLD-IMP: POSITIVE — SIGNIFICANT CHANGE UP
SODIUM SERPL-SCNC: 141 MMOL/L — SIGNIFICANT CHANGE UP (ref 135–145)
SPECIMEN SOURCE: SIGNIFICANT CHANGE UP
THROMBIN TIME: 44.6 SEC — HIGH (ref 17.6–24)
WBC # BLD: 30.79 K/UL — HIGH (ref 3.8–10.5)
WBC # BLD: 32.86 K/UL — HIGH (ref 3.8–10.5)
WBC # FLD AUTO: 30.79 K/UL — HIGH (ref 3.8–10.5)
WBC # FLD AUTO: 32.86 K/UL — HIGH (ref 3.8–10.5)

## 2019-04-23 PROCEDURE — 99232 SBSQ HOSP IP/OBS MODERATE 35: CPT

## 2019-04-23 PROCEDURE — 71045 X-RAY EXAM CHEST 1 VIEW: CPT | Mod: 26

## 2019-04-23 PROCEDURE — 71045 X-RAY EXAM CHEST 1 VIEW: CPT | Mod: 26,77

## 2019-04-23 PROCEDURE — 99291 CRITICAL CARE FIRST HOUR: CPT

## 2019-04-23 PROCEDURE — 99233 SBSQ HOSP IP/OBS HIGH 50: CPT | Mod: GC

## 2019-04-23 RX ORDER — HEPARIN SODIUM 5000 [USP'U]/ML
1100 INJECTION INTRAVENOUS; SUBCUTANEOUS
Qty: 25000 | Refills: 0 | Status: DISCONTINUED | OUTPATIENT
Start: 2019-04-23 | End: 2019-04-24

## 2019-04-23 RX ORDER — ALBUMIN HUMAN 25 %
250 VIAL (ML) INTRAVENOUS ONCE
Qty: 0 | Refills: 0 | Status: COMPLETED | OUTPATIENT
Start: 2019-04-23 | End: 2019-04-23

## 2019-04-23 RX ORDER — ACETAMINOPHEN 500 MG
650 TABLET ORAL ONCE
Qty: 0 | Refills: 0 | Status: COMPLETED | OUTPATIENT
Start: 2019-04-23 | End: 2019-04-23

## 2019-04-23 RX ORDER — POTASSIUM PHOSPHATE, MONOBASIC POTASSIUM PHOSPHATE, DIBASIC 236; 224 MG/ML; MG/ML
30 INJECTION, SOLUTION INTRAVENOUS ONCE
Qty: 0 | Refills: 0 | Status: COMPLETED | OUTPATIENT
Start: 2019-04-23 | End: 2019-04-23

## 2019-04-23 RX ORDER — ALBUMIN HUMAN 25 %
250 VIAL (ML) INTRAVENOUS ONCE
Qty: 0 | Refills: 0 | Status: COMPLETED | OUTPATIENT
Start: 2019-04-23 | End: 2019-04-24

## 2019-04-23 RX ADMIN — NYSTATIN CREAM 1 APPLICATION(S): 100000 CREAM TOPICAL at 17:33

## 2019-04-23 RX ADMIN — Medication 250 MILLIGRAM(S): at 11:50

## 2019-04-23 RX ADMIN — ATORVASTATIN CALCIUM 80 MILLIGRAM(S): 80 TABLET, FILM COATED ORAL at 22:29

## 2019-04-23 RX ADMIN — Medication 100 MILLIGRAM(S): at 11:51

## 2019-04-23 RX ADMIN — Medication 650 MILLIGRAM(S): at 01:11

## 2019-04-23 RX ADMIN — Medication 50 MILLILITER(S): at 07:04

## 2019-04-23 RX ADMIN — Medication 2: at 07:03

## 2019-04-23 RX ADMIN — Medication 650 MILLIGRAM(S): at 01:59

## 2019-04-23 RX ADMIN — POTASSIUM PHOSPHATE, MONOBASIC POTASSIUM PHOSPHATE, DIBASIC 83.33 MILLIMOLE(S): 236; 224 INJECTION, SOLUTION INTRAVENOUS at 08:50

## 2019-04-23 RX ADMIN — Medication 100 MILLIGRAM(S): at 01:11

## 2019-04-23 RX ADMIN — NYSTATIN CREAM 1 APPLICATION(S): 100000 CREAM TOPICAL at 05:44

## 2019-04-23 RX ADMIN — Medication 50 MILLILITER(S): at 16:19

## 2019-04-23 RX ADMIN — ZINC SULFATE TAB 220 MG (50 MG ZINC EQUIVALENT) 220 MILLIGRAM(S): 220 (50 ZN) TAB at 11:51

## 2019-04-23 RX ADMIN — Medication 100 UNIT(S): at 11:51

## 2019-04-23 RX ADMIN — CEFEPIME 100 MILLIGRAM(S): 1 INJECTION, POWDER, FOR SOLUTION INTRAMUSCULAR; INTRAVENOUS at 17:33

## 2019-04-23 RX ADMIN — Medication 250 MILLIGRAM(S): at 11:52

## 2019-04-23 RX ADMIN — MIDODRINE HYDROCHLORIDE 15 MILLIGRAM(S): 2.5 TABLET ORAL at 14:40

## 2019-04-23 RX ADMIN — MIDODRINE HYDROCHLORIDE 15 MILLIGRAM(S): 2.5 TABLET ORAL at 05:43

## 2019-04-23 RX ADMIN — CEFEPIME 100 MILLIGRAM(S): 1 INJECTION, POWDER, FOR SOLUTION INTRAMUSCULAR; INTRAVENOUS at 05:43

## 2019-04-23 RX ADMIN — Medication 100 MILLIGRAM(S): at 17:33

## 2019-04-23 RX ADMIN — Medication 125 MILLILITER(S): at 11:50

## 2019-04-23 RX ADMIN — Medication 50 MILLILITER(S): at 23:33

## 2019-04-23 RX ADMIN — MIDODRINE HYDROCHLORIDE 15 MILLIGRAM(S): 2.5 TABLET ORAL at 22:28

## 2019-04-23 RX ADMIN — CHLORHEXIDINE GLUCONATE 15 MILLILITER(S): 213 SOLUTION TOPICAL at 05:42

## 2019-04-23 RX ADMIN — CHLORHEXIDINE GLUCONATE 15 MILLILITER(S): 213 SOLUTION TOPICAL at 17:33

## 2019-04-23 RX ADMIN — Medication 81 MILLIGRAM(S): at 11:51

## 2019-04-23 RX ADMIN — PANTOPRAZOLE SODIUM 40 MILLIGRAM(S): 20 TABLET, DELAYED RELEASE ORAL at 11:51

## 2019-04-23 RX ADMIN — CHLORHEXIDINE GLUCONATE 1 APPLICATION(S): 213 SOLUTION TOPICAL at 05:44

## 2019-04-23 RX ADMIN — Medication 3.44 MICROGRAM(S)/KG/MIN: at 05:43

## 2019-04-23 NOTE — PROGRESS NOTE ADULT - ATTENDING COMMENTS
Pt with subq emphysema  noted this AM and stopcock opened on the chest tube setting. Airleak noted and no pnemothorax seen on CXR or CT. Will continue Ct to suction. Plan for PEG and trach. continue abx and no new Cx results noted. CDiff negative. WBC remains elevated. Levo decreasing. Avoid more aggressive diuresis today with mild increase in Cr. Continue anticoagulation.

## 2019-04-23 NOTE — PROGRESS NOTE ADULT - SUBJECTIVE AND OBJECTIVE BOX
INTERVAL HISTORY:  Patient seen and examined at bedside. Overnight chest tube was inadvertently clamped resulting in subcutaneous emphysema.   Had a fever overnight to 101F; pressor requirements have fluctuated but overall steady.     VITAL SIGNS:  ICU Vital Signs Last 24 Hrs  T(C): 37.1 (2019 09:07), Max: 38.6 (2019 11:16)  T(F): 98.7 (2019 09:07), Max: 101.4 (2019 11:16)  HR: 74 (2019 09:00) (74 - 96)  BP: 143/57 (2019 07:00) (97/47 - 143/57)  BP(mean): 106 (2019 07:00) (64 - 106)  ABP: 152/46 (2019 09:00) (102/32 - 160/40)  ABP(mean): 76 (2019 09:00) (54 - 84)  RR: 21 (:) (14 - 29)  SpO2: 93% (:00) (92% - 100%)    Mode: AC/ CMV (Assist Control/ Continuous Mandatory Ventilation), RR (machine): 12, TV (machine): 500, FiO2: 40, PEEP: 5, ITime: 1, MAP: 10, PIP: 22     @ 07: @ 07:00  --------------------------------------------------------  IN: 5127.4 mL / OUT: 931 mL / NET: 4196.4 mL     @ 07: @ 10:19  --------------------------------------------------------  IN: 126 mL / OUT: 45 mL / NET: 81 mL      CAPILLARY BLOOD GLUCOSE      POCT Blood Glucose.: 161 mg/dL (2019 06:47)      PHYSICAL EXAM:  Constitutional: Intubated and sedated. Grimacing   Neck: supple, no JVD, subcutaneous emphysema of the left side of the neck tracking to anterior  Respiratory: asymmetrical appearance of the chest, unchanged crackles at lower lung zones bilaterally, subcutaneous emphysema felt on L chest wall which tracks to the abdomen; Peak pressures on ventilator higher than before at 32 max during assessment. Tv 500cc, PEEP 5, FiO2 40%  Cardiovascular: +S1/S2, RRR  Gastrointestinal: Unchanged exam aside from subcutaneous emphysema on anterior abdominal wall as mentioned above4  Extremities: Persistent LE and UE edema bilaterally      MEDICATIONS:  MEDICATIONS  (STANDING):  albumin human  5% IVPB 250 milliLiter(s) IV Intermittent once  albumin human 25% IVPB 50 milliLiter(s) IV Intermittent every 8 hours  ascorbic acid Syrup 250 milliGRAM(s) Oral daily  aspirin  chewable 81 milliGRAM(s) Oral daily  atorvastatin 80 milliGRAM(s) Oral at bedtime  cefepime   IVPB 2000 milliGRAM(s) IV Intermittent every 12 hours  chlorhexidine 0.12% Liquid 15 milliLiter(s) Oral Mucosa two times a day  chlorhexidine 2% Cloths 1 Application(s) Topical <User Schedule>  dextrose 5%. 1000 milliLiter(s) (50 mL/Hr) IV Continuous <Continuous>  dextrose 50% Injectable 12.5 Gram(s) IV Push once  dextrose 50% Injectable 25 Gram(s) IV Push once  dextrose 50% Injectable 25 Gram(s) IV Push once  heparin  flush 100 Units/mL Injectable 100 Unit(s) IV Push every other day  heparin  Infusion 1100 Unit(s)/Hr (11 mL/Hr) IV Continuous <Continuous>  insulin lispro (HumaLOG) corrective regimen sliding scale   SubCutaneous every 6 hours  metolazone 5 milliGRAM(s) Oral <User Schedule>  metroNIDAZOLE  IVPB 500 milliGRAM(s) IV Intermittent every 8 hours  metroNIDAZOLE  IVPB      midodrine 15 milliGRAM(s) Oral every 8 hours  norepinephrine Infusion 0.05 MICROgram(s)/kG/Min (3.436 mL/Hr) IV Continuous <Continuous>  nystatin Powder 1 Application(s) Topical two times a day  pantoprazole  Injectable 40 milliGRAM(s) IV Push daily  vancomycin  IVPB 1000 milliGRAM(s) IV Intermittent daily  zinc sulfate 220 milliGRAM(s) Oral daily    MEDICATIONS  (PRN):  acetaminophen    Suspension .. 650 milliGRAM(s) Enteral Tube every 6 hours PRN Temp greater or equal to 38C (100.4F)  dextrose 40% Gel 15 Gram(s) Oral once PRN Blood Glucose LESS THAN 70 milliGRAM(s)/deciliter  fentaNYL    Injectable 12.5 MICROGram(s) IV Push every 3 hours PRN Moderate Pain (4 - 6)  fentaNYL    Injectable 25 MICROGram(s) IV Push every 3 hours PRN Severe Pain (7 - 10)  glucagon  Injectable 1 milliGRAM(s) IntraMuscular once PRN Glucose LESS THAN 70 milligrams/deciliter  ondansetron Injectable 4 milliGRAM(s) IV Push every 6 hours PRN Nausea      ALLERGIES:  Allergies    No Known Allergies    Intolerances        LABS:                        8.3    32.86 )-----------( 492      ( 2019 08:27 )             26.5         141  |  106  |  40<H>  ----------------------------<  149<H>  3.6   |  25  |  0.91    Ca    8.3<L>      2019 02:15  Phos  2.4       Mg     2.2         TPro  4.8<L>  /  Alb  x   /  TBili  x   /  DBili  x   /  AST  x   /  ALT  x   /  AlkPhos  x       PT/INR - ( 2019 02:15 )   PT: 55.7 sec;   INR: 4.70          PTT - ( 2019 08:27 )  PTT:59.8 sec  Urinalysis Basic - ( 2019 11:32 )    Color: Yellow / Appearance: Cloudy / S.025 / pH: x  Gluc: x / Ketone: Trace mg/dL  / Bili: Negative / Urobili: 0.2 E.U./dL   Blood: x / Protein: 100 mg/dL / Nitrite: NEGATIVE   Leuk Esterase: Trace / RBC: 5-10 /HPF / WBC < 5 /HPF   Sq Epi: x / Non Sq Epi: 0-5 /HPF / Bacteria: Present /HPF        RADIOLOGY & ADDITIONAL TESTS:   CXR reviewed - large left sided subcutaneous emphysema

## 2019-04-23 NOTE — PROGRESS NOTE ADULT - ASSESSMENT
87 yo M with history of b/l laparoscopic robotic-assisted inguinal hernia repair presenting with b/l segmental pulmonary embolisms, left lung abscess, pneumoperitoneum, and distal descending colon abscess (likely source of pneumoperitoneum), significant iliofemoral DVT burden, s/p IVCF placement and IR drainage of LLQ abdominal collection (4/5), s/p ex-lap, LAURYN, sigmoidectomy, drain placement in R. paracolic gutter, and abthera vac placement (4/7), s/p planned RTOR, packing removal, and end colostomy creation (4/10), now w/ MRSA + proteus PNA, persistent coagulopathy, LLL pulmonary empyema s/p bedside pigtail drainage (4/15), s/p extubation c/b vomiting and aspiration, reintubated (4/21).     Neuro: Fentanyl prn  CV: levo gtt for MAP goal >65; midodrine 15q8h; albumin 25%q8hrs, may be intravascularly depleted now with decreasing UOP in setting of likely septic process; hold lasix; asa 81 lipitor, statin,   Pulm: re-intubated 4/21, on assist control. bilateral PE's; Empyema- s/p left CTx on 4/15  GI: NPO, dobhoff glucerna 1.5@50/hr; malnourished, Vit C, zinc; RUQ US shows hepatomegaly but patent portal and hepatic veins  : rao. 24hr urine for copper.   ID: intermittent fevers and worsening leukocytosis; CT c/a/p (2/22) showing severe enterocolitis, concern for c.diff; c.diff eia sent; likely new aspiration PNA vs new intra-abdominal pathology; +kleb, proteus, strep in abd Cx, flagyl (4/9-4/20), cefepime (4/9-4/20); Sputum cx: MRSA and proteus (sensitive to cefepime) , vancomycin (4/10-5/4) ///Dc'd: ceftriaxone (4/9-4/9)// unasyn (4/8-4/9),  Vanco (4/5-4/7), Zosyn (4/5-4/8)]  Endo: ISS  Heme: bilateral iliofemoral DVT / bilateral PE; s/p IVC Filter; elevated INR; Mixing study wnr; factors; + APAS; restarted on anticoagulation w. heparin gtt 4/22  PPx: SCDs; SQH  Lines: PIVs, PICC (4/6--), L A line (4/13--) /// D/C: R cordis (4/7-4/11)  Wounds/drains: LLQ IR drain (4/5 -- ), LUQ PHYLLIS (4/7 -- ), R paracolic gutter drain (4/7 --) --- all drains to LIWS; abdominal incision wet to dry;  PT/OT: early mob 4/11 - worked with PT.

## 2019-04-23 NOTE — PROGRESS NOTE ADULT - SUBJECTIVE AND OBJECTIVE BOX
INTERVAL HISTORY:  no overnight events, tele with atrial bigemny   	  MEDICATIONS:  metolazone 5 milliGRAM(s) Oral <User Schedule>  midodrine 15 milliGRAM(s) Oral every 8 hours  norepinephrine Infusion 0.05 MICROgram(s)/kG/Min IV Continuous <Continuous>  cefepime   IVPB 2000 milliGRAM(s) IV Intermittent every 12 hours  metroNIDAZOLE  IVPB 500 milliGRAM(s) IV Intermittent every 8 hours  metroNIDAZOLE  IVPB      vancomycin  IVPB 1000 milliGRAM(s) IV Intermittent daily  acetaminophen    Suspension .. 650 milliGRAM(s) Enteral Tube every 6 hours PRN  fentaNYL    Injectable 12.5 MICROGram(s) IV Push every 3 hours PRN  fentaNYL    Injectable 25 MICROGram(s) IV Push every 3 hours PRN  ondansetron Injectable 4 milliGRAM(s) IV Push every 6 hours PRN  pantoprazole  Injectable 40 milliGRAM(s) IV Push daily  atorvastatin 80 milliGRAM(s) Oral at bedtime  insulin lispro (HumaLOG) corrective regimen sliding scale   SubCutaneous every 6 hours  albumin human 25% IVPB 50 milliLiter(s) IV Intermittent every 8 hours  ascorbic acid Syrup 250 milliGRAM(s) Oral daily  aspirin  chewable 81 milliGRAM(s) Oral daily        PHYSICAL EXAM:  T(C): 36.5 (04-23-19 @ 14:21), Max: 38.3 (04-23-19 @ 00:30)  HR: 78 (04-23-19 @ 15:00) (72 - 96)  BP: 143/57 (04-23-19 @ 07:00) (97/47 - 143/57)  RR: 21 (04-23-19 @ 15:00) (14 - 29)  SpO2: 96% (04-23-19 @ 15:00) (92% - 100%)  Wt(kg): --  I&O's Summary    22 Apr 2019 07:01  -  23 Apr 2019 07:00  --------------------------------------------------------  IN: 5127.4 mL / OUT: 931 mL / NET: 4196.4 mL    23 Apr 2019 07:01  -  23 Apr 2019 15:48  --------------------------------------------------------  IN: 878 mL / OUT: 160 mL / NET: 718 mL          Appearance:  sedated	  HEENT:  intubated 	   Cardiovascular: Normal S1 S2,    Respiratory: rhonchi, +crackles   Gastrointestinal:  soft, multiple drains in place    Extremities: anasarca    LABS:	 	  CARDIAC MARKERS:                                8.3    32.86 )-----------( 492      ( 23 Apr 2019 08:27 )             26.5     04-23    141  |  106  |  40<H>  ----------------------------<  149<H>  3.6   |  25  |  0.91    Ca    8.3<L>      23 Apr 2019 02:15  Phos  2.4     04-23  Mg     2.2     04-23    TPro  4.8<L>  /  Alb  2.9<L>  /  TBili  x   /  DBili  x   /  AST  x   /  ALT  x   /  AlkPhos  x   04-23       ASSESSMENT/PLAN: 	      86M w/PMHx CAD s/p mult PCI (last 2012; KIRSTY mLAD, dRCA, pOM2), HTN, HLD, recent b/l lap RA b/l inguinal hernia repair (3/11) initially sent in from [facility] for R inguinal hernia discomfort. Found to have 6x4cm R groin fluid collection, 10x5cm abscess in LLQ w/assoc pneumoperitoneum, L lung empyema, b/l segmental PE, and DVT in b/l CF, L common iliac and internal iliac veins. Underwent exlap w/drainage of 3L ascites, LAURYN, SB resxn w/primary anastomosis, sigmoidectomy (left in discontinuity), abd packing, and drain/VAC placement on 4/7, followed by end-colostomy creation and fascial closure on 4/10. S/p IVCF on 4/5, initially on hep gtt for DVT/PE, however d/c on 4/7 due to intra-op bleeding and not restarted. Home DAPT held since admission. ID following for mult-org induced septic shock. CTSx following for LL empyema. Noted incidentally to have EKG changes, cards c/s for further recs.    elevated troponin, resolved, hx of CAD s/p multiple PCIs in the past, he does not require DAPT for now to continue metolazone 5mg daily, consider standing lasix      - continue aspirin statin,   -still on Levophed will start metoprolol once hemodynamically stable,    -keep hg >8.0 given his ischemic history.     -monitor electrolytes, K+ >4.0 mg>2.0      will continue to follow with Dr. Corbin.

## 2019-04-23 NOTE — PROGRESS NOTE ADULT - SUBJECTIVE AND OBJECTIVE BOX
Interval History:       SUBJECTIVE:   Patient seen and examined by       Vitals - Range in last 12h  T(F): , Max: 101 (19 @ 00:30)  HR:  (74 - 96)  BP:  (97/47 - 142/56)  BP(mean):  (74 - 99)  ABP:  (124/46 - 150/44)  ABP(mean):  (58 - 84)  RR:  (17 - 29)  SpO2:  (92% - 100%)  CVP(mm Hg): --  CVP(cm H2O): --  CO: --  CI: --  PA: --  PA(mean): --  PA(direct): --  PCWP: --    Vitals - Most Recent  T(F): 98.4 (19 @ 05:46)  HR: 80 (19 @ 06:58)  BP: 142/56 (19 @ 05:00)  RR: 18 (19 @ 06:58)  SpO2: 95% (19 @ 06:58)  I&O's Detail    2019 07:01  -  2019 07:00  --------------------------------------------------------  IN:    Albumin 25%: 100 mL    Albumin 5%  - 250 mL: 500 mL    dextrose 5%.: 980 mL    Enteral Tube Flush: 1430 mL    Free Water: 250 mL    heparin Infusion: 78 mL    heparin Infusion: 33 mL    IV PiggyBack: 700 mL    norepinephrine Infusion: 357.6 mL  Total IN: 4428.6 mL    OUT:    Chest Tube: 10 mL    Colostomy: 250 mL    Indwelling Catheter - Urethral: 555 mL  Total OUT: 815 mL    Total NET: 3613.6 mL        Mode: AC/ CMV (Assist Control/ Continuous Mandatory Ventilation)  RR (machine): 12  RR (patient): 18  TV (machine): 500  TV (patient): 506  FiO2: 40  PEEP: 5  ITime: 1  MAP: 9.7  PIP: 23      Physical Exam          LABS:                        7.9    30.79 )-----------( 425      ( 2019 02:15 )             24.7         141  |  106  |  40<H>  ----------------------------<  149<H>  3.6   |  25  |  0.91    Ca    8.3<L>      2019 02:15  Phos  2.4       Mg     2.2         TPro  4.8<L>  /  Alb  x   /  TBili  x   /  DBili  x   /  AST  x   /  ALT  x   /  AlkPhos  x       LIVER FUNCTIONS - ( 2019 01:19 )  Alb: x     / Pro: 4.8 g/dL / ALK PHOS: x     / ALT: x     / AST: x     / GGT: x           PT/INR - ( 2019 02:15 )   PT: 55.7 sec;   INR: 4.70          PTT - ( 2019 02:15 )  PTT:134.8 sec  Urinalysis Basic - ( 2019 11:32 )    Color: Yellow / Appearance: Cloudy / S.025 / pH: x  Gluc: x / Ketone: Trace mg/dL  / Bili: Negative / Urobili: 0.2 E.U./dL   Blood: x / Protein: 100 mg/dL / Nitrite: NEGATIVE   Leuk Esterase: Trace / RBC: 5-10 /HPF / WBC < 5 /HPF   Sq Epi: x / Non Sq Epi: 0-5 /HPF / Bacteria: Present /HPF        Culture - Blood (collected 2019 16:52)  Source: .Blood Blood-Peripheral  Preliminary Report (2019 05:01):    No growth at 12 hours    Culture - Blood (collected 2019 16:52)  Source: .Blood Blood-Peripheral  Preliminary Report (2019 05:01):    No growth at 12 hours        MEDICATIONS  (STANDING):  albumin human  5% IVPB 250 milliLiter(s) IV Intermittent once  albumin human 25% IVPB 50 milliLiter(s) IV Intermittent every 8 hours  ascorbic acid Syrup 250 milliGRAM(s) Oral daily  aspirin  chewable 81 milliGRAM(s) Oral daily  atorvastatin 80 milliGRAM(s) Oral at bedtime  cefepime   IVPB 2000 milliGRAM(s) IV Intermittent every 12 hours  chlorhexidine 0.12% Liquid 15 milliLiter(s) Oral Mucosa two times a day  chlorhexidine 2% Cloths 1 Application(s) Topical <User Schedule>  dextrose 5%. 1000 milliLiter(s) (50 mL/Hr) IV Continuous <Continuous>  dextrose 5%. 1000 milliLiter(s) (70 mL/Hr) IV Continuous <Continuous>  dextrose 50% Injectable 12.5 Gram(s) IV Push once  dextrose 50% Injectable 25 Gram(s) IV Push once  dextrose 50% Injectable 25 Gram(s) IV Push once  heparin  flush 100 Units/mL Injectable 100 Unit(s) IV Push every other day  heparin  Infusion 1100 Unit(s)/Hr (11 mL/Hr) IV Continuous <Continuous>  insulin lispro (HumaLOG) corrective regimen sliding scale   SubCutaneous every 6 hours  metolazone 5 milliGRAM(s) Oral <User Schedule>  metroNIDAZOLE  IVPB 500 milliGRAM(s) IV Intermittent every 8 hours  metroNIDAZOLE  IVPB      midodrine 15 milliGRAM(s) Oral every 8 hours  norepinephrine Infusion 0.05 MICROgram(s)/kG/Min (3.436 mL/Hr) IV Continuous <Continuous>  nystatin Powder 1 Application(s) Topical two times a day  pantoprazole  Injectable 40 milliGRAM(s) IV Push daily  vancomycin  IVPB 1000 milliGRAM(s) IV Intermittent daily  zinc sulfate 220 milliGRAM(s) Oral daily    MEDICATIONS  (PRN):  acetaminophen    Suspension .. 650 milliGRAM(s) Enteral Tube every 6 hours PRN Temp greater or equal to 38C (100.4F)  dextrose 40% Gel 15 Gram(s) Oral once PRN Blood Glucose LESS THAN 70 milliGRAM(s)/deciliter  fentaNYL    Injectable 12.5 MICROGram(s) IV Push every 3 hours PRN Moderate Pain (4 - 6)  fentaNYL    Injectable 25 MICROGram(s) IV Push every 3 hours PRN Severe Pain (7 - 10)  glucagon  Injectable 1 milliGRAM(s) IntraMuscular once PRN Glucose LESS THAN 70 milligrams/deciliter  ondansetron Injectable 4 milliGRAM(s) IV Push every 6 hours PRN Nausea      RADIOLOGY & ADDITIONAL STUDIES:    CT c/a/p from : read pending

## 2019-04-23 NOTE — PROGRESS NOTE ADULT - PROBLEM SELECTOR PLAN 1
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 This is further supported by the consistent air leaks that have been seen since prior. Would keep the chest tube to suction. Questionable whether his chest tube can be removed in the near future given this persistent leak and now a likely fistula formation. May need additional     Recommend  - Continue to drain chest tube on intermittent suction - apply least amount of suction to maintain lung inflation. If there is no resolution of the leak, may need a more definitive measure to close BPF.  - Antibiotics to continue per cultures

## 2019-04-23 NOTE — PROGRESS NOTE ADULT - SUBJECTIVE AND OBJECTIVE BOX
Interval History:   ON: Doppler done on  negative for budd chiari. Pt on metolazone. Given tylenol for temp 101. WBC 30 this am Hgb drifting down to 7.9 - repeat @8am with typen and screen   Started hep gtt PTT@2am: 135- decreased rate to 11 repeat @am INR 4.7. Uo low - rao flushed with good response. bp high with levo @19 this am   ; CT chest/abd/pelvis ordered, sputum cx sent, UA, Serum proteing electrophoresis and total immunoglobin, D5W@70 for hypernatremia, Temp 101.4 at 1 pm, Blood cultures sent, JAK2 sent, 250cc 5% Albumin bolus, then 500 cc 5%Albumin bolus    SUBJECTIVE:   Patient seen and examined by bedside. Intubated, on vent. Opens eyes to verbal stimuli, but weak and lethargic. Did not follow commands or answer questions. ROS not obtainable      Vitals - Range in last 12h  T(F): , Max: 98.7 (19 @ 09:07)  HR:  (72 - 90)  BP:  (125/57 - 143/57)  BP(mean):  (84 - 106)  ABP:  (130/58 - 160/40)  ABP(mean):  (70 - 84)  RR:  (17 - 26)  SpO2:  (93% - 96%)  CVP(mm Hg): --  CVP(cm H2O): --  CO: --  CI: --  PA: --  PA(mean): --  PA(direct): --  PCWP: --    Vitals - Most Recent  T(F): 97.7 (19 @ 14:21)  HR: 90 (19 @ 14:00)  BP: 143/57 (19 @ 07:00)  RR: 26 (19 @ 14:00)  SpO2: 96% (19 @ 14:00)  I&O's Detail    2019 07:01  -  2019 07:00  --------------------------------------------------------  IN:    Albumin 25%: 150 mL    Albumin 5%  - 250 mL: 500 mL    dextrose 5%.: 1120 mL    Enteral Tube Flush: 1530 mL    Free Water: 500 mL    heparin Infusion: 55 mL    heparin Infusion: 78 mL    IV PiggyBack: 800 mL    norepinephrine Infusion: 394.4 mL  Total IN: 5127.4 mL    OUT:    Chest Tube: 22 mL    Colostomy: 325 mL    Indwelling Catheter - Urethral: 584 mL  Total OUT: 931 mL    Total NET: 4196.4 mL      2019 07:01  -  2019 14:24  --------------------------------------------------------  IN:    Albumin 5%  - 250 mL: 250 mL    dextrose 5%.: 70 mL    heparin Infusion: 77 mL    IV PiggyBack: 350 mL    norepinephrine Infusion: 109 mL  Total IN: 856 mL    OUT:    Indwelling Catheter - Urethral: 135 mL  Total OUT: 135 mL    Total NET: 721 mL        Mode: AC/ CMV (Assist Control/ Continuous Mandatory Ventilation)  RR (machine): 12  RR (patient): 274  TV (machine): 500  TV (patient): 524  FiO2: 40  PEEP: 5  ITime: 1  MAP: 11  PIP: 23      Physical Exam  Neuro: intubated, alert but non-verbal, lethargic, did not follow commands  General: no acute distress, breathing nonlabored on vent but mildly tachypneic  HEENT: PERRL, EOMI, MMD, dobhoff in place w/ TFs  Pulm: intubated, on vent-AC; large area of SQ emphysema noted on exam, prominent on L chest and neck; chest tube of L posterior chest wall noted to be clamped; breath sounds clear bilaterally  C/V: S1S2, non-tachycardic  Abd: nondistended, but firm; TTP w/ mild guarding; ostomy w/ liquid brown output; LLQ IR and LUQ PHYLLIS drain w/ clear brown ascites output; R paracolic gutter drain w/ clear brown output; abdominal incision w/ wet to dry dressing in place; no active bleeding, no purulence   Extremities: significant improvement in edema/ansarca of lower extremities; persistent edema of upper extremity forearms   : rao in place; scrotal edema resolved  Skin: mild blanching erythema on thighs, no macerations        LABS:                        8.3    32.86 )-----------( 492      ( 2019 08:27 )             26.5         141  |  106  |  40<H>  ----------------------------<  149<H>  3.6   |  25  |  0.91    Ca    8.3<L>      2019 02:15  Phos  2.4       Mg     2.2         TPro  4.8<L>  /  Alb  2.9<L>  /  TBili  x   /  DBili  x   /  AST  x   /  ALT  x   /  AlkPhos  x       LIVER FUNCTIONS - ( 2019 01:19 )  Alb: 2.9 g/dL / Pro: 4.8 g/dL / ALK PHOS: x     / ALT: x     / AST: x     / GGT: x           PT/INR - ( 2019 02:15 )   PT: 55.7 sec;   INR: 4.70          PTT - ( 2019 08:27 )  PTT:59.8 sec  Urinalysis Basic - ( 2019 11:32 )    Color: Yellow / Appearance: Cloudy / S.025 / pH: x  Gluc: x / Ketone: Trace mg/dL  / Bili: Negative / Urobili: 0.2 E.U./dL   Blood: x / Protein: 100 mg/dL / Nitrite: NEGATIVE   Leuk Esterase: Trace / RBC: 5-10 /HPF / WBC < 5 /HPF   Sq Epi: x / Non Sq Epi: 0-5 /HPF / Bacteria: Present /HPF        Culture - Blood (collected 2019 16:52)  Source: .Blood Blood-Peripheral  Preliminary Report (2019 05:01):    No growth at 12 hours    Culture - Blood (collected 2019 16:52)  Source: .Blood Blood-Peripheral  Preliminary Report (2019 05:01):    No growth at 12 hours    Culture - Sputum (collected 2019 16:08)  Source: .Sputum ETT sputum  Gram Stain (2019 09:53):    Rare epithelial cells    Numerous White blood cells    Rare Yeast    Rare Gram Positive Cocci  Preliminary Report (2019 09:54):    Normal Respiratory Johnna present    Culture in progress        MEDICATIONS  (STANDING):  albumin human 25% IVPB 50 milliLiter(s) IV Intermittent every 8 hours  ascorbic acid Syrup 250 milliGRAM(s) Oral daily  aspirin  chewable 81 milliGRAM(s) Oral daily  atorvastatin 80 milliGRAM(s) Oral at bedtime  cefepime   IVPB 2000 milliGRAM(s) IV Intermittent every 12 hours  chlorhexidine 0.12% Liquid 15 milliLiter(s) Oral Mucosa two times a day  chlorhexidine 2% Cloths 1 Application(s) Topical <User Schedule>  dextrose 5%. 1000 milliLiter(s) (50 mL/Hr) IV Continuous <Continuous>  dextrose 50% Injectable 12.5 Gram(s) IV Push once  dextrose 50% Injectable 25 Gram(s) IV Push once  dextrose 50% Injectable 25 Gram(s) IV Push once  heparin  flush 100 Units/mL Injectable 100 Unit(s) IV Push every other day  heparin  Infusion 1100 Unit(s)/Hr (11 mL/Hr) IV Continuous <Continuous>  insulin lispro (HumaLOG) corrective regimen sliding scale   SubCutaneous every 6 hours  metolazone 5 milliGRAM(s) Oral <User Schedule>  metroNIDAZOLE  IVPB 500 milliGRAM(s) IV Intermittent every 8 hours  metroNIDAZOLE  IVPB      midodrine 15 milliGRAM(s) Oral every 8 hours  norepinephrine Infusion 0.05 MICROgram(s)/kG/Min (3.436 mL/Hr) IV Continuous <Continuous>  nystatin Powder 1 Application(s) Topical two times a day  pantoprazole  Injectable 40 milliGRAM(s) IV Push daily  vancomycin  IVPB 1000 milliGRAM(s) IV Intermittent daily  zinc sulfate 220 milliGRAM(s) Oral daily    MEDICATIONS  (PRN):  acetaminophen    Suspension .. 650 milliGRAM(s) Enteral Tube every 6 hours PRN Temp greater or equal to 38C (100.4F)  dextrose 40% Gel 15 Gram(s) Oral once PRN Blood Glucose LESS THAN 70 milliGRAM(s)/deciliter  fentaNYL    Injectable 12.5 MICROGram(s) IV Push every 3 hours PRN Moderate Pain (4 - 6)  fentaNYL    Injectable 25 MICROGram(s) IV Push every 3 hours PRN Severe Pain (7 - 10)  glucagon  Injectable 1 milliGRAM(s) IntraMuscular once PRN Glucose LESS THAN 70 milligrams/deciliter  ondansetron Injectable 4 milliGRAM(s) IV Push every 6 hours PRN Nausea      RADIOLOGY & ADDITIONAL STUDIES:      < from: CT Abdomen and Pelvis w/ Oral Cont (19 @ 19:15) >  Evaluation of the chest demonstrates no interval change in large right   pleural effusion. New small left pleural effusion. There is no interval   change in small pericardial effusion. Endotracheal tube in appropriate   position. Central venous catheter with the tip in the SVC. Negative for   thoracic inlet, axillary, mediastinal or hilar lymphadenopathy. There is   very severe coronary arterial calcification. Relative hypodensity of   intracardiac blood, consistent with anemia. NG tube it is coiled within   the stomach. Evaluation of the lung parenchyma demonstrates interval   decrease in air and fluid component of left basilar loculated collection,   currently spanning 3.6 cm, previously spanning 6.8 cm since placement of   left-sided chest tube with the tip within the collection. There has been   interval development of innumerable centrilobular nodules and branching   micronodular opacity with regions of confluent consolidation within the   right upper lobe, right upper lobe, left lower lobe, left upper lobe and   to a lesser extent within the right lower lobe. Compressive atelectasis   of the right lower lobe. Interval resolution of previous demonstrated   patchy alveolar groundglass opacity. Persistent fibrotic consolidation   with intervening bronchiectasis within the dependent aspect of the left   lower lobe. Evaluation of the abdomen demonstrates that the unenhanced   liver, spleen, bilateral adrenal glands, pancreas and bilateral kidneys   are unremarkable aside from linear calcifications within the splenic   parenchyma. There is cholelithiasis. Evaluation of the gastrointestinal   tract left lower quadrant colostomy and left hemicolectomy with Beaver's   pouch within the pelvis. There has been interval development of very   severe circumferential mural thickening secondary to submucosal edema of   the entirety of the colon and also multiple loops of distal small bowel,   predominantly within the right lower quadrant. Loculated complicated   fluid within right inguinal region spanning 6.2 cm is unchanged, possibly   postprocedural in nature. Evaluation of the pelvis demonstrates Rao   catheter balloon within the bladder containing a small volume of air.   Seminal vesicles and bladder are unremarkable. There has been interval   decrease with now moderate residual volume of free fluid with loosely   loculated components within the right hemiabdomen interposed between   loops of small bowel spanning6.8 cm more laterally and 4.8 cm more   centrally. Interval placement of percutaneous drainage catheter with the   tip within the left upper quadrant; percutaneous drainage catheter with   the tip within the left lower quadrant; percutaneous catheterwith the   tip within the anterior left hemipelvis. There is diffuse cachexia and   diffuse anasarca. There is very severe diffuse aortic and vascular   calcification, including extending to the small vessels. There is an IVC   filter in appropriate position. There is negative for adenopathy.   Evaluation of the osseous structures demonstrates no destructive lesion.        IMPRESSION:    Interval development of severe multifocal infectious/inflammatory   pneumonitis. Persistent large right pleural effusion and new small left   pleural effusion; persistent small pericardial effusion. Interval   decrease in left empyema/abscess since placement of drainage catheter.    Interval development of severe infectious/inflammatory enterocolitis.     Significant interval decrease in abdominal ascites with residual loosely   loculated fluid within the right hemiabdomen.     Interval left hemicolectomy and left lower quadrant ileostomy.    < end of copied text >

## 2019-04-23 NOTE — PROGRESS NOTE ADULT - ATTENDING COMMENTS
CT chest done yesterday showed improvement in lung abscess. Although, CXR done today showed subcutaneous emphysema as tube got blocked accidentally. Discussed with family and answered all their question. 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 Free air leak is persistent in chest tube. Continue continuous suction. No immediate plan to remove chest tube.

## 2019-04-24 LAB
ALBUMIN FLD-MCNC: 1.3 G/DL — SIGNIFICANT CHANGE UP
ALBUMIN SERPL ELPH-MCNC: 2.6 G/DL — LOW (ref 3.3–5)
ALP SERPL-CCNC: 57 U/L — SIGNIFICANT CHANGE UP (ref 40–120)
ALT FLD-CCNC: <5 U/L — LOW (ref 10–45)
ANION GAP SERPL CALC-SCNC: 14 MMOL/L — SIGNIFICANT CHANGE UP (ref 5–17)
APTT 50/50 2HOUR INCUB: 35.8 SEC — SIGNIFICANT CHANGE UP (ref 27.5–37.4)
APTT BLD: 29.6 SECS — SIGNIFICANT CHANGE UP (ref 27.5–37.4)
APTT BLD: 45.4 SEC — HIGH (ref 27.5–36.3)
APTT BLD: 66.5 SEC — HIGH (ref 27.5–36.3)
APTT BLD: 91.5 SEC — HIGH (ref 27.5–36.3)
AST SERPL-CCNC: 22 U/L — SIGNIFICANT CHANGE UP (ref 10–40)
BILIRUB DIRECT SERPL-MCNC: 0.2 MG/DL — SIGNIFICANT CHANGE UP (ref 0–0.2)
BILIRUB INDIRECT FLD-MCNC: 0.3 MG/DL — SIGNIFICANT CHANGE UP (ref 0.2–1)
BILIRUB SERPL-MCNC: 0.5 MG/DL — SIGNIFICANT CHANGE UP (ref 0.2–1.2)
BUN SERPL-MCNC: 42 MG/DL — HIGH (ref 7–23)
CALCIUM SERPL-MCNC: 7.7 MG/DL — LOW (ref 8.4–10.5)
CHLORIDE SERPL-SCNC: 103 MMOL/L — SIGNIFICANT CHANGE UP (ref 96–108)
CO2 SERPL-SCNC: 21 MMOL/L — LOW (ref 22–31)
COPPER ?TM UR-MCNC: 205 CD:428395575 — HIGH (ref 15–60)
COPPER UR-MCNC: 1200 ML — SIGNIFICANT CHANGE UP
CREAT SERPL-MCNC: 1.09 MG/DL — SIGNIFICANT CHANGE UP (ref 0.5–1.3)
CULTURE RESULTS: SIGNIFICANT CHANGE UP
FACT IX PPP CHRO-ACNC: 71 % — SIGNIFICANT CHANGE UP (ref 69–167)
FACT V ACT/NOR PPP: 49 % — LOW (ref 70–143)
FACT VIII ACT/NOR PPP: 229 % — HIGH (ref 51–138)
FACT X ACT/NOR PPP: 24 % — LOW (ref 68–149)
FIBRINOGEN PPP-MCNC: 381 MG/DL — SIGNIFICANT CHANGE UP (ref 258–438)
GLUCOSE BLDC GLUCOMTR-MCNC: 101 MG/DL — HIGH (ref 70–99)
GLUCOSE BLDC GLUCOMTR-MCNC: 118 MG/DL — HIGH (ref 70–99)
GLUCOSE BLDC GLUCOMTR-MCNC: 119 MG/DL — HIGH (ref 70–99)
GLUCOSE BLDC GLUCOMTR-MCNC: 126 MG/DL — HIGH (ref 70–99)
GLUCOSE SERPL-MCNC: 132 MG/DL — HIGH (ref 70–99)
HCT VFR BLD CALC: 22.1 % — LOW (ref 39–50)
HCT VFR BLD CALC: 24.6 % — LOW (ref 39–50)
HGB BLD-MCNC: 7.2 G/DL — LOW (ref 13–17)
HGB BLD-MCNC: 7.8 G/DL — LOW (ref 13–17)
INR BLD: 3.47 — HIGH (ref 0.88–1.16)
INR BLD: 3.84 — HIGH (ref 0.88–1.16)
MAGNESIUM SERPL-MCNC: 2 MG/DL — SIGNIFICANT CHANGE UP (ref 1.6–2.6)
MCHC RBC-ENTMCNC: 30.4 PG — SIGNIFICANT CHANGE UP (ref 27–34)
MCHC RBC-ENTMCNC: 31.4 PG — SIGNIFICANT CHANGE UP (ref 27–34)
MCHC RBC-ENTMCNC: 31.7 GM/DL — LOW (ref 32–36)
MCHC RBC-ENTMCNC: 32.6 GM/DL — SIGNIFICANT CHANGE UP (ref 32–36)
MCV RBC AUTO: 95.7 FL — SIGNIFICANT CHANGE UP (ref 80–100)
MCV RBC AUTO: 96.5 FL — SIGNIFICANT CHANGE UP (ref 80–100)
NRBC # BLD: 0 /100 WBCS — SIGNIFICANT CHANGE UP (ref 0–0)
NRBC # BLD: 0 /100 WBCS — SIGNIFICANT CHANGE UP (ref 0–0)
PAT CTL 2H: 34.4 SEC — SIGNIFICANT CHANGE UP (ref 27.5–37.4)
PH FLD: >7.81 — SIGNIFICANT CHANGE UP
PHOSPHATE SERPL-MCNC: 3.6 MG/DL — SIGNIFICANT CHANGE UP (ref 2.5–4.5)
PLATELET # BLD AUTO: 373 K/UL — SIGNIFICANT CHANGE UP (ref 150–400)
PLATELET # BLD AUTO: 440 K/UL — HIGH (ref 150–400)
POTASSIUM SERPL-MCNC: 3.4 MMOL/L — LOW (ref 3.5–5.3)
POTASSIUM SERPL-SCNC: 3.4 MMOL/L — LOW (ref 3.5–5.3)
PROT FLD-MCNC: 2.4 G/DL — SIGNIFICANT CHANGE UP
PROT SERPL-MCNC: 5 G/DL — LOW (ref 6–8.3)
PROTHROM AB SERPL-ACNC: 40.7 SEC — HIGH (ref 10–12.9)
PROTHROM AB SERPL-ACNC: 45.2 SEC — HIGH (ref 10–12.9)
PT 50/50: 13.8 SECS — HIGH (ref 9.7–13.5)
RBC # BLD: 2.29 M/UL — LOW (ref 4.2–5.8)
RBC # BLD: 2.57 M/UL — LOW (ref 4.2–5.8)
RBC # FLD: 17.2 % — HIGH (ref 10.3–14.5)
RBC # FLD: 17.2 % — HIGH (ref 10.3–14.5)
SODIUM SERPL-SCNC: 138 MMOL/L — SIGNIFICANT CHANGE UP (ref 135–145)
SPECIMEN SOURCE: SIGNIFICANT CHANGE UP
THROMBIN TIME: 24.3 SEC — HIGH (ref 17.6–24)
VANCOMYCIN FLD-MCNC: 28.3 UG/ML
WBC # BLD: 21.98 K/UL — HIGH (ref 3.8–10.5)
WBC # BLD: 22.73 K/UL — HIGH (ref 3.8–10.5)
WBC # FLD AUTO: 21.98 K/UL — HIGH (ref 3.8–10.5)
WBC # FLD AUTO: 22.73 K/UL — HIGH (ref 3.8–10.5)

## 2019-04-24 PROCEDURE — 31622 DX BRONCHOSCOPE/WASH: CPT

## 2019-04-24 PROCEDURE — 99291 CRITICAL CARE FIRST HOUR: CPT

## 2019-04-24 PROCEDURE — 99233 SBSQ HOSP IP/OBS HIGH 50: CPT | Mod: GC,25

## 2019-04-24 PROCEDURE — 71045 X-RAY EXAM CHEST 1 VIEW: CPT | Mod: 26

## 2019-04-24 RX ORDER — FENTANYL CITRATE 50 UG/ML
12.5 INJECTION INTRAVENOUS
Qty: 0 | Refills: 0 | Status: DISCONTINUED | OUTPATIENT
Start: 2019-04-24 | End: 2019-05-01

## 2019-04-24 RX ORDER — POTASSIUM CHLORIDE 20 MEQ
20 PACKET (EA) ORAL
Qty: 0 | Refills: 0 | Status: COMPLETED | OUTPATIENT
Start: 2019-04-24 | End: 2019-04-24

## 2019-04-24 RX ORDER — FENTANYL CITRATE 50 UG/ML
25 INJECTION INTRAVENOUS
Qty: 0 | Refills: 0 | Status: DISCONTINUED | OUTPATIENT
Start: 2019-04-24 | End: 2019-05-01

## 2019-04-24 RX ORDER — PROPOFOL 10 MG/ML
5 INJECTION, EMULSION INTRAVENOUS
Qty: 1000 | Refills: 0 | Status: DISCONTINUED | OUTPATIENT
Start: 2019-04-24 | End: 2019-04-26

## 2019-04-24 RX ORDER — NYSTATIN CREAM 100000 [USP'U]/G
1 CREAM TOPICAL
Qty: 0 | Refills: 0 | Status: DISCONTINUED | OUTPATIENT
Start: 2019-04-24 | End: 2019-04-24

## 2019-04-24 RX ORDER — HEPARIN SODIUM 5000 [USP'U]/ML
950 INJECTION INTRAVENOUS; SUBCUTANEOUS
Qty: 25000 | Refills: 0 | Status: DISCONTINUED | OUTPATIENT
Start: 2019-04-24 | End: 2019-04-25

## 2019-04-24 RX ORDER — PHYTONADIONE (VIT K1) 5 MG
10 TABLET ORAL ONCE
Qty: 0 | Refills: 0 | Status: COMPLETED | OUTPATIENT
Start: 2019-04-24 | End: 2019-04-24

## 2019-04-24 RX ORDER — CISATRACURIUM BESYLATE 2 MG/ML
20 INJECTION INTRAVENOUS ONCE
Qty: 0 | Refills: 0 | Status: COMPLETED | OUTPATIENT
Start: 2019-04-24 | End: 2019-04-24

## 2019-04-24 RX ORDER — FENTANYL CITRATE 50 UG/ML
100 INJECTION INTRAVENOUS ONCE
Qty: 0 | Refills: 0 | Status: DISCONTINUED | OUTPATIENT
Start: 2019-04-24 | End: 2019-04-24

## 2019-04-24 RX ADMIN — Medication 81 MILLIGRAM(S): at 11:49

## 2019-04-24 RX ADMIN — Medication 50 MILLILITER(S): at 07:01

## 2019-04-24 RX ADMIN — MIDODRINE HYDROCHLORIDE 15 MILLIGRAM(S): 2.5 TABLET ORAL at 22:04

## 2019-04-24 RX ADMIN — MIDODRINE HYDROCHLORIDE 15 MILLIGRAM(S): 2.5 TABLET ORAL at 12:58

## 2019-04-24 RX ADMIN — CISATRACURIUM BESYLATE 20 MILLIGRAM(S): 2 INJECTION INTRAVENOUS at 07:52

## 2019-04-24 RX ADMIN — MIDODRINE HYDROCHLORIDE 15 MILLIGRAM(S): 2.5 TABLET ORAL at 05:00

## 2019-04-24 RX ADMIN — CEFEPIME 100 MILLIGRAM(S): 1 INJECTION, POWDER, FOR SOLUTION INTRAMUSCULAR; INTRAVENOUS at 05:01

## 2019-04-24 RX ADMIN — ATORVASTATIN CALCIUM 80 MILLIGRAM(S): 80 TABLET, FILM COATED ORAL at 22:04

## 2019-04-24 RX ADMIN — Medication 100 MILLIGRAM(S): at 17:14

## 2019-04-24 RX ADMIN — Medication 100 MILLIGRAM(S): at 10:49

## 2019-04-24 RX ADMIN — CEFEPIME 100 MILLIGRAM(S): 1 INJECTION, POWDER, FOR SOLUTION INTRAMUSCULAR; INTRAVENOUS at 17:15

## 2019-04-24 RX ADMIN — CHLORHEXIDINE GLUCONATE 15 MILLILITER(S): 213 SOLUTION TOPICAL at 05:01

## 2019-04-24 RX ADMIN — Medication 250 MILLIGRAM(S): at 11:49

## 2019-04-24 RX ADMIN — FENTANYL CITRATE 25 MICROGRAM(S): 50 INJECTION INTRAVENOUS at 15:50

## 2019-04-24 RX ADMIN — NYSTATIN CREAM 1 APPLICATION(S): 100000 CREAM TOPICAL at 17:12

## 2019-04-24 RX ADMIN — PANTOPRAZOLE SODIUM 40 MILLIGRAM(S): 20 TABLET, DELAYED RELEASE ORAL at 11:49

## 2019-04-24 RX ADMIN — Medication 50 MILLIEQUIVALENT(S): at 10:49

## 2019-04-24 RX ADMIN — ZINC SULFATE TAB 220 MG (50 MG ZINC EQUIVALENT) 220 MILLIGRAM(S): 220 (50 ZN) TAB at 11:49

## 2019-04-24 RX ADMIN — CHLORHEXIDINE GLUCONATE 1 APPLICATION(S): 213 SOLUTION TOPICAL at 05:01

## 2019-04-24 RX ADMIN — Medication 50 MILLIEQUIVALENT(S): at 05:46

## 2019-04-24 RX ADMIN — FENTANYL CITRATE 25 MICROGRAM(S): 50 INJECTION INTRAVENOUS at 09:16

## 2019-04-24 RX ADMIN — FENTANYL CITRATE 25 MICROGRAM(S): 50 INJECTION INTRAVENOUS at 15:25

## 2019-04-24 RX ADMIN — FENTANYL CITRATE 25 MICROGRAM(S): 50 INJECTION INTRAVENOUS at 09:30

## 2019-04-24 RX ADMIN — PROPOFOL 2.2 MICROGRAM(S)/KG/MIN: 10 INJECTION, EMULSION INTRAVENOUS at 08:17

## 2019-04-24 RX ADMIN — FENTANYL CITRATE 100 MICROGRAM(S): 50 INJECTION INTRAVENOUS at 07:55

## 2019-04-24 RX ADMIN — NYSTATIN CREAM 1 APPLICATION(S): 100000 CREAM TOPICAL at 05:01

## 2019-04-24 RX ADMIN — Medication 3.44 MICROGRAM(S)/KG/MIN: at 05:46

## 2019-04-24 RX ADMIN — Medication 100 MILLIGRAM(S): at 02:11

## 2019-04-24 RX ADMIN — Medication 125 MILLILITER(S): at 00:16

## 2019-04-24 RX ADMIN — FENTANYL CITRATE 100 MICROGRAM(S): 50 INJECTION INTRAVENOUS at 07:46

## 2019-04-24 RX ADMIN — Medication 102 MILLIGRAM(S): at 22:04

## 2019-04-24 RX ADMIN — Medication 50 MILLIEQUIVALENT(S): at 03:38

## 2019-04-24 NOTE — PROGRESS NOTE ADULT - SUBJECTIVE AND OBJECTIVE BOX
Interval History:   ON: PTT 82- no changes repeat @2am :91.5- dec to 9.5 repeat @9am. UO 15- given 5%alb x1. AM labs repleted.   4/23; UO low, 250cc bolus of 5%Albumin, significant SQ air noted on CXR, +crepitus on exam, no pneumothorax; Chest tube placed back to wall suction; CT demonstrating severe enterocolitis; cdiff negative; consented for trach and peg. Repeat CXR@5pm equivocal pneumo, possible small pneumo per rads,     SUBJECTIVE:   Patient seen and examined by bedside. Intubated, on vent. Opened eyes to voice but did not follow commands or answer questions. ROS not obtainable.       Vitals - Range in last 12h  T(F): , Max: 98 (04-24-19 @ 09:00)  HR:  (71 - 94)  BP:  (101/47 - 126/61)  BP(mean):  (65 - 88)  ABP:  (106/40 - 166/64)  ABP(mean):  (60 - 102)  RR:  (11 - 27)  SpO2:  (91% - 97%)  CVP(mm Hg): --  CVP(cm H2O): --  CO: --  CI: --  PA: --  PA(mean): --  PA(direct): --  PCWP: --    Vitals - Most Recent  T(F): 98 (04-24-19 @ 09:00)  HR: 78 (04-24-19 @ 13:00)  BP: 126/61 (04-24-19 @ 08:00)  RR: 24 (04-24-19 @ 13:00)  SpO2: 95% (04-24-19 @ 13:00)  I&O's Detail    23 Apr 2019 07:01  -  24 Apr 2019 07:00  --------------------------------------------------------  IN:    Albumin 25%: 150 mL    Albumin 5%  - 250 mL: 500 mL    dextrose 5%.: 70 mL    Enteral Tube Flush: 180 mL    Glucerna 1.5: 45 mL    heparin Infusion: 236 mL    IV PiggyBack: 700 mL    norepinephrine Infusion: 213.4 mL  Total IN: 2094.4 mL    OUT:    Bulb: 20 mL    Chest Tube: 15 mL    Colostomy: 300 mL    Indwelling Catheter - Urethral: 498 mL  Total OUT: 833 mL    Total NET: 1261.4 mL      24 Apr 2019 07:01  -  24 Apr 2019 13:28  --------------------------------------------------------  IN:    IV PiggyBack: 100 mL    norepinephrine Infusion: 43 mL    propofol Infusion: 8 mL  Total IN: 151 mL    OUT:    Indwelling Catheter - Urethral: 100 mL  Total OUT: 100 mL    Total NET: 51 mL        Mode: AC/ CMV (Assist Control/ Continuous Mandatory Ventilation)  RR (machine): 12  RR (patient): 24  TV (machine): 500  TV (patient): 509  FiO2: 40  PEEP: 5  ITime: 0.9  MAP: 12  PIP: 20      Physical Exam  Neuro: intubated, alert but non-verbal, lethargic, did not follow commands  General: no acute distress, breathing nonlabored on vent but mildly tachypneic  HEENT: PERRL, EOMI, MMD, dobhoff in place w/ TFs; subcutaneous emphysema w/ +crepitus extending from base of neck to chin  Pulm: intubated, on vent-AC; large area of SQ emphysema noted on exam, prominent on L chest; chest tube of L posterior chest to wall suction w/ minimal output; bilateral rhonchi w/ expiratory wheezing  C/V: S1S2, non-tachycardic  Abd: nondistended, but firm; TTP w/ mild guarding; ostomy w/ liquid brown output; LLQ IR and LUQ PHYLLIS drain w/ clear brown ascites output; R paracolic gutter drain w/ clear brown output; abdominal incision w/ wet to dry dressing in place; no active bleeding, no purulence   Extremities: significant improvement in edema/ansarca of lower extremities; remain 2+ pitting edema w/ persistent edema of upper extremity forearms  : rao in place; scrotal edema resolved; blanching erythema near proximal thigh and groin  Skin: mild blanching erythema on thighs, no macerations        LABS:                        7.8    22.73 )-----------( 440      ( 24 Apr 2019 11:04 )             24.6     04-24    138  |  103  |  42<H>  ----------------------------<  132<H>  3.4<L>   |  21<L>  |  1.09    Ca    7.7<L>      24 Apr 2019 02:26  Phos  3.6     04-24  Mg     2.0     04-24    TPro  5.0<L>  /  Alb  2.6<L>  /  TBili  0.5  /  DBili  0.2  /  AST  22  /  ALT  <5<L>  /  AlkPhos  57  04-24    LIVER FUNCTIONS - ( 24 Apr 2019 02:26 )  Alb: 2.6 g/dL / Pro: 5.0 g/dL / ALK PHOS: 57 U/L / ALT: <5 U/L / AST: 22 U/L / GGT: x           PT/INR - ( 24 Apr 2019 11:05 )   PT: 40.7 sec;   INR: 3.47          PTT - ( 24 Apr 2019 11:05 )  PTT:45.4 sec      Culture - Blood (collected 22 Apr 2019 16:52)  Source: .Blood Blood-Peripheral  Preliminary Report (23 Apr 2019 17:01):    No growth at 1 day.    Culture - Blood (collected 22 Apr 2019 16:52)  Source: .Blood Blood-Peripheral  Preliminary Report (23 Apr 2019 17:01):    No growth at 1 day.    Culture - Sputum (collected 22 Apr 2019 16:08)  Source: .Sputum ETT sputum  Gram Stain (23 Apr 2019 09:53):    Rare epithelial cells    Numerous White blood cells    Rare Yeast    Rare Gram Positive Cocci  Final Report (24 Apr 2019 11:26):    Normal Respiratory Johnna present        MEDICATIONS  (STANDING):  ascorbic acid Syrup 250 milliGRAM(s) Oral daily  aspirin  chewable 81 milliGRAM(s) Oral daily  atorvastatin 80 milliGRAM(s) Oral at bedtime  cefepime   IVPB 2000 milliGRAM(s) IV Intermittent every 12 hours  chlorhexidine 0.12% Liquid 15 milliLiter(s) Oral Mucosa two times a day  chlorhexidine 2% Cloths 1 Application(s) Topical <User Schedule>  dextrose 5%. 1000 milliLiter(s) (50 mL/Hr) IV Continuous <Continuous>  dextrose 50% Injectable 12.5 Gram(s) IV Push once  dextrose 50% Injectable 25 Gram(s) IV Push once  dextrose 50% Injectable 25 Gram(s) IV Push once  heparin  flush 100 Units/mL Injectable 100 Unit(s) IV Push every other day  heparin  Infusion 950 Unit(s)/Hr (9.5 mL/Hr) IV Continuous <Continuous>  insulin lispro (HumaLOG) corrective regimen sliding scale   SubCutaneous every 6 hours  metroNIDAZOLE  IVPB 500 milliGRAM(s) IV Intermittent every 8 hours  metroNIDAZOLE  IVPB      midodrine 15 milliGRAM(s) Oral every 8 hours  norepinephrine Infusion 0.05 MICROgram(s)/kG/Min (3.436 mL/Hr) IV Continuous <Continuous>  nystatin Powder 1 Application(s) Topical two times a day  pantoprazole  Injectable 40 milliGRAM(s) IV Push daily  propofol Infusion 5 MICROgram(s)/kG/Min (2.199 mL/Hr) IV Continuous <Continuous>  zinc sulfate 220 milliGRAM(s) Oral daily    MEDICATIONS  (PRN):  acetaminophen    Suspension .. 650 milliGRAM(s) Enteral Tube every 6 hours PRN Temp greater or equal to 38C (100.4F)  dextrose 40% Gel 15 Gram(s) Oral once PRN Blood Glucose LESS THAN 70 milliGRAM(s)/deciliter  fentaNYL    Injectable 12.5 MICROGram(s) IV Push every 3 hours PRN Moderate Pain (4 - 6)  fentaNYL    Injectable 25 MICROGram(s) IV Push every 3 hours PRN Severe Pain (7 - 10)  glucagon  Injectable 1 milliGRAM(s) IntraMuscular once PRN Glucose LESS THAN 70 milligrams/deciliter  ondansetron Injectable 4 milliGRAM(s) IV Push every 6 hours PRN Nausea      RADIOLOGY & ADDITIONAL STUDIES:

## 2019-04-24 NOTE — PROGRESS NOTE ADULT - SUBJECTIVE AND OBJECTIVE BOX
On interval followup, the left arm PICC site is clean, dry and non-inflamed.  T 100 range. Notes, cultures and progress are followed.

## 2019-04-24 NOTE — CHART NOTE - NSCHARTNOTEFT_GEN_A_CORE
Admitting Diagnosis:   Patient is a 86y old  Male who presents with a chief complaint of right inguinal discomfort (24 Apr 2019 14:55)    PAST MEDICAL & SURGICAL HISTORY:  Esophageal reflux  Acute diarrhea  HLD (hyperlipidemia)  HTN (hypertension)  Prostate cancer  Stented coronary artery: x3 , per pt.  Atherosclerosis of coronary artery: CAD (coronary artery disease)  S/P hernia repair  Surgery, elective: cardiac stent x3  History of prostate surgery    Current Nutrition Order:  NPO    PO Intake: Good (%) [   ]  Fair (50-75%) [   ] Poor (<25%) [   ]- N/A as pt NPO    GI Issues: +colostomy (300 mL output x24h)- water 4/23 concern for cdiff, abdomen soft/non tender per flow sheet/documentation    Pain: Unable to assess at this time as pt vented    Skin Integrity: R heel PI (no stage documented), L heel stage III, sacrum unstageable wound, back suspected deep tissue injury per flow sheet    Labs:   04-24    138  |  103  |  42<H>  ----------------------------<  132<H>  3.4<L>   |  21<L>  |  1.09    Ca    7.7<L>      24 Apr 2019 02:26  Phos  3.6     04-24  Mg     2.0     04-24    TPro  5.0<L>  /  Alb  2.6<L>  /  TBili  0.5  /  DBili  0.2  /  AST  22  /  ALT  <5<L>  /  AlkPhos  57  04-24    CAPILLARY BLOOD GLUCOSE    POCT Blood Glucose.: 118 mg/dL (24 Apr 2019 16:11)  POCT Blood Glucose.: 101 mg/dL (24 Apr 2019 11:40)  POCT Blood Glucose.: 126 mg/dL (24 Apr 2019 06:02)  POCT Blood Glucose.: 136 mg/dL (23 Apr 2019 23:18)  POCT Blood Glucose.: 149 mg/dL (23 Apr 2019 17:27)    Medications:  MEDICATIONS  (STANDING):  ascorbic acid Syrup 250 milliGRAM(s) Oral daily  aspirin  chewable 81 milliGRAM(s) Oral daily  atorvastatin 80 milliGRAM(s) Oral at bedtime  cefepime   IVPB 2000 milliGRAM(s) IV Intermittent every 12 hours  chlorhexidine 0.12% Liquid 15 milliLiter(s) Oral Mucosa two times a day  chlorhexidine 2% Cloths 1 Application(s) Topical <User Schedule>  dextrose 5%. 1000 milliLiter(s) (50 mL/Hr) IV Continuous <Continuous>  dextrose 50% Injectable 12.5 Gram(s) IV Push once  dextrose 50% Injectable 25 Gram(s) IV Push once  dextrose 50% Injectable 25 Gram(s) IV Push once  heparin  flush 100 Units/mL Injectable 100 Unit(s) IV Push every other day  heparin  Infusion 950 Unit(s)/Hr (9.5 mL/Hr) IV Continuous <Continuous>  insulin lispro (HumaLOG) corrective regimen sliding scale   SubCutaneous every 6 hours  metroNIDAZOLE  IVPB 500 milliGRAM(s) IV Intermittent every 8 hours  metroNIDAZOLE  IVPB      midodrine 15 milliGRAM(s) Oral every 8 hours  norepinephrine Infusion 0.05 MICROgram(s)/kG/Min (3.436 mL/Hr) IV Continuous <Continuous>  nystatin Powder 1 Application(s) Topical two times a day  pantoprazole  Injectable 40 milliGRAM(s) IV Push daily  propofol Infusion 5 MICROgram(s)/kG/Min (2.199 mL/Hr) IV Continuous <Continuous>  zinc sulfate 220 milliGRAM(s) Oral daily    MEDICATIONS  (PRN):  acetaminophen    Suspension .. 650 milliGRAM(s) Enteral Tube every 6 hours PRN Temp greater or equal to 38C (100.4F)  dextrose 40% Gel 15 Gram(s) Oral once PRN Blood Glucose LESS THAN 70 milliGRAM(s)/deciliter  fentaNYL    Injectable 12.5 MICROGram(s) IV Push every 3 hours PRN Moderate Pain (4 - 6)  fentaNYL    Injectable 25 MICROGram(s) IV Push every 3 hours PRN Severe Pain (7 - 10)  glucagon  Injectable 1 milliGRAM(s) IntraMuscular once PRN Glucose LESS THAN 70 milligrams/deciliter  ondansetron Injectable 4 milliGRAM(s) IV Push every 6 hours PRN Nausea    Weight:  78.6 (4/11)  77.5kg (4/24)    Weight Change: Pt weighed 161.7lb on admission, wt gain likely 2/2 fluid. Please trend weights for further assessment    Nutrition Focused Physical Exam: Completed [X on 4/6 ]  Not Pertinent [   ]  Per physical assessment: Muscle Wasting- Temporal [  X ]  Clavicle/Pectoral [  X ]  Shoulder/Deltoid [ X  ]  Scapula [ X  ]  Interosseous [  X ]  Quadriceps [   ]  Gastrocnemius [   ]  Fat Wasting- Orbital [ X  ]  Buccal [ X  ]  Triceps [   ]  Rib [ X  ]  Suspect severe protein-calorie malnutrition 2/2 to physical assessment, inadequate energy intake of <75% for >1 month    Estimated energy needs:   Height 71"; .7#; #; 94% IBW  IBW used to calculate energy needs as pt vented. Needs estimated for maintenance in older adults; increased for malnutrition, infection, wounds.    8930-7889 kcal (30-35 kcal/kg), 117-156 gm (1.5-2.0 gm/kg), fluid needs per team    Subjective: 85 yo M with history of b/l laparoscopic robotic-assisted inguinal hernia repair presenting with b/l segmental pulmonary embolisms, left lung abscess, pneumoperitoneum, and distal descending colon abscess (likely source of pneumoperitoneum), significant iliofemoral DVT burden, s/p IVCF placement and IR drainage of LLQ abdominal collection (4/5), s/p ex-lap, LAURYN, sigmoidectomy, drain placement in R. paracolic gutter, and abthera vac placement (4/7), s/p planned RTOR, packing removal, and end colostomy creation (4/10), now w/ MRSA + proteus PNA, persistent coagulopathy, LLL pulmonary empyema s/p bedside pigtail drainage (4/15), s/p extubation c/b vomiting and aspiration, reintubated (4/21), s/p tracheostomy creation (4/24). GI following for possible liver disease but no evidence of cirrhosis on imaging. PT following. No propofol running at this time. Pt started on TPN 4/7. TPN weaned off on 4/14 and pt started on TF. Pt NPO for trach and possible PEG 4/25. Increased est kcal/protein needs 2/2 wounds- please see below for full EN recs.  Discussed plan with team. RD to monitor and f/u per high risk protocol.    Previous Nutrition Diagnosis:  Malnutrition (suspected severe) RT inadequate energy intake 2/2 lack of appetite and poor PO intake 2/2 multiple hospitalizations AEB muscle loss, fat loss, weight fluctuation, and inadequate energy intake    Active [ X ]  Resolved [   ]    PES: Increased protein needs RT increased demand for nutrients AEB wounds    Active [ X ]  Resolved [   ]    Goal: Consistently meet % of EER; pt will show no further s/s malnutrition throughout admit    Recommendations:  1. Recommend resume Glucerna 1.5 at 50 mL/hr and increase to new goal of 70 mL/hr with Prostat SF 1x/day to best meet pt's needs at this time (2620 kcal, 154 gm pro, 1275 mL free water, 168% RDI vitamin/mineral, 25.7 non protein kcal/kg, 33.6 kcal/kg, 1.97 gm pro/kg)- water flushes per team. Continue volume based feeding protocol & monitor for signs of intolerance.  2. Obtain current weight and trend weekly weights for further assessment.  3. Monitor labs and replete electrolytes prn.    Education: N/A 2/2 pt's medical status    Risk Level: High [ X ] Moderate [   ] Low [   ]

## 2019-04-24 NOTE — PROGRESS NOTE ADULT - SUBJECTIVE AND OBJECTIVE BOX
Pt seen and examined at bedside.    PERTINENT REVIEW OF SYSTEMS:  CONSTITUTIONAL: No weakness, fevers or chills  HEENT: No visual changes; No vertigo or throat pain   GASTROINTESTINAL: No abdominal or epigastric pain. No nausea, vomiting, or hematemesis; No diarrhea or constipation. No melena or hematochezia.  NEUROLOGICAL: No numbness or weakness  SKIN: No itching, burning, rashes, or lesions     Allergies    No Known Allergies    Intolerances      MEDICATIONS:  MEDICATIONS  (STANDING):  albumin human 25% IVPB 50 milliLiter(s) IV Intermittent every 8 hours  ascorbic acid Syrup 250 milliGRAM(s) Oral daily  aspirin  chewable 81 milliGRAM(s) Oral daily  atorvastatin 80 milliGRAM(s) Oral at bedtime  cefepime   IVPB 2000 milliGRAM(s) IV Intermittent every 12 hours  chlorhexidine 0.12% Liquid 15 milliLiter(s) Oral Mucosa two times a day  chlorhexidine 2% Cloths 1 Application(s) Topical <User Schedule>  dextrose 5%. 1000 milliLiter(s) (50 mL/Hr) IV Continuous <Continuous>  dextrose 50% Injectable 12.5 Gram(s) IV Push once  dextrose 50% Injectable 25 Gram(s) IV Push once  dextrose 50% Injectable 25 Gram(s) IV Push once  heparin  flush 100 Units/mL Injectable 100 Unit(s) IV Push every other day  heparin  Infusion 950 Unit(s)/Hr (9.5 mL/Hr) IV Continuous <Continuous>  insulin lispro (HumaLOG) corrective regimen sliding scale   SubCutaneous every 6 hours  metroNIDAZOLE  IVPB 500 milliGRAM(s) IV Intermittent every 8 hours  metroNIDAZOLE  IVPB      midodrine 15 milliGRAM(s) Oral every 8 hours  norepinephrine Infusion 0.05 MICROgram(s)/kG/Min (3.436 mL/Hr) IV Continuous <Continuous>  nystatin Powder 1 Application(s) Topical two times a day  pantoprazole  Injectable 40 milliGRAM(s) IV Push daily  propofol Infusion 5 MICROgram(s)/kG/Min (2.199 mL/Hr) IV Continuous <Continuous>  zinc sulfate 220 milliGRAM(s) Oral daily    MEDICATIONS  (PRN):  acetaminophen    Suspension .. 650 milliGRAM(s) Enteral Tube every 6 hours PRN Temp greater or equal to 38C (100.4F)  dextrose 40% Gel 15 Gram(s) Oral once PRN Blood Glucose LESS THAN 70 milliGRAM(s)/deciliter  fentaNYL    Injectable 12.5 MICROGram(s) IV Push every 3 hours PRN Moderate Pain (4 - 6)  fentaNYL    Injectable 25 MICROGram(s) IV Push every 3 hours PRN Severe Pain (7 - 10)  glucagon  Injectable 1 milliGRAM(s) IntraMuscular once PRN Glucose LESS THAN 70 milligrams/deciliter  ondansetron Injectable 4 milliGRAM(s) IV Push every 6 hours PRN Nausea    Vital Signs Last 24 Hrs  T(C): 36.7 (2019 09:00), Max: 37.9 (2019 00:50)  T(F): 98 (2019 09:00), Max: 100.2 (2019 00:50)  HR: 72 (2019 10:00) (72 - 102)  BP: 126/61 (2019 08:00) (82/59 - 127/63)  BP(mean): 88 (2019 08:00) (61 - 88)  RR: 22 (2019 10:00) (11 - 28)  SpO2: 92% (2019 10:00) (91% - 97%)     @ 07: @ 07:00  --------------------------------------------------------  IN: 2094.4 mL / OUT: 833 mL / NET: 1261.4 mL     @ 07: @ 11:07  --------------------------------------------------------  IN: 8 mL / OUT: 15 mL / NET: -7 mL      PHYSICAL EXAM:    General: frail, cachectic ; in no acute distress  HEENT: MMM, conjunctiva and sclera clear  Gastrointestinal: Soft non-tender non-distended; Normal bowel sounds; No hepatosplenomegaly. No rebound or guarding.  ostomy w/ liquid brown output; LLQ IR and LUQ PHYLLIS drain. abdominal incision w/ dressing  Skin: Warm and dry. No obvious rash    LABS:                        7.2    21.98 )-----------( 373      ( 2019 02:27 )             22.1     04-    138  |  103  |  42<H>  ----------------------------<  132<H>  3.4<L>   |  21<L>  |  1.09    Ca    7.7<L>      2019 02:26  Phos  3.6     -  Mg     2.0         TPro  5.0<L>  /  Alb  2.6<L>  /  TBili  0.5  /  DBili  0.2  /  AST  22  /  ALT  <5<L>  /  AlkPhos  57  24    PT/INR - ( 2019 02:27 )   PT: 45.2 sec;   INR: 3.84          PTT - ( 2019 02:27 )  PTT:91.5 sec      Urinalysis Basic - ( 2019 11:32 )    Color: Yellow / Appearance: Cloudy / S.025 / pH: x  Gluc: x / Ketone: Trace mg/dL  / Bili: Negative / Urobili: 0.2 E.U./dL   Blood: x / Protein: 100 mg/dL / Nitrite: NEGATIVE   Leuk Esterase: Trace / RBC: 5-10 /HPF / WBC < 5 /HPF   Sq Epi: x / Non Sq Epi: 0-5 /HPF / Bacteria: Present /HPF                Culture - Blood (collected 2019 16:52)  Source: .Blood Blood-Peripheral  Preliminary Report (2019 17:01):    No growth at 1 day.    Culture - Blood (collected 2019 16:52)  Source: .Blood Blood-Peripheral  Preliminary Report (2019 17:01):    No growth at 1 day.    Culture - Sputum (collected 2019 16:08)  Source: .Sputum ETT sputum  Gram Stain (2019 09:53):    Rare epithelial cells    Numerous White blood cells    Rare Yeast    Rare Gram Positive Cocci  Preliminary Report (2019 09:54):    Normal Respiratory Johnna present    Culture in progress      RADIOLOGY & ADDITIONAL STUDIES:

## 2019-04-24 NOTE — ADVANCED PRACTICE NURSE CONSULT - ASSESSMENT
Pt with an evolving DTI to mid-back measuring approx 4x4 cm, small area has opened but majority is still intact skin. Currently being protected with foam dressing and other bony prominences are also being protected.

## 2019-04-24 NOTE — PROGRESS NOTE ADULT - ATTENDING COMMENTS
Pt trached today. No air leak in pleuravac noted. SubQ emphysema unchanged. Continue abx and await PEG. No vent change. Pain control.

## 2019-04-24 NOTE — PROGRESS NOTE ADULT - ATTENDING COMMENTS
Persistent air leak and subcutaneous emphysema. Emphysema appears to be better than yesterday as per CXR. Continue chest tube on persistent suction. Tracheostomy was done today at bedside.

## 2019-04-24 NOTE — PROGRESS NOTE ADULT - ASSESSMENT
85 yo M with history of b/l laparoscopic robotic-assisted inguinal hernia repair presenting with b/l segmental pulmonary embolisms, left lung abscess, pneumoperitoneum, and distal descending colon abscess (likely source of pneumoperitoneum), significant iliofemoral DVT burden, s/p IVCF placement and IR drainage of LLQ abdominal collection (4/5), s/p ex-lap, LAURYN, sigmoidectomy, drain placement in R. paracolic gutter, and abthera vac placement (4/7), s/p planned RTOR, packing removal, and end colostomy creation (4/10), now w/ MRSA + proteus PNA, persistent coagulopathy, LLL pulmonary empyema s/p bedside pigtail drainage (4/15), s/p extubation c/b vomiting and aspiration, reintubated (4/21), s/p tracheostomy creation (4/24)    - s/p tracheostomy creation in AM at bedside; heparin gtt to restart 6 hrs post-procedure  - plan for PEG placement likely 4/25    Plan:  Neuro: Fentanyl prn  CV: levo gtt for MAP goal >65; midodrine 15q8h; dc 5% albumin and metolazone; hold lasix; asa 81 lipitor, statin,   Pulm: tracheostomy 4/24; on assist control. bilateral PE's; Empyema- s/p left CTx on 4/15  GI: NPO, dobhoff glucerna 1.5@50/hr; malnourished, Vit C, zinc; RUQ US shows hepatomegaly but patent portal and hepatic veins  : oliguric 2/2 to ATN; strict i/o's; continue rao  ID: CT c/a/p (2/22) showing severe enterocolitis; cdiff 4/24 negative; +kleb, proteus, strep in abd Cx, flagyl (4/9-4/20), cefepime (4/9-4/20); Sputum cx: MRSA and proteus (sensitive to cefepime) , vancomycin (4/10-5/4); nystatin powder for groin fungal rash (4/24--- )///Dc'd: ceftriaxone (4/9-4/9)// unasyn (4/8-4/9),  Vanco (4/5-4/7), Zosyn (4/5-4/8)]  Endo: ISS  Heme: bilateral iliofemoral DVT / bilateral PE; s/p IVC Filter; elevated INR; Mixing study wnr; factors; + APAS; restarted on anticoagulation w. heparin gtt 4/22  PPx: SCDs; SQH  Lines: PIVs, PICC (4/6--), L A line (4/13--) /// D/C: R cordis (4/7-4/11)  Wounds/drains: LLQ IR drain (4/5 -- ), LUQ PHYLLIS (4/7 -- ), R paracolic gutter drain (4/7 --) --- all drains to LIWS; abdominal incision wet to dry;  PT/OT: early mob 4/11 - worked with PT.

## 2019-04-24 NOTE — BRIEF OPERATIVE NOTE - OPERATION/FINDINGS
Indication: Percutaneous Tracheostomy placement    History: Respiratory failure 2/2 MRSA pneumonia, lung empyema vs. 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 Airway evaluation revealed Sharp Mariajose. Small ulcers noted over anterior and posterior tracheal rings    Under bronchoscopic visualization, percutaneous tracheostomy performed. Finder needle, followed by dilator seen entering trachea without puncturing posterior wall. Once tracheostomy placed, bronchoscope passed through tracheostomy and confirmed proper placement, minimal bleeding seen.     Specimens:  None    EBL: 5cc

## 2019-04-24 NOTE — PROGRESS NOTE ADULT - ASSESSMENT
87 yo M with history of b/l laparoscopic robotic-assisted inguinal hernia repair presenting with b/l segmental pulmonary embolisms, left lung abscess, pneumoperitoneum, and distal descending colon abscess s/p sigoidectomy significant iliofemoral DVT burden, s/p IVCF placement with concern for liver related thromboembolic disease in the absence of cirrhosis.     #Ascites, hypoalbuminemia, thrombocytopenia -- rule out Liver disease  - prolonged PT/INR with normal PTT w/ concern for a liver disease  -- SAAG 1.5 , ascitic pro < 2.5  (ddx cirrhosis, portal HTN, budd-chiari) -   - factor shows low factor 2, 5, 7, 10, 12, but normal factor 9  - LTs WNL  - No evidence of cirrhosis on imaging or during laproscopic evaluation   - US with /dopplers shows no evidence of budd chiarai  -ASMA, AMA, neg  - Follow up JOANN, anti LKM, IgG  - Alpha 1 anti-trypsin wnl, ceruloplasmin low- please check 24 hour urine copper  - Hep A/B/C neg  - iron studies not c/w hemochromatosis  -lipid wnl, A1c wnl 87 yo M with history of b/l laparoscopic robotic-assisted inguinal hernia repair presenting with b/l segmental pulmonary embolisms, left lung abscess, pneumoperitoneum, and distal descending colon abscess s/p sigoidectomy significant iliofemoral DVT burden, s/p IVCF placement with concern for liver related thromboembolic disease in the absence of cirrhosis.     #Ascites, hypoalbuminemia, thrombocytopenia -- GI consulted to eval for underlying Liver disease, but thus far workup is negative  - prolonged PT/INR with normal PTT w/ concern for a liver disease.  LFTs WNL  -SAAG 1.5 , ascitic pro < 2.5  (ddx cirrhosis, portal HTN, budd-chiari) -   - factor shows low factor 2, 5, 7, 10, 12, but normal factor 9  - No evidence of cirrhosis on imaging or during laproscopic evaluation   - US with /dopplers shows no evidence of budd chiarai  -ASMA, AMA, JOANN neg  - Alpha 1 anti-trypsin wnl  - ceruloplasmin low- please check 24 hour urine copper  - Hep A/B/C neg  - iron studies not c/w hemochromatosis  -lipid wnl, A1c wnl

## 2019-04-24 NOTE — CHART NOTE - NSCHARTNOTEFT_GEN_A_CORE
[facility] PRE-BRONCHOSCOPY RISK ASSESSMENT  ------------------------------------------------------------    Procedure Date:     Provider Name:     Reason for Bronchoscopy:    Location of Procedure (check one):   [  ] Endoscopy  [  ] Emergency Department  [  ] Intensive Care Unit  [  ] Operating Room  [  ] Other: _________    RISK ASSESSMENT  I. Patient symptoms (check all that apply): >/ 3 of these = SIGNIFICANT RISK for TB  [  ] Coughing > 2 to 3 weeks                 [  ] Unexplained fever >/ 2 weeks   [  ] Unusual weakness or fatigue  [  ] Unexplained weight loss > 10lbs.  [  ] Hemoptysis                                   [  ] Unusual or night sweating  [  ] NON-APPLICABLE    II. TB history (Check all that apply): >/ 1 of these = SIGNIFICANT RISK for TB  [  ] Sputum smear/culture (+) for acid fast bacilli (AFB)                           [  ] Abnormal CXR or CT suggestive of TB; date(s) & description __________  [  ] Positive TB skin or blood test; date: __________                               [  ] On medication for latent TB or disease; list medication(s) ____________  [  ] TB diagnosed in the past; year treated: _______                                [  ] Inadequately treated TB  [  ] Current close contact of a person known or suspected to have TB  [  ] NON-APPLICABLE    III. Additional Risk factors for TB (Check all that apply): Consider these in relation to symptoms and history  [  ] Person has conditions placing them at higher risk for TB disease (i.e. immunosuppressive therapy)  [  ] Person has lived in a country for 3 months or more where TB is common  [  ] Person lives in a high risk environment for TB (i.e. long term care, health care worker, incarcerated, homeless)  [  ] Person injects illicit drugs  [  ] Person is HIV positive or at high risk for HIV    ****************************************************************  BASED ON THE TB RISK ASSESSMENT ABOVE, THE PATIENT'S RISK FOR TB IS:                       [ X ] LOW RISK FOR TB            [  ] SIGNIFICANT RISK FOR TB  ****************************************************************    IV. Based on the Determined Risk for TB, the following Action(s) are Recommended:  [  X] Low risk for TB infection --> Proceed with the diagnostic procedure    [  ] Significant risk for TB infection:  1. Perform the procedure in a negative pressure room, with appropriate personal protective equipment (PPE) for healthcare personnel (i.e. N95 respirator)    2. If it is not feasible to move the patient or defer the procedure:    a. Use a single-bedded room in a low traffic area to perform the bronchoscopy procedure    b. Place a portable high-efficiency particulate air (HEPA) filter in the space prior to starting the procedure and keep the door closed. Refer to Infection Control policy titled "Tuberculosis Control Strategy Plan" for additional information.    c. All healthcare personnel in the procedure room shall wear and N95 disposible respirator.    3. Documentation of the tuberculosis risk assessment is to be included in the patient's medical record.

## 2019-04-24 NOTE — PROGRESS NOTE ADULT - SUBJECTIVE AND OBJECTIVE BOX
INTERVAL HISTORY:  Patient seen and examined at bedside. s/p trach this morning.    VITAL SIGNS:  ICU Vital Signs Last 24 Hrs  T(C): 36.3 (24 Apr 2019 14:36), Max: 37.9 (24 Apr 2019 00:50)  T(F): 97.3 (24 Apr 2019 14:36), Max: 100.2 (24 Apr 2019 00:50)  HR: 78 (24 Apr 2019 14:00) (71 - 94)  BP: 126/61 (24 Apr 2019 08:00) (101/47 - 126/61)  BP(mean): 88 (24 Apr 2019 08:00) (61 - 88)  ABP: 122/46 (24 Apr 2019 14:00) (106/40 - 166/64)  ABP(mean): 72 (24 Apr 2019 14:00) (60 - 102)  RR: 27 (24 Apr 2019 14:00) (11 - 27)  SpO2: 95% (24 Apr 2019 14:00) (91% - 97%)    Mode: AC/ CMV (Assist Control/ Continuous Mandatory Ventilation), RR (machine): 12, TV (machine): 500, FiO2: 40, PEEP: 5, ITime: 0.9, MAP: 12, PIP: 20    04-23 @ 07:01 - 04-24 @ 07:00  --------------------------------------------------------  IN: 2094.4 mL / OUT: 833 mL / NET: 1261.4 mL    04-24 @ 07:01 - 04-24 @ 14:42  --------------------------------------------------------  IN: 294 mL / OUT: 135 mL / NET: 159 mL      CAPILLARY BLOOD GLUCOSE      POCT Blood Glucose.: 101 mg/dL (24 Apr 2019 11:40)      PHYSICAL EXAM:  Constitutional: intubated and sedated  Neck:  Respiratory: improved ronchi bilaterally, improved peak pressures at 20-23  Cardiovascular: +S1/S2, RRR  Gastrointestinal: abdomen soft, NT/ND; +BS x4  Extremities: WWP; no clubbing, cyanosis, 2+ edema in UE's and LE's bilaterally  Vascular: 2+ radial, DP/PT and femoral pulses B/L      MEDICATIONS:  MEDICATIONS  (STANDING):  ascorbic acid Syrup 250 milliGRAM(s) Oral daily  aspirin  chewable 81 milliGRAM(s) Oral daily  atorvastatin 80 milliGRAM(s) Oral at bedtime  cefepime   IVPB 2000 milliGRAM(s) IV Intermittent every 12 hours  chlorhexidine 0.12% Liquid 15 milliLiter(s) Oral Mucosa two times a day  chlorhexidine 2% Cloths 1 Application(s) Topical <User Schedule>  dextrose 5%. 1000 milliLiter(s) (50 mL/Hr) IV Continuous <Continuous>  dextrose 50% Injectable 12.5 Gram(s) IV Push once  dextrose 50% Injectable 25 Gram(s) IV Push once  dextrose 50% Injectable 25 Gram(s) IV Push once  heparin  flush 100 Units/mL Injectable 100 Unit(s) IV Push every other day  heparin  Infusion 950 Unit(s)/Hr (9.5 mL/Hr) IV Continuous <Continuous>  insulin lispro (HumaLOG) corrective regimen sliding scale   SubCutaneous every 6 hours  metroNIDAZOLE  IVPB 500 milliGRAM(s) IV Intermittent every 8 hours  metroNIDAZOLE  IVPB      midodrine 15 milliGRAM(s) Oral every 8 hours  norepinephrine Infusion 0.05 MICROgram(s)/kG/Min (3.436 mL/Hr) IV Continuous <Continuous>  nystatin Powder 1 Application(s) Topical two times a day  pantoprazole  Injectable 40 milliGRAM(s) IV Push daily  propofol Infusion 5 MICROgram(s)/kG/Min (2.199 mL/Hr) IV Continuous <Continuous>  zinc sulfate 220 milliGRAM(s) Oral daily    MEDICATIONS  (PRN):  acetaminophen    Suspension .. 650 milliGRAM(s) Enteral Tube every 6 hours PRN Temp greater or equal to 38C (100.4F)  dextrose 40% Gel 15 Gram(s) Oral once PRN Blood Glucose LESS THAN 70 milliGRAM(s)/deciliter  fentaNYL    Injectable 12.5 MICROGram(s) IV Push every 3 hours PRN Moderate Pain (4 - 6)  fentaNYL    Injectable 25 MICROGram(s) IV Push every 3 hours PRN Severe Pain (7 - 10)  glucagon  Injectable 1 milliGRAM(s) IntraMuscular once PRN Glucose LESS THAN 70 milligrams/deciliter  ondansetron Injectable 4 milliGRAM(s) IV Push every 6 hours PRN Nausea      ALLERGIES:  Allergies    No Known Allergies    Intolerances        LABS:                        7.8    22.73 )-----------( 440      ( 24 Apr 2019 11:04 )             24.6     04-24    138  |  103  |  42<H>  ----------------------------<  132<H>  3.4<L>   |  21<L>  |  1.09    Ca    7.7<L>      24 Apr 2019 02:26  Phos  3.6     04-24  Mg     2.0     04-24    TPro  5.0<L>  /  Alb  2.6<L>  /  TBili  0.5  /  DBili  0.2  /  AST  22  /  ALT  <5<L>  /  AlkPhos  57  04-24    PT/INR - ( 24 Apr 2019 11:05 )   PT: 40.7 sec;   INR: 3.47          PTT - ( 24 Apr 2019 11:05 )  PTT:45.4 sec      RADIOLOGY & ADDITIONAL TESTS:   CXR: worsened opacities of the right lung; subcutaneous air on left chest wall improved.

## 2019-04-24 NOTE — BRIEF OPERATIVE NOTE - OPERATION/FINDINGS
Procedure: Tracheostomy (w/ bronchoscopy by Dr. Morrow)  - performed at bedside in SICU  - patient premedicated w/ fentanyl, versed, and nimbex  - 1cm vertical incision made 2 finger breadths above suprasternal notch  - under bronchoscopic visualization, finder needle introduced into anterior wall of trachea  - trachea dilated over guidewire, tracheostomy placed  - proper placement and hemostasis confirmed w/ bronchoscope

## 2019-04-24 NOTE — ADVANCED PRACTICE NURSE CONSULT - RECOMMEDATIONS
Recommend Cavilon barrier wipes over site, continue to cover with foam dressing. Please reconsult if site opens up more. Spoke with RN Islam and SICU staff.

## 2019-04-25 LAB
ALBUMIN SERPL ELPH-MCNC: 2.5 G/DL — LOW (ref 3.3–5)
ALP SERPL-CCNC: 46 U/L — SIGNIFICANT CHANGE UP (ref 40–120)
ALT FLD-CCNC: <5 U/L — LOW (ref 10–45)
ANION GAP SERPL CALC-SCNC: 13 MMOL/L — SIGNIFICANT CHANGE UP (ref 5–17)
APTT BLD: 55.8 SEC — HIGH (ref 27.5–36.3)
APTT BLD: 59.3 SEC — HIGH (ref 27.5–36.3)
APTT BLD: 60.4 SEC — HIGH (ref 27.5–36.3)
AST SERPL-CCNC: 9 U/L — LOW (ref 10–40)
BILIRUB DIRECT SERPL-MCNC: 0.3 MG/DL — HIGH (ref 0–0.2)
BILIRUB INDIRECT FLD-MCNC: 0.2 MG/DL — SIGNIFICANT CHANGE UP (ref 0.2–1)
BILIRUB SERPL-MCNC: 0.5 MG/DL — SIGNIFICANT CHANGE UP (ref 0.2–1.2)
BUN SERPL-MCNC: 49 MG/DL — HIGH (ref 7–23)
CALCIUM SERPL-MCNC: 8.1 MG/DL — LOW (ref 8.4–10.5)
CHLORIDE SERPL-SCNC: 105 MMOL/L — SIGNIFICANT CHANGE UP (ref 96–108)
CO2 SERPL-SCNC: 20 MMOL/L — LOW (ref 22–31)
CREAT SERPL-MCNC: 1.33 MG/DL — HIGH (ref 0.5–1.3)
GLUCOSE BLDC GLUCOMTR-MCNC: 120 MG/DL — HIGH (ref 70–99)
GLUCOSE BLDC GLUCOMTR-MCNC: 138 MG/DL — HIGH (ref 70–99)
GLUCOSE BLDC GLUCOMTR-MCNC: 141 MG/DL — HIGH (ref 70–99)
GLUCOSE BLDC GLUCOMTR-MCNC: 146 MG/DL — HIGH (ref 70–99)
GLUCOSE SERPL-MCNC: 130 MG/DL — HIGH (ref 70–99)
HCT VFR BLD CALC: 26 % — LOW (ref 39–50)
HGB BLD-MCNC: 8.4 G/DL — LOW (ref 13–17)
INR BLD: 3.1 — HIGH (ref 0.88–1.16)
JAK2 P.V617F BLD/T QL: SIGNIFICANT CHANGE UP
MAGNESIUM SERPL-MCNC: 2 MG/DL — SIGNIFICANT CHANGE UP (ref 1.6–2.6)
MCHC RBC-ENTMCNC: 30.9 PG — SIGNIFICANT CHANGE UP (ref 27–34)
MCHC RBC-ENTMCNC: 32.3 GM/DL — SIGNIFICANT CHANGE UP (ref 32–36)
MCV RBC AUTO: 95.6 FL — SIGNIFICANT CHANGE UP (ref 80–100)
NRBC # BLD: 0 /100 WBCS — SIGNIFICANT CHANGE UP (ref 0–0)
PHOSPHATE SERPL-MCNC: 3.8 MG/DL — SIGNIFICANT CHANGE UP (ref 2.5–4.5)
PLATELET # BLD AUTO: 474 K/UL — HIGH (ref 150–400)
POTASSIUM SERPL-MCNC: 3.8 MMOL/L — SIGNIFICANT CHANGE UP (ref 3.5–5.3)
POTASSIUM SERPL-SCNC: 3.8 MMOL/L — SIGNIFICANT CHANGE UP (ref 3.5–5.3)
PROT SERPL-MCNC: 5 G/DL — LOW (ref 6–8.3)
PROTHROM AB SERPL-ACNC: 36.3 SEC — HIGH (ref 10–12.9)
RBC # BLD: 2.72 M/UL — LOW (ref 4.2–5.8)
RBC # FLD: 17.3 % — HIGH (ref 10.3–14.5)
SODIUM SERPL-SCNC: 138 MMOL/L — SIGNIFICANT CHANGE UP (ref 135–145)
VANCOMYCIN FLD-MCNC: 25.8 UG/ML
WBC # BLD: 21.12 K/UL — HIGH (ref 3.8–10.5)
WBC # FLD AUTO: 21.12 K/UL — HIGH (ref 3.8–10.5)

## 2019-04-25 PROCEDURE — 71045 X-RAY EXAM CHEST 1 VIEW: CPT | Mod: 26

## 2019-04-25 PROCEDURE — 99291 CRITICAL CARE FIRST HOUR: CPT

## 2019-04-25 RX ORDER — HEPARIN SODIUM 5000 [USP'U]/ML
950 INJECTION INTRAVENOUS; SUBCUTANEOUS
Qty: 25000 | Refills: 0 | Status: DISCONTINUED | OUTPATIENT
Start: 2019-04-25 | End: 2019-04-26

## 2019-04-25 RX ORDER — PHYTONADIONE (VIT K1) 5 MG
10 TABLET ORAL ONCE
Qty: 0 | Refills: 0 | Status: COMPLETED | OUTPATIENT
Start: 2019-04-25 | End: 2019-04-25

## 2019-04-25 RX ADMIN — ZINC SULFATE TAB 220 MG (50 MG ZINC EQUIVALENT) 220 MILLIGRAM(S): 220 (50 ZN) TAB at 12:48

## 2019-04-25 RX ADMIN — Medication 100 UNIT(S): at 13:30

## 2019-04-25 RX ADMIN — PANTOPRAZOLE SODIUM 40 MILLIGRAM(S): 20 TABLET, DELAYED RELEASE ORAL at 12:49

## 2019-04-25 RX ADMIN — FENTANYL CITRATE 25 MICROGRAM(S): 50 INJECTION INTRAVENOUS at 16:20

## 2019-04-25 RX ADMIN — CHLORHEXIDINE GLUCONATE 15 MILLILITER(S): 213 SOLUTION TOPICAL at 06:57

## 2019-04-25 RX ADMIN — Medication 81 MILLIGRAM(S): at 13:54

## 2019-04-25 RX ADMIN — FENTANYL CITRATE 25 MICROGRAM(S): 50 INJECTION INTRAVENOUS at 15:51

## 2019-04-25 RX ADMIN — Medication 100 MILLIGRAM(S): at 17:00

## 2019-04-25 RX ADMIN — MIDODRINE HYDROCHLORIDE 15 MILLIGRAM(S): 2.5 TABLET ORAL at 21:37

## 2019-04-25 RX ADMIN — MIDODRINE HYDROCHLORIDE 15 MILLIGRAM(S): 2.5 TABLET ORAL at 15:47

## 2019-04-25 RX ADMIN — CHLORHEXIDINE GLUCONATE 1 APPLICATION(S): 213 SOLUTION TOPICAL at 06:58

## 2019-04-25 RX ADMIN — CEFEPIME 100 MILLIGRAM(S): 1 INJECTION, POWDER, FOR SOLUTION INTRAMUSCULAR; INTRAVENOUS at 06:56

## 2019-04-25 RX ADMIN — CHLORHEXIDINE GLUCONATE 15 MILLILITER(S): 213 SOLUTION TOPICAL at 17:07

## 2019-04-25 RX ADMIN — NYSTATIN CREAM 1 APPLICATION(S): 100000 CREAM TOPICAL at 17:08

## 2019-04-25 RX ADMIN — Medication 100 MILLIGRAM(S): at 03:01

## 2019-04-25 RX ADMIN — Medication 102 MILLIGRAM(S): at 08:24

## 2019-04-25 RX ADMIN — Medication 250 MILLIGRAM(S): at 12:49

## 2019-04-25 RX ADMIN — Medication 100 MILLIGRAM(S): at 11:00

## 2019-04-25 RX ADMIN — NYSTATIN CREAM 1 APPLICATION(S): 100000 CREAM TOPICAL at 06:57

## 2019-04-25 RX ADMIN — MIDODRINE HYDROCHLORIDE 15 MILLIGRAM(S): 2.5 TABLET ORAL at 06:57

## 2019-04-25 RX ADMIN — CEFEPIME 100 MILLIGRAM(S): 1 INJECTION, POWDER, FOR SOLUTION INTRAMUSCULAR; INTRAVENOUS at 17:06

## 2019-04-25 RX ADMIN — ATORVASTATIN CALCIUM 80 MILLIGRAM(S): 80 TABLET, FILM COATED ORAL at 21:37

## 2019-04-25 NOTE — PROGRESS NOTE ADULT - ASSESSMENT
85 yo M with history of b/l laparoscopic robotic-assisted inguinal hernia repair presenting with b/l segmental pulmonary embolisms, left lung abscess, pneumoperitoneum, and distal descending colon abscess s/p sigoidectomy significant iliofemoral DVT burden, s/p IVCF placement with concern for liver related thromboembolic disease in the absence of cirrhosis.     #Ascites, hypoalbuminemia, thrombocytopenia -- GI consulted to eval for underlying Liver disease, but thus far workup is negative.   - prolonged PT/INR with normal PTT w/ concern for a liver disease.  LFTs WNL  -SAAG 1.5 , ascitic pro < 2.5  (ddx cirrhosis, portal HTN, budd-chiari) - liver workup neg, no evidence of budd chiaria, and thus suspicion for non cirrhotic causes of portal htn  - factor shows low factor 2, 5, 7, 10, 12, but normal factor 9  - No evidence of cirrhosis on imaging or during laproscopic evaluation   - US with /dopplers shows no evidence of budd chiarai  -ASMA, AMA, JOANN neg  - Alpha 1 anti-trypsin wnl  - ceruloplasmin low- please check 24 hour urine copper  - Hep A/B/C neg  - iron studies not c/w hemochromatosis  -lipid wnl, A1c wnl 87 yo M with history of b/l laparoscopic robotic-assisted inguinal hernia repair presenting with b/l segmental pulmonary embolisms, left lung abscess, pneumoperitoneum, and distal descending colon abscess s/p sigoidectomy significant iliofemoral DVT burden, s/p IVCF placement with concern for liver related thromboembolic disease in the absence of cirrhosis.     #Ascites, hypoalbuminemia, thrombocytopenia -- GI consulted to eval for underlying Liver disease, but thus far workup is negative.   - prolonged PT/INR with normal PTT.  LFTs WNL  -SAAG 1.5 , ascitic pro < 2.5  (ddx cirrhosis, portal HTN, budd-chiari) - liver workup neg, no evidence of budd chiari and thus there is suspicion for non cirrhotic causes of portal htn  - factor shows low factor 2, 5, 7, 10, 12, but normal factor 9  - No evidence of cirrhosis on imaging or during laproscopic evaluation   - US with /dopplers shows no evidence of budd chiarai  -ASMA, AMA, JOANN neg  - Alpha 1 anti-trypsin wnl  - ceruloplasmin low- please check 24 hour urine copper  - Hep A/B/C neg  - iron studies not c/w hemochromatosis  -lipid wnl, A1c wnl  -at this point pt is not a candidate for liver biopsy  given negative liver workup, GI will sign off. Please reconsult as needed 85 yo M with history of b/l laparoscopic robotic-assisted inguinal hernia repair presenting with b/l segmental pulmonary embolisms, left lung abscess, pneumoperitoneum, and distal descending colon abscess s/p sigoidectomy significant iliofemoral DVT burden, s/p IVCF placement with concern for liver related thromboembolic disease in the absence of cirrhosis.     #Ascites, hypoalbuminemia, thrombocytopenia -GI consulted to eval for underlying Liver disease, but thus far workup is negative.   - prolonged PT/INR with normal PTT.  LFTs WNL  -SAAG 1.5 , ascitic pro < 2.5  (ddx cirrhosis, portal HTN, budd-chiari) - liver workup neg, no evidence of cirrhosis, or budd chiari. Thus there is suspicion for non cirrhotic causes of portal htn  - factor shows low factor 2, 5, 7, 10, 12, but normal factor 9  - No evidence of cirrhosis on imaging or during laproscopic evaluation   - US with /dopplers shows no evidence of budd chiarai  -ASMA, AMA, JOANN neg  - Alpha 1 anti-trypsin wnl  - ceruloplasmin low- please check 24 hour urine copper  - Hep A/B/C neg  - iron studies not c/w hemochromatosis  -lipid wnl, A1c wnl  -at this point pt is not a candidate for liver biopsy. Please reconsult as needed. Thank you

## 2019-04-25 NOTE — PROGRESS NOTE ADULT - ATTENDING COMMENTS
Pt on small dose levo and anticoagulated on Heparin gtt. Agree with possible dx of Portal Htn not associated with Cirrhosis associated with antiphospholipid Ab syndrome. Await PEG. CT with air leak and remains to suction. Continue abx per ID.

## 2019-04-25 NOTE — PROGRESS NOTE ADULT - SUBJECTIVE AND OBJECTIVE BOX
Pt seen and examined at bedside.    PERTINENT REVIEW OF SYSTEMS:  CONSTITUTIONAL: No weakness, fevers or chills  HEENT: No visual changes; No vertigo or throat pain   GASTROINTESTINAL: No abdominal or epigastric pain. No nausea, vomiting, or hematemesis; No diarrhea or constipation. No melena or hematochezia.  NEUROLOGICAL: No numbness or weakness  SKIN: No itching, burning, rashes, or lesions     Allergies    No Known Allergies    Intolerances      MEDICATIONS:  MEDICATIONS  (STANDING):  ascorbic acid Syrup 250 milliGRAM(s) Oral daily  aspirin  chewable 81 milliGRAM(s) Oral daily  atorvastatin 80 milliGRAM(s) Oral at bedtime  cefepime   IVPB 2000 milliGRAM(s) IV Intermittent every 12 hours  chlorhexidine 0.12% Liquid 15 milliLiter(s) Oral Mucosa two times a day  chlorhexidine 2% Cloths 1 Application(s) Topical <User Schedule>  dextrose 5%. 1000 milliLiter(s) (50 mL/Hr) IV Continuous <Continuous>  dextrose 50% Injectable 12.5 Gram(s) IV Push once  dextrose 50% Injectable 25 Gram(s) IV Push once  dextrose 50% Injectable 25 Gram(s) IV Push once  heparin  flush 100 Units/mL Injectable 100 Unit(s) IV Push every other day  heparin  Infusion 950 Unit(s)/Hr (9.5 mL/Hr) IV Continuous <Continuous>  insulin lispro (HumaLOG) corrective regimen sliding scale   SubCutaneous every 6 hours  metroNIDAZOLE  IVPB 500 milliGRAM(s) IV Intermittent every 8 hours  metroNIDAZOLE  IVPB      midodrine 15 milliGRAM(s) Oral every 8 hours  norepinephrine Infusion 0.05 MICROgram(s)/kG/Min (3.436 mL/Hr) IV Continuous <Continuous>  nystatin Powder 1 Application(s) Topical two times a day  pantoprazole  Injectable 40 milliGRAM(s) IV Push daily  phytonadione  IVPB 10 milliGRAM(s) IV Intermittent once  propofol Infusion 5 MICROgram(s)/kG/Min (2.199 mL/Hr) IV Continuous <Continuous>  zinc sulfate 220 milliGRAM(s) Oral daily    MEDICATIONS  (PRN):  acetaminophen    Suspension .. 650 milliGRAM(s) Enteral Tube every 6 hours PRN Temp greater or equal to 38C (100.4F)  dextrose 40% Gel 15 Gram(s) Oral once PRN Blood Glucose LESS THAN 70 milliGRAM(s)/deciliter  fentaNYL    Injectable 12.5 MICROGram(s) IV Push every 3 hours PRN Moderate Pain (4 - 6)  fentaNYL    Injectable 25 MICROGram(s) IV Push every 3 hours PRN Severe Pain (7 - 10)  glucagon  Injectable 1 milliGRAM(s) IntraMuscular once PRN Glucose LESS THAN 70 milligrams/deciliter  ondansetron Injectable 4 milliGRAM(s) IV Push every 6 hours PRN Nausea    Vital Signs Last 24 Hrs  T(C): 37.6 (25 Apr 2019 06:04), Max: 37.6 (25 Apr 2019 06:04)  T(F): 99.7 (25 Apr 2019 06:04), Max: 99.7 (25 Apr 2019 06:04)  HR: 85 (25 Apr 2019 05:55) (68 - 90)  BP: 126/61 (24 Apr 2019 08:00) (126/61 - 126/61)  BP(mean): 88 (24 Apr 2019 08:00) (88 - 88)  RR: 29 (25 Apr 2019 05:55) (11 - 29)  SpO2: 98% (25 Apr 2019 05:55) (92% - 98%)    04-24 @ 07:01  -  04-25 @ 07:00  --------------------------------------------------------  IN: 728.7 mL / OUT: 575 mL / NET: 153.7 mL      PHYSICAL EXAM:    General: Well developed; well nourished; in no acute distress  HEENT: MMM, conjunctiva and sclera clear  Gastrointestinal: Soft non-tender non-distended; Normal bowel sounds; No hepatosplenomegaly. No rebound or guarding  Skin: Warm and dry. No obvious rash    LABS:                        8.4    21.12 )-----------( 474      ( 25 Apr 2019 05:37 )             26.0     04-25    138  |  105  |  49<H>  ----------------------------<  130<H>  3.8   |  20<L>  |  1.33<H>    Ca    8.1<L>      25 Apr 2019 05:37  Phos  3.8     04-25  Mg     2.0     04-25    TPro  5.0<L>  /  Alb  2.5<L>  /  TBili  0.5  /  DBili  0.3<H>  /  AST  9<L>  /  ALT  <5<L>  /  AlkPhos  46  04-25    PT/INR - ( 25 Apr 2019 05:37 )   PT: 36.3 sec;   INR: 3.10          PTT - ( 25 Apr 2019 05:37 )  PTT:60.4 sec                  Culture - Blood (collected 22 Apr 2019 16:52)  Source: .Blood Blood-Peripheral  Preliminary Report (24 Apr 2019 17:01):    No growth at 2 days.    Culture - Blood (collected 22 Apr 2019 16:52)  Source: .Blood Blood-Peripheral  Preliminary Report (24 Apr 2019 17:01):    No growth at 2 days.    Culture - Sputum (collected 22 Apr 2019 16:08)  Source: .Sputum ETT sputum  Gram Stain (23 Apr 2019 09:53):    Rare epithelial cells    Numerous White blood cells    Rare Yeast    Rare Gram Positive Cocci  Final Report (24 Apr 2019 11:26):    Normal Respiratory Johnna present      RADIOLOGY & ADDITIONAL STUDIES: Pt seen and examined at bedside.    PERTINENT REVIEW OF SYSTEMS:  CONSTITUTIONAL: No weakness, fevers or chills  HEENT: No visual changes; No vertigo or throat pain   GASTROINTESTINAL: No abdominal or epigastric pain. No nausea, vomiting, or hematemesis; No diarrhea or constipation. No melena or hematochezia.  NEUROLOGICAL: No numbness or weakness  SKIN: No itching, burning, rashes, or lesions     Allergies    No Known Allergies    Intolerances      MEDICATIONS:  MEDICATIONS  (STANDING):  ascorbic acid Syrup 250 milliGRAM(s) Oral daily  aspirin  chewable 81 milliGRAM(s) Oral daily  atorvastatin 80 milliGRAM(s) Oral at bedtime  cefepime   IVPB 2000 milliGRAM(s) IV Intermittent every 12 hours  chlorhexidine 0.12% Liquid 15 milliLiter(s) Oral Mucosa two times a day  chlorhexidine 2% Cloths 1 Application(s) Topical <User Schedule>  dextrose 5%. 1000 milliLiter(s) (50 mL/Hr) IV Continuous <Continuous>  dextrose 50% Injectable 12.5 Gram(s) IV Push once  dextrose 50% Injectable 25 Gram(s) IV Push once  dextrose 50% Injectable 25 Gram(s) IV Push once  heparin  flush 100 Units/mL Injectable 100 Unit(s) IV Push every other day  heparin  Infusion 950 Unit(s)/Hr (9.5 mL/Hr) IV Continuous <Continuous>  insulin lispro (HumaLOG) corrective regimen sliding scale   SubCutaneous every 6 hours  metroNIDAZOLE  IVPB 500 milliGRAM(s) IV Intermittent every 8 hours  metroNIDAZOLE  IVPB      midodrine 15 milliGRAM(s) Oral every 8 hours  norepinephrine Infusion 0.05 MICROgram(s)/kG/Min (3.436 mL/Hr) IV Continuous <Continuous>  nystatin Powder 1 Application(s) Topical two times a day  pantoprazole  Injectable 40 milliGRAM(s) IV Push daily  phytonadione  IVPB 10 milliGRAM(s) IV Intermittent once  propofol Infusion 5 MICROgram(s)/kG/Min (2.199 mL/Hr) IV Continuous <Continuous>  zinc sulfate 220 milliGRAM(s) Oral daily    MEDICATIONS  (PRN):  acetaminophen    Suspension .. 650 milliGRAM(s) Enteral Tube every 6 hours PRN Temp greater or equal to 38C (100.4F)  dextrose 40% Gel 15 Gram(s) Oral once PRN Blood Glucose LESS THAN 70 milliGRAM(s)/deciliter  fentaNYL    Injectable 12.5 MICROGram(s) IV Push every 3 hours PRN Moderate Pain (4 - 6)  fentaNYL    Injectable 25 MICROGram(s) IV Push every 3 hours PRN Severe Pain (7 - 10)  glucagon  Injectable 1 milliGRAM(s) IntraMuscular once PRN Glucose LESS THAN 70 milligrams/deciliter  ondansetron Injectable 4 milliGRAM(s) IV Push every 6 hours PRN Nausea    Vital Signs Last 24 Hrs  T(C): 37.6 (25 Apr 2019 06:04), Max: 37.6 (25 Apr 2019 06:04)  T(F): 99.7 (25 Apr 2019 06:04), Max: 99.7 (25 Apr 2019 06:04)  HR: 85 (25 Apr 2019 05:55) (68 - 90)  BP: 126/61 (24 Apr 2019 08:00) (126/61 - 126/61)  BP(mean): 88 (24 Apr 2019 08:00) (88 - 88)  RR: 29 (25 Apr 2019 05:55) (11 - 29)  SpO2: 98% (25 Apr 2019 05:55) (92% - 98%)    04-24 @ 07:01  -  04-25 @ 07:00  --------------------------------------------------------  IN: 728.7 mL / OUT: 575 mL / NET: 153.7 mL      PHYSICAL EXAM:    General: thin, frail, cachectic; in no acute distress  HEENT: MMM, conjunctiva and sclera clear  Gastrointestinal: Soft non-tender non-distended; Normal bowel sounds; No hepatosplenomegaly. No rebound or guarding, ostomy with brown stool  Skin: Warm and dry. No obvious rash    LABS:                        8.4    21.12 )-----------( 474      ( 25 Apr 2019 05:37 )             26.0     04-25    138  |  105  |  49<H>  ----------------------------<  130<H>  3.8   |  20<L>  |  1.33<H>    Ca    8.1<L>      25 Apr 2019 05:37  Phos  3.8     04-25  Mg     2.0     04-25    TPro  5.0<L>  /  Alb  2.5<L>  /  TBili  0.5  /  DBili  0.3<H>  /  AST  9<L>  /  ALT  <5<L>  /  AlkPhos  46  04-25    PT/INR - ( 25 Apr 2019 05:37 )   PT: 36.3 sec;   INR: 3.10          PTT - ( 25 Apr 2019 05:37 )  PTT:60.4 sec                  Culture - Blood (collected 22 Apr 2019 16:52)  Source: .Blood Blood-Peripheral  Preliminary Report (24 Apr 2019 17:01):    No growth at 2 days.    Culture - Blood (collected 22 Apr 2019 16:52)  Source: .Blood Blood-Peripheral  Preliminary Report (24 Apr 2019 17:01):    No growth at 2 days.    Culture - Sputum (collected 22 Apr 2019 16:08)  Source: .Sputum ETT sputum  Gram Stain (23 Apr 2019 09:53):    Rare epithelial cells    Numerous White blood cells    Rare Yeast    Rare Gram Positive Cocci  Final Report (24 Apr 2019 11:26):    Normal Respiratory Johnna present      RADIOLOGY & ADDITIONAL STUDIES:

## 2019-04-25 NOTE — PROGRESS NOTE ADULT - SUBJECTIVE AND OBJECTIVE BOX
Interval History:  ON: ptt therapeutic x 2  4/24: trach placed at bedside. Metolazone DC'd, dressing changed, DC'd Albumin 25%, Nystatin ordered for thigh rash, LFTs added on, Alb 2.6 (2.9). Vanc trough 28, no dose given, vanc now dosed by level. Hep ggt restarted at 2 pm. Abd fluid sent for labs      SUBJECTIVE:       Vitals - Range in last 12h  T(F): , Max: 99.7 (04-25-19 @ 06:04)  HR:  (68 - 85)  BP: --  BP(mean): --  ABP:  (118/40 - 136/50)  ABP(mean):  (64 - 76)  RR:  (21 - 29)  SpO2:  (95% - 98%)  CVP(mm Hg): --  CVP(cm H2O): --  CO: --  CI: --  PA: --  PA(mean): --  PA(direct): --  PCWP: --    Vitals - Most Recent  T(F): 99.7 (04-25-19 @ 06:04)  HR: 85 (04-25-19 @ 05:55)  BP: 126/61 (04-24-19 @ 08:00)  RR: 29 (04-25-19 @ 05:55)  SpO2: 98% (04-25-19 @ 05:55)  I&O's Detail    23 Apr 2019 07:01  -  24 Apr 2019 07:00  --------------------------------------------------------  IN:    Albumin 25%: 150 mL    Albumin 5%  - 250 mL: 500 mL    dextrose 5%.: 70 mL    Enteral Tube Flush: 180 mL    Glucerna 1.5: 45 mL    heparin Infusion: 236 mL    IV PiggyBack: 700 mL    norepinephrine Infusion: 213.4 mL  Total IN: 2094.4 mL    OUT:    Bulb: 20 mL    Chest Tube: 15 mL    Colostomy: 300 mL    Indwelling Catheter - Urethral: 498 mL  Total OUT: 833 mL    Total NET: 1261.4 mL      24 Apr 2019 07:01  -  25 Apr 2019 06:37  --------------------------------------------------------  IN:    Enteral Tube Flush: 30 mL    heparin Infusion: 151.7 mL    IV PiggyBack: 350 mL    norepinephrine Infusion: 189 mL    propofol Infusion: 8 mL  Total IN: 728.7 mL    OUT:    Bulb: 15 mL    Chest Tube: 40 mL    Colostomy: 50 mL    Indwelling Catheter - Urethral: 470 mL  Total OUT: 575 mL    Total NET: 153.7 mL        Mode: AC/ CMV (Assist Control/ Continuous Mandatory Ventilation)  RR (machine): 12  RR (patient): 33  TV (machine): 500  TV (patient): 519  FiO2: 40  PEEP: 5  ITime: 0.9  MAP: 11  PIP: 18      Physical Exam  Neuro: intubated, alert but non-verbal, lethargic, did not follow commands  General: no acute distress, breathing nonlabored on vent but mildly tachypneic  HEENT: PERRL, EOMI, MMD, dobhoff in place w/ TFs; subcutaneous emphysema w/ +crepitus extending from base of neck to chin  Pulm: intubated, on vent-AC; large area of SQ emphysema noted on exam, prominent on L chest; chest tube of L posterior chest to wall suction w/ minimal output; bilateral rhonchi w/ expiratory wheezing  C/V: S1S2, non-tachycardic  Abd: nondistended, but firm; TTP w/ mild guarding; ostomy w/ liquid brown output; LLQ IR and LUQ PHYLLIS drain w/ clear brown ascites output; R paracolic gutter drain w/ clear brown output; abdominal incision w/ wet to dry dressing in place; no active bleeding, no purulence   Extremities: significant improvement in edema/ansarca of lower extremities; remain 2+ pitting edema w/ persistent edema of upper extremity forearms  : rao in place; scrotal edema resolved; blanching erythema near proximal thigh and groin  Skin: mild blanching erythema on thighs, no macerations        LABS:                        8.4    21.12 )-----------( 474      ( 25 Apr 2019 05:37 )             26.0     04-25    138  |  105  |  49<H>  ----------------------------<  130<H>  3.8   |  20<L>  |  1.33<H>    Ca    8.1<L>      25 Apr 2019 05:37  Phos  3.8     04-25  Mg     2.0     04-25    TPro  5.0<L>  /  Alb  2.5<L>  /  TBili  0.5  /  DBili  0.3<H>  /  AST  9<L>  /  ALT  <5<L>  /  AlkPhos  46  04-25    LIVER FUNCTIONS - ( 25 Apr 2019 05:37 )  Alb: 2.5 g/dL / Pro: 5.0 g/dL / ALK PHOS: 46 U/L / ALT: <5 U/L / AST: 9 U/L / GGT: x           PT/INR - ( 25 Apr 2019 05:37 )   PT: 36.3 sec;   INR: 3.10          PTT - ( 25 Apr 2019 05:37 )  PTT:60.4 sec      Culture - Blood (collected 22 Apr 2019 16:52)  Source: .Blood Blood-Peripheral  Preliminary Report (24 Apr 2019 17:01):    No growth at 2 days.    Culture - Blood (collected 22 Apr 2019 16:52)  Source: .Blood Blood-Peripheral  Preliminary Report (24 Apr 2019 17:01):    No growth at 2 days.    Culture - Sputum (collected 22 Apr 2019 16:08)  Source: .Sputum ETT sputum  Gram Stain (23 Apr 2019 09:53):    Rare epithelial cells    Numerous White blood cells    Rare Yeast    Rare Gram Positive Cocci  Final Report (24 Apr 2019 11:26):    Normal Respiratory Johnna present        MEDICATIONS  (STANDING):  ascorbic acid Syrup 250 milliGRAM(s) Oral daily  aspirin  chewable 81 milliGRAM(s) Oral daily  atorvastatin 80 milliGRAM(s) Oral at bedtime  cefepime   IVPB 2000 milliGRAM(s) IV Intermittent every 12 hours  chlorhexidine 0.12% Liquid 15 milliLiter(s) Oral Mucosa two times a day  chlorhexidine 2% Cloths 1 Application(s) Topical <User Schedule>  dextrose 5%. 1000 milliLiter(s) (50 mL/Hr) IV Continuous <Continuous>  dextrose 50% Injectable 12.5 Gram(s) IV Push once  dextrose 50% Injectable 25 Gram(s) IV Push once  dextrose 50% Injectable 25 Gram(s) IV Push once  heparin  flush 100 Units/mL Injectable 100 Unit(s) IV Push every other day  heparin  Infusion 950 Unit(s)/Hr (9.5 mL/Hr) IV Continuous <Continuous>  insulin lispro (HumaLOG) corrective regimen sliding scale   SubCutaneous every 6 hours  metroNIDAZOLE  IVPB 500 milliGRAM(s) IV Intermittent every 8 hours  metroNIDAZOLE  IVPB      midodrine 15 milliGRAM(s) Oral every 8 hours  norepinephrine Infusion 0.05 MICROgram(s)/kG/Min (3.436 mL/Hr) IV Continuous <Continuous>  nystatin Powder 1 Application(s) Topical two times a day  pantoprazole  Injectable 40 milliGRAM(s) IV Push daily  propofol Infusion 5 MICROgram(s)/kG/Min (2.199 mL/Hr) IV Continuous <Continuous>  zinc sulfate 220 milliGRAM(s) Oral daily    MEDICATIONS  (PRN):  acetaminophen    Suspension .. 650 milliGRAM(s) Enteral Tube every 6 hours PRN Temp greater or equal to 38C (100.4F)  dextrose 40% Gel 15 Gram(s) Oral once PRN Blood Glucose LESS THAN 70 milliGRAM(s)/deciliter  fentaNYL    Injectable 12.5 MICROGram(s) IV Push every 3 hours PRN Moderate Pain (4 - 6)  fentaNYL    Injectable 25 MICROGram(s) IV Push every 3 hours PRN Severe Pain (7 - 10)  glucagon  Injectable 1 milliGRAM(s) IntraMuscular once PRN Glucose LESS THAN 70 milligrams/deciliter  ondansetron Injectable 4 milliGRAM(s) IV Push every 6 hours PRN Nausea      RADIOLOGY & ADDITIONAL STUDIES: Interval History:  ON: ptt therapeutic x 2  4/24: trach placed at bedside. Metolazone DC'd, dressing changed, DC'd Albumin 25%, Nystatin ordered for thigh rash, LFTs added on, Alb 2.6 (2.9). Vanc trough 28, no dose given, vanc now dosed by level. Hep ggt restarted at 2 pm. Abd fluid sent for labs      SUBJECTIVE:   Patient seen at bedside. Intubated. Opens eyes, followed simple commands but did not answer questions. ROS not obtainable.       Vitals - Range in last 12h  T(F): , Max: 99.7 (04-25-19 @ 06:04)  HR:  (68 - 85)  BP: --  BP(mean): --  ABP:  (118/40 - 136/50)  ABP(mean):  (64 - 76)  RR:  (21 - 29)  SpO2:  (95% - 98%)  CVP(mm Hg): --  CVP(cm H2O): --  CO: --  CI: --  PA: --  PA(mean): --  PA(direct): --  PCWP: --    Vitals - Most Recent  T(F): 99.7 (04-25-19 @ 06:04)  HR: 85 (04-25-19 @ 05:55)  BP: 126/61 (04-24-19 @ 08:00)  RR: 29 (04-25-19 @ 05:55)  SpO2: 98% (04-25-19 @ 05:55)  I&O's Detail    23 Apr 2019 07:01  -  24 Apr 2019 07:00  --------------------------------------------------------  IN:    Albumin 25%: 150 mL    Albumin 5%  - 250 mL: 500 mL    dextrose 5%.: 70 mL    Enteral Tube Flush: 180 mL    Glucerna 1.5: 45 mL    heparin Infusion: 236 mL    IV PiggyBack: 700 mL    norepinephrine Infusion: 213.4 mL  Total IN: 2094.4 mL    OUT:    Bulb: 20 mL    Chest Tube: 15 mL    Colostomy: 300 mL    Indwelling Catheter - Urethral: 498 mL  Total OUT: 833 mL    Total NET: 1261.4 mL      24 Apr 2019 07:01  -  25 Apr 2019 06:37  --------------------------------------------------------  IN:    Enteral Tube Flush: 30 mL    heparin Infusion: 151.7 mL    IV PiggyBack: 350 mL    norepinephrine Infusion: 189 mL    propofol Infusion: 8 mL  Total IN: 728.7 mL    OUT:    Bulb: 15 mL    Chest Tube: 40 mL    Colostomy: 50 mL    Indwelling Catheter - Urethral: 470 mL  Total OUT: 575 mL    Total NET: 153.7 mL        Mode: AC/ CMV (Assist Control/ Continuous Mandatory Ventilation)  RR (machine): 12  RR (patient): 33  TV (machine): 500  TV (patient): 519  FiO2: 40  PEEP: 5  ITime: 0.9  MAP: 11  PIP: 18      Physical Exam  Neuro: intubated, alert but non-verbal, follows simple commands  General: no acute distress, breathing nonlabored on vent, overbreathing vent  HEENT: PERRL, EOMI, MMD, dobhoff in place, TFs off, subcutaneous emphysema of neck,   Pulm: intubated, on vent-AC; large area of SQ emphysema noted on exam, prominent on L chest; chest tube of L posterior chest to wall suction w/ minimal output; bilateral rhonchi w/ expiratory wheezing  C/V: S1S2, non-tachycardic  Abd: nondistended, but firm; TTP w/ mild guarding; ostomy w/ liquid brown output; LLQ IR and LUQ PHYLLIS drain w/ clear brown ascites output; R paracolic gutter drain w/ clear brown output; abdominal incision w/ wet to dry dressing in place; no active bleeding, no purulence   Extremities: significant improvement in edema/ansarca of lower extremities; remain 2+ pitting edema w/ persistent edema of upper extremity forearms  : rao in place; scrotal edema resolved; blanching erythema near proximal thigh and groin  Skin: mild blanching erythema on thighs, no macerations        LABS:                        8.4    21.12 )-----------( 474      ( 25 Apr 2019 05:37 )             26.0     04-25    138  |  105  |  49<H>  ----------------------------<  130<H>  3.8   |  20<L>  |  1.33<H>    Ca    8.1<L>      25 Apr 2019 05:37  Phos  3.8     04-25  Mg     2.0     04-25    TPro  5.0<L>  /  Alb  2.5<L>  /  TBili  0.5  /  DBili  0.3<H>  /  AST  9<L>  /  ALT  <5<L>  /  AlkPhos  46  04-25    LIVER FUNCTIONS - ( 25 Apr 2019 05:37 )  Alb: 2.5 g/dL / Pro: 5.0 g/dL / ALK PHOS: 46 U/L / ALT: <5 U/L / AST: 9 U/L / GGT: x           PT/INR - ( 25 Apr 2019 05:37 )   PT: 36.3 sec;   INR: 3.10          PTT - ( 25 Apr 2019 05:37 )  PTT:60.4 sec      Culture - Blood (collected 22 Apr 2019 16:52)  Source: .Blood Blood-Peripheral  Preliminary Report (24 Apr 2019 17:01):    No growth at 2 days.    Culture - Blood (collected 22 Apr 2019 16:52)  Source: .Blood Blood-Peripheral  Preliminary Report (24 Apr 2019 17:01):    No growth at 2 days.    Culture - Sputum (collected 22 Apr 2019 16:08)  Source: .Sputum ETT sputum  Gram Stain (23 Apr 2019 09:53):    Rare epithelial cells    Numerous White blood cells    Rare Yeast    Rare Gram Positive Cocci  Final Report (24 Apr 2019 11:26):    Normal Respiratory Johnna present        MEDICATIONS  (STANDING):  ascorbic acid Syrup 250 milliGRAM(s) Oral daily  aspirin  chewable 81 milliGRAM(s) Oral daily  atorvastatin 80 milliGRAM(s) Oral at bedtime  cefepime   IVPB 2000 milliGRAM(s) IV Intermittent every 12 hours  chlorhexidine 0.12% Liquid 15 milliLiter(s) Oral Mucosa two times a day  chlorhexidine 2% Cloths 1 Application(s) Topical <User Schedule>  dextrose 5%. 1000 milliLiter(s) (50 mL/Hr) IV Continuous <Continuous>  dextrose 50% Injectable 12.5 Gram(s) IV Push once  dextrose 50% Injectable 25 Gram(s) IV Push once  dextrose 50% Injectable 25 Gram(s) IV Push once  heparin  flush 100 Units/mL Injectable 100 Unit(s) IV Push every other day  heparin  Infusion 950 Unit(s)/Hr (9.5 mL/Hr) IV Continuous <Continuous>  insulin lispro (HumaLOG) corrective regimen sliding scale   SubCutaneous every 6 hours  metroNIDAZOLE  IVPB 500 milliGRAM(s) IV Intermittent every 8 hours  metroNIDAZOLE  IVPB      midodrine 15 milliGRAM(s) Oral every 8 hours  norepinephrine Infusion 0.05 MICROgram(s)/kG/Min (3.436 mL/Hr) IV Continuous <Continuous>  nystatin Powder 1 Application(s) Topical two times a day  pantoprazole  Injectable 40 milliGRAM(s) IV Push daily  propofol Infusion 5 MICROgram(s)/kG/Min (2.199 mL/Hr) IV Continuous <Continuous>  zinc sulfate 220 milliGRAM(s) Oral daily    MEDICATIONS  (PRN):  acetaminophen    Suspension .. 650 milliGRAM(s) Enteral Tube every 6 hours PRN Temp greater or equal to 38C (100.4F)  dextrose 40% Gel 15 Gram(s) Oral once PRN Blood Glucose LESS THAN 70 milliGRAM(s)/deciliter  fentaNYL    Injectable 12.5 MICROGram(s) IV Push every 3 hours PRN Moderate Pain (4 - 6)  fentaNYL    Injectable 25 MICROGram(s) IV Push every 3 hours PRN Severe Pain (7 - 10)  glucagon  Injectable 1 milliGRAM(s) IntraMuscular once PRN Glucose LESS THAN 70 milligrams/deciliter  ondansetron Injectable 4 milliGRAM(s) IV Push every 6 hours PRN Nausea      RADIOLOGY & ADDITIONAL STUDIES:

## 2019-04-25 NOTE — PROGRESS NOTE ADULT - ASSESSMENT
85 yo M with history of b/l laparoscopic robotic-assisted inguinal hernia repair presenting with b/l segmental pulmonary embolisms, left lung abscess, pneumoperitoneum, and distal descending colon abscess (likely source of pneumoperitoneum), significant iliofemoral DVT burden, s/p IVCF placement and IR drainage of LLQ abdominal collection (4/5), s/p ex-lap, LAURYN, sigmoidectomy, drain placement in R. paracolic gutter, and abthera vac placement (4/7), s/p planned RTOR, packing removal, and end colostomy creation (4/10), now w/ MRSA + proteus PNA, persistent coagulopathy, LLL pulmonary empyema s/p bedside pigtail drainage (4/15), s/p extubation c/b vomiting and aspiration, reintubated (4/21), s/p tracheostomy creation (4/24)      Plan:  Neuro: Fentanyl prn  CV: levo gtt for MAP goal >65; midodrine 15q8h; dc 5% albumin and metolazone; hold lasix; asa 81 lipitor, statin,   Pulm: tracheostomy 4/24; on assist control. bilateral PE's; Empyema- s/p left CTx on 4/15  GI: NPO, dobhoff glucerna 1.5@50/hr; malnourished, Vit C, zinc; RUQ US shows hepatomegaly but patent portal and hepatic veins  : oliguric 2/2 to ATN; strict i/o's; continue rao  ID: CT c/a/p (2/22) showing severe enterocolitis; cdiff 4/24 negative; +kleb, proteus, strep in abd Cx, flagyl (4/9-4/20), cefepime (4/9-4/20); Sputum cx: MRSA and proteus (sensitive to cefepime) , vancomycin (4/10-5/4); nystatin powder for groin fungal rash (4/24--- )///Dc'd: ceftriaxone (4/9-4/9)// unasyn (4/8-4/9),  Vanco (4/5-4/7), Zosyn (4/5-4/8)]  Endo: ISS  Heme: bilateral iliofemoral DVT / bilateral PE; s/p IVC Filter; elevated INR; Mixing study wnr; factors; + APAS; restarted on anticoagulation w. heparin gtt 4/22  PPx: SCDs; SQH  Lines: PIVs, PICC (4/6--), L A line (4/13--) /// D/C: R cordis (4/7-4/11)  Wounds/drains: LLQ IR drain (4/5 -- ), LUQ PHYLLIS (4/7 -- ), R paracolic gutter drain (4/7 --) --- all drains to LIWS; abdominal incision wet to dry;  PT/OT: early mob 4/11 - worked with PT.

## 2019-04-25 NOTE — PROGRESS NOTE ADULT - PROBLEM SELECTOR PLAN 1
More consistent leak on chest tube today. However this has been fluctuant over the past week or so. As was yesterday, there is still a large SQ emphysema (likely from BPF), which is improving but would likely further benefit from continued suction from chest tube. May trial water seal after leak is consistently stopped    Recommend  - Continue to drain chest tube on low constant suction - apply least amount of suction to maintain lung inflation.  - Antibiotics to continue per cultures.

## 2019-04-25 NOTE — PROGRESS NOTE ADULT - SUBJECTIVE AND OBJECTIVE BOX
INTERVAL HISTORY:  Patient seen and examined at bedside. Alert and responsive but not answering questions consistently.     VITAL SIGNS:  ICU Vital Signs Last 24 Hrs  T(C): 35.3 (25 Apr 2019 14:40), Max: 37.6 (25 Apr 2019 06:04)  T(F): 95.6 (25 Apr 2019 14:40), Max: 99.7 (25 Apr 2019 06:04)  HR: 80 (25 Apr 2019 17:00) (68 - 92)  BP: 129/67 (25 Apr 2019 12:20) (98/61 - 135/65)  BP(mean): 93 (25 Apr 2019 12:20) (71 - 103)  ABP: 124/46 (25 Apr 2019 17:00) (108/48 - 142/50)  ABP(mean): 70 (25 Apr 2019 17:00) (64 - 90)  RR: 22 (25 Apr 2019 17:00) (21 - 29)  SpO2: 95% (25 Apr 2019 17:00) (95% - 100%)    Mode: AC/ CMV (Assist Control/ Continuous Mandatory Ventilation), RR (machine): 12, TV (machine): 530, FiO2: 40, PEEP: 5, ITime: 0.9, MAP: 10, PIP: 19    04-24 @ 07:01 - 04-25 @ 07:00  --------------------------------------------------------  IN: 866.7 mL / OUT: 645 mL / NET: 221.7 mL    04-25 @ 07:01 - 04-25 @ 17:38  --------------------------------------------------------  IN: 279.5 mL / OUT: 300 mL / NET: -20.5 mL      CAPILLARY BLOOD GLUCOSE      POCT Blood Glucose.: 141 mg/dL (25 Apr 2019 16:52)      PHYSICAL EXAM:  Constitutional: trached, appears to be in mild discomfort  Neck: SQ emphysema palpated bilaterally in neck. Trach in place with some dried blood and erythema around the insertion site but no other drainage   Respiratory: improved ronchi bilaterally,   Cardiovascular: +S1/S2, RRR  Gastrointestinal: abdomen soft, NT/ND; +BS x4  Extremities: WWP; no clubbing, cyanosis, 2+ edema in UE's and LE's bilaterally  Vascular: 2+ radial, DP/PT and femoral pulses B/L      MEDICATIONS:  MEDICATIONS  (STANDING):  ascorbic acid Syrup 250 milliGRAM(s) Oral daily  aspirin  chewable 81 milliGRAM(s) Oral daily  atorvastatin 80 milliGRAM(s) Oral at bedtime  cefepime   IVPB 2000 milliGRAM(s) IV Intermittent every 12 hours  chlorhexidine 0.12% Liquid 15 milliLiter(s) Oral Mucosa two times a day  chlorhexidine 2% Cloths 1 Application(s) Topical <User Schedule>  dextrose 5%. 1000 milliLiter(s) (50 mL/Hr) IV Continuous <Continuous>  dextrose 50% Injectable 12.5 Gram(s) IV Push once  dextrose 50% Injectable 25 Gram(s) IV Push once  dextrose 50% Injectable 25 Gram(s) IV Push once  heparin  flush 100 Units/mL Injectable 100 Unit(s) IV Push every other day  heparin  Infusion 950 Unit(s)/Hr (9.5 mL/Hr) IV Continuous <Continuous>  insulin lispro (HumaLOG) corrective regimen sliding scale   SubCutaneous every 6 hours  metroNIDAZOLE  IVPB 500 milliGRAM(s) IV Intermittent every 8 hours  metroNIDAZOLE  IVPB      midodrine 15 milliGRAM(s) Oral every 8 hours  norepinephrine Infusion 0.05 MICROgram(s)/kG/Min (3.436 mL/Hr) IV Continuous <Continuous>  nystatin Powder 1 Application(s) Topical two times a day  pantoprazole  Injectable 40 milliGRAM(s) IV Push daily  propofol Infusion 5 MICROgram(s)/kG/Min (2.199 mL/Hr) IV Continuous <Continuous>  zinc sulfate 220 milliGRAM(s) Oral daily    MEDICATIONS  (PRN):  acetaminophen    Suspension .. 650 milliGRAM(s) Enteral Tube every 6 hours PRN Temp greater or equal to 38C (100.4F)  dextrose 40% Gel 15 Gram(s) Oral once PRN Blood Glucose LESS THAN 70 milliGRAM(s)/deciliter  fentaNYL    Injectable 12.5 MICROGram(s) IV Push every 3 hours PRN Moderate Pain (4 - 6)  fentaNYL    Injectable 25 MICROGram(s) IV Push every 3 hours PRN Severe Pain (7 - 10)  glucagon  Injectable 1 milliGRAM(s) IntraMuscular once PRN Glucose LESS THAN 70 milligrams/deciliter  ondansetron Injectable 4 milliGRAM(s) IV Push every 6 hours PRN Nausea      ALLERGIES:  Allergies    No Known Allergies    Intolerances        LABS:                        8.4    21.12 )-----------( 474      ( 25 Apr 2019 05:37 )             26.0     04-25    138  |  105  |  49<H>  ----------------------------<  130<H>  3.8   |  20<L>  |  1.33<H>    Ca    8.1<L>      25 Apr 2019 05:37  Phos  3.8     04-25  Mg     2.0     04-25    TPro  5.0<L>  /  Alb  2.5<L>  /  TBili  0.5  /  DBili  0.3<H>  /  AST  9<L>  /  ALT  <5<L>  /  AlkPhos  46  04-25    PT/INR - ( 25 Apr 2019 05:37 )   PT: 36.3 sec;   INR: 3.10          PTT - ( 25 Apr 2019 05:37 )  PTT:60.4 sec      RADIOLOGY & ADDITIONAL TESTS:   CXR reviewed - improved right sided infiltrate, SubQ emphysema also improved

## 2019-04-25 NOTE — PROGRESS NOTE ADULT - ATTENDING COMMENTS
Persistent subcutaneous emphysema. Chest tube to continuous suction. Trach was done yesterday. Rest as above

## 2019-04-26 LAB
ALBUMIN SERPL ELPH-MCNC: 2 G/DL — LOW (ref 3.3–5)
ALP SERPL-CCNC: 91 U/L — SIGNIFICANT CHANGE UP (ref 40–120)
ALT FLD-CCNC: <5 U/L — LOW (ref 10–45)
ANION GAP SERPL CALC-SCNC: 13 MMOL/L — SIGNIFICANT CHANGE UP (ref 5–17)
APTT BLD: 40.8 SEC — HIGH (ref 27.5–36.3)
APTT BLD: 56.5 SEC — HIGH (ref 27.5–36.3)
APTT BLD: 59.8 SEC — HIGH (ref 27.5–36.3)
AST SERPL-CCNC: 9 U/L — LOW (ref 10–40)
BILIRUB DIRECT SERPL-MCNC: <0.2 MG/DL — SIGNIFICANT CHANGE UP (ref 0–0.2)
BILIRUB INDIRECT FLD-MCNC: >0.2 MG/DL — SIGNIFICANT CHANGE UP (ref 0.2–1)
BILIRUB SERPL-MCNC: 0.4 MG/DL — SIGNIFICANT CHANGE UP (ref 0.2–1.2)
BUN SERPL-MCNC: 53 MG/DL — HIGH (ref 7–23)
CALCIUM SERPL-MCNC: 7.6 MG/DL — LOW (ref 8.4–10.5)
CHLORIDE SERPL-SCNC: 106 MMOL/L — SIGNIFICANT CHANGE UP (ref 96–108)
CO2 SERPL-SCNC: 19 MMOL/L — LOW (ref 22–31)
CREAT ?TM UR-MCNC: 37 MG/DL — SIGNIFICANT CHANGE UP
CREAT SERPL-MCNC: 1.48 MG/DL — HIGH (ref 0.5–1.3)
GLUCOSE BLDC GLUCOMTR-MCNC: 121 MG/DL — HIGH (ref 70–99)
GLUCOSE BLDC GLUCOMTR-MCNC: 130 MG/DL — HIGH (ref 70–99)
GLUCOSE BLDC GLUCOMTR-MCNC: 136 MG/DL — HIGH (ref 70–99)
GLUCOSE BLDC GLUCOMTR-MCNC: 143 MG/DL — HIGH (ref 70–99)
GLUCOSE SERPL-MCNC: 141 MG/DL — HIGH (ref 70–99)
HCT VFR BLD CALC: 23.7 % — LOW (ref 39–50)
HGB BLD-MCNC: 7.7 G/DL — LOW (ref 13–17)
INR BLD: 2.88 — HIGH (ref 0.88–1.16)
MAGNESIUM SERPL-MCNC: 1.9 MG/DL — SIGNIFICANT CHANGE UP (ref 1.6–2.6)
MCHC RBC-ENTMCNC: 30.6 PG — SIGNIFICANT CHANGE UP (ref 27–34)
MCHC RBC-ENTMCNC: 32.5 GM/DL — SIGNIFICANT CHANGE UP (ref 32–36)
MCV RBC AUTO: 94 FL — SIGNIFICANT CHANGE UP (ref 80–100)
NRBC # BLD: 0 /100 WBCS — SIGNIFICANT CHANGE UP (ref 0–0)
OSMOLALITY UR: 353 MOSMOL/KG — SIGNIFICANT CHANGE UP (ref 100–650)
PHOSPHATE SERPL-MCNC: 3.8 MG/DL — SIGNIFICANT CHANGE UP (ref 2.5–4.5)
PLATELET # BLD AUTO: 377 K/UL — SIGNIFICANT CHANGE UP (ref 150–400)
POTASSIUM SERPL-MCNC: 3.4 MMOL/L — LOW (ref 3.5–5.3)
POTASSIUM SERPL-SCNC: 3.4 MMOL/L — LOW (ref 3.5–5.3)
POTASSIUM UR-SCNC: 55 MMOL/L — SIGNIFICANT CHANGE UP
PROT ?TM UR-MCNC: 104 MG/DL — HIGH (ref 0–12)
PROT SERPL-MCNC: 4.8 G/DL — LOW (ref 6–8.3)
PROT/CREAT UR-RTO: 2.8 RATIO — HIGH (ref 0–0.2)
PROTHROM AB SERPL-ACNC: 33.6 SEC — HIGH (ref 10–12.9)
RBC # BLD: 2.52 M/UL — LOW (ref 4.2–5.8)
RBC # FLD: 17.3 % — HIGH (ref 10.3–14.5)
SODIUM SERPL-SCNC: 138 MMOL/L — SIGNIFICANT CHANGE UP (ref 135–145)
SODIUM UR-SCNC: 49 MMOL/L — SIGNIFICANT CHANGE UP
UUN UR-MCNC: 350 MG/DL — SIGNIFICANT CHANGE UP
VANCOMYCIN FLD-MCNC: 22.1 UG/ML — SIGNIFICANT CHANGE UP
WBC # BLD: 18.46 K/UL — HIGH (ref 3.8–10.5)
WBC # FLD AUTO: 18.46 K/UL — HIGH (ref 3.8–10.5)

## 2019-04-26 PROCEDURE — 99291 CRITICAL CARE FIRST HOUR: CPT

## 2019-04-26 PROCEDURE — 71045 X-RAY EXAM CHEST 1 VIEW: CPT | Mod: 26

## 2019-04-26 RX ORDER — POTASSIUM CHLORIDE 20 MEQ
20 PACKET (EA) ORAL
Qty: 0 | Refills: 0 | Status: COMPLETED | OUTPATIENT
Start: 2019-04-26 | End: 2019-04-26

## 2019-04-26 RX ORDER — HEPARIN SODIUM 5000 [USP'U]/ML
950 INJECTION INTRAVENOUS; SUBCUTANEOUS
Qty: 25000 | Refills: 0 | Status: DISCONTINUED | OUTPATIENT
Start: 2019-04-26 | End: 2019-05-01

## 2019-04-26 RX ORDER — FENTANYL CITRATE 50 UG/ML
50 INJECTION INTRAVENOUS ONCE
Qty: 0 | Refills: 0 | Status: DISCONTINUED | OUTPATIENT
Start: 2019-04-26 | End: 2019-04-26

## 2019-04-26 RX ORDER — MIDAZOLAM HYDROCHLORIDE 1 MG/ML
2 INJECTION, SOLUTION INTRAMUSCULAR; INTRAVENOUS ONCE
Qty: 0 | Refills: 0 | Status: DISCONTINUED | OUTPATIENT
Start: 2019-04-26 | End: 2019-04-26

## 2019-04-26 RX ADMIN — MIDODRINE HYDROCHLORIDE 15 MILLIGRAM(S): 2.5 TABLET ORAL at 05:23

## 2019-04-26 RX ADMIN — Medication 50 MILLIEQUIVALENT(S): at 13:00

## 2019-04-26 RX ADMIN — Medication 3.44 MICROGRAM(S)/KG/MIN: at 18:00

## 2019-04-26 RX ADMIN — CEFEPIME 100 MILLIGRAM(S): 1 INJECTION, POWDER, FOR SOLUTION INTRAMUSCULAR; INTRAVENOUS at 05:23

## 2019-04-26 RX ADMIN — Medication 100 MILLIGRAM(S): at 02:17

## 2019-04-26 RX ADMIN — Medication 100 MILLIGRAM(S): at 10:00

## 2019-04-26 RX ADMIN — PANTOPRAZOLE SODIUM 40 MILLIGRAM(S): 20 TABLET, DELAYED RELEASE ORAL at 10:00

## 2019-04-26 RX ADMIN — Medication 100 MILLIGRAM(S): at 18:00

## 2019-04-26 RX ADMIN — CHLORHEXIDINE GLUCONATE 1 APPLICATION(S): 213 SOLUTION TOPICAL at 05:15

## 2019-04-26 RX ADMIN — CHLORHEXIDINE GLUCONATE 15 MILLILITER(S): 213 SOLUTION TOPICAL at 17:02

## 2019-04-26 RX ADMIN — Medication 50 MILLIEQUIVALENT(S): at 15:00

## 2019-04-26 RX ADMIN — Medication 81 MILLIGRAM(S): at 14:44

## 2019-04-26 RX ADMIN — ZINC SULFATE TAB 220 MG (50 MG ZINC EQUIVALENT) 220 MILLIGRAM(S): 220 (50 ZN) TAB at 14:45

## 2019-04-26 RX ADMIN — CEFEPIME 100 MILLIGRAM(S): 1 INJECTION, POWDER, FOR SOLUTION INTRAMUSCULAR; INTRAVENOUS at 18:00

## 2019-04-26 RX ADMIN — CHLORHEXIDINE GLUCONATE 15 MILLILITER(S): 213 SOLUTION TOPICAL at 05:23

## 2019-04-26 RX ADMIN — MIDODRINE HYDROCHLORIDE 15 MILLIGRAM(S): 2.5 TABLET ORAL at 14:44

## 2019-04-26 RX ADMIN — MIDODRINE HYDROCHLORIDE 15 MILLIGRAM(S): 2.5 TABLET ORAL at 22:56

## 2019-04-26 RX ADMIN — Medication 250 MILLIGRAM(S): at 14:44

## 2019-04-26 RX ADMIN — FENTANYL CITRATE 50 MICROGRAM(S): 50 INJECTION INTRAVENOUS at 12:48

## 2019-04-26 RX ADMIN — Medication 50 MILLIEQUIVALENT(S): at 11:21

## 2019-04-26 RX ADMIN — HEPARIN SODIUM 9.5 UNIT(S)/HR: 5000 INJECTION INTRAVENOUS; SUBCUTANEOUS at 18:01

## 2019-04-26 RX ADMIN — MIDAZOLAM HYDROCHLORIDE 2 MILLIGRAM(S): 1 INJECTION, SOLUTION INTRAMUSCULAR; INTRAVENOUS at 12:00

## 2019-04-26 RX ADMIN — ATORVASTATIN CALCIUM 80 MILLIGRAM(S): 80 TABLET, FILM COATED ORAL at 22:56

## 2019-04-26 RX ADMIN — NYSTATIN CREAM 1 APPLICATION(S): 100000 CREAM TOPICAL at 05:24

## 2019-04-26 RX ADMIN — FENTANYL CITRATE 50 MICROGRAM(S): 50 INJECTION INTRAVENOUS at 12:00

## 2019-04-26 RX ADMIN — NYSTATIN CREAM 1 APPLICATION(S): 100000 CREAM TOPICAL at 18:00

## 2019-04-26 NOTE — PROGRESS NOTE ADULT - ASSESSMENT
85 yo M with CAD (s/p 3 stents) and h/o EtOH abuse, s/p bilateral robotic inguinal hernia repair on 3/11, now with perforated diverticulitis s/p ex lap, sigmoidectomy, and drain placement with open abdomen on 4/7, RTOR on 4/10 for end colostomy and closure of fascia. Also with bilateral PE, iliofemoral DVT (s/p IVC filter on 4/5), and LLL pulmonary empyema s/p chest tube drainage on 4/15. S/p trach 4/24.     Plan:  - wean levophed as tolerated, keep MAPs > 65 on midodrine 15 tid now. on ASA/statin.  - f/u pulm recs about chest tube to water seal or low constant suction  - PEG today at bedside with Dr. Valencia  - f/u GI recs  - renal workup for JESUS with urine lytes and renal ultrasound  - on cefepime/flagyl for abx, f/u vanc trough for when to resume vancomycin for MRSA  - f/u heme consult, may need more Vit K  - possibly remove a drain today (maybe RLQ one), will discuss with Dr. Valencia  - daily dressing change wet to dry to midline wound  - PT consult, work in bed  - discussed with SICU team 87 yo M with CAD (s/p 3 stents) and h/o EtOH abuse, s/p bilateral robotic inguinal hernia repair on 3/11, now with perforated diverticulitis s/p ex lap, sigmoidectomy, and drain placement with open abdomen on 4/7, RTOR on 4/10 for end colostomy and closure of fascia. Also with bilateral PE, iliofemoral DVT (s/p IVC filter on 4/5), and LLL pulmonary empyema s/p chest tube drainage on 4/15. S/p trach 4/24.     Plan:  - keep off sedation for 24 hrs; if neuro status doesn't improve by tomorrow AM, consider CT head non-con  - wean levophed as tolerated, keep MAPs > 65 on midodrine 15 tid now. on ASA/statin.  - f/u pulm recs about chest tube to water seal or low constant suction  - PEG today at bedside with Dr. Valencia (meds today, feeds tomorrow)  - f/u GI recs  - renal workup for JESUS with urine lytes and renal ultrasound  - on cefepime/flagyl for abx, f/u vanc trough for when to resume vancomycin for MRSA  - f/u heme consult, heparin drip to resume 6 hrs after PEG  - remove RLQ drain; remove other two drains over the weekend (one on Sat, one on Sun)  - daily dressing change wet to dry to midline wound  - PT consult, work in bed  - discussed with SICU team and with Dr. Valencia

## 2019-04-26 NOTE — BRIEF OPERATIVE NOTE - NSICDXBRIEFPREOP_GEN_ALL_CORE_FT
PRE-OP DIAGNOSIS:  Perforated viscus 07-Apr-2019 16:08:37  Stephan Peraza
PRE-OP DIAGNOSIS:  Respiratory failure, acute and chronic 24-Apr-2019 08:20:21  Jacinda Lopez L
PRE-OP DIAGNOSIS:  Failure to thrive in adult 26-Apr-2019 23:57:45  Mariano Noe
PRE-OP DIAGNOSIS:  Respiratory failure, acute and chronic 24-Apr-2019 08:20:21  Jacinda Lopez L
PRE-OP DIAGNOSIS:  Perforated viscus 07-Apr-2019 16:08:37  Stephan Peraza

## 2019-04-26 NOTE — BRIEF OPERATIVE NOTE - NSICDXBRIEFOPLAUNCH_GEN_ALL_CORE
<--- Click to Launch ICDx for PreOp, PostOp and Procedure

## 2019-04-26 NOTE — PROGRESS NOTE ADULT - SUBJECTIVE AND OBJECTIVE BOX
Interval History:  ON: ptt 59.8, next ptt at 9AM  4/25: vanc level 25.3, will recheck in AM. Heparin drip stopped, vit k given.    SUBJECTIVE:   Patient seen and examined by       Vitals - Range in last 12h  T(F): , Max: 99.3 (04-25-19 @ 22:00)  HR:  (80 - 98)  BP: --  BP(mean): --  ABP:  (124/46 - 132/42)  ABP(mean):  (68 - 74)  RR:  (23 - 28)  SpO2:  (98% - 100%)  CVP(mm Hg): --  CVP(cm H2O): --  CO: --  CI: --  PA: --  PA(mean): --  PA(direct): --  PCWP: --    Vitals - Most Recent  T(F): 97 (04-26-19 @ 06:12)  HR: 82 (04-26-19 @ 06:00)  BP: 129/67 (04-25-19 @ 12:20)  RR: 26 (04-26-19 @ 06:00)  SpO2: 99% (04-26-19 @ 06:00)  I&O's Detail    24 Apr 2019 07:01  -  25 Apr 2019 07:00  --------------------------------------------------------  IN:    Enteral Tube Flush: 30 mL    heparin Infusion: 170.7 mL    IV PiggyBack: 450 mL    norepinephrine Infusion: 208 mL    propofol Infusion: 8 mL  Total IN: 866.7 mL    OUT:    Bulb: 15 mL    Chest Tube: 50 mL    Colostomy: 60 mL    Indwelling Catheter - Urethral: 520 mL  Total OUT: 645 mL    Total NET: 221.7 mL      25 Apr 2019 07:01  -  26 Apr 2019 06:37  --------------------------------------------------------  IN:    Glucerna 1.5: 180 mL    heparin Infusion: 161.5 mL    heparin Infusion: 38 mL    IV PiggyBack: 300 mL    norepinephrine Infusion: 157 mL  Total IN: 836.5 mL    OUT:    Chest Tube: 18 mL    Colostomy: 450 mL    Indwelling Catheter - Urethral: 855 mL  Total OUT: 1323 mL    Total NET: -486.5 mL        Mode: AC/ CMV (Assist Control/ Continuous Mandatory Ventilation)  RR (machine): 12  RR (patient): 23  TV (machine): 530  TV (patient): 545  FiO2: 40  PEEP: 5  ITime: 0.8  MAP: 11  PIP: 24      Physical Exam          LABS:                        7.7    18.46 )-----------( 377      ( 26 Apr 2019 05:56 )             23.7     04-25    138  |  105  |  49<H>  ----------------------------<  130<H>  3.8   |  20<L>  |  1.33<H>    Ca    8.1<L>      25 Apr 2019 05:37  Phos  3.8     04-25  Mg     2.0     04-25    TPro  5.0<L>  /  Alb  2.5<L>  /  TBili  0.5  /  DBili  0.3<H>  /  AST  9<L>  /  ALT  <5<L>  /  AlkPhos  46  04-25    LIVER FUNCTIONS - ( 25 Apr 2019 05:37 )  Alb: 2.5 g/dL / Pro: 5.0 g/dL / ALK PHOS: 46 U/L / ALT: <5 U/L / AST: 9 U/L / GGT: x           PT/INR - ( 26 Apr 2019 05:56 )   PT: 33.6 sec;   INR: 2.88          PTT - ( 26 Apr 2019 05:56 )  PTT:56.5 sec        MEDICATIONS  (STANDING):  ascorbic acid Syrup 250 milliGRAM(s) Oral daily  aspirin  chewable 81 milliGRAM(s) Oral daily  atorvastatin 80 milliGRAM(s) Oral at bedtime  cefepime   IVPB 2000 milliGRAM(s) IV Intermittent every 12 hours  chlorhexidine 0.12% Liquid 15 milliLiter(s) Oral Mucosa two times a day  chlorhexidine 2% Cloths 1 Application(s) Topical <User Schedule>  dextrose 5%. 1000 milliLiter(s) (50 mL/Hr) IV Continuous <Continuous>  dextrose 50% Injectable 12.5 Gram(s) IV Push once  dextrose 50% Injectable 25 Gram(s) IV Push once  dextrose 50% Injectable 25 Gram(s) IV Push once  heparin  flush 100 Units/mL Injectable 100 Unit(s) IV Push every other day  insulin lispro (HumaLOG) corrective regimen sliding scale   SubCutaneous every 6 hours  metroNIDAZOLE  IVPB 500 milliGRAM(s) IV Intermittent every 8 hours  metroNIDAZOLE  IVPB      midodrine 15 milliGRAM(s) Oral every 8 hours  norepinephrine Infusion 0.05 MICROgram(s)/kG/Min (3.436 mL/Hr) IV Continuous <Continuous>  nystatin Powder 1 Application(s) Topical two times a day  pantoprazole  Injectable 40 milliGRAM(s) IV Push daily  propofol Infusion 5 MICROgram(s)/kG/Min (2.199 mL/Hr) IV Continuous <Continuous>  zinc sulfate 220 milliGRAM(s) Oral daily    MEDICATIONS  (PRN):  acetaminophen    Suspension .. 650 milliGRAM(s) Enteral Tube every 6 hours PRN Temp greater or equal to 38C (100.4F)  dextrose 40% Gel 15 Gram(s) Oral once PRN Blood Glucose LESS THAN 70 milliGRAM(s)/deciliter  fentaNYL    Injectable 12.5 MICROGram(s) IV Push every 3 hours PRN Moderate Pain (4 - 6)  fentaNYL    Injectable 25 MICROGram(s) IV Push every 3 hours PRN Severe Pain (7 - 10)  glucagon  Injectable 1 milliGRAM(s) IntraMuscular once PRN Glucose LESS THAN 70 milligrams/deciliter  ondansetron Injectable 4 milliGRAM(s) IV Push every 6 hours PRN Nausea      RADIOLOGY & ADDITIONAL STUDIES: Interval History:  ON: ptt 59.8, next ptt at 9AM  4/25: vanc level 25.3, will recheck in AM. Heparin drip stopped, vit k given.    SUBJECTIVE:   Patient seen and examined by bedside. Patient trached and on vent. Patient appears more lethargic and somnolent today. Opened eyes momentarily, but did not acknowledge or answer any questions. Did not follow commands. ROS not obtainable. HD stable on levophed.      Vitals - Range in last 12h  T(F): , Max: 99.3 (04-25-19 @ 22:00)  HR:  (80 - 98)  BP: --  BP(mean): --  ABP:  (124/46 - 132/42)  ABP(mean):  (68 - 74)  RR:  (23 - 28)  SpO2:  (98% - 100%)  CVP(mm Hg): --  CVP(cm H2O): --  CO: --  CI: --  PA: --  PA(mean): --  PA(direct): --  PCWP: --    Vitals - Most Recent  T(F): 97 (04-26-19 @ 06:12)  HR: 82 (04-26-19 @ 06:00)  BP: 129/67 (04-25-19 @ 12:20)  RR: 26 (04-26-19 @ 06:00)  SpO2: 99% (04-26-19 @ 06:00)  I&O's Detail    24 Apr 2019 07:01  -  25 Apr 2019 07:00  --------------------------------------------------------  IN:    Enteral Tube Flush: 30 mL    heparin Infusion: 170.7 mL    IV PiggyBack: 450 mL    norepinephrine Infusion: 208 mL    propofol Infusion: 8 mL  Total IN: 866.7 mL    OUT:    Bulb: 15 mL    Chest Tube: 50 mL    Colostomy: 60 mL    Indwelling Catheter - Urethral: 520 mL  Total OUT: 645 mL    Total NET: 221.7 mL      25 Apr 2019 07:01  -  26 Apr 2019 06:37  --------------------------------------------------------  IN:    Glucerna 1.5: 180 mL    heparin Infusion: 161.5 mL    heparin Infusion: 38 mL    IV PiggyBack: 300 mL    norepinephrine Infusion: 157 mL  Total IN: 836.5 mL    OUT:    Chest Tube: 18 mL    Colostomy: 450 mL    Indwelling Catheter - Urethral: 855 mL  Total OUT: 1323 mL    Total NET: -486.5 mL        Mode: AC/ CMV (Assist Control/ Continuous Mandatory Ventilation)  RR (machine): 12  RR (patient): 23  TV (machine): 530  TV (patient): 545  FiO2: 40  PEEP: 5  ITime: 0.8  MAP: 11  PIP: 24      Physical Exam  Neuro: intubated, alert but non-verbal, follows simple commands  General: no acute distress, breathing nonlabored on vent, overbreathing vent  HEENT: PERRL, EOMI, MMD, dobhoff in place, TFs off, subcutaneous emphysema of neck,   Pulm: intubated, on vent-AC; large area of SQ emphysema noted on exam, prominent on L chest; chest tube of L posterior chest to wall suction w/ minimal output; bilateral rhonchi w/ expiratory wheezing  C/V: S1S2, non-tachycardic  Abd: nondistended, but firm; TTP w/ mild guarding; ostomy w/ liquid brown output; LLQ IR and LUQ PHYLLIS drain w/ clear brown ascites output; R paracolic gutter drain w/ clear brown output; abdominal incision w/ wet to dry dressing in place; no active bleeding, no purulence   Extremities: significant improvement in edema/ansarca of lower extremities; remain 2+ pitting edema w/ persistent edema of upper extremity forearms  : rao in place; scrotal edema resolved; blanching erythema near proximal thigh and groin  Skin: mild blanching erythema on thighs, no macerations        LABS:                        7.7    18.46 )-----------( 377      ( 26 Apr 2019 05:56 )             23.7     04-25    138  |  105  |  49<H>  ----------------------------<  130<H>  3.8   |  20<L>  |  1.33<H>    Ca    8.1<L>      25 Apr 2019 05:37  Phos  3.8     04-25  Mg     2.0     04-25    TPro  5.0<L>  /  Alb  2.5<L>  /  TBili  0.5  /  DBili  0.3<H>  /  AST  9<L>  /  ALT  <5<L>  /  AlkPhos  46  04-25    LIVER FUNCTIONS - ( 25 Apr 2019 05:37 )  Alb: 2.5 g/dL / Pro: 5.0 g/dL / ALK PHOS: 46 U/L / ALT: <5 U/L / AST: 9 U/L / GGT: x           PT/INR - ( 26 Apr 2019 05:56 )   PT: 33.6 sec;   INR: 2.88          PTT - ( 26 Apr 2019 05:56 )  PTT:56.5 sec        MEDICATIONS  (STANDING):  ascorbic acid Syrup 250 milliGRAM(s) Oral daily  aspirin  chewable 81 milliGRAM(s) Oral daily  atorvastatin 80 milliGRAM(s) Oral at bedtime  cefepime   IVPB 2000 milliGRAM(s) IV Intermittent every 12 hours  chlorhexidine 0.12% Liquid 15 milliLiter(s) Oral Mucosa two times a day  chlorhexidine 2% Cloths 1 Application(s) Topical <User Schedule>  dextrose 5%. 1000 milliLiter(s) (50 mL/Hr) IV Continuous <Continuous>  dextrose 50% Injectable 12.5 Gram(s) IV Push once  dextrose 50% Injectable 25 Gram(s) IV Push once  dextrose 50% Injectable 25 Gram(s) IV Push once  heparin  flush 100 Units/mL Injectable 100 Unit(s) IV Push every other day  insulin lispro (HumaLOG) corrective regimen sliding scale   SubCutaneous every 6 hours  metroNIDAZOLE  IVPB 500 milliGRAM(s) IV Intermittent every 8 hours  metroNIDAZOLE  IVPB      midodrine 15 milliGRAM(s) Oral every 8 hours  norepinephrine Infusion 0.05 MICROgram(s)/kG/Min (3.436 mL/Hr) IV Continuous <Continuous>  nystatin Powder 1 Application(s) Topical two times a day  pantoprazole  Injectable 40 milliGRAM(s) IV Push daily  propofol Infusion 5 MICROgram(s)/kG/Min (2.199 mL/Hr) IV Continuous <Continuous>  zinc sulfate 220 milliGRAM(s) Oral daily    MEDICATIONS  (PRN):  acetaminophen    Suspension .. 650 milliGRAM(s) Enteral Tube every 6 hours PRN Temp greater or equal to 38C (100.4F)  dextrose 40% Gel 15 Gram(s) Oral once PRN Blood Glucose LESS THAN 70 milliGRAM(s)/deciliter  fentaNYL    Injectable 12.5 MICROGram(s) IV Push every 3 hours PRN Moderate Pain (4 - 6)  fentaNYL    Injectable 25 MICROGram(s) IV Push every 3 hours PRN Severe Pain (7 - 10)  glucagon  Injectable 1 milliGRAM(s) IntraMuscular once PRN Glucose LESS THAN 70 milligrams/deciliter  ondansetron Injectable 4 milliGRAM(s) IV Push every 6 hours PRN Nausea      RADIOLOGY & ADDITIONAL STUDIES: Interval History:  ON: ptt 59.8, next ptt at 9AM  4/25: vanc level 25.3, will recheck in AM. Heparin drip stopped, vit k given.    SUBJECTIVE:   Patient seen and examined by bedside. Patient trached and on vent. Patient appears more lethargic and somnolent today. Opened eyes momentarily, but did not acknowledge or answer any questions. Did not follow commands. ROS not obtainable. HD stable on levophed.      Vitals - Range in last 12h  T(F): , Max: 99.3 (04-25-19 @ 22:00)  HR:  (80 - 98)  BP: --  BP(mean): --  ABP:  (124/46 - 132/42)  ABP(mean):  (68 - 74)  RR:  (23 - 28)  SpO2:  (98% - 100%)  CVP(mm Hg): --  CVP(cm H2O): --  CO: --  CI: --  PA: --  PA(mean): --  PA(direct): --  PCWP: --    Vitals - Most Recent  T(F): 97 (04-26-19 @ 06:12)  HR: 82 (04-26-19 @ 06:00)  BP: 129/67 (04-25-19 @ 12:20)  RR: 26 (04-26-19 @ 06:00)  SpO2: 99% (04-26-19 @ 06:00)  I&O's Detail    24 Apr 2019 07:01  -  25 Apr 2019 07:00  --------------------------------------------------------  IN:    Enteral Tube Flush: 30 mL    heparin Infusion: 170.7 mL    IV PiggyBack: 450 mL    norepinephrine Infusion: 208 mL    propofol Infusion: 8 mL  Total IN: 866.7 mL    OUT:    Bulb: 15 mL    Chest Tube: 50 mL    Colostomy: 60 mL    Indwelling Catheter - Urethral: 520 mL  Total OUT: 645 mL    Total NET: 221.7 mL      25 Apr 2019 07:01  -  26 Apr 2019 06:37  --------------------------------------------------------  IN:    Glucerna 1.5: 180 mL    heparin Infusion: 161.5 mL    heparin Infusion: 38 mL    IV PiggyBack: 300 mL    norepinephrine Infusion: 157 mL  Total IN: 836.5 mL    OUT:    Chest Tube: 18 mL    Colostomy: 450 mL    Indwelling Catheter - Urethral: 855 mL  Total OUT: 1323 mL    Total NET: -486.5 mL        Mode: AC/ CMV (Assist Control/ Continuous Mandatory Ventilation)  RR (machine): 12  RR (patient): 23  TV (machine): 530  TV (patient): 545  FiO2: 40  PEEP: 5  ITime: 0.8  MAP: 11  PIP: 24      Physical Exam  Neuro: on vent, non-verbal, lethargic and did not follow commands  General: no acute distress, non-labored  HEENT: PERRL, EOMI, MMD, dobhoff in place, TFs off, subcutaneous emphysema of neck improved  Pulm: trached, on ventilator-AC; chest tube of L posterior chest to wall suction w/ minimal output; mild bilateral rhonchi; no wheezing  C/V: S1S2, non-tachycardic  Abd: nondistended, but firm; TTP w/ mild guarding; ostomy w/ liquid brown output; LLQ IR and LUQ PHYLLIS drain w/ clear brown ascites output; R paracolic gutter drain w/ clear brown output; abdominal incision w/ wet to dry dressing in place; no active bleeding, no purulence   Extremities: persistent ansarca of lower extremities; 2+ pitting edema w/ persistent edema of upper extremity forearms  : rao in place; scrotal edema resolved; blanching erythema near proximal thigh and groin  Skin: mild blanching erythema on thighs, no macerations        LABS:                        7.7    18.46 )-----------( 377      ( 26 Apr 2019 05:56 )             23.7     04-25    138  |  105  |  49<H>  ----------------------------<  130<H>  3.8   |  20<L>  |  1.33<H>    Ca    8.1<L>      25 Apr 2019 05:37  Phos  3.8     04-25  Mg     2.0     04-25    TPro  5.0<L>  /  Alb  2.5<L>  /  TBili  0.5  /  DBili  0.3<H>  /  AST  9<L>  /  ALT  <5<L>  /  AlkPhos  46  04-25    LIVER FUNCTIONS - ( 25 Apr 2019 05:37 )  Alb: 2.5 g/dL / Pro: 5.0 g/dL / ALK PHOS: 46 U/L / ALT: <5 U/L / AST: 9 U/L / GGT: x           PT/INR - ( 26 Apr 2019 05:56 )   PT: 33.6 sec;   INR: 2.88          PTT - ( 26 Apr 2019 05:56 )  PTT:56.5 sec        MEDICATIONS  (STANDING):  ascorbic acid Syrup 250 milliGRAM(s) Oral daily  aspirin  chewable 81 milliGRAM(s) Oral daily  atorvastatin 80 milliGRAM(s) Oral at bedtime  cefepime   IVPB 2000 milliGRAM(s) IV Intermittent every 12 hours  chlorhexidine 0.12% Liquid 15 milliLiter(s) Oral Mucosa two times a day  chlorhexidine 2% Cloths 1 Application(s) Topical <User Schedule>  dextrose 5%. 1000 milliLiter(s) (50 mL/Hr) IV Continuous <Continuous>  dextrose 50% Injectable 12.5 Gram(s) IV Push once  dextrose 50% Injectable 25 Gram(s) IV Push once  dextrose 50% Injectable 25 Gram(s) IV Push once  heparin  flush 100 Units/mL Injectable 100 Unit(s) IV Push every other day  insulin lispro (HumaLOG) corrective regimen sliding scale   SubCutaneous every 6 hours  metroNIDAZOLE  IVPB 500 milliGRAM(s) IV Intermittent every 8 hours  metroNIDAZOLE  IVPB      midodrine 15 milliGRAM(s) Oral every 8 hours  norepinephrine Infusion 0.05 MICROgram(s)/kG/Min (3.436 mL/Hr) IV Continuous <Continuous>  nystatin Powder 1 Application(s) Topical two times a day  pantoprazole  Injectable 40 milliGRAM(s) IV Push daily  propofol Infusion 5 MICROgram(s)/kG/Min (2.199 mL/Hr) IV Continuous <Continuous>  zinc sulfate 220 milliGRAM(s) Oral daily    MEDICATIONS  (PRN):  acetaminophen    Suspension .. 650 milliGRAM(s) Enteral Tube every 6 hours PRN Temp greater or equal to 38C (100.4F)  dextrose 40% Gel 15 Gram(s) Oral once PRN Blood Glucose LESS THAN 70 milliGRAM(s)/deciliter  fentaNYL    Injectable 12.5 MICROGram(s) IV Push every 3 hours PRN Moderate Pain (4 - 6)  fentaNYL    Injectable 25 MICROGram(s) IV Push every 3 hours PRN Severe Pain (7 - 10)  glucagon  Injectable 1 milliGRAM(s) IntraMuscular once PRN Glucose LESS THAN 70 milligrams/deciliter  ondansetron Injectable 4 milliGRAM(s) IV Push every 6 hours PRN Nausea      RADIOLOGY & ADDITIONAL STUDIES: Interval History:  ON: ptt 59.8, next ptt at 9AM  4/25: vanc level 25.3, will recheck in AM. Heparin drip stopped, vit k given.    SUBJECTIVE:   Patient seen and examined by bedside. Patient trached and on vent. Patient appears more lethargic and somnolent today. Opened eyes momentarily, but did not acknowledge or answer any questions. Did not follow commands. ROS not obtainable. HD stable on levophed.      Vitals - Range in last 12h  T(F): , Max: 99.3 (04-25-19 @ 22:00)  HR:  (80 - 98)  BP: --  BP(mean): --  ABP:  (124/46 - 132/42)  ABP(mean):  (68 - 74)  RR:  (23 - 28)  SpO2:  (98% - 100%)  CVP(mm Hg): --  CVP(cm H2O): --  CO: --  CI: --  PA: --  PA(mean): --  PA(direct): --  PCWP: --    Vitals - Most Recent  T(F): 97 (04-26-19 @ 06:12)  HR: 82 (04-26-19 @ 06:00)  BP: 129/67 (04-25-19 @ 12:20)  RR: 26 (04-26-19 @ 06:00)  SpO2: 99% (04-26-19 @ 06:00)  I&O's Detail    24 Apr 2019 07:01  -  25 Apr 2019 07:00  --------------------------------------------------------  IN:    Enteral Tube Flush: 30 mL    heparin Infusion: 170.7 mL    IV PiggyBack: 450 mL    norepinephrine Infusion: 208 mL    propofol Infusion: 8 mL  Total IN: 866.7 mL    OUT:    Bulb: 15 mL    Chest Tube: 50 mL    Colostomy: 60 mL    Indwelling Catheter - Urethral: 520 mL  Total OUT: 645 mL    Total NET: 221.7 mL      25 Apr 2019 07:01  -  26 Apr 2019 06:37  --------------------------------------------------------  IN:    Glucerna 1.5: 180 mL    heparin Infusion: 161.5 mL    heparin Infusion: 38 mL    IV PiggyBack: 300 mL    norepinephrine Infusion: 157 mL  Total IN: 836.5 mL    OUT:    Chest Tube: 18 mL    Colostomy: 450 mL    Indwelling Catheter - Urethral: 855 mL  Total OUT: 1323 mL    Total NET: -486.5 mL        Mode: AC/ CMV (Assist Control/ Continuous Mandatory Ventilation)  RR (machine): 12  RR (patient): 23  TV (machine): 530  TV (patient): 545  FiO2: 40  PEEP: 5  ITime: 0.8  MAP: 11  PIP: 24      Physical Exam  Neuro: on vent, non-verbal, lethargic and did not follow commands  General: no acute distress, non-labored  HEENT: PERRL, EOMI, MMD, dobhoff in place, TFs off, subcutaneous emphysema of neck improved  Pulm: trached, on ventilator-AC; chest tube of L posterior chest to wall suction w/ minimal output; mild bilateral rhonchi; no wheezing  C/V: S1S2, non-tachycardic  Abd: nondistended, but firm; TTP w/ mild guarding; ostomy w/ liquid brown output; LLQ IR and LUQ PHYLLIS drain w/ clear brown ascites output; R paracolic gutter drain w/ clear brown output; abdominal incision w/ wet to dry dressing in place; no active bleeding, no purulence   Extremities: persistent ansarca of lower extremities; 2+ pitting edema w/ persistent edema of upper extremity forearms  : rao in place; scrotal edema resolved; blanching erythema near proximal thigh and groin  Skin: mild blanching erythema on thighs, no macerations        LABS:                        7.7    18.46 )-----------( 377      ( 26 Apr 2019 05:56 )             23.7     04-25    138  |  105  |  49<H>  ----------------------------<  130<H>  3.8   |  20<L>  |  1.33<H>    Ca    8.1<L>      25 Apr 2019 05:37  Phos  3.8     04-25  Mg     2.0     04-25    TPro  5.0<L>  /  Alb  2.5<L>  /  TBili  0.5  /  DBili  0.3<H>  /  AST  9<L>  /  ALT  <5<L>  /  AlkPhos  46  04-25    LIVER FUNCTIONS - ( 25 Apr 2019 05:37 )  Alb: 2.5 g/dL / Pro: 5.0 g/dL / ALK PHOS: 46 U/L / ALT: <5 U/L / AST: 9 U/L / GGT: x           PT/INR - ( 26 Apr 2019 05:56 )   PT: 33.6 sec;   INR: 2.88          PTT - ( 26 Apr 2019 05:56 )  PTT:56.5 sec        MEDICATIONS  (STANDING):  ascorbic acid Syrup 250 milliGRAM(s) Oral daily  aspirin  chewable 81 milliGRAM(s) Oral daily  atorvastatin 80 milliGRAM(s) Oral at bedtime  cefepime   IVPB 2000 milliGRAM(s) IV Intermittent every 12 hours  chlorhexidine 0.12% Liquid 15 milliLiter(s) Oral Mucosa two times a day  chlorhexidine 2% Cloths 1 Application(s) Topical <User Schedule>  dextrose 5%. 1000 milliLiter(s) (50 mL/Hr) IV Continuous <Continuous>  dextrose 50% Injectable 12.5 Gram(s) IV Push once  dextrose 50% Injectable 25 Gram(s) IV Push once  dextrose 50% Injectable 25 Gram(s) IV Push once  heparin  flush 100 Units/mL Injectable 100 Unit(s) IV Push every other day  insulin lispro (HumaLOG) corrective regimen sliding scale   SubCutaneous every 6 hours  metroNIDAZOLE  IVPB 500 milliGRAM(s) IV Intermittent every 8 hours  metroNIDAZOLE  IVPB      midodrine 15 milliGRAM(s) Oral every 8 hours  norepinephrine Infusion 0.05 MICROgram(s)/kG/Min (3.436 mL/Hr) IV Continuous <Continuous>  nystatin Powder 1 Application(s) Topical two times a day  pantoprazole  Injectable 40 milliGRAM(s) IV Push daily  propofol Infusion 5 MICROgram(s)/kG/Min (2.199 mL/Hr) IV Continuous <Continuous>  zinc sulfate 220 milliGRAM(s) Oral daily    MEDICATIONS  (PRN):  acetaminophen    Suspension .. 650 milliGRAM(s) Enteral Tube every 6 hours PRN Temp greater or equal to 38C (100.4F)  dextrose 40% Gel 15 Gram(s) Oral once PRN Blood Glucose LESS THAN 70 milliGRAM(s)/deciliter  fentaNYL    Injectable 12.5 MICROGram(s) IV Push every 3 hours PRN Moderate Pain (4 - 6)  fentaNYL    Injectable 25 MICROGram(s) IV Push every 3 hours PRN Severe Pain (7 - 10)  glucagon  Injectable 1 milliGRAM(s) IntraMuscular once PRN Glucose LESS THAN 70 milligrams/deciliter  ondansetron Injectable 4 milliGRAM(s) IV Push every 6 hours PRN Nausea      RADIOLOGY & ADDITIONAL STUDIES:        85 yo M with history of b/l laparoscopic robotic-assisted inguinal hernia repair presenting with b/l segmental pulmonary embolisms, left lung abscess, pneumoperitoneum, and distal descending colon abscess (likely source of pneumoperitoneum), significant iliofemoral DVT burden, s/p IVCF placement and IR drainage of LLQ abdominal collection (4/5), s/p ex-lap, LAURYN, sigmoidectomy, drain placement in R. paracolic gutter, and abthera vac placement (4/7), s/p planned RTOR, packing removal, and end colostomy creation (4/10), now w/ MRSA + proteus PNA, persistent coagulopathy, LLL pulmonary empyema s/p bedside pigtail drainage (4/15), s/p extubation c/b vomiting and aspiration, reintubated (4/21), s/p tracheostomy creation (4/24)    Plan for PEG placement today w/ Dr. Valencia at bedside  - INR 2.88 [3.1] improving w/ IV vit K x2  - continue Chest tube to wall suction    Plan:  Neuro: Fentanyl prn; Versed before PEG  CV: levo gtt for MAP goal >65; midodrine 15q8h; dc 5% albumin and metolazone; hold lasix; asa 81 lipitor, statin,   Pulm: tracheostomy 4/24; on assist control. 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 clearance; strict i/o's; continue rao  ID: CT c/a/p (2/22) showing severe enterocolitis; cdiff 4/24 negative; +kleb, proteus, strep in abd Cx, flagyl (4/9-4/20), cefepime (4/9-4/20); Sputum cx: MRSA and proteus (sensitive to cefepime) , vancomycin (4/10-4/25); nystatin powder for groin fungal rash (4/24--- )///Dc'd: ceftriaxone (4/9-4/9)// unasyn (4/8-4/9),  Vanco (4/5-4/7), Zosyn (4/5-4/8)]  Endo: ISS  Heme: INR improving w/ IV Vit Kx2; bilateral iliofemoral DVT / bilateral PE; s/p IVC Filter; elevated INR; Mixing study wnr; factors; + APAS; restarted on anticoagulation w. heparin gtt 4/22  PPx: SCDs; SQH  Lines: PIVs, PICC (4/6--), L A line (4/13--) /// D/C: R cordis (4/7-4/11)  Wounds/drains: LLQ IR pdrain (4/5 -- ), LUQ PHYLLIS (4/7 -- ), R paracolic gutter drain (4/7 --) --- all drains to LIWS; abdominal incision wet to dry;  PT/OT: early mob 4/11, PT on hold due to patient weakness

## 2019-04-26 NOTE — BRIEF OPERATIVE NOTE - OPERATION/FINDINGS
Procedure: EGD and PEG tube placement    Findings:   - performed at SICU bedside  - bite block secured; endoscope introduced into esophagus, past GE junction  - no hiatal hernia appreciated on retroflex view  - endoscope light appreciated through anterior abdominal wall  - local anesthesia infiltrated into skin; 1cm transverse incision 2 cm below L costal margin;   - introducer needle placed into gastric lumen under endoscopic visualization; no air, succus or feculance appreciated  - guidewire placed through needle, grasped withdrawn through patient's mouth  - PEG tube attached to guidewire and pulled back through esophagus in antegrade manner into the stomach and out of the anterior abdominal wall  - gastroscope re-entered into stomach, wire disengaged from PEG tube  - hemostasis and proper placement confirmed   - stomach desufflated, and endoscope withdrawn, PEG tube capped and hubbed

## 2019-04-26 NOTE — PROGRESS NOTE ADULT - SUBJECTIVE AND OBJECTIVE BOX
SUBJECTIVE: Attempted to get RoS, but despite eyes being open, pt unable to reliably answer questions.    Vital Signs Last 24 Hrs  T(C): 36.1 (26 Apr 2019 06:12), Max: 37.6 (25 Apr 2019 10:12)  T(F): 97 (26 Apr 2019 06:12), Max: 99.6 (25 Apr 2019 10:12)  HR: 78 (26 Apr 2019 08:00) (69 - 98)  BP: 129/67 (25 Apr 2019 12:20) (98/61 - 135/65)  BP(mean): 93 (25 Apr 2019 12:20) (71 - 103)  RR: 25 (26 Apr 2019 08:00) (22 - 28)  SpO2: 100% (26 Apr 2019 08:00) (94% - 100%)    Physical Exam:  General: NAD  Pulmonary: trach, on AC. Chest tube currently water seal  Cardiovascular: NSR  Abdominal: soft, NT/ND, midline wound dressing c/d/i, drains with no output. Colostomy functioning with stool output  : Abbott  Extremities: WWP, 2+ pitting edema in both legs    I&O's Summary    25 Apr 2019 07:01  -  26 Apr 2019 07:00  --------------------------------------------------------  IN: 842.5 mL / OUT: 1598 mL / NET: -755.5 mL    26 Apr 2019 07:01  -  26 Apr 2019 09:17  --------------------------------------------------------  IN: 6 mL / OUT: 40 mL / NET: -34 mL        LABS:                        7.7    18.46 )-----------( 377      ( 26 Apr 2019 05:56 )             23.7     04-26    138  |  106  |  53<H>  ----------------------------<  141<H>  3.4<L>   |  19<L>  |  1.48<H>    Ca    7.6<L>      26 Apr 2019 05:56  Phos  3.8     04-26  Mg     1.9     04-26    TPro  4.8<L>  /  Alb  2.0<L>  /  TBili  0.4  /  DBili  <0.2  /  AST  9<L>  /  ALT  <5<L>  /  AlkPhos  91  04-26    PT/INR - ( 26 Apr 2019 05:56 )   PT: 33.6 sec;   INR: 2.88          PTT - ( 26 Apr 2019 08:53 )  PTT:40.8 sec    CAPILLARY BLOOD GLUCOSE      POCT Blood Glucose.: 143 mg/dL (26 Apr 2019 06:55)  POCT Blood Glucose.: 138 mg/dL (25 Apr 2019 23:30)  POCT Blood Glucose.: 141 mg/dL (25 Apr 2019 16:52)  POCT Blood Glucose.: 146 mg/dL (25 Apr 2019 11:45)    LIVER FUNCTIONS - ( 26 Apr 2019 05:56 )  Alb: 2.0 g/dL / Pro: 4.8 g/dL / ALK PHOS: 91 U/L / ALT: <5 U/L / AST: 9 U/L / GGT: x             RADIOLOGY & ADDITIONAL STUDIES:  < from: Xray Chest 1 View- PORTABLE-Routine (04.25.19 @ 06:01) >  FINDINGS: Size, mediastinal and hilar contours are unchanged and within   normal limits. Multiple central lines and support catheters are   reidentified and unchanged in number and position. Endotracheal tube   replaced with a tracheostomy tube which is in good position. There is a   left PICC as well as left-sided percutaneous pigtail catheter and a   feeding tube. Persistent extensive left hemithorax subcutaneous   emphysema. No pneumothorax. There may be pneumomediastinum as there is   crescentic lucency over the aortic arch region probably unchanged from   prior study. Confluent opacities are again noted over the right upper and   left lower lung zones probably unchanged. Persistent left-sided pleural   effusion.    IMPRESSION:  Replacement of an endotracheal tube with a tracheostomy tube.  No other significant findings as above. SUBJECTIVE: Attempted to get RoS, but despite eyes being open, pt unable to reliably answer questions.    Vital Signs Last 24 Hrs  T(C): 36.1 (26 Apr 2019 06:12), Max: 37.6 (25 Apr 2019 10:12)  T(F): 97 (26 Apr 2019 06:12), Max: 99.6 (25 Apr 2019 10:12)  HR: 78 (26 Apr 2019 08:00) (69 - 98)  BP: 129/67 (25 Apr 2019 12:20) (98/61 - 135/65)  BP(mean): 93 (25 Apr 2019 12:20) (71 - 103)  RR: 25 (26 Apr 2019 08:00) (22 - 28)  SpO2: 100% (26 Apr 2019 08:00) (94% - 100%)    Physical Exam:  General: NAD  Pulmonary: trach, on AC. Chest tube currently water seal  Cardiovascular: NSR  Abdominal: soft, NT/ND, midline wound dressing c/d/i, drains with no output. Colostomy functioning with stool output  : Abbott  Extremities: WWP, 2+ pitting edema in both legs  Neuro: responsive to pain, not to commands/questions    I&O's Summary    25 Apr 2019 07:01  -  26 Apr 2019 07:00  --------------------------------------------------------  IN: 842.5 mL / OUT: 1598 mL / NET: -755.5 mL    26 Apr 2019 07:01  -  26 Apr 2019 09:17  --------------------------------------------------------  IN: 6 mL / OUT: 40 mL / NET: -34 mL        LABS:                        7.7    18.46 )-----------( 377      ( 26 Apr 2019 05:56 )             23.7     04-26    138  |  106  |  53<H>  ----------------------------<  141<H>  3.4<L>   |  19<L>  |  1.48<H>    Ca    7.6<L>      26 Apr 2019 05:56  Phos  3.8     04-26  Mg     1.9     04-26    TPro  4.8<L>  /  Alb  2.0<L>  /  TBili  0.4  /  DBili  <0.2  /  AST  9<L>  /  ALT  <5<L>  /  AlkPhos  91  04-26    PT/INR - ( 26 Apr 2019 05:56 )   PT: 33.6 sec;   INR: 2.88          PTT - ( 26 Apr 2019 08:53 )  PTT:40.8 sec    CAPILLARY BLOOD GLUCOSE      POCT Blood Glucose.: 143 mg/dL (26 Apr 2019 06:55)  POCT Blood Glucose.: 138 mg/dL (25 Apr 2019 23:30)  POCT Blood Glucose.: 141 mg/dL (25 Apr 2019 16:52)  POCT Blood Glucose.: 146 mg/dL (25 Apr 2019 11:45)    LIVER FUNCTIONS - ( 26 Apr 2019 05:56 )  Alb: 2.0 g/dL / Pro: 4.8 g/dL / ALK PHOS: 91 U/L / ALT: <5 U/L / AST: 9 U/L / GGT: x             RADIOLOGY & ADDITIONAL STUDIES:  < from: Xray Chest 1 View- PORTABLE-Routine (04.25.19 @ 06:01) >  FINDINGS: Size, mediastinal and hilar contours are unchanged and within   normal limits. Multiple central lines and support catheters are   reidentified and unchanged in number and position. Endotracheal tube   replaced with a tracheostomy tube which is in good position. There is a   left PICC as well as left-sided percutaneous pigtail catheter and a   feeding tube. Persistent extensive left hemithorax subcutaneous   emphysema. No pneumothorax. There may be pneumomediastinum as there is   crescentic lucency over the aortic arch region probably unchanged from   prior study. Confluent opacities are again noted over the right upper and   left lower lung zones probably unchanged. Persistent left-sided pleural   effusion.    IMPRESSION:  Replacement of an endotracheal tube with a tracheostomy tube.  No other significant findings as above.

## 2019-04-26 NOTE — BRIEF OPERATIVE NOTE - NSICDXBRIEFPOSTOP_GEN_ALL_CORE_FT
POST-OP DIAGNOSIS:  Respiratory failure, acute and chronic 24-Apr-2019 08:20:37  Jacinda Lopez
POST-OP DIAGNOSIS:  Sepsis 07-Apr-2019 16:11:25 intraabdominal Stephan Peraza
POST-OP DIAGNOSIS:  Failure to thrive in adult 27-Apr-2019 00:00:43  Mariano Noe
POST-OP DIAGNOSIS:  Respiratory failure, acute and chronic 24-Apr-2019 08:20:37  Jacinda Lopez
POST-OP DIAGNOSIS:  Sepsis 07-Apr-2019 16:11:25 intraabdominal Stephan Peraza

## 2019-04-26 NOTE — PROGRESS NOTE ADULT - ASSESSMENT
87 yo M with history of b/l laparoscopic robotic-assisted inguinal hernia repair presenting with b/l segmental pulmonary embolisms, left lung abscess, pneumoperitoneum, and distal descending colon abscess (likely source of pneumoperitoneum), significant iliofemoral DVT burden, s/p IVCF placement and IR drainage of LLQ abdominal collection (4/5), s/p ex-lap, LAURYN, sigmoidectomy, drain placement in R. paracolic gutter, and abthera vac placement (4/7), s/p planned RTOR, packing removal, and end colostomy creation (4/10), now w/ MRSA + proteus PNA, persistent coagulopathy, LLL pulmonary empyema s/p bedside pigtail drainage (4/15), s/p extubation c/b vomiting and aspiration, reintubated (4/21), s/p tracheostomy creation (4/24)    Plan for PEG placement today w/ Dr. Valencia at bedside  - INR 2.88 [3.1] improving w/ IV vit K x2  - continue Chest tube to wall suction    Plan:  Neuro: Fentanyl prn; Versed before PEG  CV: levo gtt for MAP goal >65; midodrine 15q8h; dc 5% albumin and metolazone; hold lasix; asa 81 lipitor, statin,   Pulm: tracheostomy 4/24; on assist control. 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  : oliguric 2/2 to ATN w/ progression to imminent ; strict i/o's; continue rao  ID: CT c/a/p (2/22) showing severe enterocolitis; cdiff 4/24 negative; +kleb, proteus, strep in abd Cx, flagyl (4/9-4/20), cefepime (4/9-4/20); Sputum cx: MRSA and proteus (sensitive to cefepime) , vancomycin (4/10-4/25); nystatin powder for groin fungal rash (4/24--- )///Dc'd: ceftriaxone (4/9-4/9)// unasyn (4/8-4/9),  Vanco (4/5-4/7), Zosyn (4/5-4/8)]  Endo: ISS  Heme: INR improving w/ IV Vit Kx2; bilateral iliofemoral DVT / bilateral PE; s/p IVC Filter; elevated INR; Mixing study wnr; factors; + APAS; restarted on anticoagulation w. heparin gtt 4/22  PPx: SCDs; SQH  Lines: PIVs, PICC (4/6--), L A line (4/13--) /// D/C: R cordis (4/7-4/11)  Wounds/drains: LLQ IR drain (4/5 -- ), LUQ PHYLLIS (4/7 -- ), R paracolic gutter drain (4/7 --) --- all drains to LIWS; abdominal incision wet to dry;  PT/OT: early mob 4/11 - worked with PT.

## 2019-04-26 NOTE — BRIEF OPERATIVE NOTE - NSICDXBRIEFPROCEDURE_GEN_ALL_CORE_FT
PROCEDURES:  Bronchoscopy 24-Apr-2019 08:19:36  Jacinda Lopez  Chest tube placement 15-Apr-2019 16:07:28  Jacinda Lopez
PROCEDURES:  Exploratory laparotomy 07-Apr-2019 16:08:23  Stephan Peraza
PROCEDURES:  EGD, with PEG 26-Apr-2019 23:57:16  Mariano Noe
PROCEDURES:  Creation of tracheostomy at bedside 24-Apr-2019 13:25:06  Mariano Noe
PROCEDURES:  Exploratory laparotomy 07-Apr-2019 16:08:23  Stephan Peraza

## 2019-04-27 LAB
ANION GAP SERPL CALC-SCNC: 12 MMOL/L — SIGNIFICANT CHANGE UP (ref 5–17)
APTT BLD: 44.7 SEC — HIGH (ref 27.5–36.3)
APTT BLD: 56.4 SEC — HIGH (ref 27.5–36.3)
APTT BLD: 57.8 SEC — HIGH (ref 27.5–36.3)
APTT BLD: 63.3 SEC — HIGH (ref 27.5–36.3)
APTT BLD: >200 SEC — CRITICAL HIGH (ref 27.5–36.3)
BUN SERPL-MCNC: 56 MG/DL — HIGH (ref 7–23)
CALCIUM SERPL-MCNC: 7.5 MG/DL — LOW (ref 8.4–10.5)
CHLORIDE SERPL-SCNC: 107 MMOL/L — SIGNIFICANT CHANGE UP (ref 96–108)
CO2 SERPL-SCNC: 20 MMOL/L — LOW (ref 22–31)
CREAT SERPL-MCNC: 1.64 MG/DL — HIGH (ref 0.5–1.3)
CULTURE RESULTS: SIGNIFICANT CHANGE UP
CULTURE RESULTS: SIGNIFICANT CHANGE UP
GLUCOSE BLDC GLUCOMTR-MCNC: 107 MG/DL — HIGH (ref 70–99)
GLUCOSE BLDC GLUCOMTR-MCNC: 108 MG/DL — HIGH (ref 70–99)
GLUCOSE BLDC GLUCOMTR-MCNC: 131 MG/DL — HIGH (ref 70–99)
GLUCOSE BLDC GLUCOMTR-MCNC: 138 MG/DL — HIGH (ref 70–99)
GLUCOSE SERPL-MCNC: 118 MG/DL — HIGH (ref 70–99)
HCT VFR BLD CALC: 24.9 % — LOW (ref 39–50)
HGB BLD-MCNC: 7.9 G/DL — LOW (ref 13–17)
MAGNESIUM SERPL-MCNC: 1.8 MG/DL — SIGNIFICANT CHANGE UP (ref 1.6–2.6)
MCHC RBC-ENTMCNC: 30.7 PG — SIGNIFICANT CHANGE UP (ref 27–34)
MCHC RBC-ENTMCNC: 31.7 GM/DL — LOW (ref 32–36)
MCV RBC AUTO: 96.9 FL — SIGNIFICANT CHANGE UP (ref 80–100)
NRBC # BLD: 0 /100 WBCS — SIGNIFICANT CHANGE UP (ref 0–0)
PHOSPHATE SERPL-MCNC: 4.1 MG/DL — SIGNIFICANT CHANGE UP (ref 2.5–4.5)
PLATELET # BLD AUTO: 329 K/UL — SIGNIFICANT CHANGE UP (ref 150–400)
POTASSIUM SERPL-MCNC: 4.1 MMOL/L — SIGNIFICANT CHANGE UP (ref 3.5–5.3)
POTASSIUM SERPL-SCNC: 4.1 MMOL/L — SIGNIFICANT CHANGE UP (ref 3.5–5.3)
RBC # BLD: 2.57 M/UL — LOW (ref 4.2–5.8)
RBC # FLD: 17.2 % — HIGH (ref 10.3–14.5)
SODIUM SERPL-SCNC: 139 MMOL/L — SIGNIFICANT CHANGE UP (ref 135–145)
SPECIMEN SOURCE: SIGNIFICANT CHANGE UP
SPECIMEN SOURCE: SIGNIFICANT CHANGE UP
WBC # BLD: 16.1 K/UL — HIGH (ref 3.8–10.5)
WBC # FLD AUTO: 16.1 K/UL — HIGH (ref 3.8–10.5)

## 2019-04-27 PROCEDURE — 71045 X-RAY EXAM CHEST 1 VIEW: CPT | Mod: 26

## 2019-04-27 PROCEDURE — 99233 SBSQ HOSP IP/OBS HIGH 50: CPT | Mod: GC

## 2019-04-27 RX ADMIN — NYSTATIN CREAM 1 APPLICATION(S): 100000 CREAM TOPICAL at 05:22

## 2019-04-27 RX ADMIN — Medication 250 MILLIGRAM(S): at 11:11

## 2019-04-27 RX ADMIN — CEFEPIME 100 MILLIGRAM(S): 1 INJECTION, POWDER, FOR SOLUTION INTRAMUSCULAR; INTRAVENOUS at 17:15

## 2019-04-27 RX ADMIN — FENTANYL CITRATE 25 MICROGRAM(S): 50 INJECTION INTRAVENOUS at 15:32

## 2019-04-27 RX ADMIN — Medication 100 MILLIGRAM(S): at 02:22

## 2019-04-27 RX ADMIN — ZINC SULFATE TAB 220 MG (50 MG ZINC EQUIVALENT) 220 MILLIGRAM(S): 220 (50 ZN) TAB at 11:10

## 2019-04-27 RX ADMIN — Medication 100 UNIT(S): at 11:11

## 2019-04-27 RX ADMIN — MIDODRINE HYDROCHLORIDE 15 MILLIGRAM(S): 2.5 TABLET ORAL at 05:21

## 2019-04-27 RX ADMIN — Medication 100 MILLIGRAM(S): at 11:10

## 2019-04-27 RX ADMIN — Medication 81 MILLIGRAM(S): at 11:10

## 2019-04-27 RX ADMIN — CEFEPIME 100 MILLIGRAM(S): 1 INJECTION, POWDER, FOR SOLUTION INTRAMUSCULAR; INTRAVENOUS at 07:03

## 2019-04-27 RX ADMIN — CHLORHEXIDINE GLUCONATE 1 APPLICATION(S): 213 SOLUTION TOPICAL at 05:22

## 2019-04-27 RX ADMIN — CHLORHEXIDINE GLUCONATE 15 MILLILITER(S): 213 SOLUTION TOPICAL at 05:21

## 2019-04-27 RX ADMIN — MIDODRINE HYDROCHLORIDE 15 MILLIGRAM(S): 2.5 TABLET ORAL at 14:49

## 2019-04-27 RX ADMIN — PANTOPRAZOLE SODIUM 40 MILLIGRAM(S): 20 TABLET, DELAYED RELEASE ORAL at 11:10

## 2019-04-27 RX ADMIN — MIDODRINE HYDROCHLORIDE 15 MILLIGRAM(S): 2.5 TABLET ORAL at 22:03

## 2019-04-27 RX ADMIN — FENTANYL CITRATE 25 MICROGRAM(S): 50 INJECTION INTRAVENOUS at 16:12

## 2019-04-27 RX ADMIN — ATORVASTATIN CALCIUM 80 MILLIGRAM(S): 80 TABLET, FILM COATED ORAL at 22:03

## 2019-04-27 RX ADMIN — NYSTATIN CREAM 1 APPLICATION(S): 100000 CREAM TOPICAL at 16:44

## 2019-04-27 RX ADMIN — Medication 100 MILLIGRAM(S): at 17:15

## 2019-04-27 NOTE — PROGRESS NOTE ADULT - ASSESSMENT
85 yo M with history of b/l laparoscopic robotic-assisted inguinal hernia repair presenting with b/l segmental pulmonary embolisms, left lung abscess, pneumoperitoneum, and distal descending colon abscess (likely source of pneumoperitoneum), significant iliofemoral DVT burden, s/p IVCF placement and IR drainage of LLQ abdominal collection (4/5), s/p ex-lap, LAURYN, sigmoidectomy, drain placement in R. paracolic gutter, and abthera vac placement (4/7), s/p planned RTOR, packing removal, and end colostomy creation (4/10), now w/ MRSA + proteus PNA, persistent coagulopathy, LLL pulmonary empyema s/p bedside pigtail drainage (4/15)    Care per SICU primary.  Team 1 will continue to follow.  Case discussed with chief resident.

## 2019-04-27 NOTE — PROGRESS NOTE ADULT - SUBJECTIVE AND OBJECTIVE BOX
On interval followup, the left arm PICC site is clean, dry and non-inflamed.  Afebrile. Notes, cultures and progress are followed.

## 2019-04-27 NOTE — PROGRESS NOTE ADULT - SUBJECTIVE AND OBJECTIVE BOX
Interval Events:  No events overnight   Patient seen and examined at bedside.      Allergies    No Known Allergies    Intolerances        Vital Signs Last 24 Hrs  T(C): 35.8 (27 Apr 2019 06:44), Max: 37.4 (26 Apr 2019 09:54)  T(F): 96.5 (27 Apr 2019 06:44), Max: 99.4 (26 Apr 2019 09:54)  HR: 76 (27 Apr 2019 08:35) (68 - 92)  BP: 109/60 (27 Apr 2019 07:48) (95/51 - 148/86)  BP(mean): 79 (27 Apr 2019 07:48) (61 - 109)  RR: 35 (27 Apr 2019 08:35) (13 - 35)  SpO2: 100% (27 Apr 2019 08:35) (78% - 100%)    04-26 @ 07:01  -  04-27 @ 07:00  --------------------------------------------------------  IN: 1027 mL / OUT: 1040 mL / NET: -13 mL    04-27 @ 07:01 - 04-27 @ 09:08  --------------------------------------------------------  IN: 10.5 mL / OUT: 20 mL / NET: -9.5 mL      04-26 @ 07:01  -  04-27 @ 07:00  --------------------------------------------------------  IN: 1027 mL / OUT: 1040 mL / NET: -13 mL    04-27 @ 07:01  -  04-27 @ 09:08  --------------------------------------------------------  IN: 10.5 mL / OUT: 20 mL / NET: -9.5 mL        Physical Exam:     Neuro: Trached not following simple commands opens eyes to physical contact but no interaction  General: no acute distress,  HEENT: PERRL, EOMI, MMD, subcutaneous emphysema of neck improved  Pulm: trached, on ventilator-AC; chest tube of L posterior chest to wall suction w/ minimal output; mild bilateral rhonchi; no wheezing, Cpap trial today   C/V: S1S2, non-tachycardic  Abd: nondistended, but firm; TTP w/ mild guarding; ostomy w/ liquid brown output; LLQ IR and LUQ PHYLLIS drain w/ clear brown ascites output; R paracolic gutter drain w/ clear brown output; abdominal incision w/ wet to dry dressing in place; no active bleeding, no purulence   Extremities: persistent ansarca of lower extremities; 2+ pitting edema w/ persistent edema of upper extremity forearms  : rao in place; scrotal edema resolved; blanching erythema near proximal thigh and groin  Skin: mild blanching erythema on thighs, no macerations          LABS:      CBC Full  -  ( 27 Apr 2019 03:12 )  WBC Count : 16.10 K/uL  RBC Count : 2.57 M/uL  Hemoglobin : 7.9 g/dL  Hematocrit : 24.9 %  Platelet Count - Automated : 329 K/uL  Mean Cell Volume : 96.9 fl  Mean Cell Hemoglobin : 30.7 pg  Mean Cell Hemoglobin Concentration : 31.7 gm/dL  Auto Neutrophil # : x  Auto Lymphocyte # : x  Auto Monocyte # : x  Auto Eosinophil # : x  Auto Basophil # : x  Auto Neutrophil % : x  Auto Lymphocyte % : x  Auto Monocyte % : x  Auto Eosinophil % : x  Auto Basophil % : x    04-27    139  |  107  |  56<H>  ----------------------------<  118<H>  4.1   |  20<L>  |  1.64<H>    Ca    7.5<L>      27 Apr 2019 03:12  Phos  4.1     04-27  Mg     1.8     04-27    TPro  4.8<L>  /  Alb  2.0<L>  /  TBili  0.4  /  DBili  <0.2  /  AST  9<L>  /  ALT  <5<L>  /  AlkPhos  91  04-26    PT/INR - ( 26 Apr 2019 05:56 )   PT: 33.6 sec;   INR: 2.88          PTT - ( 27 Apr 2019 04:44 )  PTT:44.7 sec                RADIOLOGY & ADDITIONAL STUDIES (The following images were personally reviewed):          A/p: 87 yo M with history of b/l laparoscopic robotic-assisted inguinal hernia repair presenting with b/l segmental pulmonary embolisms, left lung abscess, pneumoperitoneum, and distal descending colon abscess (likely source of pneumoperitoneum), significant iliofemoral DVT burden, s/p IVCF placement and IR drainage of LLQ abdominal collection (4/5), s/p ex-lap, LAURYN, sigmoidectomy, drain placement in R. paracolic gutter, and abthera vac placement (4/7), s/p planned RTOR, packing removal, and end colostomy creation (4/10), now w/ MRSA + proteus PNA, persistent coagulopathy, LLL pulmonary empyema s/p bedside pigtail drainage (4/15)    Neuro: Fentanyl prn  CV: levo for MAP>65; midodrine 15q8h,  remains fluid overloaded w/ significant anasarca and bilateral effusions; asa 81 lipitor, statin  Pulm: re-intubated 4/21 on AC. 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   ID: +kleb, proteus, strep in abd Cx, flagyl (4/9-5/4), cefepime (4/9-5/4); Sputum cx: MRSA and proteus (sensitive to cefepine) , vancomycin by level (4/10-4/26) ///Dc'd: ceftriaxone (4/9-4/9)// unasyn (4/8-4/9),  Vanco (4/5-4/7), Zosyn (4/5-4/8)]  Endo: ISS  Heme: bilateral iliofemoral DVT / bilateral PE; s/p IVC Filter; elevated INR s/p multiple vit K,  Mixing study wnl. Lupus Anticoag- On Heparin gtt( 60-80 goal)   PPx: SCDs; Heparin gtt  Lines: PIVs, PICC (4/6--),/// D/C: R cordis (4/7-4/11)   L A line (4/13-)   Wounds/drains: LLQ IR drain (4/5 -- ), LUQ PHYLLIS (4/7 -- ), R paracolic gutter drain (4/7 --) --- all drains to LIWS; abdominal incision SQ space wet to dry QD.   PT/OT: early mob 4/11 - worked with PT.

## 2019-04-27 NOTE — PROGRESS NOTE ADULT - SUBJECTIVE AND OBJECTIVE BOX
STATUS POST:    4/7: Exploratory laparotomy, lysis of adhesions, SBR (serosal tear) w/ primary anastomosis, sigmoidectomy, abdominal packing and placement of ABThera VAC; EBL 1L, given 6pRBC, 2FFP  4/10: Planned re-exploration; ex lap, abdominal packing removed, end colostomy w/ descending colon, fascial closure w/ vac placement --- (Findings: less bleeding, SB anastomosis intact) - EBL: 50, IVF: 600; 1prbc, uop: 125   4/15: Left chest tube   4/24: bedside trach  4/26: PEG placed at bedside     SUBJECTIVE: This morning, he has no acute complaints.    aspirin  chewable 81 milliGRAM(s) Oral daily  cefepime   IVPB 2000 milliGRAM(s) IV Intermittent every 12 hours  heparin  flush 100 Units/mL Injectable 100 Unit(s) IV Push every other day  heparin  Infusion 950 Unit(s)/Hr IV Continuous <Continuous>  metroNIDAZOLE  IVPB 500 milliGRAM(s) IV Intermittent every 8 hours  metroNIDAZOLE  IVPB      midodrine 15 milliGRAM(s) Oral every 8 hours  norepinephrine Infusion 0.05 MICROgram(s)/kG/Min IV Continuous <Continuous>      Vital Signs Last 24 Hrs  T(C): 36.3 (27 Apr 2019 09:29), Max: 37.4 (26 Apr 2019 09:54)  T(F): 97.4 (27 Apr 2019 09:29), Max: 99.4 (26 Apr 2019 09:54)  HR: 76 (27 Apr 2019 08:35) (68 - 92)  BP: 109/60 (27 Apr 2019 07:48) (95/51 - 148/86)  BP(mean): 79 (27 Apr 2019 07:48) (61 - 109)  RR: 35 (27 Apr 2019 08:35) (13 - 35)  SpO2: 100% (27 Apr 2019 08:35) (78% - 100%)  I&O's Detail    26 Apr 2019 07:01  -  27 Apr 2019 07:00  --------------------------------------------------------  IN:    Enteral Tube Flush: 100 mL    heparin Infusion: 209 mL    IV PiggyBack: 650 mL    norepinephrine Infusion: 68 mL  Total IN: 1027 mL    OUT:    Colostomy: 350 mL    Indwelling Catheter - Urethral: 650 mL    Voided: 40 mL  Total OUT: 1040 mL    Total NET: -13 mL      27 Apr 2019 07:01  -  27 Apr 2019 09:35  --------------------------------------------------------  IN:    heparin Infusion: 9.5 mL    norepinephrine Infusion: 1 mL  Total IN: 10.5 mL    OUT:    Chest Tube: 20 mL  Total OUT: 20 mL    Total NET: -9.5 mL          General: NAD, resting comfortably in bed  Chest: left chest tube serous  Pulm: Nonlabored breathing, no respiratory distress  Abd: soft, NT/ND, PHYLLIS with minimal output, midline WTD dressing in place, no surrounding erythema or induration, ostomy pink and patent with liquid stool in the bag  : Abbott with light straw colored urine  Extrem: WWP, no edema, SCDs in place, no calf tenderness        LABS:                        7.9    16.10 )-----------( 329      ( 27 Apr 2019 03:12 )             24.9     04-27    139  |  107  |  56<H>  ----------------------------<  118<H>  4.1   |  20<L>  |  1.64<H>    Ca    7.5<L>      27 Apr 2019 03:12  Phos  4.1     04-27  Mg     1.8     04-27    TPro  4.8<L>  /  Alb  2.0<L>  /  TBili  0.4  /  DBili  <0.2  /  AST  9<L>  /  ALT  <5<L>  /  AlkPhos  91  04-26    PT/INR - ( 26 Apr 2019 05:56 )   PT: 33.6 sec;   INR: 2.88          PTT - ( 27 Apr 2019 04:44 )  PTT:44.7 sec

## 2019-04-28 LAB
ANION GAP SERPL CALC-SCNC: 12 MMOL/L — SIGNIFICANT CHANGE UP (ref 5–17)
APTT BLD: 65.4 SEC — HIGH (ref 27.5–36.3)
APTT BLD: 77.4 SEC — HIGH (ref 27.5–36.3)
BUN SERPL-MCNC: 60 MG/DL — HIGH (ref 7–23)
CALCIUM SERPL-MCNC: 7.7 MG/DL — LOW (ref 8.4–10.5)
CHLORIDE SERPL-SCNC: 108 MMOL/L — SIGNIFICANT CHANGE UP (ref 96–108)
CO2 SERPL-SCNC: 20 MMOL/L — LOW (ref 22–31)
CREAT SERPL-MCNC: 1.73 MG/DL — HIGH (ref 0.5–1.3)
GLUCOSE BLDC GLUCOMTR-MCNC: 149 MG/DL — HIGH (ref 70–99)
GLUCOSE BLDC GLUCOMTR-MCNC: 154 MG/DL — HIGH (ref 70–99)
GLUCOSE BLDC GLUCOMTR-MCNC: 163 MG/DL — HIGH (ref 70–99)
GLUCOSE SERPL-MCNC: 135 MG/DL — HIGH (ref 70–99)
HCT VFR BLD CALC: 24.3 % — LOW (ref 39–50)
HGB BLD-MCNC: 7.6 G/DL — LOW (ref 13–17)
MAGNESIUM SERPL-MCNC: 1.8 MG/DL — SIGNIFICANT CHANGE UP (ref 1.6–2.6)
MCHC RBC-ENTMCNC: 30.3 PG — SIGNIFICANT CHANGE UP (ref 27–34)
MCHC RBC-ENTMCNC: 31.3 GM/DL — LOW (ref 32–36)
MCV RBC AUTO: 96.8 FL — SIGNIFICANT CHANGE UP (ref 80–100)
NRBC # BLD: 0 /100 WBCS — SIGNIFICANT CHANGE UP (ref 0–0)
PHOSPHATE SERPL-MCNC: 4 MG/DL — SIGNIFICANT CHANGE UP (ref 2.5–4.5)
PLATELET # BLD AUTO: 301 K/UL — SIGNIFICANT CHANGE UP (ref 150–400)
POTASSIUM SERPL-MCNC: 3.8 MMOL/L — SIGNIFICANT CHANGE UP (ref 3.5–5.3)
POTASSIUM SERPL-SCNC: 3.8 MMOL/L — SIGNIFICANT CHANGE UP (ref 3.5–5.3)
RBC # BLD: 2.51 M/UL — LOW (ref 4.2–5.8)
RBC # FLD: 17.2 % — HIGH (ref 10.3–14.5)
SODIUM SERPL-SCNC: 140 MMOL/L — SIGNIFICANT CHANGE UP (ref 135–145)
WBC # BLD: 13.75 K/UL — HIGH (ref 3.8–10.5)
WBC # FLD AUTO: 13.75 K/UL — HIGH (ref 3.8–10.5)

## 2019-04-28 PROCEDURE — 99233 SBSQ HOSP IP/OBS HIGH 50: CPT | Mod: GC

## 2019-04-28 PROCEDURE — 71045 X-RAY EXAM CHEST 1 VIEW: CPT | Mod: 26

## 2019-04-28 RX ORDER — MAGNESIUM SULFATE 500 MG/ML
2 VIAL (ML) INJECTION ONCE
Qty: 0 | Refills: 0 | Status: COMPLETED | OUTPATIENT
Start: 2019-04-28 | End: 2019-04-28

## 2019-04-28 RX ORDER — METOCLOPRAMIDE HCL 10 MG
10 TABLET ORAL EVERY 8 HOURS
Qty: 0 | Refills: 0 | Status: DISCONTINUED | OUTPATIENT
Start: 2019-04-28 | End: 2019-04-29

## 2019-04-28 RX ORDER — POTASSIUM CHLORIDE 20 MEQ
10 PACKET (EA) ORAL
Qty: 0 | Refills: 0 | Status: COMPLETED | OUTPATIENT
Start: 2019-04-28 | End: 2019-04-28

## 2019-04-28 RX ADMIN — Medication 2: at 06:52

## 2019-04-28 RX ADMIN — Medication 10 MILLIGRAM(S): at 18:00

## 2019-04-28 RX ADMIN — ZINC SULFATE TAB 220 MG (50 MG ZINC EQUIVALENT) 220 MILLIGRAM(S): 220 (50 ZN) TAB at 11:01

## 2019-04-28 RX ADMIN — NYSTATIN CREAM 1 APPLICATION(S): 100000 CREAM TOPICAL at 17:02

## 2019-04-28 RX ADMIN — CEFEPIME 100 MILLIGRAM(S): 1 INJECTION, POWDER, FOR SOLUTION INTRAMUSCULAR; INTRAVENOUS at 05:37

## 2019-04-28 RX ADMIN — CHLORHEXIDINE GLUCONATE 1 APPLICATION(S): 213 SOLUTION TOPICAL at 05:37

## 2019-04-28 RX ADMIN — Medication 50 GRAM(S): at 11:00

## 2019-04-28 RX ADMIN — Medication 100 MILLIEQUIVALENT(S): at 11:01

## 2019-04-28 RX ADMIN — Medication 2: at 11:59

## 2019-04-28 RX ADMIN — MIDODRINE HYDROCHLORIDE 15 MILLIGRAM(S): 2.5 TABLET ORAL at 05:37

## 2019-04-28 RX ADMIN — PANTOPRAZOLE SODIUM 40 MILLIGRAM(S): 20 TABLET, DELAYED RELEASE ORAL at 11:01

## 2019-04-28 RX ADMIN — Medication 100 MILLIGRAM(S): at 17:01

## 2019-04-28 RX ADMIN — CEFEPIME 100 MILLIGRAM(S): 1 INJECTION, POWDER, FOR SOLUTION INTRAMUSCULAR; INTRAVENOUS at 17:01

## 2019-04-28 RX ADMIN — Medication 250 MILLIGRAM(S): at 11:01

## 2019-04-28 RX ADMIN — ATORVASTATIN CALCIUM 80 MILLIGRAM(S): 80 TABLET, FILM COATED ORAL at 21:27

## 2019-04-28 RX ADMIN — Medication 10 MILLIGRAM(S): at 11:02

## 2019-04-28 RX ADMIN — Medication 81 MILLIGRAM(S): at 11:01

## 2019-04-28 RX ADMIN — NYSTATIN CREAM 1 APPLICATION(S): 100000 CREAM TOPICAL at 05:38

## 2019-04-28 RX ADMIN — CHLORHEXIDINE GLUCONATE 15 MILLILITER(S): 213 SOLUTION TOPICAL at 05:37

## 2019-04-28 RX ADMIN — Medication 100 MILLIGRAM(S): at 09:47

## 2019-04-28 RX ADMIN — Medication 100 MILLIGRAM(S): at 01:00

## 2019-04-28 RX ADMIN — MIDODRINE HYDROCHLORIDE 15 MILLIGRAM(S): 2.5 TABLET ORAL at 21:27

## 2019-04-28 RX ADMIN — MIDODRINE HYDROCHLORIDE 15 MILLIGRAM(S): 2.5 TABLET ORAL at 14:23

## 2019-04-28 NOTE — PROGRESS NOTE ADULT - SUBJECTIVE AND OBJECTIVE BOX
INTERVAL HPI/OVERNIGHT EVENTS:  ON : PTT @8pm 63. PTT @ 2AM 65, PTT @8am pending.   4/27: TF started. ptt 56.4,  - repeat @2pm:57.8  Repeat @8pm, drains placed to bulb suction  Pt seen and examined at bedside. no acute complaints    PRESSORS: [ ] YES [ ] NO  WHICH:  DOSE:    ANTIBIOTICS:                  DATE STARTED:  ANTIBIOTICS:                  DATE STARTED:  ANTIBIOTICS:                  DATE STARTED:    MEDICATIONS  (STANDING):  ascorbic acid Syrup 250 milliGRAM(s) Oral daily  aspirin  chewable 81 milliGRAM(s) Oral daily  atorvastatin 80 milliGRAM(s) Oral at bedtime  cefepime   IVPB 2000 milliGRAM(s) IV Intermittent every 12 hours  chlorhexidine 0.12% Liquid 15 milliLiter(s) Oral Mucosa two times a day  chlorhexidine 2% Cloths 1 Application(s) Topical <User Schedule>  dextrose 5%. 1000 milliLiter(s) (50 mL/Hr) IV Continuous <Continuous>  dextrose 50% Injectable 12.5 Gram(s) IV Push once  dextrose 50% Injectable 25 Gram(s) IV Push once  dextrose 50% Injectable 25 Gram(s) IV Push once  heparin  flush 100 Units/mL Injectable 100 Unit(s) IV Push every other day  heparin  Infusion 950 Unit(s)/Hr (9.5 mL/Hr) IV Continuous <Continuous>  insulin lispro (HumaLOG) corrective regimen sliding scale   SubCutaneous every 6 hours  metroNIDAZOLE  IVPB 500 milliGRAM(s) IV Intermittent every 8 hours  metroNIDAZOLE  IVPB      midodrine 15 milliGRAM(s) Oral every 8 hours  norepinephrine Infusion 0.05 MICROgram(s)/kG/Min (3.436 mL/Hr) IV Continuous <Continuous>  nystatin Powder 1 Application(s) Topical two times a day  pantoprazole  Injectable 40 milliGRAM(s) IV Push daily  zinc sulfate 220 milliGRAM(s) Oral daily    MEDICATIONS  (PRN):  acetaminophen    Suspension .. 650 milliGRAM(s) Enteral Tube every 6 hours PRN Temp greater or equal to 38C (100.4F)  dextrose 40% Gel 15 Gram(s) Oral once PRN Blood Glucose LESS THAN 70 milliGRAM(s)/deciliter  fentaNYL    Injectable 12.5 MICROGram(s) IV Push every 3 hours PRN Moderate Pain (4 - 6)  fentaNYL    Injectable 25 MICROGram(s) IV Push every 3 hours PRN Severe Pain (7 - 10)  glucagon  Injectable 1 milliGRAM(s) IntraMuscular once PRN Glucose LESS THAN 70 milligrams/deciliter  ondansetron Injectable 4 milliGRAM(s) IV Push every 6 hours PRN Nausea      Drug Dosing Weight  Height (cm): 180 (07 Apr 2019 11:26)  Weight (kg): 73.3 (07 Apr 2019 11:26)  BMI (kg/m2): 22.6 (07 Apr 2019 11:26)  BSA (m2): 1.92 (07 Apr 2019 11:26)    CENTRAL LINE: [ ] YES [ ] NO  LOCATION:   DATE INSERTED:  REMOVE: [ ] YES [ ] NO  EXPLAIN:    RAO: [ ] YES [ ] NO    DATE INSERTED:  REMOVE: [ ] YES [ ] NO  EXPLAIN:    A-LINE: [ ] YES [ ] NO  LOCATION:   DATE INSERTED:  REMOVE: [ ] YES [ ] NO  EXPLAIN:    PAST MEDICAL & SURGICAL HISTORY:  Esophageal reflux  Acute diarrhea  HLD (hyperlipidemia)  HTN (hypertension)  Prostate cancer  Stented coronary artery: x3 , per pt.  Atherosclerosis of coronary artery: CAD (coronary artery disease)  S/P hernia repair  Surgery, elective: cardiac stent x3  History of prostate surgery      REVIEW OF SYSTEMS      Perinent findings discussed above     ICU Vital Signs Last 24 Hrs  T(C): 37.4 (28 Apr 2019 05:30), Max: 37.4 (27 Apr 2019 22:23)  T(F): 99.4 (28 Apr 2019 05:30), Max: 99.4 (27 Apr 2019 22:23)  HR: 93 (28 Apr 2019 08:37) (68 - 112)  BP: 98/57 (28 Apr 2019 08:00) (86/45 - 112/58)  BP(mean): 72 (28 Apr 2019 08:00) (63 - 88)  ABP: --  ABP(mean): --  RR: 28 (28 Apr 2019 08:37) (13 - 29)  SpO2: 99% (28 Apr 2019 08:37) (96% - 100%)          I&O's Detail    27 Apr 2019 07:01  -  28 Apr 2019 07:00  --------------------------------------------------------  IN:    Enteral Tube Flush: 200 mL    Free Water: 500 mL    Glucerna 1.5: 500 mL    heparin Infusion: 218.5 mL    IV PiggyBack: 150 mL    norepinephrine Infusion: 7 mL  Total IN: 1575.5 mL    OUT:    Chest Tube: 56 mL    Colostomy: 800 mL    Indwelling Catheter - Urethral: 656 mL  Total OUT: 1512 mL    Total NET: 63.5 mL      28 Apr 2019 07:01  -  28 Apr 2019 09:23  --------------------------------------------------------  IN:    Glucerna 1.5: 50 mL    heparin Infusion: 9.5 mL  Total IN: 59.5 mL    OUT:    Indwelling Catheter - Urethral: 18 mL  Total OUT: 18 mL    Total NET: 41.5 mL          Mode: CPAP with PS  FiO2: 40  PEEP: 5  MAP: 9.7  PC: 12  PIP: 18      Physical exam:  Neuro: Trached not following simple commands opens eyes to physical contact but no interaction  General: no acute distress,  HEENT: PERRL, EOMI, MMD, subcutaneous emphysema of neck improved  Pulm: trached, on ventilator-AC; chest tube of L posterior chest to wall suction w/ minimal output; mild bilateral rhonchi; no wheezing, Cpap trial today   C/V: S1S2, non-tachycardic  Abd: nondistended, but firm; TTP w/ mild guarding; ostomy w/ liquid brown output; LLQ IR and LUQ PHYLLIS drain w/ clear brown ascites output; R paracolic gutter drain w/ clear brown output; abdominal incision w/ wet to dry dressing in place; no active bleeding, no purulence   Extremities: persistent ansarca of lower extremities; 2+ pitting edema w/ persistent edema of upper extremity forearms  : rao in place; scrotal edema resolved; blanching erythema near proximal thigh and groin  Skin: mild blanching erythema on thighs, no macerations      LABS:  CBC Full  -  ( 28 Apr 2019 02:49 )  WBC Count : 13.75 K/uL  RBC Count : 2.51 M/uL  Hemoglobin : 7.6 g/dL  Hematocrit : 24.3 %  Platelet Count - Automated : 301 K/uL  Mean Cell Volume : 96.8 fl  Mean Cell Hemoglobin : 30.3 pg  Mean Cell Hemoglobin Concentration : 31.3 gm/dL  Auto Neutrophil # : x  Auto Lymphocyte # : x  Auto Monocyte # : x  Auto Eosinophil # : x  Auto Basophil # : x  Auto Neutrophil % : x  Auto Lymphocyte % : x  Auto Monocyte % : x  Auto Eosinophil % : x  Auto Basophil % : x    04-28    140  |  108  |  60<H>  ----------------------------<  135<H>  3.8   |  20<L>  |  1.73<H>    Ca    7.7<L>      28 Apr 2019 02:49  Phos  4.0     04-28  Mg     1.8     04-28      PTT - ( 28 Apr 2019 07:52 )  PTT:77.4 sec      RADIOLOGY & ADDITIONAL STUDIES:    CRITICAL CARE TIME SPENT:

## 2019-04-28 NOTE — PROGRESS NOTE ADULT - ASSESSMENT
87 yo M with history of b/l laparoscopic robotic-assisted inguinal hernia repair presenting with b/l segmental pulmonary embolisms, left lung abscess, pneumoperitoneum, and distal descending colon abscess (likely source of pneumoperitoneum), significant iliofemoral DVT burden, s/p IVCF placement and IR drainage of LLQ abdominal collection (4/5), s/p ex-lap, LAURYN, sigmoidectomy, drain placement in R. paracolic gutter, and abthera vac placement (4/7), s/p planned RTOR, packing removal, and end colostomy creation (4/10), now w/ MRSA + proteus PNA, persistent coagulopathy, LLL pulmonary empyema s/p bedside pigtail drainage (4/15)    Neuro: Fentanyl prn  CV: levo for MAP>65; midodrine 15q8h,  remains fluid overloaded w/ significant anasarca and bilateral effusions; asa 81 lipitor, statin  Pulm: re-intubated 4/21 on AC. bilateral PE's; Empyema- s/p left CTx on 4/15  GI: NPO, PEG placed 4/26; TF held; malnourished, Vit C, zinc; RUQ US W/Doppler wnl  : rao. 24hr urine for copper.   ID: +kleb, proteus, strep in abd Cx, flagyl (4/9-5/4), cefepime (4/9-5/4); Sputum cx: MRSA and proteus (sensitive to cefepine) , vancomycin by level (4/10-4/26) ///Dc'd: ceftriaxone (4/9-4/9)// unasyn (4/8-4/9),  Vanco (4/5-4/7), Zosyn (4/5-4/8)]  Endo: ISS  Heme: bilateral iliofemoral DVT / bilateral PE; s/p IVC Filter; elevated INR s/p multiple vit K,  Mixing study wnr; factor studies pending. Lupus Anticoag- On Helaprin gtt( 60-80 goal)   PPx: SCDs; Heparin gtt  Lines: PIVs, PICC (4/6--), L A line (4/13--) /// D/C: R cordis (4/7-4/11)   Wounds/drains: LLQ IR drain (4/5 -- ), LUQ PHYLLIS (4/7 -- ), --- drains to bulb suction  abdominal incision SQ space wet to dry QD. D/c: R paracolic gutter drain (4/7 -4/26)

## 2019-04-29 LAB
ANION GAP SERPL CALC-SCNC: 14 MMOL/L — SIGNIFICANT CHANGE UP (ref 5–17)
APPEARANCE UR: CLEAR — SIGNIFICANT CHANGE UP
APTT BLD: 80.3 SEC — HIGH (ref 27.5–36.3)
BILIRUB UR-MCNC: NEGATIVE — SIGNIFICANT CHANGE UP
BUN SERPL-MCNC: 61 MG/DL — HIGH (ref 7–23)
CALCIUM SERPL-MCNC: 7.8 MG/DL — LOW (ref 8.4–10.5)
CHLORIDE SERPL-SCNC: 110 MMOL/L — HIGH (ref 96–108)
CHLORIDE UR-SCNC: 63 MMOL/L — SIGNIFICANT CHANGE UP
CO2 SERPL-SCNC: 19 MMOL/L — LOW (ref 22–31)
COLOR SPEC: YELLOW — SIGNIFICANT CHANGE UP
CREAT ?TM UR-MCNC: 32 MG/DL — SIGNIFICANT CHANGE UP
CREAT SERPL-MCNC: 1.95 MG/DL — HIGH (ref 0.5–1.3)
DIFF PNL FLD: ABNORMAL
GLUCOSE BLDC GLUCOMTR-MCNC: 123 MG/DL — HIGH (ref 70–99)
GLUCOSE BLDC GLUCOMTR-MCNC: 131 MG/DL — HIGH (ref 70–99)
GLUCOSE BLDC GLUCOMTR-MCNC: 137 MG/DL — HIGH (ref 70–99)
GLUCOSE BLDC GLUCOMTR-MCNC: 139 MG/DL — HIGH (ref 70–99)
GLUCOSE BLDC GLUCOMTR-MCNC: 146 MG/DL — HIGH (ref 70–99)
GLUCOSE SERPL-MCNC: 133 MG/DL — HIGH (ref 70–99)
GLUCOSE UR QL: NEGATIVE — SIGNIFICANT CHANGE UP
HCT VFR BLD CALC: 23 % — LOW (ref 39–50)
HGB BLD-MCNC: 7.3 G/DL — LOW (ref 13–17)
INR BLD: 3.6 — HIGH (ref 0.88–1.16)
KETONES UR-MCNC: ABNORMAL MG/DL
LEUKOCYTE ESTERASE UR-ACNC: ABNORMAL
MAGNESIUM SERPL-MCNC: 2.1 MG/DL — SIGNIFICANT CHANGE UP (ref 1.6–2.6)
MCHC RBC-ENTMCNC: 30.4 PG — SIGNIFICANT CHANGE UP (ref 27–34)
MCHC RBC-ENTMCNC: 31.7 GM/DL — LOW (ref 32–36)
MCV RBC AUTO: 95.8 FL — SIGNIFICANT CHANGE UP (ref 80–100)
NITRITE UR-MCNC: NEGATIVE — SIGNIFICANT CHANGE UP
NRBC # BLD: 0 /100 WBCS — SIGNIFICANT CHANGE UP (ref 0–0)
PH UR: 5.5 — SIGNIFICANT CHANGE UP (ref 5–8)
PHOSPHATE SERPL-MCNC: 4 MG/DL — SIGNIFICANT CHANGE UP (ref 2.5–4.5)
PLATELET # BLD AUTO: 269 K/UL — SIGNIFICANT CHANGE UP (ref 150–400)
POTASSIUM SERPL-MCNC: 4 MMOL/L — SIGNIFICANT CHANGE UP (ref 3.5–5.3)
POTASSIUM SERPL-SCNC: 4 MMOL/L — SIGNIFICANT CHANGE UP (ref 3.5–5.3)
PROT UR-MCNC: 30 MG/DL
PROTHROM AB SERPL-ACNC: 42.3 SEC — HIGH (ref 10–12.9)
RBC # BLD: 2.4 M/UL — LOW (ref 4.2–5.8)
RBC # FLD: 17.7 % — HIGH (ref 10.3–14.5)
SODIUM SERPL-SCNC: 143 MMOL/L — SIGNIFICANT CHANGE UP (ref 135–145)
SODIUM UR-SCNC: 60 MMOL/L — SIGNIFICANT CHANGE UP
SP GR SPEC: 1.02 — SIGNIFICANT CHANGE UP (ref 1–1.03)
UROBILINOGEN FLD QL: 0.2 E.U./DL — SIGNIFICANT CHANGE UP
WBC # BLD: 12.49 K/UL — HIGH (ref 3.8–10.5)
WBC # FLD AUTO: 12.49 K/UL — HIGH (ref 3.8–10.5)

## 2019-04-29 PROCEDURE — 99291 CRITICAL CARE FIRST HOUR: CPT

## 2019-04-29 PROCEDURE — 99233 SBSQ HOSP IP/OBS HIGH 50: CPT | Mod: GC

## 2019-04-29 PROCEDURE — 71045 X-RAY EXAM CHEST 1 VIEW: CPT | Mod: 26

## 2019-04-29 RX ORDER — SODIUM BICARBONATE 1 MEQ/ML
325 SYRINGE (ML) INTRAVENOUS EVERY 8 HOURS
Qty: 0 | Refills: 0 | Status: DISCONTINUED | OUTPATIENT
Start: 2019-04-29 | End: 2019-05-01

## 2019-04-29 RX ORDER — METOCLOPRAMIDE HCL 10 MG
10 TABLET ORAL EVERY 12 HOURS
Qty: 0 | Refills: 0 | Status: DISCONTINUED | OUTPATIENT
Start: 2019-04-29 | End: 2019-05-09

## 2019-04-29 RX ADMIN — ATORVASTATIN CALCIUM 80 MILLIGRAM(S): 80 TABLET, FILM COATED ORAL at 21:41

## 2019-04-29 RX ADMIN — Medication 81 MILLIGRAM(S): at 11:02

## 2019-04-29 RX ADMIN — HEPARIN SODIUM 9.5 UNIT(S)/HR: 5000 INJECTION INTRAVENOUS; SUBCUTANEOUS at 17:35

## 2019-04-29 RX ADMIN — MIDODRINE HYDROCHLORIDE 15 MILLIGRAM(S): 2.5 TABLET ORAL at 21:41

## 2019-04-29 RX ADMIN — Medication 325 MILLIGRAM(S): at 21:41

## 2019-04-29 RX ADMIN — NYSTATIN CREAM 1 APPLICATION(S): 100000 CREAM TOPICAL at 05:41

## 2019-04-29 RX ADMIN — MIDODRINE HYDROCHLORIDE 15 MILLIGRAM(S): 2.5 TABLET ORAL at 05:40

## 2019-04-29 RX ADMIN — CEFEPIME 100 MILLIGRAM(S): 1 INJECTION, POWDER, FOR SOLUTION INTRAMUSCULAR; INTRAVENOUS at 17:37

## 2019-04-29 RX ADMIN — CEFEPIME 100 MILLIGRAM(S): 1 INJECTION, POWDER, FOR SOLUTION INTRAMUSCULAR; INTRAVENOUS at 05:40

## 2019-04-29 RX ADMIN — Medication 325 MILLIGRAM(S): at 15:07

## 2019-04-29 RX ADMIN — CHLORHEXIDINE GLUCONATE 15 MILLILITER(S): 213 SOLUTION TOPICAL at 17:36

## 2019-04-29 RX ADMIN — Medication 10 MILLIGRAM(S): at 23:40

## 2019-04-29 RX ADMIN — PANTOPRAZOLE SODIUM 40 MILLIGRAM(S): 20 TABLET, DELAYED RELEASE ORAL at 11:01

## 2019-04-29 RX ADMIN — NYSTATIN CREAM 1 APPLICATION(S): 100000 CREAM TOPICAL at 17:41

## 2019-04-29 RX ADMIN — Medication 100 MILLIGRAM(S): at 17:35

## 2019-04-29 RX ADMIN — Medication 100 MILLIGRAM(S): at 01:31

## 2019-04-29 RX ADMIN — Medication 100 MILLIGRAM(S): at 11:03

## 2019-04-29 RX ADMIN — Medication 10 MILLIGRAM(S): at 01:31

## 2019-04-29 RX ADMIN — ZINC SULFATE TAB 220 MG (50 MG ZINC EQUIVALENT) 220 MILLIGRAM(S): 220 (50 ZN) TAB at 11:01

## 2019-04-29 RX ADMIN — Medication 100 UNIT(S): at 11:03

## 2019-04-29 RX ADMIN — MIDODRINE HYDROCHLORIDE 15 MILLIGRAM(S): 2.5 TABLET ORAL at 15:07

## 2019-04-29 RX ADMIN — Medication 10 MILLIGRAM(S): at 11:01

## 2019-04-29 RX ADMIN — CHLORHEXIDINE GLUCONATE 15 MILLILITER(S): 213 SOLUTION TOPICAL at 05:41

## 2019-04-29 RX ADMIN — Medication 250 MILLIGRAM(S): at 11:02

## 2019-04-29 RX ADMIN — CHLORHEXIDINE GLUCONATE 1 APPLICATION(S): 213 SOLUTION TOPICAL at 05:40

## 2019-04-29 NOTE — OCCUPATIONAL THERAPY INITIAL EVALUATION ADULT - MD ORDER
87 yo M with history of b/l laparoscopic robotic-assisted inguinal hernia repair presenting with b/l segmental pulmonary embolisms, left lung abscess, pneumoperitoneum, and distal descending colon abscess (likely source of pneumoperitoneum), significant iliofemoral DVT burden,

## 2019-04-29 NOTE — OCCUPATIONAL THERAPY INITIAL EVALUATION ADULT - ADDITIONAL COMMENTS
Unable to obtain social history/prior level of function at this time, patient trached, does not attempt communication with therapist through head/hand gestures.

## 2019-04-29 NOTE — PROGRESS NOTE ADULT - SUBJECTIVE AND OBJECTIVE BOX
Interval Events:  Patient seen and examined at bedside.    MEDICATIONS:  Pulmonary:    Antimicrobials:  cefepime   IVPB 2000 milliGRAM(s) IV Intermittent every 12 hours  metroNIDAZOLE  IVPB 500 milliGRAM(s) IV Intermittent every 8 hours  metroNIDAZOLE  IVPB        Anticoagulants:  aspirin  chewable 81 milliGRAM(s) Oral daily  heparin  flush 100 Units/mL Injectable 100 Unit(s) IV Push every other day  heparin  Infusion 950 Unit(s)/Hr IV Continuous <Continuous>    Cardiac:  midodrine 15 milliGRAM(s) Oral every 8 hours    Endocrine:  atorvastatin 80 milliGRAM(s) Oral at bedtime  dextrose 40% Gel 15 Gram(s) Oral once PRN  dextrose 50% Injectable 12.5 Gram(s) IV Push once  dextrose 50% Injectable 25 Gram(s) IV Push once  dextrose 50% Injectable 25 Gram(s) IV Push once  glucagon  Injectable 1 milliGRAM(s) IntraMuscular once PRN  insulin lispro (HumaLOG) corrective regimen sliding scale   SubCutaneous every 6 hours    Allergies    No Known Allergies    Intolerances        Vital Signs Last 24 Hrs  T(C): 35.6 (2019 10:09), Max: 37.3 (2019 00:50)  T(F): 96 (2019 10:09), Max: 99.1 (2019 00:50)  HR: 84 (2019 10:00) (76 - 106)  BP: 99/54 (2019 10:00) (92/50 - 113/57)  BP(mean): 74 (2019 10:00) (62 - 84)  RR: 30 (2019 10:00) (15 - 31)  SpO2: 98% (2019 10:00) (96% - 100%)     @ : @ 07:00  --------------------------------------------------------  IN: 1048 mL / OUT: 1209 mL / NET: -161 mL     @ 07: @ 11:08  --------------------------------------------------------  IN: 59 mL / OUT: 51 mL / NET: 8 mL      Mode: PS (Pressure Support)/ Spontaneous  FiO2: 40  PEEP: 5  PS: 8  PIP: 14      LABS:      CBC Full  -  ( 2019 06:03 )  WBC Count : 12.49 K/uL  RBC Count : 2.40 M/uL  Hemoglobin : 7.3 g/dL  Hematocrit : 23.0 %  Platelet Count - Automated : 269 K/uL  Mean Cell Volume : 95.8 fl  Mean Cell Hemoglobin : 30.4 pg  Mean Cell Hemoglobin Concentration : 31.7 gm/dL  Auto Neutrophil # : x  Auto Lymphocyte # : x  Auto Monocyte # : x  Auto Eosinophil # : x  Auto Basophil # : x  Auto Neutrophil % : x  Auto Lymphocyte % : x  Auto Monocyte % : x  Auto Eosinophil % : x  Auto Basophil % : x    04    143  |  110<H>  |  61<H>  ----------------------------<  133<H>  4.0   |  19<L>  |  1.95<H>    Ca    7.8<L>      2019 06:03  Phos  4.0     04-29  Mg     2.1     04-29      PT/INR - ( 2019 06:03 )   PT: 42.3 sec;   INR: 3.60          PTT - ( 2019 06:03 )  PTT:80.3 sec      Urinalysis Basic - ( 2019 09:22 )    Color: Yellow / Appearance: Clear / S.020 / pH: x  Gluc: x / Ketone: Trace mg/dL  / Bili: Negative / Urobili: 0.2 E.U./dL   Blood: x / Protein: 30 mg/dL / Nitrite: NEGATIVE   Leuk Esterase: Moderate / RBC: 5-10 /HPF / WBC Many /HPF   Sq Epi: x / Non Sq Epi: 0-5 /HPF / Bacteria: Present /HPF                RADIOLOGY & ADDITIONAL STUDIES (The following images were personally reviewed):

## 2019-04-29 NOTE — OCCUPATIONAL THERAPY INITIAL EVALUATION ADULT - PERTINENT HX OF CURRENT PROBLEM, REHAB EVAL
s/p IVCF placement and IR drainage of LLQ abdominal collection (4/5), s/p ex-lap, LAURYN, sigmoidectomy, drain placement in R. paracolic gutter, and abthera vac placement (4/7), s/p planned RTOR, packing removal, and end colostomy creation (4/10), now w/ MRSA + proteus PNA, persistent coagulopathy, LLL pulmonary empyema s/p bedside pigtail drainage (4/15)

## 2019-04-29 NOTE — OCCUPATIONAL THERAPY INITIAL EVALUATION ADULT - GENERAL OBSERVATIONS, REHAB EVAL
Hand dominance unknown. Chart reviewed, patient cleared for bedside OT eval of BUE by EVGENY Whitt. Received semi-supine, +grimace, +z-flex boots, +SCDs, +LUE PICC, +trac to vent, +Tele, +IV Hand dominance unknown. Chart reviewed, patient cleared for bedside OT eval of BUE by EVGENY Whitt. Received semi-supine, +grimace, +z-flex boots, +SCDs, +LUE PICC, +CT, +PEG, +JPx2, +trac to vent, +Tele, +IV

## 2019-04-29 NOTE — PROGRESS NOTE ADULT - SUBJECTIVE AND OBJECTIVE BOX
24 hr events: ON: TF Residual<10, started on feeds  4/28: PTT therapeutic. TF residual >300, added reglan for gastric motility and made NPO  ON : PTT @8pm 63. PTT @ 2AM 65, PTT @8am pending.       MEDICATIONS  (STANDING):  ascorbic acid Syrup 250 milliGRAM(s) Oral daily  aspirin  chewable 81 milliGRAM(s) Oral daily  atorvastatin 80 milliGRAM(s) Oral at bedtime  cefepime   IVPB 2000 milliGRAM(s) IV Intermittent every 12 hours  chlorhexidine 0.12% Liquid 15 milliLiter(s) Oral Mucosa two times a day  chlorhexidine 2% Cloths 1 Application(s) Topical <User Schedule>  dextrose 5%. 1000 milliLiter(s) (50 mL/Hr) IV Continuous <Continuous>  dextrose 50% Injectable 12.5 Gram(s) IV Push once  dextrose 50% Injectable 25 Gram(s) IV Push once  dextrose 50% Injectable 25 Gram(s) IV Push once  heparin  flush 100 Units/mL Injectable 100 Unit(s) IV Push every other day  heparin  Infusion 950 Unit(s)/Hr (9.5 mL/Hr) IV Continuous <Continuous>  insulin lispro (HumaLOG) corrective regimen sliding scale   SubCutaneous every 6 hours  metoclopramide Injectable 10 milliGRAM(s) IV Push every 8 hours  metroNIDAZOLE  IVPB 500 milliGRAM(s) IV Intermittent every 8 hours  metroNIDAZOLE  IVPB      midodrine 15 milliGRAM(s) Oral every 8 hours  nystatin Powder 1 Application(s) Topical two times a day  pantoprazole  Injectable 40 milliGRAM(s) IV Push daily  zinc sulfate 220 milliGRAM(s) Oral daily    MEDICATIONS  (PRN):  acetaminophen    Suspension .. 650 milliGRAM(s) Enteral Tube every 6 hours PRN Temp greater or equal to 38C (100.4F)  dextrose 40% Gel 15 Gram(s) Oral once PRN Blood Glucose LESS THAN 70 milliGRAM(s)/deciliter  fentaNYL    Injectable 12.5 MICROGram(s) IV Push every 3 hours PRN Moderate Pain (4 - 6)  fentaNYL    Injectable 25 MICROGram(s) IV Push every 3 hours PRN Severe Pain (7 - 10)  glucagon  Injectable 1 milliGRAM(s) IntraMuscular once PRN Glucose LESS THAN 70 milligrams/deciliter  ondansetron Injectable 4 milliGRAM(s) IV Push every 6 hours PRN Nausea      Rao:	  [ ] None	[x] Daily Rao Order Placed	   Indication:	  [x] Strict I and O's    [ ] Obstruction     [ ] Incontinence + Stage 3 or 4 Decubitus      ICU Vital Signs Last 24 Hrs  T(C): 37.1 (29 Apr 2019 05:27), Max: 37.3 (29 Apr 2019 00:50)  T(F): 98.7 (29 Apr 2019 05:27), Max: 99.1 (29 Apr 2019 00:50)  HR: 76 (29 Apr 2019 07:00) (76 - 106)  BP: 111/59 (29 Apr 2019 07:00) (92/50 - 113/57)  BP(mean): 82 (29 Apr 2019 07:00) (62 - 84)  ABP: --  ABP(mean): --  RR: 15 (29 Apr 2019 07:00) (15 - 34)  SpO2: 99% (29 Apr 2019 07:00) (96% - 100%)      Physical Exam:  Neuro: Trached not following simple commands opens eyes to physical contact but no interaction  General: no acute distress,  Pulm: trached, on ventilator-AC; chest tube of L posterior chest to wall suction w/ minimal output  C/V: S1S2, non-tachycardic  Abd: nondistended, but firm; TTP w/ mild guarding; ostomy w/ liquid brown output; LLQ IR and LUQ PHYLLIS drain w/ clear brown ascites output; R paracolic gutter drain w/ clear brown output; abdominal incision w/ wet to dry dressing in place; no active bleeding, no purulence   Extremities: 2+ pitting edema  : rao in place    Lines/tubes/drains:    Vent settings:  Mode: AC/ CMV (Assist Control/ Continuous Mandatory Ventilation), RR (machine): 12, TV (machine): 530, FiO2: 40, PEEP: 5, ITime: 0.8, MAP: 8, PIP: 21    I&O's Summary    28 Apr 2019 07:01  -  29 Apr 2019 07:00  --------------------------------------------------------  IN: 1048 mL / OUT: 1179 mL / NET: -131 mL    29 Apr 2019 07:01  -  29 Apr 2019 07:19  --------------------------------------------------------  IN: 29.5 mL / OUT: 0 mL / NET: 29.5 mL        LABS:                        7.3    12.49 )-----------( 269      ( 29 Apr 2019 06:03 )             23.0     04-29    143  |  110<H>  |  61<H>  ----------------------------<  133<H>  4.0   |  19<L>  |  1.95<H>    Ca    7.8<L>      29 Apr 2019 06:03  Phos  4.0     04-29  Mg     2.1     04-29      PTT - ( 29 Apr 2019 06:03 )  PTT:80.3 sec    CAPILLARY BLOOD GLUCOSE      POCT Blood Glucose.: 131 mg/dL (29 Apr 2019 06:25)  POCT Blood Glucose.: 137 mg/dL (29 Apr 2019 00:24)  POCT Blood Glucose.: 149 mg/dL (28 Apr 2019 16:52)  POCT Blood Glucose.: 163 mg/dL (28 Apr 2019 11:56)

## 2019-04-29 NOTE — PROGRESS NOTE ADULT - PROBLEM SELECTOR PLAN 1
-No air leak noted on chest tube today.   -Air leak However this has been fluctuant over the past week or so.  -Subcutaneous emphysema improving.  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     Recommend  -May trial chest tube to water seal today.   -It will be difficult to remove chest tube at this point given fistula, pt will need further observation on water seal and would likely need to have completed more his treatement first.   -Pt to complete a 4 week course of ABX, until 5/4/19 per ID.

## 2019-04-29 NOTE — PROGRESS NOTE ADULT - ATTENDING COMMENTS
REGINE Parada has acted as my scribe. Pt remains with worsening renal failure and persistent air leak. consider CPAP trial. complete abx 5/4. Dr. Valencia to discuss goals of care with family especially in setting of ATN and resp failure. Continue Heparin gtt for anticoag for Antiphospholipid Ab syndrome.

## 2019-04-29 NOTE — PROGRESS NOTE ADULT - ASSESSMENT
87 yo male with recent herniorrhaphy complicated with multiple infections/abscesses and now DVT and PE unable to anticoagulate initially now s/p IVC filter now with coagulopathy raising concern to initiate anticoagulation.    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; perhaps also some associated liver disease as well given mild improvement in INR s/p Vit K administration  -recc management with heparin gtt for anticoagulation transition to lovenox or coumadin when able    #DVT/PE - VTE may have been both provoked from recent surgery as well as related to LA; Ajsvir-2 negative  -given LA, recc tx with heparin transition to lovenox or coumadin  -given renal failure tx with lovenox may be difficult  -primary team may need to further d/w pt's family regarding anticoagulation given high probability of dispo to an L-tach  -depending on goals of care d/w family, the primary team asked whether discontinuation of a/c altogether may be an option; will d/w Dr. Marin for final reccs, but while off a/c greatest risk will be related to his b/l PE since he is s/p IVC filter    #Normocytic anemia (mildly macrocytic when admitted)  -likely AOCD  -can check Epo level    Will d/w Dr. Marin further 87 yo male with recent herniorrhaphy complicated with multiple infections/abscesses and now DVT and PE unable to anticoagulate initially now s/p IVC filter now with coagulopathy raising concern to initiate anticoagulation.    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; perhaps also some associated liver disease as well given mild improvement in INR s/p Vit K administration  -recc management with heparin gtt for anticoagulation transition to lovenox or coumadin when able    #DVT/PE - VTE may have been both provoked from recent surgery as well as related to LA; Jasvir-2 negative  -given LA, recc tx with heparin transition to lovenox or coumadin  -given renal failure tx with lovenox may be difficult    #Normocytic anemia (mildly macrocytic when admitted)  -likely AOCD  -can check Epo level    Will sign off at this time; pls call back with any questions  D/W Dr. Marin

## 2019-04-29 NOTE — OCCUPATIONAL THERAPY INITIAL EVALUATION ADULT - RANGE OF MOTION EXAMINATION, UPPER EXTREMITY
Impaired PROM BUE: Bilateral shoulders flexion/abduction to approximately 10 degrees. Bilateral elbow flexion/extension PROM WNL with long stretch 2/2 increased flexor tone. Passive supination to approximately 90 degrees passively. Bilateral digits PROM/AAROM WFL.

## 2019-04-29 NOTE — PROGRESS NOTE ADULT - SUBJECTIVE AND OBJECTIVE BOX
Heme/Onc Progress Note (Dr. Marin)  Discussed with Dr. Marin and plan reviewed with the primary team.    The patient was seen and examined.      CAN FELIX is a 86y Male with history of b/l inguinal herniorrhaphy recently complicated by abdominal abscess now s/p small bowel/sigmoid resection and drainage with drains in place, found to have b/l PE, b/l femoral DVT and iliac vv DVT initially placed on heparin gtt stopped d/t bleeding at drainage sites, then found to have a Proteus lung empyema now s/p chest tube, and MRSA pna.  Pt has been intermittently receiving Vitamin K for treatment of coagulopathy with bleeding, but now with worsening coagulopathy and team hoping to start pt on anticoagulation for treatment of multiple VTE.    Pt not sedated, but intubated so ROS obtained to best ability.  Notes that he drinks alcohol.  Notes abd and chest pain.  Otherwise all other ROS neg.    Interval History:  Pt was extubated and now s/p trach.  Also s/p PEG.  Pt has remained off sedation, but now on midodrine.  Had complication of tension pneumothorax with ? related renal injury.  Planned for further evaluation of renal function.  Otherwise has remained on heparin gtt for management of VTE.  Pt not responsive to questioning so cannot obtain a ROS.    No bleeding from drains/external tubes.    Allergies    No Known Allergies    Intolerances    Medications:  MEDICATIONS  (STANDING):  ascorbic acid Syrup 250 milliGRAM(s) Oral daily  aspirin  chewable 81 milliGRAM(s) Oral daily  atorvastatin 80 milliGRAM(s) Oral at bedtime  cefepime   IVPB 2000 milliGRAM(s) IV Intermittent every 12 hours  chlorhexidine 0.12% Liquid 15 milliLiter(s) Oral Mucosa two times a day  chlorhexidine 2% Cloths 1 Application(s) Topical <User Schedule>  dextrose 5%. 1000 milliLiter(s) (50 mL/Hr) IV Continuous <Continuous>  dextrose 50% Injectable 12.5 Gram(s) IV Push once  dextrose 50% Injectable 25 Gram(s) IV Push once  dextrose 50% Injectable 25 Gram(s) IV Push once  heparin  flush 100 Units/mL Injectable 100 Unit(s) IV Push every other day  heparin  Infusion 950 Unit(s)/Hr (9.5 mL/Hr) IV Continuous <Continuous>  insulin lispro (HumaLOG) corrective regimen sliding scale   SubCutaneous every 6 hours  metoclopramide Injectable 10 milliGRAM(s) IV Push every 8 hours  metroNIDAZOLE  IVPB 500 milliGRAM(s) IV Intermittent every 8 hours  metroNIDAZOLE  IVPB      midodrine 15 milliGRAM(s) Oral every 8 hours  nystatin Powder 1 Application(s) Topical two times a day  pantoprazole  Injectable 40 milliGRAM(s) IV Push daily  zinc sulfate 220 milliGRAM(s) Oral daily    MEDICATIONS  (PRN):  acetaminophen    Suspension .. 650 milliGRAM(s) Enteral Tube every 6 hours PRN Temp greater or equal to 38C (100.4F)  dextrose 40% Gel 15 Gram(s) Oral once PRN Blood Glucose LESS THAN 70 milliGRAM(s)/deciliter  fentaNYL    Injectable 12.5 MICROGram(s) IV Push every 3 hours PRN Moderate Pain (4 - 6)  fentaNYL    Injectable 25 MICROGram(s) IV Push every 3 hours PRN Severe Pain (7 - 10)  glucagon  Injectable 1 milliGRAM(s) IntraMuscular once PRN Glucose LESS THAN 70 milligrams/deciliter  ondansetron Injectable 4 milliGRAM(s) IV Push every 6 hours PRN Nausea    PHYSICAL EXAM:    Vital Signs Last 24 Hrs  T(C): 35.6 (29 Apr 2019 10:09), Max: 37.3 (29 Apr 2019 00:50)  T(F): 96 (29 Apr 2019 10:09), Max: 99.1 (29 Apr 2019 00:50)  HR: 90 (29 Apr 2019 09:00) (76 - 106)  BP: 103/52 (29 Apr 2019 09:00) (92/50 - 113/57)  BP(mean): 70 (29 Apr 2019 09:00) (62 - 84)  RR: 30 (29 Apr 2019 09:00) (15 - 33)  SpO2: 99% (29 Apr 2019 09:00) (96% - 100%)    Gen: anasarca, but also temporal wasting, lack of muscle mass  HEENT: normocephalic/atraumatic, no conjunctival pallor, no scleral icterus  Cardiovascular: RR, nl S1S2, no murmurs/rubs/gallops  Respiratory: clear air entry b/l  Gastrointestinal: BS+, soft, NT/ND, no masses, PEG  Extremities: no clubbing/cyanosis, + dependent edema and all extremities edematous, no calf tenderness  Vascular:  DP/PT 2+ b/l  Neurological: CN 2-12 grossly intact, no focal deficits  Skin: no rash on visible skin  Lymph Nodes:  no cervical/supraclavicular LAD  Musculoskeletal:  full ROM  Psychiatric:  mood stable    Labs:                          7.3    12.49 )-----------( 269      ( 29 Apr 2019 06:03 )             23.0   CBC Full  -  ( 29 Apr 2019 06:03 )  WBC Count : 12.49 K/uL  RBC Count : 2.40 M/uL  Hemoglobin : 7.3 g/dL  Hematocrit : 23.0 %  Platelet Count - Automated : 269 K/uL  Mean Cell Volume : 95.8 fl  Mean Cell Hemoglobin : 30.4 pg  Mean Cell Hemoglobin Concentration : 31.7 gm/dL  Auto Neutrophil # : x  Auto Lymphocyte # : x  Auto Monocyte # : x  Auto Eosinophil # : x  Auto Basophil # : x  Auto Neutrophil % : x  Auto Lymphocyte % : x  Auto Monocyte % : x  Auto Eosinophil % : x  Auto Basophil % : x    PT/INR - ( 29 Apr 2019 06:03 )   PT: 42.3 sec;   INR: 3.60          PTT - ( 29 Apr 2019 06:03 )  PTT:80.3 sec    04-29    143  |  110<H>  |  61<H>  ----------------------------<  133<H>  4.0   |  19<L>  |  1.95<H>    Ca    7.8<L>      29 Apr 2019 06:03  Phos  4.0     04-29  Mg     2.1     04-29

## 2019-04-29 NOTE — PROGRESS NOTE ADULT - SUBJECTIVE AND OBJECTIVE BOX
Interval Events:  no events overnight TF restarted and residuals low therefore TF advanced.     Patient seen and examined at bedside.      Allergies    No Known Allergies    Intolerances        Vital Signs Last 24 Hrs  T(C): 35.6 (2019 10:09), Max: 37.3 (2019 00:50)  T(F): 96 (2019 10:09), Max: 99.1 (2019 00:50)  HR: 84 (2019 10:00) (76 - 106)  BP: 99/54 (2019 10:00) (92/50 - 113/57)  BP(mean): 74 (2019 10:00) (62 - 84)  RR: 30 (2019 10:00) (15 - 31)  SpO2: 98% (2019 10:00) (96% - 100%)     07:  -   @ 07:00  --------------------------------------------------------  IN: 1048 mL / OUT: 1209 mL / NET: -161 mL     @ 07:  -   @ 11:46  --------------------------------------------------------  IN: 59 mL / OUT: 51 mL / NET: 8 mL       @ 07:  -   @ 07:00  --------------------------------------------------------  IN: 1048 mL / OUT: 1209 mL / NET: -161 mL    29 @ 07:  -   @ 11:46  --------------------------------------------------------  IN: 59 mL / OUT: 51 mL / NET: 8 mL        Physical Exam:     Neuro: Trached not following simple commands opens eyes to physical contact but no interaction  General: no acute distress,  HEENT: PERRL, EOMI, MMD, subcutaneous emphysema of neck improved  Pulm: trached, on ventilator-AC; chest tube of L posterior chest to wall suction w/ minimal output no air leak noted at time of exam; + Sub cutaneous air noted throughout left chest wall   C/V: RRR reg s1s2  Abd: nondistended, but firm; TTP w/ mild guarding; ostomy w/ liquid brown output; LLQ IR and LUQ PHYLLIS drain w/ clear brown ascites output;  abdominal incision w/ wet to dry dressing in place; no active bleeding, no purulence   Extremities: persistent ansarca of lower extremities; 3+ pitting edema w/ persistent edema of upper extremity forearms  : rao in place; scrotal edema resolved;   Skin: mild blanching erythema on thighs, no macerations      LABS:      CBC Full  -  ( 2019 06:03 )  WBC Count : 12.49 K/uL  RBC Count : 2.40 M/uL  Hemoglobin : 7.3 g/dL  Hematocrit : 23.0 %  Platelet Count - Automated : 269 K/uL  Mean Cell Volume : 95.8 fl  Mean Cell Hemoglobin : 30.4 pg  Mean Cell Hemoglobin Concentration : 31.7 gm/dL  Auto Neutrophil # : x  Auto Lymphocyte # : x  Auto Monocyte # : x  Auto Eosinophil # : x  Auto Basophil # : x  Auto Neutrophil % : x  Auto Lymphocyte % : x  Auto Monocyte % : x  Auto Eosinophil % : x  Auto Basophil % : x        143  |  110<H>  |  61<H>  ----------------------------<  133<H>  4.0   |  19<L>  |  1.95<H>    Ca    7.8<L>      2019 06:03  Phos  4.0       Mg     2.1           PT/INR - ( 2019 06:03 )   PT: 42.3 sec;   INR: 3.60          PTT - ( 2019 06:03 )  PTT:80.3 sec      Urinalysis Basic - ( 2019 09:22 )    Color: Yellow / Appearance: Clear / S.020 / pH: x  Gluc: x / Ketone: Trace mg/dL  / Bili: Negative / Urobili: 0.2 E.U./dL   Blood: x / Protein: 30 mg/dL / Nitrite: NEGATIVE   Leuk Esterase: Moderate / RBC: 5-10 /HPF / WBC Many /HPF   Sq Epi: x / Non Sq Epi: 0-5 /HPF / Bacteria: Present /HPF              RADIOLOGY & ADDITIONAL STUDIES (The following images were personally reviewed):          A/p:85 yo M with history of b/l laparoscopic robotic-assisted inguinal hernia repair presenting with b/l segmental pulmonary embolisms, left lung abscess, pneumoperitoneum, and distal descending colon abscess (likely source of pneumoperitoneum), significant iliofemoral DVT burden, s/p IVCF placement and IR drainage of LLQ abdominal collection (), s/p ex-lap, LAURYN, sigmoidectomy, drain placement in R. paracolic gutter, and abthera vac placement (), s/p planned RTOR, packing removal, and end colostomy creation (4/10), now w/ MRSA + proteus PNA, persistent coagulopathy, LLL pulmonary empyema s/p bedside pigtail drainage (4/15)    Neuro: Fentanyl prn  CV: MAP>65; midodrine 15q8h,  remains fluid overloaded w/ significant anasarca and bilateral effusions; asa 81 lipitor,  Pulm: AC 40/530/12/5. trach (). bilateral PE's; Empyema- s/p left CTx on 4/15 CPAP trial daily  GI: NPO, PEG placed ; TF change to Nepro free water, reglan 10tid standing for gastric motility. malnourished, Vit C, zinc; RUQ US W/Doppler wnl  : rao. dc today JESUS/AKF oliguria - f/u Urine studies and US of kidneys.   ID: +kleb, proteus, strep in abd Cx, flagyl (-), cefepime (-); Sputum cx: MRSA and proteus (sensitive to cefepine) , ///Dc'd:vancomycin by level (4/10-)  ceftriaxone (-)// unasyn (-),  Vanco (-), Zosyn (-)]  Endo: ISS  Heme: bilateral iliofemoral DVT / bilateral PE; s/p IVC Filter; elevated INR s/p multiple vit K, Lupus Anticoag- On Heparin gtt( 60-80 goal) will discuss PO meds for A/C vs d/c A/C 2* high INR. Unable to give lovenox 2* kidney function.   PPx: SCDs; Heparin gtt  Lines: PIVs, PICC (--), L A line (--) /// D/C: R cordis (-)   Wounds/drains: LLQ IR drain ( -- ), LUQ PHYLLIS ( -- ), --- drains to bulb suction  abdominal incision SQ space wet to dry QD. D/c: R paracolic gutter drain ( -)   PT/OT: early mob  - worked with PT.

## 2019-04-29 NOTE — CHART NOTE - NSCHARTNOTEFT_GEN_A_CORE
Admitting Diagnosis:   Patient is a 86y old  Male who presents with a chief complaint of right inguinal discomfort (29 Apr 2019 11:45)    PAST MEDICAL & SURGICAL HISTORY:  Esophageal reflux  Acute diarrhea  HLD (hyperlipidemia)  HTN (hypertension)  Prostate cancer  Stented coronary artery: x3 , per pt.  Atherosclerosis of coronary artery: CAD (coronary artery disease)  S/P hernia repair  Surgery, elective: cardiac stent x3  History of prostate surgery    Current Nutrition Order: Glucerna 1.5 @ 20 mL/hr increase to goal 58 mL/hr x24 hours with Prostat SF 1x/day (2188 kcal, 130gm pro, 1057 mL free water, 139% RDI vitamin/mineral), pt on volume based feeding protocol    PO Intake: Good (%) [   ]  Fair (50-75%) [   ] Poor (<25%) [   ]- N/A as pt on EN    GI Issues: +colostomy (630 mL output x24h), abdomen non distended per flow sheet    Pain: Unable to assess at this time as pt vented    Skin Integrity: R heel PI (no stage documented), L heel stage III, sacrum stage III wound, middle upper back suspected deep tissue injury per flow sheet    Labs:   04-29    143  |  110<H>  |  61<H>  ----------------------------<  133<H>  4.0   |  19<L>  |  1.95<H>    Ca    7.8<L>      29 Apr 2019 06:03  Phos  4.0     04-29  Mg     2.1     04-29    CAPILLARY BLOOD GLUCOSE    POCT Blood Glucose.: 123 mg/dL (29 Apr 2019 11:39)  POCT Blood Glucose.: 131 mg/dL (29 Apr 2019 06:25)  POCT Blood Glucose.: 137 mg/dL (29 Apr 2019 00:24)  POCT Blood Glucose.: 149 mg/dL (28 Apr 2019 16:52)    Medications:  MEDICATIONS  (STANDING):  ascorbic acid Syrup 250 milliGRAM(s) Oral daily  aspirin  chewable 81 milliGRAM(s) Oral daily  atorvastatin 80 milliGRAM(s) Oral at bedtime  cefepime   IVPB 2000 milliGRAM(s) IV Intermittent every 12 hours  chlorhexidine 0.12% Liquid 15 milliLiter(s) Oral Mucosa two times a day  chlorhexidine 2% Cloths 1 Application(s) Topical <User Schedule>  dextrose 5%. 1000 milliLiter(s) (50 mL/Hr) IV Continuous <Continuous>  dextrose 50% Injectable 12.5 Gram(s) IV Push once  dextrose 50% Injectable 25 Gram(s) IV Push once  dextrose 50% Injectable 25 Gram(s) IV Push once  heparin  flush 100 Units/mL Injectable 100 Unit(s) IV Push every other day  heparin  Infusion 950 Unit(s)/Hr (9.5 mL/Hr) IV Continuous <Continuous>  insulin lispro (HumaLOG) corrective regimen sliding scale   SubCutaneous every 6 hours  metoclopramide Injectable 10 milliGRAM(s) IV Push every 12 hours  metroNIDAZOLE  IVPB 500 milliGRAM(s) IV Intermittent every 8 hours  metroNIDAZOLE  IVPB      midodrine 15 milliGRAM(s) Oral every 8 hours  nystatin Powder 1 Application(s) Topical two times a day  pantoprazole  Injectable 40 milliGRAM(s) IV Push daily  sodium bicarbonate 325 milliGRAM(s) Oral every 8 hours  zinc sulfate 220 milliGRAM(s) Oral daily    MEDICATIONS  (PRN):  acetaminophen    Suspension .. 650 milliGRAM(s) Enteral Tube every 6 hours PRN Temp greater or equal to 38C (100.4F)  dextrose 40% Gel 15 Gram(s) Oral once PRN Blood Glucose LESS THAN 70 milliGRAM(s)/deciliter  fentaNYL    Injectable 12.5 MICROGram(s) IV Push every 3 hours PRN Moderate Pain (4 - 6)  fentaNYL    Injectable 25 MICROGram(s) IV Push every 3 hours PRN Severe Pain (7 - 10)  glucagon  Injectable 1 milliGRAM(s) IntraMuscular once PRN Glucose LESS THAN 70 milligrams/deciliter  ondansetron Injectable 4 milliGRAM(s) IV Push every 6 hours PRN Nausea    Weight:  78.6 (4/11)  77.5kg (4/24)  74.4kg (4/29)    Weight Change: Weight trending downward. Please trend weights for further assessment    Nutrition Focused Physical Exam: Completed [X on 4/6 ]  Not Pertinent [   ]  Per physical assessment: Muscle Wasting- Temporal [  X ]  Clavicle/Pectoral [  X ]  Shoulder/Deltoid [ X  ]  Scapula [ X  ]  Interosseous [  X ]  Quadriceps [   ]  Gastrocnemius [   ]  Fat Wasting- Orbital [ X  ]  Buccal [ X  ]  Triceps [   ]  Rib [ X  ]  Suspect severe protein-calorie malnutrition 2/2 to physical assessment, inadequate energy intake of <75% for >1 month    Estimated energy needs:   Height 71"; .7#; #; 94% IBW  IBW used to calculate energy needs as pt vented. Needs estimated for maintenance in older adults; increased for malnutrition, infection, wounds.    6450-5354 kcal (30-35 kcal/kg), 117-156 gm (1.5-2.0 gm/kg), fluid needs per team    Subjective: 85 yo M with history of b/l laparoscopic robotic-assisted inguinal hernia repair presenting with b/l segmental pulmonary embolisms, left lung abscess, pneumoperitoneum, and distal descending colon abscess (likely source of pneumoperitoneum), significant iliofemoral DVT burden, s/p IVCF placement and IR drainage of LLQ abdominal collection (4/5), s/p ex-lap, LAURYN, sigmoidectomy, drain placement in R. paracolic gutter, and abthera vac placement (4/7), s/p planned RTOR, packing removal, and end colostomy creation (4/10), now w/ MRSA + proteus PNA, persistent coagulopathy, LLL pulmonary empyema s/p bedside pigtail drainage (4/15), s/p extubation c/b vomiting and aspiration, reintubated (4/21), s/p tracheostomy creation (4/24). PT following. No propofol running at this time. Pt started on TPN 4/7. TPN weaned off on 4/14 and pt started on TF. s/p PEG 4/25. Glucerna 1.5 running at 20 mL/hr this AM. Plan to change TF regimen to Nepro 2/2 worsening renal function per team- please see below for full EN recs. RD to monitor and f/u per high risk protocol.    Previous Nutrition Diagnosis:  Malnutrition (suspected severe) RT inadequate energy intake 2/2 lack of appetite and poor PO intake 2/2 multiple hospitalizations AEB muscle loss, fat loss, weight fluctuation, and inadequate energy intake    Active [ X ]  Resolved [   ]    PES: Increased protein needs RT increased demand for nutrients AEB wounds    Active [ X ]  Resolved [   ]    Goal: Consistently meet % of EER; pt will show no further s/s malnutrition throughout admit    Recommendations:  1. Per team, start Nepro, recommend start at 20 mL/hr increase 20 mL q4h to goal 53 mL/hr with Prostat SF 1x/day (2390 kcal, 118 gm pro, 925 mL free water, 135% RDI vitamin/mineral, 30.6 kcal/kg IBW, 1.5 gm/kg IBW, 24.6 non protein calories/kg IBW)- additional water flushes per team. Continue volume based feeding protocol & monitor for signs of intolerance.  2. Trend bi-weekly weights for further assessment.  3. Monitor labs and replete electrolytes prn.    Education: N/A 2/2 pt's medical status    Risk Level: High [ X ] Moderate [   ] Low [   ] Admitting Diagnosis:   Patient is a 86y old  Male who presents with a chief complaint of right inguinal discomfort (29 Apr 2019 11:45)    PAST MEDICAL & SURGICAL HISTORY:  Esophageal reflux  Acute diarrhea  HLD (hyperlipidemia)  HTN (hypertension)  Prostate cancer  Stented coronary artery: x3 , per pt.  Atherosclerosis of coronary artery: CAD (coronary artery disease)  S/P hernia repair  Surgery, elective: cardiac stent x3  History of prostate surgery    Current Nutrition Order: Glucerna 1.5 @ 20 mL/hr increase to goal 58 mL/hr x24 hours with Prostat SF 1x/day (2188 kcal, 130gm pro, 1057 mL free water, 139% RDI vitamin/mineral), pt on volume based feeding protocol    PO Intake: Good (%) [   ]  Fair (50-75%) [   ] Poor (<25%) [   ]- N/A as pt on EN    GI Issues: +colostomy (630 mL output x24h), abdomen non distended per flow sheet    Pain: Unable to assess at this time as pt vented    Skin Integrity: R heel PI (no stage documented), L heel stage III, sacrum stage III wound, middle upper back suspected deep tissue injury per flow sheet    Labs:   04-29    143  |  110<H>  |  61<H>  ----------------------------<  133<H>  4.0   |  19<L>  |  1.95<H>    Ca    7.8<L>      29 Apr 2019 06:03  Phos  4.0     04-29  Mg     2.1     04-29    CAPILLARY BLOOD GLUCOSE    POCT Blood Glucose.: 123 mg/dL (29 Apr 2019 11:39)  POCT Blood Glucose.: 131 mg/dL (29 Apr 2019 06:25)  POCT Blood Glucose.: 137 mg/dL (29 Apr 2019 00:24)  POCT Blood Glucose.: 149 mg/dL (28 Apr 2019 16:52)    Medications:  MEDICATIONS  (STANDING):  ascorbic acid Syrup 250 milliGRAM(s) Oral daily  aspirin  chewable 81 milliGRAM(s) Oral daily  atorvastatin 80 milliGRAM(s) Oral at bedtime  cefepime   IVPB 2000 milliGRAM(s) IV Intermittent every 12 hours  chlorhexidine 0.12% Liquid 15 milliLiter(s) Oral Mucosa two times a day  chlorhexidine 2% Cloths 1 Application(s) Topical <User Schedule>  dextrose 5%. 1000 milliLiter(s) (50 mL/Hr) IV Continuous <Continuous>  dextrose 50% Injectable 12.5 Gram(s) IV Push once  dextrose 50% Injectable 25 Gram(s) IV Push once  dextrose 50% Injectable 25 Gram(s) IV Push once  heparin  flush 100 Units/mL Injectable 100 Unit(s) IV Push every other day  heparin  Infusion 950 Unit(s)/Hr (9.5 mL/Hr) IV Continuous <Continuous>  insulin lispro (HumaLOG) corrective regimen sliding scale   SubCutaneous every 6 hours  metoclopramide Injectable 10 milliGRAM(s) IV Push every 12 hours  metroNIDAZOLE  IVPB 500 milliGRAM(s) IV Intermittent every 8 hours  metroNIDAZOLE  IVPB      midodrine 15 milliGRAM(s) Oral every 8 hours  nystatin Powder 1 Application(s) Topical two times a day  pantoprazole  Injectable 40 milliGRAM(s) IV Push daily  sodium bicarbonate 325 milliGRAM(s) Oral every 8 hours  zinc sulfate 220 milliGRAM(s) Oral daily    MEDICATIONS  (PRN):  acetaminophen    Suspension .. 650 milliGRAM(s) Enteral Tube every 6 hours PRN Temp greater or equal to 38C (100.4F)  dextrose 40% Gel 15 Gram(s) Oral once PRN Blood Glucose LESS THAN 70 milliGRAM(s)/deciliter  fentaNYL    Injectable 12.5 MICROGram(s) IV Push every 3 hours PRN Moderate Pain (4 - 6)  fentaNYL    Injectable 25 MICROGram(s) IV Push every 3 hours PRN Severe Pain (7 - 10)  glucagon  Injectable 1 milliGRAM(s) IntraMuscular once PRN Glucose LESS THAN 70 milligrams/deciliter  ondansetron Injectable 4 milliGRAM(s) IV Push every 6 hours PRN Nausea    Weight:  78.6 (4/11)  77.5kg (4/24)  74.4kg (4/29)    Weight Change: Weight trending downward. Please trend weights for further assessment    Nutrition Focused Physical Exam: Completed [X on 4/6 ]  Not Pertinent [   ]  Per physical assessment: Muscle Wasting- Temporal [  X ]  Clavicle/Pectoral [  X ]  Shoulder/Deltoid [ X  ]  Scapula [ X  ]  Interosseous [  X ]  Quadriceps [   ]  Gastrocnemius [   ]  Fat Wasting- Orbital [ X  ]  Buccal [ X  ]  Triceps [   ]  Rib [ X  ]  Suspect severe protein-calorie malnutrition 2/2 to physical assessment, inadequate energy intake of <75% for >1 month    Estimated energy needs:   Height 71"; .7#; #; 94% IBW  IBW used to calculate energy needs as pt vented. Needs estimated for maintenance in older adults; increased for malnutrition, infection, wounds.    6026-3787 kcal (30-35 kcal/kg), 117-156 gm (1.5-2.0 gm/kg), fluid needs per team    Subjective: 87 yo M with history of b/l laparoscopic robotic-assisted inguinal hernia repair presenting with b/l segmental pulmonary embolisms, left lung abscess, pneumoperitoneum, and distal descending colon abscess (likely source of pneumoperitoneum), significant iliofemoral DVT burden, s/p IVCF placement and IR drainage of LLQ abdominal collection (4/5), s/p ex-lap, LAURYN, sigmoidectomy, drain placement in R. paracolic gutter, and abthera vac placement (4/7), s/p planned RTOR, packing removal, and end colostomy creation (4/10), now w/ MRSA + proteus PNA, persistent coagulopathy, LLL pulmonary empyema s/p bedside pigtail drainage (4/15), s/p extubation c/b vomiting and aspiration, reintubated (4/21), s/p tracheostomy creation (4/24). PT following. No propofol running at this time. Pt started on TPN 4/7. TPN weaned off on 4/14 and pt started on TF. s/p PEG 4/26. Glucerna 1.5 running at 20 mL/hr this AM. Plan to change TF regimen to Nepro 2/2 worsening renal function per team- please see below for full EN recs. RD to monitor and f/u per high risk protocol.    Previous Nutrition Diagnosis:  Malnutrition (suspected severe) RT inadequate energy intake 2/2 lack of appetite and poor PO intake 2/2 multiple hospitalizations AEB muscle loss, fat loss, weight fluctuation, and inadequate energy intake    Active [ X ]  Resolved [   ]    PES: Increased protein needs RT increased demand for nutrients AEB wounds    Active [ X ]  Resolved [   ]    Goal: Consistently meet % of EER; pt will show no further s/s malnutrition throughout admit    Recommendations:  1. Per team, start Nepro, recommend start at 20 mL/hr increase 20 mL q4h to goal 53 mL/hr with Prostat SF 1x/day (2390 kcal, 118 gm pro, 925 mL free water, 135% RDI vitamin/mineral, 30.6 kcal/kg IBW, 1.5 gm/kg IBW, 24.6 non protein calories/kg IBW)- additional water flushes per team. Continue volume based feeding protocol & monitor for signs of intolerance.  2. Trend bi-weekly weights for further assessment.  3. Monitor labs and replete electrolytes prn.    Education: N/A 2/2 pt's medical status    Risk Level: High [ X ] Moderate [   ] Low [   ]

## 2019-04-29 NOTE — OCCUPATIONAL THERAPY INITIAL EVALUATION ADULT - LEVEL OF CONSCIOUSNESS, OT EVAL
lethargic/somnolent/Does not open eyes to verbal cues, turns away from tactile stim. +grimace throughout

## 2019-04-29 NOTE — OCCUPATIONAL THERAPY INITIAL EVALUATION ADULT - MANUAL MUSCLE TESTING RESULTS, REHAB EVAL
Unable to formally assess MMT 2/2 lack of command following. Appears to resist PROM at times, +grimace. Will actively flex/extend bilateral digits 2-5 with active assist and verbal cues.

## 2019-04-30 LAB
ANION GAP SERPL CALC-SCNC: 12 MMOL/L — SIGNIFICANT CHANGE UP (ref 5–17)
APTT BLD: 80 SEC — HIGH (ref 27.5–36.3)
BUN SERPL-MCNC: 66 MG/DL — HIGH (ref 7–23)
CALCIUM SERPL-MCNC: 7.4 MG/DL — LOW (ref 8.4–10.5)
CHLORIDE SERPL-SCNC: 111 MMOL/L — HIGH (ref 96–108)
CO2 SERPL-SCNC: 18 MMOL/L — LOW (ref 22–31)
CREAT SERPL-MCNC: 2.04 MG/DL — HIGH (ref 0.5–1.3)
GLUCOSE BLDC GLUCOMTR-MCNC: 152 MG/DL — HIGH (ref 70–99)
GLUCOSE BLDC GLUCOMTR-MCNC: 162 MG/DL — HIGH (ref 70–99)
GLUCOSE BLDC GLUCOMTR-MCNC: 168 MG/DL — HIGH (ref 70–99)
GLUCOSE BLDC GLUCOMTR-MCNC: 179 MG/DL — HIGH (ref 70–99)
GLUCOSE SERPL-MCNC: 146 MG/DL — HIGH (ref 70–99)
HCT VFR BLD CALC: 24.5 % — LOW (ref 39–50)
HGB BLD-MCNC: 7.9 G/DL — LOW (ref 13–17)
MAGNESIUM SERPL-MCNC: 2 MG/DL — SIGNIFICANT CHANGE UP (ref 1.6–2.6)
MCHC RBC-ENTMCNC: 31.1 PG — SIGNIFICANT CHANGE UP (ref 27–34)
MCHC RBC-ENTMCNC: 32.2 GM/DL — SIGNIFICANT CHANGE UP (ref 32–36)
MCV RBC AUTO: 96.5 FL — SIGNIFICANT CHANGE UP (ref 80–100)
NRBC # BLD: 0 /100 WBCS — SIGNIFICANT CHANGE UP (ref 0–0)
PHOSPHATE SERPL-MCNC: 4.2 MG/DL — SIGNIFICANT CHANGE UP (ref 2.5–4.5)
PLATELET # BLD AUTO: 274 K/UL — SIGNIFICANT CHANGE UP (ref 150–400)
POTASSIUM SERPL-MCNC: 3.6 MMOL/L — SIGNIFICANT CHANGE UP (ref 3.5–5.3)
POTASSIUM SERPL-SCNC: 3.6 MMOL/L — SIGNIFICANT CHANGE UP (ref 3.5–5.3)
RBC # BLD: 2.54 M/UL — LOW (ref 4.2–5.8)
RBC # FLD: 17.6 % — HIGH (ref 10.3–14.5)
SODIUM SERPL-SCNC: 141 MMOL/L — SIGNIFICANT CHANGE UP (ref 135–145)
WBC # BLD: 15.6 K/UL — HIGH (ref 3.8–10.5)
WBC # FLD AUTO: 15.6 K/UL — HIGH (ref 3.8–10.5)

## 2019-04-30 PROCEDURE — 71045 X-RAY EXAM CHEST 1 VIEW: CPT | Mod: 26

## 2019-04-30 PROCEDURE — 99291 CRITICAL CARE FIRST HOUR: CPT

## 2019-04-30 RX ORDER — POTASSIUM CHLORIDE 20 MEQ
10 PACKET (EA) ORAL
Qty: 0 | Refills: 0 | Status: COMPLETED | OUTPATIENT
Start: 2019-04-30 | End: 2019-04-30

## 2019-04-30 RX ORDER — PANTOPRAZOLE SODIUM 20 MG/1
40 TABLET, DELAYED RELEASE ORAL DAILY
Qty: 0 | Refills: 0 | Status: DISCONTINUED | OUTPATIENT
Start: 2019-04-30 | End: 2019-05-09

## 2019-04-30 RX ORDER — CEFEPIME 1 G/1
2000 INJECTION, POWDER, FOR SOLUTION INTRAMUSCULAR; INTRAVENOUS EVERY 24 HOURS
Qty: 0 | Refills: 0 | Status: DISCONTINUED | OUTPATIENT
Start: 2019-05-01 | End: 2019-05-04

## 2019-04-30 RX ADMIN — Medication 2: at 16:44

## 2019-04-30 RX ADMIN — CEFEPIME 100 MILLIGRAM(S): 1 INJECTION, POWDER, FOR SOLUTION INTRAMUSCULAR; INTRAVENOUS at 06:16

## 2019-04-30 RX ADMIN — FENTANYL CITRATE 12.5 MICROGRAM(S): 50 INJECTION INTRAVENOUS at 18:38

## 2019-04-30 RX ADMIN — FENTANYL CITRATE 12.5 MICROGRAM(S): 50 INJECTION INTRAVENOUS at 13:17

## 2019-04-30 RX ADMIN — FENTANYL CITRATE 12.5 MICROGRAM(S): 50 INJECTION INTRAVENOUS at 23:32

## 2019-04-30 RX ADMIN — Medication 100 MILLIGRAM(S): at 01:25

## 2019-04-30 RX ADMIN — Medication 81 MILLIGRAM(S): at 12:06

## 2019-04-30 RX ADMIN — Medication 250 MILLIGRAM(S): at 12:05

## 2019-04-30 RX ADMIN — NYSTATIN CREAM 1 APPLICATION(S): 100000 CREAM TOPICAL at 06:40

## 2019-04-30 RX ADMIN — MIDODRINE HYDROCHLORIDE 15 MILLIGRAM(S): 2.5 TABLET ORAL at 22:50

## 2019-04-30 RX ADMIN — CHLORHEXIDINE GLUCONATE 1 APPLICATION(S): 213 SOLUTION TOPICAL at 06:39

## 2019-04-30 RX ADMIN — Medication 325 MILLIGRAM(S): at 06:16

## 2019-04-30 RX ADMIN — CHLORHEXIDINE GLUCONATE 15 MILLILITER(S): 213 SOLUTION TOPICAL at 19:39

## 2019-04-30 RX ADMIN — FENTANYL CITRATE 12.5 MICROGRAM(S): 50 INJECTION INTRAVENOUS at 09:55

## 2019-04-30 RX ADMIN — CHLORHEXIDINE GLUCONATE 15 MILLILITER(S): 213 SOLUTION TOPICAL at 06:16

## 2019-04-30 RX ADMIN — ZINC SULFATE TAB 220 MG (50 MG ZINC EQUIVALENT) 220 MILLIGRAM(S): 220 (50 ZN) TAB at 12:05

## 2019-04-30 RX ADMIN — Medication 100 MILLIEQUIVALENT(S): at 09:59

## 2019-04-30 RX ADMIN — Medication 325 MILLIGRAM(S): at 22:50

## 2019-04-30 RX ADMIN — Medication 10 MILLIGRAM(S): at 22:57

## 2019-04-30 RX ADMIN — Medication 100 MILLIGRAM(S): at 19:39

## 2019-04-30 RX ADMIN — HEPARIN SODIUM 9.5 UNIT(S)/HR: 5000 INJECTION INTRAVENOUS; SUBCUTANEOUS at 17:04

## 2019-04-30 RX ADMIN — Medication 100 MILLIEQUIVALENT(S): at 06:40

## 2019-04-30 RX ADMIN — Medication 2: at 12:28

## 2019-04-30 RX ADMIN — Medication 2: at 22:57

## 2019-04-30 RX ADMIN — ATORVASTATIN CALCIUM 80 MILLIGRAM(S): 80 TABLET, FILM COATED ORAL at 22:50

## 2019-04-30 RX ADMIN — Medication 100 MILLIGRAM(S): at 09:58

## 2019-04-30 RX ADMIN — Medication 325 MILLIGRAM(S): at 13:17

## 2019-04-30 RX ADMIN — PANTOPRAZOLE SODIUM 40 MILLIGRAM(S): 20 TABLET, DELAYED RELEASE ORAL at 12:07

## 2019-04-30 RX ADMIN — Medication 10 MILLIGRAM(S): at 12:05

## 2019-04-30 RX ADMIN — FENTANYL CITRATE 12.5 MICROGRAM(S): 50 INJECTION INTRAVENOUS at 12:06

## 2019-04-30 RX ADMIN — Medication 2: at 06:38

## 2019-04-30 RX ADMIN — MIDODRINE HYDROCHLORIDE 15 MILLIGRAM(S): 2.5 TABLET ORAL at 06:16

## 2019-04-30 RX ADMIN — MIDODRINE HYDROCHLORIDE 15 MILLIGRAM(S): 2.5 TABLET ORAL at 13:17

## 2019-04-30 NOTE — PROGRESS NOTE ADULT - ASSESSMENT
A/p:85 yo M with history of b/l laparoscopic robotic-assisted inguinal hernia repair presenting with b/l segmental pulmonary embolisms, left lung abscess, pneumoperitoneum, and distal descending colon abscess (likely source of pneumoperitoneum), significant iliofemoral DVT burden, s/p IVCF placement and IR drainage of LLQ abdominal collection (4/5), s/p ex-lap, LAURYN, sigmoidectomy, drain placement in R. paracolic gutter, and abthera vac placement (4/7), s/p planned RTOR, packing removal, and end colostomy creation (4/10), now w/ MRSA + proteus PNA, persistent coagulopathy, LLL pulmonary empyema s/p bedside pigtail drainage (4/15)    Neuro: Fentanyl prn  CV: MAP>65; midodrine 15q8h,  remains fluid overloaded w/ significant anasarca and bilateral effusions; asa 81 lipitor,  Pulm: AC 40/530/12/5. trach (4/24). bilateral PE's; Empyema- s/p left CTx on 4/15 CPAP trial daily  GI: NPO, PEG placed 4/26; TF change to Nepro free water, reglan 10tid standing for gastric motility. malnourished, Vit C, zinc; RUQ US W/Doppler wnl  : rao. dc today JESUS/AKF oliguria - f/u Urine studies and US of kidneys.   ID: +kleb, proteus, strep in abd Cx, flagyl (4/9-5/4), cefepime (4/9-5/4); Sputum cx: MRSA and proteus (sensitive to cefepine) , ///Dc'd:vancomycin by level (4/10-4/26)  ceftriaxone (4/9-4/9)// unasyn (4/8-4/9),  Vanco (4/5-4/7), Zosyn (4/5-4/8)]  Endo: ISS  Heme: bilateral iliofemoral DVT / bilateral PE; s/p IVC Filter; elevated INR s/p multiple vit K, Lupus Anticoag- On Heparin gtt( 60-80 goal) will discuss PO meds for A/C vs d/c A/C 2* high INR. Unable to give lovenox 2* kidney function.   PPx: SCDs; Heparin gtt  Lines: PIVs, PICC (4/6--), L A line (4/13--) /// D/C: R cordis (4/7-4/11)   Wounds/drains: LLQ IR drain (4/5 -- ), LUQ PHYLLIS (4/7 -- ), --- drains to bulb suction  abdominal incision SQ space wet to dry QD. D/c: R paracolic gutter drain (4/7 -4/26)   PT/OT: early mob 4/11 - worked with PT.

## 2019-04-30 NOTE — PROGRESS NOTE ADULT - ASSESSMENT
A/p:87 yo M with history of b/l laparoscopic robotic-assisted inguinal hernia repair presenting with b/l segmental pulmonary embolisms, left lung abscess, pneumoperitoneum, and distal descending colon abscess (likely source of pneumoperitoneum), significant iliofemoral DVT burden, s/p IVCF placement and IR drainage of LLQ abdominal collection (4/5), s/p ex-lap, LAURYN, sigmoidectomy, drain placement in R. paracolic gutter, and abthera vac placement (4/7), s/p planned RTOR, packing removal, and end colostomy creation (4/10), now w/ MRSA + proteus PNA, persistent coagulopathy, LLL pulmonary empyema s/p bedside pigtail drainage (4/15)    Neuro: Fentanyl prn  CV: MAP>65; midodrine 15q8h,  remains fluid overloaded w/ significant anasarca and bilateral effusions; asa 81 lipitor,  Pulm: AC 40/530/12/5. trach (4/24). bilateral PE's; Empyema- s/p left CTx on 4/15 CPAP trial daily  GI: NPO, PEG placed 4/26; TF change to Nepro @45, free water, reglan 10tid standing for gastric motility. malnourished, Vit C, zinc; RUQ US W/Doppler wnl  : rao. dc today JESUS/AKF oliguria - f/u Urine studies and US of kidneys. sodium bicarb tid   ID: +kleb, proteus, strep in abd Cx, flagyl (4/9-5/4), cefepime (4/9-5/4); Sputum cx: MRSA and proteus (sensitive to cefepine) , ///Dc'd:vancomycin by level (4/10-4/26)  ceftriaxone (4/9-4/9)// unasyn (4/8-4/9),  Vanco (4/5-4/7), Zosyn (4/5-4/8)]  Endo: ISS  Heme: bilateral iliofemoral DVT / bilateral PE; s/p IVC Filter; elevated INR s/p multiple vit K, Lupus Anticoag- On Heparin gtt( 60-80 goal) will discuss PO meds for A/C vs d/c A/C 2* high INR. Unable to give lovenox 2* kidney function.   PPx: SCDs; Heparin gtt  Lines: PIVs, PICC (4/6--), L A line (4/13--) /// D/C: R cordis (4/7-4/11)   Wounds/drains: LLQ IR drain (4/5 -- ), LUQ PHYLLIS (4/7 -- ), --- drains to bulb suction  abdominal incision SQ space wet to dry QD. D/c: R paracolic gutter drain (4/7 -4/26)   PT/OT: early mob 4/11 - worked with PT.

## 2019-04-30 NOTE — PROGRESS NOTE ADULT - SUBJECTIVE AND OBJECTIVE BOX
INTERVAL HPI/OVERNIGHT EVENTS:    : PTT therapeutic x3.   o/n: rested on AC overnight. passed TOV (incontinent), negative bladder scan.     PRESSORS: [ ] YES [x] NO        MEDICATIONS  (STANDING):  ascorbic acid Syrup 250 milliGRAM(s) Oral daily  aspirin  chewable 81 milliGRAM(s) Oral daily  atorvastatin 80 milliGRAM(s) Oral at bedtime  chlorhexidine 0.12% Liquid 15 milliLiter(s) Oral Mucosa two times a day  chlorhexidine 2% Cloths 1 Application(s) Topical <User Schedule>  dextrose 5%. 1000 milliLiter(s) (50 mL/Hr) IV Continuous <Continuous>  dextrose 50% Injectable 12.5 Gram(s) IV Push once  dextrose 50% Injectable 25 Gram(s) IV Push once  dextrose 50% Injectable 25 Gram(s) IV Push once  heparin  flush 100 Units/mL Injectable 100 Unit(s) IV Push every other day  heparin  Infusion 950 Unit(s)/Hr (9.5 mL/Hr) IV Continuous <Continuous>  insulin lispro (HumaLOG) corrective regimen sliding scale   SubCutaneous every 6 hours  metoclopramide Injectable 10 milliGRAM(s) IV Push every 12 hours  metroNIDAZOLE  IVPB 500 milliGRAM(s) IV Intermittent every 8 hours  metroNIDAZOLE  IVPB      midodrine 15 milliGRAM(s) Oral every 8 hours  pantoprazole   Suspension 40 milliGRAM(s) Enteral Tube daily  sodium bicarbonate 325 milliGRAM(s) Oral every 8 hours  zinc sulfate 220 milliGRAM(s) Oral daily    MEDICATIONS  (PRN):  acetaminophen    Suspension .. 650 milliGRAM(s) Enteral Tube every 6 hours PRN Temp greater or equal to 38C (100.4F)  dextrose 40% Gel 15 Gram(s) Oral once PRN Blood Glucose LESS THAN 70 milliGRAM(s)/deciliter  fentaNYL    Injectable 12.5 MICROGram(s) IV Push every 3 hours PRN Moderate Pain (4 - 6)  fentaNYL    Injectable 25 MICROGram(s) IV Push every 3 hours PRN Severe Pain (7 - 10)  glucagon  Injectable 1 milliGRAM(s) IntraMuscular once PRN Glucose LESS THAN 70 milligrams/deciliter  ondansetron Injectable 4 milliGRAM(s) IV Push every 6 hours PRN Nausea      Drug Dosing Weight  Height (cm): 180 (2019 11:26)  Weight (kg): 73.3 (2019 11:26)  BMI (kg/m2): 22.6 (2019 11:26)  BSA (m2): 1.92 (2019 11:26)      PAST MEDICAL & SURGICAL HISTORY:  Esophageal reflux  Acute diarrhea  HLD (hyperlipidemia)  HTN (hypertension)  Prostate cancer  Stented coronary artery: x3 , per pt.  Atherosclerosis of coronary artery: CAD (coronary artery disease)  S/P hernia repair  Surgery, elective: cardiac stent x3  History of prostate surgery      ICU Vital Signs Last 24 Hrs  T(C): 35.6 (2019 13:00), Max: 36.7 (2019 17:00)  T(F): 96.1 (2019 13:00), Max: 98 (2019 17:00)  HR: 90 (2019 14:00) (82 - 108)  BP: 97/49 (2019 14:00) (90/55 - 117/64)  BP(mean): 68 (2019 14:00) (62 - 94)  ABP: --  ABP(mean): --  RR: 29 (2019 14:00) (16 - 32)  SpO2: 99% (2019 14:00) (94% - 100%)            2019 07:01  -  2019 07:00  --------------------------------------------------------  IN:    Enteral Tube Flush: 290 mL    Glucerna 1.5: 80 mL    heparin Infusion: 228 mL    IV PiggyBack: 250 mL    Nepro with Carb Steady: 616 mL  Total IN: 1464 mL    OUT:    Chest Tube: 16 mL    Colostomy: 800 mL    Indwelling Catheter - Urethral: 106 mL  Total OUT: 922 mL    Total NET: 542 mL      2019 07:01  -  2019 14:25  --------------------------------------------------------  IN:    heparin Infusion: 47.5 mL    Nepro with Carb Steady: 240 mL  Total IN: 287.5 mL    OUT:  Total OUT: 0 mL    Total NET: 287.5 mL          Mode: CPAP with PS  FiO2: 40  PEEP: 5  PS: 8  MAP: 7.6  PIP: 14      Physical Exam:  Gen: NAD, trach on CPAP  CV: RRR  Pulm: CTAB  Abd: Soft, midline wound healing well with good granulation tissue.  Ext:     LABS:  CBC Full  -  ( 2019 04:51 )  WBC Count : 15.60 K/uL  RBC Count : 2.54 M/uL  Hemoglobin : 7.9 g/dL  Hematocrit : 24.5 %  Platelet Count - Automated : 274 K/uL  Mean Cell Volume : 96.5 fl  Mean Cell Hemoglobin : 31.1 pg  Mean Cell Hemoglobin Concentration : 32.2 gm/dL  Auto Neutrophil # : x  Auto Lymphocyte # : x  Auto Monocyte # : x  Auto Eosinophil # : x  Auto Basophil # : x  Auto Neutrophil % : x  Auto Lymphocyte % : x  Auto Monocyte % : x  Auto Eosinophil % : x  Auto Basophil % : x    04-30    141  |  111<H>  |  66<H>  ----------------------------<  146<H>  3.6   |  18<L>  |  2.04<H>    Ca    7.4<L>      2019 04:51  Phos  4.2     04-30  Mg     2.0     04-30      PT/INR - ( 2019 06:03 )   PT: 42.3 sec;   INR: 3.60          PTT - ( 2019 04:51 )  PTT:80.0 sec  Urinalysis Basic - ( 2019 09:22 )    Color: Yellow / Appearance: Clear / S.020 / pH: x  Gluc: x / Ketone: Trace mg/dL  / Bili: Negative / Urobili: 0.2 E.U./dL   Blood: x / Protein: 30 mg/dL / Nitrite: NEGATIVE   Leuk Esterase: Moderate / RBC: 5-10 /HPF / WBC Many /HPF   Sq Epi: x / Non Sq Epi: 0-5 /HPF / Bacteria: Present /HPF

## 2019-04-30 NOTE — PROGRESS NOTE ADULT - SUBJECTIVE AND OBJECTIVE BOX
24 hr events: o/n: rested on AC overnight. passed TOV (incontinent), negative bladder scan.   : Heme- unable to give noac or lovenox however INR high therefore unable to follow level when on coumadin. US of kidneyas: Urine: . tf changed to nepro. Rao remvoed TOV       MEDICATIONS  (STANDING):  ascorbic acid Syrup 250 milliGRAM(s) Oral daily  aspirin  chewable 81 milliGRAM(s) Oral daily  atorvastatin 80 milliGRAM(s) Oral at bedtime  cefepime   IVPB 2000 milliGRAM(s) IV Intermittent every 12 hours  chlorhexidine 0.12% Liquid 15 milliLiter(s) Oral Mucosa two times a day  chlorhexidine 2% Cloths 1 Application(s) Topical <User Schedule>  dextrose 5%. 1000 milliLiter(s) (50 mL/Hr) IV Continuous <Continuous>  dextrose 50% Injectable 12.5 Gram(s) IV Push once  dextrose 50% Injectable 25 Gram(s) IV Push once  dextrose 50% Injectable 25 Gram(s) IV Push once  heparin  flush 100 Units/mL Injectable 100 Unit(s) IV Push every other day  heparin  Infusion 950 Unit(s)/Hr (9.5 mL/Hr) IV Continuous <Continuous>  insulin lispro (HumaLOG) corrective regimen sliding scale   SubCutaneous every 6 hours  metoclopramide Injectable 10 milliGRAM(s) IV Push every 12 hours  metroNIDAZOLE  IVPB 500 milliGRAM(s) IV Intermittent every 8 hours  metroNIDAZOLE  IVPB      midodrine 15 milliGRAM(s) Oral every 8 hours  nystatin Powder 1 Application(s) Topical two times a day  pantoprazole  Injectable 40 milliGRAM(s) IV Push daily  potassium chloride  10 mEq/100 mL IVPB 10 milliEquivalent(s) IV Intermittent every 1 hour  sodium bicarbonate 325 milliGRAM(s) Oral every 8 hours  zinc sulfate 220 milliGRAM(s) Oral daily    MEDICATIONS  (PRN):  acetaminophen    Suspension .. 650 milliGRAM(s) Enteral Tube every 6 hours PRN Temp greater or equal to 38C (100.4F)  dextrose 40% Gel 15 Gram(s) Oral once PRN Blood Glucose LESS THAN 70 milliGRAM(s)/deciliter  fentaNYL    Injectable 12.5 MICROGram(s) IV Push every 3 hours PRN Moderate Pain (4 - 6)  fentaNYL    Injectable 25 MICROGram(s) IV Push every 3 hours PRN Severe Pain (7 - 10)  glucagon  Injectable 1 milliGRAM(s) IntraMuscular once PRN Glucose LESS THAN 70 milligrams/deciliter  ondansetron Injectable 4 milliGRAM(s) IV Push every 6 hours PRN Nausea      Rao:	  [ ] None	[ ] Daily Rao Order Placed	   Indication:	  [ ] Strict I and O's    [ ] Obstruction     [ ] Incontinence + Stage 3 or 4 Decubitus    ICU Vital Signs Last 24 Hrs  T(C): 36.1 (2019 05:40), Max: 36.7 (2019 17:00)  T(F): 97 (2019 05:40), Max: 98 (2019 17:00)  HR: 98 (2019 06:45) (82 - 102)  BP: 106/51 (2019 05:00) (90/55 - 114/56)  BP(mean): 66 (2019 05:00) (62 - 81)  ABP: --  ABP(mean): --  RR: 29 (2019 06:45) (16 - 31)  SpO2: 98% (2019 06:45) (94% - 100%)      Physical Exam:  Neuro: Trached not following simple commands  Pulm: trached, on ventilator-AC; chest tube of L posterior chest to wall suction w/ minimal output no air leak noted at time of exam;  C/V: RRR reg s1s2  Abd: nondistended, but firm; TTP w/ mild guarding; ostomy w/ liquid brown output; LLQ IR and LUQ PHYLLIS drain w/ clear brown ascites output;  abdominal incision w/ wet to dry dressing in place; no active bleeding, no purulence   Extremities2+ pitting edema w/ persistent edema of upper extremity forearms  : rao in place  Skin: mild blanching erythema on thighs, no macerations    Lines/tubes/drains:    Vent settings:  Mode: AC/ CMV (Assist Control/ Continuous Mandatory Ventilation), RR (machine): 12, TV (machine): 539, FiO2: 40, PEEP: 5, ITime: 0.8, MAP: 10, PIP: 19    I&O's Summary    2019 07:01  -  2019 07:00  --------------------------------------------------------  IN: 1099 mL / OUT: 737 mL / NET: 362 mL        LABS:                        7.9    15.60 )-----------( 274      ( 2019 04:51 )             24.5     04-30    141  |  111<H>  |  66<H>  ----------------------------<  146<H>  3.6   |  18<L>  |  2.04<H>    Ca    7.4<L>      2019 04:51  Phos  4.2     04-30  Mg     2.0     04-30      PT/INR - ( 2019 06:03 )   PT: 42.3 sec;   INR: 3.60          PTT - ( 2019 04:51 )  PTT:80.0 sec  Urinalysis Basic - ( 2019 09:22 )    Color: Yellow / Appearance: Clear / S.020 / pH: x  Gluc: x / Ketone: Trace mg/dL  / Bili: Negative / Urobili: 0.2 E.U./dL   Blood: x / Protein: 30 mg/dL / Nitrite: NEGATIVE   Leuk Esterase: Moderate / RBC: 5-10 /HPF / WBC Many /HPF   Sq Epi: x / Non Sq Epi: 0-5 /HPF / Bacteria: Present /HPF      CAPILLARY BLOOD GLUCOSE      POCT Blood Glucose.: 168 mg/dL (2019 06:23)  POCT Blood Glucose.: 146 mg/dL (2019 22:10)  POCT Blood Glucose.: 139 mg/dL (2019 16:25)  POCT Blood Glucose.: 123 mg/dL (2019 11:39)        Cultures:    Drips:    RADIOLOGY & ADDITIONAL STUDIES:

## 2019-04-30 NOTE — PROGRESS NOTE ADULT - ATTENDING COMMENTS
Pt remains with worsening renal function. He seems to grimace often and pain medication encouraged. Trying to reach Dr. Cha to discuss further goals of care discussion in seeting of multisystem organ failure, hypercoaguable state, and persistent air leak from CT. continue enteral feeds and anticoag with Heparin.

## 2019-05-01 LAB
ANION GAP SERPL CALC-SCNC: 17 MMOL/L — SIGNIFICANT CHANGE UP (ref 5–17)
APTT BLD: 58.4 SEC — HIGH (ref 27.5–36.3)
APTT BLD: 86.3 SEC — HIGH (ref 27.5–36.3)
BUN SERPL-MCNC: 69 MG/DL — HIGH (ref 7–23)
CALCIUM SERPL-MCNC: 7.8 MG/DL — LOW (ref 8.4–10.5)
CHLORIDE SERPL-SCNC: 109 MMOL/L — HIGH (ref 96–108)
CO2 SERPL-SCNC: 14 MMOL/L — LOW (ref 22–31)
CREAT SERPL-MCNC: 2.21 MG/DL — HIGH (ref 0.5–1.3)
GLUCOSE BLDC GLUCOMTR-MCNC: 141 MG/DL — HIGH (ref 70–99)
GLUCOSE BLDC GLUCOMTR-MCNC: 142 MG/DL — HIGH (ref 70–99)
GLUCOSE BLDC GLUCOMTR-MCNC: 165 MG/DL — HIGH (ref 70–99)
GLUCOSE SERPL-MCNC: 157 MG/DL — HIGH (ref 70–99)
HCT VFR BLD CALC: 25.1 % — LOW (ref 39–50)
HGB BLD-MCNC: 7.8 G/DL — LOW (ref 13–17)
INR BLD: 3.84 — HIGH (ref 0.88–1.16)
MAGNESIUM SERPL-MCNC: 1.9 MG/DL — SIGNIFICANT CHANGE UP (ref 1.6–2.6)
MCHC RBC-ENTMCNC: 31 PG — SIGNIFICANT CHANGE UP (ref 27–34)
MCHC RBC-ENTMCNC: 31.1 GM/DL — LOW (ref 32–36)
MCV RBC AUTO: 99.6 FL — SIGNIFICANT CHANGE UP (ref 80–100)
NRBC # BLD: 0 /100 WBCS — SIGNIFICANT CHANGE UP (ref 0–0)
PHOSPHATE SERPL-MCNC: 4.5 MG/DL — SIGNIFICANT CHANGE UP (ref 2.5–4.5)
PLATELET # BLD AUTO: 239 K/UL — SIGNIFICANT CHANGE UP (ref 150–400)
POTASSIUM SERPL-MCNC: 3.8 MMOL/L — SIGNIFICANT CHANGE UP (ref 3.5–5.3)
POTASSIUM SERPL-SCNC: 3.8 MMOL/L — SIGNIFICANT CHANGE UP (ref 3.5–5.3)
PROTHROM AB SERPL-ACNC: 45.2 SEC — HIGH (ref 10–12.9)
RBC # BLD: 2.52 M/UL — LOW (ref 4.2–5.8)
RBC # FLD: 17.7 % — HIGH (ref 10.3–14.5)
SODIUM SERPL-SCNC: 140 MMOL/L — SIGNIFICANT CHANGE UP (ref 135–145)
WBC # BLD: 16.19 K/UL — HIGH (ref 3.8–10.5)
WBC # FLD AUTO: 16.19 K/UL — HIGH (ref 3.8–10.5)

## 2019-05-01 PROCEDURE — 99291 CRITICAL CARE FIRST HOUR: CPT

## 2019-05-01 PROCEDURE — 71045 X-RAY EXAM CHEST 1 VIEW: CPT | Mod: 26

## 2019-05-01 RX ORDER — FENTANYL CITRATE 50 UG/ML
12.5 INJECTION INTRAVENOUS
Qty: 0 | Refills: 0 | Status: DISCONTINUED | OUTPATIENT
Start: 2019-05-01 | End: 2019-05-07

## 2019-05-01 RX ORDER — HEPARIN SODIUM 5000 [USP'U]/ML
900 INJECTION INTRAVENOUS; SUBCUTANEOUS
Qty: 25000 | Refills: 0 | Status: DISCONTINUED | OUTPATIENT
Start: 2019-05-01 | End: 2019-05-08

## 2019-05-01 RX ORDER — SODIUM BICARBONATE 1 MEQ/ML
50 SYRINGE (ML) INTRAVENOUS ONCE
Qty: 0 | Refills: 0 | Status: COMPLETED | OUTPATIENT
Start: 2019-05-01 | End: 2019-05-01

## 2019-05-01 RX ORDER — FUROSEMIDE 40 MG
40 TABLET ORAL ONCE
Qty: 0 | Refills: 0 | Status: COMPLETED | OUTPATIENT
Start: 2019-05-01 | End: 2019-05-01

## 2019-05-01 RX ORDER — SODIUM BICARBONATE 1 MEQ/ML
650 SYRINGE (ML) INTRAVENOUS EVERY 8 HOURS
Qty: 0 | Refills: 0 | Status: DISCONTINUED | OUTPATIENT
Start: 2019-05-01 | End: 2019-05-05

## 2019-05-01 RX ORDER — FENTANYL CITRATE 50 UG/ML
25 INJECTION INTRAVENOUS
Qty: 0 | Refills: 0 | Status: DISCONTINUED | OUTPATIENT
Start: 2019-05-01 | End: 2019-05-07

## 2019-05-01 RX ADMIN — FENTANYL CITRATE 12.5 MICROGRAM(S): 50 INJECTION INTRAVENOUS at 22:00

## 2019-05-01 RX ADMIN — Medication 250 MILLIGRAM(S): at 14:12

## 2019-05-01 RX ADMIN — CEFEPIME 100 MILLIGRAM(S): 1 INJECTION, POWDER, FOR SOLUTION INTRAMUSCULAR; INTRAVENOUS at 05:22

## 2019-05-01 RX ADMIN — FENTANYL CITRATE 25 MICROGRAM(S): 50 INJECTION INTRAVENOUS at 01:00

## 2019-05-01 RX ADMIN — Medication 2: at 14:05

## 2019-05-01 RX ADMIN — Medication 10 MILLIGRAM(S): at 12:09

## 2019-05-01 RX ADMIN — PANTOPRAZOLE SODIUM 40 MILLIGRAM(S): 20 TABLET, DELAYED RELEASE ORAL at 14:12

## 2019-05-01 RX ADMIN — FENTANYL CITRATE 12.5 MICROGRAM(S): 50 INJECTION INTRAVENOUS at 18:00

## 2019-05-01 RX ADMIN — FENTANYL CITRATE 12.5 MICROGRAM(S): 50 INJECTION INTRAVENOUS at 00:00

## 2019-05-01 RX ADMIN — CHLORHEXIDINE GLUCONATE 1 APPLICATION(S): 213 SOLUTION TOPICAL at 05:23

## 2019-05-01 RX ADMIN — ATORVASTATIN CALCIUM 80 MILLIGRAM(S): 80 TABLET, FILM COATED ORAL at 21:43

## 2019-05-01 RX ADMIN — FENTANYL CITRATE 25 MICROGRAM(S): 50 INJECTION INTRAVENOUS at 12:08

## 2019-05-01 RX ADMIN — Medication 650 MILLIGRAM(S): at 14:04

## 2019-05-01 RX ADMIN — Medication 40 MILLIGRAM(S): at 09:31

## 2019-05-01 RX ADMIN — FENTANYL CITRATE 12.5 MICROGRAM(S): 50 INJECTION INTRAVENOUS at 17:16

## 2019-05-01 RX ADMIN — ZINC SULFATE TAB 220 MG (50 MG ZINC EQUIVALENT) 220 MILLIGRAM(S): 220 (50 ZN) TAB at 12:10

## 2019-05-01 RX ADMIN — MIDODRINE HYDROCHLORIDE 15 MILLIGRAM(S): 2.5 TABLET ORAL at 05:23

## 2019-05-01 RX ADMIN — MIDODRINE HYDROCHLORIDE 15 MILLIGRAM(S): 2.5 TABLET ORAL at 21:43

## 2019-05-01 RX ADMIN — MIDODRINE HYDROCHLORIDE 15 MILLIGRAM(S): 2.5 TABLET ORAL at 14:05

## 2019-05-01 RX ADMIN — FENTANYL CITRATE 12.5 MICROGRAM(S): 50 INJECTION INTRAVENOUS at 06:00

## 2019-05-01 RX ADMIN — Medication 81 MILLIGRAM(S): at 12:10

## 2019-05-01 RX ADMIN — Medication 50 MILLIEQUIVALENT(S): at 09:31

## 2019-05-01 RX ADMIN — CHLORHEXIDINE GLUCONATE 15 MILLILITER(S): 213 SOLUTION TOPICAL at 18:32

## 2019-05-01 RX ADMIN — CHLORHEXIDINE GLUCONATE 15 MILLILITER(S): 213 SOLUTION TOPICAL at 05:23

## 2019-05-01 RX ADMIN — Medication 325 MILLIGRAM(S): at 05:23

## 2019-05-01 RX ADMIN — FENTANYL CITRATE 12.5 MICROGRAM(S): 50 INJECTION INTRAVENOUS at 21:43

## 2019-05-01 RX ADMIN — Medication 650 MILLIGRAM(S): at 21:43

## 2019-05-01 RX ADMIN — FENTANYL CITRATE 12.5 MICROGRAM(S): 50 INJECTION INTRAVENOUS at 05:22

## 2019-05-01 RX ADMIN — Medication 100 MILLIGRAM(S): at 18:32

## 2019-05-01 RX ADMIN — Medication 100 MILLIGRAM(S): at 09:37

## 2019-05-01 RX ADMIN — Medication 100 MILLIGRAM(S): at 03:34

## 2019-05-01 NOTE — PROGRESS NOTE ADULT - ATTENDING COMMENTS
Pt failed CPAP trial. I spoke at length with pts sister yesterday regarding multisystem organ failure and poor prognosis. Dr. Valencia to speak with them today regarding goals of care.

## 2019-05-01 NOTE — PROGRESS NOTE ADULT - SUBJECTIVE AND OBJECTIVE BOX
INTERVAL HPI/OVERNIGHT EVENTS:    5/1: 40 Lasix IV given in AM. Wound with good granulation tissue. HCO3 dose increased to 650 mg q8 hours.    PRESSORS: [ ] YES [x] NO      MEDICATIONS  (STANDING):  ascorbic acid Syrup 250 milliGRAM(s) Oral daily  aspirin  chewable 81 milliGRAM(s) Oral daily  atorvastatin 80 milliGRAM(s) Oral at bedtime  cefepime   IVPB 2000 milliGRAM(s) IV Intermittent every 24 hours  chlorhexidine 0.12% Liquid 15 milliLiter(s) Oral Mucosa two times a day  chlorhexidine 2% Cloths 1 Application(s) Topical <User Schedule>  dextrose 5%. 1000 milliLiter(s) (50 mL/Hr) IV Continuous <Continuous>  dextrose 50% Injectable 12.5 Gram(s) IV Push once  dextrose 50% Injectable 25 Gram(s) IV Push once  dextrose 50% Injectable 25 Gram(s) IV Push once  heparin  flush 100 Units/mL Injectable 100 Unit(s) IV Push every other day  heparin  Infusion 900 Unit(s)/Hr (9 mL/Hr) IV Continuous <Continuous>  insulin lispro (HumaLOG) corrective regimen sliding scale   SubCutaneous every 6 hours  metoclopramide Injectable 10 milliGRAM(s) IV Push every 12 hours  metroNIDAZOLE  IVPB 500 milliGRAM(s) IV Intermittent every 8 hours  metroNIDAZOLE  IVPB      midodrine 15 milliGRAM(s) Oral every 8 hours  pantoprazole   Suspension 40 milliGRAM(s) Enteral Tube daily  sodium bicarbonate 650 milliGRAM(s) Oral every 8 hours  zinc sulfate 220 milliGRAM(s) Oral daily    MEDICATIONS  (PRN):  acetaminophen    Suspension .. 650 milliGRAM(s) Enteral Tube every 6 hours PRN Temp greater or equal to 38C (100.4F)  dextrose 40% Gel 15 Gram(s) Oral once PRN Blood Glucose LESS THAN 70 milliGRAM(s)/deciliter  fentaNYL    Injectable 12.5 MICROGram(s) IV Push every 3 hours PRN Moderate Pain (4 - 6)  fentaNYL    Injectable 25 MICROGram(s) IV Push every 3 hours PRN Severe Pain (7 - 10)  glucagon  Injectable 1 milliGRAM(s) IntraMuscular once PRN Glucose LESS THAN 70 milligrams/deciliter  ondansetron Injectable 4 milliGRAM(s) IV Push every 6 hours PRN Nausea      Drug Dosing Weight  Height (cm): 180 (07 Apr 2019 11:26)  Weight (kg): 73.3 (07 Apr 2019 11:26)  BMI (kg/m2): 22.6 (07 Apr 2019 11:26)  BSA (m2): 1.92 (07 Apr 2019 11:26)      PAST MEDICAL & SURGICAL HISTORY:  Esophageal reflux  Acute diarrhea  HLD (hyperlipidemia)  HTN (hypertension)  Prostate cancer  Stented coronary artery: x3 , per pt.  Atherosclerosis of coronary artery: CAD (coronary artery disease)  S/P hernia repair  Surgery, elective: cardiac stent x3  History of prostate surgery      ICU Vital Signs Last 24 Hrs  T(C): 35.7 (01 May 2019 09:18), Max: 37 (30 Apr 2019 18:27)  T(F): 96.2 (01 May 2019 09:18), Max: 98.6 (30 Apr 2019 18:27)  HR: 80 (01 May 2019 10:00) (80 - 104)  BP: 105/52 (01 May 2019 10:00) (90/48 - 120/61)  BP(mean): 74 (01 May 2019 10:00) (66 - 83)  ABP: --  ABP(mean): --  RR: 20 (01 May 2019 10:00) (20 - 32)  SpO2: 100% (01 May 2019 10:00) (98% - 100%)            30 Apr 2019 07:01  -  01 May 2019 07:00  --------------------------------------------------------  IN:    Enteral Tube Flush: 90 mL    Free Water: 30 mL    heparin Infusion: 227.5 mL    IV PiggyBack: 450 mL    Nepro with Carb Steady: 1152 mL  Total IN: 1949.5 mL    OUT:    Bulb: 10 mL    Colostomy: 700 mL    Voided: 200 mL  Total OUT: 910 mL    Total NET: 1039.5 mL      01 May 2019 07:01  -  01 May 2019 11:15  --------------------------------------------------------  IN:    heparin Infusion: 9 mL    Nepro with Carb Steady: 48 mL  Total IN: 57 mL    OUT:  Total OUT: 0 mL    Total NET: 57 mL          Mode: CPAP with PS  FiO2: 40  PEEP: 5  PS: 8  MAP: 8  PIP: 13      Physical Exam:  Neuro: Trached not following simple commands opens eyes to physical contact but no interaction  General: no acute distress,  HEENT: PERRL, EOMI, MMD, subcutaneous emphysema of neck improved  Pulm: trached, on ventilator-AC; chest tube of L posterior chest to wall suction w/ minimal output no air leak noted at time of exam; + Sub cutaneous air noted throughout left chest wall   C/V: RRR reg s1s2  Abd: nondistended, but firm; TTP w/ mild guarding; ostomy w/ liquid brown output; LLQ IR and LUQ PHYLLIS drain w/ clear brown ascites output;  abdominal incision w/ wet to dry dressing in place; no active bleeding, no purulence, good granulation tissue.  Extremities: persistent ansarca of lower extremities; 2+ pitting edema w/ persistent edema of upper extremity forearms  : rao in place; scrotal edema resolved;   Skin: mild blanching erythema on thighs, no macerations    LABS:  CBC Full  -  ( 01 May 2019 05:10 )  WBC Count : 16.19 K/uL  RBC Count : 2.52 M/uL  Hemoglobin : 7.8 g/dL  Hematocrit : 25.1 %  Platelet Count - Automated : 239 K/uL  Mean Cell Volume : 99.6 fl  Mean Cell Hemoglobin : 31.0 pg  Mean Cell Hemoglobin Concentration : 31.1 gm/dL  Auto Neutrophil # : x  Auto Lymphocyte # : x  Auto Monocyte # : x  Auto Eosinophil # : x  Auto Basophil # : x  Auto Neutrophil % : x  Auto Lymphocyte % : x  Auto Monocyte % : x  Auto Eosinophil % : x  Auto Basophil % : x    05-01    140  |  109<H>  |  69<H>  ----------------------------<  157<H>  3.8   |  14<L>  |  2.21<H>    Ca    7.8<L>      01 May 2019 05:10  Phos  4.5     05-01  Mg     1.9     05-01      PT/INR - ( 01 May 2019 05:10 )   PT: 45.2 sec;   INR: 3.84          PTT - ( 01 May 2019 05:10 )  PTT:86.3 sec

## 2019-05-01 NOTE — PROGRESS NOTE ADULT - ASSESSMENT
A/p:87 yo M with history of b/l laparoscopic robotic-assisted inguinal hernia repair presenting with b/l segmental pulmonary embolisms, left lung abscess, pneumoperitoneum, and distal descending colon abscess (likely source of pneumoperitoneum), significant iliofemoral DVT burden, s/p IVCF placement and IR drainage of LLQ abdominal collection (4/5), s/p ex-lap, LAURYN, sigmoidectomy, drain placement in R. paracolic gutter, and abthera vac placement (4/7), s/p planned RTOR, packing removal, and end colostomy creation (4/10), now w/ MRSA + proteus PNA, persistent coagulopathy, LLL pulmonary empyema s/p bedside pigtail drainage (4/15)    Neuro: Fentanyl prn  CV: MAP>65; midodrine 15q8h,; asa 81 lipitor,  Pulm: AC 40/530/12/5. trach (4/24). bilateral PE's; Empyema- s/p left CTx on 4/15 CPAP trial daily  GI: NPO, PEG placed 4/26; TF Nepro @45, free water, reglan 10tid standing for gastric motility. malnourished, Vit C, zinc; RUQ US W/Doppler wnl  : incontinent, bladder scan and straight cath PRN. JESUS. sodium bicarb tid   ID: +kleb, proteus, strep in abd Cx, flagyl (4/9-5/4), cefepime (4/9-5/4); Sputum cx: MRSA and proteus (sensitive to cefepine) , ///Dc'd:vancomycin by level (4/10-4/26)  ceftriaxone (4/9-4/9)// unasyn (4/8-4/9),  Vanco (4/5-4/7), Zosyn (4/5-4/8)]  Endo: ISS  Heme: bilateral iliofemoral DVT / bilateral PE; s/p IVC Filter; elevated INR s/p multiple vit K, Lupus Anticoag- On Heparin gtt( 60-80 goal) will discuss PO meds for A/C vs d/c A/C 2* high INR. Unable to give lovenox 2* kidney function.   PPx: SCDs; Heparin gtt  Lines: PIVs, PICC (4/6--),  /// D/C: R cordis (4/7-4/11) L A line (4/13--)  Wounds/drains: LLQ IR drain (4/5 -- ), LUQ PHYLLIS (4/7 -- ), --- drains to bulb suction  abdominal incision SQ space wet to dry QD. D/c: R paracolic gutter drain (4/7 -4/26)   PT/OT: early mob 4/11 - worked with PT.

## 2019-05-02 LAB
ANION GAP SERPL CALC-SCNC: 14 MMOL/L — SIGNIFICANT CHANGE UP (ref 5–17)
ANION GAP SERPL CALC-SCNC: 17 MMOL/L — SIGNIFICANT CHANGE UP (ref 5–17)
APTT BLD: 75.1 SEC — HIGH (ref 27.5–36.3)
BUN SERPL-MCNC: 75 MG/DL — HIGH (ref 7–23)
BUN SERPL-MCNC: 80 MG/DL — HIGH (ref 7–23)
CALCIUM SERPL-MCNC: 7.5 MG/DL — LOW (ref 8.4–10.5)
CALCIUM SERPL-MCNC: 7.7 MG/DL — LOW (ref 8.4–10.5)
CHLORIDE SERPL-SCNC: 109 MMOL/L — HIGH (ref 96–108)
CHLORIDE SERPL-SCNC: 110 MMOL/L — HIGH (ref 96–108)
CO2 SERPL-SCNC: 17 MMOL/L — LOW (ref 22–31)
CO2 SERPL-SCNC: 17 MMOL/L — LOW (ref 22–31)
CREAT SERPL-MCNC: 2.36 MG/DL — HIGH (ref 0.5–1.3)
CREAT SERPL-MCNC: 2.48 MG/DL — HIGH (ref 0.5–1.3)
GLUCOSE BLDC GLUCOMTR-MCNC: 160 MG/DL — HIGH (ref 70–99)
GLUCOSE BLDC GLUCOMTR-MCNC: 164 MG/DL — HIGH (ref 70–99)
GLUCOSE BLDC GLUCOMTR-MCNC: 165 MG/DL — HIGH (ref 70–99)
GLUCOSE BLDC GLUCOMTR-MCNC: 174 MG/DL — HIGH (ref 70–99)
GLUCOSE BLDC GLUCOMTR-MCNC: 177 MG/DL — HIGH (ref 70–99)
GLUCOSE BLDC GLUCOMTR-MCNC: 177 MG/DL — HIGH (ref 70–99)
GLUCOSE SERPL-MCNC: 151 MG/DL — HIGH (ref 70–99)
GLUCOSE SERPL-MCNC: 160 MG/DL — HIGH (ref 70–99)
HCT VFR BLD CALC: 24.4 % — LOW (ref 39–50)
HCT VFR BLD CALC: 25.3 % — LOW (ref 39–50)
HGB BLD-MCNC: 7.8 G/DL — LOW (ref 13–17)
HGB BLD-MCNC: 7.8 G/DL — LOW (ref 13–17)
INR BLD: 3.75 — HIGH (ref 0.88–1.16)
LACTATE SERPL-SCNC: 1.8 MMOL/L — SIGNIFICANT CHANGE UP (ref 0.5–2)
MAGNESIUM SERPL-MCNC: 1.7 MG/DL — SIGNIFICANT CHANGE UP (ref 1.6–2.6)
MAGNESIUM SERPL-MCNC: 1.8 MG/DL — SIGNIFICANT CHANGE UP (ref 1.6–2.6)
MCHC RBC-ENTMCNC: 30.6 PG — SIGNIFICANT CHANGE UP (ref 27–34)
MCHC RBC-ENTMCNC: 30.8 GM/DL — LOW (ref 32–36)
MCHC RBC-ENTMCNC: 31.1 PG — SIGNIFICANT CHANGE UP (ref 27–34)
MCHC RBC-ENTMCNC: 32 GM/DL — SIGNIFICANT CHANGE UP (ref 32–36)
MCV RBC AUTO: 97.2 FL — SIGNIFICANT CHANGE UP (ref 80–100)
MCV RBC AUTO: 99.2 FL — SIGNIFICANT CHANGE UP (ref 80–100)
NRBC # BLD: 0 /100 WBCS — SIGNIFICANT CHANGE UP (ref 0–0)
NRBC # BLD: 0 /100 WBCS — SIGNIFICANT CHANGE UP (ref 0–0)
PHOSPHATE SERPL-MCNC: 4.7 MG/DL — HIGH (ref 2.5–4.5)
PHOSPHATE SERPL-MCNC: 4.8 MG/DL — HIGH (ref 2.5–4.5)
PLATELET # BLD AUTO: 225 K/UL — SIGNIFICANT CHANGE UP (ref 150–400)
PLATELET # BLD AUTO: 231 K/UL — SIGNIFICANT CHANGE UP (ref 150–400)
POTASSIUM SERPL-MCNC: 3.2 MMOL/L — LOW (ref 3.5–5.3)
POTASSIUM SERPL-MCNC: 3.5 MMOL/L — SIGNIFICANT CHANGE UP (ref 3.5–5.3)
POTASSIUM SERPL-SCNC: 3.2 MMOL/L — LOW (ref 3.5–5.3)
POTASSIUM SERPL-SCNC: 3.5 MMOL/L — SIGNIFICANT CHANGE UP (ref 3.5–5.3)
PROTHROM AB SERPL-ACNC: 44.1 SEC — HIGH (ref 10–12.9)
RBC # BLD: 2.51 M/UL — LOW (ref 4.2–5.8)
RBC # BLD: 2.55 M/UL — LOW (ref 4.2–5.8)
RBC # FLD: 18.1 % — HIGH (ref 10.3–14.5)
RBC # FLD: 18.2 % — HIGH (ref 10.3–14.5)
SODIUM SERPL-SCNC: 141 MMOL/L — SIGNIFICANT CHANGE UP (ref 135–145)
SODIUM SERPL-SCNC: 143 MMOL/L — SIGNIFICANT CHANGE UP (ref 135–145)
WBC # BLD: 17.89 K/UL — HIGH (ref 3.8–10.5)
WBC # BLD: 20.05 K/UL — HIGH (ref 3.8–10.5)
WBC # FLD AUTO: 17.89 K/UL — HIGH (ref 3.8–10.5)
WBC # FLD AUTO: 20.05 K/UL — HIGH (ref 3.8–10.5)

## 2019-05-02 PROCEDURE — 99232 SBSQ HOSP IP/OBS MODERATE 35: CPT | Mod: GC

## 2019-05-02 PROCEDURE — 99291 CRITICAL CARE FIRST HOUR: CPT

## 2019-05-02 PROCEDURE — 71045 X-RAY EXAM CHEST 1 VIEW: CPT | Mod: 26

## 2019-05-02 PROCEDURE — 71045 X-RAY EXAM CHEST 1 VIEW: CPT | Mod: 26,77

## 2019-05-02 RX ORDER — POTASSIUM CHLORIDE 20 MEQ
20 PACKET (EA) ORAL
Qty: 0 | Refills: 0 | Status: COMPLETED | OUTPATIENT
Start: 2019-05-02 | End: 2019-05-03

## 2019-05-02 RX ORDER — FUROSEMIDE 40 MG
40 TABLET ORAL
Qty: 0 | Refills: 0 | Status: DISCONTINUED | OUTPATIENT
Start: 2019-05-02 | End: 2019-05-08

## 2019-05-02 RX ORDER — FUROSEMIDE 40 MG
40 TABLET ORAL ONCE
Qty: 0 | Refills: 0 | Status: COMPLETED | OUTPATIENT
Start: 2019-05-02 | End: 2019-05-02

## 2019-05-02 RX ORDER — POTASSIUM CHLORIDE 20 MEQ
40 PACKET (EA) ORAL ONCE
Qty: 0 | Refills: 0 | Status: COMPLETED | OUTPATIENT
Start: 2019-05-02 | End: 2019-05-02

## 2019-05-02 RX ORDER — MAGNESIUM SULFATE 500 MG/ML
1 VIAL (ML) INJECTION ONCE
Qty: 0 | Refills: 0 | Status: COMPLETED | OUTPATIENT
Start: 2019-05-02 | End: 2019-05-03

## 2019-05-02 RX ORDER — IPRATROPIUM/ALBUTEROL SULFATE 18-103MCG
3 AEROSOL WITH ADAPTER (GRAM) INHALATION ONCE
Qty: 0 | Refills: 0 | Status: COMPLETED | OUTPATIENT
Start: 2019-05-02 | End: 2019-05-02

## 2019-05-02 RX ADMIN — Medication 250 MILLIGRAM(S): at 12:45

## 2019-05-02 RX ADMIN — Medication 100 MILLIGRAM(S): at 10:17

## 2019-05-02 RX ADMIN — Medication 10 MILLIGRAM(S): at 12:21

## 2019-05-02 RX ADMIN — Medication 10 MILLIGRAM(S): at 00:12

## 2019-05-02 RX ADMIN — Medication 650 MILLIGRAM(S): at 15:46

## 2019-05-02 RX ADMIN — MIDODRINE HYDROCHLORIDE 15 MILLIGRAM(S): 2.5 TABLET ORAL at 05:09

## 2019-05-02 RX ADMIN — CHLORHEXIDINE GLUCONATE 15 MILLILITER(S): 213 SOLUTION TOPICAL at 05:10

## 2019-05-02 RX ADMIN — Medication 2: at 12:21

## 2019-05-02 RX ADMIN — Medication 81 MILLIGRAM(S): at 12:46

## 2019-05-02 RX ADMIN — ZINC SULFATE TAB 220 MG (50 MG ZINC EQUIVALENT) 220 MILLIGRAM(S): 220 (50 ZN) TAB at 12:22

## 2019-05-02 RX ADMIN — Medication 650 MILLIGRAM(S): at 22:21

## 2019-05-02 RX ADMIN — Medication 100 MILLIGRAM(S): at 03:04

## 2019-05-02 RX ADMIN — MIDODRINE HYDROCHLORIDE 15 MILLIGRAM(S): 2.5 TABLET ORAL at 15:46

## 2019-05-02 RX ADMIN — FENTANYL CITRATE 25 MICROGRAM(S): 50 INJECTION INTRAVENOUS at 15:47

## 2019-05-02 RX ADMIN — Medication 40 MILLIGRAM(S): at 20:00

## 2019-05-02 RX ADMIN — Medication 100 MILLIGRAM(S): at 18:36

## 2019-05-02 RX ADMIN — PANTOPRAZOLE SODIUM 40 MILLIGRAM(S): 20 TABLET, DELAYED RELEASE ORAL at 12:22

## 2019-05-02 RX ADMIN — CEFEPIME 100 MILLIGRAM(S): 1 INJECTION, POWDER, FOR SOLUTION INTRAMUSCULAR; INTRAVENOUS at 05:10

## 2019-05-02 RX ADMIN — Medication 10 MILLIGRAM(S): at 22:21

## 2019-05-02 RX ADMIN — Medication 2: at 00:12

## 2019-05-02 RX ADMIN — Medication 2: at 06:16

## 2019-05-02 RX ADMIN — Medication 650 MILLIGRAM(S): at 05:09

## 2019-05-02 RX ADMIN — FENTANYL CITRATE 25 MICROGRAM(S): 50 INJECTION INTRAVENOUS at 12:15

## 2019-05-02 RX ADMIN — Medication 40 MILLIEQUIVALENT(S): at 10:17

## 2019-05-02 RX ADMIN — HEPARIN SODIUM 9 UNIT(S)/HR: 5000 INJECTION INTRAVENOUS; SUBCUTANEOUS at 03:04

## 2019-05-02 RX ADMIN — CHLORHEXIDINE GLUCONATE 15 MILLILITER(S): 213 SOLUTION TOPICAL at 18:35

## 2019-05-02 RX ADMIN — Medication 40 MILLIGRAM(S): at 10:17

## 2019-05-02 RX ADMIN — FENTANYL CITRATE 12.5 MICROGRAM(S): 50 INJECTION INTRAVENOUS at 03:13

## 2019-05-02 RX ADMIN — FENTANYL CITRATE 12.5 MICROGRAM(S): 50 INJECTION INTRAVENOUS at 04:00

## 2019-05-02 RX ADMIN — MIDODRINE HYDROCHLORIDE 15 MILLIGRAM(S): 2.5 TABLET ORAL at 22:22

## 2019-05-02 RX ADMIN — FENTANYL CITRATE 25 MICROGRAM(S): 50 INJECTION INTRAVENOUS at 12:19

## 2019-05-02 RX ADMIN — ATORVASTATIN CALCIUM 80 MILLIGRAM(S): 80 TABLET, FILM COATED ORAL at 22:21

## 2019-05-02 RX ADMIN — Medication 3 MILLILITER(S): at 23:17

## 2019-05-02 RX ADMIN — CHLORHEXIDINE GLUCONATE 1 APPLICATION(S): 213 SOLUTION TOPICAL at 06:21

## 2019-05-02 RX ADMIN — Medication 2: at 17:29

## 2019-05-02 NOTE — PROGRESS NOTE ADULT - ATTENDING COMMENTS
REGINE Mixon has acted as my scribe. Dr. Valencia spoke with pts sister and now awaiting return call from brother regarding goals of care. Failed CPAP trial.

## 2019-05-02 NOTE — PROGRESS NOTE ADULT - SUBJECTIVE AND OBJECTIVE BOX
24 hr events: 5/1: 40 Lasix IV given in AM. Wound with good granulation tissue. HCO3 dose increased to 650 mg q8 hours. Erik will talk to brother/sister on goals of care  4/30: PTT therapeutic x3. Family discussions with SICU attending. Patient's family will have family discussion to decide regarding withdrawal of care and regarding DNR/DNI status.    SUBJECTIVE: Ostomy function +      MEDICATIONS  (STANDING):  ascorbic acid Syrup 250 milliGRAM(s) Oral daily  aspirin  chewable 81 milliGRAM(s) Oral daily  atorvastatin 80 milliGRAM(s) Oral at bedtime  cefepime   IVPB 2000 milliGRAM(s) IV Intermittent every 24 hours  chlorhexidine 0.12% Liquid 15 milliLiter(s) Oral Mucosa two times a day  chlorhexidine 2% Cloths 1 Application(s) Topical <User Schedule>  dextrose 5%. 1000 milliLiter(s) (50 mL/Hr) IV Continuous <Continuous>  dextrose 50% Injectable 12.5 Gram(s) IV Push once  dextrose 50% Injectable 25 Gram(s) IV Push once  dextrose 50% Injectable 25 Gram(s) IV Push once  heparin  flush 100 Units/mL Injectable 100 Unit(s) IV Push every other day  heparin  Infusion 900 Unit(s)/Hr (9 mL/Hr) IV Continuous <Continuous>  insulin lispro (HumaLOG) corrective regimen sliding scale   SubCutaneous every 6 hours  metoclopramide Injectable 10 milliGRAM(s) IV Push every 12 hours  metroNIDAZOLE  IVPB 500 milliGRAM(s) IV Intermittent every 8 hours  metroNIDAZOLE  IVPB      midodrine 15 milliGRAM(s) Oral every 8 hours  pantoprazole   Suspension 40 milliGRAM(s) Enteral Tube daily  sodium bicarbonate 650 milliGRAM(s) Oral every 8 hours  zinc sulfate 220 milliGRAM(s) Oral daily    MEDICATIONS  (PRN):  acetaminophen    Suspension .. 650 milliGRAM(s) Enteral Tube every 6 hours PRN Temp greater or equal to 38C (100.4F)  dextrose 40% Gel 15 Gram(s) Oral once PRN Blood Glucose LESS THAN 70 milliGRAM(s)/deciliter  fentaNYL    Injectable 12.5 MICROGram(s) IV Push every 3 hours PRN Moderate Pain (4 - 6)  fentaNYL    Injectable 25 MICROGram(s) IV Push every 3 hours PRN Severe Pain (7 - 10)  glucagon  Injectable 1 milliGRAM(s) IntraMuscular once PRN Glucose LESS THAN 70 milligrams/deciliter  ondansetron Injectable 4 milliGRAM(s) IV Push every 6 hours PRN Nausea      ICU Vital Signs Last 24 Hrs  T(C): 36.8 (02 May 2019 04:30), Max: 36.8 (01 May 2019 12:31)  T(F): 98.2 (02 May 2019 04:30), Max: 98.3 (01 May 2019 12:31)  HR: 94 (02 May 2019 07:00) (80 - 104)  BP: 93/50 (02 May 2019 07:00) (93/50 - 109/55)  BP(mean): 61 (02 May 2019 07:00) (59 - 83)  ABP: --  ABP(mean): --  RR: 27 (02 May 2019 07:00) (20 - 30)  SpO2: 100% (02 May 2019 07:00) (98% - 100%)      Physical Exam:  Neuro: Trached not following simple commands opens eyes to physical contact but no interaction  General: no acute distress,  Pulm: trached, on ventilator-AC; chest tube of L posterior chest to wall suction w/ minimal output no air leak noted at time of exam; + Sub cutaneous air noted throughout left chest wall   C/V: RRR reg s1s2  Abd: nondistended, but firm; TTP w/ mild guarding; ostomy w/ liquid brown output; LLQ IR and LUQ PHYLLIS drain w/ clear brown ascites output;  abdominal incision w/ wet to dry dressing in place; no active bleeding, no purulence, good granulation tissue.  Extremities: 2+ pitting edema w/ persistent edema of upper extremity forearms  : rao in place; scrotal edema resolved;   Skin: mild blanching erythema on thighs, no macerations    Lines/tubes/drains:    Vent settings:  Mode: AC/ CMV (Assist Control/ Continuous Mandatory Ventilation), RR (machine): 12, TV (machine): 530, FiO2: 40, PEEP: 5, ITime: 0.8, MAP: 8.5, PIP: 16    I&O's Summary    01 May 2019 07:01  -  02 May 2019 07:00  --------------------------------------------------------  IN: 1752 mL / OUT: 1760 mL / NET: -8 mL    02 May 2019 07:01  -  02 May 2019 07:29  --------------------------------------------------------  IN: 57 mL / OUT: 0 mL / NET: 57 mL        LABS:                        7.8    17.89 )-----------( 225      ( 02 May 2019 05:35 )             25.3     05-02    141  |  110<H>  |  75<H>  ----------------------------<  160<H>  3.2<L>   |  17<L>  |  2.36<H>    Ca    7.5<L>      02 May 2019 05:35  Phos  4.7     05-02  Mg     1.8     05-02      PT/INR - ( 02 May 2019 05:35 )   PT: 44.1 sec;   INR: 3.75          PTT - ( 02 May 2019 05:35 )  PTT:75.1 sec    CAPILLARY BLOOD GLUCOSE      POCT Blood Glucose.: 177 mg/dL (02 May 2019 05:57)  POCT Blood Glucose.: 160 mg/dL (02 May 2019 00:43)  POCT Blood Glucose.: 165 mg/dL (02 May 2019 00:05)  POCT Blood Glucose.: 141 mg/dL (01 May 2019 17:54)  POCT Blood Glucose.: 165 mg/dL (01 May 2019 11:54)

## 2019-05-02 NOTE — PROGRESS NOTE ADULT - SUBJECTIVE AND OBJECTIVE BOX
Interval Events:  Patient seen and examined at bedside.    Patient awake and appears comfortable. Opens eyes and tracks. follows simple commands.     Ongoing discussions with family regarding GOC.     MEDICATIONS:  Pulmonary:    Antimicrobials:  cefepime   IVPB 2000 milliGRAM(s) IV Intermittent every 24 hours  metroNIDAZOLE  IVPB 500 milliGRAM(s) IV Intermittent every 8 hours  metroNIDAZOLE  IVPB        Anticoagulants:  aspirin  chewable 81 milliGRAM(s) Oral daily  heparin  flush 100 Units/mL Injectable 100 Unit(s) IV Push every other day  heparin  Infusion 900 Unit(s)/Hr IV Continuous <Continuous>    Cardiac:  furosemide   Injectable 40 milliGRAM(s) IV Push two times a day  midodrine 15 milliGRAM(s) Oral every 8 hours    Endocrine:  atorvastatin 80 milliGRAM(s) Oral at bedtime  dextrose 40% Gel 15 Gram(s) Oral once PRN  dextrose 50% Injectable 12.5 Gram(s) IV Push once  dextrose 50% Injectable 25 Gram(s) IV Push once  dextrose 50% Injectable 25 Gram(s) IV Push once  glucagon  Injectable 1 milliGRAM(s) IntraMuscular once PRN  insulin lispro (HumaLOG) corrective regimen sliding scale   SubCutaneous every 6 hours    Allergies    No Known Allergies    Intolerances    Vital Signs Last 24 Hrs  T(C): 36.5 (02 May 2019 17:55), Max: 36.8 (01 May 2019 21:05)  T(F): 97.7 (02 May 2019 17:55), Max: 98.2 (01 May 2019 21:05)  HR: 108 (02 May 2019 20:29) (76 - 108)  BP: 91/53 (02 May 2019 20:29) (90/55 - 119/48)  BP(mean): 68 (02 May 2019 20:29) (59 - 89)  RR: 29 (02 May 2019 20:29) (22 - 29)  SpO2: 99% (02 May 2019 20:29) (98% - 100%)    05-01 @ 07:01  -  05-02 @ 07:00  --------------------------------------------------------  IN: 1752 mL / OUT: 1760 mL / NET: -8 mL    05-02 @ 07:01 - 05-02 @ 21:01  --------------------------------------------------------  IN: 884 mL / OUT: 1150 mL / NET: -266 mL      Mode: AC/ CMV (Assist Control/ Continuous Mandatory Ventilation)  RR (machine): 12  TV (machine): 530  FiO2: 40  PEEP: 5  ITime: 0.8  MAP: 9.3  PIP: 18    Physical Exam:  Constitutional: trached, appears comfortable.   Neck: trace SQ emphysema in neck. Trach in place  Respiratory: improved ronchi bilaterally,   Cardiovascular: +S1/S2, RRR  Gastrointestinal: abdomen soft, NT/ND; +BS x4  Extremities: WWP; no clubbing, cyanosis, 2+ edema in UE's and LE's bilaterally  Vascular: 2+ radial, DP/PT and femoral pulses B/L    LABS:    CBC Full  -  ( 02 May 2019 05:35 )  WBC Count : 17.89 K/uL  RBC Count : 2.55 M/uL  Hemoglobin : 7.8 g/dL  Hematocrit : 25.3 %  Platelet Count - Automated : 225 K/uL  Mean Cell Volume : 99.2 fl  Mean Cell Hemoglobin : 30.6 pg  Mean Cell Hemoglobin Concentration : 30.8 gm/dL  Auto Neutrophil # : x  Auto Lymphocyte # : x  Auto Monocyte # : x  Auto Eosinophil # : x  Auto Basophil # : x  Auto Neutrophil % : x  Auto Lymphocyte % : x  Auto Monocyte % : x  Auto Eosinophil % : x  Auto Basophil % : x    05-02    141  |  110<H>  |  75<H>  ----------------------------<  160<H>  3.2<L>   |  17<L>  |  2.36<H>    Ca    7.5<L>      02 May 2019 05:35  Phos  4.7     05-02  Mg     1.8     05-02      PT/INR - ( 02 May 2019 05:35 )   PT: 44.1 sec;   INR: 3.75          PTT - ( 02 May 2019 05:35 )  PTT:75.1 sec      RADIOLOGY & ADDITIONAL STUDIES (The following images were personally reviewed):  Reviewed.

## 2019-05-02 NOTE — PROGRESS NOTE ADULT - ASSESSMENT
A/p:85 yo M with history of b/l laparoscopic robotic-assisted inguinal hernia repair presenting with b/l segmental pulmonary embolisms, left lung abscess, pneumoperitoneum, and distal descending colon abscess (likely source of pneumoperitoneum), significant iliofemoral DVT burden, s/p IVCF placement and IR drainage of LLQ abdominal collection (4/5), s/p ex-lap, LAURYN, sigmoidectomy, drain placement in R. paracolic gutter, and abthera vac placement (4/7), s/p planned RTOR, packing removal, and end colostomy creation (4/10), now w/ MRSA + proteus PNA, persistent coagulopathy, LLL pulmonary empyema s/p bedside pigtail drainage (4/15)    Neuro: Fentanyl prn  CV: midodrine 15q8h,; asa 81 lipitor, anasarca, s/p lasix yesterday, will give another dose today.   Pulm: AC 40/530/12/5. trach (4/24). bilateral PE's; Empyema- s/p left CTx on 4/15 CPAP trial daily - RR in high 20's while on CPAP.   GI: NPO, PEG placed 4/26; TF Nepro @45, reglan 10tid standing for gastric motility. malnourished, Vit C, zinc; RUQ US W/Doppler wnl  : incontinent, bladder scan and straight cath PRN. JESUS. sodium bicarb tid   ID: +kleb, proteus, strep in abd Cx, flagyl (4/9-5/4), cefepime (4/9-5/4); Sputum cx: MRSA and proteus (sensitive to cefepine) , ///Dc'd:vancomycin by level (4/10-4/26)  ceftriaxone (4/9-4/9)// unasyn (4/8-4/9),  Vanco (4/5-4/7), Zosyn (4/5-4/8)]  Endo: ISS  Heme: bilateral iliofemoral DVT / bilateral PE; s/p IVC Filter; elevated INR s/p multiple vit K, Lupus Anticoag- On Heparin gtt( 60-80 goal)   PPx: SCDs; Heparin gtt  Lines: PIVs, PICC (4/6--),  /// D/C: R cordis (4/7-4/11) L A line (4/13--)  Wounds/drains: LLQ IR drain (4/5 -- ), LUQ PHYLLIS (4/7 -- ), --- drains to bulb suction  abdominal incision SQ space wet to dry QD. D/c: R paracolic gutter drain (4/7 -4/26)   PT/OT: early mob 4/11 - worked with PT.   Poor prognosis, goals of care discussion in progress between Dr Valencia and pt's family

## 2019-05-02 NOTE — PROGRESS NOTE ADULT - ASSESSMENT
A/p:85 yo M with history of b/l laparoscopic robotic-assisted inguinal hernia repair presenting with b/l segmental pulmonary embolisms, left lung abscess, pneumoperitoneum, and distal descending colon abscess (likely source of pneumoperitoneum), significant iliofemoral DVT burden, s/p IVCF placement and IR drainage of LLQ abdominal collection (4/5), s/p ex-lap, LAURYN, sigmoidectomy, drain placement in R. paracolic gutter, and abthera vac placement (4/7), s/p planned RTOR, packing removal, and end colostomy creation (4/10), now w/ MRSA + proteus PNA, persistent coagulopathy, LLL pulmonary empyema s/p bedside pigtail drainage (4/15)    Neuro: Fentanyl prn  CV: MAP>65; midodrine 15q8h,; asa 81 lipitor,  Pulm: AC 40/530/12/5. trach (4/24). bilateral PE's; Empyema- s/p left CTx on 4/15 CPAP trial daily  GI: NPO, PEG placed 4/26; TF Nepro @45, free water, reglan 10tid standing for gastric motility. malnourished, Vit C, zinc; RUQ US W/Doppler wnl  : incontinent, bladder scan and straight cath PRN. JESUS. sodium bicarb tid   ID: +kleb, proteus, strep in abd Cx, flagyl (4/9-5/4), cefepime (4/9-5/4); Sputum cx: MRSA and proteus (sensitive to cefepine) , ///Dc'd:vancomycin by level (4/10-4/26)  ceftriaxone (4/9-4/9)// unasyn (4/8-4/9),  Vanco (4/5-4/7), Zosyn (4/5-4/8)]  Endo: ISS  Heme: bilateral iliofemoral DVT / bilateral PE; s/p IVC Filter; elevated INR s/p multiple vit K, Lupus Anticoag- On Heparin gtt( 60-80 goal) will discuss PO meds for A/C vs d/c A/C 2* high INR. Unable to give lovenox 2* kidney function.   PPx: SCDs; Heparin gtt  Lines: PIVs, PICC (4/6--),  /// D/C: R cordis (4/7-4/11) L A line (4/13--)  Wounds/drains: LLQ IR drain (4/5 -- ), LUQ PHYLLIS (4/7 -- ), --- drains to bulb suction  abdominal incision SQ space wet to dry QD. D/c: R paracolic gutter drain (4/7 -4/26)

## 2019-05-02 NOTE — PROGRESS NOTE ADULT - SUBJECTIVE AND OBJECTIVE BOX
S: No new issues/events overnight, no new med c/o    O: ICU Vital Signs Last 24 Hrs  T(F): 98.2 (05-02-19 @ 04:30), Max: 98.3 (05-01-19 @ 12:31)  HR: 88 (05-02-19 @ 10:00) (76 - 104)  BP: 99/53 (05-02-19 @ 10:00) (93/50 - 109/55)  BP(mean): 67 (05-02-19 @ 10:00) (59 - 83)  ABP: --  RR: 26 (05-02-19 @ 10:00) (21 - 30)  SpO2: 100% (05-02-19 @ 10:00) (98% - 100%)    PHYSICAL EXAM:     Neurological: AAOx3, CNII-XII intact,  strength 5/5 b/l  Cardiovascular: RRR  Respiratory: CTA  Gastrointestinal: soft, NT, ND, BS+  Extremities: warm, no dependent edema  Vascular: no cyanosis/erythema    LABS:    05-02    141  |  110<H>  |  75<H>  ----------------------------<  160<H>  3.2<L>   |  17<L>  |  2.36<H>    Ca    7.5<L>      02 May 2019 05:35  Phos  4.7     05-02  Mg     1.8     05-02                          7.8    17.89 )-----------( 225      ( 02 May 2019 05:35 )             25.3   PT/INR - ( 02 May 2019 05:35 )   PT: 44.1 sec;   INR: 3.75          PTT - ( 02 May 2019 05:35 )  PTT:75.1 sec  CAPILLARY BLOOD GLUCOSE      POCT Blood Glucose.: 164 mg/dL (02 May 2019 11:11)  POCT Blood Glucose.: 177 mg/dL (02 May 2019 05:57)  POCT Blood Glucose.: 160 mg/dL (02 May 2019 00:43)  POCT Blood Glucose.: 165 mg/dL (02 May 2019 00:05)  POCT Blood Glucose.: 141 mg/dL (01 May 2019 17:54)    MEDICATIONS  (STANDING):  ascorbic acid Syrup 250 milliGRAM(s) Oral daily  aspirin  chewable 81 milliGRAM(s) Oral daily  atorvastatin 80 milliGRAM(s) Oral at bedtime  cefepime   IVPB 2000 milliGRAM(s) IV Intermittent every 24 hours  chlorhexidine 0.12% Liquid 15 milliLiter(s) Oral Mucosa two times a day  chlorhexidine 2% Cloths 1 Application(s) Topical <User Schedule>  dextrose 5%. 1000 milliLiter(s) (50 mL/Hr) IV Continuous <Continuous>  dextrose 50% Injectable 12.5 Gram(s) IV Push once  dextrose 50% Injectable 25 Gram(s) IV Push once  dextrose 50% Injectable 25 Gram(s) IV Push once  heparin  flush 100 Units/mL Injectable 100 Unit(s) IV Push every other day  heparin  Infusion 900 Unit(s)/Hr (9 mL/Hr) IV Continuous <Continuous>  insulin lispro (HumaLOG) corrective regimen sliding scale   SubCutaneous every 6 hours  metoclopramide Injectable 10 milliGRAM(s) IV Push every 12 hours  metroNIDAZOLE  IVPB 500 milliGRAM(s) IV Intermittent every 8 hours  metroNIDAZOLE  IVPB      midodrine 15 milliGRAM(s) Oral every 8 hours  pantoprazole   Suspension 40 milliGRAM(s) Enteral Tube daily  sodium bicarbonate 650 milliGRAM(s) Oral every 8 hours  zinc sulfate 220 milliGRAM(s) Oral daily    MEDICATIONS  (PRN):  acetaminophen    Suspension .. 650 milliGRAM(s) Enteral Tube every 6 hours PRN Temp greater or equal to 38C (100.4F)  dextrose 40% Gel 15 Gram(s) Oral once PRN Blood Glucose LESS THAN 70 milliGRAM(s)/deciliter  fentaNYL    Injectable 12.5 MICROGram(s) IV Push every 3 hours PRN Moderate Pain (4 - 6)  fentaNYL    Injectable 25 MICROGram(s) IV Push every 3 hours PRN Severe Pain (7 - 10)  glucagon  Injectable 1 milliGRAM(s) IntraMuscular once PRN Glucose LESS THAN 70 milligrams/deciliter  ondansetron Injectable 4 milliGRAM(s) IV Push every 6 hours PRN Nausea      Abbott:	  [ ] None	[ ] Daily Abbott Order Placed	   Indication:	  [ ] Strict I and O's    [ ] Obstruction     [ ] Incontinence + Stage 3 or 4 Decubitus  Central Line:  [ ] None	   [ ]  Medication / TPN Administration     [ ] No Peripheral IV S: No new issues/events overnight, no new med c/o    O: ICU Vital Signs Last 24 Hrs  T(F): 98.2 (05-02-19 @ 04:30), Max: 98.3 (05-01-19 @ 12:31)  HR: 88 (05-02-19 @ 10:00) (76 - 104)  BP: 99/53 (05-02-19 @ 10:00) (93/50 - 109/55)  BP(mean): 67 (05-02-19 @ 10:00) (59 - 83)  ABP: --  RR: 26 (05-02-19 @ 10:00) (21 - 30)  SpO2: 100% (05-02-19 @ 10:00) (98% - 100%)    PHYSICAL EXAM:     Neurological: arousible,   Cardiovascular: RRR  Respiratory: CTA  Gastrointestinal: soft, ND, BS+, ostomy pink with stool. midline incision granulating well, with no pus, no bleeding.   Extremities: warm, 3+ dependent edema  Vascular: no cyanosis/erythema    LABS:    05-02    141  |  110<H>  |  75<H>  ----------------------------<  160<H>  3.2<L>   |  17<L>  |  2.36<H>    Ca    7.5<L>      02 May 2019 05:35  Phos  4.7     05-02  Mg     1.8     05-02                          7.8    17.89 )-----------( 225      ( 02 May 2019 05:35 )             25.3   PT/INR - ( 02 May 2019 05:35 )   PT: 44.1 sec;   INR: 3.75          PTT - ( 02 May 2019 05:35 )  PTT:75.1 sec  CAPILLARY BLOOD GLUCOSE      POCT Blood Glucose.: 164 mg/dL (02 May 2019 11:11)  POCT Blood Glucose.: 177 mg/dL (02 May 2019 05:57)  POCT Blood Glucose.: 160 mg/dL (02 May 2019 00:43)  POCT Blood Glucose.: 165 mg/dL (02 May 2019 00:05)  POCT Blood Glucose.: 141 mg/dL (01 May 2019 17:54)    MEDICATIONS  (STANDING):  ascorbic acid Syrup 250 milliGRAM(s) Oral daily  aspirin  chewable 81 milliGRAM(s) Oral daily  atorvastatin 80 milliGRAM(s) Oral at bedtime  cefepime   IVPB 2000 milliGRAM(s) IV Intermittent every 24 hours  chlorhexidine 0.12% Liquid 15 milliLiter(s) Oral Mucosa two times a day  chlorhexidine 2% Cloths 1 Application(s) Topical <User Schedule>  heparin  flush 100 Units/mL Injectable 100 Unit(s) IV Push every other day  heparin  Infusion 900 Unit(s)/Hr (9 mL/Hr) IV Continuous <Continuous>  insulin lispro (HumaLOG) corrective regimen sliding scale   SubCutaneous every 6 hours  metoclopramide Injectable 10 milliGRAM(s) IV Push every 12 hours  metroNIDAZOLE  IVPB 500 milliGRAM(s) IV Intermittent every 8 hours  metroNIDAZOLE  IVPB      midodrine 15 milliGRAM(s) Oral every 8 hours  pantoprazole   Suspension 40 milliGRAM(s) Enteral Tube daily  sodium bicarbonate 650 milliGRAM(s) Oral every 8 hours  zinc sulfate 220 milliGRAM(s) Oral daily    MEDICATIONS  (PRN):  acetaminophen    Suspension .. 650 milliGRAM(s) Enteral Tube every 6 hours PRN Temp greater or equal to 38C (100.4F)  dextrose 40% Gel 15 Gram(s) Oral once PRN Blood Glucose LESS THAN 70 milliGRAM(s)/deciliter  fentaNYL    Injectable 12.5 MICROGram(s) IV Push every 3 hours PRN Moderate Pain (4 - 6)  fentaNYL    Injectable 25 MICROGram(s) IV Push every 3 hours PRN Severe Pain (7 - 10)  glucagon  Injectable 1 milliGRAM(s) IntraMuscular once PRN Glucose LESS THAN 70 milligrams/deciliter  ondansetron Injectable 4 milliGRAM(s) IV Push every 6 hours PRN Nausea      Abbott:	  [ x] None	[ ] Daily Abbott Order Placed	   Indication:	  [ ] Strict I and O's    [ ] Obstruction     [ ] Incontinence + Stage 3 or 4 Decubitus  Central Line:  [ ] None	   [x ]  Medication / TPN Administration     [ ] No Peripheral IV

## 2019-05-02 NOTE — PROGRESS NOTE ADULT - ATTENDING COMMENTS
Agree with above. Expected and extensively discussed complication of chest tube placement into an abscess s/p chest tube. Will need a Heimlich valve on discharge as he is a poor candidate for other interventions. Fevers have resolved s/p lung abscess drainage; continue with 4 week course of abx. Extensive goals of care discussion in progress. Will follow peripherally. Call with questions.

## 2019-05-02 NOTE — PROGRESS NOTE ADULT - PROBLEM SELECTOR PLAN 1
Air leak present again today. Air leak However this has been fluctuant over the past week or so. Subcutaneous emphysema resolving.   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     Recommend  -Would continue suction for now. Patient may need Heimlich valve vs endobronchial valves if air leak continues to be persistent.   -Pt to complete a 4 week course of ABX, until 5/4/19 per ID.

## 2019-05-02 NOTE — CHART NOTE - NSCHARTNOTEFT_GEN_A_CORE
Admitting Diagnosis:   Patient is a 86y old  Male who presents with a chief complaint of right inguinal discomfort (02 May 2019 12:07)    PAST MEDICAL & SURGICAL HISTORY:  Esophageal reflux  Acute diarrhea  HLD (hyperlipidemia)  HTN (hypertension)  Prostate cancer  Stented coronary artery: x3 , per pt.  Atherosclerosis of coronary artery: CAD (coronary artery disease)  S/P hernia repair  Surgery, elective: cardiac stent x3  History of prostate surgery    Current Nutrition Order: Nepro @48 mL/hr x24h with Prostat SF 1x/day (2174 kcal, 108 gm pro, 838 mL free water, 122% RDI vitamin/mineral), pt on volume based feeding protocol    PO Intake: Good (%) [   ]  Fair (50-75%) [   ] Poor (<25%) [   ]- N/A as pt on EN    GI Issues: +colostomy (1,000 mL output x24h), abdomen soft/non-tender per flow sheet    Pain: Unable to assess at this time as pt vented    Skin Integrity: R heel stage III, L heel stage III, sacrum unstageable wound, middle upper back suspected deep tissue injury, R part of spine with unstageable wound per flow sheet    Labs:   05-02    141  |  110<H>  |  75<H>  ----------------------------<  160<H>  3.2<L>   |  17<L>  |  2.36<H>    Ca    7.5<L>      02 May 2019 05:35  Phos  4.7     05-02  Mg     1.8     05-02    CAPILLARY BLOOD GLUCOSE    POCT Blood Glucose.: 174 mg/dL (02 May 2019 15:51)  POCT Blood Glucose.: 164 mg/dL (02 May 2019 11:11)  POCT Blood Glucose.: 177 mg/dL (02 May 2019 05:57)  POCT Blood Glucose.: 160 mg/dL (02 May 2019 00:43)  POCT Blood Glucose.: 165 mg/dL (02 May 2019 00:05)  POCT Blood Glucose.: 141 mg/dL (01 May 2019 17:54)    Medications:  MEDICATIONS  (STANDING):  ascorbic acid Syrup 250 milliGRAM(s) Oral daily  aspirin  chewable 81 milliGRAM(s) Oral daily  atorvastatin 80 milliGRAM(s) Oral at bedtime  cefepime   IVPB 2000 milliGRAM(s) IV Intermittent every 24 hours  chlorhexidine 0.12% Liquid 15 milliLiter(s) Oral Mucosa two times a day  chlorhexidine 2% Cloths 1 Application(s) Topical <User Schedule>  dextrose 5%. 1000 milliLiter(s) (50 mL/Hr) IV Continuous <Continuous>  dextrose 50% Injectable 12.5 Gram(s) IV Push once  dextrose 50% Injectable 25 Gram(s) IV Push once  dextrose 50% Injectable 25 Gram(s) IV Push once  heparin  flush 100 Units/mL Injectable 100 Unit(s) IV Push every other day  heparin  Infusion 900 Unit(s)/Hr (9 mL/Hr) IV Continuous <Continuous>  insulin lispro (HumaLOG) corrective regimen sliding scale   SubCutaneous every 6 hours  metoclopramide Injectable 10 milliGRAM(s) IV Push every 12 hours  metroNIDAZOLE  IVPB 500 milliGRAM(s) IV Intermittent every 8 hours  metroNIDAZOLE  IVPB      midodrine 15 milliGRAM(s) Oral every 8 hours  pantoprazole   Suspension 40 milliGRAM(s) Enteral Tube daily  sodium bicarbonate 650 milliGRAM(s) Oral every 8 hours  zinc sulfate 220 milliGRAM(s) Oral daily    MEDICATIONS  (PRN):  acetaminophen    Suspension .. 650 milliGRAM(s) Enteral Tube every 6 hours PRN Temp greater or equal to 38C (100.4F)  dextrose 40% Gel 15 Gram(s) Oral once PRN Blood Glucose LESS THAN 70 milliGRAM(s)/deciliter  fentaNYL    Injectable 12.5 MICROGram(s) IV Push every 3 hours PRN Moderate Pain (4 - 6)  fentaNYL    Injectable 25 MICROGram(s) IV Push every 3 hours PRN Severe Pain (7 - 10)  glucagon  Injectable 1 milliGRAM(s) IntraMuscular once PRN Glucose LESS THAN 70 milligrams/deciliter  ondansetron Injectable 4 milliGRAM(s) IV Push every 6 hours PRN Nausea    Weight:  78.6 (4/11)  77.5kg (4/24)  74.4kg (4/29)    Weight Change: Weight trending downward. Please trend weights for further assessment.    Nutrition Focused Physical Exam: Completed [X on 4/6 ]  Not Pertinent [   ]  Per physical assessment: Muscle Wasting- Temporal [  X ]  Clavicle/Pectoral [  X ]  Shoulder/Deltoid [ X  ]  Scapula [ X  ]  Interosseous [  X ]  Quadriceps [   ]  Gastrocnemius [   ]  Fat Wasting- Orbital [ X  ]  Buccal [ X  ]  Triceps [   ]  Rib [ X  ]  Suspect severe protein-calorie malnutrition 2/2 to physical assessment, inadequate energy intake of <75% for >1 month    Estimated energy needs:   Height 71"; .7#; #; 94% IBW  IBW used to calculate energy needs as pt vented. Needs estimated for maintenance in older adults; increased for malnutrition, infection, wounds.    3385-3284 kcal (30-35 kcal/kg), 117-156 gm (1.5-2.0 gm/kg), fluid needs per team    Subjective: 85 yo M with history of b/l laparoscopic robotic-assisted inguinal hernia repair presenting with b/l segmental pulmonary embolisms, left lung abscess, pneumoperitoneum, and distal descending colon abscess (likely source of pneumoperitoneum), significant iliofemoral DVT burden, s/p IVCF placement and IR drainage of LLQ abdominal collection (4/5), s/p ex-lap, LAURYN, sigmoidectomy, drain placement in R. paracolic gutter, and abthera vac placement (4/7), s/p planned RTOR, packing removal, and end colostomy creation (4/10), now w/ MRSA + proteus PNA, persistent coagulopathy, LLL pulmonary empyema s/p bedside pigtail drainage (4/15), s/p extubation c/b vomiting and aspiration, reintubated (4/21), s/p tracheostomy creation (4/24). PT following. No propofol running at this time. Pt started on TPN 4/7. TPN weaned off on 4/14 and pt started on TF. s/p PEG 4/26. Pt tolerating Nepro @ 48 mL/hr with Prostat SF 1x/day. K level low, phos level slightly elevated. Team using weight from ABW from previous admission (62kg from 3/2019) as pt now with edema. Recommend use IBW for nutritional calculations as pt severely malnourished- d/w team. Recommend Jevity 1.5 @ 48 mL/hr increase 20 mL q4h to goal 67 mL/hr x24h with Prostat SF 1x/day- please see below for EN recs. Can consider phos binder if phos level continues to rise. Will monitor renal function closely. RD to monitor closely and f/u per high risk protocol.    Previous Nutrition Diagnosis:  Malnutrition (suspected severe) RT inadequate energy intake 2/2 lack of appetite and poor PO intake 2/2 multiple hospitalizations AEB muscle loss, fat loss, weight fluctuation, and inadequate energy intake    Active [ X ]  Resolved [   ]    PES: Increased protein needs RT increased demand for nutrients AEB wounds    Active [ X ]  Resolved [   ]    Goal: Consistently meet % of EER; pt will show no further s/s malnutrition throughout admit    Recommendations:  1. Recommend Jevity 1.5 @ 48 mL/hr increase 20 mL q4h to goal 67 mL/hr x24h with Prostat SF 1x/day (2512 kcal, 118 gm pro, 1222 mL free water, 161% RDI vitamin/mineral, 32.2 kcal/kg IBW, 1.5 gm/kg IBW, 26.15 non protein calories/kg IBW)- - additional water flushes per team. Can consider phos binder if phos level continues to rise. Will monitor renal function closely. Continue volume based feeding protocol & monitor for signs of intolerance.  2. Trend bi-weekly weights for further assessment.  3. Monitor labs and replete electrolytes prn.    Education: N/A 2/2 pt's medical status    Risk Level: High [ X ] Moderate [   ] Low [   ] Admitting Diagnosis:   Patient is a 86y old  Male who presents with a chief complaint of right inguinal discomfort (02 May 2019 12:07)    PAST MEDICAL & SURGICAL HISTORY:  Esophageal reflux  Acute diarrhea  HLD (hyperlipidemia)  HTN (hypertension)  Prostate cancer  Stented coronary artery: x3 , per pt.  Atherosclerosis of coronary artery: CAD (coronary artery disease)  S/P hernia repair  Surgery, elective: cardiac stent x3  History of prostate surgery    Current Nutrition Order: Nepro @48 mL/hr x24h with Prostat SF 1x/day (2174 kcal, 108 gm pro, 838 mL free water, 122% RDI vitamin/mineral), pt on volume based feeding protocol    PO Intake: Good (%) [   ]  Fair (50-75%) [   ] Poor (<25%) [   ]- N/A as pt on EN    GI Issues: +colostomy (1,000 mL output x24h), abdomen soft/non-tender per flow sheet    Pain: Unable to assess at this time as pt vented    Skin Integrity: R heel stage III, L heel stage III, sacrum unstageable wound, middle upper back suspected deep tissue injury, R part of spine with unstageable wound per flow sheet    Labs:   05-02    141  |  110<H>  |  75<H>  ----------------------------<  160<H>  3.2<L>   |  17<L>  |  2.36<H>    Ca    7.5<L>      02 May 2019 05:35  Phos  4.7     05-02  Mg     1.8     05-02    CAPILLARY BLOOD GLUCOSE    POCT Blood Glucose.: 174 mg/dL (02 May 2019 15:51)  POCT Blood Glucose.: 164 mg/dL (02 May 2019 11:11)  POCT Blood Glucose.: 177 mg/dL (02 May 2019 05:57)  POCT Blood Glucose.: 160 mg/dL (02 May 2019 00:43)  POCT Blood Glucose.: 165 mg/dL (02 May 2019 00:05)  POCT Blood Glucose.: 141 mg/dL (01 May 2019 17:54)    Medications:  MEDICATIONS  (STANDING):  ascorbic acid Syrup 250 milliGRAM(s) Oral daily  aspirin  chewable 81 milliGRAM(s) Oral daily  atorvastatin 80 milliGRAM(s) Oral at bedtime  cefepime   IVPB 2000 milliGRAM(s) IV Intermittent every 24 hours  chlorhexidine 0.12% Liquid 15 milliLiter(s) Oral Mucosa two times a day  chlorhexidine 2% Cloths 1 Application(s) Topical <User Schedule>  dextrose 5%. 1000 milliLiter(s) (50 mL/Hr) IV Continuous <Continuous>  dextrose 50% Injectable 12.5 Gram(s) IV Push once  dextrose 50% Injectable 25 Gram(s) IV Push once  dextrose 50% Injectable 25 Gram(s) IV Push once  heparin  flush 100 Units/mL Injectable 100 Unit(s) IV Push every other day  heparin  Infusion 900 Unit(s)/Hr (9 mL/Hr) IV Continuous <Continuous>  insulin lispro (HumaLOG) corrective regimen sliding scale   SubCutaneous every 6 hours  metoclopramide Injectable 10 milliGRAM(s) IV Push every 12 hours  metroNIDAZOLE  IVPB 500 milliGRAM(s) IV Intermittent every 8 hours  metroNIDAZOLE  IVPB      midodrine 15 milliGRAM(s) Oral every 8 hours  pantoprazole   Suspension 40 milliGRAM(s) Enteral Tube daily  sodium bicarbonate 650 milliGRAM(s) Oral every 8 hours  zinc sulfate 220 milliGRAM(s) Oral daily    MEDICATIONS  (PRN):  acetaminophen    Suspension .. 650 milliGRAM(s) Enteral Tube every 6 hours PRN Temp greater or equal to 38C (100.4F)  dextrose 40% Gel 15 Gram(s) Oral once PRN Blood Glucose LESS THAN 70 milliGRAM(s)/deciliter  fentaNYL    Injectable 12.5 MICROGram(s) IV Push every 3 hours PRN Moderate Pain (4 - 6)  fentaNYL    Injectable 25 MICROGram(s) IV Push every 3 hours PRN Severe Pain (7 - 10)  glucagon  Injectable 1 milliGRAM(s) IntraMuscular once PRN Glucose LESS THAN 70 milligrams/deciliter  ondansetron Injectable 4 milliGRAM(s) IV Push every 6 hours PRN Nausea    Weight:  78.6 (4/11)  77.5kg (4/24)  74.4kg (4/29)    Weight Change: Weight trending downward. Please trend weights for further assessment.    Nutrition Focused Physical Exam: Completed [X on 4/6 ]  Not Pertinent [   ]  Per physical assessment: Muscle Wasting- Temporal [  X ]  Clavicle/Pectoral [  X ]  Shoulder/Deltoid [ X  ]  Scapula [ X  ]  Interosseous [  X ]  Quadriceps [   ]  Gastrocnemius [   ]  Fat Wasting- Orbital [ X  ]  Buccal [ X  ]  Triceps [   ]  Rib [ X  ]  Suspect severe protein-calorie malnutrition 2/2 to physical assessment, inadequate energy intake of <75% for >1 month    Estimated energy needs:   Height 71"; .7#; #; 94% IBW  IBW used to calculate energy needs as pt vented. Needs estimated for maintenance in older adults; increased for malnutrition, infection, wounds.    2630-6007 kcal (30-35 kcal/kg), 117-156 gm (1.5-2.0 gm/kg), fluid needs per team    Subjective: 85 yo M with history of b/l laparoscopic robotic-assisted inguinal hernia repair presenting with b/l segmental pulmonary embolisms, left lung abscess, pneumoperitoneum, and distal descending colon abscess (likely source of pneumoperitoneum), significant iliofemoral DVT burden, s/p IVCF placement and IR drainage of LLQ abdominal collection (4/5), s/p ex-lap, LAURYN, sigmoidectomy, drain placement in R. paracolic gutter, and abthera vac placement (4/7), s/p planned RTOR, packing removal, and end colostomy creation (4/10), now w/ MRSA + proteus PNA, persistent coagulopathy, LLL pulmonary empyema s/p bedside pigtail drainage (4/15), s/p extubation c/b vomiting and aspiration, reintubated (4/21), s/p tracheostomy creation (4/24). PT following. No propofol running at this time. Pt started on TPN 4/7. TPN weaned off on 4/14 and pt started on TF. s/p PEG 4/26. Pt tolerating Nepro @ 48 mL/hr with Prostat SF 1x/day. K level low, phos level slightly elevated. Team using weight from ABW from previous admission (62 kg from 3/2019) as pt now with edema. Recommend use IBW for nutritional calculations as pt severely malnourished- d/w team. Recommend Jevity 1.5 @ 48 mL/hr increase 20 mL q4h to goal 67 mL/hr x24h with Prostat SF 1x/day- please see below for EN recs. Can consider phos binder if phos level continues to rise. Will monitor renal function closely. RD to monitor closely and f/u per high risk protocol.    Previous Nutrition Diagnosis:  Malnutrition (suspected severe) RT inadequate energy intake 2/2 lack of appetite and poor PO intake 2/2 multiple hospitalizations AEB muscle loss, fat loss, weight fluctuation, and inadequate energy intake    Active [ X ]  Resolved [   ]    PES: Increased protein needs RT increased demand for nutrients AEB wounds    Active [ X ]  Resolved [   ]    Goal: Consistently meet % of EER; pt will show no further s/s malnutrition throughout admit    Recommendations:  1. Recommend Jevity 1.5 @ 48 mL/hr increase 20 mL q4h to goal 67 mL/hr x24h with Prostat SF 1x/day (2512 kcal, 118 gm pro, 1222 mL free water, 161% RDI vitamin/mineral, 32.2 kcal/kg IBW, 1.5 gm/kg IBW, 26.15 non protein calories/kg IBW)- additional water flushes per team. Can consider phos binder if phos level continues to rise. Will monitor renal function closely. Continue volume based feeding protocol & monitor for signs of intolerance.  2. Trend bi-weekly weights for further assessment.  3. Monitor labs and replete electrolytes prn.  4. Consider Metamucil for high ostomy output.    Education: N/A 2/2 pt's medical status    Risk Level: High [ X ] Moderate [   ] Low [   ]

## 2019-05-03 LAB
ANION GAP SERPL CALC-SCNC: 10 MMOL/L — SIGNIFICANT CHANGE UP (ref 5–17)
APTT BLD: 71.5 SEC — HIGH (ref 27.5–36.3)
APTT BLD: 86.5 SEC — HIGH (ref 27.5–36.3)
BUN SERPL-MCNC: 84 MG/DL — HIGH (ref 7–23)
CALCIUM SERPL-MCNC: 7.6 MG/DL — LOW (ref 8.4–10.5)
CHLORIDE SERPL-SCNC: 112 MMOL/L — HIGH (ref 96–108)
CO2 SERPL-SCNC: 18 MMOL/L — LOW (ref 22–31)
CREAT SERPL-MCNC: 2.53 MG/DL — HIGH (ref 0.5–1.3)
GLUCOSE BLDC GLUCOMTR-MCNC: 145 MG/DL — HIGH (ref 70–99)
GLUCOSE BLDC GLUCOMTR-MCNC: 160 MG/DL — HIGH (ref 70–99)
GLUCOSE BLDC GLUCOMTR-MCNC: 161 MG/DL — HIGH (ref 70–99)
GLUCOSE BLDC GLUCOMTR-MCNC: 170 MG/DL — HIGH (ref 70–99)
GLUCOSE SERPL-MCNC: 155 MG/DL — HIGH (ref 70–99)
HCT VFR BLD CALC: 23.5 % — LOW (ref 39–50)
HGB BLD-MCNC: 7.4 G/DL — LOW (ref 13–17)
INR BLD: 3.99 — HIGH (ref 0.88–1.16)
MAGNESIUM SERPL-MCNC: 1.9 MG/DL — SIGNIFICANT CHANGE UP (ref 1.6–2.6)
MCHC RBC-ENTMCNC: 30.6 PG — SIGNIFICANT CHANGE UP (ref 27–34)
MCHC RBC-ENTMCNC: 31.5 GM/DL — LOW (ref 32–36)
MCV RBC AUTO: 97.1 FL — SIGNIFICANT CHANGE UP (ref 80–100)
NRBC # BLD: 0 /100 WBCS — SIGNIFICANT CHANGE UP (ref 0–0)
PHOSPHATE SERPL-MCNC: 4.6 MG/DL — HIGH (ref 2.5–4.5)
PLATELET # BLD AUTO: 221 K/UL — SIGNIFICANT CHANGE UP (ref 150–400)
POTASSIUM SERPL-MCNC: 4 MMOL/L — SIGNIFICANT CHANGE UP (ref 3.5–5.3)
POTASSIUM SERPL-SCNC: 4 MMOL/L — SIGNIFICANT CHANGE UP (ref 3.5–5.3)
PROTHROM AB SERPL-ACNC: 47 SEC — HIGH (ref 10–12.9)
RBC # BLD: 2.42 M/UL — LOW (ref 4.2–5.8)
RBC # FLD: 18.2 % — HIGH (ref 10.3–14.5)
SODIUM SERPL-SCNC: 140 MMOL/L — SIGNIFICANT CHANGE UP (ref 135–145)
WBC # BLD: 16.78 K/UL — HIGH (ref 3.8–10.5)
WBC # FLD AUTO: 16.78 K/UL — HIGH (ref 3.8–10.5)

## 2019-05-03 PROCEDURE — 76770 US EXAM ABDO BACK WALL COMP: CPT | Mod: 26,59

## 2019-05-03 PROCEDURE — 93976 VASCULAR STUDY: CPT | Mod: 26

## 2019-05-03 PROCEDURE — 99291 CRITICAL CARE FIRST HOUR: CPT

## 2019-05-03 PROCEDURE — 71045 X-RAY EXAM CHEST 1 VIEW: CPT | Mod: 26

## 2019-05-03 RX ORDER — MAGNESIUM SULFATE 500 MG/ML
2 VIAL (ML) INJECTION ONCE
Qty: 0 | Refills: 0 | Status: COMPLETED | OUTPATIENT
Start: 2019-05-03 | End: 2019-05-03

## 2019-05-03 RX ORDER — FUROSEMIDE 40 MG
40 TABLET ORAL ONCE
Qty: 0 | Refills: 0 | Status: DISCONTINUED | OUTPATIENT
Start: 2019-05-03 | End: 2019-05-03

## 2019-05-03 RX ADMIN — Medication 650 MILLIGRAM(S): at 06:27

## 2019-05-03 RX ADMIN — CHLORHEXIDINE GLUCONATE 15 MILLILITER(S): 213 SOLUTION TOPICAL at 06:26

## 2019-05-03 RX ADMIN — ATORVASTATIN CALCIUM 80 MILLIGRAM(S): 80 TABLET, FILM COATED ORAL at 22:25

## 2019-05-03 RX ADMIN — CHLORHEXIDINE GLUCONATE 15 MILLILITER(S): 213 SOLUTION TOPICAL at 18:36

## 2019-05-03 RX ADMIN — FENTANYL CITRATE 25 MICROGRAM(S): 50 INJECTION INTRAVENOUS at 13:45

## 2019-05-03 RX ADMIN — ZINC SULFATE TAB 220 MG (50 MG ZINC EQUIVALENT) 220 MILLIGRAM(S): 220 (50 ZN) TAB at 11:07

## 2019-05-03 RX ADMIN — Medication 2: at 06:26

## 2019-05-03 RX ADMIN — Medication 81 MILLIGRAM(S): at 11:07

## 2019-05-03 RX ADMIN — Medication 100 MILLIEQUIVALENT(S): at 00:00

## 2019-05-03 RX ADMIN — PANTOPRAZOLE SODIUM 40 MILLIGRAM(S): 20 TABLET, DELAYED RELEASE ORAL at 11:07

## 2019-05-03 RX ADMIN — CEFEPIME 100 MILLIGRAM(S): 1 INJECTION, POWDER, FOR SOLUTION INTRAMUSCULAR; INTRAVENOUS at 06:28

## 2019-05-03 RX ADMIN — Medication 100 MILLIGRAM(S): at 03:18

## 2019-05-03 RX ADMIN — Medication 2: at 18:46

## 2019-05-03 RX ADMIN — FENTANYL CITRATE 25 MICROGRAM(S): 50 INJECTION INTRAVENOUS at 06:48

## 2019-05-03 RX ADMIN — Medication 650 MILLIGRAM(S): at 13:30

## 2019-05-03 RX ADMIN — CHLORHEXIDINE GLUCONATE 1 APPLICATION(S): 213 SOLUTION TOPICAL at 06:30

## 2019-05-03 RX ADMIN — Medication 100 MILLIGRAM(S): at 18:35

## 2019-05-03 RX ADMIN — Medication 40 MILLIGRAM(S): at 06:27

## 2019-05-03 RX ADMIN — Medication 100 MILLIEQUIVALENT(S): at 03:37

## 2019-05-03 RX ADMIN — Medication 40 MILLIGRAM(S): at 18:36

## 2019-05-03 RX ADMIN — Medication 650 MILLIGRAM(S): at 22:26

## 2019-05-03 RX ADMIN — Medication 10 MILLIGRAM(S): at 10:21

## 2019-05-03 RX ADMIN — FENTANYL CITRATE 25 MICROGRAM(S): 50 INJECTION INTRAVENOUS at 07:31

## 2019-05-03 RX ADMIN — FENTANYL CITRATE 25 MICROGRAM(S): 50 INJECTION INTRAVENOUS at 13:59

## 2019-05-03 RX ADMIN — Medication 2: at 11:08

## 2019-05-03 RX ADMIN — Medication 250 MILLIGRAM(S): at 11:07

## 2019-05-03 RX ADMIN — HEPARIN SODIUM 9 UNIT(S)/HR: 5000 INJECTION INTRAVENOUS; SUBCUTANEOUS at 07:32

## 2019-05-03 RX ADMIN — Medication 100 MILLIGRAM(S): at 10:20

## 2019-05-03 RX ADMIN — Medication 2: at 00:37

## 2019-05-03 RX ADMIN — MIDODRINE HYDROCHLORIDE 15 MILLIGRAM(S): 2.5 TABLET ORAL at 13:30

## 2019-05-03 RX ADMIN — Medication 100 GRAM(S): at 02:31

## 2019-05-03 RX ADMIN — Medication 50 GRAM(S): at 07:34

## 2019-05-03 RX ADMIN — Medication 100 UNIT(S): at 11:08

## 2019-05-03 RX ADMIN — MIDODRINE HYDROCHLORIDE 15 MILLIGRAM(S): 2.5 TABLET ORAL at 06:27

## 2019-05-03 RX ADMIN — Medication 10 MILLIGRAM(S): at 22:27

## 2019-05-03 RX ADMIN — MIDODRINE HYDROCHLORIDE 15 MILLIGRAM(S): 2.5 TABLET ORAL at 22:26

## 2019-05-03 NOTE — PROGRESS NOTE ADULT - ASSESSMENT
A/p:87 yo M with history of b/l laparoscopic robotic-assisted inguinal hernia repair presenting with b/l segmental pulmonary embolisms, left lung abscess, pneumoperitoneum, and distal descending colon abscess (likely source of pneumoperitoneum), significant iliofemoral DVT burden, s/p IVCF placement and IR drainage of LLQ abdominal collection (4/5), s/p ex-lap, LAURYN, sigmoidectomy, drain placement in R. paracolic gutter, and abthera vac placement (4/7), s/p planned RTOR, packing removal, and end colostomy creation (4/10), now w/ MRSA + proteus PNA, persistent coagulopathy, LLL pulmonary empyema s/p bedside pigtail drainage (4/15)    Neuro: Fentanyl prn  CV: MAP>65; midodrine 15q8h,; asa 81 lipitor,  Pulm: AC 40/530/12/5. trach (4/24). bilateral PE's; Empyema- s/p left CTx on 4/15 CPAP trial daily  GI: NPO, PEG placed 4/26; TF Nepro @45, free water, reglan 10tid standing for gastric motility. malnourished, Vit C, zinc; RUQ US W/Doppler wnl  : incontinent, bladder scan and straight cath PRN. JESUS. sodium bicarb tid   ID: +kleb, proteus, strep in abd Cx, flagyl (4/9-5/4), cefepime (4/9-5/4); Sputum cx: MRSA and proteus (sensitive to cefepine) , ///Dc'd:vancomycin by level (4/10-4/26)  ceftriaxone (4/9-4/9)// unasyn (4/8-4/9),  Vanco (4/5-4/7), Zosyn (4/5-4/8)]  Endo: ISS  Heme: bilateral iliofemoral DVT / bilateral PE; s/p IVC Filter; elevated INR s/p multiple vit K, Lupus Anticoag- On Heparin gtt( 60-80 goal)   PPx: SCDs; Heparin gtt  Lines: PIVs, PICC (4/6--),  /// D/C: R cordis (4/7-4/11) L A line (4/13--)  Wounds/drains: LLQ IR drain (4/5 -- ), LUQ PHYLLIS (4/7 -- ), --- drains to bulb suction  abdominal incision SQ space wet to dry QD. D/c: R paracolic gutter drain (4/7 -4/26)   PT/OT: early mob 4/11 - worked with PT.

## 2019-05-03 NOTE — PROGRESS NOTE ADULT - SUBJECTIVE AND OBJECTIVE BOX
On interval followup, the left arm PICC site is clean, dry and non-inflamed. Afebrile. Notes, cultures and progress are followed,

## 2019-05-03 NOTE — PROGRESS NOTE ADULT - SUBJECTIVE AND OBJECTIVE BOX
INTERVAL HPI/OVERNIGHT EVENTS:    ON: Tachypneic overnight, improved after Duoneb and suctioning, 3 events of NSVT, K, Mg repleted    PRESSORS: [ ] YES [x] NO      MEDICATIONS  (STANDING):  ascorbic acid Syrup 250 milliGRAM(s) Oral daily  aspirin  chewable 81 milliGRAM(s) Oral daily  atorvastatin 80 milliGRAM(s) Oral at bedtime  cefepime   IVPB 2000 milliGRAM(s) IV Intermittent every 24 hours  chlorhexidine 0.12% Liquid 15 milliLiter(s) Oral Mucosa two times a day  chlorhexidine 2% Cloths 1 Application(s) Topical <User Schedule>  dextrose 5%. 1000 milliLiter(s) (50 mL/Hr) IV Continuous <Continuous>  dextrose 50% Injectable 12.5 Gram(s) IV Push once  dextrose 50% Injectable 25 Gram(s) IV Push once  dextrose 50% Injectable 25 Gram(s) IV Push once  furosemide   Injectable 40 milliGRAM(s) IV Push two times a day  heparin  flush 100 Units/mL Injectable 100 Unit(s) IV Push every other day  heparin  Infusion 900 Unit(s)/Hr (9 mL/Hr) IV Continuous <Continuous>  insulin lispro (HumaLOG) corrective regimen sliding scale   SubCutaneous every 6 hours  magnesium sulfate  IVPB 2 Gram(s) IV Intermittent once  metoclopramide Injectable 10 milliGRAM(s) IV Push every 12 hours  metroNIDAZOLE  IVPB 500 milliGRAM(s) IV Intermittent every 8 hours  metroNIDAZOLE  IVPB      midodrine 15 milliGRAM(s) Oral every 8 hours  pantoprazole   Suspension 40 milliGRAM(s) Enteral Tube daily  sodium bicarbonate 650 milliGRAM(s) Oral every 8 hours  zinc sulfate 220 milliGRAM(s) Oral daily    MEDICATIONS  (PRN):  acetaminophen    Suspension .. 650 milliGRAM(s) Enteral Tube every 6 hours PRN Temp greater or equal to 38C (100.4F)  dextrose 40% Gel 15 Gram(s) Oral once PRN Blood Glucose LESS THAN 70 milliGRAM(s)/deciliter  fentaNYL    Injectable 12.5 MICROGram(s) IV Push every 3 hours PRN Moderate Pain (4 - 6)  fentaNYL    Injectable 25 MICROGram(s) IV Push every 3 hours PRN Severe Pain (7 - 10)  glucagon  Injectable 1 milliGRAM(s) IntraMuscular once PRN Glucose LESS THAN 70 milligrams/deciliter  ondansetron Injectable 4 milliGRAM(s) IV Push every 6 hours PRN Nausea      Drug Dosing Weight  Height (cm): 180 (07 Apr 2019 11:26)  Weight (kg): 73.3 (07 Apr 2019 11:26)  BMI (kg/m2): 22.6 (07 Apr 2019 11:26)  BSA (m2): 1.92 (07 Apr 2019 11:26)      PAST MEDICAL & SURGICAL HISTORY:  Esophageal reflux  Acute diarrhea  HLD (hyperlipidemia)  HTN (hypertension)  Prostate cancer  Stented coronary artery: x3 , per pt.  Atherosclerosis of coronary artery: CAD (coronary artery disease)  S/P hernia repair  Surgery, elective: cardiac stent x3  History of prostate surgery      ICU Vital Signs Last 24 Hrs  T(C): 36.5 (03 May 2019 07:00), Max: 36.8 (02 May 2019 20:30)  T(F): 97.7 (03 May 2019 07:00), Max: 98.2 (02 May 2019 20:30)  HR: 100 (03 May 2019 06:00) (76 - 114)  BP: 120/67 (03 May 2019 06:00) (82/47 - 121/60)  BP(mean): 90 (03 May 2019 06:00) (56 - 90)  ABP: --  ABP(mean): --  RR: 28 (03 May 2019 06:00) (22 - 31)  SpO2: 100% (03 May 2019 06:00) (98% - 100%)            02 May 2019 07:01  -  03 May 2019 07:00  --------------------------------------------------------  IN:    Enteral Tube Flush: 30 mL    heparin Infusion: 135 mL    IV PiggyBack: 200 mL    Nepro with Carb Steady: 720 mL  Total IN: 1085 mL    OUT:    Colostomy: 750 mL    Voided: 500 mL  Total OUT: 1250 mL    Total NET: -165 mL          Mode: AC/ CMV (Assist Control/ Continuous Mandatory Ventilation)  RR (machine): 12  TV (machine): 530  FiO2: 40  PEEP: 5  ITime: 0.8  MAP: 8.2  PIP: 18      Physical Exam:  Neurological: arousible,   Cardiovascular: RRR  Respiratory: CTAB  Gastrointestinal: soft, ND, BS+, ostomy pink with stool. midline incision granulating well, with no pus, no bleeding.   Extremities: warm, 3+ dependent edema  Vascular: no cyanosis/erythema      LABS:  CBC Full  -  ( 03 May 2019 05:20 )  WBC Count : 16.78 K/uL  RBC Count : 2.42 M/uL  Hemoglobin : 7.4 g/dL  Hematocrit : 23.5 %  Platelet Count - Automated : 221 K/uL  Mean Cell Volume : 97.1 fl  Mean Cell Hemoglobin : 30.6 pg  Mean Cell Hemoglobin Concentration : 31.5 gm/dL  Auto Neutrophil # : x  Auto Lymphocyte # : x  Auto Monocyte # : x  Auto Eosinophil # : x  Auto Basophil # : x  Auto Neutrophil % : x  Auto Lymphocyte % : x  Auto Monocyte % : x  Auto Eosinophil % : x  Auto Basophil % : x    05-03    140  |  112<H>  |  84<H>  ----------------------------<  155<H>  4.0   |  18<L>  |  2.53<H>    Ca    7.6<L>      03 May 2019 05:20  Phos  4.6     05-03  Mg     1.9     05-03      PT/INR - ( 03 May 2019 05:20 )   PT: 47.0 sec;   INR: 3.99          PTT - ( 03 May 2019 05:20 )  PTT:86.5 sec

## 2019-05-03 NOTE — PROGRESS NOTE ADULT - ATTENDING COMMENTS
Feeds adjusted for likely actual body weight (without edema) of 62 kg based on initial hospital weight at time of prior admission. Nepro at 40 ml/hr providing 22.5 NPC/kg and 1.22 g protein per kg down from 48 ml/hr. Will assess impact on minute ventilation. F/U renal doppler flow study. continue anticoag. Retry CPAP today. Awaiting f/u with family regarding goals of care discussion.

## 2019-05-04 LAB
ALBUMIN SERPL ELPH-MCNC: 1.6 G/DL — LOW (ref 3.3–5)
ANION GAP SERPL CALC-SCNC: 16 MMOL/L — SIGNIFICANT CHANGE UP (ref 5–17)
APTT BLD: 59.4 SEC — HIGH (ref 27.5–36.3)
BUN SERPL-MCNC: 88 MG/DL — HIGH (ref 7–23)
CALCIUM SERPL-MCNC: 8.1 MG/DL — LOW (ref 8.4–10.5)
CHLORIDE SERPL-SCNC: 109 MMOL/L — HIGH (ref 96–108)
CO2 SERPL-SCNC: 17 MMOL/L — LOW (ref 22–31)
CREAT SERPL-MCNC: 2.67 MG/DL — HIGH (ref 0.5–1.3)
GLUCOSE BLDC GLUCOMTR-MCNC: 146 MG/DL — HIGH (ref 70–99)
GLUCOSE BLDC GLUCOMTR-MCNC: 166 MG/DL — HIGH (ref 70–99)
GLUCOSE BLDC GLUCOMTR-MCNC: 173 MG/DL — HIGH (ref 70–99)
GLUCOSE BLDC GLUCOMTR-MCNC: 179 MG/DL — HIGH (ref 70–99)
GLUCOSE SERPL-MCNC: 160 MG/DL — HIGH (ref 70–99)
HCT VFR BLD CALC: 24.1 % — LOW (ref 39–50)
HGB BLD-MCNC: 7.6 G/DL — LOW (ref 13–17)
INR BLD: 3.75 — HIGH (ref 0.88–1.16)
MAGNESIUM SERPL-MCNC: 2.2 MG/DL — SIGNIFICANT CHANGE UP (ref 1.6–2.6)
MCHC RBC-ENTMCNC: 30.5 PG — SIGNIFICANT CHANGE UP (ref 27–34)
MCHC RBC-ENTMCNC: 31.5 GM/DL — LOW (ref 32–36)
MCV RBC AUTO: 96.8 FL — SIGNIFICANT CHANGE UP (ref 80–100)
NRBC # BLD: 0 /100 WBCS — SIGNIFICANT CHANGE UP (ref 0–0)
PHOSPHATE SERPL-MCNC: 5.1 MG/DL — HIGH (ref 2.5–4.5)
PLATELET # BLD AUTO: 231 K/UL — SIGNIFICANT CHANGE UP (ref 150–400)
POTASSIUM SERPL-MCNC: 3.6 MMOL/L — SIGNIFICANT CHANGE UP (ref 3.5–5.3)
POTASSIUM SERPL-SCNC: 3.6 MMOL/L — SIGNIFICANT CHANGE UP (ref 3.5–5.3)
PROTHROM AB SERPL-ACNC: 44.2 SEC — HIGH (ref 10–12.9)
RBC # BLD: 2.49 M/UL — LOW (ref 4.2–5.8)
RBC # FLD: 19.1 % — HIGH (ref 10.3–14.5)
SODIUM SERPL-SCNC: 142 MMOL/L — SIGNIFICANT CHANGE UP (ref 135–145)
WBC # BLD: 18.17 K/UL — HIGH (ref 3.8–10.5)
WBC # FLD AUTO: 18.17 K/UL — HIGH (ref 3.8–10.5)

## 2019-05-04 PROCEDURE — 99291 CRITICAL CARE FIRST HOUR: CPT

## 2019-05-04 RX ORDER — METRONIDAZOLE 500 MG
500 TABLET ORAL EVERY 8 HOURS
Qty: 0 | Refills: 0 | Status: COMPLETED | OUTPATIENT
Start: 2019-05-04 | End: 2019-05-04

## 2019-05-04 RX ORDER — FENTANYL CITRATE 50 UG/ML
25 INJECTION INTRAVENOUS ONCE
Qty: 0 | Refills: 0 | Status: DISCONTINUED | OUTPATIENT
Start: 2019-05-04 | End: 2019-05-04

## 2019-05-04 RX ADMIN — FENTANYL CITRATE 25 MICROGRAM(S): 50 INJECTION INTRAVENOUS at 06:00

## 2019-05-04 RX ADMIN — ZINC SULFATE TAB 220 MG (50 MG ZINC EQUIVALENT) 220 MILLIGRAM(S): 220 (50 ZN) TAB at 11:02

## 2019-05-04 RX ADMIN — ATORVASTATIN CALCIUM 80 MILLIGRAM(S): 80 TABLET, FILM COATED ORAL at 21:51

## 2019-05-04 RX ADMIN — FENTANYL CITRATE 25 MICROGRAM(S): 50 INJECTION INTRAVENOUS at 15:30

## 2019-05-04 RX ADMIN — Medication 650 MILLIGRAM(S): at 06:11

## 2019-05-04 RX ADMIN — FENTANYL CITRATE 25 MICROGRAM(S): 50 INJECTION INTRAVENOUS at 10:18

## 2019-05-04 RX ADMIN — PANTOPRAZOLE SODIUM 40 MILLIGRAM(S): 20 TABLET, DELAYED RELEASE ORAL at 11:02

## 2019-05-04 RX ADMIN — Medication 2: at 12:29

## 2019-05-04 RX ADMIN — FENTANYL CITRATE 25 MICROGRAM(S): 50 INJECTION INTRAVENOUS at 09:12

## 2019-05-04 RX ADMIN — FENTANYL CITRATE 25 MICROGRAM(S): 50 INJECTION INTRAVENOUS at 15:59

## 2019-05-04 RX ADMIN — Medication 2: at 05:30

## 2019-05-04 RX ADMIN — MIDODRINE HYDROCHLORIDE 15 MILLIGRAM(S): 2.5 TABLET ORAL at 08:25

## 2019-05-04 RX ADMIN — Medication 2: at 23:10

## 2019-05-04 RX ADMIN — CEFEPIME 100 MILLIGRAM(S): 1 INJECTION, POWDER, FOR SOLUTION INTRAMUSCULAR; INTRAVENOUS at 06:23

## 2019-05-04 RX ADMIN — FENTANYL CITRATE 25 MICROGRAM(S): 50 INJECTION INTRAVENOUS at 12:30

## 2019-05-04 RX ADMIN — Medication 250 MILLIGRAM(S): at 11:02

## 2019-05-04 RX ADMIN — Medication 100 MILLIGRAM(S): at 21:51

## 2019-05-04 RX ADMIN — Medication 100 MILLIGRAM(S): at 13:01

## 2019-05-04 RX ADMIN — ONDANSETRON 4 MILLIGRAM(S): 8 TABLET, FILM COATED ORAL at 21:52

## 2019-05-04 RX ADMIN — CHLORHEXIDINE GLUCONATE 15 MILLILITER(S): 213 SOLUTION TOPICAL at 17:14

## 2019-05-04 RX ADMIN — Medication 10 MILLIGRAM(S): at 23:09

## 2019-05-04 RX ADMIN — MIDODRINE HYDROCHLORIDE 15 MILLIGRAM(S): 2.5 TABLET ORAL at 13:01

## 2019-05-04 RX ADMIN — Medication 650 MILLIGRAM(S): at 13:00

## 2019-05-04 RX ADMIN — Medication 40 MILLIGRAM(S): at 17:14

## 2019-05-04 RX ADMIN — Medication 10 MILLIGRAM(S): at 11:02

## 2019-05-04 RX ADMIN — Medication 100 MILLIGRAM(S): at 02:10

## 2019-05-04 RX ADMIN — FENTANYL CITRATE 25 MICROGRAM(S): 50 INJECTION INTRAVENOUS at 11:03

## 2019-05-04 RX ADMIN — CHLORHEXIDINE GLUCONATE 15 MILLILITER(S): 213 SOLUTION TOPICAL at 06:09

## 2019-05-04 RX ADMIN — CHLORHEXIDINE GLUCONATE 1 APPLICATION(S): 213 SOLUTION TOPICAL at 05:24

## 2019-05-04 RX ADMIN — Medication 40 MILLIGRAM(S): at 06:10

## 2019-05-04 RX ADMIN — Medication 650 MILLIGRAM(S): at 21:52

## 2019-05-04 RX ADMIN — MIDODRINE HYDROCHLORIDE 15 MILLIGRAM(S): 2.5 TABLET ORAL at 21:52

## 2019-05-04 RX ADMIN — Medication 81 MILLIGRAM(S): at 11:02

## 2019-05-04 NOTE — PROGRESS NOTE ADULT - ATTENDING COMMENTS
ERGINE Mixon has acted as my scribe. Failed CPAP trial. REGINE Wilder has acted as my scribe. Failed CPAP trial.

## 2019-05-04 NOTE — PROGRESS NOTE ADULT - ASSESSMENT
A/p:87 yo M with history of b/l laparoscopic robotic-assisted inguinal hernia repair presenting with b/l segmental pulmonary embolisms, left lung abscess, pneumoperitoneum, and distal descending colon abscess (likely source of pneumoperitoneum), significant iliofemoral DVT burden, s/p IVCF placement and IR drainage of LLQ abdominal collection (4/5), s/p ex-lap, LAURYN, sigmoidectomy, drain placement in R. paracolic gutter, and abthera vac placement (4/7), s/p planned RTOR, packing removal, and end colostomy creation (4/10), now w/ MRSA + proteus PNA, persistent coagulopathy, LLL pulmonary empyema s/p bedside pigtail drainage (4/15)    Neuro: Fentanyl prn  CV: MAP>65; midodrine 15q8h; asa 81 lipitor  Pulm: AC 40/530/12/5. trach (4/24). bilateral PE's; Empyema- s/p left CTx on 4/15 CPAP trial daily  GI: NPO, PEG placed 4/26; TF Nepro @40, free water, reglan 10tid standing for gastric motility. malnourished, Vit C, zinc; RUQ US W/Doppler wnl  : incontinent, bladder scan and straight cath PRN. JESUS. sodium bicarb tid   ID: +kleb, proteus, strep in abd Cx, flagyl (4/9-5/4), cefepime (4/9-5/4); Sputum cx: MRSA and proteus (sensitive to cefepine) , ///Dc'd:vancomycin by level (4/10-4/26)  ceftriaxone (4/9-4/9)// unasyn (4/8-4/9),  Vanco (4/5-4/7), Zosyn (4/5-4/8)]  Endo: ISS  Heme: bilateral iliofemoral DVT / bilateral PE; s/p IVC Filter; elevated INR s/p multiple vit K, Lupus Anticoag- On Heparin gtt (60-80 goal)   PPx: SCDs; Heparin gtt  Lines: PIVs, PICC (4/6--),  /// D/C: R cordis (4/7-4/11) L A line (4/13--)  Wounds/drains: LLQ IR drain (4/5 -- ), LUQ PHYLLIS (4/7 -- ), --- drains to bulb suction  abdominal incision SQ space wet to dry QD. D/c: R paracolic gutter drain (4/7 -4/26)   PT/OT: early mob 4/11 - worked with PT.

## 2019-05-04 NOTE — PROGRESS NOTE ADULT - SUBJECTIVE AND OBJECTIVE BOX
INTERVAL HPI/OVERNIGHT EVENTS: 5/3: am PTT 87, decreased hep gtt to 8.5. tachypnea possibly 2/2 overfeeding - TF decreased to 40cc. 2pm PTT WNL. Next PTT in AM.      MEDICATIONS  (STANDING):  ascorbic acid Syrup 250 milliGRAM(s) Oral daily  aspirin  chewable 81 milliGRAM(s) Oral daily  atorvastatin 80 milliGRAM(s) Oral at bedtime  cefepime   IVPB 2000 milliGRAM(s) IV Intermittent every 24 hours  chlorhexidine 0.12% Liquid 15 milliLiter(s) Oral Mucosa two times a day  chlorhexidine 2% Cloths 1 Application(s) Topical <User Schedule>  dextrose 5%. 1000 milliLiter(s) (50 mL/Hr) IV Continuous <Continuous>  dextrose 50% Injectable 12.5 Gram(s) IV Push once  dextrose 50% Injectable 25 Gram(s) IV Push once  dextrose 50% Injectable 25 Gram(s) IV Push once  furosemide   Injectable 40 milliGRAM(s) IV Push two times a day  heparin  flush 100 Units/mL Injectable 100 Unit(s) IV Push every other day  heparin  Infusion 900 Unit(s)/Hr (8.5 mL/Hr) IV Continuous <Continuous>  insulin lispro (HumaLOG) corrective regimen sliding scale   SubCutaneous every 6 hours  metoclopramide Injectable 10 milliGRAM(s) IV Push every 12 hours  metroNIDAZOLE  IVPB 500 milliGRAM(s) IV Intermittent every 8 hours  metroNIDAZOLE  IVPB      midodrine 15 milliGRAM(s) Oral every 8 hours  pantoprazole   Suspension 40 milliGRAM(s) Enteral Tube daily  sodium bicarbonate 650 milliGRAM(s) Oral every 8 hours  zinc sulfate 220 milliGRAM(s) Oral daily    MEDICATIONS  (PRN):  acetaminophen    Suspension .. 650 milliGRAM(s) Enteral Tube every 6 hours PRN Temp greater or equal to 38C (100.4F)  dextrose 40% Gel 15 Gram(s) Oral once PRN Blood Glucose LESS THAN 70 milliGRAM(s)/deciliter  fentaNYL    Injectable 12.5 MICROGram(s) IV Push every 3 hours PRN Moderate Pain (4 - 6)  fentaNYL    Injectable 25 MICROGram(s) IV Push every 3 hours PRN Severe Pain (7 - 10)  glucagon  Injectable 1 milliGRAM(s) IntraMuscular once PRN Glucose LESS THAN 70 milligrams/deciliter  ondansetron Injectable 4 milliGRAM(s) IV Push every 6 hours PRN Nausea      Drug Dosing Weight  Height (cm): 180 (07 Apr 2019 11:26)  Weight (kg): 73.3 (07 Apr 2019 11:26)  BMI (kg/m2): 22.6 (07 Apr 2019 11:26)  BSA (m2): 1.92 (07 Apr 2019 11:26)      PAST MEDICAL & SURGICAL HISTORY:  Esophageal reflux  Acute diarrhea  HLD (hyperlipidemia)  HTN (hypertension)  Prostate cancer  Stented coronary artery: x3 , per pt.  Atherosclerosis of coronary artery: CAD (coronary artery disease)  S/P hernia repair  Surgery, elective: cardiac stent x3  History of prostate surgery      ICU Vital Signs Last 24 Hrs  T(C): 36.3 (04 May 2019 00:34), Max: 36.9 (03 May 2019 21:00)  T(F): 97.3 (04 May 2019 00:34), Max: 98.5 (03 May 2019 21:00)  HR: 114 (04 May 2019 07:00) (94 - 116)  BP: 126/68 (04 May 2019 07:00) (90/51 - 126/68)  BP(mean): 108 (04 May 2019 07:00) (65 - 108)  ABP: --  ABP(mean): --  RR: 27 (04 May 2019 07:00) (22 - 31)  SpO2: 99% (04 May 2019 07:00) (99% - 100%)            03 May 2019 07:01  -  04 May 2019 07:00  --------------------------------------------------------  IN:    Enteral Tube Flush: 220 mL    heparin Infusion: 171.5 mL    IV PiggyBack: 200 mL    Nepro with Carb Steady: 984 mL  Total IN: 1575.5 mL    OUT:    Colostomy: 900 mL    Voided: 850 mL  Total OUT: 1750 mL    Total NET: -174.5 mL          Mode: AC/ CMV (Assist Control/ Continuous Mandatory Ventilation)  RR (machine): 12  TV (machine): 530  FiO2: 40  PEEP: 5  ITime: 0.8  MAP: 13  PIP: 27      PHYSICAL EXAM:  Neuro: arousable, opens eyes to voice.   HEENT: MMM  CV: RRR  Pulm: no respiratory distress, decreased BS at b/l bases. left chest tube in place to suction with +air leak  Abd: soft, ND, BS+, ostomy pink with stool. midline incision granulating well, with no pus, no bleeding.   Ext: warm, 2+ pitting edema          LABS:  CBC Full  -  ( 04 May 2019 05:28 )  WBC Count : 18.17 K/uL  RBC Count : 2.49 M/uL  Hemoglobin : 7.6 g/dL  Hematocrit : 24.1 %  Platelet Count - Automated : 231 K/uL  Mean Cell Volume : 96.8 fl  Mean Cell Hemoglobin : 30.5 pg  Mean Cell Hemoglobin Concentration : 31.5 gm/dL      05-04    142  |  109<H>  |  88<H>  ----------------------------<  160<H>  3.6   |  17<L>  |  2.67<H>    Ca    8.1<L>      04 May 2019 05:28  Phos  5.1     05-04  Mg     2.2     05-04      PT/INR - ( 04 May 2019 05:28 )   PT: 44.2 sec;   INR: 3.75          PTT - ( 04 May 2019 05:28 )  PTT:59.4 sec

## 2019-05-05 LAB
ANION GAP SERPL CALC-SCNC: 18 MMOL/L — HIGH (ref 5–17)
APTT BLD: 73.6 SEC — HIGH (ref 27.5–36.3)
BLD GP AB SCN SERPL QL: NEGATIVE — SIGNIFICANT CHANGE UP
BUN SERPL-MCNC: 94 MG/DL — HIGH (ref 7–23)
CALCIUM SERPL-MCNC: 7.8 MG/DL — LOW (ref 8.4–10.5)
CHLORIDE SERPL-SCNC: 110 MMOL/L — HIGH (ref 96–108)
CO2 SERPL-SCNC: 16 MMOL/L — LOW (ref 22–31)
CREAT SERPL-MCNC: 2.8 MG/DL — HIGH (ref 0.5–1.3)
GLUCOSE BLDC GLUCOMTR-MCNC: 152 MG/DL — HIGH (ref 70–99)
GLUCOSE BLDC GLUCOMTR-MCNC: 156 MG/DL — HIGH (ref 70–99)
GLUCOSE BLDC GLUCOMTR-MCNC: 169 MG/DL — HIGH (ref 70–99)
GLUCOSE BLDC GLUCOMTR-MCNC: 173 MG/DL — HIGH (ref 70–99)
GLUCOSE SERPL-MCNC: 153 MG/DL — HIGH (ref 70–99)
HCT VFR BLD CALC: 21.6 % — LOW (ref 39–50)
HCT VFR BLD CALC: 22.4 % — LOW (ref 39–50)
HGB BLD-MCNC: 6.9 G/DL — CRITICAL LOW (ref 13–17)
HGB BLD-MCNC: 7 G/DL — CRITICAL LOW (ref 13–17)
MAGNESIUM SERPL-MCNC: 2 MG/DL — SIGNIFICANT CHANGE UP (ref 1.6–2.6)
MCHC RBC-ENTMCNC: 30.1 PG — SIGNIFICANT CHANGE UP (ref 27–34)
MCHC RBC-ENTMCNC: 30.8 GM/DL — LOW (ref 32–36)
MCHC RBC-ENTMCNC: 31.8 PG — SIGNIFICANT CHANGE UP (ref 27–34)
MCHC RBC-ENTMCNC: 32.4 GM/DL — SIGNIFICANT CHANGE UP (ref 32–36)
MCV RBC AUTO: 97.8 FL — SIGNIFICANT CHANGE UP (ref 80–100)
MCV RBC AUTO: 98.2 FL — SIGNIFICANT CHANGE UP (ref 80–100)
NRBC # BLD: 0 /100 WBCS — SIGNIFICANT CHANGE UP (ref 0–0)
NRBC # BLD: 0 /100 WBCS — SIGNIFICANT CHANGE UP (ref 0–0)
PHOSPHATE SERPL-MCNC: 5.5 MG/DL — HIGH (ref 2.5–4.5)
PLATELET # BLD AUTO: 200 K/UL — SIGNIFICANT CHANGE UP (ref 150–400)
PLATELET # BLD AUTO: 203 K/UL — SIGNIFICANT CHANGE UP (ref 150–400)
POTASSIUM SERPL-MCNC: 3.3 MMOL/L — LOW (ref 3.5–5.3)
POTASSIUM SERPL-SCNC: 3.3 MMOL/L — LOW (ref 3.5–5.3)
RBC # BLD: 2.2 M/UL — LOW (ref 4.2–5.8)
RBC # BLD: 2.29 M/UL — LOW (ref 4.2–5.8)
RBC # FLD: 19.1 % — HIGH (ref 10.3–14.5)
RBC # FLD: 19.3 % — HIGH (ref 10.3–14.5)
RETICS #: 73.3 K/UL — SIGNIFICANT CHANGE UP (ref 25–125)
RETICS/RBC NFR: 3.3 % — HIGH (ref 0.5–2.5)
RH IG SCN BLD-IMP: POSITIVE — SIGNIFICANT CHANGE UP
SODIUM SERPL-SCNC: 144 MMOL/L — SIGNIFICANT CHANGE UP (ref 135–145)
WBC # BLD: 13.93 K/UL — HIGH (ref 3.8–10.5)
WBC # BLD: 14.23 K/UL — HIGH (ref 3.8–10.5)
WBC # FLD AUTO: 13.93 K/UL — HIGH (ref 3.8–10.5)
WBC # FLD AUTO: 14.23 K/UL — HIGH (ref 3.8–10.5)

## 2019-05-05 PROCEDURE — 71045 X-RAY EXAM CHEST 1 VIEW: CPT | Mod: 26,77

## 2019-05-05 PROCEDURE — 71045 X-RAY EXAM CHEST 1 VIEW: CPT | Mod: 26

## 2019-05-05 PROCEDURE — 99291 CRITICAL CARE FIRST HOUR: CPT

## 2019-05-05 RX ORDER — SODIUM BICARBONATE 1 MEQ/ML
650 SYRINGE (ML) INTRAVENOUS EVERY 6 HOURS
Qty: 0 | Refills: 0 | Status: DISCONTINUED | OUTPATIENT
Start: 2019-05-05 | End: 2019-05-09

## 2019-05-05 RX ORDER — POTASSIUM CHLORIDE 20 MEQ
40 PACKET (EA) ORAL ONCE
Qty: 0 | Refills: 0 | Status: COMPLETED | OUTPATIENT
Start: 2019-05-05 | End: 2019-05-05

## 2019-05-05 RX ORDER — CALCIUM ACETATE 667 MG
667 TABLET ORAL
Qty: 0 | Refills: 0 | Status: DISCONTINUED | OUTPATIENT
Start: 2019-05-05 | End: 2019-05-08

## 2019-05-05 RX ADMIN — FENTANYL CITRATE 25 MICROGRAM(S): 50 INJECTION INTRAVENOUS at 06:46

## 2019-05-05 RX ADMIN — Medication 100 UNIT(S): at 11:43

## 2019-05-05 RX ADMIN — ZINC SULFATE TAB 220 MG (50 MG ZINC EQUIVALENT) 220 MILLIGRAM(S): 220 (50 ZN) TAB at 11:44

## 2019-05-05 RX ADMIN — Medication 667 MILLIGRAM(S): at 11:44

## 2019-05-05 RX ADMIN — ATORVASTATIN CALCIUM 80 MILLIGRAM(S): 80 TABLET, FILM COATED ORAL at 21:57

## 2019-05-05 RX ADMIN — Medication 667 MILLIGRAM(S): at 09:28

## 2019-05-05 RX ADMIN — Medication 650 MILLIGRAM(S): at 11:45

## 2019-05-05 RX ADMIN — Medication 40 MILLIGRAM(S): at 17:36

## 2019-05-05 RX ADMIN — Medication 650 MILLIGRAM(S): at 23:05

## 2019-05-05 RX ADMIN — Medication 667 MILLIGRAM(S): at 17:36

## 2019-05-05 RX ADMIN — MIDODRINE HYDROCHLORIDE 15 MILLIGRAM(S): 2.5 TABLET ORAL at 21:56

## 2019-05-05 RX ADMIN — MIDODRINE HYDROCHLORIDE 15 MILLIGRAM(S): 2.5 TABLET ORAL at 14:08

## 2019-05-05 RX ADMIN — Medication 2: at 06:46

## 2019-05-05 RX ADMIN — Medication 650 MILLIGRAM(S): at 17:36

## 2019-05-05 RX ADMIN — FENTANYL CITRATE 25 MICROGRAM(S): 50 INJECTION INTRAVENOUS at 04:48

## 2019-05-05 RX ADMIN — MIDODRINE HYDROCHLORIDE 15 MILLIGRAM(S): 2.5 TABLET ORAL at 05:03

## 2019-05-05 RX ADMIN — Medication 40 MILLIEQUIVALENT(S): at 09:28

## 2019-05-05 RX ADMIN — CHLORHEXIDINE GLUCONATE 15 MILLILITER(S): 213 SOLUTION TOPICAL at 17:36

## 2019-05-05 RX ADMIN — Medication 2: at 11:44

## 2019-05-05 RX ADMIN — Medication 10 MILLIGRAM(S): at 22:17

## 2019-05-05 RX ADMIN — FENTANYL CITRATE 25 MICROGRAM(S): 50 INJECTION INTRAVENOUS at 10:00

## 2019-05-05 RX ADMIN — Medication 40 MILLIGRAM(S): at 05:03

## 2019-05-05 RX ADMIN — Medication 81 MILLIGRAM(S): at 11:44

## 2019-05-05 RX ADMIN — Medication 2: at 23:05

## 2019-05-05 RX ADMIN — FENTANYL CITRATE 25 MICROGRAM(S): 50 INJECTION INTRAVENOUS at 09:40

## 2019-05-05 RX ADMIN — CHLORHEXIDINE GLUCONATE 1 APPLICATION(S): 213 SOLUTION TOPICAL at 05:04

## 2019-05-05 RX ADMIN — Medication 650 MILLIGRAM(S): at 05:04

## 2019-05-05 RX ADMIN — Medication 2: at 17:37

## 2019-05-05 RX ADMIN — Medication 10 MILLIGRAM(S): at 11:43

## 2019-05-05 RX ADMIN — PANTOPRAZOLE SODIUM 40 MILLIGRAM(S): 20 TABLET, DELAYED RELEASE ORAL at 11:44

## 2019-05-05 RX ADMIN — Medication 250 MILLIGRAM(S): at 11:44

## 2019-05-05 RX ADMIN — CHLORHEXIDINE GLUCONATE 15 MILLILITER(S): 213 SOLUTION TOPICAL at 05:03

## 2019-05-05 NOTE — PROGRESS NOTE ADULT - SUBJECTIVE AND OBJECTIVE BOX
Interval Events:  No events overnight    Patient seen and examined at bedside.      Allergies    No Known Allergies    Intolerances        Vital Signs Last 24 Hrs  T(C): 37.1 (05 May 2019 05:59), Max: 37.3 (04 May 2019 14:40)  T(F): 98.8 (05 May 2019 05:59), Max: 99.1 (04 May 2019 14:40)  HR: 98 (05 May 2019 07:00) (96 - 118)  BP: 101/55 (05 May 2019 07:00) (90/52 - 117/62)  BP(mean): 67 (05 May 2019 07:00) (61 - 85)  RR: 24 (05 May 2019 07:00) (18 - 31)  SpO2: 100% (05 May 2019 07:00) (98% - 100%)    05-04 @ 07:01  -  05-05 @ 07:00  --------------------------------------------------------  IN: 1444 mL / OUT: 1480 mL / NET: -36 mL    05-05 @ 07:01  -  05-05 @ 08:57  --------------------------------------------------------  IN: 48.5 mL / OUT: 0 mL / NET: 48.5 mL      05-04 @ 07:01  -  05-05 @ 07:00  --------------------------------------------------------  IN: 1444 mL / OUT: 1480 mL / NET: -36 mL    05-05 @ 07:01  -  05-05 @ 08:57  --------------------------------------------------------  IN: 48.5 mL / OUT: 0 mL / NET: 48.5 mL        Physical Exam:     Gen: cachetic male   Neuro: Opens eyes to verbal stimuli but does not interact or attempt to communicate. + Uext rigid  CV:RRR Reg s1s2 noM  Pulm: CTA b/l No w/r/r + Rhonchi on exam that cleared upon Suctioning. + Thick puss-like secretions suctioned via ETT.  Continued mild small amounts of Subcutaneous air in left upper chest. + continued Air leak after manipulating tube  Abd: Soft NT + Colostomy pink with stool out put. + midline incision wound open with good granulation tissue.   Ext: No C/C/E + 3+ Pitting edema throughout  Vasc: + DP b/l   Skin: no rashes noted  MSK: No joint swelling  Psych: No signs of anxiety or depression      LABS:      CBC Full  -  ( 05 May 2019 07:58 )  WBC Count : 14.23 K/uL  RBC Count : 2.20 M/uL  Hemoglobin : 7.0 g/dL  Hematocrit : 21.6 %  Platelet Count - Automated : 203 K/uL  Mean Cell Volume : 98.2 fl  Mean Cell Hemoglobin : 31.8 pg  Mean Cell Hemoglobin Concentration : 32.4 gm/dL  Auto Neutrophil # : x  Auto Lymphocyte # : x  Auto Monocyte # : x  Auto Eosinophil # : x  Auto Basophil # : x  Auto Neutrophil % : x  Auto Lymphocyte % : x  Auto Monocyte % : x  Auto Eosinophil % : x  Auto Basophil % : x    05-05    144  |  110<H>  |  94<H>  ----------------------------<  153<H>  3.3<L>   |  16<L>  |  2.80<H>    Ca    7.8<L>      05 May 2019 06:11  Phos  5.5     05-05  Mg     2.0     05-05    TPro  x   /  Alb  1.6<L>  /  TBili  x   /  DBili  x   /  AST  x   /  ALT  x   /  AlkPhos  x   05-04    PT/INR - ( 04 May 2019 05:28 )   PT: 44.2 sec;   INR: 3.75          PTT - ( 05 May 2019 06:11 )  PTT:73.6 sec                RADIOLOGY & ADDITIONAL STUDIES (The following images were personally reviewed):          A/p: A/p:87 yo M with history of b/l laparoscopic robotic-assisted inguinal hernia repair presenting with b/l segmental pulmonary embolisms, left lung abscess, pneumoperitoneum, and distal descending colon abscess (likely source of pneumoperitoneum), significant iliofemoral DVT burden, s/p IVCF placement and IR drainage of LLQ abdominal collection (4/5), s/p ex-lap, LAURYN, sigmoidectomy, drain placement in R. paracolic gutter, and abthera vac placement (4/7), s/p planned RTOR, packing removal, and end colostomy creation (4/10), now w/ MRSA + proteus PNA, persistent coagulopathy, LLL pulmonary empyema s/p bedside pigtail drainage (4/15)    Neuro: Fentanyl prn  CV: MAP>65; midodrine 15q8h; asa 81 lipitor Hgb 6.9- repeat cbc with t&s Lasix 40 bid.   Pulm: AC 40/530/12/5. trach (4/24). bilateral PE's; Empyema- s/p left CTx on 4/15 CPAP trial daily  GI: NPO, PEG placed 4/26; TF Nepro @40, free water, reglan 10tid standing for gastric motility. malnourished, Vit C, zinc; RUQ US W/Doppler wnl  : incontinent, bladder scan and straight cath PRN. JESUS. sodium bicarb tid increased, Started phoslo  ID: +kleb, proteus, strep in abd Cx, flagyl Sputum cx: MRSA and proteus (sensitive to cefepine) , ///Dc'd:  (4/9-5/4), cefepime (4/9-5/4); vancomycin by level (4/10-4/26)  ceftriaxone (4/9-4/9)// unasyn (4/8-4/9),  Vanco (4/5-4/7), Zosyn (4/5-4/8)]  Endo: ISS  Heme: bilateral iliofemoral DVT / bilateral PE; s/p IVC Filter; elevated INR s/p multiple vit K, Lupus Anticoag- On Heparin gtt (60-80 goal)   PPx: SCDs; Heparin gtt  Lines: PIVs, PICC (4/6--),  /// D/C: R cordis (4/7-4/11) L A line (4/13--)  Wounds/drains: LLQ IR drain (4/5 -- ), LUQ PHYLLIS (4/7 -- ), --- drains to bulb suction poss d/c.   abdominal incision SQ space wet to dry QD. D/c: R paracolic gutter drain (4/7 -4/26)   PT/OT: early mob 4/11 - worked with PT.

## 2019-05-05 NOTE — PROGRESS NOTE ADULT - ATTENDING COMMENTS
REGINE Parada has acted as my scribe. Chest tube remains with leak. Retry [id number]. REGINE Parada has acted as my scribe. Chest tube remains with leak. Retry [id number]. F/U CBC. no evidence of bleeding clinically.

## 2019-05-05 NOTE — PROGRESS NOTE ADULT - PROVIDER SPECIALTY LIST ADULT
SICU (midsternal). Abdominal: Soft. Bowel sounds are normal. She exhibits no distension. There is no tenderness. There is no guarding. Musculoskeletal: Normal range of motion. She exhibits no edema or tenderness. Lymphadenopathy:     She has no cervical adenopathy. Neurological: She is alert and oriented to person, place, and time. Skin: Skin is warm and dry. No rash noted. She is not diaphoretic. No erythema. No pallor. Darkened vascular rash noted of bilateral lower extremities. Psychiatric: She has a normal mood and affect. Her behavior is normal. Thought content normal.     DIAGNOSTICS      EKG:   EKG 12 Lead [[id number]] Collected: 11/05/18 1403   Updated: 11/05/18 1625     Ventricular Rate 81 BPM    Atrial Rate 81 BPM    P-R Interval 132 ms    QRS Duration 90 ms    Q-T Interval 368 ms    QTc Calculation (Bazett) 427 ms    P Axis 48 degrees    R Axis 28 degrees    T Axis 63 degrees   Narrative:     Normal sinus rhythm  Septal infarct , age undetermined  Abnormal ECG  When compared with ECG of 24-OCT-2018 13:17,  Nonspecific T wave abnormality no longer evident in Lateral leads     Labs:  CBC:   Recent Labs      11/05/18   1510   WBC  4.4   HGB  8.9*   PLT  170     BMP:    Recent Labs      11/05/18   1510   NA  138   K  5.3   CL  102   CO2  29   BUN  27*   CREATININE  1.42*   GLUCOSE  103*     S. Calcium:  Recent Labs      11/05/18   1510   CALCIUM  8.9     S. Ionized Calcium:No results for input(s): IONCA in the last 72 hours. S. Magnesium:  Recent Labs      11/05/18   1510   MG  1.6     S. Phosphorus:No results for input(s): PHOS in the last 72 hours.   S. Glucose:  Recent Labs      11/05/18   1850  11/05/18   2245   POCGLU  103  127*     Glycosylated hemoglobin A1C:   Lab Results   Component Value Date    LABA1C 5.7 10/28/2017     Hepatic:   Recent Labs      11/05/18   1510   AST  7   ALT  9   ALKPHOS  138*     CARDIAC ENZY:   Recent Labs      11/05/18   1510   TROPONINT  0.06*     INR: No results for input(s): INR in the last 72 hours. BNP: No results for input(s): BNP in the last 72 hours. Invalid input(s):  PROBNP  ABGs: No results for input(s): PH, PCO2, PO2, HCO3, O2SAT in the last 72 hours. Lipids: No results for input(s): CHOL, TRIG, HDL, LDLCALC in the last 72 hours. Invalid input(s): LDL  Pancreatic functions:No results for input(s): LIPASE, AMYLASE in the last 72 hours. S. Lactic Acid: No results for input(s): LACTA in the last 72 hours. Thyroid functions:   Lab Results   Component Value Date    TSH 1.94 09/13/2017      U/A:No results for input(s): NITRITE, COLORU, WBCUA, RBCUA, MUCUS, BACTERIA, CLARITYU, SPECGRAV, LEUKOCYTESUR, BLOODU, GLUCOSEU, AMORPHOUS in the last 72 hours. Invalid input(s): [facility]    Imaging/Diagonstics:     Ct Head Wo Contrast    Result Date: 10/16/2018  EXAMINATION: CT OF THE HEAD WITHOUT CONTRAST  10/16/2018 10:36 pm TECHNIQUE: CT of the head was performed without the administration of intravenous contrast. Dose modulation, iterative reconstruction, and/or weight based adjustment of the mA/kV was utilized to reduce the radiation dose to as low as reasonably achievable. COMPARISON: 03/13/2018 HISTORY: ORDERING SYSTEM PROVIDED HISTORY: stroke TECHNOLOGIST PROVIDED HISTORY: Ordering Physician Provided Reason for Exam: S/P Stroke - Alert. (Seizure today - new onset). Acuity: Unknown Type of Exam: Unknown Relevant Medical/Surgical History: Hx - Diabetes, HBP and CKD. FINDINGS: BRAIN/VENTRICLES: There is no acute intracranial hemorrhage, mass effect or midline shift. No abnormal extra-axial fluid collection. The gray-white differentiation is maintained without evidence of an acute infarct. There is prominence of the ventricles and sulci due to global parenchymal volume loss. There are nonspecific areas of hypoattenuation within the periventricular and subcortical white matter, which likely represent chronic microvascular ischemic change.  ORBITS: The visualized portion of the orbits demonstrate no acute abnormality. SINUSES: The visualized paranasal sinuses and mastoid air cells demonstrate no acute abnormality. SOFT TISSUES/SKULL: No acute abnormality of the visualized skull or soft tissues. No CT evidence of acute territorial infarct. No ictal focus identified. Findings were discussed with Dr Gaye Lopez at 10:46 pm on 10/16/2018. Xr Chest Portable    Result Date: 11/5/2018  EXAMINATION: SINGLE XRAY VIEW OF THE CHEST 11/5/2018 2:14 pm COMPARISON: 10/24/2018 HISTORY: ORDERING SYSTEM PROVIDED HISTORY: Chest Pain TECHNOLOGIST PROVIDED HISTORY: Chest Pain Ordering Physician Provided Reason for Exam: Chest pain radiating down left arm x 1 week Acuity: Acute Type of Exam: Initial Additional signs and symptoms: Chest pain radiating down left arm x 1 week FINDINGS: Single portable frontal view of the chest is submitted for review. The cardiac silhouette is borderline enlarged and there is mild central vascular congestion. Lung parenchyma is otherwise clear without focal airspace consolidation, sizeable pleural effusion, or pneumothorax. Trachea is midline. Visualized osseous structures and soft tissues are grossly intact. Borderline cardiomegaly. No evidence for acute cardiopulmonary pathology. Xr Chest Portable    Result Date: 10/24/2018  EXAMINATION: SINGLE XRAY VIEW OF THE CHEST 10/24/2018 1:29 pm COMPARISON: July 22, 2018 HISTORY: ORDERING SYSTEM PROVIDED HISTORY: Chest Pain TECHNOLOGIST PROVIDED HISTORY: Chest Pain FINDINGS: Shallow inflation. Cardiac silhouette enlargement is noted, which may be accentuated by shallow inflation. Vascular congestion is noted and linear basilar opacities are noted. No evidence for interstitial edema. The soft tissue of the patient's neck obscures a portion of the lung apices however there is no evidence for pneumothorax. No evidence for effusion. No acute osseous abnormality identified. Shallow inflation.   Basilar opacities

## 2019-05-06 LAB
ANION GAP SERPL CALC-SCNC: 18 MMOL/L — HIGH (ref 5–17)
APTT BLD: 54.4 SEC — HIGH (ref 27.5–36.3)
BUN SERPL-MCNC: 97 MG/DL — HIGH (ref 7–23)
CALCIUM SERPL-MCNC: 8 MG/DL — LOW (ref 8.4–10.5)
CHLORIDE SERPL-SCNC: 109 MMOL/L — HIGH (ref 96–108)
CO2 SERPL-SCNC: 15 MMOL/L — LOW (ref 22–31)
CREAT SERPL-MCNC: 2.92 MG/DL — HIGH (ref 0.5–1.3)
GLUCOSE BLDC GLUCOMTR-MCNC: 144 MG/DL — HIGH (ref 70–99)
GLUCOSE BLDC GLUCOMTR-MCNC: 161 MG/DL — HIGH (ref 70–99)
GLUCOSE BLDC GLUCOMTR-MCNC: 167 MG/DL — HIGH (ref 70–99)
GLUCOSE BLDC GLUCOMTR-MCNC: 180 MG/DL — HIGH (ref 70–99)
GLUCOSE SERPL-MCNC: 160 MG/DL — HIGH (ref 70–99)
HCT VFR BLD CALC: 22 % — LOW (ref 39–50)
HGB BLD-MCNC: 7 G/DL — CRITICAL LOW (ref 13–17)
MAGNESIUM SERPL-MCNC: 1.9 MG/DL — SIGNIFICANT CHANGE UP (ref 1.6–2.6)
MCHC RBC-ENTMCNC: 31.1 PG — SIGNIFICANT CHANGE UP (ref 27–34)
MCHC RBC-ENTMCNC: 31.8 GM/DL — LOW (ref 32–36)
MCV RBC AUTO: 97.8 FL — SIGNIFICANT CHANGE UP (ref 80–100)
NRBC # BLD: 0 /100 WBCS — SIGNIFICANT CHANGE UP (ref 0–0)
PHOSPHATE SERPL-MCNC: 5.5 MG/DL — HIGH (ref 2.5–4.5)
PLATELET # BLD AUTO: 212 K/UL — SIGNIFICANT CHANGE UP (ref 150–400)
POTASSIUM SERPL-MCNC: 3.7 MMOL/L — SIGNIFICANT CHANGE UP (ref 3.5–5.3)
POTASSIUM SERPL-SCNC: 3.7 MMOL/L — SIGNIFICANT CHANGE UP (ref 3.5–5.3)
RBC # BLD: 2.25 M/UL — LOW (ref 4.2–5.8)
RBC # FLD: 19.8 % — HIGH (ref 10.3–14.5)
SODIUM SERPL-SCNC: 142 MMOL/L — SIGNIFICANT CHANGE UP (ref 135–145)
WBC # BLD: 13.22 K/UL — HIGH (ref 3.8–10.5)
WBC # FLD AUTO: 13.22 K/UL — HIGH (ref 3.8–10.5)

## 2019-05-06 PROCEDURE — 99291 CRITICAL CARE FIRST HOUR: CPT

## 2019-05-06 PROCEDURE — 71045 X-RAY EXAM CHEST 1 VIEW: CPT | Mod: 26

## 2019-05-06 RX ORDER — POTASSIUM CHLORIDE 20 MEQ
10 PACKET (EA) ORAL ONCE
Qty: 0 | Refills: 0 | Status: COMPLETED | OUTPATIENT
Start: 2019-05-06 | End: 2019-05-06

## 2019-05-06 RX ORDER — ACETAMINOPHEN 500 MG
1000 TABLET ORAL ONCE
Qty: 0 | Refills: 0 | Status: DISCONTINUED | OUTPATIENT
Start: 2019-05-06 | End: 2019-05-06

## 2019-05-06 RX ORDER — SODIUM BICARBONATE 1 MEQ/ML
100 SYRINGE (ML) INTRAVENOUS ONCE
Qty: 0 | Refills: 0 | Status: COMPLETED | OUTPATIENT
Start: 2019-05-06 | End: 2019-05-06

## 2019-05-06 RX ORDER — MAGNESIUM SULFATE 500 MG/ML
2 VIAL (ML) INJECTION ONCE
Qty: 0 | Refills: 0 | Status: COMPLETED | OUTPATIENT
Start: 2019-05-06 | End: 2019-05-06

## 2019-05-06 RX ADMIN — Medication 2: at 06:50

## 2019-05-06 RX ADMIN — HEPARIN SODIUM 8.5 UNIT(S)/HR: 5000 INJECTION INTRAVENOUS; SUBCUTANEOUS at 11:52

## 2019-05-06 RX ADMIN — Medication 2: at 17:07

## 2019-05-06 RX ADMIN — Medication 667 MILLIGRAM(S): at 17:06

## 2019-05-06 RX ADMIN — Medication 2: at 11:53

## 2019-05-06 RX ADMIN — CHLORHEXIDINE GLUCONATE 1 APPLICATION(S): 213 SOLUTION TOPICAL at 05:38

## 2019-05-06 RX ADMIN — Medication 100 MILLIEQUIVALENT(S): at 08:15

## 2019-05-06 RX ADMIN — Medication 40 MILLIGRAM(S): at 05:38

## 2019-05-06 RX ADMIN — Medication 50 GRAM(S): at 08:15

## 2019-05-06 RX ADMIN — ATORVASTATIN CALCIUM 80 MILLIGRAM(S): 80 TABLET, FILM COATED ORAL at 21:53

## 2019-05-06 RX ADMIN — Medication 81 MILLIGRAM(S): at 11:53

## 2019-05-06 RX ADMIN — Medication 10 MILLIGRAM(S): at 11:53

## 2019-05-06 RX ADMIN — Medication 650 MILLIGRAM(S): at 05:41

## 2019-05-06 RX ADMIN — MIDODRINE HYDROCHLORIDE 15 MILLIGRAM(S): 2.5 TABLET ORAL at 21:53

## 2019-05-06 RX ADMIN — Medication 40 MILLIGRAM(S): at 17:07

## 2019-05-06 RX ADMIN — PANTOPRAZOLE SODIUM 40 MILLIGRAM(S): 20 TABLET, DELAYED RELEASE ORAL at 11:53

## 2019-05-06 RX ADMIN — ZINC SULFATE TAB 220 MG (50 MG ZINC EQUIVALENT) 220 MILLIGRAM(S): 220 (50 ZN) TAB at 11:53

## 2019-05-06 RX ADMIN — Medication 250 MILLIGRAM(S): at 11:53

## 2019-05-06 RX ADMIN — Medication 667 MILLIGRAM(S): at 11:53

## 2019-05-06 RX ADMIN — Medication 650 MILLIGRAM(S): at 23:29

## 2019-05-06 RX ADMIN — Medication 650 MILLIGRAM(S): at 17:06

## 2019-05-06 RX ADMIN — MIDODRINE HYDROCHLORIDE 15 MILLIGRAM(S): 2.5 TABLET ORAL at 14:21

## 2019-05-06 RX ADMIN — Medication 650 MILLIGRAM(S): at 11:55

## 2019-05-06 RX ADMIN — Medication 667 MILLIGRAM(S): at 08:15

## 2019-05-06 RX ADMIN — CHLORHEXIDINE GLUCONATE 15 MILLILITER(S): 213 SOLUTION TOPICAL at 17:07

## 2019-05-06 RX ADMIN — Medication 100 MILLIEQUIVALENT(S): at 11:53

## 2019-05-06 RX ADMIN — MIDODRINE HYDROCHLORIDE 15 MILLIGRAM(S): 2.5 TABLET ORAL at 05:38

## 2019-05-06 RX ADMIN — CHLORHEXIDINE GLUCONATE 15 MILLILITER(S): 213 SOLUTION TOPICAL at 05:38

## 2019-05-06 RX ADMIN — Medication 10 MILLIGRAM(S): at 23:29

## 2019-05-06 NOTE — PROGRESS NOTE ADULT - SUBJECTIVE AND OBJECTIVE BOX
24 hr events: 5/6: no airleak this AM. On CPAP. Wound w/ good granulation.   ON: CXR no pneumo, Airleak in Chest tube, tolerating CPAP, 8 runs of NSVT       MEDICATIONS  (STANDING):  ascorbic acid Syrup 250 milliGRAM(s) Oral daily  aspirin  chewable 81 milliGRAM(s) Oral daily  atorvastatin 80 milliGRAM(s) Oral at bedtime  calcium acetate 667 milliGRAM(s) Oral three times a day with meals  chlorhexidine 0.12% Liquid 15 milliLiter(s) Oral Mucosa two times a day  chlorhexidine 2% Cloths 1 Application(s) Topical <User Schedule>  dextrose 5%. 1000 milliLiter(s) (50 mL/Hr) IV Continuous <Continuous>  dextrose 50% Injectable 12.5 Gram(s) IV Push once  dextrose 50% Injectable 25 Gram(s) IV Push once  dextrose 50% Injectable 25 Gram(s) IV Push once  furosemide   Injectable 40 milliGRAM(s) IV Push two times a day  heparin  flush 100 Units/mL Injectable 100 Unit(s) IV Push every other day  heparin  Infusion 900 Unit(s)/Hr (8.5 mL/Hr) IV Continuous <Continuous>  insulin lispro (HumaLOG) corrective regimen sliding scale   SubCutaneous every 6 hours  metoclopramide Injectable 10 milliGRAM(s) IV Push every 12 hours  midodrine 15 milliGRAM(s) Oral every 8 hours  pantoprazole   Suspension 40 milliGRAM(s) Enteral Tube daily  sodium bicarbonate 650 milliGRAM(s) Oral every 6 hours  zinc sulfate 220 milliGRAM(s) Oral daily    MEDICATIONS  (PRN):  acetaminophen    Suspension .. 650 milliGRAM(s) Enteral Tube every 6 hours PRN Temp greater or equal to 38C (100.4F)  dextrose 40% Gel 15 Gram(s) Oral once PRN Blood Glucose LESS THAN 70 milliGRAM(s)/deciliter  fentaNYL    Injectable 12.5 MICROGram(s) IV Push every 3 hours PRN Moderate Pain (4 - 6)  fentaNYL    Injectable 25 MICROGram(s) IV Push every 3 hours PRN Severe Pain (7 - 10)  glucagon  Injectable 1 milliGRAM(s) IntraMuscular once PRN Glucose LESS THAN 70 milligrams/deciliter  ondansetron Injectable 4 milliGRAM(s) IV Push every 6 hours PRN Nausea      ICU Vital Signs Last 24 Hrs  T(C): 36.3 (06 May 2019 05:51), Max: 37.1 (05 May 2019 09:25)  T(F): 97.4 (06 May 2019 05:51), Max: 98.7 (05 May 2019 09:25)  HR: 108 (06 May 2019 06:00) (94 - 114)  BP: 101/58 (06 May 2019 06:00) (93/57 - 133/65)  BP(mean): 72 (06 May 2019 06:00) (65 - 84)  ABP: --  ABP(mean): --  RR: 27 (06 May 2019 06:00) (21 - 33)  SpO2: 100% (06 May 2019 06:00) (98% - 100%)      Physical Exam:  Gen: cachetic male   Neuro: Opens eyes to verbal stimuli but does not interact or attempt to communicate. Rigid upper extremities.   CV: RRR  Pulm: On CPAP, no increased work of breathing or dyspnea.   Abd: ostomy pink, viable, patent with stool output. Midline wound with good granulation tissue.   Ext: 2+ b/l LE edema.  Lines/tubes/drains:    Vent settings:  Mode: AC/ CMV (Assist Control/ Continuous Mandatory Ventilation), RR (machine): 12, TV (machine): 530, FiO2: 40, PEEP: 5, PS: 8, ITime: 0.8, MAP: 8, PIP: 14    I&O's Summary    05 May 2019 07:01  -  06 May 2019 07:00  --------------------------------------------------------  IN: 1445.5 mL / OUT: 1525 mL / NET: -79.5 mL        LABS:                        7.0    13.22 )-----------( 212      ( 06 May 2019 05:44 )             22.0     05-06    142  |  109<H>  |  97<H>  ----------------------------<  160<H>  3.7   |  15<L>  |  2.92<H>    Ca    8.0<L>      06 May 2019 05:30  Phos  5.5     05-06  Mg     1.9     05-06      PTT - ( 06 May 2019 05:44 )  PTT:54.4 sec    CAPILLARY BLOOD GLUCOSE      POCT Blood Glucose.: 161 mg/dL (06 May 2019 06:42)  POCT Blood Glucose.: 156 mg/dL (05 May 2019 22:15)  POCT Blood Glucose.: 169 mg/dL (05 May 2019 17:00)  POCT Blood Glucose.: 173 mg/dL (05 May 2019 11:17)        Cultures:    Drips:    RADIOLOGY & ADDITIONAL STUDIES:

## 2019-05-06 NOTE — PROVIDER CONTACT NOTE (CRITICAL VALUE NOTIFICATION) - ACTION/TREATMENT ORDERED:
rusty summers/omaira
Heparin drip rate decreased from 13mL/hr to 11mL/hr
MD aware of  results
No orders at this time. EVGENY Leon aware. Will continue to monitor.
will continue to monitor as ordered
No new orders at this time.
no interventions at this time
Heparin held for 1HR, restarted at [id number]

## 2019-05-06 NOTE — CHART NOTE - NSCHARTNOTEFT_GEN_A_CORE
I spoke with the patients brother Dr. Cha today by phone to discuss goals of care . Upon having this discussion with the patients sister, she had directed us to Dr. Cha to have this discussion. Based on Mr. Cha's ongoing medical condition and deteriorating status, Dr. Cha agrees that a DNR order would be appropriate. He also agrees that further aggressive interventions are not going to change the poor outcome and therefore does not want further interventions including institution of dialysis, new antibiotics, or vasopressors. I told him the next step would be a long term acute care facility since pt requires chest tube to wall suction and is still be anticoagulated fro his hypercoaguable state with a Heparin gtt (only option). Dr. Valencia will speak to family as well regarding withdrawal of care but as per my conversation today, Dr. Cha was not ready to make that decision. I spoke with Dr. Valencia regarding current goals of care discussion and he agrees.

## 2019-05-06 NOTE — PROGRESS NOTE ADULT - SUBJECTIVE AND OBJECTIVE BOX
INTERVAL HPI/OVERNIGHT EVENTS:    5/6: no airleak this AM. On CPAP. Wound w/ good granulation.   ON: CXR no pneumo, Airleak in Chest tube, tolerating CPAP, 8 runs of NSVT     PRESSORS: [ ] YES [x] NO      MEDICATIONS  (STANDING):  ascorbic acid Syrup 250 milliGRAM(s) Oral daily  aspirin  chewable 81 milliGRAM(s) Oral daily  atorvastatin 80 milliGRAM(s) Oral at bedtime  calcium acetate 667 milliGRAM(s) Oral three times a day with meals  chlorhexidine 0.12% Liquid 15 milliLiter(s) Oral Mucosa two times a day  chlorhexidine 2% Cloths 1 Application(s) Topical <User Schedule>  dextrose 5%. 1000 milliLiter(s) (50 mL/Hr) IV Continuous <Continuous>  dextrose 50% Injectable 12.5 Gram(s) IV Push once  dextrose 50% Injectable 25 Gram(s) IV Push once  dextrose 50% Injectable 25 Gram(s) IV Push once  furosemide   Injectable 40 milliGRAM(s) IV Push two times a day  heparin  flush 100 Units/mL Injectable 100 Unit(s) IV Push every other day  heparin  Infusion 900 Unit(s)/Hr (8.5 mL/Hr) IV Continuous <Continuous>  insulin lispro (HumaLOG) corrective regimen sliding scale   SubCutaneous every 6 hours  metoclopramide Injectable 10 milliGRAM(s) IV Push every 12 hours  midodrine 15 milliGRAM(s) Oral every 8 hours  pantoprazole   Suspension 40 milliGRAM(s) Enteral Tube daily  sodium bicarbonate 650 milliGRAM(s) Oral every 6 hours  zinc sulfate 220 milliGRAM(s) Oral daily    MEDICATIONS  (PRN):  acetaminophen    Suspension .. 650 milliGRAM(s) Enteral Tube every 6 hours PRN Temp greater or equal to 38C (100.4F)  dextrose 40% Gel 15 Gram(s) Oral once PRN Blood Glucose LESS THAN 70 milliGRAM(s)/deciliter  fentaNYL    Injectable 12.5 MICROGram(s) IV Push every 3 hours PRN Moderate Pain (4 - 6)  fentaNYL    Injectable 25 MICROGram(s) IV Push every 3 hours PRN Severe Pain (7 - 10)  glucagon  Injectable 1 milliGRAM(s) IntraMuscular once PRN Glucose LESS THAN 70 milligrams/deciliter  ondansetron Injectable 4 milliGRAM(s) IV Push every 6 hours PRN Nausea      Drug Dosing Weight  Height (cm): 180 (07 Apr 2019 11:26)  Weight (kg): 73.3 (07 Apr 2019 11:26)  BMI (kg/m2): 22.6 (07 Apr 2019 11:26)  BSA (m2): 1.92 (07 Apr 2019 11:26)      PAST MEDICAL & SURGICAL HISTORY:  Esophageal reflux  Acute diarrhea  HLD (hyperlipidemia)  HTN (hypertension)  Prostate cancer  Stented coronary artery: x3 , per pt.  Atherosclerosis of coronary artery: CAD (coronary artery disease)  S/P hernia repair  Surgery, elective: cardiac stent x3  History of prostate surgery      ICU Vital Signs Last 24 Hrs  T(C): 36.3 (06 May 2019 05:51), Max: 37.1 (05 May 2019 09:25)  T(F): 97.4 (06 May 2019 05:51), Max: 98.7 (05 May 2019 09:25)  HR: 108 (06 May 2019 06:00) (94 - 114)  BP: 101/58 (06 May 2019 06:00) (93/57 - 133/65)  BP(mean): 72 (06 May 2019 06:00) (65 - 84)  ABP: --  ABP(mean): --  RR: 27 (06 May 2019 06:00) (21 - 33)  SpO2: 100% (06 May 2019 06:00) (98% - 100%)            05 May 2019 07:01  -  06 May 2019 07:00  --------------------------------------------------------  IN:    Enteral Tube Flush: 330 mL    heparin Infusion: 195.5 mL    Nepro with Carb Steady: 920 mL  Total IN: 1445.5 mL    OUT:    Colostomy: 825 mL    Voided: 700 mL  Total OUT: 1525 mL    Total NET: -79.5 mL          Mode: AC/ CMV (Assist Control/ Continuous Mandatory Ventilation)  RR (machine): 12  TV (machine): 530  FiO2: 40  PEEP: 5  PS: 8  ITime: 0.8  MAP: 8  PIP: 14    Physical Exam:    Gen: cachetic male   Neuro: Opens eyes to verbal stimuli but does not interact or attempt to communicate. Rigid upper extremities.   CV: RRR  Pulm: On CPAP, no increased work of breathing or dyspnea.   Abd: ostomy pink, viable, patent with stool output. Midline wound with good granulation tissue.   Ext: 2+ b/l LE edema.          LABS:  CBC Full  -  ( 06 May 2019 05:44 )  WBC Count : 13.22 K/uL  RBC Count : 2.25 M/uL  Hemoglobin : 7.0 g/dL  Hematocrit : 22.0 %  Platelet Count - Automated : 212 K/uL  Mean Cell Volume : 97.8 fl  Mean Cell Hemoglobin : 31.1 pg  Mean Cell Hemoglobin Concentration : 31.8 gm/dL  Auto Neutrophil # : x  Auto Lymphocyte # : x  Auto Monocyte # : x  Auto Eosinophil # : x  Auto Basophil # : x  Auto Neutrophil % : x  Auto Lymphocyte % : x  Auto Monocyte % : x  Auto Eosinophil % : x  Auto Basophil % : x    05-06    142  |  109<H>  |  97<H>  ----------------------------<  160<H>  3.7   |  15<L>  |  2.92<H>    Ca    8.0<L>      06 May 2019 05:30  Phos  5.5     05-06  Mg     1.9     05-06      PTT - ( 06 May 2019 05:44 )  PTT:54.4 sec

## 2019-05-06 NOTE — PROGRESS NOTE ADULT - ATTENDING COMMENTS
Pt remains stable and back on CPAP trial. Increase RR likely related to resp muscle weakness and metabolic derangements.  Opens eyes, not following commands. Worsening acidosis secondary to worsening renal failure . See chart note regarding goals of care discussion with Dr. Cha. Pt is DNR and no new antibiotics, vasopressors, or HD to be instituted. Agree with LTAC placement.

## 2019-05-06 NOTE — PROGRESS NOTE ADULT - SUBJECTIVE AND OBJECTIVE BOX
On interval followup, the left arm PICC site is clean, dry and non-inflamed. Afebrile. Notes, cultures, progress and clinical status are followed.

## 2019-05-06 NOTE — PROGRESS NOTE ADULT - ASSESSMENT
A/p: A/p:87 yo M with history of b/l laparoscopic robotic-assisted inguinal hernia repair presenting with b/l segmental pulmonary embolisms, left lung abscess, pneumoperitoneum, and distal descending colon abscess (likely source of pneumoperitoneum), significant iliofemoral DVT burden, s/p IVCF placement and IR drainage of LLQ abdominal collection (4/5), s/p ex-lap, LAURYN, sigmoidectomy, drain placement in R. paracolic gutter, and abthera vac placement (4/7), s/p planned RTOR, packing removal, and end colostomy creation (4/10), now w/ MRSA + proteus PNA, persistent coagulopathy, LLL pulmonary empyema s/p bedside pigtail drainage (4/15)    Neuro: Fentanyl prn  CV: MAP>65; midodrine 15q8h; asa 81 lipitor Hgb 6.9- repeat cbc with t&s Lasix 40 bid.   Pulm: AC 40/530/12/5. trach (4/24). bilateral PE's; Empyema- s/p left CTx on 4/15 CPAP trial daily  GI: NPO, PEG placed 4/26; TF Nepro @40, free water, reglan 10tid standing for gastric motility. malnourished, Vit C, zinc; RUQ US W/Doppler wnl  : incontinent, bladder scan and straight cath PRN. JESUS. sodium bicarb tid increased, Started phoslo  ID: +kleb, proteus, strep in abd Cx, flagyl Sputum cx: MRSA and proteus (sensitive to cefepine) , ///Dc'd:  (4/9-5/4), cefepime (4/9-5/4); vancomycin by level (4/10-4/26)  ceftriaxone (4/9-4/9)// unasyn (4/8-4/9),  Vanco (4/5-4/7), Zosyn (4/5-4/8)]  Endo: ISS  Heme: bilateral iliofemoral DVT / bilateral PE; s/p IVC Filter; elevated INR s/p multiple vit K, Lupus Anticoag- On Heparin gtt (60-80 goal)   PPx: SCDs; Heparin gtt  Lines: PIVs, PICC (4/6--),  /// D/C: R cordis (4/7-4/11) L A line (4/13--)  Wounds/drains: LLQ IR drain (4/5 -- ), LUQ PHYLLIS (4/7 -- ), --- drains to bulb suction poss d/c.   abdominal incision SQ space wet to dry QD. D/c: R paracolic gutter drain (4/7 -4/26)   PT/OT: early mob 4/11 - PT.

## 2019-05-07 DIAGNOSIS — Z02.9 ENCOUNTER FOR ADMINISTRATIVE EXAMINATIONS, UNSPECIFIED: ICD-10-CM

## 2019-05-07 DIAGNOSIS — Z51.5 ENCOUNTER FOR PALLIATIVE CARE: ICD-10-CM

## 2019-05-07 LAB
GLUCOSE BLDC GLUCOMTR-MCNC: 160 MG/DL — HIGH (ref 70–99)
GLUCOSE BLDC GLUCOMTR-MCNC: 163 MG/DL — HIGH (ref 70–99)
GLUCOSE BLDC GLUCOMTR-MCNC: 169 MG/DL — HIGH (ref 70–99)
GLUCOSE BLDC GLUCOMTR-MCNC: 178 MG/DL — HIGH (ref 70–99)

## 2019-05-07 PROCEDURE — 99223 1ST HOSP IP/OBS HIGH 75: CPT

## 2019-05-07 PROCEDURE — 99233 SBSQ HOSP IP/OBS HIGH 50: CPT | Mod: GC

## 2019-05-07 PROCEDURE — 99359 PROLONG SERV W/O CONTACT ADD: CPT

## 2019-05-07 PROCEDURE — 99358 PROLONG SERVICE W/O CONTACT: CPT

## 2019-05-07 RX ORDER — FENTANYL CITRATE 50 UG/ML
50 INJECTION INTRAVENOUS
Qty: 0 | Refills: 0 | Status: DISCONTINUED | OUTPATIENT
Start: 2019-05-07 | End: 2019-05-09

## 2019-05-07 RX ORDER — FENTANYL CITRATE 50 UG/ML
25 INJECTION INTRAVENOUS
Qty: 0 | Refills: 0 | Status: DISCONTINUED | OUTPATIENT
Start: 2019-05-07 | End: 2019-05-09

## 2019-05-07 RX ADMIN — FENTANYL CITRATE 25 MICROGRAM(S): 50 INJECTION INTRAVENOUS at 23:20

## 2019-05-07 RX ADMIN — Medication 2: at 05:45

## 2019-05-07 RX ADMIN — Medication 10 MILLIGRAM(S): at 12:05

## 2019-05-07 RX ADMIN — FENTANYL CITRATE 25 MICROGRAM(S): 50 INJECTION INTRAVENOUS at 05:43

## 2019-05-07 RX ADMIN — Medication 667 MILLIGRAM(S): at 17:27

## 2019-05-07 RX ADMIN — ATORVASTATIN CALCIUM 80 MILLIGRAM(S): 80 TABLET, FILM COATED ORAL at 22:25

## 2019-05-07 RX ADMIN — MIDODRINE HYDROCHLORIDE 15 MILLIGRAM(S): 2.5 TABLET ORAL at 05:11

## 2019-05-07 RX ADMIN — Medication 250 MILLIGRAM(S): at 12:04

## 2019-05-07 RX ADMIN — Medication 40 MILLIGRAM(S): at 17:27

## 2019-05-07 RX ADMIN — PANTOPRAZOLE SODIUM 40 MILLIGRAM(S): 20 TABLET, DELAYED RELEASE ORAL at 12:06

## 2019-05-07 RX ADMIN — Medication 650 MILLIGRAM(S): at 12:05

## 2019-05-07 RX ADMIN — MIDODRINE HYDROCHLORIDE 15 MILLIGRAM(S): 2.5 TABLET ORAL at 14:53

## 2019-05-07 RX ADMIN — CHLORHEXIDINE GLUCONATE 15 MILLILITER(S): 213 SOLUTION TOPICAL at 17:27

## 2019-05-07 RX ADMIN — FENTANYL CITRATE 25 MICROGRAM(S): 50 INJECTION INTRAVENOUS at 14:00

## 2019-05-07 RX ADMIN — Medication 40 MILLIGRAM(S): at 05:11

## 2019-05-07 RX ADMIN — Medication 650 MILLIGRAM(S): at 05:11

## 2019-05-07 RX ADMIN — Medication 81 MILLIGRAM(S): at 12:06

## 2019-05-07 RX ADMIN — FENTANYL CITRATE 25 MICROGRAM(S): 50 INJECTION INTRAVENOUS at 23:01

## 2019-05-07 RX ADMIN — Medication 650 MILLIGRAM(S): at 17:27

## 2019-05-07 RX ADMIN — FENTANYL CITRATE 25 MICROGRAM(S): 50 INJECTION INTRAVENOUS at 14:16

## 2019-05-07 RX ADMIN — Medication 100 UNIT(S): at 12:05

## 2019-05-07 RX ADMIN — ZINC SULFATE TAB 220 MG (50 MG ZINC EQUIVALENT) 220 MILLIGRAM(S): 220 (50 ZN) TAB at 12:06

## 2019-05-07 RX ADMIN — FENTANYL CITRATE 25 MICROGRAM(S): 50 INJECTION INTRAVENOUS at 06:00

## 2019-05-07 RX ADMIN — Medication 10 MILLIGRAM(S): at 22:25

## 2019-05-07 RX ADMIN — Medication 2: at 12:07

## 2019-05-07 RX ADMIN — Medication 667 MILLIGRAM(S): at 07:17

## 2019-05-07 RX ADMIN — Medication 667 MILLIGRAM(S): at 12:05

## 2019-05-07 RX ADMIN — FENTANYL CITRATE 25 MICROGRAM(S): 50 INJECTION INTRAVENOUS at 15:00

## 2019-05-07 RX ADMIN — MIDODRINE HYDROCHLORIDE 15 MILLIGRAM(S): 2.5 TABLET ORAL at 22:25

## 2019-05-07 RX ADMIN — FENTANYL CITRATE 25 MICROGRAM(S): 50 INJECTION INTRAVENOUS at 13:33

## 2019-05-07 RX ADMIN — FENTANYL CITRATE 25 MICROGRAM(S): 50 INJECTION INTRAVENOUS at 10:33

## 2019-05-07 RX ADMIN — HEPARIN SODIUM 8.5 UNIT(S)/HR: 5000 INJECTION INTRAVENOUS; SUBCUTANEOUS at 16:45

## 2019-05-07 RX ADMIN — CHLORHEXIDINE GLUCONATE 1 APPLICATION(S): 213 SOLUTION TOPICAL at 05:12

## 2019-05-07 RX ADMIN — FENTANYL CITRATE 25 MICROGRAM(S): 50 INJECTION INTRAVENOUS at 11:00

## 2019-05-07 RX ADMIN — Medication 2: at 16:20

## 2019-05-07 RX ADMIN — CHLORHEXIDINE GLUCONATE 15 MILLILITER(S): 213 SOLUTION TOPICAL at 05:12

## 2019-05-07 NOTE — CONSULT NOTE ADULT - SUBJECTIVE AND OBJECTIVE BOX
CAN CHA          MRN-[id number]           : 1932    HPI:  Mr. Cha is an 85 yo M, poor historian, with history of CAD s/p 3 stents (, , ) still on ASA/Plavix, ETOH abuse, b/l inguinal hernias s/p laparoscopic robotic-assisted repair of bilateral inguinal hernias on 3/11/2019 (notably, 4.5L of ascites were also drained in the beginning of the procedure and sent for cytopathology) presenting with right inguinal hernia discomfort. Patient denies any associated fevers, chills, nausea, or emesis. Patient cannot remember last time he had bowel movement or passed flatus, but per nursing staff, patient a large bowel movement in his stretcher in the ED. Patient reports having a colonoscopy at age 70s which was reportedly normal. He denies ever having an endoscopy.     In the ED, patient is afebrile, HR 90s, , RR20, saturation 95%. WBC 20.8, hgb 10.3, INR 1.82, PT 21, PTT 24, Na 130. CT ab/pelvis with IV contrast shows 1) bilateral segmental pulmonary emboli, 2) left lung empyema, 3) large pneumoperitoneum, 4) 10 x 5 cm abscess in LLQ (possible source of pneumoperitoneum), 5) 6 x 4 cm fluid collection in right groin, and 6) DVT in bilateral common femoral vein, central venous pelvic thrombus in the left common iliac and internal iliac veins.    PMH/PSx: CAD s/p 3 stents (, , ) still on ASA/Plavix, ETOH abuse, b/l inguinal hernias s/p laparoscopic robotic-assisted repair of bilateral inguinal hernias on 3/11/2019 w/ drainage of 4.5 L of ascites,   Allergies: NKDA  Meds: see med rec  FH: patient denies FH of cancers or IBD  SH: patient reports significant alcohol use in youth but was unable to quantify the number of daily drinks. Patient denies smoking history or drug use (2019 19:49)    PAST MEDICAL & SURGICAL HISTORY:  Esophageal reflux  Acute diarrhea  HLD (hyperlipidemia)  HTN (hypertension)  Prostate cancer  Stented coronary artery: x3 , per pt.  Atherosclerosis of coronary artery: CAD (coronary artery disease)  S/P hernia repair  Surgery, elective: cardiac stent x3  History of prostate surgery    FAMILY HISTORY:  No pertinent family history in first degree relatives    SOCIAL HISTORY:     ROS:    Unable to attain due to:                      DYSPNEA (Y/N):	  N/V (Y/N):	  SECRETIONS (Y/N):	  AGITATION (Y/N):  PAIN(Y/N):        -Provocation/Palliation:     -Quality/Quantity:     -Radiating:     -Severity:     -Timing/Frequency:     -Impact on ADLs:    GENERAL:   HEENT:   NECK:   CVS:    RESP:   GI:    :   MUSC:     NEURO:  PSYCH:    SKIN:    LYMPH:      PHYSICAL EXAM:  T(C): 36.6 (19 @ 09:49), Max: 36.6 (19 @ 09:49)  HR: 118 (19 @ 13:00) (98 - 118)  BP: 103/65 (19 @ 13:00) (91/54 - 126/64)  RR: 26 (19 @ 13:00) (16 - 30)  SpO2: 100% (19 @ 13:00) (98% - 100%)  Wt(kg): --  Daily     Daily   CAPILLARY BLOOD GLUCOSE      POCT Blood Glucose.: 169 mg/dL (07 May 2019 10:49)    I&O's Summary    06 May 2019 07:  -  07 May 2019 07:00  --------------------------------------------------------  IN: 867 mL / OUT: 1410 mL / NET: -543 mL    07 May 2019 07:  -  07 May 2019 13:52  --------------------------------------------------------  IN: 291 mL / OUT: 125 mL / NET: 166 mL    GENERAL:  HEENT:      NECK:    CVS:    RESP:    GI:    :    MUSC:    NEURO:   PSYCH:    SKIN:    LYMPH:    PREADMIT Karnofsky: %           CURRENT Karnofsky: %  CACHEXIA (Y/N):   BMI:    Allergies    No Known Allergies    Intolerances    OPIATE NAIVE (Y/N):   -iStop reviewed (Y/N): Y, Ref#     MEDICATIONS:      MEDICATIONS  (STANDING):  ascorbic acid Syrup 250 milliGRAM(s) Oral daily  aspirin  chewable 81 milliGRAM(s) Oral daily  atorvastatin 80 milliGRAM(s) Oral at bedtime  calcium acetate 667 milliGRAM(s) Oral three times a day with meals  chlorhexidine 0.12% Liquid 15 milliLiter(s) Oral Mucosa two times a day  chlorhexidine 2% Cloths 1 Application(s) Topical <User Schedule>  dextrose 5%. 1000 milliLiter(s) (50 mL/Hr) IV Continuous <Continuous>  dextrose 50% Injectable 12.5 Gram(s) IV Push once  dextrose 50% Injectable 25 Gram(s) IV Push once  dextrose 50% Injectable 25 Gram(s) IV Push once  furosemide   Injectable 40 milliGRAM(s) IV Push two times a day  heparin  flush 100 Units/mL Injectable 100 Unit(s) IV Push every other day  heparin  Infusion 900 Unit(s)/Hr (8.5 mL/Hr) IV Continuous <Continuous>  insulin lispro (HumaLOG) corrective regimen sliding scale   SubCutaneous every 6 hours  metoclopramide Injectable 10 milliGRAM(s) IV Push every 12 hours  midodrine 15 milliGRAM(s) Oral every 8 hours  pantoprazole   Suspension 40 milliGRAM(s) Enteral Tube daily  sodium bicarbonate 650 milliGRAM(s) Oral every 6 hours  zinc sulfate 220 milliGRAM(s) Oral daily    MEDICATIONS  (PRN):  acetaminophen    Suspension .. 650 milliGRAM(s) Enteral Tube every 6 hours PRN Temp greater or equal to 38C (100.4F)  dextrose 40% Gel 15 Gram(s) Oral once PRN Blood Glucose LESS THAN 70 milliGRAM(s)/deciliter  fentaNYL    Injectable 25 MICROGram(s) IV Push every 3 hours PRN Moderate Pain (4 - 6)  fentaNYL    Injectable 50 MICROGram(s) IV Push every 3 hours PRN Severe Pain (7 - 10)  glucagon  Injectable 1 milliGRAM(s) IntraMuscular once PRN Glucose LESS THAN 70 milligrams/deciliter  ondansetron Injectable 4 milliGRAM(s) IV Push every 6 hours PRN Nausea    LABS:    CBC:                        7.0    13.22 )-----------( 212      ( 06 May 2019 05:44 )             22.0     CMP:    05-06    142  |  109<H>  |  97<H>  ----------------------------<  160<H>  3.7   |  15<L>  |  2.92<H>    Ca    8.0<L>      06 May 2019 05:30  Phos  5.5     -  Mg     1.9     -    PTT - ( 06 May 2019 05:44 )  PTT:54.4 sec    IMAGING:  Reviewed    ADVANCE DIRECTIVES:    DNR  Decision maker: Vikash Cha [phone]  Legal surrogate:    PSYCHOSOCIAL-SPIRITUAL ASSESSMENT: pending	    GOALS OF CARE DISCUSSION:  Palliative care info/counseling provided	      Documentation of GOC:     AGENCY CHOICE DISCUSSED:     Other: LTACH	          REFERRALS:	   Palliative Med   Unit SW/Case Mgmt    Speech/Swallow  Patient/Family Support  Massage Therapy  Music Therapy  Hospice  Nutrition  PT/OT [facility]A          [id number]           : 1932    HPI:  Mr. Cha is an 85 yo M, poor historian, with history of CAD s/p 3 stents (, , ) still on ASA/Plavix, ETOH abuse, b/l inguinal hernias s/p laparoscopic robotic-assisted repair of bilateral inguinal hernias on 3/11/2019 (notably, 4.5L of ascites were also drained in the beginning of the procedure and sent for cytopathology) presenting with right inguinal hernia discomfort. Patient denies any associated fevers, chills, nausea, or emesis. Patient cannot remember last time he had bowel movement or passed flatus, but per nursing staff, patient a large bowel movement in his stretcher in the ED. Patient reports having a colonoscopy at age 70s which was reportedly normal. He denies ever having an endoscopy.     In the ED, patient is afebrile, HR 90s, , RR20, saturation 95%. WBC 20.8, hgb 10.3, INR 1.82, PT 21, PTT 24, Na 130. CT ab/pelvis with IV contrast shows 1) bilateral segmental pulmonary emboli, 2) left lung empyema, 3) large pneumoperitoneum, 4) 10 x 5 cm abscess in LLQ (possible source of pneumoperitoneum), 5) 6 x 4 cm fluid collection in right groin, and 6) DVT in bilateral common femoral vein, central venous pelvic thrombus in the left common iliac and internal iliac veins.    PAST MEDICAL & SURGICAL HISTORY:  Esophageal reflux  Acute diarrhea  HLD (hyperlipidemia)  HTN (hypertension)  Prostate cancer  Stented coronary artery: x3 , per pt.  Atherosclerosis of coronary artery: CAD (coronary artery disease)  S/P hernia repair  Surgery, elective: cardiac stent x3  History of prostate surgery    FAMILY HISTORY:  No pertinent family history in first degree relatives    SOCIAL HISTORY: , wife has been admitted to SNF since 2018, no children, lives alone, Muslim, one of four children, older brother/HCP is a retired Ortho Surgeon who currently resides in [place], ETOH abuse in his youth, no tobacco    ROS:    Unable to attain due to: encephalopathic                     DYSPNEA (Y/N):	  N/V (Y/N):	  SECRETIONS (Y/N):	  AGITATION (Y/N):  PAIN(Y/N):        -Provocation/Palliation:     -Quality/Quantity:     -Radiating:     -Severity:     -Timing/Frequency:     -Impact on ADLs:    PHYSICAL EXAM:  T(C): 36.6 (19 @ 09:49), Max: 36.6 (19 @ 09:49)  HR: 118 (19 @ 13:00) (98 - 118)  BP: 103/65 (19 @ 13:00) (91/54 - 126/64)  RR: 26 (19 @ 13:00) (16 - 30)  SpO2: 100% (19 @ 13:00) (98% - 100%)  Wt(kg): --  Daily     Daily   CAPILLARY BLOOD GLUCOSE    POCT Blood Glucose.: 169 mg/dL (07 May 2019 10:49)    I&O's Summary    06 May 2019 07:  -  07 May 2019 07:00  --------------------------------------------------------  IN: 867 mL / OUT: 1410 mL / NET: -543 mL    07 May 2019 07:01  -  07 May 2019 13:52  --------------------------------------------------------  IN: 291 mL / OUT: 125 mL / NET: 166 mL    GENERAL: Cachectic gentleman appearing to be in pain with facial grimacing and furrowed brows, rcv'd prn fentanyl 25mcg IV ~20 mins ago  HEENT: bilateral temporal wasting, anicteric, cannot assess hearing adequately, no nasal drainage, dry mucous membranes      NECK: trach   CVS: SR on ECG   RESP: trach to CMV on vent, ribs visible, left CT to wall suction   GI: peg with tube feeds, PHYLLIS to self suction x2, ostomy pink with loose tan output, +BS, mild ascites   : permafit    MUSC: bilateral LEs up to thighs with 3-4+ edema    NEURO: awake, opens eyes to verbal stimuli, but does not track nor follow commands  PSYCH: encephalopathic   SKIN: per nsg   LYMPH: no supraclavicular LAD   PREADMIT Karnofsky: 80%           CURRENT Karnofsky: 10-20%  CACHEXIA (Y/N): y   BMI: 22.6    Allergies    No Known Allergies    Intolerances    OPIATE NAIVE (Y/N): y  -iStop reviewed (Y/N): Y, Ref# [id number]    MEDICATIONS:      MEDICATIONS  (STANDING):  ascorbic acid Syrup 250 milliGRAM(s) Oral daily  aspirin  chewable 81 milliGRAM(s) Oral daily  atorvastatin 80 milliGRAM(s) Oral at bedtime  calcium acetate 667 milliGRAM(s) Oral three times a day with meals  chlorhexidine 0.12% Liquid 15 milliLiter(s) Oral Mucosa two times a day  chlorhexidine 2% Cloths 1 Application(s) Topical <User Schedule>  dextrose 5%. 1000 milliLiter(s) (50 mL/Hr) IV Continuous <Continuous>  dextrose 50% Injectable 12.5 Gram(s) IV Push once  dextrose 50% Injectable 25 Gram(s) IV Push once  dextrose 50% Injectable 25 Gram(s) IV Push once  furosemide   Injectable 40 milliGRAM(s) IV Push two times a day  heparin  flush 100 Units/mL Injectable 100 Unit(s) IV Push every other day  heparin  Infusion 900 Unit(s)/Hr (8.5 mL/Hr) IV Continuous <Continuous>  insulin lispro (HumaLOG) corrective regimen sliding scale   SubCutaneous every 6 hours  metoclopramide Injectable 10 milliGRAM(s) IV Push every 12 hours  midodrine 15 milliGRAM(s) Oral every 8 hours  pantoprazole   Suspension 40 milliGRAM(s) Enteral Tube daily  sodium bicarbonate 650 milliGRAM(s) Oral every 6 hours  zinc sulfate 220 milliGRAM(s) Oral daily    MEDICATIONS  (PRN):  acetaminophen    Suspension .. 650 milliGRAM(s) Enteral Tube every 6 hours PRN Temp greater or equal to 38C (100.4F)  dextrose 40% Gel 15 Gram(s) Oral once PRN Blood Glucose LESS THAN 70 milliGRAM(s)/deciliter  fentaNYL    Injectable 25 MICROGram(s) IV Push every 3 hours PRN Moderate Pain (4 - 6)  fentaNYL    Injectable 50 MICROGram(s) IV Push every 3 hours PRN Severe Pain (7 - 10)  glucagon  Injectable 1 milliGRAM(s) IntraMuscular once PRN Glucose LESS THAN 70 milligrams/deciliter  ondansetron Injectable 4 milliGRAM(s) IV Push every 6 hours PRN Nausea    LABS:    CBC:                        7.0    13.22 )-----------( 212      ( 06 May 2019 05:44 )             22.0     CMP:    05-06    142  |  109<H>  |  97<H>  ----------------------------<  160<H>  3.7   |  15<L>  |  2.92<H>    Ca    8.0<L>      06 May 2019 05:30  Phos  5.5     05-06  Mg     1.9     05-06    PTT - ( 06 May 2019 05:44 )  PTT:54.4 sec    Culture - Body Fluid with Gram Stain (04.15.19 @ 17:00)    -  Gentamicin: S <=1    -  Moxifloxacin(Aerobic): R 32    -  Metronidazole/Anaerobe: S 1.5    -  Trimethoprim/Sulfamethoxazole: S <=0.5/9.5    -  Vancomycin: S    -  Vancomycin: S    -  Penicillin: S 0.064    -  Piperacillin/Tazobactam: S <=8    -  Tobramycin: S <=2    Gram Stain:   **Please Note**: This is a Corrected Report** Previously reported as:  No organisms seen  Numerous white blood cells  Corrected to:  Few Gram Negative Rods  Few Gram Positive Cocci in Pairs and Chains  Numerous White blood cells  ROLA yuan RN  2019 12:47:48    -  Amoxicillin/Clavulanic Acid: I 8    -  Erythromycin: R    -  Erythromycin: S    -  Ceftriaxone: S <=1    -  Ceftriaxone: S 0.25    -  Clindamycin: R    -  Clindamycin: S    -  Ampicillin: R >16    -  Ampicillin/Sulbactam: S 8/4    -  Cefazolin: R >16    Specimen Source: .Body Fluid pleural fluid    Culture Results:   Numerous Proteus vulgaris Susceptibility to follow.  Few Streptococcus anginosus Susceptibility to follow.    Organism Identification: Bacteroides ovatus  Bacteroides ovatus  Streptococcus anginosus  Streptococcus anginosus  Streptococcus anginosus  Streptococcus anginosus    Organism: Streptococcus anginosus    Organism: Streptococcus anginosus    Culture - Sputum . (19 @ 11:48)    -  Linezolid: S 2    -  Oxacillin: R >2    -  Penicillin: R >8    -  Vancomycin: S 1.5    -  Trimethoprim/Sulfamethoxazole: S <=0.5/9.5    -  Tetra/Doxy: S <=1    -  RIF- Rifampin: S <=1 Should not be used as monotherapy    -  Erythromycin: R >4    -  Clindamycin: R >4    -  Daptomycin: S 0.5    -  Cefazolin: R >16    Gram Stain:   No epithelial cells  Numerous White blood cells  Few Gram positive cocci in pairs    Specimen Source: .Sputum Sputum    Culture Results:   Moderate Methicillin resistant Staphylococcus aureus  Result called to and read back byRosalia Jacobson RN  04/10/2019 11:47:35  Accompanied by normal respiratory bev    Organism Identification: Methicillin resistant Staphylococcus aureus  Methicillin resistant Staphylococcus aureus    Organism: Methicillin resistant Staphylococcus aureus    Organism: Methicillin resistant Staphylococcus aureus    Method Type: CASSY    Method Type: ETEST    Organism: Streptococcus anginosus    Organism: Streptococcus anginosus    Organism: Bacteroides ovatus    Organism: Bacteroides ovatus    Method Type: KB    Method Type: KB    Method Type: KB    Method Type: ETEST    Method Type: ETEST    Method Type: CASSY    IMAGING:  Reviewed  < from: CT Abdomen and Pelvis w/ Oral Cont (19 @ 19:15) >  EXAM:  CT CHEST                        EXAM:  CT ABDOMEN AND PELVIS OC                        IMPRESSION:  Interval development of severe multifocal infectious/inflammatory   pneumonitis. Persistent large right pleural effusion and new small left   pleural effusion; persistent small pericardial effusion. Interval   decrease in left empyema/abscess since placement of drainage catheter.  Interval development of severe infectious/inflammatory enterocolitis.   Significant interval decrease in abdominal ascites with residual loosely   loculated fluid within the right hemiabdomen.   Interval left hemicolectomy and left lower quadrant ileostomy.  < end of copied text >    < from: Xray Chest 1 View-PORTABLE IMMEDIATE (19 @ 18:29) >  EXAM:  XR CHEST PORTABLE IMMED 1V                        PROCEDURE DATE:  2019    < from: Xray Chest 1 View-PORTABLE IMMEDIATE (19 @ 18:29) >  COMPARISON: Chest x-ray from 5/3/2019 and 2018.  FINDINGS:   No change left-sided chest tubes, left PICC line catheter and   tracheostomy. No change coarse bilateral opacities.  < end of copied text >    ADVANCE DIRECTIVES:    DNR  Decision maker: Vikash Cha/brother [phone]  Legal surrogate:    PSYCHOSOCIAL-SPIRITUAL ASSESSMENT: pending	    GOALS OF CARE DISCUSSION:  Palliative care info/counseling provided	      Documentation of GOC:     AGENCY CHOICE DISCUSSED:     Other: LTACH	          REFERRALS:	   Palliative Med   Unit SW/Case Mgmt    Speech/Swallow  Patient/Family Support  Massage Therapy  Music Therapy  Hospice  Nutrition  PT/OT    [xPROLONGED SERVICE    Non- FACE TO FACE: chart review   Start:   12N           End:  1300	       Minutes: 60 mins

## 2019-05-07 NOTE — CONSULT NOTE ADULT - CONSULT REQUESTED DATE/TIME
05-Apr-2019 19:47
05-Apr-2019 20:52
05-Apr-2019 21:35
06-Apr-2019 23:11
09-Apr-2019 16:22
12-Apr-2019 01:49
12-Apr-2019 09:54
17-Apr-2019 16:09
18-Apr-2019 14:43
07-May-2019

## 2019-05-07 NOTE — PROGRESS NOTE ADULT - SUBJECTIVE AND OBJECTIVE BOX
INTERVAL HPI/OVERNIGHT EVENTS:    5/7: wound w/ good granulation tissue.     PRESSORS: [ ] YES [x] NO      MEDICATIONS  (STANDING):  ascorbic acid Syrup 250 milliGRAM(s) Oral daily  aspirin  chewable 81 milliGRAM(s) Oral daily  atorvastatin 80 milliGRAM(s) Oral at bedtime  calcium acetate 667 milliGRAM(s) Oral three times a day with meals  chlorhexidine 0.12% Liquid 15 milliLiter(s) Oral Mucosa two times a day  chlorhexidine 2% Cloths 1 Application(s) Topical <User Schedule>  dextrose 5%. 1000 milliLiter(s) (50 mL/Hr) IV Continuous <Continuous>  dextrose 50% Injectable 12.5 Gram(s) IV Push once  dextrose 50% Injectable 25 Gram(s) IV Push once  dextrose 50% Injectable 25 Gram(s) IV Push once  furosemide   Injectable 40 milliGRAM(s) IV Push two times a day  heparin  flush 100 Units/mL Injectable 100 Unit(s) IV Push every other day  heparin  Infusion 900 Unit(s)/Hr (8.5 mL/Hr) IV Continuous <Continuous>  insulin lispro (HumaLOG) corrective regimen sliding scale   SubCutaneous every 6 hours  metoclopramide Injectable 10 milliGRAM(s) IV Push every 12 hours  midodrine 15 milliGRAM(s) Oral every 8 hours  pantoprazole   Suspension 40 milliGRAM(s) Enteral Tube daily  sodium bicarbonate 650 milliGRAM(s) Oral every 6 hours  zinc sulfate 220 milliGRAM(s) Oral daily    MEDICATIONS  (PRN):  acetaminophen    Suspension .. 650 milliGRAM(s) Enteral Tube every 6 hours PRN Temp greater or equal to 38C (100.4F)  dextrose 40% Gel 15 Gram(s) Oral once PRN Blood Glucose LESS THAN 70 milliGRAM(s)/deciliter  fentaNYL    Injectable 12.5 MICROGram(s) IV Push every 3 hours PRN Moderate Pain (4 - 6)  fentaNYL    Injectable 25 MICROGram(s) IV Push every 3 hours PRN Severe Pain (7 - 10)  glucagon  Injectable 1 milliGRAM(s) IntraMuscular once PRN Glucose LESS THAN 70 milligrams/deciliter  ondansetron Injectable 4 milliGRAM(s) IV Push every 6 hours PRN Nausea      Drug Dosing Weight  Height (cm): 180 (07 Apr 2019 11:26)  Weight (kg): 73.3 (07 Apr 2019 11:26)  BMI (kg/m2): 22.6 (07 Apr 2019 11:26)  BSA (m2): 1.92 (07 Apr 2019 11:26)      PAST MEDICAL & SURGICAL HISTORY:  Esophageal reflux  Acute diarrhea  HLD (hyperlipidemia)  HTN (hypertension)  Prostate cancer  Stented coronary artery: x3 , per pt.  Atherosclerosis of coronary artery: CAD (coronary artery disease)  S/P hernia repair  Surgery, elective: cardiac stent x3  History of prostate surgery      ICU Vital Signs Last 24 Hrs  T(C): 36.2 (07 May 2019 02:07), Max: 36.4 (06 May 2019 11:47)  T(F): 97.1 (07 May 2019 02:07), Max: 97.6 (06 May 2019 11:47)  HR: 105 (07 May 2019 07:00) (98 - 108)  BP: 98/58 (07 May 2019 07:00) (91/54 - 109/66)  BP(mean): 77 (07 May 2019 07:00) (62 - 88)  ABP: --  ABP(mean): --  RR: 25 (07 May 2019 07:00) (20 - 29)  SpO2: 99% (07 May 2019 07:00) (98% - 100%)            06 May 2019 07:01  -  07 May 2019 07:00  --------------------------------------------------------  IN:    heparin Infusion: 178.5 mL    Nepro with Carb Steady: 680 mL  Total IN: 858.5 mL    OUT:    Chest Tube: 10 mL    Colostomy: 750 mL    Voided: 650 mL  Total OUT: 1410 mL    Total NET: -551.5 mL          Mode: AC/ CMV (Assist Control/ Continuous Mandatory Ventilation)  RR (machine): 12  TV (machine): 530  FiO2: 40  PEEP: 5  PS: 8  ITime: 0.8  MAP: 7.4  PIP: 13      Physical Exam:  Gen: cachetic male   Neuro: Opens eyes to verbal stimuli but does not interact or attempt to communicate. Rigid upper extremities.   CV: RRR  Pulm: On CPAP, no increased work of breathing or dyspnea.   Abd: ostomy remains pink, viable, patent with stool output. Midline wound with good granulation tissue.   Ext: 2+ b/l LE edema.    LABS:  CBC Full  -  ( 06 May 2019 05:44 )  WBC Count : 13.22 K/uL  RBC Count : 2.25 M/uL  Hemoglobin : 7.0 g/dL  Hematocrit : 22.0 %  Platelet Count - Automated : 212 K/uL  Mean Cell Volume : 97.8 fl  Mean Cell Hemoglobin : 31.1 pg  Mean Cell Hemoglobin Concentration : 31.8 gm/dL  Auto Neutrophil # : x  Auto Lymphocyte # : x  Auto Monocyte # : x  Auto Eosinophil # : x  Auto Basophil # : x  Auto Neutrophil % : x  Auto Lymphocyte % : x  Auto Monocyte % : x  Auto Eosinophil % : x  Auto Basophil % : x    05-06    142  |  109<H>  |  97<H>  ----------------------------<  160<H>  3.7   |  15<L>  |  2.92<H>    Ca    8.0<L>      06 May 2019 05:30  Phos  5.5     05-06  Mg     1.9     05-06      PTT - ( 06 May 2019 05:44 )  PTT:54.4 sec

## 2019-05-07 NOTE — CHART NOTE - NSCHARTNOTEFT_GEN_A_CORE
Admitting Diagnosis:   Patient is a 86y old  Male who presents with a chief complaint of right inguinal discomfort (07 May 2019 13:51)    PAST MEDICAL & SURGICAL HISTORY:  Esophageal reflux  Acute diarrhea  HLD (hyperlipidemia)  HTN (hypertension)  Prostate cancer  Stented coronary artery: x3 , per pt.  Atherosclerosis of coronary artery: CAD (coronary artery disease)  S/P hernia repair  Surgery, elective: cardiac stent x3  History of prostate surgery    Current Nutrition Order: Nepro @40 mL/hr x24h with Prostat SF 1x/day (1728 kcal, 78 gm pro, 698 mL free water, 102% RDI vitamin/mineral), pt on volume based feeding protocol    PO Intake: Good (%) [   ]  Fair (50-75%) [   ] Poor (<25%) [   ]- N/A as pt on EN    GI Issues: +colostomy (750 mL output x24h) per flow sheet    Pain: Unable to assess at this time as pt vented    Skin Integrity: R heel unstageable wound, L heel stage unstageable wound, sacrum unstageable wound, middle upper back suspected deep tissue injury, R part of spine with stage II wound per flow sheet    Labs:   05-06    142  |  109<H>  |  97<H>  ----------------------------<  160<H>  3.7   |  15<L>  |  2.92<H>    Ca    8.0<L>      06 May 2019 05:30  Phos  5.5     05-06  Mg     1.9     05-06    CAPILLARY BLOOD GLUCOSE    POCT Blood Glucose.: 169 mg/dL (07 May 2019 10:49)  POCT Blood Glucose.: 178 mg/dL (07 May 2019 05:37)  POCT Blood Glucose.: 144 mg/dL (06 May 2019 23:19)  POCT Blood Glucose.: 180 mg/dL (06 May 2019 16:47)    Medications:  MEDICATIONS  (STANDING):  ascorbic acid Syrup 250 milliGRAM(s) Oral daily  aspirin  chewable 81 milliGRAM(s) Oral daily  atorvastatin 80 milliGRAM(s) Oral at bedtime  calcium acetate 667 milliGRAM(s) Oral three times a day with meals  chlorhexidine 0.12% Liquid 15 milliLiter(s) Oral Mucosa two times a day  chlorhexidine 2% Cloths 1 Application(s) Topical <User Schedule>  dextrose 5%. 1000 milliLiter(s) (50 mL/Hr) IV Continuous <Continuous>  dextrose 50% Injectable 12.5 Gram(s) IV Push once  dextrose 50% Injectable 25 Gram(s) IV Push once  dextrose 50% Injectable 25 Gram(s) IV Push once  furosemide   Injectable 40 milliGRAM(s) IV Push two times a day  heparin  flush 100 Units/mL Injectable 100 Unit(s) IV Push every other day  heparin  Infusion 900 Unit(s)/Hr (8.5 mL/Hr) IV Continuous <Continuous>  insulin lispro (HumaLOG) corrective regimen sliding scale   SubCutaneous every 6 hours  metoclopramide Injectable 10 milliGRAM(s) IV Push every 12 hours  midodrine 15 milliGRAM(s) Oral every 8 hours  pantoprazole   Suspension 40 milliGRAM(s) Enteral Tube daily  sodium bicarbonate 650 milliGRAM(s) Oral every 6 hours  zinc sulfate 220 milliGRAM(s) Oral daily    MEDICATIONS  (PRN):  acetaminophen    Suspension .. 650 milliGRAM(s) Enteral Tube every 6 hours PRN Temp greater or equal to 38C (100.4F)  dextrose 40% Gel 15 Gram(s) Oral once PRN Blood Glucose LESS THAN 70 milliGRAM(s)/deciliter  fentaNYL    Injectable 25 MICROGram(s) IV Push every 3 hours PRN Moderate Pain (4 - 6)  fentaNYL    Injectable 50 MICROGram(s) IV Push every 3 hours PRN Severe Pain (7 - 10)  glucagon  Injectable 1 milliGRAM(s) IntraMuscular once PRN Glucose LESS THAN 70 milligrams/deciliter  ondansetron Injectable 4 milliGRAM(s) IV Push every 6 hours PRN Nausea    Weight:  78.6 (4/11)  77.5kg (4/24)  74.4kg (4/29)    Weight Change: Weight trending downward. Please trend weights for further assessment.    Nutrition Focused Physical Exam: Completed [X on 4/6 ]  Not Pertinent [   ]  Per physical assessment: Muscle Wasting- Temporal [  X ]  Clavicle/Pectoral [  X ]  Shoulder/Deltoid [ X  ]  Scapula [ X  ]  Interosseous [  X ]  Quadriceps [   ]  Gastrocnemius [   ]  Fat Wasting- Orbital [ X  ]  Buccal [ X  ]  Triceps [   ]  Rib [ X  ]  Suspect severe protein-calorie malnutrition 2/2 to physical assessment, inadequate energy intake of <75% for >1 month    Estimated energy needs:   Height 71"; .7#; #; 94% IBW  IBW used to calculate energy needs as pt vented. Needs estimated for maintenance in older adults; increased for malnutrition, infection, wounds.    8303-1841 kcal (30-35 kcal/kg), 117-156 gm (1.5-2.0 gm/kg), fluid needs per team    Subjective: 87 yo M with history of b/l laparoscopic robotic-assisted inguinal hernia repair presenting with b/l segmental pulmonary embolisms, left lung abscess, pneumoperitoneum, and distal descending colon abscess (likely source of pneumoperitoneum), significant iliofemoral DVT burden, s/p IVCF placement and IR drainage of LLQ abdominal collection (4/5), s/p ex-lap, LAURYN, sigmoidectomy, drain placement in R. paracolic gutter, and abthera vac placement (4/7), s/p planned RTOR, packing removal, and end colostomy creation (4/10), now w/ MRSA + proteus PNA, persistent coagulopathy, LLL pulmonary empyema s/p bedside pigtail drainage (4/15), s/p extubation c/b vomiting and aspiration, reintubated (4/21), s/p tracheostomy creation (4/24). PT following. No propofol running at this time. Pt started on TPN 4/7. TPN weaned off on 4/14 and pt started on TF. s/p PEG 4/26. Team decreased goal rate to 40 mL/hr. Pt tolerating Nepro @ goal of 40 mL/hr with Prostat SF 1x/day per RN. K WNL. Team using weight from ABW from previous admission (62 kg from 3/2019) as pt now with edema. Recommend use IBW for nutritional calculations as pt severely malnourished- d/w team. Recommend Jevity 1.5 @ 48 mL/hr increase 20 mL q4h to goal 67 mL/hr x24h with Prostat SF 1x/day- please see below for EN recs. Pt started on phoslo 2/2 hyperphosphatemia. If phos continues to rise despite adding Phoslo, can consider switching to Nepro. Will monitor renal function closely. RD to monitor closely and f/u per high risk protocol.    Previous Nutrition Diagnosis:  Malnutrition (suspected severe) RT inadequate energy intake 2/2 lack of appetite and poor PO intake 2/2 multiple hospitalizations AEB muscle loss, fat loss, weight fluctuation, and inadequate energy intake    Active [ X ]  Resolved [   ]    PES: Increased protein needs RT increased demand for nutrients AEB wounds    Active [ X ]  Resolved [   ]    Goal: Consistently meet % of EER; pt will show no further s/s malnutrition throughout admit    Recommendations:  1. Recommend Jevity 1.5 @ 48 mL/hr increase 20 mL q4h to goal 67 mL/hr x24h with Prostat SF 1x/day (2512 kcal, 118 gm pro, 1222 mL free water, 161% RDI vitamin/mineral, 32.2 kcal/kg IBW, 1.5 gm/kg IBW, 26.15 non protein calories/kg IBW)- additional water flushes per team. Recommend continue Phoslo for hyperphosphatemia. If phos continues to rise despite adding Phoslo, can consider switching to Nepro. Continue volume based feeding protocol & monitor for signs of intolerance.  2. Trend bi-weekly weights for further assessment.  3. Monitor labs and replete electrolytes prn.  4. Consider Metamucil for high ostomy output.    Education: N/A 2/2 pt's medical status    Risk Level: High [ X ] Moderate [   ] Low [   ]

## 2019-05-07 NOTE — CONSULT NOTE ADULT - PROBLEM SELECTOR RECOMMENDATION 9
spoke with brother/Dr. Vikash Cha/HCP via phone at [phone] ([phone]) who reports he has not seen pt. in person in >15 yrs and is aware of pt's overall very poor prognosis.  Brother recounts his previous experience with SNF patients who were trach/peg and states "Panchito can only get worst from this point on".  He confirmed his wish for no further escalation of care, foregoing any new abx, pressors, nor HD.  We discussed possibility of vent withdrawal, but he reports decision regarding this matter would have to be based on a collective decision from his two sisters and asked that I speak with his sister Tiago Tavarez at [phone], which I did ([phone]).  Per Beena, she has been updated on pt's status and was at bedside when Dr. Frost spoke with Vikash via phone in her presence.  She tells me she has also updated pt's wife who has been in the [facility] since late 2018 that "her  can die any minute".  Discussed possibility of vent withdrawal with care focus solely on comfort, but she is not in favor, states "although we know Panchito will die, we cannot do anything active to expedite his death.  He is [place]".  Offer to speak with  or have  visit pt was declined.  She wishes for  visits to pt. only when she is present.

## 2019-05-07 NOTE — PROGRESS NOTE ADULT - ATTENDING COMMENTS
Patient seen and examined with house-staff during bedside rounds.  Resident note read, including vitals, physical findings, laboratory data, and radiological reports.   Revisions included below.  Direct personal management at bed side and extensive interpretation of the data.  Plan was outlined and discussed in details with the housestaff.  Decision making of high complexity  Action taken for acute disease activity to reflect the level of care provided:  - medication reconciliation  - review laboratory data  respiratory failure with multiple organ failure with hypercoagulable state  no wean  palliative care consult  discuss with heme regarding anticoagulation  cr stable  morphine prn for comfort

## 2019-05-07 NOTE — CONSULT NOTE ADULT - PROVIDER SPECIALTY LIST ADULT
Cardiology
Gastroenterology
Heme/Onc
Infectious Disease
Pulmonology
SICU
Surgery
Thoracic Surgery
Vascular Surgery
Palliative Care

## 2019-05-07 NOTE — CONSULT NOTE ADULT - CONSULT REASON
Abdominal abscess
Abn EKG
Ascites
Close monitoring and resuscitation s/p IR drainage of LLQ abscess in setting of Iliofemoral DVT/PE and Empyema
Lung Abscess
Lung abscess
PE and DVT
PICC insertion
coagulopathy in setting of b/l PE, DVT
goals of care

## 2019-05-07 NOTE — CONSULT NOTE ADULT - ASSESSMENT
87 y/o Male with severe protein malnutrition, h/o b/CAD with stents, HLD, HTN, prostate surgery, GERD, and bilateral inguinal hernias s/p lap robotic repair and 4.5L ascites drainage on 3/11, then sent to [facility], readmitted initially with right inguinal discomfort subsequently found to have bilateral PEs, DVTs, pneumoperitoneum, abdominal abscess s/p 4/7 LAURYN, ex lap, SBR, sigmoid rsxn and intraabdominal fluid drainage and 4/10 abd wall closure and end colostomy maturation with VAC.  Hospital course complicated with coagulopathy, LLL abscess, MRSA pna requiring CT, and MOSF, requiring trach and peg with poor overall prognosis  Pt seen for goals of care

## 2019-05-07 NOTE — CONSULT NOTE ADULT - PROBLEM SELECTOR RECOMMENDATION 2
LTACH option discussed with both sister Kike Tavarez and HCP/Dr. Cha, both wishes for pt. to stay in hospital as long as possible.  Authorization for LTACH already rcv'd per unit CM.   to follow for dispo

## 2019-05-07 NOTE — CONSULT NOTE ADULT - CONSULT REQUESTED BY NAME
Dr Valencia
Dr. Chris Valencia
Dr. Delong/Dr. Valencia
Dr. Suazo
Dr. Valencia
ED
Erik
SICU
SICU team
Erik

## 2019-05-08 ENCOUNTER — TRANSCRIPTION ENCOUNTER (OUTPATIENT)
Age: 84
End: 2019-05-08

## 2019-05-08 LAB
APTT BLD: 49.6 SEC — HIGH (ref 27.5–36.3)
GLUCOSE BLDC GLUCOMTR-MCNC: 134 MG/DL — HIGH (ref 70–99)
GLUCOSE BLDC GLUCOMTR-MCNC: 173 MG/DL — HIGH (ref 70–99)
GLUCOSE BLDC GLUCOMTR-MCNC: 177 MG/DL — HIGH (ref 70–99)

## 2019-05-08 PROCEDURE — 71045 X-RAY EXAM CHEST 1 VIEW: CPT | Mod: 26

## 2019-05-08 PROCEDURE — 99233 SBSQ HOSP IP/OBS HIGH 50: CPT | Mod: GC

## 2019-05-08 PROCEDURE — 99232 SBSQ HOSP IP/OBS MODERATE 35: CPT

## 2019-05-08 RX ORDER — HEPARIN SODIUM 5000 [USP'U]/ML
850 INJECTION INTRAVENOUS; SUBCUTANEOUS
Qty: 25000 | Refills: 0 | Status: DISCONTINUED | OUTPATIENT
Start: 2019-05-08 | End: 2019-05-08

## 2019-05-08 RX ORDER — HEPARIN SODIUM 5000 [USP'U]/ML
950 INJECTION INTRAVENOUS; SUBCUTANEOUS
Qty: 25000 | Refills: 0 | Status: DISCONTINUED | OUTPATIENT
Start: 2019-05-08 | End: 2019-05-08

## 2019-05-08 RX ORDER — HEPARIN SODIUM 5000 [USP'U]/ML
1050 INJECTION INTRAVENOUS; SUBCUTANEOUS
Qty: 25000 | Refills: 0 | Status: DISCONTINUED | OUTPATIENT
Start: 2019-05-08 | End: 2019-05-08

## 2019-05-08 RX ORDER — IPRATROPIUM/ALBUTEROL SULFATE 18-103MCG
3 AEROSOL WITH ADAPTER (GRAM) INHALATION ONCE
Qty: 0 | Refills: 0 | Status: COMPLETED | OUTPATIENT
Start: 2019-05-08 | End: 2019-05-08

## 2019-05-08 RX ORDER — FUROSEMIDE 40 MG
60 TABLET ORAL EVERY 12 HOURS
Qty: 0 | Refills: 0 | Status: DISCONTINUED | OUTPATIENT
Start: 2019-05-08 | End: 2019-05-09

## 2019-05-08 RX ADMIN — Medication 667 MILLIGRAM(S): at 09:50

## 2019-05-08 RX ADMIN — Medication 81 MILLIGRAM(S): at 12:26

## 2019-05-08 RX ADMIN — Medication 650 MILLIGRAM(S): at 22:30

## 2019-05-08 RX ADMIN — CHLORHEXIDINE GLUCONATE 1 APPLICATION(S): 213 SOLUTION TOPICAL at 07:17

## 2019-05-08 RX ADMIN — FENTANYL CITRATE 25 MICROGRAM(S): 50 INJECTION INTRAVENOUS at 02:12

## 2019-05-08 RX ADMIN — Medication 10 MILLIGRAM(S): at 22:18

## 2019-05-08 RX ADMIN — HEPARIN SODIUM 8.5 UNIT(S)/HR: 5000 INJECTION INTRAVENOUS; SUBCUTANEOUS at 08:40

## 2019-05-08 RX ADMIN — Medication 650 MILLIGRAM(S): at 07:16

## 2019-05-08 RX ADMIN — Medication 2: at 07:16

## 2019-05-08 RX ADMIN — MIDODRINE HYDROCHLORIDE 15 MILLIGRAM(S): 2.5 TABLET ORAL at 07:40

## 2019-05-08 RX ADMIN — CHLORHEXIDINE GLUCONATE 15 MILLILITER(S): 213 SOLUTION TOPICAL at 07:16

## 2019-05-08 RX ADMIN — CHLORHEXIDINE GLUCONATE 15 MILLILITER(S): 213 SOLUTION TOPICAL at 18:17

## 2019-05-08 RX ADMIN — Medication 650 MILLIGRAM(S): at 02:13

## 2019-05-08 RX ADMIN — Medication 10 MILLIGRAM(S): at 12:28

## 2019-05-08 RX ADMIN — Medication 650 MILLIGRAM(S): at 12:26

## 2019-05-08 RX ADMIN — Medication 3 MILLILITER(S): at 21:57

## 2019-05-08 RX ADMIN — Medication 650 MILLIGRAM(S): at 18:16

## 2019-05-08 RX ADMIN — MIDODRINE HYDROCHLORIDE 15 MILLIGRAM(S): 2.5 TABLET ORAL at 12:33

## 2019-05-08 RX ADMIN — FENTANYL CITRATE 25 MICROGRAM(S): 50 INJECTION INTRAVENOUS at 02:25

## 2019-05-08 RX ADMIN — Medication 650 MILLIGRAM(S): at 23:57

## 2019-05-08 RX ADMIN — Medication 650 MILLIGRAM(S): at 21:59

## 2019-05-08 RX ADMIN — MIDODRINE HYDROCHLORIDE 15 MILLIGRAM(S): 2.5 TABLET ORAL at 21:59

## 2019-05-08 RX ADMIN — PANTOPRAZOLE SODIUM 40 MILLIGRAM(S): 20 TABLET, DELAYED RELEASE ORAL at 15:21

## 2019-05-08 RX ADMIN — Medication 60 MILLIGRAM(S): at 18:17

## 2019-05-08 NOTE — PROGRESS NOTE ADULT - SUBJECTIVE AND OBJECTIVE BOX
24 hr events:  5/7: wound w/ good granulation tissue. Palliative consult. Fentanyl dose increased  5/6: no air leak this AM. On CPAP. Wound w/ good granulation. 2 amps bicarb. Mamie spoke to brother - pt made DNR.     SUBJECTIVE:  Pt seen at bedside. Pt resting in bed. Opens eyes to verbal stimuli but does not interact or attempt to communicate.       Vital Signs Last 24 Hrs  T(C): 37.3 (08 May 2019 01:00), Max: 37.3 (08 May 2019 01:00)  T(F): 99.1 (08 May 2019 01:00), Max: 99.1 (08 May 2019 01:00)  HR: 116 (08 May 2019 05:37) (104 - 124)  BP: 117/54 (08 May 2019 04:02) (84/55 - 126/64)  BP(mean): 74 (08 May 2019 04:02) (60 - 95)  RR: 20 (08 May 2019 04:02) (16 - 30)  SpO2: 100% (08 May 2019 05:37) (97% - 100%)    Physical Exam:  Gen: cachetic male   Neuro: Opens eyes to verbal stimuli but does not interact or attempt to communicate. Rigid upper extremities.   CV: RRR  Pulm: On CPAP, no increased work of breathing or dyspnea.   Abd: ostomy remains pink, viable, patent with dark red/black liquid output. Midline wound with good granulation tissue.       Lines/drains/tubes:    I&O's Summary    07 May 2019 07:01  -  08 May 2019 07:00  --------------------------------------------------------  IN: 687.5 mL / OUT: 575 mL / NET: 112.5 mL    PTT - ( 08 May 2019 07:50 )  PTT:49.6 sec    CAPILLARY BLOOD GLUCOSE  POCT Blood Glucose.: 173 mg/dL (08 May 2019 06:45)  POCT Blood Glucose.: 134 mg/dL (08 May 2019 01:04)  POCT Blood Glucose.: 160 mg/dL (07 May 2019 21:13)  POCT Blood Glucose.: 163 mg/dL (07 May 2019 16:14)  POCT Blood Glucose.: 169 mg/dL (07 May 2019 10:49) 24 hr events:  5/7: wound w/ good granulation tissue. Palliative consult. Fentanyl dose increased  5/6: Intermittent air leak. On A/C. Wound w/ good granulation. Dr. Gee spoke to brother Dr. Cha - pt made DNR and no new interventions including antibiotics, vasopressors, dialysis.  SUBJECTIVE:  Pt seen at bedside. Pt resting in bed. Opens eyes to verbal stimuli but does not interact or attempt to communicate.       Vital Signs Last 24 Hrs  T(C): 37.3 (08 May 2019 01:00), Max: 37.3 (08 May 2019 01:00)  T(F): 99.1 (08 May 2019 01:00), Max: 99.1 (08 May 2019 01:00)  HR: 116 (08 May 2019 05:37) (104 - 124)  BP: 117/54 (08 May 2019 04:02) (84/55 - 126/64)  BP(mean): 74 (08 May 2019 04:02) (60 - 95)  RR: 20 (08 May 2019 04:02) (16 - 30)  SpO2: 100% (08 May 2019 05:37) (97% - 100%)    Physical Exam:  Gen: cachetic male   Neuro: Opens eyes to verbal stimuli but does not interact or attempt to communicate. Rigid upper extremities.   CV: RRR  Pulm: On CPAP, no increased work of breathing or dyspnea.   Abd: ostomy remains pink, viable, patent with dark red/black liquid output. Midline wound with good granulation tissue.   Ext with edema      Lines/drains/tubes:    I&O's Summary    07 May 2019 07:01  -  08 May 2019 07:00  --------------------------------------------------------  IN: 687.5 mL / OUT: 575 mL / NET: 112.5 mL    PTT - ( 08 May 2019 07:50 )  PTT:49.6 sec    CAPILLARY BLOOD GLUCOSE  POCT Blood Glucose.: 173 mg/dL (08 May 2019 06:45)  POCT Blood Glucose.: 134 mg/dL (08 May 2019 01:04)  POCT Blood Glucose.: 160 mg/dL (07 May 2019 21:13)  POCT Blood Glucose.: 163 mg/dL (07 May 2019 16:14)  POCT Blood Glucose.: 169 mg/dL (07 May 2019 10:49)

## 2019-05-08 NOTE — PROGRESS NOTE ADULT - ASSESSMENT
A/p: A/p:85 yo M with history of b/l laparoscopic robotic-assisted inguinal hernia repair presenting with b/l segmental pulmonary embolisms, left lung abscess, pneumoperitoneum, and distal descending colon abscess (likely source of pneumoperitoneum), significant iliofemoral DVT burden, s/p IVCF placement and IR drainage of LLQ abdominal collection (4/5), s/p ex-lap, LAURYN, sigmoidectomy, drain placement in R. paracolic gutter, and abthera vac placement (4/7), s/p planned RTOR, packing removal, and end colostomy creation (4/10), now w/ MRSA + proteus PNA, persistent coagulopathy, LLL pulmonary empyema s/p bedside pigtail drainage (4/15)    Neuro: Fentanyl prn  CV: MAP>65; midodrine 15q8h; asa 81 lipitor Hgb 6.9- repeat cbc with t&s Lasix 40 bid.   Pulm: AC 40/530/12/5. trach (4/24). bilateral PE's; Empyema- s/p left CTx on 4/15 CPAP trial daily  GI: NPO, PEG placed 4/26; TF Nepro @40, free water, reglan 10tid standing for gastric motility. malnourished, Vit C, zinc; RUQ US W/Doppler wnl  : incontinent, bladder scan and straight cath PRN. JESUS. sodium bicarb tid increased, Started phoslo  ID: +kleb, proteus, strep in abd Cx, flagyl Sputum cx: MRSA and proteus (sensitive to cefepine) , ///Dc'd:  (4/9-5/4), cefepime (4/9-5/4); vancomycin by level (4/10-4/26)  ceftriaxone (4/9-4/9)// unasyn (4/8-4/9),  Vanco (4/5-4/7), Zosyn (4/5-4/8)]  Endo: ISS  Heme: bilateral iliofemoral DVT / bilateral PE; s/p IVC Filter; elevated INR s/p multiple vit K, Lupus Anticoag- On Heparin gtt (60-80 goal)   PPx: SCDs; Heparin gtt  Lines: PIVs, PICC (4/6--),  /// D/C: R cordis (4/7-4/11) L A line (4/13--)  Wounds/drains: LLQ IR drain (4/5 -- ), LUQ PHYLLIS (4/7 -- ), --- drains to bulb suction poss d/c.   abdominal incision SQ space wet to dry QD. D/c: R paracolic gutter drain (4/7 -4/26)   PT/OT: early mob 4/11 - PT. A/p: A/p:85 yo M with history of b/l laparoscopic robotic-assisted inguinal hernia repair presenting with b/l segmental pulmonary embolisms, left lung abscess, pneumoperitoneum, and distal descending colon abscess (likely source of pneumoperitoneum), significant iliofemoral DVT burden, s/p IVCF placement and IR drainage of LLQ abdominal collection (4/5), s/p ex-lap, LAURYN, sigmoidectomy, drain placement in R. paracolic gutter, and abthera vac placement (4/7), s/p planned RTOR, packing removal, and end colostomy creation (4/10), now w/ MRSA + proteus PNA, persistent coagulopathy, LLL pulmonary empyema s/p bedside pigtail drainage (4/15)    Neuro: Fentanyl prn  CV: MAP>65; midodrine 15q8h; asa 81 ; no plan for CBC/transfusion; continue Lasix 60mg via PEG BID to try to maintain urine output as a comfort measure  Pulm: AC 40/530/12/5. trach (4/24). bilateral PE's; Empyema- s/p left CTx on 4/15 ; fails CPAP trial daily; Maintain Chest tube to suction  GI: PEG placed 4/26; TF Nepro @40  : incontinent, bladder scan and straight cath PRN. JESUS. sodium bicarb qid increased,   ID: +kleb, proteus, strep in abd Cx, flagyl Sputum cx: MRSA and proteus (sensitive to cefepine) , ///Dc'd:  (4/9-5/4), cefepime (4/9-5/4); vancomycin by level (4/10-4/26)  ceftriaxone (4/9-4/9)// unasyn (4/8-4/9),  Vanco (4/5-4/7), Zosyn (4/5-4/8)]; completed abx and no new abx per discussion with pts brother  Endo: ISS  Heme: bilateral iliofemoral DVT / bilateral PE; s/p IVC Filter; elevated INR s/p multiple vit K, Lupus Anticoag- On Heparin gtt (60-80 goal)   PPx: SCDs; Heparin gtt  Lines: PIVs, PICC (4/6--),  /// D/C: R cordis (4/7-4/11) L A line (4/13--)  Wounds/drains: LLQ IR drain (4/5 -- ), LUQ PHYLLIS (4/7 -- ), --- drains to bulb suction poss d/c.   abdominal incision SQ space wet to dry QD. D/c: R paracolic gutter drain (4/7 -4/26)   PT/OT: early mob 4/11 - PT.

## 2019-05-08 NOTE — PROVIDER CONTACT NOTE (MEDICATION) - SITUATION
PT on heparin 850 units per hr. Therapeutic range 60-80 PTT patient specific. Last PTT 05/06 subtherapeutic range of 54.4,

## 2019-05-08 NOTE — DISCHARGE NOTE PROVIDER - CARE PROVIDER_API CALL
Jayden Valencia)  Surgery  [address]  [place], [place] [address]  Phone: [phone]  Fax: [phone]  Follow Up Time:

## 2019-05-08 NOTE — PROGRESS NOTE ADULT - PROBLEM SELECTOR PROBLEM 1
Abscess of lower lobe of left lung with pneumonia
Palliative care by specialist

## 2019-05-08 NOTE — PROVIDER CONTACT NOTE (OTHER) - ACTION/TREATMENT ORDERED:
MD Don at bedside. Plan to give 12.5mcg fentanyl in case pt is in pain and IV tylenol 1g to treat fever. Ice packs placed. Will reassess shortly.
PA at bedside trouble shooting chest tube, will continue to monitor as ordered
Continue to assess the sites
Continue to monitor
D/C fluids, HFNC ordered
Notified SICU MD and bedside ECG and Lab done per order. Continue to monitor.
assessed pt at bedside, continue with heparin gtt at 8.5cc, will order PPI. ok to leave chest tube to low wall suction with bubbling.

## 2019-05-08 NOTE — PROVIDER CONTACT NOTE (OTHER) - ASSESSMENT
RR 25-30 02 sat 99% on vent, tolerating settings.  no signs of distress.  L chest tube not bubbling and holding suction.  insertion site re-enforced and line checked for kinks
Pt Hemodynamically stable without acute distress
Pt a-line was not in good waveform.
Pt complained of difficulty breathing and "not getting enough oxygen"
Pt right side drainage is more compared to left side. The color is yellow/clear.
pt is sat >95% on ACVC, NST. pt on heparin gtt 8.5cc, pending am lab to populate. bubbling was not present hr prior. Team 1 was paged 30 min prior.

## 2019-05-08 NOTE — PROVIDER CONTACT NOTE (OTHER) - SITUATION
upon AM shift assessment to reposition pt, RN noted bubbling in water chamber and yellow saturation at pigtail and chest tube connection site. RN also notes blood in pt colostomy.

## 2019-05-08 NOTE — CHART NOTE - NSCHARTNOTEFT_GEN_A_CORE
Pt with bloody output from ostomy today and Heparin gtt d/jameson. will hold with risk of bleeding greater than benefit at this time. Continue comfort care.

## 2019-05-08 NOTE — DISCHARGE NOTE PROVIDER - NSDCFUADDINST_GEN_ALL_CORE_FT
Follow up with Dr. Valencia as needed. Call the office at the number below to schedule your appointment if needed.    General:  - Patient's family has decided to keep the patient on comfort care measures only. No escalation of care. No cultures, antibiotics, pressors, bicarb, dialysis, central lines, etc.  - Patient remains on a ventilator  - Patient has Chest Tube to wall suction  - Patient has a PEG tube and is currently receiving tube feeds of NeproRTH at 40cc/hr. Please continue as tolerated.  - Patient's anti-coagulation is being held secondary to possible blood in ostomy. Please restart as appropriate.    Wound Care:  - Continue routine ostomy care and maintenance as needed  - Midline wound should be changed daily as wet-to-dry dressing  - Drain sponge should be used around PEG tube, changed daily    Pain Control  - Patient is currently on Morphine 3mg q3h and morphine 5mg q3h IV for moderate and severe pain. Please continue comfort care and pain control as appropriate.

## 2019-05-08 NOTE — PROGRESS NOTE ADULT - PROBLEM SELECTOR PLAN 1
family seems clear with their goals of care for pt, signing off. Patient to have access to supportive services during rest of hospital stay as the pt/family deems necessary i.e. Chaplaincy, Massage Therapy, Music Therapy, Pt/family supportive services, Palliative SW, etc.  As identified during the patients PSSA screening the patient would benefit from: chaplaincy, but sister only wants  visits when family is present

## 2019-05-08 NOTE — DISCHARGE NOTE PROVIDER - NSDCCPCAREPLAN_GEN_ALL_CORE_FT
PRINCIPAL DISCHARGE DIAGNOSIS  Diagnosis: Perforated bowel  Assessment and Plan of Treatment:       SECONDARY DISCHARGE DIAGNOSES  Diagnosis: Pulmonary embolism  Assessment and Plan of Treatment:     Diagnosis: Empyema  Assessment and Plan of Treatment:     Diagnosis: Abscess  Assessment and Plan of Treatment:

## 2019-05-08 NOTE — PROVIDER CONTACT NOTE (MEDICATION) - ACTION/TREATMENT ORDERED:
Dilaudid 0.5mg
per MD, will order PTT this AM. maintain heparin gtt at 850units/hr.   no further interventions at this time.

## 2019-05-08 NOTE — DISCHARGE NOTE PROVIDER - HOSPITAL COURSE
Mr. Cha is an 87 yo M, poor historian, with history of CAD s/p 3 stents (2012, 2009, 2000) still on ASA/Plavix, ETOH abuse, b/l inguinal hernias s/p laparoscopic robotic-assisted repair of bilateral inguinal hernias on 3/11/2019 (notably, 4.5L of ascites were also drained in the beginning of the procedure and sent for cytopathology) presented with right inguinal hernia discomfort. In the ED on 04/05  pt had a CT ab/pelvis with IV contrast completed which showed 1) bilateral segmental pulmonary emboli, 2) left lung empyema, 3) large pneumoperitoneum, 4) 10 x 5 cm abscess in LLQ (possible source of pneumoperitoneum), 5) 6 x 4 cm fluid collection in right groin, and 6) DVT in bilateral common femoral vein, central venous pelvic thrombus in the left common iliac and internal iliac veins. Overnight pt had IR drainage of LLQ collection + IVC filter placement. On 4/6 edema noted on exam and albumin 1.3, started albumin 25%. MAPs 50s at noon, unresponsive to NS 500cc , levo started, R radial A line placed, alejandra to place PICC. Abd Cx +GPCs, few GNR, echo without evidence of R heart strain. B lines on bedside US in afternoon, HL IVF and given 20 lasix. On 4/7 TPN started. taken to OR for Exploratory laparotomy, LAURYN, sigmoidectomy, abdominal packing and placement of ABThera VAC. Pt returned to SICU intubated. Overnight ON: Copious drainage from drains and vac, given 4 ffp, 1 platelet, 10 cryo, 10 vit k, drainage decreased. On 4/10 pt was taken back to the OR for exlap - abthera vac removed, previous small bowel anastomosis intact, end colostomy w/ descending colon, fascia closed, wound vac in SQ space; post op labs: hgb 8.4; platelet 110; lactate normal 1.1; HIT panel ordered and negative; MRSA in sputum cx; vancomycin. On 4/13 4/13: repeat CT of chest today shows progression and increase in size of empyema. Rao clogged with sediment - flushed. Doing well on CPAP. IV lasix (60 mg) given this AM: Negative 1.1 L @4pm. + Ostomy function TF advanced. On 4/15 Sputum cx + proteus vulgaris and staph aureus. Chest tube placed in afternoon after 4 units of ffp. given lasix in afternoon. Overnight ET tube advanced 3cm, pulm administered tPa, given additional lasix 40 ovn. On 4/17: vanc trough 18, given 1g at 6AM. Midodrine 5 TID started. cpap trial in AM; DC fentanyl gtt --> fentanyl PRN; Net -2L; IV lasix 40 in AM with appropriate response; Heme consult-factor 7 low, factor 5, 8, ESR hptgn, ldh, d-dimer, fibrinogen sent. Wound vac changed. ON: random vanc level 18, lasix 40 given with 40K. 4/18: Tolerated T-Peice trial for an hour - will attempt extubation tomorrow after further diuresis. lasix 40 x 2 today. GI consulted for ascites, 24 hr urea nitrogen. 4/19; Lethargic in AM; did not tolerate Cpap; no T-piece in AM; Prealbumin 7 [4 on 4/9]; Hexagonal Phase +; IV lasix at 7:30AM; 1U PRBC for hgb 7.2, start SQH. 4/20: extubated. lasix at 5AM and 1pm, vanc trough good (15.7), incr midodrine to 15q8h. On 4/21 re-intubated for aspiration w/ hypoxia and tachypnea, T 100.9/tachy/lowUOP/increased CR, given 500cc 5% albumin with improvement. On 4/22; CT chest/abd/pelvis ordered, sputum cx sent, UA, Serum proteing electrophoresis and total immunoglobin, D5W@70 for hypernatremia, Temp 101.4 at 1 pm, Blood cultures sent, JAK2 sent, 250cc 5% Albumin bolus, then 500 cc 5%Albumin bolus. Doppler done on 4/21 negative for budd chiari. Pt on metolazone. Given tylenol for temp 101. WBC 30 this am Hgb drifting down to 7.9 - repeat @8am with typen and screen. Started hep gtt PTT@2am: 135- decreased rate to 11 repeat @am INR 4.7. Uo low - rao flushed with good response. bp high with levo @19 this am. On 4/23 significant SQ air noted on CXR, +crepitus on exam, no pneumothorax; Chest tube placed back to wall suction; CT demonstrating severe enterocolitis; cdiff negative; consented for trach and peg. Repeat CXR@5pm equivocal pneumo, possible small pneumo per rads, On 4/24: trach placed at bedside. Metolazone DC'd, dressing changed, DC'd Albumin 25%, On 4/26 PEG placed. On 4/27: TF started. ptt 56.4,  - repeat @2pm:57.8  Repeat @8pm, drains placed to bulb suction. On 4/28 4/28: PTT therapeutic. TF residual >300, added reglan for gastric motility and made NPO. On 4/29: Heme- unable to give noac or lovenox however INR high therefore unable to follow level when on coumadin. US of kidneyas: Urine: . tf changed to nepro. Rao remvoed TOV @8pm. Overnight rested on AC overnight. passed TOV (incontinent), negative bladder scan. On 4/30: PTT therapeutic x3. Family discussions with SICU attending. Patient's family will have family discussion to decide regarding withdrawal of care and regarding DNR/DNI status. On 5/1: 40 Lasix IV given in AM. Wound with good granulation tissue. HCO3 dose increased to 650 mg q8 hours. Erik will talk to brother/sister on goals of care. On 5/2: anasarcic, 40mg lasix started bid. feeding weight of 62kg. goals of care discussion in progress. On 5/3: am PTT 87, decreased hep gtt to 8.5. tachypnea possibly 2/2 overfeeding - TF decreased to 40cc. 2pm PTT WNL. Next PTT in AM. On 5/4: PTT 59, no changes made. HR consistently 110-120s, not improved with pain control. last day of Abx. Unable to visualize left renal vessels on US, right side negative for thrombosis. On 5/5: HGB 6.9 Repeat 7.0 - no changes.  started phoslo increased sodium bicarb. Dr lizarraga says keep joni until monday then discuss with Octavio. Air leak no longer present in am but trouble shooted line with return of leak. Again in afternoon air leak gone but unable to get it back. Dr IAM adhikari will recheck. CXR no pneumo, Airleak in Chest tube, tolerating CPAP, 8 runs of NSVT. On 5/6: no air leak this AM. On CPAP. Wound w/ good granulation. 2 amps bicarb. Mamie spoke to brother - pt made DNR. On 5/7: wound w/ good granulation tissue. Palliative consult. Fentanyl dose increased. On 5/8: AM ptt 49.5. Per Dr. Gee, ok, leave hgtt at current rate. Pt stable for transfer to LTAC for continued comfort care. Family wish for comfort care to be the primary focus of care. Pt DNR- no escalation of care - No cultures, abx, pressors, bicarb, dialysis, central lines. Mr. Cha is an 85 yo M, poor historian, with history of CAD s/p 3 stents (2012, 2009, 2000) still on ASA/Plavix, ETOH abuse, b/l inguinal hernias s/p laparoscopic robotic-assisted repair of bilateral inguinal hernias on 3/11/2019 (notably, 4.5L of ascites were also drained in the beginning of the procedure and sent for cytopathology) presented with right inguinal hernia discomfort. In the ED on 04/05  pt had a CT ab/pelvis with IV contrast completed which showed 1) bilateral segmental pulmonary emboli, 2) left lung empyema, 3) large pneumoperitoneum, 4) 10 x 5 cm abscess in LLQ (possible source of pneumoperitoneum), 5) 6 x 4 cm fluid collection in right groin, and 6) DVT in bilateral common femoral vein, central venous pelvic thrombus in the left common iliac and internal iliac veins. Overnight pt had IR drainage of LLQ collection + IVC filter placement. On 4/6 edema noted on exam and albumin 1.3, started albumin 25%. MAPs 50s at noon, unresponsive to NS 500cc , levo started, R radial A line placed, alejandra to place PICC. Abd Cx +GPCs, few GNR, echo without evidence of R heart strain. B lines on bedside US in afternoon, HL IVF and given 20 lasix. On 4/7 TPN started. taken to OR for Exploratory laparotomy, LAURYN, sigmoidectomy, abdominal packing and placement of ABThera VAC. Pt returned to SICU intubated. Overnight ON: Copious drainage from drains and vac, given 4 ffp, 1 platelet, 10 cryo, 10 vit k, drainage decreased. On 4/10 pt was taken back to the OR for exlap - abthera vac removed, previous small bowel anastomosis intact, end colostomy w/ descending colon, fascia closed, wound vac in SQ space; post op labs: hgb 8.4; platelet 110; lactate normal 1.1; HIT panel ordered and negative; MRSA in sputum cx; vancomycin. On 4/13 4/13: repeat CT of chest today shows progression and increase in size of empyema. Rao clogged with sediment - flushed. Doing well on CPAP. IV lasix (60 mg) given this AM: Negative 1.1 L @4pm. + Ostomy function TF advanced. On 4/15 Sputum cx + proteus vulgaris and staph aureus. Chest tube placed in afternoon after 4 units of ffp. given lasix in afternoon. Overnight ET tube advanced 3cm, pulm administered tPa, given additional lasix 40 ovn. On 4/17: vanc trough 18, given 1g at 6AM. Midodrine 5 TID started. cpap trial in AM; DC fentanyl gtt --> fentanyl PRN; Net -2L; IV lasix 40 in AM with appropriate response; Heme consult-factor 7 low, factor 5, 8, ESR hptgn, ldh, d-dimer, fibrinogen sent. Wound vac changed. ON: random vanc level 18, lasix 40 given with 40K. 4/18: Tolerated T-Peice trial for an hour - will attempt extubation tomorrow after further diuresis. lasix 40 x 2 today. GI consulted for ascites, 24 hr urea nitrogen. 4/19; Lethargic in AM; did not tolerate Cpap; no T-piece in AM; Prealbumin 7 [4 on 4/9]; Hexagonal Phase +; IV lasix at 7:30AM; 1U PRBC for hgb 7.2, start SQH. 4/20: extubated. lasix at 5AM and 1pm, vanc trough good (15.7), incr midodrine to 15q8h. On 4/21 re-intubated for aspiration w/ hypoxia and tachypnea, T 100.9/tachy/lowUOP/increased CR, given 500cc 5% albumin with improvement. On 4/22; CT chest/abd/pelvis ordered, sputum cx sent, UA, Serum proteing electrophoresis and total immunoglobin, D5W@70 for hypernatremia, Temp 101.4 at 1 pm, Blood cultures sent, JAK2 sent, 250cc 5% Albumin bolus, then 500 cc 5%Albumin bolus. Doppler done on 4/21 negative for budd chiari. Pt on metolazone. Given tylenol for temp 101. WBC 30 this am Hgb drifting down to 7.9 - repeat @8am with typen and screen. Started hep gtt PTT@2am: 135- decreased rate to 11 repeat @am INR 4.7. Uo low - rao flushed with good response. bp high with levo @19 this am. On 4/23 significant SQ air noted on CXR, +crepitus on exam, no pneumothorax; Chest tube placed back to wall suction; CT demonstrating severe enterocolitis; cdiff negative; consented for trach and peg. Repeat CXR@5pm equivocal pneumo, possible small pneumo per rads, On 4/24: trach placed at bedside. Metolazone DC'd, dressing changed, DC'd Albumin 25%, On 4/26 PEG placed. On 4/27: TF started. ptt 56.4,  - repeat @2pm:57.8  Repeat @8pm, drains placed to bulb suction. On 4/28 4/28: PTT therapeutic. TF residual >300, added reglan for gastric motility and made NPO. On 4/29: Heme- unable to give noac or lovenox however INR high therefore unable to follow level when on coumadin. US of kidneyas: Urine: . tf changed to nepro. Rao remvoed TOV @8pm. Overnight rested on AC overnight. passed TOV (incontinent), negative bladder scan. On 4/30: PTT therapeutic x3. Family discussions with SICU attending. Patient's family will have family discussion to decide regarding withdrawal of care and regarding DNR/DNI status. On 5/1: 40 Lasix IV given in AM. Wound with good granulation tissue. HCO3 dose increased to 650 mg q8 hours. Erik will talk to brother/sister on goals of care. On 5/2: anasarcic, 40mg lasix started bid. feeding weight of 62kg. goals of care discussion in progress. On 5/3: am PTT 87, decreased hep gtt to 8.5. tachypnea possibly 2/2 overfeeding - TF decreased to 40cc. 2pm PTT WNL. Next PTT in AM. On 5/4: PTT 59, no changes made. HR consistently 110-120s, not improved with pain control. last day of Abx. Unable to visualize left renal vessels on US, right side negative for thrombosis. On 5/5: HGB 6.9 Repeat 7.0 - no changes.  started phoslo increased sodium bicarb. Dr lizarraga says keep joni until monday then discuss with Octavio. Air leak no longer present in am but trouble shooted line with return of leak. Again in afternoon air leak gone but unable to get it back. Dr IAM adhikari will recheck. CXR no pneumo, Airleak in Chest tube, tolerating CPAP, 8 runs of NSVT. On 5/6: no air leak this AM. On CPAP. Wound w/ good granulation. 2 amps bicarb. Mamie spoke to brother - pt made DNR. On 5/7: wound w/ good granulation tissue. Palliative consult. Fentanyl dose increased. On 5/8: AM ptt 49.5. Per Dr. Gee, ok, leave hgtt at current rate. Overnight on 5/8, the patient had some scant blood in his ostomy bag. Due to concern for bleeding, anti-coagulation was held per Dr. Gee. Risks of bleeding outweigh benefits of anticoagulation. The patient is otherwise ready for transfer to [facility] for continued comfort care. Family wish for comfort care to be the primary focus of care. Pt DNR- no escalation of care - No cultures, abx, pressors, bicarb, dialysis, central lines.

## 2019-05-08 NOTE — PROGRESS NOTE ADULT - ATTENDING COMMENTS
Pt stable for transfer to LTAC for continued comfort care. would stop anticoagulation if any evidence of bleeding. Family understands no chance for meaningful independent recovery and wish for comfort care to be the primary focus of care.

## 2019-05-08 NOTE — PROGRESS NOTE ADULT - ASSESSMENT
87 y/o Male with severe protein malnutrition, h/o b/CAD with stents, HLD, HTN, prostate surgery, GERD, and bilateral inguinal hernias s/p lap robotic repair and 4.5L ascites drainage on 3/11, then sent to [facility], readmitted initially with right inguinal discomfort subsequently found to have bilateral PEs, DVTs, pneumoperitoneum, abdominal abscess s/p 4/7 LAURYN, ex lap, SBR, sigmoid rsxn and intraabdominal fluid drainage and 4/10 abd wall closure and end colostomy maturation with VAC.  Hospital course complicated with coagulopathy, LLL abscess, MRSA pna requiring CT, and MOSF, requiring trach and peg with poor overall prognosis. Pt seen for goals of care

## 2019-05-09 ENCOUNTER — TRANSCRIPTION ENCOUNTER (OUTPATIENT)
Age: 84
End: 2019-05-09

## 2019-05-09 VITALS
RESPIRATION RATE: 26 BRPM | HEART RATE: 116 BPM | OXYGEN SATURATION: 100 % | DIASTOLIC BLOOD PRESSURE: 55 MMHG | SYSTOLIC BLOOD PRESSURE: 96 MMHG

## 2019-05-09 PROCEDURE — 86381 MITOCHONDRIAL ANTIBODY EACH: CPT

## 2019-05-09 PROCEDURE — 83690 ASSAY OF LIPASE: CPT

## 2019-05-09 PROCEDURE — 49406 IMAGE CATH FLUID PERI/RETRO: CPT

## 2019-05-09 PROCEDURE — 87186 SC STD MICRODIL/AGAR DIL: CPT

## 2019-05-09 PROCEDURE — P9047: CPT

## 2019-05-09 PROCEDURE — 84295 ASSAY OF SERUM SODIUM: CPT

## 2019-05-09 PROCEDURE — 74177 CT ABD & PELVIS W/CONTRAST: CPT

## 2019-05-09 PROCEDURE — P9012: CPT

## 2019-05-09 PROCEDURE — 71045 X-RAY EXAM CHEST 1 VIEW: CPT

## 2019-05-09 PROCEDURE — 81332 SERPINA1 GENE: CPT

## 2019-05-09 PROCEDURE — 85260 CLOT FACTOR X STUART-POWER: CPT

## 2019-05-09 PROCEDURE — 85230 CLOT FACTOR VII PROCONVERTIN: CPT

## 2019-05-09 PROCEDURE — 82962 GLUCOSE BLOOD TEST: CPT

## 2019-05-09 PROCEDURE — 84540 ASSAY OF URINE/UREA-N: CPT

## 2019-05-09 PROCEDURE — 80048 BASIC METABOLIC PNL TOTAL CA: CPT

## 2019-05-09 PROCEDURE — 85598 HEXAGNAL PHOSPH PLTLT NEUTRL: CPT

## 2019-05-09 PROCEDURE — 83540 ASSAY OF IRON: CPT

## 2019-05-09 PROCEDURE — 99285 EMERGENCY DEPT VISIT HI MDM: CPT | Mod: 25

## 2019-05-09 PROCEDURE — 87070 CULTURE OTHR SPECIMN AEROBIC: CPT

## 2019-05-09 PROCEDURE — 82150 ASSAY OF AMYLASE: CPT

## 2019-05-09 PROCEDURE — 85379 FIBRIN DEGRADATION QUANT: CPT

## 2019-05-09 PROCEDURE — P9016: CPT

## 2019-05-09 PROCEDURE — 82728 ASSAY OF FERRITIN: CPT

## 2019-05-09 PROCEDURE — L8699: CPT

## 2019-05-09 PROCEDURE — 82803 BLOOD GASES ANY COMBINATION: CPT

## 2019-05-09 PROCEDURE — 86022 PLATELET ANTIBODIES: CPT

## 2019-05-09 PROCEDURE — P9017: CPT

## 2019-05-09 PROCEDURE — 85730 THROMBOPLASTIN TIME PARTIAL: CPT

## 2019-05-09 PROCEDURE — 83615 LACTATE (LD) (LDH) ENZYME: CPT

## 2019-05-09 PROCEDURE — 97110 THERAPEUTIC EXERCISES: CPT

## 2019-05-09 PROCEDURE — 93976 VASCULAR STUDY: CPT

## 2019-05-09 PROCEDURE — 81001 URINALYSIS AUTO W/SCOPE: CPT

## 2019-05-09 PROCEDURE — 84156 ASSAY OF PROTEIN URINE: CPT

## 2019-05-09 PROCEDURE — 84484 ASSAY OF TROPONIN QUANT: CPT

## 2019-05-09 PROCEDURE — C1769: CPT

## 2019-05-09 PROCEDURE — 87324 CLOSTRIDIUM AG IA: CPT

## 2019-05-09 PROCEDURE — 83986 ASSAY PH BODY FLUID NOS: CPT

## 2019-05-09 PROCEDURE — 84311 SPECTROPHOTOMETRY: CPT

## 2019-05-09 PROCEDURE — 84466 ASSAY OF TRANSFERRIN: CPT

## 2019-05-09 PROCEDURE — C1887: CPT

## 2019-05-09 PROCEDURE — 85210 CLOT FACTOR II PROTHROM SPEC: CPT

## 2019-05-09 PROCEDURE — 80053 COMPREHEN METABOLIC PANEL: CPT

## 2019-05-09 PROCEDURE — 84100 ASSAY OF PHOSPHORUS: CPT

## 2019-05-09 PROCEDURE — 36415 COLL VENOUS BLD VENIPUNCTURE: CPT

## 2019-05-09 PROCEDURE — 82436 ASSAY OF URINE CHLORIDE: CPT

## 2019-05-09 PROCEDURE — 85045 AUTOMATED RETICULOCYTE COUNT: CPT

## 2019-05-09 PROCEDURE — 82784 ASSAY IGA/IGD/IGG/IGM EACH: CPT

## 2019-05-09 PROCEDURE — 88112 CYTOPATH CELL ENHANCE TECH: CPT

## 2019-05-09 PROCEDURE — 82042 OTHER SOURCE ALBUMIN QUAN EA: CPT

## 2019-05-09 PROCEDURE — 94002 VENT MGMT INPAT INIT DAY: CPT

## 2019-05-09 PROCEDURE — 82330 ASSAY OF CALCIUM: CPT

## 2019-05-09 PROCEDURE — 84300 ASSAY OF URINE SODIUM: CPT

## 2019-05-09 PROCEDURE — 85240 CLOT FACTOR VIII AHG 1 STAGE: CPT

## 2019-05-09 PROCEDURE — 96375 TX/PRO/DX INJ NEW DRUG ADDON: CPT | Mod: XU

## 2019-05-09 PROCEDURE — 84478 ASSAY OF TRIGLYCERIDES: CPT

## 2019-05-09 PROCEDURE — C1880: CPT

## 2019-05-09 PROCEDURE — 84155 ASSAY OF PROTEIN SERUM: CPT

## 2019-05-09 PROCEDURE — 85291 CLOT FACTOR XIII FIBRIN SCRN: CPT

## 2019-05-09 PROCEDURE — 84165 PROTEIN E-PHORESIS SERUM: CPT

## 2019-05-09 PROCEDURE — 84132 ASSAY OF SERUM POTASSIUM: CPT

## 2019-05-09 PROCEDURE — 81003 URINALYSIS AUTO W/O SCOPE: CPT

## 2019-05-09 PROCEDURE — 86334 IMMUNOFIX E-PHORESIS SERUM: CPT

## 2019-05-09 PROCEDURE — 87075 CULTR BACTERIA EXCEPT BLOOD: CPT

## 2019-05-09 PROCEDURE — 82390 ASSAY OF CERULOPLASMIN: CPT

## 2019-05-09 PROCEDURE — 83550 IRON BINDING TEST: CPT

## 2019-05-09 PROCEDURE — 97163 PT EVAL HIGH COMPLEX 45 MIN: CPT

## 2019-05-09 PROCEDURE — 85611 PROTHROMBIN TEST: CPT

## 2019-05-09 PROCEDURE — P9045: CPT

## 2019-05-09 PROCEDURE — 93306 TTE W/DOPPLER COMPLETE: CPT

## 2019-05-09 PROCEDURE — 85025 COMPLETE CBC W/AUTO DIFF WBC: CPT

## 2019-05-09 PROCEDURE — 99153 MOD SED SAME PHYS/QHP EA: CPT

## 2019-05-09 PROCEDURE — 82945 GLUCOSE OTHER FLUID: CPT

## 2019-05-09 PROCEDURE — 76770 US EXAM ABDO BACK WALL COMP: CPT

## 2019-05-09 PROCEDURE — 82525 ASSAY OF COPPER: CPT

## 2019-05-09 PROCEDURE — 87102 FUNGUS ISOLATION CULTURE: CPT

## 2019-05-09 PROCEDURE — 88305 TISSUE EXAM BY PATHOLOGIST: CPT

## 2019-05-09 PROCEDURE — 82040 ASSAY OF SERUM ALBUMIN: CPT

## 2019-05-09 PROCEDURE — 71046 X-RAY EXAM CHEST 2 VIEWS: CPT

## 2019-05-09 PROCEDURE — 71250 CT THORAX DX C-: CPT

## 2019-05-09 PROCEDURE — 86038 ANTINUCLEAR ANTIBODIES: CPT

## 2019-05-09 PROCEDURE — 87040 BLOOD CULTURE FOR BACTERIA: CPT

## 2019-05-09 PROCEDURE — 87340 HEPATITIS B SURFACE AG IA: CPT

## 2019-05-09 PROCEDURE — 85220 BLOOC CLOT FACTOR V TEST: CPT

## 2019-05-09 PROCEDURE — 93005 ELECTROCARDIOGRAM TRACING: CPT

## 2019-05-09 PROCEDURE — 85280 CLOT FACTOR XII HAGEMAN: CPT

## 2019-05-09 PROCEDURE — 85732 THROMBOPLASTIN TIME PARTIAL: CPT

## 2019-05-09 PROCEDURE — 96365 THER/PROPH/DIAG IV INF INIT: CPT | Mod: XU

## 2019-05-09 PROCEDURE — 83935 ASSAY OF URINE OSMOLALITY: CPT

## 2019-05-09 PROCEDURE — 80076 HEPATIC FUNCTION PANEL: CPT

## 2019-05-09 PROCEDURE — 83010 ASSAY OF HAPTOGLOBIN QUANT: CPT

## 2019-05-09 PROCEDURE — 87086 URINE CULTURE/COLONY COUNT: CPT

## 2019-05-09 PROCEDURE — 86255 FLUORESCENT ANTIBODY SCREEN: CPT

## 2019-05-09 PROCEDURE — 86706 HEP B SURFACE ANTIBODY: CPT

## 2019-05-09 PROCEDURE — 84436 ASSAY OF TOTAL THYROXINE: CPT

## 2019-05-09 PROCEDURE — 93975 VASCULAR STUDY: CPT

## 2019-05-09 PROCEDURE — C1729: CPT

## 2019-05-09 PROCEDURE — 85384 FIBRINOGEN ACTIVITY: CPT

## 2019-05-09 PROCEDURE — 86901 BLOOD TYPING SEROLOGIC RH(D): CPT

## 2019-05-09 PROCEDURE — 84443 ASSAY THYROID STIM HORMONE: CPT

## 2019-05-09 PROCEDURE — 86923 COMPATIBILITY TEST ELECTRIC: CPT

## 2019-05-09 PROCEDURE — 71260 CT THORAX DX C+: CPT

## 2019-05-09 PROCEDURE — 87449 NOS EACH ORGANISM AG IA: CPT

## 2019-05-09 PROCEDURE — 99233 SBSQ HOSP IP/OBS HIGH 50: CPT | Mod: GC

## 2019-05-09 PROCEDURE — 94640 AIRWAY INHALATION TREATMENT: CPT

## 2019-05-09 PROCEDURE — 85270 CLOT FACTOR XI PTA: CPT

## 2019-05-09 PROCEDURE — 84134 ASSAY OF PREALBUMIN: CPT

## 2019-05-09 PROCEDURE — 76705 ECHO EXAM OF ABDOMEN: CPT

## 2019-05-09 PROCEDURE — 86850 RBC ANTIBODY SCREEN: CPT

## 2019-05-09 PROCEDURE — 97161 PT EVAL LOW COMPLEX 20 MIN: CPT

## 2019-05-09 PROCEDURE — 85290 CLOT FACTOR XIII FIBRIN STAB: CPT

## 2019-05-09 PROCEDURE — 85018 HEMOGLOBIN: CPT

## 2019-05-09 PROCEDURE — 84157 ASSAY OF PROTEIN OTHER: CPT

## 2019-05-09 PROCEDURE — 99152 MOD SED SAME PHYS/QHP 5/>YRS: CPT

## 2019-05-09 PROCEDURE — 85652 RBC SED RATE AUTOMATED: CPT

## 2019-05-09 PROCEDURE — 87184 SC STD DISK METHOD PER PLATE: CPT

## 2019-05-09 PROCEDURE — 85250 CLOT FACTOR IX PTC/CHRSTMAS: CPT

## 2019-05-09 PROCEDURE — 83735 ASSAY OF MAGNESIUM: CPT

## 2019-05-09 PROCEDURE — 84133 ASSAY OF URINE POTASSIUM: CPT

## 2019-05-09 PROCEDURE — 97530 THERAPEUTIC ACTIVITIES: CPT

## 2019-05-09 PROCEDURE — 74176 CT ABD & PELVIS W/O CONTRAST: CPT

## 2019-05-09 PROCEDURE — 85610 PROTHROMBIN TIME: CPT

## 2019-05-09 PROCEDURE — 80074 ACUTE HEPATITIS PANEL: CPT

## 2019-05-09 PROCEDURE — 94003 VENT MGMT INPAT SUBQ DAY: CPT

## 2019-05-09 PROCEDURE — 85027 COMPLETE CBC AUTOMATED: CPT

## 2019-05-09 PROCEDURE — 88307 TISSUE EXAM BY PATHOLOGIST: CPT

## 2019-05-09 PROCEDURE — 83605 ASSAY OF LACTIC ACID: CPT

## 2019-05-09 PROCEDURE — 85670 THROMBIN TIME PLASMA: CPT

## 2019-05-09 PROCEDURE — 82570 ASSAY OF URINE CREATININE: CPT

## 2019-05-09 PROCEDURE — 89051 BODY FLUID CELL COUNT: CPT

## 2019-05-09 PROCEDURE — 37191 INS ENDOVAS VENA CAVA FILTR: CPT

## 2019-05-09 PROCEDURE — 86900 BLOOD TYPING SEROLOGIC ABO: CPT

## 2019-05-09 PROCEDURE — 83880 ASSAY OF NATRIURETIC PEPTIDE: CPT

## 2019-05-09 PROCEDURE — P9035: CPT

## 2019-05-09 PROCEDURE — 87205 SMEAR GRAM STAIN: CPT

## 2019-05-09 PROCEDURE — 80202 ASSAY OF VANCOMYCIN: CPT

## 2019-05-09 PROCEDURE — 36430 TRANSFUSION BLD/BLD COMPNT: CPT

## 2019-05-09 RX ORDER — CLOPIDOGREL BISULFATE 75 MG/1
1 TABLET, FILM COATED ORAL
Qty: 0 | Refills: 0 | DISCHARGE

## 2019-05-09 RX ORDER — SODIUM BICARBONATE 1 MEQ/ML
1 SYRINGE (ML) INTRAVENOUS
Qty: 0 | Refills: 0 | DISCHARGE
Start: 2019-05-09

## 2019-05-09 RX ORDER — FUROSEMIDE 40 MG
3 TABLET ORAL
Qty: 0 | Refills: 0 | DISCHARGE
Start: 2019-05-09

## 2019-05-09 RX ORDER — TAMSULOSIN HYDROCHLORIDE 0.4 MG/1
1 CAPSULE ORAL
Qty: 0 | Refills: 0 | DISCHARGE

## 2019-05-09 RX ORDER — PANTOPRAZOLE SODIUM 20 MG/1
0 TABLET, DELAYED RELEASE ORAL
Qty: 0 | Refills: 0 | DISCHARGE
Start: 2019-05-09

## 2019-05-09 RX ORDER — MORPHINE SULFATE 50 MG/1
5 CAPSULE, EXTENDED RELEASE ORAL
Refills: 0 | Status: DISCONTINUED | OUTPATIENT
Start: 2019-05-09 | End: 2019-05-09

## 2019-05-09 RX ORDER — METOPROLOL TARTRATE 50 MG
1 TABLET ORAL
Qty: 0 | Refills: 0 | DISCHARGE

## 2019-05-09 RX ORDER — MORPHINE SULFATE 50 MG/1
3 CAPSULE, EXTENDED RELEASE ORAL
Qty: 0 | Refills: 0 | DISCHARGE
Start: 2019-05-09

## 2019-05-09 RX ORDER — CHLORHEXIDINE GLUCONATE 213 G/1000ML
1 SOLUTION TOPICAL
Qty: 0 | Refills: 0 | DISCHARGE
Start: 2019-05-09

## 2019-05-09 RX ORDER — ATORVASTATIN CALCIUM 80 MG/1
1 TABLET, FILM COATED ORAL
Qty: 0 | Refills: 0 | DISCHARGE

## 2019-05-09 RX ORDER — ONDANSETRON 8 MG/1
2 TABLET, FILM COATED ORAL
Qty: 0 | Refills: 0 | DISCHARGE
Start: 2019-05-09

## 2019-05-09 RX ORDER — CHLORHEXIDINE GLUCONATE 213 G/1000ML
15 SOLUTION TOPICAL
Qty: 0 | Refills: 0 | DISCHARGE
Start: 2019-05-09

## 2019-05-09 RX ORDER — MIDODRINE HYDROCHLORIDE 2.5 MG/1
3 TABLET ORAL
Qty: 0 | Refills: 0 | DISCHARGE
Start: 2019-05-09

## 2019-05-09 RX ORDER — MORPHINE SULFATE 50 MG/1
1 CAPSULE, EXTENDED RELEASE ORAL
Qty: 0 | Refills: 0 | DISCHARGE

## 2019-05-09 RX ORDER — METOCLOPRAMIDE HCL 10 MG
2 TABLET ORAL
Qty: 0 | Refills: 0 | DISCHARGE
Start: 2019-05-09

## 2019-05-09 RX ORDER — MORPHINE SULFATE 50 MG/1
3 CAPSULE, EXTENDED RELEASE ORAL
Refills: 0 | Status: DISCONTINUED | OUTPATIENT
Start: 2019-05-09 | End: 2019-05-09

## 2019-05-09 RX ADMIN — Medication 60 MILLIGRAM(S): at 06:02

## 2019-05-09 RX ADMIN — PANTOPRAZOLE SODIUM 40 MILLIGRAM(S): 20 TABLET, DELAYED RELEASE ORAL at 12:38

## 2019-05-09 RX ADMIN — Medication 650 MILLIGRAM(S): at 06:03

## 2019-05-09 RX ADMIN — Medication 650 MILLIGRAM(S): at 12:38

## 2019-05-09 RX ADMIN — MIDODRINE HYDROCHLORIDE 15 MILLIGRAM(S): 2.5 TABLET ORAL at 14:14

## 2019-05-09 RX ADMIN — Medication 650 MILLIGRAM(S): at 07:00

## 2019-05-09 RX ADMIN — CHLORHEXIDINE GLUCONATE 15 MILLILITER(S): 213 SOLUTION TOPICAL at 06:02

## 2019-05-09 RX ADMIN — Medication 10 MILLIGRAM(S): at 10:24

## 2019-05-09 RX ADMIN — MORPHINE SULFATE 3 MILLIGRAM(S): 50 CAPSULE, EXTENDED RELEASE ORAL at 10:15

## 2019-05-09 RX ADMIN — Medication 650 MILLIGRAM(S): at 07:30

## 2019-05-09 RX ADMIN — Medication 81 MILLIGRAM(S): at 12:38

## 2019-05-09 RX ADMIN — Medication 100 UNIT(S): at 12:39

## 2019-05-09 RX ADMIN — CHLORHEXIDINE GLUCONATE 1 APPLICATION(S): 213 SOLUTION TOPICAL at 06:04

## 2019-05-09 RX ADMIN — MORPHINE SULFATE 3 MILLIGRAM(S): 50 CAPSULE, EXTENDED RELEASE ORAL at 10:59

## 2019-05-09 RX ADMIN — MIDODRINE HYDROCHLORIDE 15 MILLIGRAM(S): 2.5 TABLET ORAL at 06:04

## 2019-05-09 NOTE — PROGRESS NOTE ADULT - REASON FOR ADMISSION
right inguinal discomfort
right inguinal discomfort
perforated diverticulitis
right inguinal discomfort
Septic shock 2/2 bowel perforation
perforated colon
perforated diverticulitis
right inguinal discomfort

## 2019-05-09 NOTE — DISCHARGE NOTE NURSING/CASE MANAGEMENT/SOCIAL WORK - NSDCDPATPORTLINK_GEN_ALL_CORE
You can access the [facility] Patient Portal, offered by [facility], by registering with the following website: [email or web address]

## 2019-05-09 NOTE — PROGRESS NOTE ADULT - ATTENDING COMMENTS
Pt to leave for LTAC today as per discussion with pts brother Dr. Cha and Dr. Valencia. Pt is primarily receiving comfort care as he continues to fail attempts to wean and has worsening end organ failure.  Now blood noted from colostomy and risk of bleeding on  Heparin in this setting is great and  and has been d/jameson. . Plans discussed with Dr. Valencia For continued comfort care and transfer to LTAC. . LTAC aware of pts current deteriorating clinical condition. I tried to reach pts brother again to update him but was not able to reach him by phone.

## 2019-05-09 NOTE — PROGRESS NOTE ADULT - ASSESSMENT
87 yo M with history of b/l laparoscopic robotic-assisted inguinal hernia repair presenting with b/l segmental pulmonary embolisms, left lung abscess, pneumoperitoneum, and distal descending colon abscess (likely source of pneumoperitoneum), significant iliofemoral DVT burden, s/p IVCF placement and IR drainage of LLQ abdominal collection (4/5), s/p ex-lap, LAURYN, sigmoidectomy, drain placement in R. paracolic gutter, and abthera vac placement (4/7), s/p planned RTOR, packing removal, and end colostomy creation (4/10), now w/ MRSA + proteus PNA, persistent coagulopathy, LLL pulmonary empyema s/p bedside pigtail drainage (4/15)    Pain control PRN (Fentanyl)  NPO with TFs via PEG  Holding heparin for now pending discussions with palliative  Dispo planning today

## 2019-05-09 NOTE — PROGRESS NOTE ADULT - SUBJECTIVE AND OBJECTIVE BOX
24 hr events:  O/N: SBP 79, , repeat  w/o intervention, multiple secretions, given duonebs  5/8: AM ptt 49.5. Blood in ostomy, per Dr. Gee, dc heparin. Drains pulled x 2.     SUBJECTIVE:  Patient non-communicative    Vital Signs Last 24 Hrs  T(C): 38.2 (09 May 2019 06:00), Max: 38.3 (09 May 2019 01:00)  T(F): 100.8 (09 May 2019 06:00), Max: 100.9 (09 May 2019 01:00)  HR: 120 (09 May 2019 06:55) (110 - 126)  BP: 114/61 (09 May 2019 04:35) (70/46 - 135/56)  BP(mean): 77 (09 May 2019 04:35) (52 - 87)  RR: 21 (09 May 2019 06:55) (17 - 30)  SpO2: 100% (09 May 2019 06:55) (99% - 100%)    Physical Exam:  Gen: cachetic male   Neuro: Opens eyes to verbal stimuli but does not interact or attempt to communicate. Rigid upper extremities.   CV: RRR  Pulm: On ventilator  Abd: ostomy remains pink, viable, patent with dark output, no blood. Midline wound with good granulation tissue.     I&O's Summary  08 May 2019 07:01  -  09 May 2019 07:00  --------------------------------------------------------  IN: 556.5 mL / OUT: 2580 mL / NET: -2023.5 mL    LABS:  PTT - ( 08 May 2019 07:50 )  PTT:49.6 sec    CAPILLARY BLOOD GLUCOSE  POCT Blood Glucose.: 177 mg/dL (08 May 2019 11:06)

## 2019-05-14 LAB
CULTURE RESULTS: SIGNIFICANT CHANGE UP
ORGANISM # SPEC MICROSCOPIC CNT: SIGNIFICANT CHANGE UP
SPECIMEN SOURCE: SIGNIFICANT CHANGE UP

## 2019-05-15 DIAGNOSIS — I10 ESSENTIAL (PRIMARY) HYPERTENSION: ICD-10-CM

## 2019-05-15 DIAGNOSIS — K63.1 PERFORATION OF INTESTINE (NONTRAUMATIC): ICD-10-CM

## 2019-05-15 DIAGNOSIS — J85.2 ABSCESS OF LUNG WITHOUT PNEUMONIA: ICD-10-CM

## 2019-05-15 DIAGNOSIS — K57.10 DIVERTICULOSIS OF SMALL INTESTINE WITHOUT PERFORATION OR ABSCESS WITHOUT BLEEDING: ICD-10-CM

## 2019-05-15 DIAGNOSIS — J96.20 ACUTE AND CHRONIC RESPIRATORY FAILURE, UNSPECIFIED WHETHER WITH HYPOXIA OR HYPERCAPNIA: ICD-10-CM

## 2019-05-15 DIAGNOSIS — J15.212 PNEUMONIA DUE TO METHICILLIN RESISTANT STAPHYLOCOCCUS AUREUS: ICD-10-CM

## 2019-05-15 DIAGNOSIS — K66.0 PERITONEAL ADHESIONS (POSTPROCEDURAL) (POSTINFECTION): ICD-10-CM

## 2019-05-15 DIAGNOSIS — I82.413 ACUTE EMBOLISM AND THROMBOSIS OF FEMORAL VEIN, BILATERAL: ICD-10-CM

## 2019-05-15 DIAGNOSIS — J80 ACUTE RESPIRATORY DISTRESS SYNDROME: ICD-10-CM

## 2019-05-15 DIAGNOSIS — I26.99 OTHER PULMONARY EMBOLISM WITHOUT ACUTE COR PULMONALE: ICD-10-CM

## 2019-05-15 DIAGNOSIS — R10.30 LOWER ABDOMINAL PAIN, UNSPECIFIED: ICD-10-CM

## 2019-05-15 DIAGNOSIS — K21.9 GASTRO-ESOPHAGEAL REFLUX DISEASE WITHOUT ESOPHAGITIS: ICD-10-CM

## 2019-05-15 DIAGNOSIS — A41.9 SEPSIS, UNSPECIFIED ORGANISM: ICD-10-CM

## 2019-05-15 DIAGNOSIS — R62.7 ADULT FAILURE TO THRIVE: ICD-10-CM

## 2019-05-15 DIAGNOSIS — Z78.1 PHYSICAL RESTRAINT STATUS: ICD-10-CM

## 2019-05-15 DIAGNOSIS — Z95.5 PRESENCE OF CORONARY ANGIOPLASTY IMPLANT AND GRAFT: ICD-10-CM

## 2019-05-15 DIAGNOSIS — Z79.02 LONG TERM (CURRENT) USE OF ANTITHROMBOTICS/ANTIPLATELETS: ICD-10-CM

## 2019-05-15 DIAGNOSIS — Z51.5 ENCOUNTER FOR PALLIATIVE CARE: ICD-10-CM

## 2019-05-15 DIAGNOSIS — R65.21 SEVERE SEPSIS WITH SEPTIC SHOCK: ICD-10-CM

## 2019-05-15 DIAGNOSIS — D68.9 COAGULATION DEFECT, UNSPECIFIED: ICD-10-CM

## 2019-05-15 DIAGNOSIS — E87.0 HYPEROSMOLALITY AND HYPERNATREMIA: ICD-10-CM

## 2019-05-15 DIAGNOSIS — Z79.82 LONG TERM (CURRENT) USE OF ASPIRIN: ICD-10-CM

## 2019-05-15 DIAGNOSIS — E43 UNSPECIFIED SEVERE PROTEIN-CALORIE MALNUTRITION: ICD-10-CM

## 2019-05-15 DIAGNOSIS — R18.8 OTHER ASCITES: ICD-10-CM

## 2019-05-15 DIAGNOSIS — Z66 DO NOT RESUSCITATE: ICD-10-CM

## 2019-05-15 DIAGNOSIS — E78.5 HYPERLIPIDEMIA, UNSPECIFIED: ICD-10-CM

## 2019-05-15 DIAGNOSIS — J69.8 PNEUMONITIS DUE TO INHALATION OF OTHER SOLIDS AND LIQUIDS: ICD-10-CM

## 2019-05-15 DIAGNOSIS — K66.8 OTHER SPECIFIED DISORDERS OF PERITONEUM: ICD-10-CM

## 2019-05-15 DIAGNOSIS — K52.9 NONINFECTIVE GASTROENTERITIS AND COLITIS, UNSPECIFIED: ICD-10-CM

## 2019-05-15 DIAGNOSIS — K63.0 ABSCESS OF INTESTINE: ICD-10-CM

## 2019-05-15 DIAGNOSIS — I82.423 ACUTE EMBOLISM AND THROMBOSIS OF ILIAC VEIN, BILATERAL: ICD-10-CM

## 2019-05-15 LAB
CULTURE RESULTS: SIGNIFICANT CHANGE UP
SPECIMEN SOURCE: SIGNIFICANT CHANGE UP

## 2020-01-22 NOTE — CONSULT NOTE ADULT - MINUTES
70 Dupixent Pregnancy And Lactation Text: This medication likely crosses the placenta but the risk for the fetus is uncertain. This medication is excreted in breast milk.

## 2020-02-18 NOTE — PATIENT PROFILE ADULT - NSTOBACCONEVERSMOKERY/N_GEN_A
How Severe Are Your Spot(S)?: mild What Is The Reason For Today's Visit?: Full Body Skin Examination What Is The Reason For Today's Visit? (Being Monitored For X): concerning skin lesions on an annual basis No

## 2020-12-23 NOTE — ED ADULT NURSE NOTE - CAS DISCH BELONGINGS RETURNED
"Subjective   Sapna Murdock is a 54 y.o. female.     History of Present Illness     Chief Complaint:   Chief Complaint   Patient presents with   • ADHD       Sapna Murdock 54 y.o. female who presents today for Medical Management of the below listed issues and medication refills.  she has a problem list of   Patient Active Problem List   Diagnosis   • Asthma   • Attention deficit hyperactivity disorder (ADHD), predominantly inattentive type   • Elevated blood pressure   • Anxiety   • Primary insomnia   • Encounter for screening for malignant neoplasm of colon   • Moderate episode of recurrent major depressive disorder (CMS/HCC)   .  Since the last visit, she has overall felt well.  she has been compliant with   Current Outpatient Medications:   •  ALBUTEROL SULFATE  (90 Base) MCG/ACT inhaler, INHALE 2 PUFF BY MOUTH EVERY 4 HOURS AS NEEDED FOR WHEEZE, Disp: 108 g, Rfl: 0  •  amphetamine-dextroamphetamine (Adderall) 10 MG tablet, Take 1 tablet by mouth Daily., Disp: 90 tablet, Rfl: 0  •  Fluticasone Furoate-Vilanterol (Breo Ellipta) 100-25 MCG/INH inhaler, Inhale 1 puff Daily., Disp: 3 each, Rfl: 3  •  hydrOXYzine (ATARAX) 25 MG tablet, Take 1-2 tabs QHS prn sleep, Disp: 60 tablet, Rfl: 5  •  lisdexamfetamine (Vyvanse) 60 MG capsule, Take 1 capsule by mouth Every Morning, Disp: 90 capsule, Rfl: 0  •  norethindrone-ethinyl estradiol (Femhrt Low Dose) 0.5-2.5 MG-MCG per tablet, Take 1 tablet by mouth Daily., Disp: 30 tablet, Rfl: 11  •  PROVENTIL  (90 Base) MCG/ACT inhaler, INHALE 2 PUFFS BY MOUTH EVERY 6 HOURS AS NEEDED FOR WHEEZING, Disp: 6.7 g, Rfl: 0  •  sertraline (ZOLOFT) 100 MG tablet, TAKE 1 TABLET BY MOUTH DAILY, Disp: 90 tablet, Rfl: 0.  she denies medication side effects.    All of the other chronic condition(s) listed above are stable w/o issues.    /86   Pulse 94   Temp 97.8 °F (36.6 °C) (Oral)   Resp 18   Ht 162.6 cm (64\")   Wt 80.3 kg (177 lb)   BMI 30.38 kg/m²     Results " for orders placed or performed in visit on 11/16/20   QuantiFERON TB Gold    Specimen: Blood   Result Value Ref Range    QuantiFERON Incubation Incubation performed.     QuantiFERON Criteria Comment     QUANTIFERON TB1 AG VALUE 0.06 IU/mL    QUANTIFERON TB2 AG VALUE 0.04 IU/mL    QuantiFERON Nil Value 0.03 IU/mL    QuantiFERON Mitogen Value >10.00 IU/mL    QUANTIFERON-TB GOLD PLUS Negative Negative           The following portions of the patient's history were reviewed and updated as appropriate: allergies, current medications, past family history, past medical history, past social history, past surgical history and problem list.    Review of Systems   Constitutional: Negative for activity change, chills and fever.   Respiratory: Negative for cough.    Cardiovascular: Negative for chest pain.   Psychiatric/Behavioral: Negative for dysphoric mood.       Objective   Physical Exam  Constitutional:       General: She is not in acute distress.     Appearance: She is well-developed.   Pulmonary:      Effort: Pulmonary effort is normal.   Neurological:      Mental Status: She is alert and oriented to person, place, and time.   Psychiatric:         Behavior: Behavior normal.         Thought Content: Thought content normal.       The patient has read and signed the [facility] Controlled Substance Contract.  I will continue to see patient for regular follow up appointments.  They are well controlled on their medication.  GRAYSON has been reviewed by me and is updated every 3 months. The patient is aware of the potential for addiction and dependence.    Assessment/Plan   Diagnoses and all orders for this visit:    1. Attention deficit hyperactivity disorder (ADHD), predominantly inattentive type (Primary)  -     amphetamine-dextroamphetamine (Adderall) 10 MG tablet; Take 1 tablet by mouth Daily.  Dispense: 90 tablet; Refill: 0  -     lisdexamfetamine (Vyvanse) 60 MG capsule; Take 1 capsule by mouth Every Morning  Dispense:  90 capsule; Refill: 0    2. Mild intermittent asthma without complication  -     Fluticasone Furoate-Vilanterol (Breo Ellipta) 100-25 MCG/INH inhaler; Inhale 1 puff Daily.  Dispense: 3 each; Refill: 3                Yes

## 2020-12-28 NOTE — H&P PST ADULT - RESPIRATORY AND THORAX COMMENTS
Pt to ED for c/o left sided upper abd/chest pain onset 1 week ago, worsening with laying down. Pt also states abd swelling and leda leg/foot swelling started 1 week ago as well.  Pt attached to cardiac monitor, NSR noted, IV access obtained and blood work se mild dyspnea while walking up 3 flights of stairs

## 2021-02-10 NOTE — PHYSICAL THERAPY INITIAL EVALUATION ADULT - LEVEL OF INDEPENDENCE: STAIR NEGOTIATION, REHAB EVAL
Sepsis Note   Nae Munoz 79 y o  female MRN: [id number]  Unit/Bed#: ED 15 Encounter: [id number]      qSOFA     [address] Name 02/10/21 1522 02/10/21 1518             Altered mental status GCS < 15  --  --       Respiratory Rate > / =22  --  0       Systolic BP < / =233  0  --       Q Sofa Score  0  --           Initial Sepsis Screening     Row Name 02/10/21 1758 02/10/21 1756             Is the patient's history suggestive of a new or worsening infection? (!) Yes (Proceed)  -JG  (!) Yes (Proceed)  -JG       Suspected source of infection  suspect infection, source unknown  -JG  suspect infection, source unknown  -JG       Are two or more of the following signs & symptoms of infection both present and new to the patient?  --  (!) Yes (Proceed)  -JG       Indicate SIRS criteria  Tachypnea > 20 resp per min  -JG  Tachycardia > 90 bpm  -JG       If the answer is yes to both questions, suspicion of sepsis is present  --  --       If severe sepsis is present AND tissue hypoperfusion perists in the hour after fluid resuscitation or lactate > 4, the patient meets criteria for SEPTIC SHOCK  --  --       Are any of the following organ dysfunction criteria present within 6 hours of suspected infection and SIRS criteria that are NOT considered to be chronic conditions?   (!) Yes  -JG  --       Organ dysfunction  Lactate >/equal 4 0 mmol/L  -JG  --       Date of presentation of severe sepsis  --  --       Time of presentation of severe sepsis  --  --       Tissue hypoperfusion persists in the hour after crystalloid fluid administration, evidenced, by either:  --  --       Was hypotension present within one hour of the conclusion of crystalloid fluid administration?  --  --       Date of presentation of septic shock  --  --       Time of presentation of septic shock  --  --         User Key  (r) = Recorded By, (t) = Taken By, (c) = Cosigned By    [address] Name Provider Type    [address] Oneil PA-C Physician Assistant NA

## 2022-04-02 NOTE — PROGRESS NOTE ADULT - ATTENDING COMMENTS
Polymicrobial peritonitis s/p washout 4/7; LLL lung abscess (no empyema) likely 2/2 MRSA. Continue vancomycin (increase to 1.25g IV q12h and repeat trough Sunday--goal 12-18), cefepime, Flagyl through ~4/20/19. f/u GOC.  Please reconsult with ?  ID Team 1 Polymicrobial peritonitis s/p washout 4/7; LLL lung abscess (no empyema) likely 2/2 MRSA. Continue vancomycin (increase to 1.25g IV q12h and repeat trough Sunday--goal 12-18)--anticipate through 5/4/19 for lung abscess; continue cefepime, Flagyl through ~4/20/19. No ID contraindication to PICC. f/u GOC.  Please reconsult with ?  ID Team 1 Yes

## 2022-04-18 NOTE — PROGRESS NOTE ADULT - ASSESSMENT
85 yo M with history of b/l laparoscopic robotic-assisted inguinal hernia repair presenting with b/l segmental pulmonary embolisms, left lung abscess, pneumoperitoneum, and distal descending colon abscess (likely source of pneumoperitoneum), significant iliofemoral DVT burden, s/p IVCF placement and IR drainage of LLQ abdominal collection (4/5), s/p ex-lap, LAURYN, sigmoidectomy, drain placement in R. paracolic gutter, and abthera vac placement (4/7), now s/p planned RTOR, packing removal, and end colostomy creation (4/10), with pleural effusions and diffuse edema, febrile overnight likely 2/2 empyema.    Agree with CT chest to evaluate need for drainage of pleural effusion and empyema.  Care per SICU team.  Case discussed with chief resident.  Team 1C will continue to follow. reports nad

## 2023-02-15 NOTE — PROGRESS NOTE ADULT - NSHPATTENDINGPLANDISCUSS_GEN_ALL_CORE
Patients wife Marisel Palmer is requesting a return call from [facility]. She states she called yesterday, and no one returned her call. Please return her call. Thank you!
Patients wife called and left voice message to report they received a new monitor and she requested a test carelink be scheduled for tomorrow. Carelink schedule. Will notify them when transmission arrives.
primary team
sister
Dr. Marin
Dr. Valencia
Dr. Valencia
as above
Palliative SW

## 2023-05-05 NOTE — AIRWAY REMOVAL NOTE  ADULT & PEDS - RESPIRATORY RHYTHM/PATTERN
Patient is scheduled for [facility] on 5/22/23  Will submit new PA       Working on scheduling medication delivery in other encounter       The following labs are required and have been entered per protocol:    CMP  CBC  Immunoglobulins  HIV  Hepatitis  Quantiferon TB Gold     Mychart message sent to pt reminding him of needed labs  no shortness of breath

## 2023-11-15 NOTE — PROGRESS NOTE ADULT - ASSESSMENT
87 yo M with history of b/l laparoscopic robotic-assisted inguinal hernia repair presenting with b/l segmental pulmonary embolisms, left lung abscess, pneumoperitoneum, and distal descending colon abscess (likely source of pneumoperitoneum), significant iliofemoral DVT burden, s/p IVCF placement and IR drainage of LLQ abdominal collection (4/5), s/p ex-lap, LAURYN, sigmoidectomy, drain placement in R. paracolic gutter, and abthera vac placement (4/7), s/p planned RTOR, packing removal, and end colostomy creation (4/10), now w/ MRSA + proteus PNA, persistent coagulopathy, LLL pulmonary empyema s/p bedside pigtail drainage (4/15)    Neuro: Fentanyl prn  CV: levo for MAP>65; fluid overloaded w/ anasarca. been getting lasix diuresis; albumin 25%q8hrs, cont IV lasix 40 PRN, asa 81 lipitor, statin  Pulm:  bilateral PE's - currently not on ac due to bleeding risk; Empyema- s/p left CTx on 4/15, tolerated CPAP, will extubate today.   GI: NPO, dobhoff glucerna 1.5@50/hr; malnourished, repeat prealbumin better ; Vit C, zinc supplementation; GI consulted for w/u for persistent elevated INR.   : rao for strict I&O while getting agressive diuresis.   ID: +kleb, proteus, strep in abd Cx, flagyl (4/9-4/20), cefepime (4/9-4/20); Sputum cx: MRSA and proteus (sensitive to cefepine) , vancomycin (4/10-5/4) [Dc'd//ceftriaxone (4/9-4/9)// unasyn (4/8-4/9),  Vanco (4/5-4/7), Zosyn (4/5-4/8)]  Endo: ISS  Heme: bilateral iliofemoral DVT / bilateral PE; s/p IVC Filter; elevated INR s/p multiple vit K,      Mixing study wnr; factor studies pending  PPx: SCDs; SQH  Lines: PIVs, PICC (4/6--), L A line (4/13--) /// D/C: R cordis (4/7-4/11)   Wounds/drains: LLQ IR drain (4/5 -- ), LUQ PHYLLIS (4/7 -- ), R paracolic gutter drain (4/7 --) --- all drains to LIWS; abdominal incision SQ space wet to dry; vac [facility] (4/10 --)  PT/OT: early mob 4/11 - worked with PT. 85 yo M with history of b/l laparoscopic robotic-assisted inguinal hernia repair presenting with b/l segmental pulmonary embolisms, left lung abscess, pneumoperitoneum, and distal descending colon abscess (likely source of pneumoperitoneum), significant iliofemoral DVT burden, s/p IVCF placement and IR drainage of LLQ abdominal collection (4/5), s/p ex-lap, LAURYN, sigmoidectomy, drain placement in R. paracolic gutter, and abthera vac placement (4/7), s/p planned RTOR, packing removal, and end colostomy creation (4/10), now w/ MRSA + proteus PNA, persistent coagulopathy, LLL pulmonary empyema s/p bedside pigtail drainage (4/15)    Neuro: Fentanyl prn  CV: levo for MAP>65, been on midodrine 10q8h, will incr dose; fluid overloaded w/ anasarca. been getting lasix diuresis; albumin 25%q8hrs, cont IV lasix 40 PRN, asa 81 lipitor, statin  Pulm:  bilateral PE's - currently not on ac due to bleeding risk; Empyema- s/p left CTx on 4/15, tolerated CPAP, will extubate today.   GI: NPO, dobhoff glucerna 1.5@50/hr; malnourished, repeat prealbumin better ; Vit C, zinc supplementation; GI consulted for w/u for persistent elevated INR.   : rao for strict I&O while getting agressive diuresis.   ID: +kleb, proteus, strep in abd Cx, flagyl (4/9-4/20), cefepime (4/9-4/20); Sputum cx: MRSA and proteus (sensitive to cefepine) , vancomycin (4/10-5/4) [Dc'd//ceftriaxone (4/9-4/9)// unasyn (4/8-4/9),  Vanco (4/5-4/7), Zosyn (4/5-4/8)]  Endo: ISS  Heme: bilateral iliofemoral DVT / bilateral PE; s/p IVC Filter; elevated INR s/p multiple vit K, Heme consulted - Mixing study wnl; factor studies pending  PPx: SCDs; SQH  Lines: PIVs, PICC (4/6--), L A line (4/13--) /// D/C: R cordis (4/7-4/11)   Wounds/drains: LLQ IR drain (4/5 -- ), LUQ PHYLLIS (4/7 -- ), R paracolic gutter drain (4/7 --) --- all drains to LIWS; abdominal incision SQ space wet to dry  PT/OT: early mob 4/11 - worked with PT. OOB to stretcher chair daily. Otezla Pregnancy And Lactation Text: This medication is Pregnancy Category C and it isn't known if it is safe during pregnancy. It is unknown if it is excreted in breast milk.

## 2023-11-17 NOTE — ED ADULT TRIAGE NOTE - RESPIRATORY RATE (BREATHS/MIN)
Spoke with wife and she said he is seeing his primary doctor at the [facility] on Monday. He is already scheduled with you on 12/04. The soonest I would have an opening for you would be 11/29 and Dr Jessica Oneill is also booked for the next few weeks. 18

## 2024-03-24 NOTE — PROGRESS NOTE ADULT - PROBLEM SELECTOR PLAN 1
Leak has improved on chest tube but it is still on suction. There is still a large SQ emphysema, although that has improved since his prior film. 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 Would still keep the chest tube to suction until subcutaneous emphysema resolves. May trial water seal afterwards but we would need to be very cautious with it.    Recommend  - Continue to drain chest tube on low constant suction - apply least amount of suction to maintain lung inflation. If there is no resolution of the leak, may need a more definitive measure to close BPF.  - Antibiotics to continue per cultures. No

## 2024-04-16 NOTE — PROGRESS NOTE ADULT - PROBLEM/PLAN-1
DISPLAY PLAN FREE TEXT
Tamar Linder

## 2024-04-30 LAB
-  AMPICILLIN: SIGNIFICANT CHANGE UP
-  CEFEPIME: SIGNIFICANT CHANGE UP
-  CEFTRIAXONE: SIGNIFICANT CHANGE UP
CULTURE RESULTS: ABNORMAL
GRAM STN FLD: ABNORMAL
ORGANISM # SPEC MICROSCOPIC CNT: ABNORMAL

## 2024-12-10 NOTE — ED ADULT NURSE NOTE - NS ED NURSE LEVEL OF CONSCIOUSNESS ORIENTATION
Case Management Discharge Planning Note    Patient name Chuy Norton  Location /-01 MRN [id number]  : 1961 Date 12/10/2024       Current Admission Date: 2024  Current Admission Diagnosis:Sepsis due to pneumonia (HCC)   Patient Active Problem List    Diagnosis Date Noted Date Diagnosed    Sepsis due to pneumonia (HCC) 2024     Thrombocytopenia (HCC) 2024     Acute respiratory failure with hypoxia (HCC) 2024     PUD (peptic ulcer disease) 2024     Abnormal CXR 2024     Exertional dyspnea 2024     Hepatitis C 05/15/2024     Acute pain of right knee 2024     Acute left-sided low back pain without sciatica 2024     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     Cirrhosis (HCC) 2024     Iron deficiency anemia 2024     History of CVA (cerebrovascular accident) 2021     Acute metabolic encephalopathy 2021     SIRS (systemic inflammatory response syndrome) (HCC) 06/15/2021     Patellofemoral pain syndrome of right knee 2020     Elevated d-dimer 2019     Left hip pain 2019     Encounter for medical examination to establish care 10/10/2018     Bipolar 2 disorder, major depressive episode (HCC) 2018     Attention deficit hyperactivity disorder (ADHD), combined type 2018     Hiatal hernia with GERD without esophagitis 2015     Hyperlipidemia 2015     Knee pain 2015     Spinal stenosis of lumbar region 2015     Tobacco dependence syndrome 2015     Vitamin D deficiency 2015     Anxiety disorder 2015     Depressive disorder 2013     Low back pain 2013     Benign essential hypertension 10/18/2012     Alcohol dependence in remission (HCC) 2011       LOS (days): 2  Geometric Mean LOS (GMLOS) (days):   Days to GMLOS:     OBJECTIVE:  Risk of Unplanned Readmission Score: 25.23         Current admission status: Inpatient   Preferred Pharmacy:   [facility] - [place] - [address]  [address]  [place] [address]  Phone: [phone] Fax: [phone]    Primary Care Provider: Savanna Pan DO    Primary Insurance: [employer]  Secondary Insurance: JOEL CRAWFORD [facility]    DISCHARGE DETAILS:     DERIC spoke with Mari from [facility] and pt can be discharged back. She will call CM with a  time. She asked that AVS be faxed to her at [phone]. She also asked about frequency of labs for coumadin. DERIC relayed same to MD via secure chat.                                                                                                                     Oriented - self; Oriented - place; Oriented - time
